# Patient Record
Sex: FEMALE | Race: WHITE | NOT HISPANIC OR LATINO | Employment: OTHER | URBAN - METROPOLITAN AREA
[De-identification: names, ages, dates, MRNs, and addresses within clinical notes are randomized per-mention and may not be internally consistent; named-entity substitution may affect disease eponyms.]

---

## 2023-03-30 DIAGNOSIS — I10 HYPERTENSION, UNSPECIFIED TYPE: Primary | ICD-10-CM

## 2023-04-03 DIAGNOSIS — M54.6 RIGHT-SIDED THORACIC BACK PAIN, UNSPECIFIED CHRONICITY: Primary | ICD-10-CM

## 2023-04-03 RX ORDER — HYDROCODONE BITARTRATE AND ACETAMINOPHEN 5; 325 MG/1; MG/1
TABLET ORAL
COMMUNITY
Start: 2023-03-06 | End: 2023-07-11 | Stop reason: ALTCHOICE

## 2023-04-03 RX ORDER — POTASSIUM CHLORIDE 750 MG/1
10 TABLET, EXTENDED RELEASE ORAL
COMMUNITY
Start: 2021-04-09 | End: 2023-07-11 | Stop reason: SDUPTHER

## 2023-04-03 RX ORDER — AMLODIPINE BESYLATE 10 MG/1
10 TABLET ORAL DAILY
COMMUNITY
Start: 2017-04-06 | End: 2023-07-11 | Stop reason: SDUPTHER

## 2023-04-03 RX ORDER — IBUPROFEN 800 MG/1
800 TABLET ORAL 3 TIMES DAILY
Qty: 270 TABLET | Refills: 1 | Status: SHIPPED | OUTPATIENT
Start: 2023-04-03 | End: 2023-06-16

## 2023-04-03 RX ORDER — HYDROCHLOROTHIAZIDE 25 MG/1
25 TABLET ORAL DAILY
COMMUNITY
Start: 2012-07-13 | End: 2023-07-11 | Stop reason: SDUPTHER

## 2023-04-03 RX ORDER — ATENOLOL 50 MG/1
50 TABLET ORAL 2 TIMES DAILY
Qty: 180 TABLET | Refills: 1 | Status: SHIPPED | OUTPATIENT
Start: 2023-04-03 | End: 2023-07-03

## 2023-04-03 RX ORDER — HYDROCHLOROTHIAZIDE 25 MG/1
25 TABLET ORAL DAILY
Qty: 90 TABLET | Refills: 1 | Status: SHIPPED | OUTPATIENT
Start: 2023-04-03 | End: 2023-07-03

## 2023-04-03 RX ORDER — ATENOLOL 50 MG/1
50 TABLET ORAL 2 TIMES DAILY
COMMUNITY
Start: 2012-09-26 | End: 2023-07-11 | Stop reason: SDUPTHER

## 2023-04-03 RX ORDER — AMLODIPINE BESYLATE 10 MG/1
10 TABLET ORAL DAILY
Qty: 90 TABLET | Refills: 1 | Status: SHIPPED | OUTPATIENT
Start: 2023-04-03 | End: 2023-07-03

## 2023-04-03 RX ORDER — IBUPROFEN 800 MG/1
800 TABLET ORAL 3 TIMES DAILY
COMMUNITY
Start: 2017-01-09 | End: 2023-04-03

## 2023-04-03 NOTE — TELEPHONE ENCOUNTER
Requested Prescriptions     Pending Prescriptions Disp Refills    atenolol (Tenormin) 50 mg tablet [Pharmacy Med Name: Atenolol 50 MG Oral Tablet] 180 tablet 1     Sig: Take 1 tablet (50 mg) by mouth in the morning and 1 tablet (50 mg) before bedtime.    amLODIPine (Norvasc) 10 mg tablet [Pharmacy Med Name: amLODIPine Besylate 10 MG Oral Tablet] 90 tablet 1     Sig: Take 1 tablet (10 mg) by mouth once daily.    hydroCHLOROthiazide (HYDRODiuril) 25 mg tablet [Pharmacy Med Name: hydroCHLOROthiazide 25 MG Oral Tablet] 90 tablet 1     Sig: Take 1 tablet (25 mg) by mouth once daily.

## 2023-04-03 NOTE — TELEPHONE ENCOUNTER
Requested Prescriptions     Pending Prescriptions Disp Refills    ibuprofen 800 mg tablet [Pharmacy Med Name: Ibuprofen 800 MG Oral Tablet] 270 tablet 1     Sig: Take 1 tablet (800 mg) by mouth in the morning and 1 tablet (800 mg) in the evening and 1 tablet (800 mg) before bedtime.

## 2023-06-21 DIAGNOSIS — E87.6 HYPOKALEMIA: Primary | ICD-10-CM

## 2023-06-21 RX ORDER — POTASSIUM CHLORIDE 750 MG/1
50 TABLET, FILM COATED, EXTENDED RELEASE ORAL DAILY
Qty: 450 TABLET | Refills: 0 | Status: SHIPPED | OUTPATIENT
Start: 2023-06-21 | End: 2023-07-11 | Stop reason: SDUPTHER

## 2023-06-21 NOTE — TELEPHONE ENCOUNTER
Pharmacy requests prescription below    Last Office Visit: 01/24/2023     Requested Prescriptions     Pending Prescriptions Disp Refills    potassium chloride CR 10 mEq ER tablet [Pharmacy Med Name: Potassium Chloride Roro ER 10 MEQ Oral Tablet Extended Release] 500 tablet 2     Sig: TAKE 5 TABLETS BY MOUTH DAILY

## 2023-07-11 ENCOUNTER — OFFICE VISIT (OUTPATIENT)
Dept: PRIMARY CARE | Facility: CLINIC | Age: 68
End: 2023-07-11
Payer: MEDICARE

## 2023-07-11 VITALS
TEMPERATURE: 97.7 F | BODY MASS INDEX: 33.84 KG/M2 | HEART RATE: 70 BPM | DIASTOLIC BLOOD PRESSURE: 86 MMHG | OXYGEN SATURATION: 95 % | SYSTOLIC BLOOD PRESSURE: 142 MMHG | RESPIRATION RATE: 18 BRPM | WEIGHT: 185 LBS

## 2023-07-11 DIAGNOSIS — E87.6 HYPOKALEMIA: ICD-10-CM

## 2023-07-11 DIAGNOSIS — M54.6 RIGHT-SIDED THORACIC BACK PAIN, UNSPECIFIED CHRONICITY: ICD-10-CM

## 2023-07-11 DIAGNOSIS — I10 HYPERTENSION, UNSPECIFIED TYPE: Primary | ICD-10-CM

## 2023-07-11 PROCEDURE — 99213 OFFICE O/P EST LOW 20 MIN: CPT | Performed by: FAMILY MEDICINE

## 2023-07-11 RX ORDER — HYDROCHLOROTHIAZIDE 25 MG/1
25 TABLET ORAL DAILY
Qty: 90 TABLET | Refills: 1 | Status: SHIPPED | OUTPATIENT
Start: 2023-07-11 | End: 2023-09-15

## 2023-07-11 RX ORDER — AMLODIPINE BESYLATE 10 MG/1
10 TABLET ORAL DAILY
Qty: 90 TABLET | Refills: 1 | Status: SHIPPED | OUTPATIENT
Start: 2023-07-11 | End: 2023-09-15

## 2023-07-11 RX ORDER — IBUPROFEN 800 MG/1
800 TABLET ORAL 3 TIMES DAILY
Qty: 270 TABLET | Refills: 1 | Status: SHIPPED | OUTPATIENT
Start: 2023-07-11 | End: 2023-09-27 | Stop reason: SDUPTHER

## 2023-07-11 RX ORDER — CHOLECALCIFEROL (VITAMIN D3) 50 MCG
TABLET ORAL
COMMUNITY
End: 2024-04-12 | Stop reason: SDUPTHER

## 2023-07-11 RX ORDER — ASCORBIC ACID 250 MG
TABLET ORAL
COMMUNITY
End: 2024-04-12 | Stop reason: SDUPTHER

## 2023-07-11 RX ORDER — POTASSIUM CHLORIDE 750 MG/1
50 TABLET, FILM COATED, EXTENDED RELEASE ORAL DAILY
Qty: 450 TABLET | Refills: 1 | Status: SHIPPED | OUTPATIENT
Start: 2023-07-11 | End: 2023-09-20

## 2023-07-11 RX ORDER — IBUPROFEN 200 MG
1 TABLET ORAL DAILY
COMMUNITY
Start: 2023-04-24 | End: 2024-05-15 | Stop reason: ENTERED-IN-ERROR

## 2023-07-11 RX ORDER — ATENOLOL 50 MG/1
50 TABLET ORAL 2 TIMES DAILY
Qty: 180 TABLET | Refills: 1 | Status: SHIPPED | OUTPATIENT
Start: 2023-07-11 | End: 2023-09-15

## 2023-07-11 NOTE — PROGRESS NOTES
Subjective   Patient ID: Klaudia Ratliff is a 67 y.o. female who presents for Med Refill.    HPI   Patient comes in today for 6-month checkup and refill of medication.  She is doing well and is currently without complaints.  She is taking all her medicines as directed without side effects.        1/24/2023  Patient comes in today for Medicare wellness exam. She is currently without complaints. She is going to have a great grandchild in June and she is currently smoking and would like to quit. She is currently smoking less than a pack a day. She needs refills on all of her medications. Her colonoscopy and mammograms are up-to-date. She refuses all vaccines.    2/102022  Patient presents today for 6-month checkup and refill of meds. She currently is without complaints. She states that she is taking her medicines as directed and is not having any side effects from them. She has a new mail away prescription service with [facility] and needs all of her medicines sent there. She is also eligible for a welcome to Medicare wellness exam which we will schedule in August.    Patient's blood pressure today is high in the office but she claims that she is getting readings 130/80 or less at home. She has an arm cuff and checks her blood pressure once a week right now.    8/10/2021  Patient comes in today for an outbreak of her lichen planus. She usually sees dermatology for this but they will not even schedule her because she currently does not have health insurance. Apparently she will not have health insurance until her birthday next month 9/3/2020. In the meantime the outbreak is fairly extensive on both arms and both legs. She brings with her the medication that they usually gave her and we will refill it for her today.    3/24/2021  Patient presents today for 6-month checkup and refill of meds. She currently is without complaints. She states that she is taking her medicines as directed and is not having any side effects  "from them. She is very excited about a program called [employer] which costs her about $400 a month for the food she has to buy. She started back on 2021 with her daughter and apparently it is working very well for her as she has lost 19 pounds so far. Despite her weight loss however her blood pressure is still high even on recheck. She claims she has been taking her medicine as directed.    2020  Patient comes in today for evaluation of pain in her lower back into the buttock area. She works in a printing company running 2 different processes. 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 She claims it didn't start until she started running the \"high dye machine\". She claims she doesn't have good health insurance but she does have a HSA. She is waiting to get on Medicare a year from now. Her   in  from a motor vehicle accident.    3/26/2020  Patient calls in today as a telephone visit as she does not have access to a cell phone or Internet video capability. We are currently in the middle of a coronavirus pandemic worldwide and normally she would've been brought into the office for her current complaints. He is transitioning in the care. She was seen at the [facility] express clinic on 3/8/2020 where she was diagnosed with a UTI. She was placed on Keflex 500 mg 2 times a day for 7 days and she finished the medicine 10 days ago and does not feel like her symptoms ever went away. All nonessential business in [place] has been shut down but patient works for a printing company that has been designated to stay open. She is having tingling and pressure with urination but denies frequency or urgency.    3/26/2020  Patient comes in today she is very excited. She is moving from [facility] to a house in [place] next week. She technically has the apartment until November because she is breaking her lease. She is here today for checkup and refill of " her medicine. She doesn't want to do the lab work etc. right now because of its expense.     Review of Systems   All other systems reviewed and are negative.      Objective   BP (!) 163/91   Pulse 70   Temp 36.5 °C (97.7 °F)   Resp 18   Wt 83.9 kg (185 lb)   SpO2 95%   BMI 33.84 kg/m²     Physical Exam  Vitals reviewed.   Constitutional:       Appearance: She is well-developed. She is obese.   HENT:      Head: Normocephalic and atraumatic.      Right Ear: Tympanic membrane, ear canal and external ear normal.      Left Ear: Tympanic membrane, ear canal and external ear normal.      Nose: Nose normal.      Mouth/Throat:      Mouth: Mucous membranes are moist.      Pharynx: Oropharynx is clear.   Eyes:      Extraocular Movements: Extraocular movements intact.      Conjunctiva/sclera: Conjunctivae normal.      Pupils: Pupils are equal, round, and reactive to light.   Cardiovascular:      Rate and Rhythm: Normal rate and regular rhythm.      Heart sounds: Normal heart sounds. No murmur heard.  Pulmonary:      Effort: Pulmonary effort is normal.      Breath sounds: Normal breath sounds. No wheezing.   Abdominal:      General: Abdomen is flat. Bowel sounds are normal.      Palpations: Abdomen is soft.   Musculoskeletal:         General: Normal range of motion.   Skin:     General: Skin is warm and dry.   Neurological:      General: No focal deficit present.      Mental Status: She is alert and oriented to person, place, and time. Mental status is at baseline.   Psychiatric:         Mood and Affect: Mood normal.         Behavior: Behavior normal.         Thought Content: Thought content normal.         Judgment: Judgment normal.         Assessment/Plan   Problem List Items Addressed This Visit    None  Visit Diagnoses       Hypertension, unspecified type    -  Primary    Relevant Medications    amLODIPine (Norvasc) 10 mg tablet    atenolol (Tenormin) 50 mg tablet    hydroCHLOROthiazide (HYDRODiuril) 25 mg tablet     Right-sided thoracic back pain, unspecified chronicity        Relevant Medications    ibuprofen 800 mg tablet    Hypokalemia        Relevant Medications    potassium chloride CR 10 mEq ER tablet        Continue current meds as directed.  Follow up in 6 months for recheck if all remains stable, sooner if problems arise.  Medication list reconciled.  Thank you for visiting today!      Professional services: Some of this note was completed using Dragon voice technology and sometimes the software misinterprets words. This may include unintended errors with respect to translation of words, typographical errors or grammar errors which may not have been identified prior to finalization of the chart note. Please take this into account when reading the note. Thank you.

## 2023-07-11 NOTE — PROGRESS NOTES
Subjective   Patient ID: Klaudia Ratliff is a 67 y.o. female who presents for Med Refill.    HPI     Review of Systems    Objective   BP (!) 163/91   Pulse 70   Temp 36.5 °C (97.7 °F)   Resp 18   Wt 83.9 kg (185 lb)   SpO2 95%   BMI 33.84 kg/m²     Physical Exam    Assessment/Plan

## 2023-08-15 ENCOUNTER — OFFICE VISIT (OUTPATIENT)
Dept: PRIMARY CARE | Facility: CLINIC | Age: 68
End: 2023-08-15
Payer: MEDICARE

## 2023-08-15 ENCOUNTER — LAB (OUTPATIENT)
Dept: LAB | Facility: LAB | Age: 68
End: 2023-08-15
Payer: MEDICARE

## 2023-08-15 VITALS
HEART RATE: 64 BPM | WEIGHT: 185 LBS | TEMPERATURE: 97.7 F | OXYGEN SATURATION: 97 % | SYSTOLIC BLOOD PRESSURE: 156 MMHG | RESPIRATION RATE: 18 BRPM | DIASTOLIC BLOOD PRESSURE: 83 MMHG | BODY MASS INDEX: 33.84 KG/M2

## 2023-08-15 DIAGNOSIS — M25.552 BILATERAL HIP PAIN: Primary | ICD-10-CM

## 2023-08-15 DIAGNOSIS — E87.6 HYPOKALEMIA: ICD-10-CM

## 2023-08-15 DIAGNOSIS — M25.551 BILATERAL HIP PAIN: Primary | ICD-10-CM

## 2023-08-15 LAB
ALANINE AMINOTRANSFERASE (SGPT) (U/L) IN SER/PLAS: 13 U/L (ref 7–45)
ALBUMIN (G/DL) IN SER/PLAS: 3.9 G/DL (ref 3.4–5)
ALKALINE PHOSPHATASE (U/L) IN SER/PLAS: 42 U/L (ref 33–136)
ANION GAP IN SER/PLAS: 15 MMOL/L (ref 10–20)
ASPARTATE AMINOTRANSFERASE (SGOT) (U/L) IN SER/PLAS: 16 U/L (ref 9–39)
BILIRUBIN TOTAL (MG/DL) IN SER/PLAS: 0.5 MG/DL (ref 0–1.2)
CALCIUM (MG/DL) IN SER/PLAS: 9.5 MG/DL (ref 8.6–10.3)
CARBON DIOXIDE, TOTAL (MMOL/L) IN SER/PLAS: 26 MMOL/L (ref 21–32)
CHLORIDE (MMOL/L) IN SER/PLAS: 102 MMOL/L (ref 98–107)
CREATININE (MG/DL) IN SER/PLAS: 0.98 MG/DL (ref 0.5–1.05)
GFR FEMALE: 63 ML/MIN/1.73M2
GLUCOSE (MG/DL) IN SER/PLAS: 78 MG/DL (ref 74–99)
POTASSIUM (MMOL/L) IN SER/PLAS: 4.7 MMOL/L (ref 3.5–5.3)
PROTEIN TOTAL: 6.9 G/DL (ref 6.4–8.2)
SODIUM (MMOL/L) IN SER/PLAS: 138 MMOL/L (ref 136–145)
UREA NITROGEN (MG/DL) IN SER/PLAS: 25 MG/DL (ref 6–23)

## 2023-08-15 PROCEDURE — 99214 OFFICE O/P EST MOD 30 MIN: CPT | Performed by: FAMILY MEDICINE

## 2023-08-15 PROCEDURE — 80053 COMPREHEN METABOLIC PANEL: CPT

## 2023-08-15 PROCEDURE — 36415 COLL VENOUS BLD VENIPUNCTURE: CPT

## 2023-08-15 RX ORDER — PREDNISONE 10 MG/1
TABLET ORAL
Qty: 30 TABLET | Refills: 0 | Status: SHIPPED | OUTPATIENT
Start: 2023-08-15 | End: 2023-11-01 | Stop reason: ALTCHOICE

## 2023-08-15 NOTE — PROGRESS NOTES
Subjective   Patient ID: Klaudia Ratliff is a 67 y.o. female who presents for Gait Problem (Having trouble walking x a couple mos. Not very active and feels like every 100 steps her hips would freeze up. She increase her daily activity but now the aches and pains are worse and burning. ).    HPI   Patient comes in today complaining of gait problems for the last month.  She complains of aches and pains in both hips.  She admits that she is not very active and would like to try increasing her activity but noticed when she tried to do that the aches and pains were worse.    7/11/2023  Patient comes in today for 6-month checkup and refill of medication.  She is doing well and is currently without complaints.  She is taking all her medicines as directed without side effects.      Review of Systems   All other systems reviewed and are negative.      Objective   /83   Pulse 64   Temp 36.5 °C (97.7 °F)   Resp 18   Wt 83.9 kg (185 lb)   SpO2 97%   BMI 33.84 kg/m²     Physical Exam  Vitals reviewed.   Constitutional:       Appearance: She is well-developed.   HENT:      Head: Normocephalic and atraumatic.      Right Ear: Tympanic membrane, ear canal and external ear normal.      Left Ear: Tympanic membrane, ear canal and external ear normal.      Nose: Nose normal.      Mouth/Throat:      Mouth: Mucous membranes are moist.      Pharynx: Oropharynx is clear.   Eyes:      Extraocular Movements: Extraocular movements intact.      Conjunctiva/sclera: Conjunctivae normal.      Pupils: Pupils are equal, round, and reactive to light.   Cardiovascular:      Rate and Rhythm: Normal rate and regular rhythm.      Heart sounds: Normal heart sounds. No murmur heard.  Pulmonary:      Effort: Pulmonary effort is normal.      Breath sounds: Normal breath sounds. No wheezing.   Abdominal:      General: Abdomen is flat. Bowel sounds are normal.      Palpations: Abdomen is soft.   Musculoskeletal:         General: Tenderness  (Bilateral hip pain) present. Normal range of motion.   Skin:     General: Skin is warm and dry.   Neurological:      General: No focal deficit present.      Mental Status: She is alert and oriented to person, place, and time. Mental status is at baseline.   Psychiatric:         Mood and Affect: Mood normal.         Behavior: Behavior normal.         Thought Content: Thought content normal.         Judgment: Judgment normal.       Assessment/Plan   Problem List Items Addressed This Visit    None  Visit Diagnoses       Bilateral hip pain    -  Primary    Relevant Medications    predniSONE (Deltasone) 10 mg tablet    Other Relevant Orders    XR hips bilateral 2 VW w or wo pelvis    Hypokalemia        Relevant Orders    Comprehensive Metabolic Panel        Will contact patient with x-ray results when they are available.  Will contact patient with lab results when available.  Medication list reconciled.  Thank you for visiting today!      Professional services: Some of this note was completed using Dragon voice technology and sometimes the software misinterprets words. This may include unintended errors with respect to translation of words, typographical errors or grammar errors which may not have been identified prior to finalization of the chart note. Please take this into account when reading the note. Thank you.

## 2023-08-18 ENCOUNTER — TELEPHONE (OUTPATIENT)
Dept: PRIMARY CARE | Facility: CLINIC | Age: 68
End: 2023-08-18
Payer: MEDICARE

## 2023-08-18 DIAGNOSIS — G89.29 CHRONIC BILATERAL LOW BACK PAIN, UNSPECIFIED WHETHER SCIATICA PRESENT: Primary | ICD-10-CM

## 2023-08-18 DIAGNOSIS — M54.50 CHRONIC BILATERAL LOW BACK PAIN, UNSPECIFIED WHETHER SCIATICA PRESENT: Primary | ICD-10-CM

## 2023-08-18 NOTE — TELEPHONE ENCOUNTER
I called and spoke with patient.  She claims she is getting no relief with the prednisone.  She states is still very difficult for her to walk.  We will have her get an x-ray of the lumbar spine.  Patient also given reassurances about her CMP lab work which was normal except for slightly elevated BUN of 25.

## 2023-08-18 NOTE — TELEPHONE ENCOUNTER
Patient called back with a copy of questions from the [facility] follow up.  Patient states that she has been taking the prednisone as prescribed by the doctor for the arthritis and she knows this is only the fifth day but she is wondering how long it will be before she feels some kind of relief?  Patient states that she can't even walk to the bathroom without stopping and resting because the pain is so bad.  Patient also looked at labs in her chart and she saw that some levels were high and she knows that is in connection to the kidneys the patient is wondering if that is something that she needs to be concerned with?  Patient can be reached at [phone].      Thank You

## 2023-08-18 NOTE — TELEPHONE ENCOUNTER
Patient is calling she would like to go over the results of her lab work.  Patient can be reached at [phone].    Thank You

## 2023-08-24 DIAGNOSIS — M25.551 BILATERAL HIP PAIN: Primary | ICD-10-CM

## 2023-08-24 DIAGNOSIS — M25.552 BILATERAL HIP PAIN: Primary | ICD-10-CM

## 2023-08-25 ENCOUNTER — TELEPHONE (OUTPATIENT)
Dept: PRIMARY CARE | Facility: CLINIC | Age: 68
End: 2023-08-25
Payer: MEDICARE

## 2023-08-25 DIAGNOSIS — M54.6 RIGHT-SIDED THORACIC BACK PAIN, UNSPECIFIED CHRONICITY: ICD-10-CM

## 2023-08-25 DIAGNOSIS — M25.551 BILATERAL HIP PAIN: Primary | ICD-10-CM

## 2023-08-25 DIAGNOSIS — M25.552 BILATERAL HIP PAIN: Primary | ICD-10-CM

## 2023-08-25 NOTE — TELEPHONE ENCOUNTER
I spoke with patient today and at this point we will have her see physical therapy for balance and ambulation.  We will send referral to [facility] at [facility].  Patient is agreeable.

## 2023-08-25 NOTE — TELEPHONE ENCOUNTER
Patient is calling stating that the doctor she was referred to by Dr. Mendes for her spine stated that they don't do anything spinal.  Patient is asking for another recommendation as to where she can go.  Patient can be reached at [phone].    Thank You

## 2023-09-14 DIAGNOSIS — I10 HYPERTENSION, UNSPECIFIED TYPE: ICD-10-CM

## 2023-09-15 RX ORDER — ATENOLOL 50 MG/1
50 TABLET ORAL 2 TIMES DAILY
Qty: 200 TABLET | Refills: 1 | Status: SHIPPED | OUTPATIENT
Start: 2023-09-15 | End: 2023-09-27 | Stop reason: SDUPTHER

## 2023-09-15 RX ORDER — HYDROCHLOROTHIAZIDE 25 MG/1
25 TABLET ORAL DAILY
Qty: 100 TABLET | Refills: 1 | Status: SHIPPED | OUTPATIENT
Start: 2023-09-15 | End: 2023-09-27 | Stop reason: SDUPTHER

## 2023-09-15 RX ORDER — AMLODIPINE BESYLATE 10 MG/1
10 TABLET ORAL DAILY
Qty: 100 TABLET | Refills: 1 | Status: SHIPPED | OUTPATIENT
Start: 2023-09-15 | End: 2023-09-27 | Stop reason: SDUPTHER

## 2023-09-15 NOTE — TELEPHONE ENCOUNTER
Requested Prescriptions     Pending Prescriptions Disp Refills    amLODIPine (Norvasc) 10 mg tablet [Pharmacy Med Name: amLODIPine Besylate 10 MG Oral Tablet] 100 tablet 2     Sig: TAKE 1 TABLET BY MOUTH ONCE  DAILY    atenolol (Tenormin) 50 mg tablet [Pharmacy Med Name: Atenolol 50 MG Oral Tablet] 200 tablet 2     Sig: TAKE 1 TABLET BY MOUTH TWICE  DAILY    hydroCHLOROthiazide (HYDRODiuril) 25 mg tablet [Pharmacy Med Name: hydroCHLOROthiazide 25 MG Oral Tablet] 100 tablet 2     Sig: TAKE 1 TABLET BY MOUTH ONCE  DAILY

## 2023-09-18 DIAGNOSIS — E87.6 HYPOKALEMIA: ICD-10-CM

## 2023-09-20 RX ORDER — POTASSIUM CHLORIDE 750 MG/1
10 TABLET, FILM COATED, EXTENDED RELEASE ORAL DAILY
Qty: 90 TABLET | Refills: 1 | Status: SHIPPED | OUTPATIENT
Start: 2023-09-20 | End: 2023-09-27 | Stop reason: SDUPTHER

## 2023-09-20 NOTE — TELEPHONE ENCOUNTER
Requested Prescriptions     Pending Prescriptions Disp Refills    potassium chloride CR 10 mEq ER tablet [Pharmacy Med Name: Potassium Chloride Roro ER 10 MEQ Oral Tablet Extended Release] 90 tablet 1     Sig: Take 1 tablet (10 mEq) by mouth once daily.

## 2023-09-27 DIAGNOSIS — E87.6 HYPOKALEMIA: ICD-10-CM

## 2023-09-27 DIAGNOSIS — I10 HYPERTENSION, UNSPECIFIED TYPE: ICD-10-CM

## 2023-09-27 DIAGNOSIS — M54.6 RIGHT-SIDED THORACIC BACK PAIN, UNSPECIFIED CHRONICITY: ICD-10-CM

## 2023-09-27 RX ORDER — POTASSIUM CHLORIDE 750 MG/1
10 TABLET, FILM COATED, EXTENDED RELEASE ORAL DAILY
Qty: 90 TABLET | Refills: 1 | Status: SHIPPED | OUTPATIENT
Start: 2023-09-27 | End: 2023-10-17 | Stop reason: SDUPTHER

## 2023-09-27 RX ORDER — AMLODIPINE BESYLATE 10 MG/1
10 TABLET ORAL DAILY
Qty: 100 TABLET | Refills: 1 | Status: SHIPPED | OUTPATIENT
Start: 2023-09-27 | End: 2023-12-29 | Stop reason: SINTOL

## 2023-09-27 RX ORDER — ATENOLOL 50 MG/1
50 TABLET ORAL 2 TIMES DAILY
Qty: 200 TABLET | Refills: 1 | Status: SHIPPED | OUTPATIENT
Start: 2023-09-27 | End: 2023-12-29 | Stop reason: DRUGHIGH

## 2023-09-27 RX ORDER — HYDROCHLOROTHIAZIDE 25 MG/1
25 TABLET ORAL DAILY
Qty: 100 TABLET | Refills: 1 | Status: SHIPPED | OUTPATIENT
Start: 2023-09-27 | End: 2024-01-12 | Stop reason: ALTCHOICE

## 2023-09-27 RX ORDER — IBUPROFEN 800 MG/1
800 TABLET ORAL 3 TIMES DAILY
Qty: 270 TABLET | Refills: 1 | Status: SHIPPED | OUTPATIENT
Start: 2023-09-27 | End: 2024-02-16 | Stop reason: HOSPADM

## 2023-09-27 NOTE — TELEPHONE ENCOUNTER
Patient is requesting a refill on her     Amlodipine 10 mg  Atenolol 50 mg  Hydrochlorothiazide 25 mg  Potassium 10 mEq  Ibuprofen 800 mg      Sent to [facility]

## 2023-10-12 DIAGNOSIS — E87.6 HYPOKALEMIA: ICD-10-CM

## 2023-10-12 NOTE — TELEPHONE ENCOUNTER
Patient called in today stating that she just received her prescription from [facility] and it is incorrect and she does not have enough pills.  Patient states that the instructions say to take one pill daily and the old instructions were to take five daily.  Patient is calling in and requesting a clarification.  Patient can be reached at [phone].      Thank You

## 2023-10-17 RX ORDER — POTASSIUM CHLORIDE 750 MG/1
50 TABLET, FILM COATED, EXTENDED RELEASE ORAL DAILY
Qty: 450 TABLET | Refills: 1 | Status: SHIPPED | OUTPATIENT
Start: 2023-10-17 | End: 2024-01-02

## 2023-10-17 NOTE — TELEPHONE ENCOUNTER
Pt states that she is supposed to take 5 tablets daily but her Rx was changed to 1. Please review and advise.

## 2023-10-20 NOTE — TELEPHONE ENCOUNTER
Patient is calling to check the status on this request.  Patient can be reached at [phone].    Thank You

## 2023-10-23 NOTE — TELEPHONE ENCOUNTER
Patient is calling to check the status on this medication.  Patient is also requesting a follow up on a physical therapy order that was sent over on Wednesday about what her follow up should be.  Patient is asking if anything else is needed like additional tests or what not?  Patient would like a call back today on these matters if possible.  Patient can be reached at [phone].      Thank You

## 2023-10-24 NOTE — TELEPHONE ENCOUNTER
Patient is calling stating that [facility] told her that the prescription was canceled and they are not sending it out.  Patient can be reached at [phone].      Thank You

## 2023-10-24 NOTE — TELEPHONE ENCOUNTER
I tried to call the patient and explain as stated but patient did not answer and the voicemail box is not set up unable to leave a message.

## 2023-10-25 ENCOUNTER — TELEPHONE (OUTPATIENT)
Dept: PRIMARY CARE | Facility: CLINIC | Age: 68
End: 2023-10-25
Payer: MEDICARE

## 2023-10-25 NOTE — TELEPHONE ENCOUNTER
Please call patient about script,   potassium chloride CR 10 mEq ER tablet     From [facility]. Thank you.

## 2023-11-01 ENCOUNTER — OFFICE VISIT (OUTPATIENT)
Dept: PRIMARY CARE | Facility: CLINIC | Age: 68
End: 2023-11-01
Payer: MEDICARE

## 2023-11-01 VITALS
BODY MASS INDEX: 33.65 KG/M2 | RESPIRATION RATE: 18 BRPM | DIASTOLIC BLOOD PRESSURE: 82 MMHG | WEIGHT: 184 LBS | HEART RATE: 84 BPM | SYSTOLIC BLOOD PRESSURE: 133 MMHG | TEMPERATURE: 98 F

## 2023-11-01 DIAGNOSIS — G89.29 CHRONIC RIGHT-SIDED LOW BACK PAIN WITH LEFT-SIDED SCIATICA: Primary | ICD-10-CM

## 2023-11-01 DIAGNOSIS — M54.42 CHRONIC RIGHT-SIDED LOW BACK PAIN WITH LEFT-SIDED SCIATICA: Primary | ICD-10-CM

## 2023-11-01 PROBLEM — E87.6 HYPOKALEMIA: Status: ACTIVE | Noted: 2023-11-01

## 2023-11-01 PROBLEM — L43.9 LICHEN PLANUS: Status: ACTIVE | Noted: 2023-11-01

## 2023-11-01 PROBLEM — I83.90 ASYMPTOMATIC VARICOSE VEINS: Status: ACTIVE | Noted: 2023-11-01

## 2023-11-01 PROBLEM — M54.6 ACUTE RIGHT-SIDED THORACIC BACK PAIN: Status: RESOLVED | Noted: 2023-11-01 | Resolved: 2023-11-01

## 2023-11-01 PROBLEM — F17.210 CIGARETTE NICOTINE DEPENDENCE WITHOUT COMPLICATION: Status: ACTIVE | Noted: 2023-11-01

## 2023-11-01 PROBLEM — I10 HYPERTENSION: Status: ACTIVE | Noted: 2023-11-01

## 2023-11-01 PROBLEM — M54.50 LOW BACK PAIN: Status: ACTIVE | Noted: 2023-11-01

## 2023-11-01 PROBLEM — R51.9 HEADACHE: Status: ACTIVE | Noted: 2023-11-01

## 2023-11-01 PROBLEM — E78.5 HYPERLIPIDEMIA: Status: ACTIVE | Noted: 2023-11-01

## 2023-11-01 PROBLEM — M25.511 RIGHT SHOULDER PAIN: Status: RESOLVED | Noted: 2023-11-01 | Resolved: 2023-11-01

## 2023-11-01 PROBLEM — R19.5 POSITIVE COLORECTAL CANCER SCREENING USING COLOGUARD TEST: Status: ACTIVE | Noted: 2023-11-01

## 2023-11-01 PROCEDURE — 3079F DIAST BP 80-89 MM HG: CPT | Performed by: NURSE PRACTITIONER

## 2023-11-01 PROCEDURE — 3075F SYST BP GE 130 - 139MM HG: CPT | Performed by: NURSE PRACTITIONER

## 2023-11-01 PROCEDURE — 1160F RVW MEDS BY RX/DR IN RCRD: CPT | Performed by: NURSE PRACTITIONER

## 2023-11-01 PROCEDURE — 1159F MED LIST DOCD IN RCRD: CPT | Performed by: NURSE PRACTITIONER

## 2023-11-01 PROCEDURE — 99214 OFFICE O/P EST MOD 30 MIN: CPT | Performed by: NURSE PRACTITIONER

## 2023-11-01 RX ORDER — PREDNISONE 50 MG/1
50 TABLET ORAL DAILY
Qty: 5 TABLET | Refills: 0 | Status: SHIPPED | OUTPATIENT
Start: 2023-11-01 | End: 2023-11-06

## 2023-11-01 RX ORDER — PREDNISONE 20 MG/1
20 TABLET ORAL DAILY
Qty: 21 TABLET | Refills: 8 | Status: SHIPPED | OUTPATIENT
Start: 2023-11-01 | End: 2023-11-01 | Stop reason: SDUPTHER

## 2023-11-01 RX ORDER — PREDNISONE 20 MG/1
20 TABLET ORAL DAILY
Qty: 21 TABLET | Refills: 8 | Status: SHIPPED | OUTPATIENT
Start: 2023-11-01 | End: 2023-11-01 | Stop reason: ENTERED-IN-ERROR

## 2023-11-01 ASSESSMENT — ENCOUNTER SYMPTOMS
WEAKNESS: 0
NECK PAIN: 0
FATIGUE: 0
WHEEZING: 0
COUGH: 0
BACK PAIN: 1
NUMBNESS: 1
SHORTNESS OF BREATH: 0
MYALGIAS: 1
CHILLS: 0
PALPITATIONS: 0
FEVER: 0

## 2023-11-01 NOTE — PROGRESS NOTES
Subjective   Klaudia Ratliff is a 68 y.o. female who presents for Back Pain (Follow up- discuss PT results./). She completed 13 sessions of PT for LBP and impaired gait. She states the pain severity improved with P.T, but still can only walk  feet before having to sit down because of the pain. She tried a prednisone taper in 8/2023 and did not feel any relief.   HPI Reviewed results of x-rays of lumbar spine and ehsan hips from 8/15/2023. Has mild OA of ehsan hips. OA at L5-S1. Pt. Using ibuprofen 800mg usually once daily.   Review of Systems   Constitutional:  Negative for chills, fatigue and fever.   Respiratory:  Negative for cough, shortness of breath and wheezing.    Cardiovascular:  Negative for chest pain, palpitations and leg swelling.   Musculoskeletal:  Positive for back pain, gait problem and myalgias. Negative for neck pain.   Neurological:  Positive for numbness (left anterior thigh). Negative for weakness.   Objective   Physical Exam  Constitutional:       Appearance: Normal appearance.   Cardiovascular:      Rate and Rhythm: Normal rate and regular rhythm.      Heart sounds: No murmur heard.  Pulmonary:      Effort: Pulmonary effort is normal.      Breath sounds: Normal breath sounds.   Musculoskeletal:      Cervical back: Normal.      Thoracic back: Normal.      Lumbar back: Spasms (low lumbar paraspinal muscles; right buttock) and tenderness (approx L4-S1) present. Normal range of motion. Negative right straight leg raise test and negative left straight leg raise test.      Right hip: Tenderness (point tenderness overlying right hip) present. No deformity or bony tenderness.      Left hip: Normal.   Neurological:      Mental Status: She is alert.     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 Continue PT.

## 2023-11-07 DIAGNOSIS — M54.42 CHRONIC RIGHT-SIDED LOW BACK PAIN WITH LEFT-SIDED SCIATICA: ICD-10-CM

## 2023-11-07 DIAGNOSIS — G89.29 CHRONIC RIGHT-SIDED LOW BACK PAIN WITH LEFT-SIDED SCIATICA: ICD-10-CM

## 2023-11-07 RX ORDER — PREDNISONE 20 MG/1
20 TABLET ORAL DAILY
Qty: 21 TABLET | Refills: 8 | OUTPATIENT
Start: 2023-11-07 | End: 2024-05-05

## 2023-11-07 NOTE — TELEPHONE ENCOUNTER
Requested Prescriptions     Pending Prescriptions Disp Refills    predniSONE (Deltasone) 20 mg tablet 21 tablet 8     Sig: Take 1 tablet (20 mg) by mouth once daily.

## 2023-11-07 NOTE — TELEPHONE ENCOUNTER
Patient is calling to request a refill on the Prednisone that was prescribed by Tanna Izaguirre.  Patient states that it helped her out a lot and she took her last pill yesterday.  Patient wants to make sure that she gets rid of the Bronchitis so that she has no other issues.  Patient can be reached at [phone].      Thank You

## 2023-11-08 NOTE — TELEPHONE ENCOUNTER
Patient is calling to check the status on her refill request.  Patient can be reached at [phone].      Thank You

## 2023-11-08 NOTE — TELEPHONE ENCOUNTER
Pt now stating that the request was not for her Bronchitis (and that she didn't have bronchitis) but it is for her back pain. It was explained that she still won't be getting a refill of steroids. Does Pt still need to be seen?

## 2023-11-27 ENCOUNTER — HOSPITAL ENCOUNTER (OUTPATIENT)
Dept: RADIOLOGY | Facility: CLINIC | Age: 68
Discharge: HOME | End: 2023-11-27
Payer: MEDICARE

## 2023-11-27 DIAGNOSIS — G89.29 CHRONIC RIGHT-SIDED LOW BACK PAIN WITH LEFT-SIDED SCIATICA: ICD-10-CM

## 2023-11-27 DIAGNOSIS — M54.42 CHRONIC RIGHT-SIDED LOW BACK PAIN WITH LEFT-SIDED SCIATICA: ICD-10-CM

## 2023-11-27 PROCEDURE — 72148 MRI LUMBAR SPINE W/O DYE: CPT

## 2023-11-27 PROCEDURE — 72148 MRI LUMBAR SPINE W/O DYE: CPT | Performed by: RADIOLOGY

## 2023-12-01 ENCOUNTER — TELEPHONE (OUTPATIENT)
Dept: PRIMARY CARE | Facility: CLINIC | Age: 68
End: 2023-12-01
Payer: MEDICARE

## 2023-12-01 NOTE — TELEPHONE ENCOUNTER
Patient would like to go over the results of her MRI with the doctor.  Patient states that she did receive the results on [employer], but is unsure of what they mean and would like to get clarification from the physician.  Patient can be reached at [phone].      Thank You

## 2023-12-05 NOTE — TELEPHONE ENCOUNTER
Eric Abdalla,  I spoke with patient this evening and she has an appointment with pain management but not until January 3, 2024.  Please see if you can find an appointment for her sooner than that.  If not she will keep the 1/3/2024 appointment.  Thank you

## 2023-12-05 NOTE — TELEPHONE ENCOUNTER
Patient called this morning stating that she would like to speak with you about the MRI results.  Patient can be reached at [phone].      Thank You

## 2023-12-08 ENCOUNTER — OFFICE VISIT (OUTPATIENT)
Dept: PRIMARY CARE | Facility: CLINIC | Age: 68
End: 2023-12-08
Payer: MEDICARE

## 2023-12-08 VITALS
DIASTOLIC BLOOD PRESSURE: 85 MMHG | RESPIRATION RATE: 20 BRPM | SYSTOLIC BLOOD PRESSURE: 150 MMHG | HEART RATE: 70 BPM | WEIGHT: 184 LBS | BODY MASS INDEX: 33.65 KG/M2 | OXYGEN SATURATION: 96 % | TEMPERATURE: 97.9 F

## 2023-12-08 DIAGNOSIS — L03.031 CELLULITIS OF GREAT TOE, RIGHT: ICD-10-CM

## 2023-12-08 DIAGNOSIS — J01.90 ACUTE SINUSITIS, RECURRENCE NOT SPECIFIED, UNSPECIFIED LOCATION: Primary | ICD-10-CM

## 2023-12-08 DIAGNOSIS — L03.032 CELLULITIS OF GREAT TOE, LEFT: ICD-10-CM

## 2023-12-08 PROCEDURE — 99213 OFFICE O/P EST LOW 20 MIN: CPT | Performed by: FAMILY MEDICINE

## 2023-12-08 PROCEDURE — 3077F SYST BP >= 140 MM HG: CPT | Performed by: FAMILY MEDICINE

## 2023-12-08 PROCEDURE — 1159F MED LIST DOCD IN RCRD: CPT | Performed by: FAMILY MEDICINE

## 2023-12-08 PROCEDURE — 1160F RVW MEDS BY RX/DR IN RCRD: CPT | Performed by: FAMILY MEDICINE

## 2023-12-08 PROCEDURE — 3079F DIAST BP 80-89 MM HG: CPT | Performed by: FAMILY MEDICINE

## 2023-12-08 RX ORDER — DOXYCYCLINE 100 MG/1
100 CAPSULE ORAL 2 TIMES DAILY
Qty: 20 CAPSULE | Refills: 0 | Status: SHIPPED | OUTPATIENT
Start: 2023-12-08 | End: 2023-12-11 | Stop reason: SINTOL

## 2023-12-08 NOTE — PROGRESS NOTES
"Subjective   Patient ID: Klaudia Ratliff is a 68 y.o. female who presents for Sinus Problem (X 4 days. Started as a head cold and wants to make sure it is not in her sinuses. She is all \"plugged up\" and is prone to sinus infection. ) and Nail Problem (Both big toe nails are possibly infected, painful. X 2 weeks).    HPI   Patient comes in today complaining of her sinuses.  She is felt like she has had a \"cold\" for the last 4 days.  She has had a cough and her ears have been crackling.  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  She complains of nasal congestion, postnasal drip and a feeling of her ears being plugged.  Her cough is most pronounced when she is lying down.    Patient also complaining of some swelling in her great toes bilaterally with some dryness and cracking of the skin.    8/15/2023  Patient comes in today complaining of gait problems for the last month.  She complains of aches and pains in both hips.  She admits that she is not very active and would like to try increasing her activity but noticed when she tried to do that the aches and pains were worse.    Review of Systems   All other systems reviewed and are negative.      Objective   /85   Pulse 70   Temp 36.6 °C (97.9 °F)   Resp 20   Wt 83.5 kg (184 lb)   SpO2 96%   BMI 33.65 kg/m²     Physical Exam  Constitutional:       Appearance: She is well-developed. She is obese.   HENT:      Head: Normocephalic and atraumatic.      Right Ear: Tympanic membrane, ear canal and external ear normal.      Left Ear: Tympanic membrane, ear canal and external ear normal.      Nose: Congestion and rhinorrhea (Positive rhinorrhea, positive swollen turbinates, positive postnasal drip) present.      Mouth/Throat:      Mouth: Mucous membranes are moist.      Pharynx: Oropharynx is clear.   Eyes:      Extraocular Movements: Extraocular movements intact.      Conjunctiva/sclera: Conjunctivae normal.      Pupils: Pupils are equal, " round, and reactive to light.   Cardiovascular:      Rate and Rhythm: Normal rate and regular rhythm.      Heart sounds: Normal heart sounds. No murmur heard.  Pulmonary:      Effort: Pulmonary effort is normal.      Breath sounds: Normal breath sounds. No wheezing.   Abdominal:      General: Abdomen is flat. Bowel sounds are normal.      Palpations: Abdomen is soft.   Musculoskeletal:         General: Normal range of motion.   Skin:     General: Skin is warm and dry.      Findings: Erythema (Mild erythema present and mild swelling present of both great toes which both toes are also missing toenails for quite some time.) present.   Neurological:      General: No focal deficit present.      Mental Status: She is alert and oriented to person, place, and time. Mental status is at baseline.   Psychiatric:         Mood and Affect: Mood normal.         Behavior: Behavior normal.         Thought Content: Thought content normal.         Judgment: Judgment normal.         Assessment/Plan   Problem List Items Addressed This Visit    None  Visit Diagnoses         Codes    Acute sinusitis, recurrence not specified, unspecified location    -  Primary J01.90    Relevant Medications    doxycycline (Monodox) 100 mg capsule    Cellulitis of great toe, left     L03.032    Cellulitis of great toe, right     L03.031        Take new medication as directed.  Continue other medications as directed.  RTC in 2 weeks for recheck, sooner should problems arise.  Medication list reconciled.  Thank you for visiting today!      Professional services: Some of this note was completed using Dragon voice technology and sometimes the software misinterprets words. This may include unintended errors with respect to translation of words, typographical errors or grammar errors which may not have been identified prior to finalization of the chart note. Please take this into account when reading the note. Thank you.

## 2023-12-11 ENCOUNTER — TELEPHONE (OUTPATIENT)
Dept: PRIMARY CARE | Facility: CLINIC | Age: 68
End: 2023-12-11
Payer: MEDICARE

## 2023-12-11 NOTE — TELEPHONE ENCOUNTER
Patient called in stating that one she started taking the doxycycline she started having serve diarrhea and vomiting.  Patient states that she called the on call doctor and they told her to stop taking the medication.  Patient is calling to see if there is something that else that she can take?  Patient can be reached at [phone].      Thank You

## 2023-12-11 NOTE — TELEPHONE ENCOUNTER
I spoke with patient this afternoon and we will hold off on any further antibiotic at this time.  She is to soak her foot in warm water with Epsom salts for 10 to 15 minutes at least twice a day.  She will contact me on Thursday with an update.  Sooner should problems arise.

## 2023-12-15 DIAGNOSIS — J20.9 ACUTE BRONCHITIS, UNSPECIFIED ORGANISM: Primary | ICD-10-CM

## 2023-12-15 RX ORDER — CEPHALEXIN 500 MG/1
500 CAPSULE ORAL 3 TIMES DAILY
Qty: 30 CAPSULE | Refills: 0 | Status: SHIPPED | OUTPATIENT
Start: 2023-12-15 | End: 2023-12-25

## 2023-12-15 NOTE — TELEPHONE ENCOUNTER
Please notify patient that a new prescription was sent to [facility] in [place].  Thank you

## 2023-12-15 NOTE — TELEPHONE ENCOUNTER
Patient called in this morning asking if the doctor is willing to put her another antibiotic to replace the one that she was on?  Patient can be reached at [phone].      Thank You

## 2023-12-18 NOTE — TELEPHONE ENCOUNTER
Please let patient know she needs to complete the full 10-day course of antibiotics and then call us back with an update.  She has only been on it for 3 days.  Thank you.

## 2023-12-20 ENCOUNTER — APPOINTMENT (OUTPATIENT)
Dept: PAIN MEDICINE | Facility: CLINIC | Age: 68
End: 2023-12-20
Payer: MEDICARE

## 2023-12-20 ENCOUNTER — TELEPHONE (OUTPATIENT)
Dept: PRIMARY CARE | Facility: CLINIC | Age: 68
End: 2023-12-20
Payer: MEDICARE

## 2023-12-20 NOTE — TELEPHONE ENCOUNTER
Please notify patient that she is going to have numbness in her legs from her back issues.  She should make sure she follows up with pain management as scheduled.    The foot swelling could be due to the amlodipine that she is on for her blood pressure.  If that continues we will need to see her next week and possibly change her medicine.

## 2023-12-20 NOTE — TELEPHONE ENCOUNTER
Patient is calling in stating that her foot/calf is numb when she woke up this morning.  Patient states that she was hoping that it would go away through out the morning and this has not happened yet.  Patient is a little concerned about it and does not know what she should do about this?  Patient also states that it is still swollen.  Patient can be reached at [phone].        Thank You

## 2023-12-26 NOTE — TELEPHONE ENCOUNTER
Patient states that the swelling has not gone down.  Patient states that her toes are real red and are still numb at night.  Patient states that it is okay durning the day when she is walking with the numbness.  Patient is asking if another appointment is needed?  Patient can be reached at [phone].      Thank You

## 2023-12-29 ENCOUNTER — OFFICE VISIT (OUTPATIENT)
Dept: PRIMARY CARE | Facility: CLINIC | Age: 68
End: 2023-12-29
Payer: MEDICARE

## 2023-12-29 VITALS
BODY MASS INDEX: 33.29 KG/M2 | HEART RATE: 79 BPM | TEMPERATURE: 97.6 F | SYSTOLIC BLOOD PRESSURE: 145 MMHG | OXYGEN SATURATION: 94 % | RESPIRATION RATE: 16 BRPM | WEIGHT: 182 LBS | DIASTOLIC BLOOD PRESSURE: 84 MMHG

## 2023-12-29 DIAGNOSIS — I10 HYPERTENSION, UNSPECIFIED TYPE: ICD-10-CM

## 2023-12-29 PROCEDURE — 99213 OFFICE O/P EST LOW 20 MIN: CPT | Performed by: FAMILY MEDICINE

## 2023-12-29 RX ORDER — ATENOLOL 50 MG/1
TABLET ORAL
Qty: 200 TABLET | Refills: 1 | Status: SHIPPED | OUTPATIENT
Start: 2023-12-29 | End: 2024-01-12 | Stop reason: DRUGHIGH

## 2023-12-29 NOTE — PROGRESS NOTES
Subjective   Patient ID: Klaudia Ratliff is a 68 y.o. female who presents for Follow-up (For swollen R foot. Has not improved in the last 2 weeks but has not gotten worse. Seems to go down at night when she goes to bed. ).    HPI   Patient returns today with continued complaint of swelling in both of her feet.  She was last here on 12/8/2023 with similar complaints and also had sinus infection and was placed on antibiotics which would cover skin infection as well and the swelling did not change with the antibiotics.  Patient also had been instructed to soak her feet in warm water with Epsom salts but that did not change the swelling either.  She is on amlodipine 10 mg a day which we will discontinue and see if the swelling improves.      Patient has upcoming appointment with pain management for her low back pain and radicular pain down the lower extremities.    Review of Systems   All other systems reviewed and are negative.      Objective   /84   Pulse 79   Temp 36.4 °C (97.6 °F)   Resp 16   Wt 82.6 kg (182 lb)   SpO2 94%   BMI 33.29 kg/m²     Physical Exam  Constitutional:       Appearance: She is well-developed. She is obese.   HENT:      Head: Normocephalic and atraumatic.      Right Ear: Tympanic membrane, ear canal and external ear normal.      Left Ear: Tympanic membrane, ear canal and external ear normal.      Nose: Nose normal.      Mouth/Throat:      Mouth: Mucous membranes are moist.      Pharynx: Oropharynx is clear.   Eyes:      Extraocular Movements: Extraocular movements intact.      Conjunctiva/sclera: Conjunctivae normal.      Pupils: Pupils are equal, round, and reactive to light.   Cardiovascular:      Rate and Rhythm: Normal rate and regular rhythm.      Heart sounds: Normal heart sounds. No murmur heard.  Pulmonary:      Effort: Pulmonary effort is normal.      Breath sounds: Normal breath sounds. No wheezing.   Abdominal:      General: Abdomen is flat. Bowel sounds are normal.       Palpations: Abdomen is soft.   Musculoskeletal:         General: Normal range of motion.      Right lower leg: Edema (1-2+ pitting edema foot) present.      Left lower leg: Edema (1-2+ pitting edema foot) present.   Skin:     General: Skin is warm and dry.   Neurological:      General: No focal deficit present.      Mental Status: She is alert and oriented to person, place, and time. Mental status is at baseline.   Psychiatric:         Mood and Affect: Mood normal.         Behavior: Behavior normal.         Thought Content: Thought content normal.         Judgment: Judgment normal.       Assessment/Plan   Problem List Items Addressed This Visit             ICD-10-CM    Hypertension I10    Relevant Medications    atenolol (Tenormin) 50 mg tablet   DC amlodipine  Increase atenolol to 100 mg every morning and 50 mg every evening   Continue current meds as directed.  Follow up in 2 weeks for recheck if all remains stable, sooner if problems arise.  Medication list reconciled.  Thank you for visiting today!      Professional services: Some of this note was completed using Dragon voice technology and sometimes the software misinterprets words. This may include unintended errors with respect to translation of words, typographical errors or grammar errors which may not have been identified prior to finalization of the chart note. Please take this into account when reading the note. Thank you.

## 2024-01-12 ENCOUNTER — LAB (OUTPATIENT)
Dept: LAB | Facility: LAB | Age: 69
End: 2024-01-12
Payer: MEDICARE

## 2024-01-12 ENCOUNTER — OFFICE VISIT (OUTPATIENT)
Dept: PRIMARY CARE | Facility: CLINIC | Age: 69
End: 2024-01-12
Payer: MEDICARE

## 2024-01-12 VITALS
DIASTOLIC BLOOD PRESSURE: 83 MMHG | TEMPERATURE: 97.6 F | RESPIRATION RATE: 20 BRPM | WEIGHT: 180 LBS | HEART RATE: 57 BPM | OXYGEN SATURATION: 94 % | SYSTOLIC BLOOD PRESSURE: 171 MMHG | BODY MASS INDEX: 32.92 KG/M2

## 2024-01-12 DIAGNOSIS — E55.9 VITAMIN D DEFICIENCY: ICD-10-CM

## 2024-01-12 DIAGNOSIS — E87.6 HYPOKALEMIA: Primary | ICD-10-CM

## 2024-01-12 DIAGNOSIS — E78.5 HYPERLIPIDEMIA, UNSPECIFIED HYPERLIPIDEMIA TYPE: ICD-10-CM

## 2024-01-12 DIAGNOSIS — R60.9 EDEMA, UNSPECIFIED TYPE: ICD-10-CM

## 2024-01-12 DIAGNOSIS — I10 HYPERTENSION, UNSPECIFIED TYPE: ICD-10-CM

## 2024-01-12 DIAGNOSIS — M10.9 GOUT, UNSPECIFIED CAUSE, UNSPECIFIED CHRONICITY, UNSPECIFIED SITE: ICD-10-CM

## 2024-01-12 DIAGNOSIS — E53.8 VITAMIN B12 DEFICIENCY: ICD-10-CM

## 2024-01-12 LAB
25(OH)D3 SERPL-MCNC: 41 NG/ML (ref 30–100)
ALBUMIN SERPL BCP-MCNC: 4.3 G/DL (ref 3.4–5)
ALP SERPL-CCNC: 51 U/L (ref 33–136)
ALT SERPL W P-5'-P-CCNC: 11 U/L (ref 7–45)
ANION GAP SERPL CALC-SCNC: 16 MMOL/L (ref 10–20)
AST SERPL W P-5'-P-CCNC: 12 U/L (ref 9–39)
BILIRUB SERPL-MCNC: 0.5 MG/DL (ref 0–1.2)
BUN SERPL-MCNC: 17 MG/DL (ref 6–23)
CALCIUM SERPL-MCNC: 9.4 MG/DL (ref 8.6–10.3)
CHLORIDE SERPL-SCNC: 102 MMOL/L (ref 98–107)
CHOLEST SERPL-MCNC: 267 MG/DL (ref 0–199)
CHOLESTEROL/HDL RATIO: 5.6
CO2 SERPL-SCNC: 27 MMOL/L (ref 21–32)
CREAT SERPL-MCNC: 0.89 MG/DL (ref 0.5–1.05)
EGFRCR SERPLBLD CKD-EPI 2021: 71 ML/MIN/1.73M*2
ERYTHROCYTE [DISTWIDTH] IN BLOOD BY AUTOMATED COUNT: 13.2 % (ref 11.5–14.5)
GLUCOSE SERPL-MCNC: 96 MG/DL (ref 74–99)
HCT VFR BLD AUTO: 41.2 % (ref 36–46)
HDLC SERPL-MCNC: 47.5 MG/DL
HGB BLD-MCNC: 13.6 G/DL (ref 12–16)
LDLC SERPL CALC-MCNC: 188 MG/DL
MCH RBC QN AUTO: 33 PG (ref 26–34)
MCHC RBC AUTO-ENTMCNC: 33 G/DL (ref 32–36)
MCV RBC AUTO: 100 FL (ref 80–100)
NON HDL CHOLESTEROL: 220 MG/DL (ref 0–149)
NRBC BLD-RTO: 0 /100 WBCS (ref 0–0)
PLATELET # BLD AUTO: 290 X10*3/UL (ref 150–450)
POTASSIUM SERPL-SCNC: 4 MMOL/L (ref 3.5–5.3)
PROT SERPL-MCNC: 6.9 G/DL (ref 6.4–8.2)
RBC # BLD AUTO: 4.12 X10*6/UL (ref 4–5.2)
SODIUM SERPL-SCNC: 141 MMOL/L (ref 136–145)
TRIGL SERPL-MCNC: 160 MG/DL (ref 0–149)
TSH SERPL-ACNC: 1.86 MIU/L (ref 0.44–3.98)
URATE SERPL-MCNC: 7.9 MG/DL (ref 2.3–6.7)
VIT B12 SERPL-MCNC: 267 PG/ML (ref 211–911)
VLDL: 32 MG/DL (ref 0–40)
WBC # BLD AUTO: 10.2 X10*3/UL (ref 4.4–11.3)

## 2024-01-12 PROCEDURE — 80053 COMPREHEN METABOLIC PANEL: CPT

## 2024-01-12 PROCEDURE — 36415 COLL VENOUS BLD VENIPUNCTURE: CPT

## 2024-01-12 PROCEDURE — 99214 OFFICE O/P EST MOD 30 MIN: CPT | Performed by: FAMILY MEDICINE

## 2024-01-12 PROCEDURE — 80061 LIPID PANEL: CPT

## 2024-01-12 PROCEDURE — 82607 VITAMIN B-12: CPT

## 2024-01-12 PROCEDURE — 85027 COMPLETE CBC AUTOMATED: CPT

## 2024-01-12 PROCEDURE — 82306 VITAMIN D 25 HYDROXY: CPT

## 2024-01-12 PROCEDURE — 84550 ASSAY OF BLOOD/URIC ACID: CPT

## 2024-01-12 PROCEDURE — 84443 ASSAY THYROID STIM HORMONE: CPT

## 2024-01-12 RX ORDER — FUROSEMIDE 40 MG/1
40 TABLET ORAL DAILY
Qty: 30 TABLET | Refills: 11 | Status: SHIPPED | OUTPATIENT
Start: 2024-01-12 | End: 2024-05-31 | Stop reason: HOSPADM

## 2024-01-12 RX ORDER — LOSARTAN POTASSIUM 100 MG/1
100 TABLET ORAL DAILY
Qty: 30 TABLET | Refills: 5 | Status: SHIPPED | OUTPATIENT
Start: 2024-01-12 | End: 2024-05-31 | Stop reason: HOSPADM

## 2024-01-12 RX ORDER — INDOMETHACIN 50 MG/1
CAPSULE ORAL
Qty: 30 CAPSULE | Refills: 0 | Status: SHIPPED | OUTPATIENT
Start: 2024-01-12 | End: 2024-02-16 | Stop reason: HOSPADM

## 2024-01-12 RX ORDER — ATENOLOL 50 MG/1
TABLET ORAL
Qty: 200 TABLET | Refills: 1 | Status: ON HOLD | OUTPATIENT
Start: 2024-01-12 | End: 2024-03-15

## 2024-01-12 NOTE — PROGRESS NOTES
Subjective   Patient ID: Klaudia Ratliff is a 68 y.o. female who presents for Follow-up (Bp check. Has been checking at home, running high 150-170s/70s. ).    HPI   Patient comes in today for follow-up on her blood pressure and swelling of her lower extremities.  The swelling has improved a lot with the discontinuation of the amlodipine.  She however still is having some swelling and some tenderness in the great toe.  She has had gout in the past.  She is currently taking hydrochlorothiazide.    She finally got into see pain management and is scheduled for an MRI of the C-spine, EMG, x-ray of the C-spine and vascular studies of the lower extremities.  Will await those results.    Patient also was in to see her GYN for what she thought was cervical prolapse however he told her it was not cervical prolapse it was her rectum.  She is not symptomatic to any of this.  She just had seen a structure in her vaginal opening that she was not sure what it was.    Review of Systems   All other systems reviewed and are negative.      Objective   /83   Pulse 57   Temp 36.4 °C (97.6 °F)   Resp 20   Wt 81.6 kg (180 lb)   SpO2 94%   BMI 32.92 kg/m²     Physical Exam  Constitutional:       Appearance: She is well-developed. She is obese.   HENT:      Head: Normocephalic and atraumatic.      Right Ear: Tympanic membrane, ear canal and external ear normal.      Left Ear: Tympanic membrane, ear canal and external ear normal.      Nose: Nose normal.      Mouth/Throat:      Mouth: Mucous membranes are moist.      Pharynx: Oropharynx is clear.   Eyes:      Extraocular Movements: Extraocular movements intact.      Conjunctiva/sclera: Conjunctivae normal.      Pupils: Pupils are equal, round, and reactive to light.   Cardiovascular:      Rate and Rhythm: Normal rate and regular rhythm.      Heart sounds: Normal heart sounds. No murmur heard.  Pulmonary:      Effort: Pulmonary effort is normal.      Breath sounds: Normal breath  sounds. No wheezing.   Abdominal:      General: Abdomen is flat. Bowel sounds are normal.      Palpations: Abdomen is soft.   Musculoskeletal:         General: Normal range of motion.   Skin:     General: Skin is warm and dry.   Neurological:      General: No focal deficit present.      Mental Status: She is alert and oriented to person, place, and time. Mental status is at baseline.   Psychiatric:         Mood and Affect: Mood normal.         Behavior: Behavior normal.         Thought Content: Thought content normal.         Judgment: Judgment normal.       Assessment/Plan   Problem List Items Addressed This Visit             ICD-10-CM    Hyperlipidemia E78.5    Relevant Orders    Lipid Panel (Completed)    Hypertension I10    Relevant Medications    losartan (Cozaar) 100 mg tablet    atenolol (Tenormin) 50 mg tablet    Other Relevant Orders    Comprehensive Metabolic Panel (Completed)    Hypokalemia - Primary E87.6     Other Visit Diagnoses         Codes    Vitamin B12 deficiency     E53.8    Relevant Orders    CBC (Completed)    Vitamin B12 (Completed)    Vitamin D deficiency     E55.9    Relevant Orders    Vitamin D 25-Hydroxy,Total (for eval of Vitamin D levels) (Completed)    Gout, unspecified cause, unspecified chronicity, unspecified site     M10.9    Relevant Medications    indomethacin (Indocin) 50 mg capsule    Other Relevant Orders    Uric Acid (Completed)    Edema, unspecified type     R60.9    Relevant Medications    furosemide (Lasix) 40 mg tablet    Other Relevant Orders    TSH with reflex to Free T4 if abnormal (Completed)        DC hydrochlorothiazide  Add Lasix 40 mg as needed leg swelling.  Add losartan 100 mg daily.  Add Indocin 50 mg 3 times daily for great toe pain  Will contact patient with lab results when available.  Take new medication as directed.  Continue other medications as directed.  RTC in 2 weeks for recheck, sooner should problems arise.  Medication list reconciled.  Thank you for  visiting today!      Professional services: Some of this note was completed using Dragon voice technology and sometimes the software misinterprets words. This may include unintended errors with respect to translation of words, typographical errors or grammar errors which may not have been identified prior to finalization of the chart note. Please take this into account when reading the note. Thank you.

## 2024-01-15 ENCOUNTER — TELEPHONE (OUTPATIENT)
Dept: PRIMARY CARE | Facility: CLINIC | Age: 69
End: 2024-01-15
Payer: MEDICARE

## 2024-01-15 NOTE — TELEPHONE ENCOUNTER
Patient stated that she would like to do the B-12 injections in the office.  I scheduled her first one for 1/26/24 when she comes in for her wellness exam.      Thank You

## 2024-01-24 ENCOUNTER — TELEPHONE (OUTPATIENT)
Dept: PRIMARY CARE | Facility: CLINIC | Age: 69
End: 2024-01-24
Payer: MEDICARE

## 2024-01-24 NOTE — TELEPHONE ENCOUNTER
Patient is calling asking if she needs to keep taking the potassium or does she need to stop taking it since she was put on a new blood pressure medication?  Patient can be reached at [phone].      Thank You

## 2024-01-24 NOTE — TELEPHONE ENCOUNTER
Yes, she needs to continue to take the potassium as long as she is taking the Lasix/furosemide for the leg swelling.

## 2024-01-26 ENCOUNTER — APPOINTMENT (OUTPATIENT)
Dept: PRIMARY CARE | Facility: CLINIC | Age: 69
End: 2024-01-26
Payer: MEDICARE

## 2024-02-12 PROBLEM — E66.811 CLASS 1 OBESITY: Status: ACTIVE | Noted: 2024-02-12

## 2024-02-12 PROBLEM — R29.2 REFLEX ABNORMALITY: Status: ACTIVE | Noted: 2024-01-03

## 2024-02-12 PROBLEM — R26.81 GAIT INSTABILITY: Status: ACTIVE | Noted: 2024-01-03

## 2024-02-12 PROBLEM — G54.1 LUMBOSACRAL PLEXUS LESION: Status: ACTIVE | Noted: 2023-10-01

## 2024-02-12 PROBLEM — M51.379 DDD (DEGENERATIVE DISC DISEASE), LUMBOSACRAL: Status: ACTIVE | Noted: 2024-01-03

## 2024-02-12 PROBLEM — M51.37 DDD (DEGENERATIVE DISC DISEASE), LUMBOSACRAL: Status: ACTIVE | Noted: 2024-01-03

## 2024-02-12 PROBLEM — N39.0 URINARY TRACT INFECTION: Status: ACTIVE | Noted: 2024-02-12

## 2024-02-12 PROBLEM — E66.3 OVERWEIGHT WITH BODY MASS INDEX (BMI) 25.0-29.9: Status: ACTIVE | Noted: 2024-02-12

## 2024-02-12 PROBLEM — I70.219: Status: ACTIVE | Noted: 2024-02-12

## 2024-02-12 PROBLEM — J01.90 ACUTE SINUSITIS: Status: ACTIVE | Noted: 2024-02-12

## 2024-02-12 PROBLEM — E66.9 CLASS 1 OBESITY: Status: ACTIVE | Noted: 2024-02-12

## 2024-02-12 PROBLEM — R20.9 DISTURBANCE OF SKIN SENSATION: Status: ACTIVE | Noted: 2024-01-03

## 2024-02-12 PROBLEM — I70.0 AORTIC OCCLUSION (CMS-HCC): Status: ACTIVE | Noted: 2024-02-02

## 2024-02-12 PROBLEM — R25.2 CRAMP OF BOTH LOWER EXTREMITIES: Status: ACTIVE | Noted: 2024-02-12

## 2024-02-13 ENCOUNTER — HOSPITAL ENCOUNTER (OUTPATIENT)
Dept: RADIOLOGY | Facility: EXTERNAL LOCATION | Age: 69
Discharge: HOME | End: 2024-02-13

## 2024-02-14 ENCOUNTER — OFFICE VISIT (OUTPATIENT)
Dept: VASCULAR SURGERY | Facility: HOSPITAL | Age: 69
End: 2024-02-14
Payer: MEDICARE

## 2024-02-14 VITALS
WEIGHT: 171 LBS | OXYGEN SATURATION: 96 % | BODY MASS INDEX: 31.47 KG/M2 | HEIGHT: 62 IN | DIASTOLIC BLOOD PRESSURE: 84 MMHG | HEART RATE: 106 BPM | SYSTOLIC BLOOD PRESSURE: 147 MMHG

## 2024-02-14 DIAGNOSIS — I25.10 CORONARY ARTERY DISEASE WITHOUT ANGINA PECTORIS, UNSPECIFIED VESSEL OR LESION TYPE, UNSPECIFIED WHETHER NATIVE OR TRANSPLANTED HEART: ICD-10-CM

## 2024-02-14 DIAGNOSIS — I65.23 ASYMPTOMATIC BILATERAL CAROTID ARTERY STENOSIS: ICD-10-CM

## 2024-02-14 DIAGNOSIS — Z01.810 PREOPERATIVE CARDIOVASCULAR EXAMINATION: ICD-10-CM

## 2024-02-14 DIAGNOSIS — I70.0 AORTIC OCCLUSION (CMS-HCC): Primary | ICD-10-CM

## 2024-02-14 PROCEDURE — 1159F MED LIST DOCD IN RCRD: CPT | Performed by: SURGERY

## 2024-02-14 PROCEDURE — 99215 OFFICE O/P EST HI 40 MIN: CPT | Mod: 27,25 | Performed by: SURGERY

## 2024-02-14 PROCEDURE — 99406 BEHAV CHNG SMOKING 3-10 MIN: CPT | Performed by: SURGERY

## 2024-02-14 PROCEDURE — 3079F DIAST BP 80-89 MM HG: CPT | Performed by: SURGERY

## 2024-02-14 PROCEDURE — 3077F SYST BP >= 140 MM HG: CPT | Performed by: SURGERY

## 2024-02-14 PROCEDURE — 99205 OFFICE O/P NEW HI 60 MIN: CPT | Performed by: SURGERY

## 2024-02-14 ASSESSMENT — ENCOUNTER SYMPTOMS
OCCASIONAL FEELINGS OF UNSTEADINESS: 1
DEPRESSION: 0
LOSS OF SENSATION IN FEET: 1

## 2024-02-14 ASSESSMENT — PATIENT HEALTH QUESTIONNAIRE - PHQ9
2. FEELING DOWN, DEPRESSED OR HOPELESS: NOT AT ALL
1. LITTLE INTEREST OR PLEASURE IN DOING THINGS: NOT AT ALL
SUM OF ALL RESPONSES TO PHQ9 QUESTIONS 1 AND 2: 0

## 2024-02-15 PROBLEM — I25.10 CORONARY ARTERY DISEASE WITHOUT ANGINA PECTORIS: Status: ACTIVE | Noted: 2024-02-14

## 2024-02-15 NOTE — PROGRESS NOTES
Vascular Surgery Clinic Note    CC: NPV  Referring provider: Dr. Jesse Bar     History Of Present Illness:   Klaudia Ratliff is a 68 y.o. female here for initial evaluation for aortic occlusion. She developed bilateral claudication symptoms last year from her hips down her legs. This has progressed to constant rest pain this past month. She states dangling her legs helps some. A year ago, she was able to walk 2 miles distance. She smokes 1 ppd since the age of 22. CTA aorta demonstrates an aortic occlusion with reconstitution in the left iliac artery and right common femoral artery.     She denies chest pain or shortness of breath. She denies amaurosis fugax or TIA symptoms. She denies any history of thyroid disease or DM. She has lost 20 pounds over the past month because she has been unable to walk to the refrigerator to get snacks.     PMH: HTN, obesity, aortic occlusion, tobacco dependency, HLD   PSH: none   SH: current smoker, lives with roommate    Medical History:  Patient Active Problem List   Diagnosis    Asymptomatic varicose veins    Cigarette nicotine dependence without complication    Hyperlipidemia    Hypertension    Hypokalemia    Lichen planus    Chronic right-sided low back pain with left-sided sciatica    Headache    Positive colorectal cancer screening using Cologuard test    Acute sinusitis    Aortic occlusion (CMS/HCC)    Class 1 obesity    Atherosclerosis of native arteries of extremities with intermittent claudication, unspecified extremity (CMS/HCC)    Cramp of both lower extremities    DDD (degenerative disc disease), lumbosacral    Disturbance of skin sensation    Gait instability    Lumbosacral plexus lesion    Overweight with body mass index (BMI) 25.0-29.9    Reflex abnormality    Urinary tract infection    Coronary artery disease without angina pectoris        SH:    Social Determinants of Health     Tobacco Use: High Risk (2/14/2024)    Patient History     Smoking Tobacco Use:  "Every Day     Smokeless Tobacco Use: Never     Passive Exposure: Current   Alcohol Use: Not on file   Financial Resource Strain: Not on file   Food Insecurity: Not on file   Transportation Needs: Not on file   Physical Activity: Not on file   Stress: Not on file   Social Connections: Not on file   Intimate Partner Violence: Not on file   Depression: Not at risk (2/14/2024)    PHQ-2     PHQ-2 Score: 0   Housing Stability: Not on file   Utilities: Not on file   Digital Equity: Not on file        FH:  No family history on file.     Allergies:   Allergies   Allergen Reactions    Doxycycline Diarrhea    Amlodipine Swelling     Bilateral foot swelling    Clarithromycin Other and Dizziness     Slept for days       ROS:  All systems were reviewed and noted to be negative, other than described above.     Objective:  Last Recorded Vitals  Blood pressure 147/84, pulse 106, height 1.575 m (5' 2\"), weight 77.6 kg (171 lb), SpO2 96 %.    Meds:   Current Outpatient Medications   Medication Instructions    ascorbic acid (Vitamin C) 250 mg tablet oral    atenolol (Tenormin) 50 mg tablet 1 every morning and 1 every evening    cholecalciferol (Vitamin D-3) 50 MCG (2000 UT) tablet oral    furosemide (LASIX) 40 mg, oral, Daily, As needed for foot swelling    ibuprofen 800 mg, oral, 3 times daily    indomethacin (Indocin) 50 mg capsule Take capsule 3 times a day as needed with food for 3 to 4 days until gout attack subsides    losartan (COZAAR) 100 mg, oral, Daily    nicotine (Nicoderm CQ) 14 mg/24 hr patch 1 patch, Daily, Apply (1) patch daily as directed.    potassium chloride CR 10 mEq ER tablet 50 mEq, oral, Daily       Exam:  Constitutional: Well appearing, NAD   PSYCH: Appropriate mood and affect  Eyes: Sclera clear    Neck: Supple, no carotid bruits noted.   CV: No tachycardia   RESP: CTA  GI: Soft, nontender, non-distended.   SKIN: No lesions  NEURO: No focal deficits noted. Motor/sensory intact.   EXTREMITIES: Warm. 1-2+ right " leg edema. Small dry ulcer of left lateral foot   PULSES: nonpalpable femoral or pedal pulses. Monophasic DPs    Assessment & Plan:  1. Aortic occlusion (CMS/HCC)  Vascular US carotid artery duplex bilateral    Case Request Cath Lab: Left Heart Cath    CT angio chest w and wo IV contrast    Complete Pulmonary Function Test Pre/Post Bronchodialator (Spirometry Pre/Post/DLCO/Lung Volumes)    Arterial Blood Gas (ABG)    Blood Gas Arterial    Blood Gas Arterial, COOX      2. Coronary artery disease without angina pectoris, unspecified vessel or lesion type, unspecified whether native or transplanted heart  Vascular US carotid artery duplex bilateral    Case Request Cath Lab: Left Heart Cath    CT angio chest w and wo IV contrast    Complete Pulmonary Function Test Pre/Post Bronchodialator (Spirometry Pre/Post/DLCO/Lung Volumes)    Arterial Blood Gas (ABG)    Blood Gas Arterial    Blood Gas Arterial, COOX      3. Preoperative cardiovascular examination  Vascular US carotid artery duplex bilateral    CT angio chest w and wo IV contrast    Complete Pulmonary Function Test Pre/Post Bronchodialator (Spirometry Pre/Post/DLCO/Lung Volumes)    Arterial Blood Gas (ABG)    Blood Gas Arterial    Blood Gas Arterial, COOX      4. Asymptomatic bilateral carotid artery stenosis  Vascular US carotid artery duplex bilateral    CT angio chest w and wo IV contrast    Complete Pulmonary Function Test Pre/Post Bronchodialator (Spirometry Pre/Post/DLCO/Lung Volumes)    Arterial Blood Gas (ABG)    Blood Gas Arterial    Blood Gas Arterial, COOX        Aortic occlusion with rest pain.   Thrombus starts in the descending thoracic aorta and will require open thoracoabdominal aortic repair.   Smoking cessation  Protective footwear   Obtain PFTs with room air ABG, CTA chest, carotid duplex & diagnostic coronary catheterization as stat  Plan for surgical repair on 2/27/2024    Orders:  Orders Placed This Encounter   Procedures    CT angio chest w  and wo IV contrast    Blood Gas Arterial    Blood Gas Arterial, COOX    Complete Pulmonary Function Test Pre/Post Bronchodialator (Spirometry Pre/Post/DLCO/Lung Volumes)    Arterial Blood Gas (ABG)     Mary Toussaint, APRN-CNP

## 2024-02-16 ENCOUNTER — APPOINTMENT (OUTPATIENT)
Dept: CARDIOLOGY | Facility: HOSPITAL | Age: 69
End: 2024-02-16
Payer: MEDICARE

## 2024-02-16 ENCOUNTER — HOSPITAL ENCOUNTER (OUTPATIENT)
Facility: HOSPITAL | Age: 69
Setting detail: OUTPATIENT SURGERY
Discharge: HOME | End: 2024-02-16
Attending: STUDENT IN AN ORGANIZED HEALTH CARE EDUCATION/TRAINING PROGRAM | Admitting: STUDENT IN AN ORGANIZED HEALTH CARE EDUCATION/TRAINING PROGRAM
Payer: MEDICARE

## 2024-02-16 VITALS
SYSTOLIC BLOOD PRESSURE: 154 MMHG | BODY MASS INDEX: 31.4 KG/M2 | RESPIRATION RATE: 18 BRPM | WEIGHT: 170.64 LBS | DIASTOLIC BLOOD PRESSURE: 80 MMHG | TEMPERATURE: 97 F | HEIGHT: 62 IN | HEART RATE: 72 BPM | OXYGEN SATURATION: 98 %

## 2024-02-16 DIAGNOSIS — I20.0 UNSTABLE ANGINA (MULTI): ICD-10-CM

## 2024-02-16 DIAGNOSIS — I70.0 AORTIC OCCLUSION (CMS-HCC): Primary | ICD-10-CM

## 2024-02-16 DIAGNOSIS — I25.10 CORONARY ARTERY DISEASE WITHOUT ANGINA PECTORIS, UNSPECIFIED VESSEL OR LESION TYPE, UNSPECIFIED WHETHER NATIVE OR TRANSPLANTED HEART: ICD-10-CM

## 2024-02-16 DIAGNOSIS — I25.10 CORONARY ARTERY DISEASE WITHOUT ANGINA PECTORIS: ICD-10-CM

## 2024-02-16 LAB
ANION GAP SERPL CALC-SCNC: 16 MMOL/L (ref 10–20)
BUN SERPL-MCNC: 22 MG/DL (ref 6–23)
CALCIUM SERPL-MCNC: 9.7 MG/DL (ref 8.6–10.3)
CHLORIDE SERPL-SCNC: 105 MMOL/L (ref 98–107)
CO2 SERPL-SCNC: 24 MMOL/L (ref 21–32)
CREAT SERPL-MCNC: 0.79 MG/DL (ref 0.5–1.05)
EGFRCR SERPLBLD CKD-EPI 2021: 82 ML/MIN/1.73M*2
ERYTHROCYTE [DISTWIDTH] IN BLOOD BY AUTOMATED COUNT: 12.6 % (ref 11.5–14.5)
GLUCOSE SERPL-MCNC: 106 MG/DL (ref 74–99)
HCT VFR BLD AUTO: 36.4 % (ref 36–46)
HGB BLD-MCNC: 11.9 G/DL (ref 12–16)
INR PPP: 1.4 (ref 0.9–1.1)
MCH RBC QN AUTO: 33.2 PG (ref 26–34)
MCHC RBC AUTO-ENTMCNC: 32.7 G/DL (ref 32–36)
MCV RBC AUTO: 102 FL (ref 80–100)
NRBC BLD-RTO: 0 /100 WBCS (ref 0–0)
PLATELET # BLD AUTO: 413 X10*3/UL (ref 150–450)
POTASSIUM SERPL-SCNC: 4.5 MMOL/L (ref 3.5–5.3)
PROTHROMBIN TIME: 15.4 SECONDS (ref 9.8–12.8)
RBC # BLD AUTO: 3.58 X10*6/UL (ref 4–5.2)
SODIUM SERPL-SCNC: 140 MMOL/L (ref 136–145)
WBC # BLD AUTO: 10.3 X10*3/UL (ref 4.4–11.3)

## 2024-02-16 PROCEDURE — 85610 PROTHROMBIN TIME: CPT | Performed by: STUDENT IN AN ORGANIZED HEALTH CARE EDUCATION/TRAINING PROGRAM

## 2024-02-16 PROCEDURE — 93005 ELECTROCARDIOGRAM TRACING: CPT | Mod: 59

## 2024-02-16 PROCEDURE — 2780000003 HC OR 278 NO HCPCS: Performed by: STUDENT IN AN ORGANIZED HEALTH CARE EDUCATION/TRAINING PROGRAM

## 2024-02-16 PROCEDURE — 2500000001 HC RX 250 WO HCPCS SELF ADMINISTERED DRUGS (ALT 637 FOR MEDICARE OP): Performed by: STUDENT IN AN ORGANIZED HEALTH CARE EDUCATION/TRAINING PROGRAM

## 2024-02-16 PROCEDURE — 36415 COLL VENOUS BLD VENIPUNCTURE: CPT | Performed by: STUDENT IN AN ORGANIZED HEALTH CARE EDUCATION/TRAINING PROGRAM

## 2024-02-16 PROCEDURE — 93010 ELECTROCARDIOGRAM REPORT: CPT | Performed by: STUDENT IN AN ORGANIZED HEALTH CARE EDUCATION/TRAINING PROGRAM

## 2024-02-16 PROCEDURE — 7100000009 HC PHASE TWO TIME - INITIAL BASE CHARGE: Performed by: STUDENT IN AN ORGANIZED HEALTH CARE EDUCATION/TRAINING PROGRAM

## 2024-02-16 PROCEDURE — 2720000007 HC OR 272 NO HCPCS: Performed by: STUDENT IN AN ORGANIZED HEALTH CARE EDUCATION/TRAINING PROGRAM

## 2024-02-16 PROCEDURE — 7100000010 HC PHASE TWO TIME - EACH INCREMENTAL 1 MINUTE: Performed by: STUDENT IN AN ORGANIZED HEALTH CARE EDUCATION/TRAINING PROGRAM

## 2024-02-16 PROCEDURE — C1760 CLOSURE DEV, VASC: HCPCS | Performed by: STUDENT IN AN ORGANIZED HEALTH CARE EDUCATION/TRAINING PROGRAM

## 2024-02-16 PROCEDURE — 2500000004 HC RX 250 GENERAL PHARMACY W/ HCPCS (ALT 636 FOR OP/ED): Performed by: STUDENT IN AN ORGANIZED HEALTH CARE EDUCATION/TRAINING PROGRAM

## 2024-02-16 PROCEDURE — 2500000005 HC RX 250 GENERAL PHARMACY W/O HCPCS: Performed by: STUDENT IN AN ORGANIZED HEALTH CARE EDUCATION/TRAINING PROGRAM

## 2024-02-16 PROCEDURE — 80048 BASIC METABOLIC PNL TOTAL CA: CPT | Performed by: STUDENT IN AN ORGANIZED HEALTH CARE EDUCATION/TRAINING PROGRAM

## 2024-02-16 PROCEDURE — 85027 COMPLETE CBC AUTOMATED: CPT | Performed by: STUDENT IN AN ORGANIZED HEALTH CARE EDUCATION/TRAINING PROGRAM

## 2024-02-16 PROCEDURE — 93458 L HRT ARTERY/VENTRICLE ANGIO: CPT | Performed by: STUDENT IN AN ORGANIZED HEALTH CARE EDUCATION/TRAINING PROGRAM

## 2024-02-16 PROCEDURE — 2500000004 HC RX 250 GENERAL PHARMACY W/ HCPCS (ALT 636 FOR OP/ED): Performed by: NURSE PRACTITIONER

## 2024-02-16 PROCEDURE — C1894 INTRO/SHEATH, NON-LASER: HCPCS | Performed by: STUDENT IN AN ORGANIZED HEALTH CARE EDUCATION/TRAINING PROGRAM

## 2024-02-16 PROCEDURE — C1887 CATHETER, GUIDING: HCPCS | Performed by: STUDENT IN AN ORGANIZED HEALTH CARE EDUCATION/TRAINING PROGRAM

## 2024-02-16 PROCEDURE — 2550000001 HC RX 255 CONTRASTS: Performed by: STUDENT IN AN ORGANIZED HEALTH CARE EDUCATION/TRAINING PROGRAM

## 2024-02-16 RX ORDER — HEPARIN SODIUM 1000 [USP'U]/ML
INJECTION, SOLUTION INTRAVENOUS; SUBCUTANEOUS AS NEEDED
Status: DISCONTINUED | OUTPATIENT
Start: 2024-02-16 | End: 2024-02-16 | Stop reason: HOSPADM

## 2024-02-16 RX ORDER — FENTANYL CITRATE 50 UG/ML
INJECTION, SOLUTION INTRAMUSCULAR; INTRAVENOUS AS NEEDED
Status: DISCONTINUED | OUTPATIENT
Start: 2024-02-16 | End: 2024-02-16 | Stop reason: HOSPADM

## 2024-02-16 RX ORDER — SODIUM CHLORIDE 9 MG/ML
100 INJECTION, SOLUTION INTRAVENOUS CONTINUOUS
Status: DISCONTINUED | OUTPATIENT
Start: 2024-02-16 | End: 2024-02-16 | Stop reason: HOSPADM

## 2024-02-16 RX ORDER — ATORVASTATIN CALCIUM 80 MG/1
80 TABLET, FILM COATED ORAL NIGHTLY
Status: DISCONTINUED | OUTPATIENT
Start: 2024-02-16 | End: 2024-02-16 | Stop reason: HOSPADM

## 2024-02-16 RX ORDER — VERAPAMIL HYDROCHLORIDE 2.5 MG/ML
INJECTION, SOLUTION INTRAVENOUS AS NEEDED
Status: DISCONTINUED | OUTPATIENT
Start: 2024-02-16 | End: 2024-02-16 | Stop reason: HOSPADM

## 2024-02-16 RX ORDER — MIDAZOLAM HYDROCHLORIDE 1 MG/ML
INJECTION, SOLUTION INTRAMUSCULAR; INTRAVENOUS AS NEEDED
Status: DISCONTINUED | OUTPATIENT
Start: 2024-02-16 | End: 2024-02-16 | Stop reason: HOSPADM

## 2024-02-16 RX ORDER — NAPROXEN SODIUM 220 MG/1
81 TABLET, FILM COATED ORAL ONCE
Status: DISCONTINUED | OUTPATIENT
Start: 2024-02-16 | End: 2024-02-16

## 2024-02-16 RX ORDER — ASPIRIN 325 MG
325 TABLET ORAL ONCE
Status: COMPLETED | OUTPATIENT
Start: 2024-02-16 | End: 2024-02-16

## 2024-02-16 RX ORDER — LIDOCAINE HYDROCHLORIDE 20 MG/ML
INJECTION, SOLUTION INFILTRATION; PERINEURAL AS NEEDED
Status: DISCONTINUED | OUTPATIENT
Start: 2024-02-16 | End: 2024-02-16 | Stop reason: HOSPADM

## 2024-02-16 RX ORDER — NITROGLYCERIN 40 MG/100ML
INJECTION INTRAVENOUS AS NEEDED
Status: DISCONTINUED | OUTPATIENT
Start: 2024-02-16 | End: 2024-02-16 | Stop reason: HOSPADM

## 2024-02-16 RX ORDER — ATORVASTATIN CALCIUM 80 MG/1
80 TABLET, FILM COATED ORAL NIGHTLY
Qty: 90 TABLET | Refills: 3 | Status: SHIPPED | OUTPATIENT
Start: 2024-02-16 | End: 2025-02-15

## 2024-02-16 RX ORDER — NAPROXEN SODIUM 220 MG/1
81 TABLET, FILM COATED ORAL DAILY
Qty: 90 TABLET | Refills: 3 | Status: SHIPPED | OUTPATIENT
Start: 2024-02-16 | End: 2025-02-15

## 2024-02-16 RX ADMIN — ASPIRIN 325 MG ORAL TABLET 325 MG: 325 PILL ORAL at 10:04

## 2024-02-16 ASSESSMENT — COLUMBIA-SUICIDE SEVERITY RATING SCALE - C-SSRS
1. IN THE PAST MONTH, HAVE YOU WISHED YOU WERE DEAD OR WISHED YOU COULD GO TO SLEEP AND NOT WAKE UP?: NO
6. HAVE YOU EVER DONE ANYTHING, STARTED TO DO ANYTHING, OR PREPARED TO DO ANYTHING TO END YOUR LIFE?: NO
2. HAVE YOU ACTUALLY HAD ANY THOUGHTS OF KILLING YOURSELF?: NO

## 2024-02-16 ASSESSMENT — PAIN - FUNCTIONAL ASSESSMENT: PAIN_FUNCTIONAL_ASSESSMENT: 0-10

## 2024-02-16 ASSESSMENT — PAIN DESCRIPTION - DESCRIPTORS: DESCRIPTORS: ACHING;THROBBING

## 2024-02-16 ASSESSMENT — PAIN SCALES - GENERAL: PAINLEVEL_OUTOF10: 8

## 2024-02-16 NOTE — NURSING NOTE
Patient ambulated at baseline levels; impaired ambulation due to preexisting condition. Discharge instructions reviewed with patient and family. No questions identified. IV access removed. Patient transported to [facility] via wheelchair by staff.

## 2024-02-16 NOTE — H&P
History Of Present Illness  Klaudia Ratliff is a 68 y.o. female here for initial evaluation for aortic occlusion. She developed bilateral claudication symptoms last year from her hips down her legs. This has progressed to constant rest pain this past month. She states dangling her legs helps some. A year ago, she was able to walk 2 miles distance. She smokes 1 ppd since the age of 22. CTA aorta demonstrates an aortic occlusion with reconstitution in the left iliac artery and right common femoral artery.     Patient is presenting today to Cath Lab for left heart catheterization as part of a preop evaluation for aortic occlusion.     She denies chest pain or shortness of breath. She denies amaurosis fugax or TIA symptoms. She denies any history of thyroid disease or DM. She has lost 20 pounds over the past month because she has been unable to walk to the refrigerator to get snacks.      PMH: HTN, obesity, aortic occlusion, tobacco dependency, HLD   PSH: none   SH: current smoker, lives with roommate     Past Medical History  Past Medical History:   Diagnosis Date    Acute sinusitis, unspecified 03/16/2016    Acute sinusitis    Essential (primary) hypertension 07/18/2022    Hypertension    Headache, unspecified 07/09/2013    Headache    Right shoulder pain 11/01/2023       Surgical History  No past surgical history on file.     Social History  She reports that she has been smoking cigarettes. She has been exposed to tobacco smoke. She has never used smokeless tobacco. She reports current alcohol use. No history on file for drug use.    Family History  No family history on file.     Allergies  Doxycycline, Amlodipine, and Clarithromycin    General: Awake, alert/oriented x3, well developed, no acute distress  Head: Atraumatic/Normocephalic  Eyes: Normal external exam, EOMI, PERRLA  ENT: Oropharynx normal, moist mucous membranes  Cardiovascular: RRR, S1/S2, no murmurs, rubs, or gallops, radial pulses +2, no edema of  extremities  Pulmonary: CTAB, no respiratory distress. No wheezes, rales, or ronchi  Abdomen: +BS, soft, non-tender, nondistended, no guarding or rebound  MSK: No joint swelling, normal movements of all extremities. Range of motion- normal.  Extremities: no edema, no cyanosis  Neuro: Alert/oriented x3, no focal motor or sensory deficits  Psychiatric: Judgment intact. Appropriate mood and behavior              Assessment/Plan   Principal Problem:    Coronary artery disease without angina pectoris  Active Problems:    Aortic occlusion (CMS/HCC)      Risk and benefits of left heart catheterization and coronary angiography with possible revascularization were discussed with the patient.  She is in agreement to proceed with that.

## 2024-02-16 NOTE — POST-PROCEDURE NOTE
Physician Transition of Care Summary  Invasive Cardiovascular Lab    Procedure Date: 2/16/2024  Attending:    * Aditya Sky - Primary  Resident/Fellow/Other Assistant: Surgeon(s) and Role:    Please refer to the full report that is in the system.    Electronically signed by: Aditya Sky MD PhD, 2/16/2024 11:10 AM

## 2024-02-18 LAB
ATRIAL RATE: 76 BPM
P AXIS: 63 DEGREES
P OFFSET: 204 MS
P ONSET: 154 MS
PR INTERVAL: 136 MS
Q ONSET: 222 MS
QRS COUNT: 12 BEATS
QRS DURATION: 88 MS
QT INTERVAL: 356 MS
QTC CALCULATION(BAZETT): 400 MS
QTC FREDERICIA: 385 MS
R AXIS: 9 DEGREES
T AXIS: 32 DEGREES
T OFFSET: 400 MS
VENTRICULAR RATE: 76 BPM

## 2024-02-19 ENCOUNTER — HOSPITAL ENCOUNTER (OUTPATIENT)
Dept: RESPIRATORY THERAPY | Facility: HOSPITAL | Age: 69
Discharge: HOME | End: 2024-02-19
Payer: MEDICARE

## 2024-02-19 ENCOUNTER — PREP FOR PROCEDURE (OUTPATIENT)
Dept: VASCULAR SURGERY | Facility: HOSPITAL | Age: 69
End: 2024-02-19
Payer: MEDICARE

## 2024-02-19 ENCOUNTER — HOSPITAL ENCOUNTER (OUTPATIENT)
Facility: HOSPITAL | Age: 69
Setting detail: SURGERY ADMIT
End: 2024-02-19
Attending: SURGERY | Admitting: SURGERY
Payer: MEDICARE

## 2024-02-19 ENCOUNTER — HOSPITAL ENCOUNTER (OUTPATIENT)
Dept: CARDIOLOGY | Facility: HOSPITAL | Age: 69
Discharge: HOME | End: 2024-02-19
Payer: MEDICARE

## 2024-02-19 DIAGNOSIS — I25.10 CORONARY ARTERY DISEASE WITHOUT ANGINA PECTORIS, UNSPECIFIED VESSEL OR LESION TYPE, UNSPECIFIED WHETHER NATIVE OR TRANSPLANTED HEART: ICD-10-CM

## 2024-02-19 DIAGNOSIS — I65.23 ASYMPTOMATIC BILATERAL CAROTID ARTERY STENOSIS: ICD-10-CM

## 2024-02-19 DIAGNOSIS — I70.0 AORTIC OCCLUSION (CMS-HCC): ICD-10-CM

## 2024-02-19 DIAGNOSIS — I70.0 AORTIC OCCLUSION (CMS-HCC): Primary | ICD-10-CM

## 2024-02-19 DIAGNOSIS — Z01.810 PREOPERATIVE CARDIOVASCULAR EXAMINATION: ICD-10-CM

## 2024-02-19 LAB
ARTERIAL PATENCY WRIST A: ABNORMAL
BASE EXCESS BLDA CALC-SCNC: 2.5 MMOL/L (ref -2–3)
BODY TEMPERATURE: ABNORMAL
HCO3 BLDA-SCNC: 26.3 MMOL/L (ref 22–26)
OXYHGB MFR BLDA: 93.4 % (ref 94–98)
PCO2 BLDA: 37 MM HG (ref 38–42)
PH BLDA: 7.46 PH (ref 7.38–7.42)
PO2 BLDA: 93 MM HG (ref 85–95)
SAO2 % BLDA: 98 % (ref 94–100)
TEST COMMENT: ABNORMAL

## 2024-02-19 PROCEDURE — 93880 EXTRACRANIAL BILAT STUDY: CPT

## 2024-02-19 PROCEDURE — 36600 WITHDRAWAL OF ARTERIAL BLOOD: CPT

## 2024-02-19 PROCEDURE — 93880 EXTRACRANIAL BILAT STUDY: CPT | Performed by: INTERNAL MEDICINE

## 2024-02-19 PROCEDURE — 82805 BLOOD GASES W/O2 SATURATION: CPT

## 2024-02-20 ENCOUNTER — HOSPITAL ENCOUNTER (INPATIENT)
Facility: HOSPITAL | Age: 69
LOS: 5 days | Discharge: HOME | DRG: 165 | End: 2024-02-25
Attending: EMERGENCY MEDICINE | Admitting: SURGERY
Payer: MEDICARE

## 2024-02-20 ENCOUNTER — HOSPITAL ENCOUNTER (OUTPATIENT)
Dept: RADIOLOGY | Facility: HOSPITAL | Age: 69
Discharge: HOME | End: 2024-02-20
Payer: MEDICARE

## 2024-02-20 ENCOUNTER — TELEPHONE (OUTPATIENT)
Dept: VASCULAR SURGERY | Facility: HOSPITAL | Age: 69
End: 2024-02-20
Payer: MEDICARE

## 2024-02-20 ENCOUNTER — CLINICAL SUPPORT (OUTPATIENT)
Dept: EMERGENCY MEDICINE | Facility: HOSPITAL | Age: 69
DRG: 165 | End: 2024-02-20
Payer: MEDICARE

## 2024-02-20 DIAGNOSIS — I65.23 ASYMPTOMATIC BILATERAL CAROTID ARTERY STENOSIS: ICD-10-CM

## 2024-02-20 DIAGNOSIS — I99.9 VASCULAR DISEASE: ICD-10-CM

## 2024-02-20 DIAGNOSIS — I82.621 ACUTE EMBOLISM AND THROMBOSIS OF DEEP VEINS OF RIGHT UPPER EXTREMITY (MULTI): ICD-10-CM

## 2024-02-20 DIAGNOSIS — I26.92 SADDLE EMBOLUS OF PULMONARY ARTERY, UNSPECIFIED CHRONICITY, UNSPECIFIED WHETHER ACUTE COR PULMONALE PRESENT (MULTI): Primary | ICD-10-CM

## 2024-02-20 DIAGNOSIS — I26.99 PULMONARY EMBOLISM (MULTI): ICD-10-CM

## 2024-02-20 DIAGNOSIS — Z01.810 PREOPERATIVE CARDIOVASCULAR EXAMINATION: ICD-10-CM

## 2024-02-20 DIAGNOSIS — I70.0 AORTIC OCCLUSION (CMS-HCC): ICD-10-CM

## 2024-02-20 DIAGNOSIS — I25.10 CORONARY ARTERY DISEASE WITHOUT ANGINA PECTORIS, UNSPECIFIED VESSEL OR LESION TYPE, UNSPECIFIED WHETHER NATIVE OR TRANSPLANTED HEART: ICD-10-CM

## 2024-02-20 DIAGNOSIS — I26.94 MULTIPLE SUBSEGMENTAL PULMONARY EMBOLI WITHOUT ACUTE COR PULMONALE (MULTI): Primary | ICD-10-CM

## 2024-02-20 LAB
ALBUMIN SERPL BCP-MCNC: 4.1 G/DL (ref 3.4–5)
ALP SERPL-CCNC: 53 U/L (ref 33–136)
ALT SERPL W P-5'-P-CCNC: 24 U/L (ref 7–45)
ANION GAP SERPL CALC-SCNC: 16 MMOL/L (ref 10–20)
AST SERPL W P-5'-P-CCNC: 16 U/L (ref 9–39)
BASOPHILS # BLD AUTO: 0.05 X10*3/UL (ref 0–0.1)
BASOPHILS NFR BLD AUTO: 0.4 %
BILIRUB SERPL-MCNC: 0.5 MG/DL (ref 0–1.2)
BNP SERPL-MCNC: 15 PG/ML (ref 0–99)
BUN SERPL-MCNC: 25 MG/DL (ref 6–23)
CALCIUM SERPL-MCNC: 9.6 MG/DL (ref 8.6–10.6)
CARDIAC TROPONIN I PNL SERPL HS: 9 NG/L (ref 0–34)
CHLORIDE SERPL-SCNC: 98 MMOL/L (ref 98–107)
CO2 SERPL-SCNC: 25 MMOL/L (ref 21–32)
CREAT SERPL-MCNC: 0.88 MG/DL (ref 0.5–1.05)
EGFRCR SERPLBLD CKD-EPI 2021: 72 ML/MIN/1.73M*2
EOSINOPHIL # BLD AUTO: 0.13 X10*3/UL (ref 0–0.7)
EOSINOPHIL NFR BLD AUTO: 1.1 %
ERYTHROCYTE [DISTWIDTH] IN BLOOD BY AUTOMATED COUNT: 12.6 % (ref 11.5–14.5)
GLUCOSE SERPL-MCNC: 103 MG/DL (ref 74–99)
HCT VFR BLD AUTO: 36.8 % (ref 36–46)
HGB BLD-MCNC: 12.8 G/DL (ref 12–16)
IMM GRANULOCYTES # BLD AUTO: 0.05 X10*3/UL (ref 0–0.7)
IMM GRANULOCYTES NFR BLD AUTO: 0.4 % (ref 0–0.9)
INR PPP: 1.2 (ref 0.9–1.1)
LYMPHOCYTES # BLD AUTO: 2.12 X10*3/UL (ref 1.2–4.8)
LYMPHOCYTES NFR BLD AUTO: 17.7 %
MCH RBC QN AUTO: 32.2 PG (ref 26–34)
MCHC RBC AUTO-ENTMCNC: 34.8 G/DL (ref 32–36)
MCV RBC AUTO: 93 FL (ref 80–100)
MONOCYTES # BLD AUTO: 0.75 X10*3/UL (ref 0.1–1)
MONOCYTES NFR BLD AUTO: 6.3 %
NEUTROPHILS # BLD AUTO: 8.87 X10*3/UL (ref 1.2–7.7)
NEUTROPHILS NFR BLD AUTO: 74.1 %
NRBC BLD-RTO: 0 /100 WBCS (ref 0–0)
PLATELET # BLD AUTO: 447 X10*3/UL (ref 150–450)
POTASSIUM SERPL-SCNC: 3.8 MMOL/L (ref 3.5–5.3)
PROT SERPL-MCNC: 7.6 G/DL (ref 6.4–8.2)
PROTHROMBIN TIME: 13.8 SECONDS (ref 9.8–12.8)
RBC # BLD AUTO: 3.98 X10*6/UL (ref 4–5.2)
SODIUM SERPL-SCNC: 135 MMOL/L (ref 136–145)
UFH PPP CHRO-ACNC: 1.1 IU/ML
WBC # BLD AUTO: 12 X10*3/UL (ref 4.4–11.3)

## 2024-02-20 PROCEDURE — 80053 COMPREHEN METABOLIC PANEL: CPT | Performed by: EMERGENCY MEDICINE

## 2024-02-20 PROCEDURE — 83880 ASSAY OF NATRIURETIC PEPTIDE: CPT | Performed by: EMERGENCY MEDICINE

## 2024-02-20 PROCEDURE — 2550000001 HC RX 255 CONTRASTS: Performed by: NURSE PRACTITIONER

## 2024-02-20 PROCEDURE — 71275 CT ANGIOGRAPHY CHEST: CPT | Performed by: RADIOLOGY

## 2024-02-20 PROCEDURE — 2500000004 HC RX 250 GENERAL PHARMACY W/ HCPCS (ALT 636 FOR OP/ED): Performed by: STUDENT IN AN ORGANIZED HEALTH CARE EDUCATION/TRAINING PROGRAM

## 2024-02-20 PROCEDURE — 93005 ELECTROCARDIOGRAM TRACING: CPT

## 2024-02-20 PROCEDURE — 2500000002 HC RX 250 W HCPCS SELF ADMINISTERED DRUGS (ALT 637 FOR MEDICARE OP, ALT 636 FOR OP/ED): Performed by: STUDENT IN AN ORGANIZED HEALTH CARE EDUCATION/TRAINING PROGRAM

## 2024-02-20 PROCEDURE — 99285 EMERGENCY DEPT VISIT HI MDM: CPT | Mod: 25

## 2024-02-20 PROCEDURE — 2500000001 HC RX 250 WO HCPCS SELF ADMINISTERED DRUGS (ALT 637 FOR MEDICARE OP): Performed by: STUDENT IN AN ORGANIZED HEALTH CARE EDUCATION/TRAINING PROGRAM

## 2024-02-20 PROCEDURE — 36415 COLL VENOUS BLD VENIPUNCTURE: CPT | Performed by: STUDENT IN AN ORGANIZED HEALTH CARE EDUCATION/TRAINING PROGRAM

## 2024-02-20 PROCEDURE — 99285 EMERGENCY DEPT VISIT HI MDM: CPT | Performed by: EMERGENCY MEDICINE

## 2024-02-20 PROCEDURE — 71275 CT ANGIOGRAPHY CHEST: CPT

## 2024-02-20 PROCEDURE — 99222 1ST HOSP IP/OBS MODERATE 55: CPT | Performed by: SURGERY

## 2024-02-20 PROCEDURE — 85610 PROTHROMBIN TIME: CPT | Performed by: EMERGENCY MEDICINE

## 2024-02-20 PROCEDURE — 1200000002 HC GENERAL ROOM WITH TELEMETRY DAILY

## 2024-02-20 PROCEDURE — 85025 COMPLETE CBC W/AUTO DIFF WBC: CPT | Performed by: EMERGENCY MEDICINE

## 2024-02-20 PROCEDURE — 84484 ASSAY OF TROPONIN QUANT: CPT | Performed by: EMERGENCY MEDICINE

## 2024-02-20 PROCEDURE — S4991 NICOTINE PATCH NONLEGEND: HCPCS | Performed by: STUDENT IN AN ORGANIZED HEALTH CARE EDUCATION/TRAINING PROGRAM

## 2024-02-20 PROCEDURE — 93010 ELECTROCARDIOGRAM REPORT: CPT | Performed by: EMERGENCY MEDICINE

## 2024-02-20 PROCEDURE — 85520 HEPARIN ASSAY: CPT | Performed by: STUDENT IN AN ORGANIZED HEALTH CARE EDUCATION/TRAINING PROGRAM

## 2024-02-20 PROCEDURE — 36415 COLL VENOUS BLD VENIPUNCTURE: CPT | Performed by: EMERGENCY MEDICINE

## 2024-02-20 RX ORDER — HEPARIN SODIUM 5000 [USP'U]/ML
80 INJECTION, SOLUTION INTRAVENOUS; SUBCUTANEOUS ONCE
Status: COMPLETED | OUTPATIENT
Start: 2024-02-20 | End: 2024-02-20

## 2024-02-20 RX ORDER — POLYETHYLENE GLYCOL 3350 17 G/17G
17 POWDER, FOR SOLUTION ORAL DAILY
Status: CANCELLED | OUTPATIENT
Start: 2024-02-20

## 2024-02-20 RX ORDER — MULTIVIT-MIN/IRON FUM/FOLIC AC 7.5 MG-4
1 TABLET ORAL DAILY
Status: DISCONTINUED | OUTPATIENT
Start: 2024-02-20 | End: 2024-02-25 | Stop reason: HOSPADM

## 2024-02-20 RX ORDER — LOSARTAN POTASSIUM 50 MG/1
100 TABLET ORAL DAILY
Status: DISCONTINUED | OUTPATIENT
Start: 2024-02-20 | End: 2024-02-25 | Stop reason: HOSPADM

## 2024-02-20 RX ORDER — HEPARIN SODIUM 10000 [USP'U]/100ML
0-4500 INJECTION, SOLUTION INTRAVENOUS CONTINUOUS
Status: DISCONTINUED | OUTPATIENT
Start: 2024-02-20 | End: 2024-02-23

## 2024-02-20 RX ORDER — IBUPROFEN 200 MG
1 TABLET ORAL DAILY
Status: DISCONTINUED | OUTPATIENT
Start: 2024-02-20 | End: 2024-02-25 | Stop reason: HOSPADM

## 2024-02-20 RX ORDER — POLYETHYLENE GLYCOL 3350 17 G/17G
17 POWDER, FOR SOLUTION ORAL DAILY
Status: DISCONTINUED | OUTPATIENT
Start: 2024-02-20 | End: 2024-02-25 | Stop reason: HOSPADM

## 2024-02-20 RX ORDER — HEPARIN SODIUM 5000 [USP'U]/ML
3000-6000 INJECTION, SOLUTION INTRAVENOUS; SUBCUTANEOUS EVERY 4 HOURS PRN
Status: DISCONTINUED | OUTPATIENT
Start: 2024-02-20 | End: 2024-02-23

## 2024-02-20 RX ORDER — ATENOLOL 50 MG/1
50 TABLET ORAL 2 TIMES DAILY
Status: DISCONTINUED | OUTPATIENT
Start: 2024-02-20 | End: 2024-02-25 | Stop reason: HOSPADM

## 2024-02-20 RX ORDER — ATORVASTATIN CALCIUM 80 MG/1
80 TABLET, FILM COATED ORAL NIGHTLY
Status: DISCONTINUED | OUTPATIENT
Start: 2024-02-20 | End: 2024-02-25 | Stop reason: HOSPADM

## 2024-02-20 RX ADMIN — HEPARIN SODIUM 6250 UNITS: 5000 INJECTION INTRAVENOUS; SUBCUTANEOUS at 18:50

## 2024-02-20 RX ADMIN — ATORVASTATIN CALCIUM 80 MG: 80 TABLET, FILM COATED ORAL at 21:12

## 2024-02-20 RX ADMIN — ATENOLOL 50 MG: 50 TABLET ORAL at 22:26

## 2024-02-20 RX ADMIN — NICOTINE 1 PATCH: 14 PATCH, EXTENDED RELEASE TRANSDERMAL at 22:26

## 2024-02-20 RX ADMIN — LOSARTAN POTASSIUM 100 MG: 50 TABLET, FILM COATED ORAL at 21:12

## 2024-02-20 RX ADMIN — Medication 1 TABLET: at 21:12

## 2024-02-20 RX ADMIN — HEPARIN SODIUM AND DEXTROSE 1400 UNITS/HR: 10000; 5 INJECTION INTRAVENOUS at 18:49

## 2024-02-20 RX ADMIN — IOHEXOL 90 ML: 350 INJECTION, SOLUTION INTRAVENOUS at 14:00

## 2024-02-20 SDOH — SOCIAL STABILITY: SOCIAL INSECURITY: HAS ANYONE EVER THREATENED TO HURT YOUR FAMILY OR YOUR PETS?: NO

## 2024-02-20 SDOH — SOCIAL STABILITY: SOCIAL INSECURITY: ABUSE: ADULT

## 2024-02-20 SDOH — SOCIAL STABILITY: SOCIAL INSECURITY: HAVE YOU HAD THOUGHTS OF HARMING ANYONE ELSE?: NO

## 2024-02-20 SDOH — SOCIAL STABILITY: SOCIAL INSECURITY: ARE YOU OR HAVE YOU BEEN THREATENED OR ABUSED PHYSICALLY, EMOTIONALLY, OR SEXUALLY BY ANYONE?: NO

## 2024-02-20 SDOH — SOCIAL STABILITY: SOCIAL INSECURITY: WERE YOU ABLE TO COMPLETE ALL THE BEHAVIORAL HEALTH SCREENINGS?: YES

## 2024-02-20 SDOH — SOCIAL STABILITY: SOCIAL INSECURITY: DO YOU FEEL ANYONE HAS EXPLOITED OR TAKEN ADVANTAGE OF YOU FINANCIALLY OR OF YOUR PERSONAL PROPERTY?: NO

## 2024-02-20 SDOH — SOCIAL STABILITY: SOCIAL INSECURITY: DO YOU FEEL UNSAFE GOING BACK TO THE PLACE WHERE YOU ARE LIVING?: NO

## 2024-02-20 SDOH — SOCIAL STABILITY: SOCIAL INSECURITY: DOES ANYONE TRY TO KEEP YOU FROM HAVING/CONTACTING OTHER FRIENDS OR DOING THINGS OUTSIDE YOUR HOME?: NO

## 2024-02-20 SDOH — SOCIAL STABILITY: SOCIAL INSECURITY: ARE THERE ANY APPARENT SIGNS OF INJURIES/BEHAVIORS THAT COULD BE RELATED TO ABUSE/NEGLECT?: NO

## 2024-02-20 ASSESSMENT — COGNITIVE AND FUNCTIONAL STATUS - GENERAL
MOBILITY SCORE: 23
PATIENT BASELINE BEDBOUND: NO
CLIMB 3 TO 5 STEPS WITH RAILING: A LITTLE
DRESSING REGULAR LOWER BODY CLOTHING: A LITTLE
DAILY ACTIVITIY SCORE: 23

## 2024-02-20 ASSESSMENT — ACTIVITIES OF DAILY LIVING (ADL)
ADEQUATE_TO_COMPLETE_ADL: YES
TOILETING: INDEPENDENT
HEARING - RIGHT EAR: FUNCTIONAL
ASSISTIVE_DEVICE: CANE;WALKER
LACK_OF_TRANSPORTATION: NO
FEEDING YOURSELF: INDEPENDENT
DRESSING YOURSELF: INDEPENDENT
WALKS IN HOME: INDEPENDENT
HEARING - LEFT EAR: FUNCTIONAL
BATHING: INDEPENDENT
PATIENT'S MEMORY ADEQUATE TO SAFELY COMPLETE DAILY ACTIVITIES?: YES
JUDGMENT_ADEQUATE_SAFELY_COMPLETE_DAILY_ACTIVITIES: YES
GROOMING: INDEPENDENT

## 2024-02-20 ASSESSMENT — PAIN SCALES - GENERAL
PAINLEVEL_OUTOF10: 4
PAINLEVEL_OUTOF10: 8
PAINLEVEL_OUTOF10: 4

## 2024-02-20 ASSESSMENT — PATIENT HEALTH QUESTIONNAIRE - PHQ9
2. FEELING DOWN, DEPRESSED OR HOPELESS: NOT AT ALL
SUM OF ALL RESPONSES TO PHQ9 QUESTIONS 1 & 2: 0
1. LITTLE INTEREST OR PLEASURE IN DOING THINGS: NOT AT ALL

## 2024-02-20 ASSESSMENT — PAIN DESCRIPTION - ORIENTATION: ORIENTATION: RIGHT

## 2024-02-20 ASSESSMENT — PAIN - FUNCTIONAL ASSESSMENT: PAIN_FUNCTIONAL_ASSESSMENT: 0-10

## 2024-02-20 ASSESSMENT — LIFESTYLE VARIABLES
HAVE PEOPLE ANNOYED YOU BY CRITICIZING YOUR DRINKING: NO
SKIP TO QUESTIONS 9-10: 1
EVER FELT BAD OR GUILTY ABOUT YOUR DRINKING: NO
AUDIT-C TOTAL SCORE: 0
HOW OFTEN DO YOU HAVE A DRINK CONTAINING ALCOHOL: NEVER
HAVE YOU EVER FELT YOU SHOULD CUT DOWN ON YOUR DRINKING: NO
AUDIT-C TOTAL SCORE: 0
EVER HAD A DRINK FIRST THING IN THE MORNING TO STEADY YOUR NERVES TO GET RID OF A HANGOVER: NO
HOW MANY STANDARD DRINKS CONTAINING ALCOHOL DO YOU HAVE ON A TYPICAL DAY: PATIENT DOES NOT DRINK
HOW OFTEN DO YOU HAVE 6 OR MORE DRINKS ON ONE OCCASION: NEVER

## 2024-02-20 ASSESSMENT — COLUMBIA-SUICIDE SEVERITY RATING SCALE - C-SSRS
6. HAVE YOU EVER DONE ANYTHING, STARTED TO DO ANYTHING, OR PREPARED TO DO ANYTHING TO END YOUR LIFE?: NO
2. HAVE YOU ACTUALLY HAD ANY THOUGHTS OF KILLING YOURSELF?: NO
1. IN THE PAST MONTH, HAVE YOU WISHED YOU WERE DEAD OR WISHED YOU COULD GO TO SLEEP AND NOT WAKE UP?: NO

## 2024-02-20 ASSESSMENT — PAIN DESCRIPTION - LOCATION: LOCATION: LEG

## 2024-02-20 NOTE — ED PROVIDER NOTES
HPI:  68-year-old female history of known aortic occlusion causing chronic leg claudication following with vascular presenting after preoperative imaging showed an incidental finding of development of a right lower lobe and right middle lobe segmental PE with focal opacification of the right lower lobe suggestive of developing pulmonary infarct, no CT evidence for right heart strain, redemonstration of complete thrombosis of the visualized portion of the renal/infrarenal abdominal aorta with partial occlusion of the distal thoracic and proximal abdominal aorta.  Patient is asymptomatic at this time, denies chest pain, shortness of breath, dyspnea on exertion.  She does endorse continued chronic right lower extremity pain and swelling which is why she has a scheduled aortic repair with bypass on 2/26 with Dr. Garcia who sent her into the ED for evaluation today.    ------------------------------------------------------------------------------------------------------------------------------------------  Physical Exam:    VS: As documented in the triage note and EMR flowsheet from this visit were reviewed.  General: Well appearing. No acute distress.   Eyes: Pupils round and reactive. No scleral icterus.   HENT: Atraumatic. Normocephalic. Moist mucous membranes.   CV: Regular rate. No pedal edema appreciated. RLE erythematous and swollen (pt states is chronic), extremities warm well-perfused b/l  Resp: Coarse breath sounds bilaterally. Non-labored.    GI: Soft, nontender to palpation. Nondistended.   Skin: Warm, dry, intact. No systemic rashes or lesions appreciated.  Neuro: Alert. No focal neuro deficits observed. Speech fluent. Answers questions appropriately.   Psych: Appropriate. Kempt.    ------------------------------------------------------------------------------------------------------------------------------------------    Medical Decision Making:  ED Course as of 02/20/24 1852   Tue Feb 20, 2024   [id number]  Electrocardiogram, 12-lead PRN ACS symptoms  Normal sinus rhythm rate 70, normal axis.  No acute ST segment elevation or depression.  , QRS 94, QTc 408. [KR]      ED Course User Index  [KR] Christiana Simmons DO         Diagnoses as of 02/20/24 1852   Multiple subsegmental pulmonary emboli without acute cor pulmonale (CMS/HCC)     68-year-old female history of known aortic occlusion following with vascular surgery presenting today with incidental segmental PEs on outpatient CT scan.  Patient afebrile, hemodynamically stable, not tachycardic or hypoxic, breathing comfortably on room air.  Asymptomatic. Patient evaluated by vascular surgery given upcoming planned aortic repair with Dr. Jovan Zelaya this patient was sent in by his office.  Vascular evaluated patient at bedside and recommending admission under their service, starting high intensity heparin drip with bolus and plan to consult to ED LS for consideration of thrombectomy.  Basic labs including preoperative labs obtained and grossly unremarkable.  Heparin drip ordered and patient was admitted to vascular service for further medical optimization and management.    Christiana Simmons DO  EM PGY-2     Christiana Simmons DO  Resident  02/20/24 1852

## 2024-02-20 NOTE — TELEPHONE ENCOUNTER
After speaking with Dr. Jovan Zelaya, patient was instructed to report to [facility] ED for treatment of PE with pulmonary infarction. May require thrombectomy prior to aortic surgery. ED was notified.

## 2024-02-20 NOTE — CONSULTS
[facility]  Vascular Surgery  H&P/Consult Note  Klaudia Ratliff  [id number]  1955      Reason For Consult:  Patient known to vascular surgery service, referred to ED by Dr. Zelaya for CT findings today (saddle PE)     HPI  Klaudia Ratliff is a 68 y.o. female here for initial evaluation for aortic occlusion. She developed bilateral claudication symptoms last year from her hips down her legs. This has progressed to constant rest pain this past month. She states dangling her legs helps some. A year ago, she was able to walk 2 miles distance. She smokes 1 ppd since the age of 22. CTA aorta demonstrates an aortic occlusion with reconstitution in the left iliac artery and right common femoral artery.     Vascular Surgery is consulted given patient is known to Dr. Zelaya. She underwent pre-operative imagining with CTA Chest and was found to have saddle PE.      She denies chest pain or shortness of breath. She denies amaurosis fugax or TIA symptoms. She denies any history of thyroid disease or DM. She has lost 20 pounds over the past month because she has been unable to walk to the refrigerator to get snacks.  She states that she had some right lower chest pain when using her walker over the last few weeks, the pain is not reproducible when using a cane and on palpation.  She states that 1 month ago she noticed that her right lower extremity became painful from the calf down (pain has slowly crept up to the thigh over time) time her lower leg started becoming more swollen.       Review of Systems  No headaches, lightheadedness, dizziness, chest pain, shortness of breath, abdominal pain extremity weakness.    She does endorse with ambulation of her right lower extremity.      PMH:  Past Medical History:   Diagnosis Date    Acute sinusitis, unspecified 03/16/2016    Acute sinusitis    Essential (primary) hypertension 07/18/2022    Hypertension    Headache, unspecified 07/09/2013     "Headache    Right shoulder pain 11/01/2023       Shx:  Past Surgical History:   Procedure Laterality Date    CARDIAC CATHETERIZATION N/A 2/16/2024    Procedure: Left Heart Cath;  Surgeon: Aditya Sky MD PhD;  Location: [facility] Cardiac Cath Lab;  Service: Cardiovascular;  Laterality: N/A;  STAT        FMHx:  family history is not on file.    Social Hx:  Smoker: current , states that she stopped smoking in anticipation of surgery with   Alcohol consumption: none  Illicit drug use: none  Living Situation: lives with a roomate       Objective:     Last Recorded Vitals      12/29/2023    10:33 AM 1/12/2024    11:29 AM 2/14/2024     1:10 PM 2/16/2024     9:49 AM 2/16/2024    10:24 AM 2/20/2024     5:06 PM 2/20/2024     6:08 PM   Vitals   Systolic 145 171 147 160 154 143 153   Diastolic 84 83 84 85 80 82 119   Heart Rate 79 57 106 76 72 81 70   Temp 36.4 °C (97.6 °F) 36.4 °C (97.6 °F)  36.1 °C (97 °F)  37 °C (98.6 °F) 36.9 °C (98.4 °F)   Resp 16 20  16 18 16 17   Height (in)   1.575 m (5' 2\") 1.575 m (5' 2\")   1.575 m (5' 2\")   Weight (lb) 182 180 171 170.64  170 170   BMI 33.29 kg/m2 32.92 kg/m2 31.28 kg/m2 31.21 kg/m2  31.09 kg/m2 31.09 kg/m2   BSA (m2) 1.9 m2 1.89 m2 1.84 m2 1.84 m2  1.84 m2 1.84 m2   Visit Report Report Report Report         No intake/output data recorded.     Physical Exam  General: NAD. Nontoxic  HEENT: Atraumatic. MMM. EOMI.  Card: RRR on monitor  Pulm: Nonlabored breathing on RA  Chest: No bruising or seatbelt sign. Chest is nontender.  Abdomen: Soft, nontender, nondistended. No bruising.  Extremities: MAEx4. RLE warmer than left (left is cold from mid foot to toes). RLE is swollen in comparison to LLE, soft compartments bilaterally  Neuro: No focal deficits.  Pulses:  RUE  Radial -2+ palpable  Ulnar-2+ palpable    LUE  Radial -2+ palpable  Ulnar-2+ palpable    LLE  Fem- unable to palpate  DP-monophasic  PT-monophasic    RLE  Fem-unable to palpate  DP- " monophasic  PT-monophasic    Relevant Results  Lab Results   Component Value Date    WBC 12.0 (H) 02/20/2024    HGB 12.8 02/20/2024    HCT 36.8 02/20/2024    MCV 93 02/20/2024     02/20/2024     Lab Results   Component Value Date    GLUCOSE 103 (H) 02/20/2024    CALCIUM 9.6 02/20/2024     (L) 02/20/2024    K 3.8 02/20/2024    CO2 25 02/20/2024    CL 98 02/20/2024    BUN 25 (H) 02/20/2024    CREATININE 0.88 02/20/2024         CTA chest w/ and wo/ contrast (02/20/2024):  IMPRESSION:  1. Interval development of right lower lobe and right middle lobe  segmental pulmonary emboli with focal opacification of the right  lower lobe suggestive of developing pulmonary infarct. No CT evidence  for right heart strain.  2. The thoracic aorta is normal in course, caliber, and contour.  There is complete thrombosis of the visualized portion of the  renal/infrarenal abdominal aorta and partial occlusion of the distal  thoracic and proximal abdominal aorta.. There is otherwise no  evidence for acute aortic pathology.  3. Additional findings as above.     Assessment/Plan   Ms. Klaudia Ratliff is a 68 y.o. F with known  aortic occlusion and BLE claudication now presenting with incidental R main pulmonary artery saddle PE on CTA Chest imaging on 2/20/24. She is currently HDS.    Plan  -admission to vascular surgery  -High intensity hep gtt with bolus  - consult to EVLS -- evaluate if appropriate candidate for thrombectomy possibly 2/21  - obtain bilateral LE venous duplex for DVT r/o  -ECHO -- to assess for right heart strain given new PE findings  -collect BNP, troponins  -collect full set of labs (CBC, CMP, MAG)  -maintain NPO @ midnight for plan of possible thrombectomy with interventional Cardiology tomorrow (discussed with Dr. Cooley)  -Regular diet until midnight       Discussed with attending surgeon Dr. Zelaya.     Kaycee Sol MD  Vascular Surgery, PGY1  Team Pager: [phone]  Available via [employer]

## 2024-02-20 NOTE — ED TRIAGE NOTES
Pt had CT done prior today, outpatient, and it revealed a PE. Pt denies SOB and chest pain.     Pt's phone contact note states:  After speaking with Dr. Jovan Zelaya, patient was instructed to report to [facility] ED for treatment of PE with pulmonary infarction. May require thrombectomy prior to aortic surgery. ED was notified.

## 2024-02-20 NOTE — RESULT ENCOUNTER NOTE
Incidental PE on CTA chest - no evidence of right heart strain.   Patient denies chest pain or shortness of breath.   Hx of LE DVT ~40 years ago while on oral contraceptives.   Starter pack of Eliquis sent to pharmacy.   Patient verbalized understanding.

## 2024-02-21 ENCOUNTER — APPOINTMENT (OUTPATIENT)
Dept: CARDIOLOGY | Facility: HOSPITAL | Age: 69
DRG: 165 | End: 2024-02-21
Payer: MEDICARE

## 2024-02-21 PROBLEM — I26.99 PULMONARY EMBOLISM (MULTI): Status: ACTIVE | Noted: 2024-02-20

## 2024-02-21 LAB
ALBUMIN SERPL BCP-MCNC: 3.8 G/DL (ref 3.4–5)
ALP SERPL-CCNC: 58 U/L (ref 33–136)
ALT SERPL W P-5'-P-CCNC: 22 U/L (ref 7–45)
ANION GAP SERPL CALC-SCNC: 15 MMOL/L (ref 10–20)
AORTIC VALVE MEAN GRADIENT: 5 MMHG
AORTIC VALVE PEAK VELOCITY: 1.6 M/S
AST SERPL W P-5'-P-CCNC: 12 U/L (ref 9–39)
ATRIAL RATE: 70 BPM
AV PEAK GRADIENT: 10.2 MMHG
AVA (PEAK VEL): 2.47 CM2
AVA (VTI): 2.58 CM2
B2 GLYCOPROT1 IGA SER-ACNC: 9.9 U/ML
B2 GLYCOPROT1 IGG SER-ACNC: <1.4 U/ML
B2 GLYCOPROT1 IGM SER-ACNC: 3.9 U/ML
BILIRUB SERPL-MCNC: 0.4 MG/DL (ref 0–1.2)
BUN SERPL-MCNC: 25 MG/DL (ref 6–23)
CALCIUM SERPL-MCNC: 9.6 MG/DL (ref 8.6–10.6)
CARDIOLIPIN IGA SERPL-ACNC: 2.9 APL U/ML
CARDIOLIPIN IGG SER IA-ACNC: <1.6 GPL U/ML
CARDIOLIPIN IGM SER IA-ACNC: 4.4 MPL U/ML
CHLORIDE SERPL-SCNC: 102 MMOL/L (ref 98–107)
CO2 SERPL-SCNC: 26 MMOL/L (ref 21–32)
CREAT SERPL-MCNC: 0.92 MG/DL (ref 0.5–1.05)
EGFRCR SERPLBLD CKD-EPI 2021: 68 ML/MIN/1.73M*2
ERYTHROCYTE [DISTWIDTH] IN BLOOD BY AUTOMATED COUNT: 12.8 % (ref 11.5–14.5)
GLUCOSE SERPL-MCNC: 101 MG/DL (ref 74–99)
HCT VFR BLD AUTO: 35.3 % (ref 36–46)
HGB BLD-MCNC: 11.5 G/DL (ref 12–16)
LEFT ATRIUM VOLUME AREA LENGTH INDEX BSA: 24.3 ML/M2
LEFT VENTRICLE INTERNAL DIMENSION DIASTOLE: 4.3 CM (ref 3.5–6)
LEFT VENTRICULAR OUTFLOW TRACT DIAMETER: 2 CM
MCH RBC QN AUTO: 32.1 PG (ref 26–34)
MCHC RBC AUTO-ENTMCNC: 32.6 G/DL (ref 32–36)
MCV RBC AUTO: 99 FL (ref 80–100)
MITRAL VALVE E/A RATIO: 0.94
MITRAL VALVE E/E' RATIO: 11.86
NRBC BLD-RTO: 0 /100 WBCS (ref 0–0)
P AXIS: 51 DEGREES
P OFFSET: 207 MS
P ONSET: 151 MS
PLATELET # BLD AUTO: 439 X10*3/UL (ref 150–450)
POTASSIUM SERPL-SCNC: 3.8 MMOL/L (ref 3.5–5.3)
PR INTERVAL: 142 MS
PROT SERPL-MCNC: 6.6 G/DL (ref 6.4–8.2)
Q ONSET: 222 MS
QRS COUNT: 11 BEATS
QRS DURATION: 94 MS
QT INTERVAL: 378 MS
QTC CALCULATION(BAZETT): 408 MS
QTC FREDERICIA: 398 MS
R AXIS: 15 DEGREES
RBC # BLD AUTO: 3.58 X10*6/UL (ref 4–5.2)
RIGHT VENTRICLE FREE WALL PEAK S': 16.3 CM/S
RIGHT VENTRICLE PEAK SYSTOLIC PRESSURE: 27.2 MMHG
SODIUM SERPL-SCNC: 139 MMOL/L (ref 136–145)
T AXIS: 28 DEGREES
T OFFSET: 411 MS
TRICUSPID ANNULAR PLANE SYSTOLIC EXCURSION: 2.4 CM
UFH PPP CHRO-ACNC: 0.6 IU/ML
UFH PPP CHRO-ACNC: 0.6 IU/ML
VENTRICULAR RATE: 70 BPM
WBC # BLD AUTO: 10.9 X10*3/UL (ref 4.4–11.3)

## 2024-02-21 PROCEDURE — 2500000001 HC RX 250 WO HCPCS SELF ADMINISTERED DRUGS (ALT 637 FOR MEDICARE OP): Performed by: STUDENT IN AN ORGANIZED HEALTH CARE EDUCATION/TRAINING PROGRAM

## 2024-02-21 PROCEDURE — 86146 BETA-2 GLYCOPROTEIN ANTIBODY: CPT | Performed by: STUDENT IN AN ORGANIZED HEALTH CARE EDUCATION/TRAINING PROGRAM

## 2024-02-21 PROCEDURE — 1200000002 HC GENERAL ROOM WITH TELEMETRY DAILY

## 2024-02-21 PROCEDURE — 85027 COMPLETE CBC AUTOMATED: CPT | Performed by: STUDENT IN AN ORGANIZED HEALTH CARE EDUCATION/TRAINING PROGRAM

## 2024-02-21 PROCEDURE — 2500000002 HC RX 250 W HCPCS SELF ADMINISTERED DRUGS (ALT 637 FOR MEDICARE OP, ALT 636 FOR OP/ED): Performed by: STUDENT IN AN ORGANIZED HEALTH CARE EDUCATION/TRAINING PROGRAM

## 2024-02-21 PROCEDURE — 2500000004 HC RX 250 GENERAL PHARMACY W/ HCPCS (ALT 636 FOR OP/ED): Performed by: STUDENT IN AN ORGANIZED HEALTH CARE EDUCATION/TRAINING PROGRAM

## 2024-02-21 PROCEDURE — 93306 TTE W/DOPPLER COMPLETE: CPT | Performed by: INTERNAL MEDICINE

## 2024-02-21 PROCEDURE — 93306 TTE W/DOPPLER COMPLETE: CPT

## 2024-02-21 PROCEDURE — 99233 SBSQ HOSP IP/OBS HIGH 50: CPT | Performed by: SURGERY

## 2024-02-21 PROCEDURE — 36415 COLL VENOUS BLD VENIPUNCTURE: CPT | Performed by: STUDENT IN AN ORGANIZED HEALTH CARE EDUCATION/TRAINING PROGRAM

## 2024-02-21 PROCEDURE — 99222 1ST HOSP IP/OBS MODERATE 55: CPT | Performed by: NURSE PRACTITIONER

## 2024-02-21 PROCEDURE — 2500000004 HC RX 250 GENERAL PHARMACY W/ HCPCS (ALT 636 FOR OP/ED): Performed by: INTERNAL MEDICINE

## 2024-02-21 PROCEDURE — 85613 RUSSELL VIPER VENOM DILUTED: CPT | Performed by: STUDENT IN AN ORGANIZED HEALTH CARE EDUCATION/TRAINING PROGRAM

## 2024-02-21 PROCEDURE — 85520 HEPARIN ASSAY: CPT | Performed by: STUDENT IN AN ORGANIZED HEALTH CARE EDUCATION/TRAINING PROGRAM

## 2024-02-21 PROCEDURE — 80053 COMPREHEN METABOLIC PANEL: CPT | Performed by: STUDENT IN AN ORGANIZED HEALTH CARE EDUCATION/TRAINING PROGRAM

## 2024-02-21 PROCEDURE — S4991 NICOTINE PATCH NONLEGEND: HCPCS | Performed by: STUDENT IN AN ORGANIZED HEALTH CARE EDUCATION/TRAINING PROGRAM

## 2024-02-21 PROCEDURE — 86147 CARDIOLIPIN ANTIBODY EA IG: CPT | Performed by: STUDENT IN AN ORGANIZED HEALTH CARE EDUCATION/TRAINING PROGRAM

## 2024-02-21 RX ADMIN — ATORVASTATIN CALCIUM 80 MG: 80 TABLET, FILM COATED ORAL at 21:20

## 2024-02-21 RX ADMIN — Medication 1 TABLET: at 12:34

## 2024-02-21 RX ADMIN — PERFLUTREN 2 ML OF DILUTION: 6.52 INJECTION, SUSPENSION INTRAVENOUS at 11:13

## 2024-02-21 RX ADMIN — HEPARIN SODIUM AND DEXTROSE 1200 UNITS/HR: 10000; 5 INJECTION INTRAVENOUS at 06:33

## 2024-02-21 RX ADMIN — NICOTINE 1 PATCH: 14 PATCH, EXTENDED RELEASE TRANSDERMAL at 12:55

## 2024-02-21 RX ADMIN — LOSARTAN POTASSIUM 100 MG: 50 TABLET, FILM COATED ORAL at 12:34

## 2024-02-21 RX ADMIN — ATENOLOL 50 MG: 50 TABLET ORAL at 12:55

## 2024-02-21 RX ADMIN — ATENOLOL 50 MG: 50 TABLET ORAL at 21:20

## 2024-02-21 ASSESSMENT — COGNITIVE AND FUNCTIONAL STATUS - GENERAL
WALKING IN HOSPITAL ROOM: A LITTLE
MOBILITY SCORE: 22
WALKING IN HOSPITAL ROOM: A LITTLE
MOBILITY SCORE: 20
DAILY ACTIVITIY SCORE: 24
CLIMB 3 TO 5 STEPS WITH RAILING: A LITTLE
DAILY ACTIVITIY SCORE: 24
STANDING UP FROM CHAIR USING ARMS: A LITTLE
MOVING TO AND FROM BED TO CHAIR: A LITTLE
CLIMB 3 TO 5 STEPS WITH RAILING: A LITTLE

## 2024-02-21 ASSESSMENT — PAIN SCALES - GENERAL
PAINLEVEL_OUTOF10: 2
PAINLEVEL_OUTOF10: 2

## 2024-02-21 ASSESSMENT — PAIN - FUNCTIONAL ASSESSMENT: PAIN_FUNCTIONAL_ASSESSMENT: 0-10

## 2024-02-21 NOTE — CONSULTS
Consults  History Of Present Illness:    Klaudia Ratliff is a 68 y.o. female presenting with Segmental PE (right lower lobe).    69 YO F with Pmhx of aortic bi-iliac occlusion and segmental PE>  Vascular surgery request mechanical thrombectomy prior to surgeyr.     Last Recorded Vitals:  Vitals:    02/20/24 1949 02/21/24 0003 02/21/24 0720 02/21/24 0822   BP: 121/84 113/54 120/59 122/62   Pulse: 74 74 58 66   Resp: 18 18 18 18   Temp: 36.5 °C (97.7 °F) 36.4 °C (97.5 °F) 36.2 °C (97.2 °F) 36.3 °C (97.3 °F)   TempSrc: Temporal      SpO2: 97% 94% 95% 96%   Weight:       Height:           Last Labs:  CBC - 2/21/2024:  8:58 AM  10.9 11.5 439    35.3      CMP - 2/20/2024:  5:29 PM  9.6 7.6 16 --- 0.5   _ 4.1 24 53      PTT - No results in last year.  1.2   13.8 _     Troponin I, High Sensitivity   Date/Time Value Ref Range Status   02/20/2024 05:29 PM 9 0 - 34 ng/L Final     BNP   Date/Time Value Ref Range Status   02/20/2024 05:29 PM 15 0 - 99 pg/mL Final     LDL Calculated   Date/Time Value Ref Range Status   01/12/2024 12:18  (H) <=99 mg/dL Final     Comment:                                 Near   Borderline      AGE      Desirable  Optimal    High     High     Very High     0-19 Y     0 - 109     ---    110-129   >/= 130     ----    20-24 Y     0 - 119     ---    120-159   >/= 160     ----      >24 Y     0 -  99   100-129  130-159   160-189     >/=190       VLDL   Date/Time Value Ref Range Status   01/12/2024 12:18 PM 32 0 - 40 mg/dL Final   02/03/2023 09:12 AM 19 0 - 40 mg/dL Final   07/23/2022 09:04 AM 19 0 - 40 mg/dL Final   11/16/2021 09:39 AM 14 0 - 40 mg/dL Final      Last I/O:  I/O last 3 completed shifts:  In: 138.1 (1.8 mL/kg) [I.V.:138.1 (1.8 mL/kg)]  Out: 2 (0 mL/kg) [Urine:2 (0 mL/kg/hr)]  Weight: 77.1 kg     Past Cardiology Tests (Last 3 Years):  EKG:  ECG 12 lead 02/20/2024      ECG 12 lead STAT 02/16/2024    Echo:  No results found for this or any previous visit from the past 1095  "days.    Ejection Fractions:  No results found for: \"EF\"  Cath:  Cardiac catheterization - coronary 02/16/2024    Stress Test:  No results found for this or any previous visit from the past 1095 days.    Cardiac Imaging:  No results found for this or any previous visit from the past 1095 days.      Past Medical History:  She has a past medical history of Acute sinusitis, unspecified (03/16/2016), Essential (primary) hypertension (07/18/2022), Headache, unspecified (07/09/2013), and Right shoulder pain (11/01/2023).    Past Surgical History:  She has a past surgical history that includes Cardiac catheterization (N/A, 2/16/2024).      Social History:  She reports that she has been smoking cigarettes. She has been smoking an average of .5 packs per day. She has been exposed to tobacco smoke. She has never used smokeless tobacco. She reports that she does not currently use alcohol. She reports current drug use. Drug: Marijuana.    Family History:  No family history on file.     Allergies:  Amlodipine, Doxycycline, and Clarithromycin    Inpatient Medications:  Scheduled medications   Medication Dose Route Frequency    atenolol  50 mg oral BID    atorvastatin  80 mg oral Nightly    losartan  100 mg oral Daily    multivitamin with minerals  1 tablet oral Daily    nicotine  1 patch transdermal Daily    perflutren lipid microspheres  0.5-10 mL of dilution intravenous Once in imaging    polyethylene glycol  17 g oral Daily     PRN medications   Medication    heparin     Continuous Medications   Medication Dose Last Rate    heparin  0-4,500 Units/hr 1,200 Units/hr (02/21/24 0922)     Outpatient Medications:  Current Outpatient Medications   Medication Instructions    apixaban (Eliquis) 5 mg (74 tabs) tablet Take 2 tablets (10 mg) by mouth 2 times a day for 7 days, then take 1 tablet (5 mg) by mouth 2 times a day.    ascorbic acid (Vitamin C) 250 mg tablet oral    aspirin 81 mg, oral, Daily    atenolol (Tenormin) 50 mg tablet 1 " "every morning and 1 every evening    atorvastatin (LIPITOR) 80 mg, oral, Nightly    cholecalciferol (Vitamin D-3) 50 MCG (2000 UT) tablet oral    furosemide (LASIX) 40 mg, oral, Daily, As needed for foot swelling    losartan (COZAAR) 100 mg, oral, Daily    nicotine (Nicoderm CQ) 14 mg/24 hr patch 1 patch, Daily, Apply (1) patch daily as directed.    potassium chloride CR 10 mEq ER tablet 50 mEq, oral, Daily       Physical Exam:  /65   Pulse 59   Temp 36.5 °C (97.7 °F)   Resp 18   Ht 1.575 m (5' 2\")   Wt 77.6 kg (170 lb 15.5 oz)   SpO2 99%   BMI 31.27 kg/m²     General Appearance:    Alert, cooperative, no distress, appears stated age   Head:    Normocephalic, without obvious abnormality, atraumatic   Eyes:    PERRL, conjunctiva/corneas clear, EOM's intact, fundi     benign, both eyes   Ears:    Normal TM's and external ear canals, both ears   Nose:   Nares normal, septum midline, mucosa normal, no drainage     or sinus tenderness   Throat:   Lips, mucosa, and tongue normal; teeth and gums normal   Neck:   Supple, symmetrical, trachea midline, no adenopathy;     thyroid:  no enlargement/tenderness/nodules; no carotid    bruit or JVD   Back:     Symmetric, no curvature, ROM normal, no CVA tenderness   Lungs:     Clear to auscultation bilaterally, respirations unlabored   Chest Wall:    No tenderness or deformity    Heart:    Regular rate and rhythm, S1 and S2 normal, no murmur, rub    or gallop   Breast Exam:    No tenderness, masses, or nipple abnormality   Abdomen:     Soft, non-tender, bowel sounds active all four quadrants,     no masses, no organomegaly   Genitalia:    Normal female without lesion, discharge or tenderness   Rectal:    Normal tone, no masses or tenderness; guaiac negative stool           Skin:   Skin color, texture, turgor normal, no rashes or lesions   Lymph nodes:   Cervical, supraclavicular, and axillary nodes normal   Neurologic:   CNII-XII intact, normal strength, sensation and " reflexes     throughout          Assessment/Plan     Peripheral IV 02/20/24 20 G Left;Anterior Wrist (Active)   Site Assessment Clean;Dry;Intact 02/21/24 0922   Dressing Status Clean;Dry 02/21/24 0922   Number of days: 1     Pulmonary angiogram with possible intervention    Code Status:  Full Code    I spent 35 minutes in the professional and overall care of this patient.        Luis Manuel Simmons MD MPH

## 2024-02-21 NOTE — SIGNIFICANT EVENT
CODE STATUS discussion    Patient was seen at bedside for CODE STATUS discussion.  She states that she would like all resuscitative measures including intubation.  All current CODE STATUS is in [place] were explained to patient (full code, DNRCC,, DNR CCA).  She verbalized understanding.  all questions were answered daughter also in agreement, all questions answered.     Patient also obtained with ICU level care.    Patient will remain a full code at this time    Kaycee Sol MD  PGY1 Vascular Surgery  [id number]

## 2024-02-21 NOTE — PROGRESS NOTES
"Klaudia Ratliff is a 68 y.o. female on day 1 of admission presenting with Multiple subsegmental pulmonary emboli without acute cor pulmonale (CMS/HCC).      Subjective   No events overnight. Pain in R foot somewhat improved this morning. Denies chest pain, dyspnea, dizziness, syncope       Objective   Last Recorded Vitals  Heart Rate:  [58-81]   Temp:  [36.2 °C (97.2 °F)-37 °C (98.6 °F)]   Resp:  [16-18]   BP: (113-153)/()   Height:  [157.5 cm (5' 2\")]   Weight:  [77.1 kg (170 lb)]   SpO2:  [94 %-98 %]     Intake/Output last 3 Shifts:  I/O last 3 completed shifts:  In: 138.1 (1.8 mL/kg) [I.V.:138.1 (1.8 mL/kg)]  Out: 2 (0 mL/kg) [Urine:2 (0 mL/kg/hr)]  Weight: 77.1 kg     General: NAD. Nontoxic  HEENT: Atraumatic. MMM. EOMI.  Card: RRR on monitor  Pulm: Nonlabored breathing on RA  Chest: No bruising or seatbelt sign. Chest is nontender.  Abdomen: Soft, nontender, nondistended. No bruising.  Extremities: MAEx4. RLE warmer than left (left is cold from mid foot to toes), erythema improved from prior exams RLE. RLE is swollen in comparison to LLE, soft compartments bilaterally  Neuro: No focal deficits.  Pulses:  RUE  Radial -2+ palpable  Ulnar-2+ palpable     LUE  Radial -2+ palpable  Ulnar-2+ palpable     LLE  Fem- unable to palpate  DP-monophasic  PT-monophasic     RLE  Fem-unable to palpate  DP- monophasic  PT-monophasic    Relevant Results  Lab Results   Component Value Date    WBC 10.9 02/21/2024    HGB 11.5 (L) 02/21/2024    HCT 35.3 (L) 02/21/2024    MCV 99 02/21/2024     02/21/2024     Lab Results   Component Value Date    GLUCOSE 101 (H) 02/21/2024    CALCIUM 9.6 02/21/2024     02/21/2024    K 3.8 02/21/2024    CO2 26 02/21/2024     02/21/2024    BUN 25 (H) 02/21/2024    CREATININE 0.92 02/21/2024       Active Medications  Scheduled medications  atenolol, 50 mg, oral, BID  atorvastatin, 80 mg, oral, Nightly  losartan, 100 mg, oral, Daily  multivitamin with minerals, 1 tablet, oral, " Daily  nicotine, 1 patch, transdermal, Daily  perflutren lipid microspheres, 0.5-10 mL of dilution, intravenous, Once in imaging  polyethylene glycol, 17 g, oral, Daily      Continuous medications  heparin, 0-4,500 Units/hr, Last Rate: 1,200 Units/hr (02/21/24 0922)      PRN medications  PRN medications: heparin     Assessment/Plan   Principal Problem:    Multiple subsegmental pulmonary emboli without acute cor pulmonale (CMS/HCC)    Ms. Klaudia Ratliff is a 68 y.o. F with known  aortic occlusion and BLE claudication and rest pain planned for open thoracoabdominal aortic repair with Dr. Zelaya 2/26/24 admitted for evaluation and management of incidentally-found R main PA saddle PE without symptoms.    RLE pain somewhat improved on heparin drip    Plan:  Continue NPO. Engaged cardiology regarding possible pulmonary thrombectomy  Continue heparin drip, high-intensity  Bilateral DVT scans today  Will send APLA labs today  Pending echo. BNP and trops normal    Discussed with Dr. Garcia Toussaint MD  PGY5 - Integrated Vascular Surgery  [id number]

## 2024-02-21 NOTE — CARE PLAN
The patient's goals for the shift include remain hemodynamically stable throughout shift.    The clinical goals for the shift include patient will remain therapeutic on heparin gtt    Over the shift, the patient did not make progress toward the following goals.        Problem: Pain - Adult  Goal: Verbalizes/displays adequate comfort level or baseline comfort level  Outcome: Progressing     Problem: Safety - Adult  Goal: Free from fall injury  Outcome: Progressing     Problem: Discharge Planning  Goal: Discharge to home or other facility with appropriate resources  Outcome: Progressing     Problem: Chronic Conditions and Co-morbidities  Goal: Patient's chronic conditions and co-morbidity symptoms are monitored and maintained or improved  Outcome: Progressing     Problem: Pain  Goal: Turns in bed with improved pain control throughout the shift  Outcome: Progressing  Goal: Walks with improved pain control throughout the shift  Outcome: Progressing

## 2024-02-21 NOTE — PROGRESS NOTES
Pharmacy Medication History Review    Klaudia Ratliff is a 68 y.o. female admitted for Multiple subsegmental pulmonary emboli without acute cor pulmonale (CMS/HCC). Pharmacy reviewed the patient's [id number] medications and allergies for accuracy.    The list below reflects the updated PTA list. Comments regarding how patient may be taking medications differently can be found in the Admit Orders Activity  Prior to Admission Medications   Prescriptions Last Dose Informant Patient Reported?   apixaban (Eliquis) 5 mg (74 tabs) tablet Unknown at patient plans to start upon discharge Self No   Sig: Take 2 tablets (10 mg) by mouth 2 times a day for 7 days, then take 1 tablet (5 mg) by mouth 2 times a day.   ascorbic acid (Vitamin C) 250 mg tablet Past Week Self Yes   Sig: Take by mouth.   aspirin 81 mg chewable tablet 2024 Self No   Sig: Chew 1 tablet (81 mg) once daily.   atenolol (Tenormin) 50 mg tablet 2024 Self No   Si every morning and 1 every evening   atorvastatin (Lipitor) 80 mg tablet 2024 Self No   Sig: Take 1 tablet (80 mg) by mouth once daily at bedtime.   cholecalciferol (Vitamin D-3) 50 MCG (2000 UT) tablet 2024 Self Yes   Sig: Take by mouth.   furosemide (Lasix) 40 mg tablet 2024 at patient still has supply Self No   Sig: Take 1 tablet (40 mg) by mouth once daily. As needed for foot swelling   losartan (Cozaar) 100 mg tablet 2024 Self No   Sig: Take 1 tablet (100 mg) by mouth once daily.   nicotine (Nicoderm CQ) 14 mg/24 hr patch More than a month at patient request refills Self Yes   Si patch once daily. Apply (1) patch daily as directed.   potassium chloride CR 10 mEq ER tablet 2024 Self No   Sig: Take 5 tablets (50 mEq) by mouth once daily.      Facility-Administered Medications: None        The list below reflects the updated allergy list. Please review each documented allergy for additional clarification and justification.  Allergies  Reviewed by  Cornell Marrero, RN on 2/20/2024        Severity Reactions Comments    Amlodipine High Swelling Bilateral foot swelling    Doxycycline High Diarrhea     Clarithromycin Medium Other, Dizziness Slept for days            Patient accepts M2B at discharge. Pharmacy has been updated to [facility].    Sources used to complete the med history include   Epic dispense history   Allergy list sure scripts   Allergy list from [employer]   Oas ( none recent )      Below are additional concerns with the patient's PTA list.  Patient is an excellent historian - patient knows medication name, indication, dose, and frequency  Patient plans to start apixaban 5 mg starter dose pack  upon discharge      Pratik Steel CPRobyn  Transitions of Care Pharmacy Technician  [facility]  Please reach out via [employer] Secure Chat for questions, or if no response call [phone] or [employer] “MedRec”

## 2024-02-21 NOTE — CONSULTS
Consults  History Of Present Illness:    Klaudia Ratliff is a 68 y.o. female presenting with Segmental PE (right lower lobe). Pt here for initial evaluation for aortic occlusion by vascular surgery. She developed bilateral claudication symptoms about a year from her hips down her legs. This has progressed to constant rest pain and unable to walk. A year ago, she was able to walk 2 miles distance then progressively got worse where she was only able to walk 200 ft then was only able to do 50 ft in the past couple of month. She has been seen by PCP on multiple occasions but first encounter for vascular assessment was about a month ago. She states dangling her legs helps with the pain some. About a month and half ago she started to have RLE swelling and pain and went to PCP and was told it might be from taking Amlodipine but no ultrasound of her legs was done. She smokes 1 ppd since the age of 22. CTA aorta demonstrates an aortic occlusion with reconstitution in the left iliac artery and right common femoral artery. She underwent pre-operative imagining with CTA Chest and was found to have saddle PE. Endovascular consulted for consideration of pulmonary thrombectomy. She denied chest pain, shortness of breath, palpitation or syncope.        Last Recorded Vitals:  Vitals:    02/21/24 0003 02/21/24 0720 02/21/24 0822 02/21/24 1218   BP: 113/54 120/59 122/62 148/74   Pulse: 74 58 66 68   Resp: 18 18 18 18   Temp: 36.4 °C (97.5 °F) 36.2 °C (97.2 °F) 36.3 °C (97.3 °F) 35.3 °C (95.5 °F)   TempSrc:       SpO2: 94% 95% 96% 96%   Weight:       Height:           Last Labs:  CBC - 2/21/2024:  8:58 AM  10.9 11.5 439    35.3      CMP - 2/21/2024:  8:58 AM  9.6 6.6 12 --- 0.4   _ 3.8 22 58      PTT - No results in last year.  1.2   13.8 _     Troponin I, High Sensitivity   Date/Time Value Ref Range Status   02/20/2024 05:29 PM 9 0 - 34 ng/L Final     BNP   Date/Time Value Ref Range Status   02/20/2024 05:29 PM 15 0 - 99 pg/mL  Final     LDL Calculated   Date/Time Value Ref Range Status   01/12/2024 12:18  (H) <=99 mg/dL Final     Comment:                                 Near   Borderline      AGE      Desirable  Optimal    High     High     Very High     0-19 Y     0 - 109     ---    110-129   >/= 130     ----    20-24 Y     0 - 119     ---    120-159   >/= 160     ----      >24 Y     0 -  99   100-129  130-159   160-189     >/=190       VLDL   Date/Time Value Ref Range Status   01/12/2024 12:18 PM 32 0 - 40 mg/dL Final   02/03/2023 09:12 AM 19 0 - 40 mg/dL Final   07/23/2022 09:04 AM 19 0 - 40 mg/dL Final   11/16/2021 09:39 AM 14 0 - 40 mg/dL Final      Last I/O:  I/O last 3 completed shifts:  In: 138.1 (1.8 mL/kg) [I.V.:138.1 (1.8 mL/kg)]  Out: 2 (0 mL/kg) [Urine:2 (0 mL/kg/hr)]  Weight: 77.1 kg     Past Cardiology Tests (Last 3 Years):  EKG:  ECG 12 lead 02/20/2024    ECG 12 lead STAT 02/16/2024    Echo:  Transthoracic Echo (TTE) Complete 02/21/2024  CONCLUSIONS:   1. Left ventricular systolic function is normal with a 60-65% estimated ejection fraction.   2. There is an elevated mean left atrial pressure.   3. There is a prominent echodensity in the apex of the RV, similar in density to surrounding muscle, that is present on multiple views but is not visible with Definity. This may represent a large moderator band or RV trabeculations, vs less likely thrombus or myxoma. Recommend cMRI for further characterization.    Cath:  Cardiac catheterization - coronary 02/16/2024    CTA chest w/ and wo/ contrast (02/20/2024):  IMPRESSION:  1. Interval development of right lower lobe and right middle lobe  segmental pulmonary emboli with focal opacification of the right  lower lobe suggestive of developing pulmonary infarct. No CT evidence  for right heart strain.  2. The thoracic aorta is normal in course, caliber, and contour.  There is complete thrombosis of the visualized portion of the  renal/infrarenal abdominal aorta and partial  occlusion of the distal  thoracic and proximal abdominal aorta.. There is otherwise no  evidence for acute aortic pathology.  3. Additional findings as above.    Past Medical History:  She has a past medical history of Acute sinusitis, unspecified (03/16/2016), Essential (primary) hypertension (07/18/2022), Headache, unspecified (07/09/2013), and Right shoulder pain (11/01/2023).    Past Surgical History:  She has a past surgical history that includes Cardiac catheterization (N/A, 2/16/2024).      Social History:  She reports that she has been smoking cigarettes. She has been smoking an average of .5 packs per day. She has been exposed to tobacco smoke. She has never used smokeless tobacco. She reports that she does not currently use alcohol. She reports current drug use. Drug: Marijuana.    Family History:  No family history on file.     Allergies:  Amlodipine, Doxycycline, and Clarithromycin    Inpatient Medications:  Scheduled medications   Medication Dose Route Frequency    atenolol  50 mg oral BID    atorvastatin  80 mg oral Nightly    losartan  100 mg oral Daily    multivitamin with minerals  1 tablet oral Daily    nicotine  1 patch transdermal Daily    polyethylene glycol  17 g oral Daily     PRN medications   Medication    heparin     Continuous Medications   Medication Dose Last Rate    heparin  0-4,500 Units/hr 1,200 Units/hr (02/21/24 1157)     Outpatient Medications:  Current Outpatient Medications   Medication Instructions    apixaban (Eliquis) 5 mg (74 tabs) tablet Take 2 tablets (10 mg) by mouth 2 times a day for 7 days, then take 1 tablet (5 mg) by mouth 2 times a day.    ascorbic acid (Vitamin C) 250 mg tablet oral    aspirin 81 mg, oral, Daily    atenolol (Tenormin) 50 mg tablet 1 every morning and 1 every evening    atorvastatin (LIPITOR) 80 mg, oral, Nightly    cholecalciferol (Vitamin D-3) 50 MCG (2000 UT) tablet oral    furosemide (LASIX) 40 mg, oral, Daily, As needed for foot swelling     losartan (COZAAR) 100 mg, oral, Daily    nicotine (Nicoderm CQ) 14 mg/24 hr patch 1 patch, Daily, Apply (1) patch daily as directed.    potassium chloride CR 10 mEq ER tablet 50 mEq, oral, Daily       Physical Exam:  Constitutional: Obese female, NAD, awake/alert/oriented x3, pleasant, cooperative     Head/Neck: Neck supple, No JVD, trachea midline, no bruits           Respiratory/Thorax: Patent airways, thorax symmetric, diminished breath sounds in the bases   Cardiovascular: Regular, rate and rhythm, no murmurs, normal S 1 and S 2    Gastrointestinal: Nondistended, soft, non-tender, +BS,       Skin: Warm and dry,               Extremities: SMITH, RLE: + edema, pain with palpation of calf and foot, erythema, warmth, monophasic DP/PT signal. LLE: no edema, discoloration or open sore, foot is warm to touch but cooler than right foot, monophasic DP/PT on Doppler. B/L UE no abnormalities noted, palpable radial and ulnar pulses.   Neuro: non-focal, B/L upper and lower ext equal strength.   Psychological: Appropriate mood and behavior      Assessment/Plan   Ms. Klaudia Ratliff is a 68 y.o. F with known aortic occlusion and BLE claudication now presenting with R main pulmonary artery saddle PE incidental finding on CTA Chest on 2/20/24. She is currently HDS without RV strain, on room air.    Per discussion of Shannan Zelaya and Shay the indication to proceed with pulmonary thrombectomy is to have better visualization during surgery by dropping the left lung and doing single lung ventilation on the right. The clot which appears to be chronic at this point might not be able to be removed endovascularly. However, for the reasons mention it is worth to attempt removal of the clot from the right lung to minimize any ventilation complication during surgery.     Plan    - obtain LE venous duplex for DVT  - continue with high intensity Hep gtt  - Pulmonary angiogram scheduled for 2/22/2024 at 1000 am with Dr Day  - NPO at  [place] and routine labs      Images reviewed by EVLS fellow and attending Dr Day  Case and plan discussed with attending Dr Day      Thank you for the consult.         Peripheral IV 02/20/24 20 G Left;Anterior Wrist (Active)   Site Assessment Clean;Dry;Intact 02/21/24 0922   Dressing Status Clean;Dry 02/21/24 0922   Number of days: 1       Code Status:  Full Code            ADRIÁN Haywood-CNP  Endovascular/Limb Salvage Service   Day: 17607/Haiku/Night [facility] [id number]

## 2024-02-21 NOTE — PROGRESS NOTES
2/21/2024 Care coordination  Ms. Klaudia Ratliff is a 68 y.o. F with known  aortic occlusion and BLE claudication and rest pain planned for open thoracoabdominal aortic repair with Dr. Zelaya 2/26/24 admitted for evaluation and management of incidentally-found R main PA saddle PE without symptoms.   RLE pain somewhat improved on heparin drip   Plan:  Continue NPO. Engaged cardiology regarding possible pulmonary thrombectomy. Continue heparin drip, high-intensity. No needs identified at thi time.

## 2024-02-22 ENCOUNTER — APPOINTMENT (OUTPATIENT)
Dept: PREADMISSION TESTING | Facility: HOSPITAL | Age: 69
End: 2024-02-22
Payer: MEDICARE

## 2024-02-22 ENCOUNTER — APPOINTMENT (OUTPATIENT)
Dept: RESPIRATORY THERAPY | Facility: HOSPITAL | Age: 69
End: 2024-02-22
Payer: MEDICARE

## 2024-02-22 LAB
ALBUMIN SERPL BCP-MCNC: 3.4 G/DL (ref 3.4–5)
ANION GAP SERPL CALC-SCNC: 16 MMOL/L (ref 10–20)
BUN SERPL-MCNC: 22 MG/DL (ref 6–23)
CALCIUM SERPL-MCNC: 9.2 MG/DL (ref 8.6–10.6)
CHLORIDE SERPL-SCNC: 106 MMOL/L (ref 98–107)
CO2 SERPL-SCNC: 24 MMOL/L (ref 21–32)
CREAT SERPL-MCNC: 1.09 MG/DL (ref 0.5–1.05)
DRVVT SCREEN TO CONFIRM RATIO: 1.16 RATIO
DRVVT/DRVVT CFM NRMLZD PPP-RTO: 1.05 RATIO
DRVVT/DRVVT CFM P DOAC NEUT NORM PPP-RTO: 1.11 RATIO
EGFRCR SERPLBLD CKD-EPI 2021: 56 ML/MIN/1.73M*2
ERYTHROCYTE [DISTWIDTH] IN BLOOD BY AUTOMATED COUNT: 12.8 % (ref 11.5–14.5)
GLUCOSE SERPL-MCNC: 102 MG/DL (ref 74–99)
HCT VFR BLD AUTO: 32 % (ref 36–46)
HGB BLD-MCNC: 10.7 G/DL (ref 12–16)
LA 2 SCREEN W REFLEX-IMP: NORMAL
MAGNESIUM SERPL-MCNC: 1.57 MG/DL (ref 1.6–2.4)
MCH RBC QN AUTO: 32.7 PG (ref 26–34)
MCHC RBC AUTO-ENTMCNC: 33.4 G/DL (ref 32–36)
MCV RBC AUTO: 98 FL (ref 80–100)
NORMALIZED SCT PPP-RTO: 0.83 RATIO
NRBC BLD-RTO: 0 /100 WBCS (ref 0–0)
PHOSPHATE SERPL-MCNC: 4.2 MG/DL (ref 2.5–4.9)
PLATELET # BLD AUTO: 389 X10*3/UL (ref 150–450)
POTASSIUM SERPL-SCNC: 3.7 MMOL/L (ref 3.5–5.3)
RBC # BLD AUTO: 3.27 X10*6/UL (ref 4–5.2)
SILICA CLOTTING TIME CONFIRMATION: 1.17 RATIO
SILICA CLOTTING TIME SCREEN: 0.97 RATIO
SODIUM SERPL-SCNC: 142 MMOL/L (ref 136–145)
UFH PPP CHRO-ACNC: 0.6 IU/ML
UFH PPP CHRO-ACNC: 1 IU/ML
WBC # BLD AUTO: 8.6 X10*3/UL (ref 4.4–11.3)

## 2024-02-22 PROCEDURE — 2500000001 HC RX 250 WO HCPCS SELF ADMINISTERED DRUGS (ALT 637 FOR MEDICARE OP): Performed by: STUDENT IN AN ORGANIZED HEALTH CARE EDUCATION/TRAINING PROGRAM

## 2024-02-22 PROCEDURE — 75741 ARTERY X-RAYS LUNG: CPT | Performed by: INTERNAL MEDICINE

## 2024-02-22 PROCEDURE — 99232 SBSQ HOSP IP/OBS MODERATE 35: CPT | Performed by: NURSE PRACTITIONER

## 2024-02-22 PROCEDURE — 88304 TISSUE EXAM BY PATHOLOGIST: CPT | Performed by: PATHOLOGY

## 2024-02-22 PROCEDURE — S4991 NICOTINE PATCH NONLEGEND: HCPCS | Performed by: STUDENT IN AN ORGANIZED HEALTH CARE EDUCATION/TRAINING PROGRAM

## 2024-02-22 PROCEDURE — 2550000001 HC RX 255 CONTRASTS: Performed by: INTERNAL MEDICINE

## 2024-02-22 PROCEDURE — 99152 MOD SED SAME PHYS/QHP 5/>YRS: CPT | Performed by: INTERNAL MEDICINE

## 2024-02-22 PROCEDURE — 2500000004 HC RX 250 GENERAL PHARMACY W/ HCPCS (ALT 636 FOR OP/ED): Performed by: INTERNAL MEDICINE

## 2024-02-22 PROCEDURE — 80069 RENAL FUNCTION PANEL: CPT

## 2024-02-22 PROCEDURE — C1757 CATH, THROMBECTOMY/EMBOLECT: HCPCS | Performed by: INTERNAL MEDICINE

## 2024-02-22 PROCEDURE — C1894 INTRO/SHEATH, NON-LASER: HCPCS | Performed by: INTERNAL MEDICINE

## 2024-02-22 PROCEDURE — 2500000004 HC RX 250 GENERAL PHARMACY W/ HCPCS (ALT 636 FOR OP/ED): Performed by: STUDENT IN AN ORGANIZED HEALTH CARE EDUCATION/TRAINING PROGRAM

## 2024-02-22 PROCEDURE — 85347 COAGULATION TIME ACTIVATED: CPT | Performed by: INTERNAL MEDICINE

## 2024-02-22 PROCEDURE — 2500000002 HC RX 250 W HCPCS SELF ADMINISTERED DRUGS (ALT 637 FOR MEDICARE OP, ALT 636 FOR OP/ED): Performed by: STUDENT IN AN ORGANIZED HEALTH CARE EDUCATION/TRAINING PROGRAM

## 2024-02-22 PROCEDURE — 83735 ASSAY OF MAGNESIUM: CPT

## 2024-02-22 PROCEDURE — 85520 HEPARIN ASSAY: CPT | Performed by: STUDENT IN AN ORGANIZED HEALTH CARE EDUCATION/TRAINING PROGRAM

## 2024-02-22 PROCEDURE — 99153 MOD SED SAME PHYS/QHP EA: CPT | Performed by: INTERNAL MEDICINE

## 2024-02-22 PROCEDURE — 85347 COAGULATION TIME ACTIVATED: CPT

## 2024-02-22 PROCEDURE — 1200000002 HC GENERAL ROOM WITH TELEMETRY DAILY

## 2024-02-22 PROCEDURE — 2500000004 HC RX 250 GENERAL PHARMACY W/ HCPCS (ALT 636 FOR OP/ED): Performed by: NURSE PRACTITIONER

## 2024-02-22 PROCEDURE — 0752T DGTZ GLS MCRSCP SLD LVL III: CPT | Mod: TC,SUR | Performed by: STUDENT IN AN ORGANIZED HEALTH CARE EDUCATION/TRAINING PROGRAM

## 2024-02-22 PROCEDURE — 2720000007 HC OR 272 NO HCPCS: Performed by: INTERNAL MEDICINE

## 2024-02-22 PROCEDURE — 99233 SBSQ HOSP IP/OBS HIGH 50: CPT | Performed by: NURSE PRACTITIONER

## 2024-02-22 PROCEDURE — 36415 COLL VENOUS BLD VENIPUNCTURE: CPT | Performed by: STUDENT IN AN ORGANIZED HEALTH CARE EDUCATION/TRAINING PROGRAM

## 2024-02-22 PROCEDURE — C1769 GUIDE WIRE: HCPCS | Performed by: INTERNAL MEDICINE

## 2024-02-22 PROCEDURE — 85027 COMPLETE CBC AUTOMATED: CPT

## 2024-02-22 PROCEDURE — 02CQ3ZZ EXTIRPATION OF MATTER FROM RIGHT PULMONARY ARTERY, PERCUTANEOUS APPROACH: ICD-10-PCS | Performed by: INTERNAL MEDICINE

## 2024-02-22 PROCEDURE — 2500000005 HC RX 250 GENERAL PHARMACY W/O HCPCS: Performed by: INTERNAL MEDICINE

## 2024-02-22 RX ORDER — MIDAZOLAM HYDROCHLORIDE 1 MG/ML
INJECTION INTRAMUSCULAR; INTRAVENOUS AS NEEDED
Status: DISCONTINUED | OUTPATIENT
Start: 2024-02-22 | End: 2024-02-22 | Stop reason: HOSPADM

## 2024-02-22 RX ORDER — OXYCODONE HYDROCHLORIDE 5 MG/1
5 TABLET ORAL EVERY 6 HOURS PRN
Status: DISCONTINUED | OUTPATIENT
Start: 2024-02-22 | End: 2024-02-25 | Stop reason: HOSPADM

## 2024-02-22 RX ORDER — LIDOCAINE HYDROCHLORIDE 20 MG/ML
INJECTION, SOLUTION INFILTRATION; PERINEURAL AS NEEDED
Status: DISCONTINUED | OUTPATIENT
Start: 2024-02-22 | End: 2024-02-22 | Stop reason: HOSPADM

## 2024-02-22 RX ORDER — HEPARIN SODIUM 1000 [USP'U]/ML
INJECTION, SOLUTION INTRAVENOUS; SUBCUTANEOUS AS NEEDED
Status: DISCONTINUED | OUTPATIENT
Start: 2024-02-22 | End: 2024-02-22 | Stop reason: HOSPADM

## 2024-02-22 RX ORDER — MAGNESIUM SULFATE HEPTAHYDRATE 40 MG/ML
4 INJECTION, SOLUTION INTRAVENOUS ONCE
Status: COMPLETED | OUTPATIENT
Start: 2024-02-22 | End: 2024-02-22

## 2024-02-22 RX ORDER — FENTANYL CITRATE 50 UG/ML
INJECTION, SOLUTION INTRAMUSCULAR; INTRAVENOUS AS NEEDED
Status: DISCONTINUED | OUTPATIENT
Start: 2024-02-22 | End: 2024-02-22 | Stop reason: HOSPADM

## 2024-02-22 RX ORDER — ACETAMINOPHEN 325 MG/1
650 TABLET ORAL EVERY 6 HOURS PRN
Status: DISCONTINUED | OUTPATIENT
Start: 2024-02-22 | End: 2024-02-25 | Stop reason: HOSPADM

## 2024-02-22 RX ADMIN — HEPARIN SODIUM AND DEXTROSE 1000 UNITS/HR: 10000; 5 INJECTION INTRAVENOUS at 23:55

## 2024-02-22 RX ADMIN — NICOTINE 1 PATCH: 14 PATCH, EXTENDED RELEASE TRANSDERMAL at 09:26

## 2024-02-22 RX ADMIN — ATENOLOL 50 MG: 50 TABLET ORAL at 20:58

## 2024-02-22 RX ADMIN — LOSARTAN POTASSIUM 100 MG: 50 TABLET, FILM COATED ORAL at 09:26

## 2024-02-22 RX ADMIN — Medication 1 TABLET: at 09:26

## 2024-02-22 RX ADMIN — HEPARIN SODIUM AND DEXTROSE 1200 UNITS/HR: 10000; 5 INJECTION INTRAVENOUS at 00:55

## 2024-02-22 RX ADMIN — MAGNESIUM SULFATE 4 G: 4 INJECTION INTRAVENOUS at 13:34

## 2024-02-22 RX ADMIN — ATENOLOL 50 MG: 50 TABLET ORAL at 09:26

## 2024-02-22 RX ADMIN — ATORVASTATIN CALCIUM 80 MG: 80 TABLET, FILM COATED ORAL at 20:58

## 2024-02-22 ASSESSMENT — COGNITIVE AND FUNCTIONAL STATUS - GENERAL
MOBILITY SCORE: 20
MOVING TO AND FROM BED TO CHAIR: A LITTLE
MOVING TO AND FROM BED TO CHAIR: A LITTLE
TOILETING: A LITTLE
MOBILITY SCORE: 20
STANDING UP FROM CHAIR USING ARMS: A LITTLE
CLIMB 3 TO 5 STEPS WITH RAILING: A LITTLE
WALKING IN HOSPITAL ROOM: A LITTLE
CLIMB 3 TO 5 STEPS WITH RAILING: A LITTLE
DAILY ACTIVITIY SCORE: 24
STANDING UP FROM CHAIR USING ARMS: A LITTLE
DAILY ACTIVITIY SCORE: 23
WALKING IN HOSPITAL ROOM: A LITTLE

## 2024-02-22 ASSESSMENT — PAIN SCALES - GENERAL: PAINLEVEL_OUTOF10: 0 - NO PAIN

## 2024-02-22 NOTE — CARE PLAN
The patient's goals for the shift include Comfort/sleep    The clinical goals for the shift include pt will remain HDS and sleep at least four hours by end of shift      Problem: Pain - Adult  Goal: Verbalizes/displays adequate comfort level or baseline comfort level  Outcome: Progressing     Problem: Safety - Adult  Goal: Free from fall injury  Outcome: Progressing     Problem: Discharge Planning  Goal: Discharge to home or other facility with appropriate resources  Outcome: Progressing     Problem: Chronic Conditions and Co-morbidities  Goal: Patient's chronic conditions and co-morbidity symptoms are monitored and maintained or improved  Outcome: Progressing     Problem: Pain  Goal: Turns in bed with improved pain control throughout the shift  Outcome: Progressing  Goal: Walks with improved pain control throughout the shift  Outcome: Progressing  Goal: Performs ADL's with improved pain control throughout shift  Outcome: Progressing  Goal: Participates in PT with improved pain control throughout the shift  Outcome: Progressing  Goal: Free from opioid side effects throughout the shift  Outcome: Progressing  Goal: Free from acute confusion related to pain meds throughout the shift  Outcome: Progressing     Problem: Fall/Injury  Goal: Not fall by end of shift  Outcome: Progressing  Goal: Be free from injury by end of the shift  Outcome: Progressing  Goal: Verbalize understanding of personal risk factors for fall in the hospital  Outcome: Progressing  Goal: Verbalize understanding of risk factor reduction measures to prevent injury from fall in the home  Outcome: Progressing  Goal: Use assistive devices by end of the shift  Outcome: Progressing  Goal: Pace activities to prevent fatigue by end of the shift  Outcome: Progressing

## 2024-02-22 NOTE — POST-PROCEDURE NOTE
Physician Transition of Care Summary  Invasive Cardiovascular Lab    Procedure Date: 2/22/2024  Attending:    Brian Day - Primary  Resident/Fellow/Other Assistant: Surgeon(s) and Role:     * Luis Manuel Simmons MD MPH - Fellow    Indications:   Pre-op Diagnosis     * Pulmonary embolism (CMS/HCC) [I26.99]    Post-procedure diagnosis:   Post-op Diagnosis     * Pulmonary embolism (CMS/HCC) [I26.99]    Procedure(s):     * Lower Extremity Angiogram    * Pulmonary Angiogram      Procedure Findings:   PE/tissue in lower lobe branch of right pulmonary artery    Description of the Procedure:   Right femoral vein access  24 Fr sheath, closed with figure of 8 (Flow stasis).  Successful mechanical thrombectomy of right PE/tissue.  Send for pathology(after discussing with pathology department, in saline).    Plan:  4 hours to remove the figure of 8/4 more hour of bedrest after removal.  Restart heparin immediately.    Complications:   None    Stents/Implants:       Anticoagulation/Antiplatelet Plan:   Heparin    Estimated Blood Loss:   20 mL    Anesthesia: Moderate Sedation Anesthesia Staff: No anesthesia staff entered.    Any Specimen(s) Removed:   Order Name Source Comment Collection Info Order Time   SURGICAL PATHOLOGY EXAM PULMONARY ARTERY Fresh sample, not in formalin because not available in cath lab; discussed with Trudy in pathology lab  2/22/2024 11:05 AM     How many specimens will you need? (If more than 16 start a new case)   4          Source of Specimen A:   PULMONARY ARTERY          Procedure:   Mechanical thrombectomy          Specify Site:   Right pulmonary artery          Fixative:   Saline          Source of Specimen B:   PULMONARY ARTERY          Procedure:   Mechanical thrombectomy          Specify Site:   Right pulmonary artery          Fixative:   Saline          Source of Specimen C:   PULMONARY ARTERY          Procedure:   Mechanical thrombectomy          Specify Site:   Right pulmonary artery           Fixative:   Saline          Source of Specimen D:   PULMONARY ARTERY          Procedure:   Mechanical thrombectomy          Specify Site:   Right pulmonary artery          Fixative:   Saline          Clinical History/Special Request:   weight loss, PE, and aortic occlusion c/f malignancy            Disposition:   Home      Electronically signed by: Luis Manuel Simmons MD MPH, 2/22/2024 11:08 AM

## 2024-02-22 NOTE — SIGNIFICANT EVENT
Right CFV access site figure of 8 suture with flowStasis was removed at 0530. Groin soft, no bleeding or oozing or hematoma. 4x4 with Tegaderm applied. Pt instructed to lay flat for another 4 hours until 2130 tonight.  Pt came ambulate after 4 hours bedrest if groin is stable. If there is rebleeding apply pressure dressing and bedrest for the rest of the night.   Vascular surgery team updated.          ADRIÁN Haywood-CNP  Endovascular/Limb Salvage Service   Day: 35001/Haiku/Night [facility] [id number]

## 2024-02-22 NOTE — PROGRESS NOTES
Subjective Data:  Pt was seen and assessed this morning in her room accompanied by her daughter. Pt denied chest pain, SOB or palpitation. She still on RA. Pain in the right leg remains the same without increase edema. She said she has been trying to move more to help with the discomfort. Pt was updated on the plan for cath lab and discussed procedure. Pt is agreeable with the POC.     Overnight Events:    No issues reported.     Objective Data:  Last Recorded Vitals:  Vitals:    02/22/24 0429 02/22/24 0830 02/22/24 1008 02/22/24 1009   BP: 124/59 123/58 152/76    BP Location: Right arm      Patient Position: Lying Lying     Pulse: 60 60 60    Resp: 16 16 20    Temp: 36.3 °C (97.3 °F) 36.4 °C (97.5 °F)     TempSrc: Temporal Temporal     SpO2: 97% 96% 98% 99%   Weight:       Height:           Last Labs:  CBC - 2/22/2024:  8:16 AM  8.6 10.7 389    32.0      CMP - 2/22/2024:  8:16 AM  9.2 6.6 12 --- 0.4   4.2 3.4 22 58      PTT - No results in last year.  1.2   13.8 _     TROPHS   Date/Time Value Ref Range Status   02/20/2024 05:29 PM 9 0 - 34 ng/L Final     BNP   Date/Time Value Ref Range Status   02/20/2024 05:29 PM 15 0 - 99 pg/mL Final     LDLCALC   Date/Time Value Ref Range Status   01/12/2024 12:18  <=99 mg/dL Final     Comment:                                 Near   Borderline      AGE      Desirable  Optimal    High     High     Very High     0-19 Y     0 - 109     ---    110-129   >/= 130     ----    20-24 Y     0 - 119     ---    120-159   >/= 160     ----      >24 Y     0 -  99   100-129  130-159   160-189     >/=190       VLDL   Date/Time Value Ref Range Status   01/12/2024 12:18 PM 32 0 - 40 mg/dL Final   02/03/2023 09:12 AM 19 0 - 40 mg/dL Final   07/23/2022 09:04 AM 19 0 - 40 mg/dL Final   11/16/2021 09:39 AM 14 0 - 40 mg/dL Final      Last I/O:  I/O last 3 completed shifts:  In: 948.9 (12.3 mL/kg) [P.O.:530; I.V.:418.9 (5.4 mL/kg)]  Out: 2 (0 mL/kg) [Urine:2 (0 mL/kg/hr)]  Weight: 77.1 kg     Past  Cardiology Tests (Last 3 Years):  EKG:  ECG 12 lead 02/20/2024    ECG 12 lead STAT 02/16/2024    Echo:  Transthoracic Echo (TTE) Complete 02/21/2024    Ejection Fractions:  Left ventricular systolic function is normal with a 60-65% estimated ejection fraction.   Cath:  Cardiac catheterization - coronary 02/16/2024      Inpatient Medications:  Scheduled medications   Medication Dose Route Frequency    atenolol  50 mg oral BID    atorvastatin  80 mg oral Nightly    losartan  100 mg oral Daily    magnesium sulfate  4 g intravenous Once    multivitamin with minerals  1 tablet oral Daily    nicotine  1 patch transdermal Daily    polyethylene glycol  17 g oral Daily     PRN medications   Medication    fentaNYL PF    heparin    heparin    lidocaine    midazolam     Continuous Medications   Medication Dose Last Rate    heparin  0-4,500 Units/hr 1,200 Units/hr (02/22/24 0557)       Physical Exam:  Constitutional: Obese female, NAD, awake/alert/oriented x3, pleasant, cooperative     Head/Neck: Neck supple, No JVD, trachea midline, no bruits           Respiratory/Thorax: Patent airways, thorax symmetric, diminished breath sounds in the bases   Cardiovascular: Regular, rate and rhythm, no murmurs, normal S 1 and S 2    Gastrointestinal: Nondistended, soft, non-tender, +BS,       Skin: Warm and dry,               Extremities: SMITH, RLE: + edema, pain with palpation of calf and foot, erythema, warmth, monophasic DP/PT signal. LLE: no edema, discoloration or open sore, foot is warm to touch but cooler than right foot, monophasic DP/PT on Doppler. B/L UE no abnormalities noted, palpable radial and ulnar pulses.   Neuro: non-focal, B/L upper and lower ext equal strength.   Psychological: Appropriate mood and behavior      Assessment/Plan   Ms. Klaudia Ratliff is a 68 y.o. F with known aortic occlusion and BLE claudication now presenting with R main pulmonary artery saddle PE incidental finding on CTA Chest on 2/20/24. She is  currently HDS without RV strain, on room air.     Plan     - Pulmonary thrombectomy scheduled for today at 1000 am with Dr Day  -  SAVITA venous duplex for DVT pending  - continue with high intensity Hep gtt   - NPO and routine labs   - EVLS will continue to follow      Peripheral IV 02/20/24 20 G Left;Anterior Wrist (Active)   Site Assessment Clean;Dry;Intact 02/22/24 0900   Dressing Status Clean;Dry 02/22/24 0900   Number of days: 2       Peripheral IV 02/22/24 20 G Right Antecubital (Active)   Site Assessment Clean;Dry;Intact 02/22/24 1010   Number of days: 0       Code Status:  Full Code            ADRIÁN Haywood-CNP  Endovascular/Limb Salvage Service   Day: 79974/Haiku/Night [facility] [id number]

## 2024-02-22 NOTE — PROGRESS NOTES
VASCULAR SURGERY PROGRESS NOTE  Subjective   Right leg feels better this AM with heparin drip infusing.     Objective   Vitals:    02/22/24 1009   BP:    Pulse:    Resp:    Temp:    SpO2: 99%      Exam:  General: NAD. Nontoxic  HEENT: Atraumatic. MMM. EOMI.  Card: RRR on monitor  Pulm: Nonlabored breathing on RA  Abdomen: Soft, nontender, nondistended  Extremities: MAEx4. RLE warmer than left (left is cool from mid foot to toes), erythema improved from prior exams RLE. RLE is swollen in comparison to LLE, soft compartments bilaterally  Neuro: No focal deficits.  Pulses: nonpalpable femoral pulse or pedal pulses.     Relevant Results  CT angio chest w and wo IV contrast 02/20/2024    Narrative  Interpreted By:  Alejandro Mendez and Ravi Shweta  STUDY:  CT ANGIO CHEST W AND WO IV CONTRAST;  2/20/2024 1:59 pm    INDICATION:  Signs/Symptoms:aortic occlusion with thrombus starting in thoracic  aorta.    COMPARISON:  CT abdomen/pelvis 01/30/2024    ACCESSION NUMBER(S):  [id number]    ORDERING CLINICIAN:  RAMBO EASTMAN    TECHNIQUE:  Using multi-detector CT technology,  axial, sequential imaging with  prospective gating was performed of the chest following the  intravenous administration of contrast material.  A low-osmolar  contrast agent was used (80 mL of Omnipaque 350).    CT Dose-Length Product (DLP):   mGy*cm  CT Dose Reduction Employed: Yes ( 100 kV, prospective ECG triggering,  iterative reconstruction)    For optimization of anatomic evaluation, multiplanar reconstruction,  maximum intensity projections, and advanced 3-D off-line  postprocessing were performed on a dedicated stand-alone workstation  by the interpreting physician.    FINDINGS:  Potential study limitations:  None.    THORACIC AORTA:  The thoracic aorta is normal in course, caliber, and contour.  The sinotubular junction is  preserved.  There is no evidence for dissection, intramural hematoma, or  contained rupture. The arch vessel branching  pattern is  conventional.  All of the arch branch vessels appear widely patent in  their proximal portions. Minimal aortic annular calcification is  present. The visualized distal thoracic aorta demonstrates large  intraluminal thrombus with 50% occlusion which extends into the  visualized portion of the abdominal aorta. There is complete  occlusion of the infrarenal abdominal aorta as visualized. Findings  are consistent with imaging from 01/30/2024. There is severe  atherosclerotic calcification at the ostia of the bilateral renal  arteries. The proximal SMA is smoothly narrowed as compared to the  mid SMA which may be secondary to smooth noncalcified plaque. The  celiac trunk, SMA and bilateral renal arteries are otherwise normal  in caliber. There is partial visualization of retrograde  opacification of the ASPEN.    REPRESENTATIVE MEASUREMENTS OF THE AORTA:  Annulus 2.5 x 1.7 cm  Root (Sinus of Valsalva)  3.2 cm (sinus to sinus)  Sinotubular junction  3.0 cm  Mid ascending  3.5 cm  Distal ascending  3.3 cm  Mid transverse arch  2.9 cm  Isthmus  2.4 cm  Mid descending  2.5 cm  Distal descending (hiatus)  2.6 cm    CORONARY ARTERIES:  The examination is not optimized for assessment of the coronary  arteries. Normal coronary artery origins.  Right dominant system.  Mild coronary artery calcification.    PULMONARY ARTERIES:  The central pulmonary arteries appear  normal (MPA-2.2 cm, RPA-2.2  cm, LPA-2.4 cm).  There are multiple subsegmental pulmonary emboli  involving the bifurcation of the right lower lobe and right middle  lobe pulmonary arteries as well as multiple segmental arteries  including the right anterobasilar, lateral basilar, and posterior  basilar segmental arteries as well as a subsegmental branch of the  medial basilar artery. The medial and lateral segmental branches of  the right middle lobe pulmonary artery are also affected. No evidence  of right heart strain.    SYSTEMIC AND PULMONARY  VEINS:  Normal systemic venous and pulmonary venous return.  The SVC and IVC are of normal caliber.  Accessory right top pulmonary vein with otherwise normal pulmonary  venous anatomy.    CARDIAC CHAMBERS:  Normal atrioventricular and ventriculoarterial concordance.  No  cardiomegaly.    LEFT ATRIUM:  Normal size (Area-16.9 cm2)    RIGHT ATRIUM:  Normal size (Area-15.8 cm2)    INTERATRIAL SEPTUM:  Intact.    LEFT VENTRICLE:  Normal size (GIOVANI-3.9 cm)    RIGHT VENTRICLE:  Normal size (GIOVANI-3.6 cm)    INTERVENTRICULAR SEPTUM:  Intact.    AORTIC VALVE:  The aortic valve is  trileaflet in morphology.  No calcifications.    MITRAL VALVE:  No thickening/calcification.    PERICARDIUM:  There is no pericardial effusion or thickening.    CHEST:  Trachea and central airways are patent. No endobronchial lesion.  There is focal airspace opacification of the of the anterolateral  basilar right lower lobe with central lucency in a pattern favoring  developing pulmonary infarct. No pleural effusion or pneumothorax.  There is no significant axillary or mediastinal lymph nodes. No hilar  adenopathy. Few nonenlarged mediastinal lymph nodes are favored to be  reactive. Visualized thyroid is intact.  The esophagus is unremarkable. Note is made of a small hiatal hernia.    UPPER ABDOMEN:  The visualized upper abdomen is notable for: A 1.5 cm nonenhancing  cystic lesion within the superior pole of the left kidney consistent  with a renal cyst, nonspecific dilation of the common bile duct  without intrahepatic biliary ductal dilation or pancreatic ductal  dilation. Relative hypoattenuation of the liver as compared to the  spleen which is nonspecific on early portal venous phase imaging  though likely reflects underlying steatosis. There are incompletely  evaluated sub 5 mm nodules within segment KINJAL and segment IVB/VIII  which may represent cysts, hemangiomas, or hamartomas. A splenule is  present.    BONE AND SOFT TISSUE:  No suspicious  osseous abnormality.    Impression  1. Interval development of right lower lobe and right middle lobe  segmental pulmonary emboli with focal opacification of the right  lower lobe suggestive of developing pulmonary infarct. No CT evidence  for right heart strain.  2. The thoracic aorta is normal in course, caliber, and contour.  There is complete thrombosis of the visualized portion of the  renal/infrarenal abdominal aorta and partial occlusion of the distal  thoracic and proximal abdominal aorta.. There is otherwise no  evidence for acute aortic pathology.  3. Additional findings as above.    I personally reviewed the images/study and I agree with the resident  findings as stated by Ekta Cooper MD. This study was interpreted at  [place], [place].    MACRO:  Ekta Cooper discussed the significance and urgency of this critical  finding by Epic Chat with  RAMBO EASTMAN on 2/20/2024 at 2:47 pm.  (**-RCF-**) Findings:  See findings.    Signed by: Alejandro Mendez 2/20/2024 3:27 PM  Dictation workstation:   [id number]    Transthoracic Echo (TTE) Complete With Contrast 02/21/2024    [facility], [address] [place] [address]  Tel [phone] and Fax [phone]    TRANSTHORACIC ECHOCARDIOGRAM REPORT      Patient Name:      JORGE L CROSS MANUEL   Reading Physician:    [id number] Unruly Celaya MD  Study Date:        2/21/2024            Ordering Provider:    [id number] EMELY HYATT  MRN/PID:           [id number]             Fellow:               [id number] Jesse Bourgeois MD PhD  Accession#:        [id number]         Nurse:  Date of Birth/Age: 1955 / 68 years  Sonographer:          Mariel Calero  SHANDA  Gender:            F                    Additional Staff:  Height:            157.48 cm            Admit Date:           2/20/2024  Weight:            77.11 kg             Admission Status:     Inpatient - STAT  BSA / BMI:         1.78 m2 / 31.09       Encounter#:           [id number]  kg/m2  Department Location:  [facility] Non  Invasive  Blood Pressure: 122 /62 mmHg    Study Type:    TRANSTHORACIC ECHO (TTE) COMPLETE  Diagnosis/ICD: Multiple subsegmental pulmonary emboli without acute cor  pulmonale-I26.94  Indication:    Multiple subsegmental pulmonary emboli without acute cor  pulmonale  CPT Code:      Echo Complete w Full Doppler-[id number]    Patient History:  Pertinent History: HTN, Aortic occlusion, HLD, Multile PE.    Study Detail: The following Echo studies were performed: 2D, M-Mode, Doppler and  color flow. Definity used as a contrast agent for endocardial  border definition. Total contrast used for this procedure was 2 mL  via IV push.      PHYSICIAN INTERPRETATION:  Left Ventricle: The left ventricular systolic function is normal, with an estimated ejection fraction of 60-65%. There are no regional wall motion abnormalities. The left ventricular cavity size is normal. Doppler shows a borderline pattern of left ventricular diastolic filling. There is an elevated mean left atrial pressure.  Left Atrium: The left atrium is normal in size.  Right Ventricle: The right ventricle is normal in size. There is normal right ventricular global systolic function. There is a prominent echodensity in the apex of the RV, similar in density to surrounding muscle, that is present on multiple views but is not visible with Definity. This may represent a large moderator band or RV trabeculations, vs less likely thrombus or myxoma. Recommend cMRI for further characterization.  Right Atrium: The right atrium is normal in size.  Aortic Valve: The aortic valve is probably trileaflet. There is minimal aortic valve cusp calcification. There is trivial aortic valve regurgitation. The peak instantaneous gradient of the aortic valve is 10.2 mmHg. The mean gradient of the aortic valve is 5.0 mmHg.  Mitral Valve: The mitral valve is normal in structure. There is trace mitral  valve regurgitation.  Tricuspid Valve: The tricuspid valve is structurally normal. There is trace to mild tricuspid regurgitation.  Pulmonic Valve: The pulmonic valve is not well visualized. There is trace pulmonic valve regurgitation.  Pericardium: There is a trivial pericardial effusion. There is an anterior clear space.  Aorta: The aortic root is normal. There is upper limits of normal dilatation of the ascending aorta. There is no dilatation of the aortic root.  Pulmonary Artery: The tricuspid regurgitant velocity is 2.46 m/s, and with an estimated right atrial pressure of 3 mmHg, the estimated pulmonary artery pressure is normal with the RVSP at 27.2 mmHg.  Systemic Veins: The inferior vena cava appears to be of normal size. There is IVC inspiratory collapse greater than 50%.  In comparison to the previous echocardiogram(s): There are no prior studies on this patient for comparison purposes.      CONCLUSIONS:  1. Left ventricular systolic function is normal with a 60-65% estimated ejection fraction.  2. There is an elevated mean left atrial pressure.  3. There is a prominent echodensity in the apex of the RV, similar in density to surrounding muscle, that is present on multiple views but is not visible with Definity. This may represent a large moderator band or RV trabeculations, vs less likely thrombus or myxoma. Recommend cMRI for further characterization.    QUANTITATIVE DATA SUMMARY:  2D MEASUREMENTS:  Normal Ranges:  Ao Root d:     3.20 cm   (2.0-3.7cm)  LAs:           3.50 cm   (2.7-4.0cm)  IVSd:          0.70 cm   (0.6-1.1cm)  LVPWd:         0.70 cm   (0.6-1.1cm)  LVIDd:         4.30 cm   (3.9-5.9cm)  LVIDs:         3.20 cm  LV Mass Index: 49.6 g/m2  LV % FS        25.6 %    LA VOLUME:  Normal Ranges:  LA Vol A4C:        44.3 ml    (22+/-6mL/m2)  LA Vol A2C:        41.8 ml  LA Vol BP:         43.4 ml  LA Vol Index A4C:  24.9ml/m2  LA Vol Index A2C:  23.4 ml/m2  LA Vol Index BP:   24.3 ml/m2  LA Area A4C:        16.1 cm2  LA Area A2C:       15.5 cm2  LA Major Axis A4C: 5.0 cm  LA Major Axis A2C: 4.9 cm  LA Volume Index:   24.3 ml/m2    RA VOLUME BY A/L METHOD:  Normal Ranges:  RA Area A4C: 13.0 cm2    AORTA MEASUREMENTS:  Normal Ranges:  Asc Ao, d: 3.30 cm (2.1-3.4cm)    LV DIASTOLIC FUNCTION:  Normal Ranges:  MV Peak E:        0.95 m/s    (0.7-1.2 m/s)  MV Peak A:        1.01 m/s    (0.42-0.7 m/s)  E/A Ratio:        0.94        (1.0-2.2)  MV e'             0.08 m/s    (>8.0)  MV lateral e'     0.08 m/s  MV medial e'      0.08 m/s  MV A Dur:         99.00 msec  E/e' Ratio:       11.86       (<8.0)  a'                0.11 m/s  MV DT:            257 msec    (150-240 msec)  PulmV Sys Munir:    76.60 cm/s  PulmV Vivas Munir:   53.50 cm/s  PulmV S/D Munir:    1.40  PulmV A Revs Munir: 39.60 cm/s  PulmV A Revs Dur: 144.00 msec    MITRAL VALVE:  Normal Ranges:  MV DT: 257 msec (150-240msec)    AORTIC VALVE:  Normal Ranges:  AoV Vmax:                1.60 m/s  (<=1.7m/s)  AoV Peak PG:             10.2 mmHg (<20mmHg)  AoV Mean P.0 mmHg  (1.7-11.5mmHg)  LVOT Max Munir:            1.26 m/s  (<=1.1m/s)  AoV VTI:                 34.40 cm  (18-25cm)  LVOT VTI:                28.20 cm  LVOT Diameter:           2.00 cm   (1.8-2.4cm)  AoV Area, VTI:           2.58 cm2  (2.5-5.5cm2)  AoV Area,Vmax:           2.47 cm2  (2.5-4.5cm2)  AoV Dimensionless Index: 0.82      RIGHT VENTRICLE:  RV Basal 4.30 cm  RV Mid   3.40 cm  RV Major 6.5 cm  TAPSE:   23.5 mm  RV s'    0.16 m/s    TRICUSPID VALVE/RVSP:  Normal Ranges:  Peak TR Velocity: 2.46 m/s  RV Syst Pressure: 27.2 mmHg (< 30mmHg)  IVC Diam:         1.50 cm    PULMONIC VALVE:  Normal Ranges:  PV Accel Time: 97 msec  (>120ms)  PV Max Munir:    0.8 m/s  (0.6-0.9m/s)  PV Max P.9 mmHg    Pulmonary Veins:  PulmV A Revs Dur: 144.00 msec  PulmV A Revs Munir: 39.60 cm/s  PulmV Vivas Munir:   53.50 cm/s  PulmV S/D Munir:    1.40  PulmV Sys Munir:    76.60 cm/s      [id number] Unruly Celaya  MD  Electronically signed on 2/21/2024 at 12:41:16 PM        ** Final **    Medications:  Scheduled Meds:  atenolol, 50 mg, oral, BID  atorvastatin, 80 mg, oral, Nightly  losartan, 100 mg, oral, Daily  magnesium sulfate, 4 g, intravenous, Once  multivitamin with minerals, 1 tablet, oral, Daily  nicotine, 1 patch, transdermal, Daily  polyethylene glycol, 17 g, oral, Daily      Continuous Infusions:  heparin, 0-4,500 Units/hr, Last Rate: 1,200 Units/hr (02/22/24 0557)      PRN Meds:.  PRN medications: fentaNYL PF, heparin, heparin, lidocaine, midazolam    Labs:  Results from last 7 days   Lab Units 02/22/24  0816 02/21/24  0858 02/20/24  1729   WBC AUTO x10*3/uL 8.6 10.9 12.0*   HEMOGLOBIN g/dL 10.7* 11.5* 12.8   PLATELETS AUTO x10*3/uL 389 439 447      Results from last 7 days   Lab Units 02/22/24  0816 02/21/24  0858 02/20/24  1729   SODIUM mmol/L 142 139 135*   POTASSIUM mmol/L 3.7 3.8 3.8   CHLORIDE mmol/L 106 102 98   CO2 mmol/L 24 26 25   BUN mg/dL 22 25* 25*   CREATININE mg/dL 1.09* 0.92 0.88   GLUCOSE mg/dL 102* 101* 103*   MAGNESIUM mg/dL 1.57*  --   --    PHOSPHORUS mg/dL 4.2  --   --       Results from last 7 days   Lab Units 02/20/24  1729 02/16/24  1006   INR  1.2* 1.4*   PROTIME seconds 13.8* 15.4*     Assessment/Plan   Klaudia Ratliff is 68 y.o. female with history of HTN, HLD, obesity, tobacco dependency and aortic occlusion causing BLE claudication and rest pain with planned open thoracoabdominal aortic repair with Dr. Zelaya 2/26/2024 who was admitted with right pulmonary embolism that was incidentally found on CTA of the chest. Placed on a heparin drip. DVT scan pending. Echo without evidence of right heart strain. BNP & troponin normal. Planning possible pulmonary thrombectomy today with Dr. Day.     Plan:  Neuro: ischemic rest pain  - continue tylenol/oxy PRN for pain control  - continue neurovascular checks every 4 hrs    CV: hx of HTN, HLD, aortic occlusion with CLTI of BLE, saddle PE of  right pulmonary artery  - maintain blood pressure control  - continue home atenolol & cozaar   - continue atorvastatin & high-intensity heparin drip   - pulmonary thrombectomy today with Dr. Day   - echo 2/21: no evidence of right heart strain; echodensity in the apex of the RV visualized - obtain cardiac MRI to further characterize   - obtain BLE DVT study     Pulm: tobacco dependency   - continue to encourage IS hourly while awake  - OOB to chair and increase ambulation as tolerated  - continue nicotine patch   - obtain PFTs    FENGI: hypomagnesemia  - NPO  - continue bowel regimen  - replete electrolytes as needed   - RFP as clinically indicated    Endo:   - no issues     Heme:   - no s/sx of bleeding  - APLA labs normal   - CBC as clinically indicated    ID:   - trend temp q4  - CBC as clinically indicated     Dispo:   - continue care on regular nursing floor    ADRIÁN Sommer-CNP

## 2024-02-23 ENCOUNTER — APPOINTMENT (OUTPATIENT)
Dept: VASCULAR MEDICINE | Facility: HOSPITAL | Age: 69
DRG: 165 | End: 2024-02-23
Payer: MEDICARE

## 2024-02-23 ENCOUNTER — APPOINTMENT (OUTPATIENT)
Dept: RESPIRATORY THERAPY | Facility: HOSPITAL | Age: 69
DRG: 165 | End: 2024-02-23
Payer: MEDICARE

## 2024-02-23 ENCOUNTER — APPOINTMENT (OUTPATIENT)
Dept: RADIOLOGY | Facility: HOSPITAL | Age: 69
DRG: 165 | End: 2024-02-23
Payer: MEDICARE

## 2024-02-23 LAB
ACT BLD: 140 SEC (ref 89–169)
ALBUMIN SERPL BCP-MCNC: 3.1 G/DL (ref 3.4–5)
ANION GAP SERPL CALC-SCNC: 13 MMOL/L (ref 10–20)
BUN SERPL-MCNC: 22 MG/DL (ref 6–23)
CALCIUM SERPL-MCNC: 8.7 MG/DL (ref 8.6–10.6)
CHLORIDE SERPL-SCNC: 107 MMOL/L (ref 98–107)
CO2 SERPL-SCNC: 24 MMOL/L (ref 21–32)
CREAT SERPL-MCNC: 0.94 MG/DL (ref 0.5–1.05)
EGFRCR SERPLBLD CKD-EPI 2021: 67 ML/MIN/1.73M*2
ERYTHROCYTE [DISTWIDTH] IN BLOOD BY AUTOMATED COUNT: 12.9 % (ref 11.5–14.5)
GLUCOSE SERPL-MCNC: 99 MG/DL (ref 74–99)
HCT VFR BLD AUTO: 29.3 % (ref 36–46)
HGB BLD-MCNC: 9.7 G/DL (ref 12–16)
MAGNESIUM SERPL-MCNC: 2.17 MG/DL (ref 1.6–2.4)
MCH RBC QN AUTO: 33.1 PG (ref 26–34)
MCHC RBC AUTO-ENTMCNC: 33.1 G/DL (ref 32–36)
MCV RBC AUTO: 100 FL (ref 80–100)
MGC ASCENT PFT - FEV1 - POST: 1.12
MGC ASCENT PFT - FEV1 - PRE: 0.94
MGC ASCENT PFT - FEV1 - PREDICTED: 2.04
MGC ASCENT PFT - FVC - POST: 2.1
MGC ASCENT PFT - FVC - PRE: 1.91
MGC ASCENT PFT - FVC - PREDICTED: 2.58
NRBC BLD-RTO: 0 /100 WBCS (ref 0–0)
PHOSPHATE SERPL-MCNC: 4.1 MG/DL (ref 2.5–4.9)
PLATELET # BLD AUTO: 347 X10*3/UL (ref 150–450)
POTASSIUM SERPL-SCNC: 3.6 MMOL/L (ref 3.5–5.3)
RBC # BLD AUTO: 2.93 X10*6/UL (ref 4–5.2)
SODIUM SERPL-SCNC: 140 MMOL/L (ref 136–145)
UFH PPP CHRO-ACNC: 0.3 IU/ML
UFH PPP CHRO-ACNC: 0.3 IU/ML
WBC # BLD AUTO: 8.9 X10*3/UL (ref 4.4–11.3)

## 2024-02-23 PROCEDURE — 94060 EVALUATION OF WHEEZING: CPT | Performed by: INTERNAL MEDICINE

## 2024-02-23 PROCEDURE — 93970 EXTREMITY STUDY: CPT

## 2024-02-23 PROCEDURE — S4991 NICOTINE PATCH NONLEGEND: HCPCS | Performed by: STUDENT IN AN ORGANIZED HEALTH CARE EDUCATION/TRAINING PROGRAM

## 2024-02-23 PROCEDURE — 94729 DIFFUSING CAPACITY: CPT | Performed by: INTERNAL MEDICINE

## 2024-02-23 PROCEDURE — 2500000002 HC RX 250 W HCPCS SELF ADMINISTERED DRUGS (ALT 637 FOR MEDICARE OP, ALT 636 FOR OP/ED): Performed by: STUDENT IN AN ORGANIZED HEALTH CARE EDUCATION/TRAINING PROGRAM

## 2024-02-23 PROCEDURE — 2550000001 HC RX 255 CONTRASTS: Performed by: NURSE PRACTITIONER

## 2024-02-23 PROCEDURE — 2500000004 HC RX 250 GENERAL PHARMACY W/ HCPCS (ALT 636 FOR OP/ED)

## 2024-02-23 PROCEDURE — 2500000001 HC RX 250 WO HCPCS SELF ADMINISTERED DRUGS (ALT 637 FOR MEDICARE OP): Performed by: STUDENT IN AN ORGANIZED HEALTH CARE EDUCATION/TRAINING PROGRAM

## 2024-02-23 PROCEDURE — 85027 COMPLETE CBC AUTOMATED: CPT

## 2024-02-23 PROCEDURE — 75561 CARDIAC MRI FOR MORPH W/DYE: CPT

## 2024-02-23 PROCEDURE — 36415 COLL VENOUS BLD VENIPUNCTURE: CPT | Performed by: STUDENT IN AN ORGANIZED HEALTH CARE EDUCATION/TRAINING PROGRAM

## 2024-02-23 PROCEDURE — 85520 HEPARIN ASSAY: CPT | Performed by: STUDENT IN AN ORGANIZED HEALTH CARE EDUCATION/TRAINING PROGRAM

## 2024-02-23 PROCEDURE — 94727 GAS DIL/WSHOT DETER LNG VOL: CPT

## 2024-02-23 PROCEDURE — 2500000004 HC RX 250 GENERAL PHARMACY W/ HCPCS (ALT 636 FOR OP/ED): Performed by: STUDENT IN AN ORGANIZED HEALTH CARE EDUCATION/TRAINING PROGRAM

## 2024-02-23 PROCEDURE — 94727 GAS DIL/WSHOT DETER LNG VOL: CPT | Performed by: INTERNAL MEDICINE

## 2024-02-23 PROCEDURE — 75561 CARDIAC MRI FOR MORPH W/DYE: CPT | Performed by: RADIOLOGY

## 2024-02-23 PROCEDURE — 36415 COLL VENOUS BLD VENIPUNCTURE: CPT

## 2024-02-23 PROCEDURE — A9575 INJ GADOTERATE MEGLUMI 0.1ML: HCPCS | Performed by: NURSE PRACTITIONER

## 2024-02-23 PROCEDURE — 83735 ASSAY OF MAGNESIUM: CPT

## 2024-02-23 PROCEDURE — 84100 ASSAY OF PHOSPHORUS: CPT

## 2024-02-23 PROCEDURE — 1200000002 HC GENERAL ROOM WITH TELEMETRY DAILY

## 2024-02-23 PROCEDURE — 93970 EXTREMITY STUDY: CPT | Performed by: RADIOLOGY

## 2024-02-23 RX ORDER — HEPARIN SODIUM 10000 [USP'U]/100ML
0-4000 INJECTION, SOLUTION INTRAVENOUS CONTINUOUS
Status: ACTIVE | OUTPATIENT
Start: 2024-02-23 | End: 2024-02-23

## 2024-02-23 RX ORDER — GADOTERATE MEGLUMINE 376.9 MG/ML
30 INJECTION INTRAVENOUS
Status: COMPLETED | OUTPATIENT
Start: 2024-02-23 | End: 2024-02-23

## 2024-02-23 RX ORDER — POTASSIUM CHLORIDE 14.9 MG/ML
20 INJECTION INTRAVENOUS
Status: COMPLETED | OUTPATIENT
Start: 2024-02-23 | End: 2024-02-23

## 2024-02-23 RX ORDER — HEPARIN SODIUM 5000 [USP'U]/ML
4000 INJECTION, SOLUTION INTRAVENOUS; SUBCUTANEOUS ONCE
Status: DISCONTINUED | OUTPATIENT
Start: 2024-02-23 | End: 2024-02-23

## 2024-02-23 RX ORDER — ENOXAPARIN SODIUM 100 MG/ML
1 INJECTION SUBCUTANEOUS EVERY 12 HOURS
Status: DISCONTINUED | OUTPATIENT
Start: 2024-02-23 | End: 2024-02-23

## 2024-02-23 RX ORDER — HEPARIN SODIUM 10000 [USP'U]/100ML
0-4000 INJECTION, SOLUTION INTRAVENOUS CONTINUOUS
Status: DISCONTINUED | OUTPATIENT
Start: 2024-02-23 | End: 2024-02-23

## 2024-02-23 RX ORDER — ENOXAPARIN SODIUM 100 MG/ML
1 INJECTION SUBCUTANEOUS EVERY 12 HOURS
Status: DISCONTINUED | OUTPATIENT
Start: 2024-02-23 | End: 2024-02-25 | Stop reason: HOSPADM

## 2024-02-23 RX ORDER — POTASSIUM CHLORIDE 14.9 MG/ML
20 INJECTION INTRAVENOUS ONCE
Status: DISCONTINUED | OUTPATIENT
Start: 2024-02-23 | End: 2024-02-23

## 2024-02-23 RX ADMIN — ENOXAPARIN SODIUM 80 MG: 80 INJECTION SUBCUTANEOUS at 20:45

## 2024-02-23 RX ADMIN — NICOTINE 1 PATCH: 14 PATCH, EXTENDED RELEASE TRANSDERMAL at 09:58

## 2024-02-23 RX ADMIN — ATORVASTATIN CALCIUM 80 MG: 80 TABLET, FILM COATED ORAL at 20:45

## 2024-02-23 RX ADMIN — Medication 1 TABLET: at 09:57

## 2024-02-23 RX ADMIN — GADOTERATE MEGLUMINE 30 ML: 376.9 INJECTION INTRAVENOUS at 11:08

## 2024-02-23 RX ADMIN — ATENOLOL 50 MG: 50 TABLET ORAL at 09:57

## 2024-02-23 RX ADMIN — POTASSIUM CHLORIDE 20 MEQ: 14.9 INJECTION, SOLUTION INTRAVENOUS at 17:03

## 2024-02-23 RX ADMIN — LOSARTAN POTASSIUM 100 MG: 50 TABLET, FILM COATED ORAL at 09:57

## 2024-02-23 RX ADMIN — POTASSIUM CHLORIDE 20 MEQ: 14.9 INJECTION, SOLUTION INTRAVENOUS at 20:06

## 2024-02-23 RX ADMIN — ATENOLOL 50 MG: 50 TABLET ORAL at 20:45

## 2024-02-23 ASSESSMENT — COGNITIVE AND FUNCTIONAL STATUS - GENERAL
MOBILITY SCORE: 23
DAILY ACTIVITIY SCORE: 23
CLIMB 3 TO 5 STEPS WITH RAILING: A LITTLE
DRESSING REGULAR LOWER BODY CLOTHING: A LITTLE

## 2024-02-23 ASSESSMENT — PAIN SCALES - GENERAL
PAINLEVEL_OUTOF10: 0 - NO PAIN
PAINLEVEL_OUTOF10: 0 - NO PAIN

## 2024-02-23 NOTE — PROGRESS NOTES
VASCULAR SURGERY PROGRESS NOTE  Subjective   No changes  S/p pulmonary angiogram, attempted thrombectomy. Tissue removed which looked mass-like; sent for path  DVT scan with R CFV, PFV, FV, pop DVT. LLE without DVT.    Objective   Vitals:    02/23/24 0900   BP: 132/82   Pulse: 60   Resp: 16   Temp: 36.9 °C (98.4 °F)   SpO2: 96%      Exam:  General: NAD. Nontoxic  HEENT: Atraumatic. MMM. EOMI.  Card: RRR on monitor  Pulm: Nonlabored breathing on RA  Abdomen: Soft, nontender, nondistended  Extremities: MAEx4. RLE warmer than left (left is cool from mid foot to toes), mild erythem RLE. RLE is swollen in comparison to LLE, soft compartments bilaterally  Neuro: No focal deficits.  Pulses: nonpalpable femoral pulse or pedal pulses.     Relevant Results  CT angio chest w and wo IV contrast 02/20/2024    Narrative  Interpreted By:  Alejandro Mendez and Ravi Shweta  STUDY:  CT ANGIO CHEST W AND WO IV CONTRAST;  2/20/2024 1:59 pm    INDICATION:  Signs/Symptoms:aortic occlusion with thrombus starting in thoracic  aorta.    COMPARISON:  CT abdomen/pelvis 01/30/2024    ACCESSION NUMBER(S):  [id number]    ORDERING CLINICIAN:  RAMBO EASTMAN    TECHNIQUE:  Using multi-detector CT technology,  axial, sequential imaging with  prospective gating was performed of the chest following the  intravenous administration of contrast material.  A low-osmolar  contrast agent was used (80 mL of Omnipaque 350).    CT Dose-Length Product (DLP):   mGy*cm  CT Dose Reduction Employed: Yes ( 100 kV, prospective ECG triggering,  iterative reconstruction)    For optimization of anatomic evaluation, multiplanar reconstruction,  maximum intensity projections, and advanced 3-D off-line  postprocessing were performed on a dedicated stand-alone workstation  by the interpreting physician.    FINDINGS:  Potential study limitations:  None.    THORACIC AORTA:  The thoracic aorta is normal in course, caliber, and contour.  The sinotubular junction is   preserved.  There is no evidence for dissection, intramural hematoma, or  contained rupture. The arch vessel branching pattern is  conventional.  All of the arch branch vessels appear widely patent in  their proximal portions. Minimal aortic annular calcification is  present. The visualized distal thoracic aorta demonstrates large  intraluminal thrombus with 50% occlusion which extends into the  visualized portion of the abdominal aorta. There is complete  occlusion of the infrarenal abdominal aorta as visualized. Findings  are consistent with imaging from 01/30/2024. There is severe  atherosclerotic calcification at the ostia of the bilateral renal  arteries. The proximal SMA is smoothly narrowed as compared to the  mid SMA which may be secondary to smooth noncalcified plaque. The  celiac trunk, SMA and bilateral renal arteries are otherwise normal  in caliber. There is partial visualization of retrograde  opacification of the ASPEN.    REPRESENTATIVE MEASUREMENTS OF THE AORTA:  Annulus 2.5 x 1.7 cm  Root (Sinus of Valsalva)  3.2 cm (sinus to sinus)  Sinotubular junction  3.0 cm  Mid ascending  3.5 cm  Distal ascending  3.3 cm  Mid transverse arch  2.9 cm  Isthmus  2.4 cm  Mid descending  2.5 cm  Distal descending (hiatus)  2.6 cm    CORONARY ARTERIES:  The examination is not optimized for assessment of the coronary  arteries. Normal coronary artery origins.  Right dominant system.  Mild coronary artery calcification.    PULMONARY ARTERIES:  The central pulmonary arteries appear  normal (MPA-2.2 cm, RPA-2.2  cm, LPA-2.4 cm).  There are multiple subsegmental pulmonary emboli  involving the bifurcation of the right lower lobe and right middle  lobe pulmonary arteries as well as multiple segmental arteries  including the right anterobasilar, lateral basilar, and posterior  basilar segmental arteries as well as a subsegmental branch of the  medial basilar artery. The medial and lateral segmental branches of  the right  middle lobe pulmonary artery are also affected. No evidence  of right heart strain.    SYSTEMIC AND PULMONARY VEINS:  Normal systemic venous and pulmonary venous return.  The SVC and IVC are of normal caliber.  Accessory right top pulmonary vein with otherwise normal pulmonary  venous anatomy.    CARDIAC CHAMBERS:  Normal atrioventricular and ventriculoarterial concordance.  No  cardiomegaly.    LEFT ATRIUM:  Normal size (Area-16.9 cm2)    RIGHT ATRIUM:  Normal size (Area-15.8 cm2)    INTERATRIAL SEPTUM:  Intact.    LEFT VENTRICLE:  Normal size (GIOVANI-3.9 cm)    RIGHT VENTRICLE:  Normal size (GIOVANI-3.6 cm)    INTERVENTRICULAR SEPTUM:  Intact.    AORTIC VALVE:  The aortic valve is  trileaflet in morphology.  No calcifications.    MITRAL VALVE:  No thickening/calcification.    PERICARDIUM:  There is no pericardial effusion or thickening.    CHEST:  Trachea and central airways are patent. No endobronchial lesion.  There is focal airspace opacification of the of the anterolateral  basilar right lower lobe with central lucency in a pattern favoring  developing pulmonary infarct. No pleural effusion or pneumothorax.  There is no significant axillary or mediastinal lymph nodes. No hilar  adenopathy. Few nonenlarged mediastinal lymph nodes are favored to be  reactive. Visualized thyroid is intact.  The esophagus is unremarkable. Note is made of a small hiatal hernia.    UPPER ABDOMEN:  The visualized upper abdomen is notable for: A 1.5 cm nonenhancing  cystic lesion within the superior pole of the left kidney consistent  with a renal cyst, nonspecific dilation of the common bile duct  without intrahepatic biliary ductal dilation or pancreatic ductal  dilation. Relative hypoattenuation of the liver as compared to the  spleen which is nonspecific on early portal venous phase imaging  though likely reflects underlying steatosis. There are incompletely  evaluated sub 5 mm nodules within segment KINJAL and segment IVB/VIII  which may  represent cysts, hemangiomas, or hamartomas. A splenule is  present.    BONE AND SOFT TISSUE:  No suspicious osseous abnormality.    Impression  1. Interval development of right lower lobe and right middle lobe  segmental pulmonary emboli with focal opacification of the right  lower lobe suggestive of developing pulmonary infarct. No CT evidence  for right heart strain.  2. The thoracic aorta is normal in course, caliber, and contour.  There is complete thrombosis of the visualized portion of the  renal/infrarenal abdominal aorta and partial occlusion of the distal  thoracic and proximal abdominal aorta.. There is otherwise no  evidence for acute aortic pathology.  3. Additional findings as above.    I personally reviewed the images/study and I agree with the resident  findings as stated by Ekta Cooper MD. This study was interpreted at  [facility], [place], [place].    MACRO:  Ekta Cooper discussed the significance and urgency of this critical  finding by Epic Chat with  RAMBO EASTMAN on 2/20/2024 at 2:47 pm.  (**-RCF-**) Findings:  See findings.    Signed by: Alejandro Mendez 2/20/2024 3:27 PM  Dictation workstation:   [id number]    Transthoracic Echo (TTE) Complete With Contrast 02/21/2024    [facility], [address] [place] [address]  Tel [phone] and Fax [phone]    TRANSTHORACIC ECHOCARDIOGRAM REPORT      Patient Name:      JORGE L MANUEL   Reading Physician:    [id number] Unruly Celaya MD  Study Date:        2/21/2024            Ordering Provider:    [id number] EMELY HYATT  MRN/PID:           [id number]             Fellow:               [id number] Jesse Bourgeois MD PhD  Accession#:        [id number]         Nurse:  Date of Birth/Age: 1955 / 68 years  Sonographer:          Mariel Calero RDCS  Gender:            F                    Additional Staff:  Height:            157.48 cm            Admit Date:           2/20/2024  Weight:             77.11 kg             Admission Status:     Inpatient - STAT  BSA / BMI:         1.78 m2 / 31.09      Encounter#:           [id number]  kg/m2  Department Location:  [facility] Non  Invasive  Blood Pressure: 122 /62 mmHg    Study Type:    TRANSTHORACIC ECHO (TTE) COMPLETE  Diagnosis/ICD: Multiple subsegmental pulmonary emboli without acute cor  pulmonale-I26.94  Indication:    Multiple subsegmental pulmonary emboli without acute cor  pulmonale  CPT Code:      Echo Complete w Full Doppler-[id number]    Patient History:  Pertinent History: HTN, Aortic occlusion, HLD, Multile PE.    Study Detail: The following Echo studies were performed: 2D, M-Mode, Doppler and  color flow. Definity used as a contrast agent for endocardial  border definition. Total contrast used for this procedure was 2 mL  via IV push.      PHYSICIAN INTERPRETATION:  Left Ventricle: The left ventricular systolic function is normal, with an estimated ejection fraction of 60-65%. There are no regional wall motion abnormalities. The left ventricular cavity size is normal. Doppler shows a borderline pattern of left ventricular diastolic filling. There is an elevated mean left atrial pressure.  Left Atrium: The left atrium is normal in size.  Right Ventricle: The right ventricle is normal in size. There is normal right ventricular global systolic function. There is a prominent echodensity in the apex of the RV, similar in density to surrounding muscle, that is present on multiple views but is not visible with Definity. This may represent a large moderator band or RV trabeculations, vs less likely thrombus or myxoma. Recommend cMRI for further characterization.  Right Atrium: The right atrium is normal in size.  Aortic Valve: The aortic valve is probably trileaflet. There is minimal aortic valve cusp calcification. There is trivial aortic valve regurgitation. The peak instantaneous gradient of the aortic valve is 10.2 mmHg. The mean gradient of the  aortic valve is 5.0 mmHg.  Mitral Valve: The mitral valve is normal in structure. There is trace mitral valve regurgitation.  Tricuspid Valve: The tricuspid valve is structurally normal. There is trace to mild tricuspid regurgitation.  Pulmonic Valve: The pulmonic valve is not well visualized. There is trace pulmonic valve regurgitation.  Pericardium: There is a trivial pericardial effusion. There is an anterior clear space.  Aorta: The aortic root is normal. There is upper limits of normal dilatation of the ascending aorta. There is no dilatation of the aortic root.  Pulmonary Artery: The tricuspid regurgitant velocity is 2.46 m/s, and with an estimated right atrial pressure of 3 mmHg, the estimated pulmonary artery pressure is normal with the RVSP at 27.2 mmHg.  Systemic Veins: The inferior vena cava appears to be of normal size. There is IVC inspiratory collapse greater than 50%.  In comparison to the previous echocardiogram(s): There are no prior studies on this patient for comparison purposes.      CONCLUSIONS:  1. Left ventricular systolic function is normal with a 60-65% estimated ejection fraction.  2. There is an elevated mean left atrial pressure.  3. There is a prominent echodensity in the apex of the RV, similar in density to surrounding muscle, that is present on multiple views but is not visible with Definity. This may represent a large moderator band or RV trabeculations, vs less likely thrombus or myxoma. Recommend cMRI for further characterization.    QUANTITATIVE DATA SUMMARY:  2D MEASUREMENTS:  Normal Ranges:  Ao Root d:     3.20 cm   (2.0-3.7cm)  LAs:           3.50 cm   (2.7-4.0cm)  IVSd:          0.70 cm   (0.6-1.1cm)  LVPWd:         0.70 cm   (0.6-1.1cm)  LVIDd:         4.30 cm   (3.9-5.9cm)  LVIDs:         3.20 cm  LV Mass Index: 49.6 g/m2  LV % FS        25.6 %    LA VOLUME:  Normal Ranges:  LA Vol A4C:        44.3 ml    (22+/-6mL/m2)  LA Vol A2C:        41.8 ml  LA Vol BP:         43.4  ml  LA Vol Index A4C:  24.9ml/m2  LA Vol Index A2C:  23.4 ml/m2  LA Vol Index BP:   24.3 ml/m2  LA Area A4C:       16.1 cm2  LA Area A2C:       15.5 cm2  LA Major Axis A4C: 5.0 cm  LA Major Axis A2C: 4.9 cm  LA Volume Index:   24.3 ml/m2    RA VOLUME BY A/L METHOD:  Normal Ranges:  RA Area A4C: 13.0 cm2    AORTA MEASUREMENTS:  Normal Ranges:  Asc Ao, d: 3.30 cm (2.1-3.4cm)    LV DIASTOLIC FUNCTION:  Normal Ranges:  MV Peak E:        0.95 m/s    (0.7-1.2 m/s)  MV Peak A:        1.01 m/s    (0.42-0.7 m/s)  E/A Ratio:        0.94        (1.0-2.2)  MV e'             0.08 m/s    (>8.0)  MV lateral e'     0.08 m/s  MV medial e'      0.08 m/s  MV A Dur:         99.00 msec  E/e' Ratio:       11.86       (<8.0)  a'                0.11 m/s  MV DT:            257 msec    (150-240 msec)  PulmV Sys Munir:    76.60 cm/s  PulmV Vivas Munir:   53.50 cm/s  PulmV S/D Munir:    1.40  PulmV A Revs Munir: 39.60 cm/s  PulmV A Revs Dur: 144.00 msec    MITRAL VALVE:  Normal Ranges:  MV DT: 257 msec (150-240msec)    AORTIC VALVE:  Normal Ranges:  AoV Vmax:                1.60 m/s  (<=1.7m/s)  AoV Peak PG:             10.2 mmHg (<20mmHg)  AoV Mean P.0 mmHg  (1.7-11.5mmHg)  LVOT Max Munir:            1.26 m/s  (<=1.1m/s)  AoV VTI:                 34.40 cm  (18-25cm)  LVOT VTI:                28.20 cm  LVOT Diameter:           2.00 cm   (1.8-2.4cm)  AoV Area, VTI:           2.58 cm2  (2.5-5.5cm2)  AoV Area,Vmax:           2.47 cm2  (2.5-4.5cm2)  AoV Dimensionless Index: 0.82      RIGHT VENTRICLE:  RV Basal 4.30 cm  RV Mid   3.40 cm  RV Major 6.5 cm  TAPSE:   23.5 mm  RV s'    0.16 m/s    TRICUSPID VALVE/RVSP:  Normal Ranges:  Peak TR Velocity: 2.46 m/s  RV Syst Pressure: 27.2 mmHg (< 30mmHg)  IVC Diam:         1.50 cm    PULMONIC VALVE:  Normal Ranges:  PV Accel Time: 97 msec  (>120ms)  PV Max Munir:    0.8 m/s  (0.6-0.9m/s)  PV Max P.9 mmHg    Pulmonary Veins:  PulmV A Revs Dur: 144.00 msec  PulmV A Revs Munir: 39.60 cm/s  PulmV Vivas  Munir:   53.50 cm/s  PulmV S/D Munir:    1.40  PulmV Sys Munir:    76.60 cm/s      [id number] Unruly Celaya MD  Electronically signed on 2/21/2024 at 12:41:16 PM        ** Final **    Medications:  Scheduled Meds:  atenolol, 50 mg, oral, BID  atorvastatin, 80 mg, oral, Nightly  losartan, 100 mg, oral, Daily  multivitamin with minerals, 1 tablet, oral, Daily  nicotine, 1 patch, transdermal, Daily  polyethylene glycol, 17 g, oral, Daily      Continuous Infusions:  heparin, 0-4,500 Units/hr, Last Rate: 1,000 Units/hr (02/23/24 0700)      PRN Meds:.  PRN medications: acetaminophen, heparin, oxyCODONE    Labs:  Results from last 7 days   Lab Units 02/23/24  0716 02/22/24  0816 02/21/24  0858   WBC AUTO x10*3/uL 8.9 8.6 10.9   HEMOGLOBIN g/dL 9.7* 10.7* 11.5*   PLATELETS AUTO x10*3/uL [phone]      Results from last 7 days   Lab Units 02/23/24  0716 02/22/24  0816 02/21/24  0858 02/21/24  0858   SODIUM mmol/L 140 142  --  139   POTASSIUM mmol/L 3.6 3.7  --  3.8   CHLORIDE mmol/L 107 106  --  102   CO2 mmol/L 24 24  --  26   BUN mg/dL 22 22  --  25*   CREATININE mg/dL 0.94 1.09*  --  0.92   GLUCOSE mg/dL 99 102*  --  101*   MAGNESIUM mg/dL 2.17 1.57*   < >  --    PHOSPHORUS mg/dL 4.1 4.2   < >  --     < > = values in this interval not displayed.      Results from last 7 days   Lab Units 02/20/24  1729   INR  1.2*   PROTIME seconds 13.8*     Assessment/Plan   Klaudia Ratliff is 68 y.o. female with history of HTN, HLD, obesity, tobacco dependency and aortic occlusion causing BLE claudication and rest pain with planned open thoracoabdominal aortic repair with Dr. Zelaya 2/26/2024 who was admitted with right pulmonary embolism that was incidentally found on CTA of the chest. Placed on a heparin drip. DVT scan pending. Echo without evidence of right heart strain. BNP & troponin normal.     2/23:  S/p pulmonary angiogram with Dr. Day; removed small bit of mass-like material sent for path.  Getting PFTs and cardiac MRI today  DVT  scan with RLE CFV, PFV, FV, pop DVT    Plan:  Neuro: ischemic rest pain  - continue tylenol/oxy PRN for pain control  - continue neurovascular checks every 4 hrs    CV: hx of HTN, HLD, aortic occlusion with CLTI of BLE, saddle PE of right pulmonary artery  - maintain blood pressure control  - continue home atenolol & cozaar   - continue atorvastatin & high-intensity heparin drip   - Follow-up path results from tissue obtained from 2/22 pulmonary angiogram/thrombectomy  - echo 2/21: no evidence of right heart strain; echodensity in the apex of the RV visualized - Pending cardiac MRI today (done, awaiting read)  - DVT scan with RLE CFV, FV, PFV, pop DVT    Pulm: tobacco dependency   - continue to encourage IS hourly while awake  - OOB to chair and increase ambulation as tolerated  - continue nicotine patch   - Pending PFTs today    FENGI: hypomagnesemia  - Regular diet  - continue bowel regimen  - replete electrolytes as needed   - RFP as clinically indicated    Endo:   - no issues     Heme:   - no s/sx of bleeding  - APLA labs normal   - CBC as clinically indicated    ID:   - trend temp q4  - CBC as clinically indicated     Dispo:   - continue care on regular nursing floor    Discussed with Dr. Garcia Toussaint MD

## 2024-02-24 LAB
ALBUMIN SERPL BCP-MCNC: 3.1 G/DL (ref 3.4–5)
ANION GAP SERPL CALC-SCNC: 12 MMOL/L (ref 10–20)
BUN SERPL-MCNC: 20 MG/DL (ref 6–23)
CALCIUM SERPL-MCNC: 8.9 MG/DL (ref 8.6–10.6)
CHLORIDE SERPL-SCNC: 108 MMOL/L (ref 98–107)
CO2 SERPL-SCNC: 24 MMOL/L (ref 21–32)
CREAT SERPL-MCNC: 0.89 MG/DL (ref 0.5–1.05)
EGFRCR SERPLBLD CKD-EPI 2021: 71 ML/MIN/1.73M*2
ERYTHROCYTE [DISTWIDTH] IN BLOOD BY AUTOMATED COUNT: 13.2 % (ref 11.5–14.5)
GLUCOSE SERPL-MCNC: 90 MG/DL (ref 74–99)
HCT VFR BLD AUTO: 30.7 % (ref 36–46)
HGB BLD-MCNC: 9.8 G/DL (ref 12–16)
MAGNESIUM SERPL-MCNC: 1.76 MG/DL (ref 1.6–2.4)
MCH RBC QN AUTO: 32.6 PG (ref 26–34)
MCHC RBC AUTO-ENTMCNC: 31.9 G/DL (ref 32–36)
MCV RBC AUTO: 102 FL (ref 80–100)
NRBC BLD-RTO: 0 /100 WBCS (ref 0–0)
PHOSPHATE SERPL-MCNC: 4.2 MG/DL (ref 2.5–4.9)
PLATELET # BLD AUTO: 353 X10*3/UL (ref 150–450)
POTASSIUM SERPL-SCNC: 3.9 MMOL/L (ref 3.5–5.3)
RBC # BLD AUTO: 3.01 X10*6/UL (ref 4–5.2)
SODIUM SERPL-SCNC: 140 MMOL/L (ref 136–145)
WBC # BLD AUTO: 8.6 X10*3/UL (ref 4.4–11.3)

## 2024-02-24 PROCEDURE — 2500000002 HC RX 250 W HCPCS SELF ADMINISTERED DRUGS (ALT 637 FOR MEDICARE OP, ALT 636 FOR OP/ED): Performed by: STUDENT IN AN ORGANIZED HEALTH CARE EDUCATION/TRAINING PROGRAM

## 2024-02-24 PROCEDURE — 2500000001 HC RX 250 WO HCPCS SELF ADMINISTERED DRUGS (ALT 637 FOR MEDICARE OP): Performed by: STUDENT IN AN ORGANIZED HEALTH CARE EDUCATION/TRAINING PROGRAM

## 2024-02-24 PROCEDURE — 2500000004 HC RX 250 GENERAL PHARMACY W/ HCPCS (ALT 636 FOR OP/ED)

## 2024-02-24 PROCEDURE — 2500000004 HC RX 250 GENERAL PHARMACY W/ HCPCS (ALT 636 FOR OP/ED): Performed by: STUDENT IN AN ORGANIZED HEALTH CARE EDUCATION/TRAINING PROGRAM

## 2024-02-24 PROCEDURE — 1200000002 HC GENERAL ROOM WITH TELEMETRY DAILY

## 2024-02-24 PROCEDURE — 83735 ASSAY OF MAGNESIUM: CPT

## 2024-02-24 PROCEDURE — S4991 NICOTINE PATCH NONLEGEND: HCPCS | Performed by: STUDENT IN AN ORGANIZED HEALTH CARE EDUCATION/TRAINING PROGRAM

## 2024-02-24 PROCEDURE — 85027 COMPLETE CBC AUTOMATED: CPT

## 2024-02-24 PROCEDURE — 99233 SBSQ HOSP IP/OBS HIGH 50: CPT | Performed by: SURGERY

## 2024-02-24 PROCEDURE — 36415 COLL VENOUS BLD VENIPUNCTURE: CPT

## 2024-02-24 PROCEDURE — 80069 RENAL FUNCTION PANEL: CPT

## 2024-02-24 RX ORDER — MAGNESIUM SULFATE HEPTAHYDRATE 40 MG/ML
2 INJECTION, SOLUTION INTRAVENOUS ONCE
Status: COMPLETED | OUTPATIENT
Start: 2024-02-24 | End: 2024-02-24

## 2024-02-24 RX ADMIN — ENOXAPARIN SODIUM 80 MG: 80 INJECTION SUBCUTANEOUS at 08:59

## 2024-02-24 RX ADMIN — LOSARTAN POTASSIUM 100 MG: 50 TABLET, FILM COATED ORAL at 08:59

## 2024-02-24 RX ADMIN — ENOXAPARIN SODIUM 80 MG: 80 INJECTION SUBCUTANEOUS at 20:41

## 2024-02-24 RX ADMIN — ATENOLOL 50 MG: 50 TABLET ORAL at 20:41

## 2024-02-24 RX ADMIN — MAGNESIUM SULFATE HEPTAHYDRATE 2 G: 40 INJECTION, SOLUTION INTRAVENOUS at 12:03

## 2024-02-24 RX ADMIN — Medication 1 TABLET: at 08:59

## 2024-02-24 RX ADMIN — NICOTINE 1 PATCH: 14 PATCH, EXTENDED RELEASE TRANSDERMAL at 08:59

## 2024-02-24 RX ADMIN — ATORVASTATIN CALCIUM 80 MG: 80 TABLET, FILM COATED ORAL at 20:41

## 2024-02-24 ASSESSMENT — COGNITIVE AND FUNCTIONAL STATUS - GENERAL
MOBILITY SCORE: 24
DAILY ACTIVITIY SCORE: 24

## 2024-02-24 ASSESSMENT — PAIN SCALES - GENERAL
PAINLEVEL_OUTOF10: 0 - NO PAIN
PAINLEVEL_OUTOF10: 0 - NO PAIN

## 2024-02-24 NOTE — HOSPITAL COURSE
Klaudia Ratliff is 68 y.o. female with history of HTN, HLD, obesity, tobacco dependency and aortic occlusion causing BLE claudication and rest pain with planned open thoracoabdominal aortic repair with Dr. Zelaya 2/26/2024 who was admitted with right pulmonary embolism that was incidentally found on CTA of the chest 2/20/24. She was found to have a R main PA saddle PE when undergoing this imaging study for pre-operative testing.    On presentation to [facility] ED , she was placed on a heparin drip. She underwent the following imaging modaities to assess whether patient has co-existing DVTs given new PE finding, assess for Right heart strain: ECHO, BLE venous duplex.    ECHO 2/21 did not show evidence of right heart strain but had indeterminate findings of prominent echodensity in the apex of the RV, similar in density to surrounding muscle, that is present on multiple views but is not visible with Definity. This may represent a large moderator band or RV trabeculations, vs less likely thrombus or myxoma. BNP & troponin normal.     On 2/22 Patient underwent pulmonary angiogram with intervention with Interventional cardiology (right CFV access) . Successful mechanical thrombectomy of Right PE/Tissue (tissue did not appear characteristic of expected thrombus). Pathology sent, pending results.    BLE 2/23 showed RLE CFV, FV, PFV, pop DVT .   On 2/23 patient also underwent cardiac MRI to further delineate cardiac morphology given ECHO findings. Preliminary results show Prominent trabeculations within the right ventricular apex. No  evidence of mass or thrombus. Additionally PFTs obtained on 2/23 showed moderate airflow obstruction  consistent with restrictive lung disease. The diffusing capacity for carbon monoxide, a reflection of alveolar-capillary gas transport, was mildly reduced. On this day, she was transitioned to therapeutic Lovenox.    She was deemed appropriate for discharge on 2/25 to home with no home needs.   Her existing case 2/26 was canceled and appropriate for rescheduling at this time.  On day of discharge, she was tolerating regular diet, voiding independently, passing gas and bowel movements.  She is able to ambulate with assistance, does endorse numbness and tingling in the right lower extremity with ambulation but otherwise states the swelling has decreased during her stay.

## 2024-02-24 NOTE — CARE PLAN
Problem: Pain - Adult  Goal: Verbalizes/displays adequate comfort level or baseline comfort level  Outcome: Progressing     Problem: Safety - Adult  Goal: Free from fall injury  Outcome: Progressing     Problem: Discharge Planning  Goal: Discharge to home or other facility with appropriate resources  Outcome: Progressing     Problem: Chronic Conditions and Co-morbidities  Goal: Patient's chronic conditions and co-morbidity symptoms are monitored and maintained or improved  Outcome: Progressing     Problem: Pain  Goal: Turns in bed with improved pain control throughout the shift  Outcome: Progressing  Goal: Walks with improved pain control throughout the shift  Outcome: Progressing  Goal: Performs ADL's with improved pain control throughout shift  Outcome: Progressing  Goal: Participates in PT with improved pain control throughout the shift  Outcome: Progressing  Goal: Free from opioid side effects throughout the shift  Outcome: Progressing  Goal: Free from acute confusion related to pain meds throughout the shift  Outcome: Progressing     Problem: Fall/Injury  Goal: Not fall by end of shift  Outcome: Progressing  Goal: Be free from injury by end of the shift  Outcome: Progressing  Goal: Verbalize understanding of personal risk factors for fall in the hospital  Outcome: Progressing  Goal: Verbalize understanding of risk factor reduction measures to prevent injury from fall in the home  Outcome: Progressing  Goal: Use assistive devices by end of the shift  Outcome: Progressing  Goal: Pace activities to prevent fatigue by end of the shift  Outcome: Progressing

## 2024-02-24 NOTE — PROGRESS NOTES
VASCULAR SURGERY   PROGRESS NOTE  Subjective   NAEO    Patient sleeping removed this a.m., but easily awoken.  She states that she is anxious regarding the plan for her surgical intervention going, but remains optimistic.    Denies any headaches, lightheadedness, chills, nausea, vomiting, chest pain, shortness of breath, abdominal pain, new extremity weakness.  Continues to endorse numbness/tingling in her right foot, but states this is not a new change.    Objective   Vitals:    02/24/24 0746   BP: 130/60   Pulse: 53   Resp: 17   Temp: 36.5 °C (97.7 °F)   SpO2: 96%      Exam:  General: NAD. Nontoxic  HEENT: Atraumatic. MMM. EOMI.  Card: RRR on monitor  Pulm: Nonlabored breathing on RA  Abdomen: Soft, nontender, nondistended  Extremities: MAEx4. RLE warmer than left (left is cool from mid foot to toes), mild erythem RLE. RLE is swollen in comparison to LLE, soft compartments bilaterally  Neuro: No focal deficits.  Pulses: nonpalpable femoral pulse or pedal pulses.       Medications:  Scheduled Meds:  atenolol, 50 mg, oral, BID  atorvastatin, 80 mg, oral, Nightly  enoxaparin, 1 mg/kg, subcutaneous, q12h  losartan, 100 mg, oral, Daily  multivitamin with minerals, 1 tablet, oral, Daily  nicotine, 1 patch, transdermal, Daily  polyethylene glycol, 17 g, oral, Daily      Continuous Infusions:       PRN Meds:.  PRN medications: acetaminophen, oxyCODONE    Labs:  Results from last 7 days   Lab Units 02/23/24  0716 02/22/24  0816 02/21/24  0858   WBC AUTO x10*3/uL 8.9 8.6 10.9   HEMOGLOBIN g/dL 9.7* 10.7* 11.5*   PLATELETS AUTO x10*3/uL 347 389 439      Results from last 7 days   Lab Units 02/23/24  0716 02/22/24  0816 02/21/24  0858 02/21/24  0858   SODIUM mmol/L 140 142  --  139   POTASSIUM mmol/L 3.6 3.7  --  3.8   CHLORIDE mmol/L 107 106  --  102   CO2 mmol/L 24 24  --  26   BUN mg/dL 22 22  --  25*   CREATININE mg/dL 0.94 1.09*  --  0.92   GLUCOSE mg/dL 99 102*  --  101*   MAGNESIUM mg/dL 2.17 1.57*   < >  --     PHOSPHORUS mg/dL 4.1 4.2   < >  --     < > = values in this interval not displayed.      Results from last 7 days   Lab Units 02/20/24  1729   INR  1.2*   PROTIME seconds 13.8*     MRI Cardiac morphology and function w/wo/ IV contrast (02/24/24)  IMPRESSION:  1. Prominent trabeculations within the right ventricular apex. No  evidence of mass or thrombus.  2. The left ventricle is normal in size, shape, and has normal global  systolic function (LVEF = 73%). There are no segmental wall motion  abnormalities.  Quantitative values are as noted above.  3. There are no findings to suggest prior ischemic damage or an  infiltrative process.  4. Normal aortic, mitral, and tricuspid valve function.  5. Focal area of subpleural signal abnormality within the right lower  lobe better evaluated on dedicated CT of the chest from 02/20/2024    LE venous duplex (02/24/24)  IMPRESSION:  1. Occlusive deep venous thrombus throughout the right lower extremity  2. No evidence of DVT in the left lower extremity.  DVT scan with RLE CFV, PFV, FV, pop DVT    PFTs (02/24/24):  Spirometry demonstrates moderate airflow obstruction. Following the inhalation of a bronchodilator, there is no significant change in the airway mechanics. Lung volumes are consistent with restrictive lung disease. The diffusing capacity for carbon monoxide, a reflection of alveolar-capillary gas transport, is mildly reduced.     Assessment/Plan   Klaudia Ratliff is 68 y.o. female with history of HTN, HLD, obesity, tobacco dependency and aortic occlusion causing BLE claudication and rest pain with planned open thoracoabdominal aortic repair with Dr. Zelaya 2/26/2024 who was admitted with right pulmonary embolism that was incidentally found on CTA of the chest. Placed on a heparin drip. DVT scan pending. Echo without evidence of right heart strain. BNP & troponin normal.       Plan:  Neuro: ischemic rest pain  - continue tylenol/oxy PRN for pain control  - continue  neurovascular checks every 4 hrs    CV: hx of HTN, HLD, aortic occlusion with CLTI of BLE, saddle PE of right pulmonary artery  - maintain blood pressure control  - continue home atenolol & cozaar   - continue atorvastatin & high-intensity heparin drip   - Follow-up path results from tissue obtained from 2/22 pulmonary angiogram/thrombectomy  - echo 2/21: no evidence of right heart strain; echodensity in the apex of the RV visualized   - Pending cardiac MRI final read  - DVT scan with RLE CFV, FV, PFV, pop DVT    Pulm: tobacco dependency   - continue to encourage IS hourly while awake  - OOB to chair and increase ambulation as tolerated  - continue nicotine patch   - PFTs completed 2/24    FENGI: hypomagnesemia  - Regular diet  - continue bowel regimen  - replete electrolytes as needed   - RFP as clinically indicated    Endo:   - no issues     Heme:   - no s/sx of bleeding  - APLA labs normal   - CBC as clinically indicated    ID:   - trend temp q4  - CBC as clinically indicated     Dispo:   - continue care on regular nursing floor    Discussed with Dr. Amanda Sol MD  PGY1 Vascular Surgery  [id number]

## 2024-02-25 ENCOUNTER — PHARMACY VISIT (OUTPATIENT)
Dept: PHARMACY | Facility: CLINIC | Age: 69
End: 2024-02-25
Payer: COMMERCIAL

## 2024-02-25 VITALS
BODY MASS INDEX: 31.46 KG/M2 | HEART RATE: 59 BPM | WEIGHT: 170.97 LBS | TEMPERATURE: 97.7 F | HEIGHT: 62 IN | DIASTOLIC BLOOD PRESSURE: 65 MMHG | SYSTOLIC BLOOD PRESSURE: 134 MMHG | RESPIRATION RATE: 18 BRPM | OXYGEN SATURATION: 99 %

## 2024-02-25 LAB
ALBUMIN SERPL BCP-MCNC: 3.2 G/DL (ref 3.4–5)
ANION GAP SERPL CALC-SCNC: 13 MMOL/L (ref 10–20)
BUN SERPL-MCNC: 19 MG/DL (ref 6–23)
CALCIUM SERPL-MCNC: 8.9 MG/DL (ref 8.6–10.6)
CHLORIDE SERPL-SCNC: 109 MMOL/L (ref 98–107)
CO2 SERPL-SCNC: 23 MMOL/L (ref 21–32)
CREAT SERPL-MCNC: 1.07 MG/DL (ref 0.5–1.05)
EGFRCR SERPLBLD CKD-EPI 2021: 57 ML/MIN/1.73M*2
ERYTHROCYTE [DISTWIDTH] IN BLOOD BY AUTOMATED COUNT: 13.1 % (ref 11.5–14.5)
GLUCOSE SERPL-MCNC: 172 MG/DL (ref 74–99)
HCT VFR BLD AUTO: 29.9 % (ref 36–46)
HGB BLD-MCNC: 9.6 G/DL (ref 12–16)
MAGNESIUM SERPL-MCNC: 1.82 MG/DL (ref 1.6–2.4)
MCH RBC QN AUTO: 32.3 PG (ref 26–34)
MCHC RBC AUTO-ENTMCNC: 32.1 G/DL (ref 32–36)
MCV RBC AUTO: 101 FL (ref 80–100)
NRBC BLD-RTO: 0 /100 WBCS (ref 0–0)
PHOSPHATE SERPL-MCNC: 3.4 MG/DL (ref 2.5–4.9)
PLATELET # BLD AUTO: 342 X10*3/UL (ref 150–450)
POTASSIUM SERPL-SCNC: 3.9 MMOL/L (ref 3.5–5.3)
RBC # BLD AUTO: 2.97 X10*6/UL (ref 4–5.2)
SODIUM SERPL-SCNC: 141 MMOL/L (ref 136–145)
UFH PPP CHRO-ACNC: 0.6 IU/ML
WBC # BLD AUTO: 8.9 X10*3/UL (ref 4.4–11.3)

## 2024-02-25 PROCEDURE — 36415 COLL VENOUS BLD VENIPUNCTURE: CPT

## 2024-02-25 PROCEDURE — RXMED WILLOW AMBULATORY MEDICATION CHARGE

## 2024-02-25 PROCEDURE — 2500000004 HC RX 250 GENERAL PHARMACY W/ HCPCS (ALT 636 FOR OP/ED)

## 2024-02-25 PROCEDURE — 83735 ASSAY OF MAGNESIUM: CPT

## 2024-02-25 PROCEDURE — 2500000001 HC RX 250 WO HCPCS SELF ADMINISTERED DRUGS (ALT 637 FOR MEDICARE OP): Performed by: STUDENT IN AN ORGANIZED HEALTH CARE EDUCATION/TRAINING PROGRAM

## 2024-02-25 PROCEDURE — 2500000002 HC RX 250 W HCPCS SELF ADMINISTERED DRUGS (ALT 637 FOR MEDICARE OP, ALT 636 FOR OP/ED): Performed by: STUDENT IN AN ORGANIZED HEALTH CARE EDUCATION/TRAINING PROGRAM

## 2024-02-25 PROCEDURE — 85520 HEPARIN ASSAY: CPT

## 2024-02-25 PROCEDURE — S4991 NICOTINE PATCH NONLEGEND: HCPCS | Performed by: STUDENT IN AN ORGANIZED HEALTH CARE EDUCATION/TRAINING PROGRAM

## 2024-02-25 PROCEDURE — 2500000002 HC RX 250 W HCPCS SELF ADMINISTERED DRUGS (ALT 637 FOR MEDICARE OP, ALT 636 FOR OP/ED)

## 2024-02-25 PROCEDURE — 2500000004 HC RX 250 GENERAL PHARMACY W/ HCPCS (ALT 636 FOR OP/ED): Performed by: STUDENT IN AN ORGANIZED HEALTH CARE EDUCATION/TRAINING PROGRAM

## 2024-02-25 PROCEDURE — 84100 ASSAY OF PHOSPHORUS: CPT

## 2024-02-25 PROCEDURE — 85027 COMPLETE CBC AUTOMATED: CPT

## 2024-02-25 RX ORDER — MULTIVIT-MIN/IRON FUM/FOLIC AC 7.5 MG-4
1 TABLET ORAL DAILY
Qty: 30 TABLET | Refills: 1 | Status: SHIPPED | OUTPATIENT
Start: 2024-02-25 | End: 2024-04-25

## 2024-02-25 RX ORDER — OXYCODONE HYDROCHLORIDE 5 MG/1
5 TABLET ORAL EVERY 6 HOURS PRN
Qty: 15 TABLET | Refills: 0 | Status: SHIPPED | OUTPATIENT
Start: 2024-02-25 | End: 2024-03-01

## 2024-02-25 RX ORDER — ACETAMINOPHEN 325 MG/1
650 TABLET ORAL EVERY 6 HOURS PRN
Qty: 30 TABLET | Refills: 0 | Status: SHIPPED | OUTPATIENT
Start: 2024-02-25 | End: 2024-03-06

## 2024-02-25 RX ORDER — ENOXAPARIN SODIUM 100 MG/ML
80 INJECTION SUBCUTANEOUS EVERY 12 HOURS
Qty: 60 EACH | Refills: 0 | Status: SHIPPED | OUTPATIENT
Start: 2024-02-25 | End: 2024-03-26

## 2024-02-25 RX ORDER — POLYETHYLENE GLYCOL 3350 17 G/17G
17 POWDER, FOR SOLUTION ORAL DAILY
Qty: 10 PACKET | Refills: 0 | Status: SHIPPED | OUTPATIENT
Start: 2024-02-25 | End: 2024-03-06

## 2024-02-25 RX ORDER — POTASSIUM CHLORIDE 750 MG/1
10 TABLET, FILM COATED, EXTENDED RELEASE ORAL ONCE
Status: COMPLETED | OUTPATIENT
Start: 2024-02-25 | End: 2024-02-25

## 2024-02-25 RX ADMIN — NICOTINE 1 PATCH: 14 PATCH, EXTENDED RELEASE TRANSDERMAL at 09:26

## 2024-02-25 RX ADMIN — LOSARTAN POTASSIUM 100 MG: 50 TABLET, FILM COATED ORAL at 09:26

## 2024-02-25 RX ADMIN — POTASSIUM CHLORIDE 10 MEQ: 750 TABLET, FILM COATED, EXTENDED RELEASE ORAL at 09:26

## 2024-02-25 RX ADMIN — ENOXAPARIN SODIUM 80 MG: 80 INJECTION SUBCUTANEOUS at 09:26

## 2024-02-25 RX ADMIN — SODIUM CHLORIDE 500 ML: 9 INJECTION, SOLUTION INTRAVENOUS at 09:27

## 2024-02-25 RX ADMIN — Medication 1 TABLET: at 09:25

## 2024-02-25 RX ADMIN — ATENOLOL 50 MG: 50 TABLET ORAL at 09:26

## 2024-02-25 ASSESSMENT — COGNITIVE AND FUNCTIONAL STATUS - GENERAL
MOBILITY SCORE: 24
DAILY ACTIVITIY SCORE: 24
DAILY ACTIVITIY SCORE: 24
MOBILITY SCORE: 23
CLIMB 3 TO 5 STEPS WITH RAILING: A LITTLE

## 2024-02-25 ASSESSMENT — PAIN SCALES - GENERAL
PAINLEVEL_OUTOF10: 0 - NO PAIN
PAINLEVEL_OUTOF10: 0 - NO PAIN

## 2024-02-25 NOTE — NURSING NOTE
Patient and daughter received discharge instructions and verbalized understanding. Patient received [id number] and IV and tele were removed. Patient was transported off the unit with all her belongings

## 2024-02-25 NOTE — PROGRESS NOTES
VASCULAR SURGERY   PROGRESS NOTE  Subjective   NAEO    Denies any headaches, lightheadedness, chills, nausea, vomiting, chest pain, shortness of breath, abdominal pain, new extremity weakness.  Continues to endorse numbness/tingling in her right foot, but states this is not a new change, and she states that is has decreased since yesterday.    Objective   Vitals:    02/25/24 0806   BP: 147/77   Pulse: 62   Resp: 18   Temp: 36.5 °C (97.7 °F)   SpO2: 97%      Exam:  General: NAD. Nontoxic  HEENT: Atraumatic. MMM. EOMI.  Card: RRR on monitor  Pulm: Nonlabored breathing on RA  Abdomen: Soft, nontender, nondistended  Extremities: MAEx4. RLE warmer than left (left is cool from mid foot to toes), mild erythem RLE. RLE is edematous (3+ pitting) in comparison to LLE, soft compartments bilaterally  Neuro: No focal deficits.  Pulses: nonpalpable femoral pulse or pedal pulses.       Medications:  Scheduled Meds:  atenolol, 50 mg, oral, BID  atorvastatin, 80 mg, oral, Nightly  enoxaparin, 1 mg/kg, subcutaneous, q12h  losartan, 100 mg, oral, Daily  multivitamin with minerals, 1 tablet, oral, Daily  nicotine, 1 patch, transdermal, Daily  polyethylene glycol, 17 g, oral, Daily      Continuous Infusions:       PRN Meds:.  PRN medications: acetaminophen, oxyCODONE    Labs:  Results from last 7 days   Lab Units 02/25/24  0648 02/24/24  0720 02/23/24  0716   WBC AUTO x10*3/uL 8.9 8.6 8.9   HEMOGLOBIN g/dL 9.6* 9.8* 9.7*   PLATELETS AUTO x10*3/uL 342 353 347      Results from last 7 days   Lab Units 02/25/24  0648 02/24/24  0720 02/23/24  0716   SODIUM mmol/L 141 140 140   POTASSIUM mmol/L 3.9 3.9 3.6   CHLORIDE mmol/L 109* 108* 107   CO2 mmol/L 23 24 24   BUN mg/dL 19 20 22   CREATININE mg/dL 1.07* 0.89 0.94   GLUCOSE mg/dL 172* 90 99   MAGNESIUM mg/dL 1.82 1.76 2.17   PHOSPHORUS mg/dL 3.4 4.2 4.1      Results from last 7 days   Lab Units 02/20/24  1729   INR  1.2*   PROTIME seconds 13.8*     MRI Cardiac morphology and function w/wo/  IV contrast (02/24/24)  IMPRESSION:  1. Prominent trabeculations within the right ventricular apex. No  evidence of mass or thrombus.  2. The left ventricle is normal in size, shape, and has normal global  systolic function (LVEF = 73%). There are no segmental wall motion  abnormalities.  Quantitative values are as noted above.  3. There are no findings to suggest prior ischemic damage or an  infiltrative process.  4. Normal aortic, mitral, and tricuspid valve function.  5. Focal area of subpleural signal abnormality within the right lower  lobe better evaluated on dedicated CT of the chest from 02/20/2024    LE venous duplex (02/24/24)  IMPRESSION:  1. Occlusive deep venous thrombus throughout the right lower extremity  2. No evidence of DVT in the left lower extremity.  DVT scan with RLE CFV, PFV, FV, pop DVT    PFTs (02/24/24):  Spirometry demonstrates moderate airflow obstruction. Following the inhalation of a bronchodilator, there is no significant change in the airway mechanics. Lung volumes are consistent with restrictive lung disease. The diffusing capacity for carbon monoxide, a reflection of alveolar-capillary gas transport, is mildly reduced.     Assessment/Plan   Klaudia Ratliff is 68 y.o. female with history of HTN, HLD, obesity, tobacco dependency and aortic occlusion causing BLE claudication and rest pain with planned open thoracoabdominal aortic repair with Dr. Zelaya 2/26/2024 who was admitted with right pulmonary embolism that was incidentally found on CTA of the chest. Placed on a heparin drip. DVT scan pending. Echo without evidence of right heart strain. BNP & troponin normal.       Plan:  Neuro: ischemic rest pain  - continue tylenol/oxy PRN for pain control  - continue neurovascular checks every 4 hrs    CV: hx of HTN, HLD, aortic occlusion with CLTI of BLE, saddle PE of right pulmonary artery  - maintain blood pressure control  - continue home atenolol & cozaar   - continue atorvastatin &  therapeutic lovenox  - Follow-up path results from tissue obtained from 2/22 pulmonary angiogram/thrombectomy  - echo 2/21: no evidence of right heart strain; echodensity in the apex of the RV visualized   - 2/24 DVT scan with RLE CFV, FV, PFV, pop DVT    Pulm: tobacco dependency   - continue to encourage IS hourly while awake  - OOB to chair and increase ambulation as tolerated  - continue nicotine patch   - PFTs completed 2/24    FENGI: hypomagnesemia  - Regular diet  - continue bowel regimen  - replete electrolytes as needed   - RFP as clinically indicated    Endo:   - no issues     Heme:   - no s/sx of bleeding  - APLA labs normal   - CBC as clinically indicated    ID:   - trend temp q4  - CBC as clinically indicated     Dispo:   - continue care on regular nursing floor  -Likely DC home today 2/25/24 (no home care needs)    Discussed with Dr. Miranda.    Kaycee Sol MD  PGY1 Vascular Surgery  [id number]

## 2024-02-25 NOTE — DISCHARGE SUMMARY
Discharge Diagnosis  Multiple subsegmental pulmonary emboli without acute cor pulmonale (CMS/HCC)    Issues Requiring Follow-Up  Follow-up with Dr. Zelaya in vascular surgery clinic for ongoing surgical planning    Test Results Pending At Discharge  Pending Labs       No current pending labs.            Hospital Course  Klaudia Ratliff is 68 y.o. female with history of HTN, HLD, obesity, tobacco dependency and aortic occlusion causing BLE claudication and rest pain with planned open thoracoabdominal aortic repair with Dr. Zelaya 2/26/2024 who was admitted with right pulmonary embolism that was incidentally found on CTA of the chest 2/20/24. She was found to have a R main PA saddle PE when undergoing this imaging study for pre-operative testing.    On presentation to [facility] ED , she was placed on a heparin drip. She underwent the following imaging modaities to assess whether patient has co-existing DVTs given new PE finding, assess for Right heart strain: ECHO, BLE venous duplex.    ECHO 2/21 did not show evidence of right heart strain but had indeterminate findings of prominent echodensity in the apex of the RV, similar in density to surrounding muscle, that is present on multiple views but is not visible with Definity. This may represent a large moderator band or RV trabeculations, vs less likely thrombus or myxoma. BNP & troponin normal.     On 2/22 Patient underwent pulmonary angiogram with intervention with Interventional cardiology (right CFV access) . Successful mechanical thrombectomy of Right PE/Tissue (tissue did not appear characteristic of expected thrombus). Pathology sent, pending results.    BLE 2/23 showed RLE CFV, FV, PFV, pop DVT .   On 2/23 patient also underwent cardiac MRI to further delineate cardiac morphology given ECHO findings. Preliminary results show Prominent trabeculations within the right ventricular apex. No  evidence of mass or thrombus. Additionally PFTs obtained on 2/23 showed moderate  airflow obstruction  consistent with restrictive lung disease. The diffusing capacity for carbon monoxide, a reflection of alveolar-capillary gas transport, was mildly reduced. On this day, she was transitioned to therapeutic Lovenox.    She was deemed appropriate for discharge on 2/25 to home with no home needs.  Her existing case 2/26 was canceled and appropriate for rescheduling at this time.  On day of discharge, she was tolerating regular diet, voiding independently, passing gas and bowel movements.  She is able to ambulate with assistance, does endorse numbness and tingling in the right lower extremity with ambulation but otherwise states the swelling has decreased during her stay.        Pertinent Physical Exam At Time of Discharge  Vitals:    02/25/24 0806   BP: 147/77   Pulse: 62   Resp: 18   Temp: 36.5 °C (97.7 °F)   SpO2: 97%     Physical Exam:  General: NAD. Nontoxic  HEENT: Atraumatic. MMM. EOMI.  Card: RRR on monitor  Pulm: Nonlabored breathing on RA  Abdomen: Soft, nontender, nondistended  Extremities: MAEx4. RLE warmer than left (left is cool from mid foot to toes), mild erythem RLE. RLE is edematous (3+ pitting) in comparison to LLE, soft compartments bilaterally  Neuro: No focal deficits.  Pulses: nonpalpable femoral pulse or pedal pulses.     Home Medications     Medication List      START taking these medications     acetaminophen 325 mg tablet; Commonly known as: Tylenol; Take 2 tablets   (650 mg) by mouth every 6 hours if needed for mild pain (1 - 3) for up to   10 days.   enoxaparin 80 mg/0.8 mL syringe; Commonly known as: Lovenox; Inject 0.77   mL (77 mg) under the skin every 12 hours.   multivitamin with minerals tablet; Take 1 tablet by mouth once daily.   oxyCODONE 5 mg immediate release tablet; Commonly known as: Roxicodone;   Take 1 tablet (5 mg) by mouth every 6 hours if needed for severe pain (7 -   10) for up to 5 days.   polyethylene glycol 17 gram packet; Commonly known as:  Glycolax,   Miralax; Take 17 g by mouth once daily for 10 days.     CONTINUE taking these medications     ascorbic acid 250 mg tablet; Commonly known as: Vitamin C   aspirin 81 mg chewable tablet; Chew 1 tablet (81 mg) once daily.   atenolol 50 mg tablet; Commonly known as: Tenormin; 1 every morning and   1 every evening   atorvastatin 80 mg tablet; Commonly known as: Lipitor; Take 1 tablet (80   mg) by mouth once daily at bedtime.   cholecalciferol 50 MCG (2000 UT) tablet; Commonly known as: Vitamin D-3   furosemide 40 mg tablet; Commonly known as: Lasix; Take 1 tablet (40 mg)   by mouth once daily. As needed for foot swelling   losartan 100 mg tablet; Commonly known as: Cozaar; Take 1 tablet (100   mg) by mouth once daily.   nicotine 14 mg/24 hr patch; Commonly known as: Nicoderm CQ   potassium chloride CR 10 mEq ER tablet; Commonly known as: Klor-Con;   Take 5 tablets (50 mEq) by mouth once daily.     STOP taking these medications     apixaban 5 mg (74 tabs) tablet; Commonly known as: Eliquis       Outpatient Follow-Up  Future Appointments   Date Time Provider Department Center   4/3/2024  1:15 PM Aditya Sky MD PhD [id number]       Kaycee Sol MD  PGY1 Vascular Surgery  [id number]

## 2024-02-25 NOTE — DISCHARGE INSTRUCTIONS
[facility]  DISCHARGE INSTRUCTIONS    You were admitted to [facility] from 02/20//24 - 02/25/24 for work-up and evaluation after incidental finding of a clot in your lung when undergoing a CT scan in preparation of your surgery.     GENERAL INSTRUCTIONS  1) Diet: You may resume your regular home diet     2) Activity:   - Move around as you are able  - Avoiding driving when your pain is not well controlled. If your pain would hinder you from slamming the breaks or turning your head from side to side, avoid driving  - Refrain from driving if you have used any narcotic medication in the last 48-72 hours. These medications can slow down your reaction time and impair your judgment.  -Avoid strenuous activities including heavy lifting, running, strenuous activity    3) Medications:   - Take tylenol 650 mg every 6 hours as needed for your pain. You can alternate this every 3 hours with Ibuprofen (example: take tylenol at 6 am, noon, 6 pm and take ibuprofen at 9 am, 3 pm, 9 pm).    - If your are still in pain, you may take 5 mg (1 tablet) of oxycodone every 4-6 hours as needed for 7-10 or greater pain.   - Please stop taking Eliquis. You will continue taking ASA,  - You will now be taking a medication called lovenox. This medication helps with preventing new blood clots from forming that may cause new DVTs (clots in deep veins in your body) or in your arteries.    4) Return precautions --   - Fever > 100.5 F (>38 C), chills  -uncontrolled nausea and/or vomiting  -Chest pain  -new or worsening shortness of breathe  -uncontrolled diarrhea   -you stop passing gas   -have new bruising, rashes, blistering on your body  -new numbness/tingling, loss of feeling in any part of your body  -new or worsening swelling  -uncontrolled pain    IF THIS IS AN EMERGENCY, PLEASE CALL 911 OF VISIT YOUR NEAREST EMERGENCY DEPARTMENT.    5) Right leg pain  - Elevate leg as often  as possible  - pain medications should be taken as needed.  - Please contact a provider if the swelling or pain in your leg gets worse.      FOLLOW-UP APPOINTMENTS:  Vascular Surgery Clinic  You will follow-up with this clinic in Dr. Zelaya's clinic after discharge to discuss future surgery planning. If you do not hear from them within 1 week of discharge with appointment information, please call [phone] to schedule your appointment.

## 2024-02-26 ENCOUNTER — PATIENT OUTREACH (OUTPATIENT)
Dept: PRIMARY CARE | Facility: CLINIC | Age: 69
End: 2024-02-26
Payer: MEDICARE

## 2024-02-26 NOTE — PROGRESS NOTES
Discharge Facility: [facility]  Discharge Diagnosis: Multiple subsegmental pulmonary emboli without acute cor pulmonale  Admission Date: 2/20/2024  Discharge Date: 2/25/2024    PCP Appointment Date: none  Specialist Appointment Date: cardiology 4/3/2024  Hospital Encounter and Summary: Linked   See discharge assessment below for further details    Engagement  Call Start Time: 1125 (2/26/2024 11:25 AM)    Medications  Medications reviewed with patient/caregiver?: Yes (2/26/2024 11:25 AM)  Is the patient having any side effects they believe may be caused by any medication additions or changes?: No (2/26/2024 11:25 AM)  Does the patient have all medications ordered at discharge?: Yes (2/26/2024 11:25 AM)  Prescription Comments: new: tylenol, lovenox and multi vitamin. did not accept : oxycodone and miralax (2/26/2024 11:25 AM)  Is the patient taking all medications as directed (includes completed medication regime)?: Yes (2/26/2024 11:25 AM)  Medication Comments: see med list (2/26/2024 11:25 AM)    Appointments  Does the patient have a primary care provider?: Yes (2/26/2024 11:25 AM)  Care Management Interventions: Advised patient to make appointment (2/26/2024 11:25 AM)  Has the patient kept scheduled appointments due by today?: Yes (2/26/2024 11:25 AM)  Care Management Interventions: Advised patient to keep appointment (2/26/2024 11:25 AM)    Self Management  What is the home health agency?: none (2/26/2024 11:25 AM)  Has home health visited the patient within 72 hours of discharge?: Not applicable (2/26/2024 11:25 AM)    Patient Teaching  Does the patient have access to their discharge instructions?: Yes (2/26/2024 11:25 AM)  Care Management Interventions: Reviewed instructions with patient (2/26/2024 11:25 AM)  What is the patient's perception of their health status since discharge?: Improving (2/26/2024 11:25 AM)  Is the patient/caregiver able to teach back the hierarchy of who to call/visit for symptoms/problems?  PCP, Specialist, Home Health nurse, Urgent Care, ED, 911: Yes (2/26/2024 11:25 AM)    Wrap Up  Wrap Up Additional Comments: CTS spoke with patient. she was admitted to [facility] on 2/20/2024 with Multiple subsegmental pulmonary emboli without acute cor pulmonale. Discharged on 2/25/2024 with jairo meds: Lovenox, tylenol and multivitamin. Patient stated that she was feeling better than when she went into the hospital. Denies any shortness of breath or chest pains. She is waiting on Dr Zelaya office to call with regards to rescheduling her surgery. Does not feel an appt with PCP is necessary at this time. Patient has all meds prescribed to her through meds to beds. She stated that she did not accept oxycodone or miralax. She denies any questions or concerns at this time. (2/26/2024 11:25 AM)  Call End Time: 1133 (2/26/2024 11:25 AM)

## 2024-02-27 DIAGNOSIS — E87.6 HYPOKALEMIA: ICD-10-CM

## 2024-02-29 LAB
LABORATORY COMMENT REPORT: NORMAL
PATH REPORT.FINAL DX SPEC: NORMAL
PATH REPORT.GROSS SPEC: NORMAL
PATH REPORT.RELEVANT HX SPEC: NORMAL
PATH REPORT.TOTAL CANCER: NORMAL

## 2024-02-29 RX ORDER — POTASSIUM CHLORIDE 750 MG/1
TABLET, EXTENDED RELEASE ORAL
Qty: 450 TABLET | Refills: 1 | Status: SHIPPED | OUTPATIENT
Start: 2024-02-29 | End: 2024-05-31 | Stop reason: HOSPADM

## 2024-02-29 NOTE — TELEPHONE ENCOUNTER
Pharmacy requests prescription below    Last Office Visit: 1/12/2024     Requested Prescriptions     Pending Prescriptions Disp Refills    potassium chloride CR 10 mEq ER tablet [Pharmacy Med Name: Potassium Chloride Roro ER 10 MEQ Oral Tablet Extended Release] 450 tablet 3     Sig: TAKE 5 TABLETS BY MOUTH ONCE  DAILY

## 2024-03-07 ENCOUNTER — TELEPHONE (OUTPATIENT)
Dept: VASCULAR SURGERY | Facility: HOSPITAL | Age: 69
End: 2024-03-07
Payer: MEDICARE

## 2024-03-07 DIAGNOSIS — I82.402 DEEP VEIN THROMBOSIS (DVT) OF LEFT LOWER EXTREMITY, UNSPECIFIED CHRONICITY, UNSPECIFIED VEIN (MULTI): Primary | ICD-10-CM

## 2024-03-07 DIAGNOSIS — J44.9 CHRONIC OBSTRUCTIVE PULMONARY DISEASE, UNSPECIFIED COPD TYPE (MULTI): Primary | ICD-10-CM

## 2024-03-07 DIAGNOSIS — I82.409 DEEP VEIN THROMBOSIS (DVT) OF LOWER EXTREMITY, UNSPECIFIED CHRONICITY, UNSPECIFIED LATERALITY, UNSPECIFIED VEIN (MULTI): Primary | ICD-10-CM

## 2024-03-07 DIAGNOSIS — T81.72XA COMPLICATION OF VEIN FOLLOWING A PROCEDURE, NOT ELSEWHERE CLASSIFIED, INITIAL ENCOUNTER: ICD-10-CM

## 2024-03-08 DIAGNOSIS — T81.72XA COMPLICATION OF VEIN FOLLOWING A PROCEDURE, NOT ELSEWHERE CLASSIFIED, INITIAL ENCOUNTER: ICD-10-CM

## 2024-03-08 DIAGNOSIS — I82.409 DEEP VEIN THROMBOSIS (DVT) OF LOWER EXTREMITY, UNSPECIFIED CHRONICITY, UNSPECIFIED LATERALITY, UNSPECIFIED VEIN (MULTI): Primary | ICD-10-CM

## 2024-03-11 ENCOUNTER — PATIENT OUTREACH (OUTPATIENT)
Dept: PRIMARY CARE | Facility: CLINIC | Age: 69
End: 2024-03-11
Payer: MEDICARE

## 2024-03-13 ENCOUNTER — TELEPHONE (OUTPATIENT)
Dept: PRIMARY CARE | Facility: CLINIC | Age: 69
End: 2024-03-13
Payer: MEDICARE

## 2024-03-13 DIAGNOSIS — N39.0 URINARY TRACT INFECTION WITHOUT HEMATURIA, SITE UNSPECIFIED: Primary | ICD-10-CM

## 2024-03-13 NOTE — TELEPHONE ENCOUNTER
Patient is asking if Dr. Mendes will put in a request for a urine to be done at [facility] while she is getting her ultrasound done there.  Patient states that Dr. Zelaya believes she has an UTI.  Patient can be reached at [phone].        Thank You

## 2024-03-14 ENCOUNTER — APPOINTMENT (OUTPATIENT)
Dept: RADIOLOGY | Facility: HOSPITAL | Age: 69
DRG: 378 | End: 2024-03-14
Payer: MEDICARE

## 2024-03-14 ENCOUNTER — APPOINTMENT (OUTPATIENT)
Dept: CARDIOLOGY | Facility: HOSPITAL | Age: 69
End: 2024-03-14
Payer: MEDICARE

## 2024-03-14 ENCOUNTER — HOSPITAL ENCOUNTER (INPATIENT)
Facility: HOSPITAL | Age: 69
LOS: 8 days | Discharge: HOME | DRG: 378 | End: 2024-03-22
Attending: SURGERY | Admitting: SURGERY
Payer: MEDICARE

## 2024-03-14 DIAGNOSIS — I26.94 MULTIPLE SUBSEGMENTAL PULMONARY EMBOLI WITHOUT ACUTE COR PULMONALE (MULTI): ICD-10-CM

## 2024-03-14 DIAGNOSIS — I70.0 AORTIC OCCLUSION (CMS-HCC): ICD-10-CM

## 2024-03-14 DIAGNOSIS — K92.2 ACUTE UPPER GI BLEED: ICD-10-CM

## 2024-03-14 DIAGNOSIS — G89.29 CHRONIC RIGHT-SIDED LOW BACK PAIN WITH LEFT-SIDED SCIATICA: ICD-10-CM

## 2024-03-14 DIAGNOSIS — K25.9 GASTRIC ULCER, UNSPECIFIED CHRONICITY, UNSPECIFIED WHETHER GASTRIC ULCER HEMORRHAGE OR PERFORATION PRESENT: ICD-10-CM

## 2024-03-14 DIAGNOSIS — J44.9 CHRONIC OBSTRUCTIVE PULMONARY DISEASE, UNSPECIFIED COPD TYPE (MULTI): ICD-10-CM

## 2024-03-14 DIAGNOSIS — I10 HYPERTENSION, UNSPECIFIED TYPE: ICD-10-CM

## 2024-03-14 DIAGNOSIS — M54.42 CHRONIC RIGHT-SIDED LOW BACK PAIN WITH LEFT-SIDED SCIATICA: ICD-10-CM

## 2024-03-14 DIAGNOSIS — K92.1 MELENA: Primary | ICD-10-CM

## 2024-03-14 LAB
ALBUMIN SERPL BCP-MCNC: 3.5 G/DL (ref 3.4–5)
ANION GAP SERPL CALC-SCNC: 13 MMOL/L (ref 10–20)
APPEARANCE UR: CLEAR
APTT PPP: 35 SECONDS (ref 27–38)
BACTERIA #/AREA URNS AUTO: ABNORMAL /HPF
BILIRUB UR STRIP.AUTO-MCNC: NEGATIVE MG/DL
BUN SERPL-MCNC: 74 MG/DL (ref 6–23)
CALCIUM SERPL-MCNC: 9.2 MG/DL (ref 8.6–10.6)
CHLORIDE SERPL-SCNC: 105 MMOL/L (ref 98–107)
CO2 SERPL-SCNC: 23 MMOL/L (ref 21–32)
COLOR UR: ABNORMAL
CREAT SERPL-MCNC: 1.1 MG/DL (ref 0.5–1.05)
EGFRCR SERPLBLD CKD-EPI 2021: 55 ML/MIN/1.73M*2
ERYTHROCYTE [DISTWIDTH] IN BLOOD BY AUTOMATED COUNT: 14 % (ref 11.5–14.5)
GLUCOSE BLD MANUAL STRIP-MCNC: 100 MG/DL (ref 74–99)
GLUCOSE SERPL-MCNC: 108 MG/DL (ref 74–99)
GLUCOSE UR STRIP.AUTO-MCNC: NORMAL MG/DL
HCT VFR BLD AUTO: 24.4 % (ref 36–46)
HGB BLD-MCNC: 8 G/DL (ref 12–16)
INR PPP: 1.3 (ref 0.9–1.1)
KETONES UR STRIP.AUTO-MCNC: NEGATIVE MG/DL
LACTATE SERPL-SCNC: 0.7 MMOL/L (ref 0.4–2)
LEUKOCYTE ESTERASE UR QL STRIP.AUTO: ABNORMAL
MAGNESIUM SERPL-MCNC: 1.48 MG/DL (ref 1.6–2.4)
MCH RBC QN AUTO: 32.8 PG (ref 26–34)
MCHC RBC AUTO-ENTMCNC: 32.8 G/DL (ref 32–36)
MCV RBC AUTO: 100 FL (ref 80–100)
MUCOUS THREADS #/AREA URNS AUTO: ABNORMAL /LPF
NITRITE UR QL STRIP.AUTO: ABNORMAL
NRBC BLD-RTO: 0 /100 WBCS (ref 0–0)
PH UR STRIP.AUTO: 5.5 [PH]
PHOSPHATE SERPL-MCNC: 4.5 MG/DL (ref 2.5–4.9)
PLATELET # BLD AUTO: 315 X10*3/UL (ref 150–450)
POTASSIUM SERPL-SCNC: 4.4 MMOL/L (ref 3.5–5.3)
PROT UR STRIP.AUTO-MCNC: NEGATIVE MG/DL
PROTHROMBIN TIME: 14.2 SECONDS (ref 9.8–12.8)
RBC # BLD AUTO: 2.44 X10*6/UL (ref 4–5.2)
RBC # UR STRIP.AUTO: NEGATIVE /UL
RBC #/AREA URNS AUTO: ABNORMAL /HPF
SODIUM SERPL-SCNC: 137 MMOL/L (ref 136–145)
SP GR UR STRIP.AUTO: 1.01
SQUAMOUS #/AREA URNS AUTO: ABNORMAL /HPF
UROBILINOGEN UR STRIP.AUTO-MCNC: NORMAL MG/DL
WBC # BLD AUTO: 12.6 X10*3/UL (ref 4.4–11.3)
WBC #/AREA URNS AUTO: ABNORMAL /HPF

## 2024-03-14 PROCEDURE — 2500000004 HC RX 250 GENERAL PHARMACY W/ HCPCS (ALT 636 FOR OP/ED)

## 2024-03-14 PROCEDURE — 83735 ASSAY OF MAGNESIUM: CPT

## 2024-03-14 PROCEDURE — 1200000002 HC GENERAL ROOM WITH TELEMETRY DAILY

## 2024-03-14 PROCEDURE — 83605 ASSAY OF LACTIC ACID: CPT

## 2024-03-14 PROCEDURE — 86901 BLOOD TYPING SEROLOGIC RH(D): CPT

## 2024-03-14 PROCEDURE — 82947 ASSAY GLUCOSE BLOOD QUANT: CPT

## 2024-03-14 PROCEDURE — 85027 COMPLETE CBC AUTOMATED: CPT

## 2024-03-14 PROCEDURE — 87086 URINE CULTURE/COLONY COUNT: CPT

## 2024-03-14 PROCEDURE — C9113 INJ PANTOPRAZOLE SODIUM, VIA: HCPCS

## 2024-03-14 PROCEDURE — 74174 CTA ABD&PLVS W/CONTRAST: CPT

## 2024-03-14 PROCEDURE — 82040 ASSAY OF SERUM ALBUMIN: CPT

## 2024-03-14 PROCEDURE — 81003 URINALYSIS AUTO W/O SCOPE: CPT

## 2024-03-14 PROCEDURE — 86920 COMPATIBILITY TEST SPIN: CPT

## 2024-03-14 PROCEDURE — 36415 COLL VENOUS BLD VENIPUNCTURE: CPT

## 2024-03-14 PROCEDURE — 85610 PROTHROMBIN TIME: CPT

## 2024-03-14 RX ORDER — DEXTROSE 50 % IN WATER (D50W) INTRAVENOUS SYRINGE
25
Status: DISCONTINUED | OUTPATIENT
Start: 2024-03-14 | End: 2024-03-22 | Stop reason: HOSPADM

## 2024-03-14 RX ORDER — PANTOPRAZOLE SODIUM 40 MG/10ML
40 INJECTION, POWDER, LYOPHILIZED, FOR SOLUTION INTRAVENOUS 2 TIMES DAILY
Status: DISCONTINUED | OUTPATIENT
Start: 2024-03-14 | End: 2024-03-22 | Stop reason: HOSPADM

## 2024-03-14 RX ORDER — MAGNESIUM SULFATE HEPTAHYDRATE 40 MG/ML
4 INJECTION, SOLUTION INTRAVENOUS ONCE
Status: COMPLETED | OUTPATIENT
Start: 2024-03-14 | End: 2024-03-15

## 2024-03-14 RX ORDER — SODIUM CHLORIDE, SODIUM LACTATE, POTASSIUM CHLORIDE, CALCIUM CHLORIDE 600; 310; 30; 20 MG/100ML; MG/100ML; MG/100ML; MG/100ML
100 INJECTION, SOLUTION INTRAVENOUS CONTINUOUS
Status: DISCONTINUED | OUTPATIENT
Start: 2024-03-14 | End: 2024-03-16

## 2024-03-14 RX ORDER — ONDANSETRON 4 MG/1
4 TABLET, FILM COATED ORAL EVERY 8 HOURS PRN
Status: DISCONTINUED | OUTPATIENT
Start: 2024-03-14 | End: 2024-03-22 | Stop reason: HOSPADM

## 2024-03-14 RX ORDER — DEXTROSE 50 % IN WATER (D50W) INTRAVENOUS SYRINGE
12.5
Status: DISCONTINUED | OUTPATIENT
Start: 2024-03-14 | End: 2024-03-22 | Stop reason: HOSPADM

## 2024-03-14 RX ORDER — ATENOLOL 50 MG/1
50 TABLET ORAL 2 TIMES DAILY
Status: DISCONTINUED | OUTPATIENT
Start: 2024-03-14 | End: 2024-03-22 | Stop reason: HOSPADM

## 2024-03-14 RX ORDER — INSULIN LISPRO 100 [IU]/ML
0-5 INJECTION, SOLUTION INTRAVENOUS; SUBCUTANEOUS EVERY 4 HOURS
Status: DISCONTINUED | OUTPATIENT
Start: 2024-03-14 | End: 2024-03-15

## 2024-03-14 RX ORDER — IBUPROFEN 200 MG
1 TABLET ORAL DAILY
Status: DISCONTINUED | OUTPATIENT
Start: 2024-03-14 | End: 2024-03-22 | Stop reason: HOSPADM

## 2024-03-14 RX ORDER — MULTIVIT-MIN/IRON FUM/FOLIC AC 7.5 MG-4
1 TABLET ORAL DAILY
Status: DISCONTINUED | OUTPATIENT
Start: 2024-03-14 | End: 2024-03-22 | Stop reason: HOSPADM

## 2024-03-14 RX ORDER — ENOXAPARIN SODIUM 100 MG/ML
80 INJECTION SUBCUTANEOUS EVERY 12 HOURS
Status: DISCONTINUED | OUTPATIENT
Start: 2024-03-14 | End: 2024-03-16

## 2024-03-14 RX ORDER — NAPROXEN SODIUM 220 MG/1
81 TABLET, FILM COATED ORAL DAILY
Status: DISCONTINUED | OUTPATIENT
Start: 2024-03-14 | End: 2024-03-22 | Stop reason: HOSPADM

## 2024-03-14 RX ORDER — ONDANSETRON HYDROCHLORIDE 2 MG/ML
4 INJECTION, SOLUTION INTRAVENOUS EVERY 8 HOURS PRN
Status: DISCONTINUED | OUTPATIENT
Start: 2024-03-14 | End: 2024-03-22 | Stop reason: HOSPADM

## 2024-03-14 RX ORDER — FUROSEMIDE 20 MG/1
40 TABLET ORAL DAILY
Status: DISCONTINUED | OUTPATIENT
Start: 2024-03-14 | End: 2024-03-22 | Stop reason: HOSPADM

## 2024-03-14 RX ORDER — ACETAMINOPHEN 325 MG/1
650 TABLET ORAL EVERY 4 HOURS PRN
Status: DISCONTINUED | OUTPATIENT
Start: 2024-03-14 | End: 2024-03-22 | Stop reason: HOSPADM

## 2024-03-14 RX ADMIN — SODIUM CHLORIDE 1000 ML: 9 INJECTION, SOLUTION INTRAVENOUS at 22:47

## 2024-03-14 RX ADMIN — PANTOPRAZOLE SODIUM 40 MG: 40 INJECTION, POWDER, FOR SOLUTION INTRAVENOUS at 23:01

## 2024-03-14 SDOH — SOCIAL STABILITY: SOCIAL INSECURITY: DO YOU FEEL ANYONE HAS EXPLOITED OR TAKEN ADVANTAGE OF YOU FINANCIALLY OR OF YOUR PERSONAL PROPERTY?: NO

## 2024-03-14 SDOH — SOCIAL STABILITY: SOCIAL INSECURITY: ARE THERE ANY APPARENT SIGNS OF INJURIES/BEHAVIORS THAT COULD BE RELATED TO ABUSE/NEGLECT?: NO

## 2024-03-14 SDOH — SOCIAL STABILITY: SOCIAL INSECURITY: DOES ANYONE TRY TO KEEP YOU FROM HAVING/CONTACTING OTHER FRIENDS OR DOING THINGS OUTSIDE YOUR HOME?: NO

## 2024-03-14 SDOH — SOCIAL STABILITY: SOCIAL INSECURITY: ABUSE: ADULT

## 2024-03-14 SDOH — SOCIAL STABILITY: SOCIAL INSECURITY: HAVE YOU HAD THOUGHTS OF HARMING ANYONE ELSE?: NO

## 2024-03-14 SDOH — SOCIAL STABILITY: SOCIAL INSECURITY: DO YOU FEEL UNSAFE GOING BACK TO THE PLACE WHERE YOU ARE LIVING?: NO

## 2024-03-14 SDOH — SOCIAL STABILITY: SOCIAL INSECURITY: HAS ANYONE EVER THREATENED TO HURT YOUR FAMILY OR YOUR PETS?: NO

## 2024-03-14 SDOH — SOCIAL STABILITY: SOCIAL INSECURITY: WERE YOU ABLE TO COMPLETE ALL THE BEHAVIORAL HEALTH SCREENINGS?: YES

## 2024-03-14 SDOH — SOCIAL STABILITY: SOCIAL INSECURITY: ARE YOU OR HAVE YOU BEEN THREATENED OR ABUSED PHYSICALLY, EMOTIONALLY, OR SEXUALLY BY ANYONE?: NO

## 2024-03-14 ASSESSMENT — PATIENT HEALTH QUESTIONNAIRE - PHQ9
SUM OF ALL RESPONSES TO PHQ9 QUESTIONS 1 & 2: 0
2. FEELING DOWN, DEPRESSED OR HOPELESS: NOT AT ALL
1. LITTLE INTEREST OR PLEASURE IN DOING THINGS: NOT AT ALL

## 2024-03-14 ASSESSMENT — ACTIVITIES OF DAILY LIVING (ADL)
GROOMING: INDEPENDENT
PATIENT'S MEMORY ADEQUATE TO SAFELY COMPLETE DAILY ACTIVITIES?: YES
ADEQUATE_TO_COMPLETE_ADL: YES
HEARING - LEFT EAR: FUNCTIONAL
FEEDING YOURSELF: INDEPENDENT
TOILETING: INDEPENDENT
DRESSING YOURSELF: INDEPENDENT
LACK_OF_TRANSPORTATION: NO
HEARING - RIGHT EAR: FUNCTIONAL
JUDGMENT_ADEQUATE_SAFELY_COMPLETE_DAILY_ACTIVITIES: YES
ASSISTIVE_DEVICE: CANE;EYEGLASSES
BATHING: INDEPENDENT
WALKS IN HOME: INDEPENDENT

## 2024-03-14 ASSESSMENT — COGNITIVE AND FUNCTIONAL STATUS - GENERAL
CLIMB 3 TO 5 STEPS WITH RAILING: A LITTLE
DAILY ACTIVITIY SCORE: 24
PATIENT BASELINE BEDBOUND: NO
MOBILITY SCORE: 23

## 2024-03-14 ASSESSMENT — LIFESTYLE VARIABLES
AUDIT-C TOTAL SCORE: 0
SKIP TO QUESTIONS 9-10: 1
HOW OFTEN DO YOU HAVE 6 OR MORE DRINKS ON ONE OCCASION: NEVER
HOW MANY STANDARD DRINKS CONTAINING ALCOHOL DO YOU HAVE ON A TYPICAL DAY: PATIENT DOES NOT DRINK
HOW OFTEN DO YOU HAVE A DRINK CONTAINING ALCOHOL: NEVER
AUDIT-C TOTAL SCORE: 0

## 2024-03-14 NOTE — Clinical Note
IVC filter placed with R internal jugular access. Dressing CDI. Total 0.5mg versed, 50mcg fentanyl given ivp. VSS t/o procedure.

## 2024-03-14 NOTE — H&P
[facility]  VASCULAR SURGERY - HISTORY AND PHYSICAL / CONSULT    Patient Name: Klaudia Ratliff  MRN: [id number]  Admit Date: 314  : 1955  AGE: 68 y.o.   GENDER: female  ==============================================================================  TODAY'S ASSESSMENT AND PLAN OF CARE:  68-year-old female with past medical history of hypertension, hyperlipidemia, obesity, prior tobacco dependence, cardiac cath 1624, and aortic occlusion causing BLE claudication and rest pain with previous planned open thoracoabdominal aortic repair with Dr. Zelaya which was cancelled due to incidental R main pulmonary artery saddle embolism (s/p mechanical thrombectomy with interventional cards 24 - discharged on  with therapeutic Lovenox).  Patient is now readmitted today for workup of new onset melena, light-headedness, and transient BLE numbness.    Of note, previous L femoral access site from  still has not healed although no drainage.    Will engage GI for likely EGD, IR for IVC filter placement (jugular access because of previously unhealed femoral access site), fu labs, resuscitation. CT A/P pending labs.    Neuro - Tylenol PRN  CV - cont tele, home atenolol  Resp - cont pox, ICS  GI - NPO, PPI, ISS, /hr, 1L bolus on admission, GI consult   - UA, labs pending  Heme - Hold home LVX therapeutic, IR consult  Ppy - SCDs, hold LVX  Dispo - RNF    Seen and d/w Chief on call, Luis Toussaint  D/w Dr. Garcia Ridley MD  Vascular Surgery, PGY1    ==============================================================================  CHIEF COMPLAINT/REASON FOR ADMIT:  68-year-old female with past medical history of hypertension, hyperlipidemia, obesity, prior tobacco dependence, cardiac cath 1624, and aortic occlusion causing BLE claudication and rest pain with previous planned open thoracoabdominal aortic repair with Dr. Zelaya which was cancelled due to incidental R main  pulmonary artery saddle embolism (s/p mechanical thrombectomy with interventional cards 2/22/24 - discharged on 2/25 with therapeutic Lovenox).  Patient is now readmitted today for workup of new onset melena, light-headedness, and transient BLE numbness.    Claudication hx:  Patient endorses that she developed bilateral claudication symptoms last year from her hips down to her legs.  This is progressed to constant rest pain over the past 2 months.  Patient does have positive dangle test.  A year ago the patient reportedly able to walk about 2 miles distance.  Patient reports smoking 1 pack/day since the age of 22 but has now quit.  Previous CTA aorta demonstrated aortic occlusion with reconstitution in the left iliac artery and right common femoral artery.    Today on admission pt reports a 1-day history of weakness/lightheadedness, nausea, and diarrhea with black stool (2 episodes today). Previously endorsed some burning with urination.    PAST MEDICAL HISTORY:   PMH: Hypertension, obesity, aortic lesion, tobacco dependency, hyperlipidemia, right pulmonary embolism requiring mechanical lobectomy  Past Medical History:   Diagnosis Date    Acute sinusitis, unspecified 03/16/2016    Acute sinusitis    Essential (primary) hypertension 07/18/2022    Hypertension    Headache, unspecified 07/09/2013    Headache    Right shoulder pain 11/01/2023     Last menstrual period: NA    PSH:   Past Surgical History:   Procedure Laterality Date    CARDIAC CATHETERIZATION N/A 2/16/2024    Procedure: Left Heart Cath;  Surgeon: Aditya Sky MD PhD;  Location: [facility] [place] [facility];  Service: Cardiovascular;  Laterality: N/A;  STAT    INVASIVE VASCULAR PROCEDURE N/A 2/22/2024    Procedure: Lower Extremity Angiogram;  Surgeon: Bar Day DO;  Location: [place] [address] Cardiac Cath Lab;  Service: Cardiovascular;  Laterality: N/A;    INVASIVE VASCULAR PROCEDURE N/A 2/22/2024    Procedure: Pulmonary Angiogram;  Sur[facility] CROSS  [facility];  Location: [place] [address] Cardiac Cath Lab;  Service: Cardiovascular;  Laterality: N/A;     FH: NC  No family history on file.  SOCIAL HISTORY:    Smoking: Endorses  Social History     Tobacco Use   Smoking Status Every Day    Packs/day: .5    Types: Cigarettes    Passive exposure: Current   Smokeless Tobacco Never       Alcohol:    Social History     Substance and Sexual Activity   Alcohol Use Not Currently       MEDICATIONS:   Prior to Admission medications    Medication Sig Start Date End Date Taking? Authorizing Provider   ascorbic acid (Vitamin C) 250 mg tablet Take by mouth.    Historical Provider, MD   aspirin 81 mg chewable tablet Chew 1 tablet (81 mg) once daily. 2/16/24 2/15/25  Aditya Sky MD PhD   atenolol (Tenormin) 50 mg tablet 1 every morning and 1 every evening 1/12/24   Jesse Mendes DO   atorvastatin (Lipitor) 80 mg tablet Take 1 tablet (80 mg) by mouth once daily at bedtime. 2/16/24 2/15/25  Aditya Sky MD PhD   cholecalciferol (Vitamin D-3) 50 MCG (2000 UT) tablet Take by mouth.    Historical Provider, MD   enoxaparin (Lovenox) 80 mg/0.8 mL syringe Inject 0.8 mL (80 mg) under the skin every 12 hours. 2/25/24 3/26/24  Kaycee Sol MD   furosemide (Lasix) 40 mg tablet Take 1 tablet (40 mg) by mouth once daily. As needed for foot swelling 1/12/24 1/11/25  Jesse Mendes DO   losartan (Cozaar) 100 mg tablet Take 1 tablet (100 mg) by mouth once daily. 1/12/24 7/10/24  Jesse Mendes DO   multivitamin with minerals tablet Take 1 tablet by mouth once daily. 2/25/24 4/25/24  Kaycee Sol MD   nicotine (Nicoderm CQ) 14 mg/24 hr patch 1 patch once daily. Apply (1) patch daily as directed. 4/24/23   Historical Provider, MD   potassium chloride CR 10 mEq ER tablet TAKE 5 TABLETS BY MOUTH ONCE  DAILY 2/29/24   Jesse Mendes DO     ALLERGIES:   Allergies   Allergen Reactions    Amlodipine Swelling     Bilateral foot swelling    Doxycycline Diarrhea    Clarithromycin  "Other and Dizziness     Slept for days       REVIEW OF SYSTEMS:  12-point ROS performed and negative unless otherwise stated in HPI    PHYSICAL EXAM:  Physical Exam  Constitutional:       Appearance: Normal appearance. She is not ill-appearing, toxic-appearing or diaphoretic.   HENT:      Head: Normocephalic.      Right Ear: External ear normal.      Left Ear: External ear normal.      Nose: Nose normal.      Mouth/Throat:      Mouth: Mucous membranes are moist.   Eyes:      Pupils: Pupils are equal, round, and reactive to light.   Cardiovascular:      Rate and Rhythm: Normal rate.      Comments: No signals BLE, no palpable femoral pulses bl  Pulmonary:      Effort: Pulmonary effort is normal.   Abdominal:      General: There is no distension.      Palpations: Abdomen is soft.      Tenderness: There is no abdominal tenderness.   Musculoskeletal:         General: Normal range of motion.      Cervical back: Normal range of motion.   Skin:     General: Skin is warm and dry.      Capillary Refill: Capillary refill takes less than 2 seconds.      Comments: L femoral access site remains open although no drainage, purulence, erythema, fluctuance, or crepitus noted surrounding   Neurological:      General: No focal deficit present.      Mental Status: She is alert and oriented to person, place, and time. Mental status is at baseline.   Psychiatric:         Mood and Affect: Mood normal.         Behavior: Behavior normal.         Thought Content: Thought content normal.         Judgment: Judgment normal.         IMAGING SUMMARY:  (summary of findings, not a copy of dictation)  NA    LABS:                No lab exists for component: \"LABALBU\"              I have reviewed all laboratory and imaging results ordered/pertinent for this encounter.    "

## 2024-03-15 ENCOUNTER — APPOINTMENT (OUTPATIENT)
Dept: GASTROENTEROLOGY | Facility: HOSPITAL | Age: 69
DRG: 378 | End: 2024-03-15
Payer: MEDICARE

## 2024-03-15 ENCOUNTER — APPOINTMENT (OUTPATIENT)
Dept: RADIOLOGY | Facility: HOSPITAL | Age: 69
DRG: 378 | End: 2024-03-15
Payer: MEDICARE

## 2024-03-15 DIAGNOSIS — I10 HYPERTENSION, UNSPECIFIED TYPE: ICD-10-CM

## 2024-03-15 LAB
ABO GROUP (TYPE) IN BLOOD: NORMAL
ABO GROUP (TYPE) IN BLOOD: NORMAL
ALBUMIN SERPL BCP-MCNC: 3.5 G/DL (ref 3.4–5)
ANION GAP SERPL CALC-SCNC: 15 MMOL/L (ref 10–20)
ANTIBODY SCREEN: NORMAL
APTT PPP: 32 SECONDS (ref 27–38)
BLOOD EXPIRATION DATE: NORMAL
BUN SERPL-MCNC: 53 MG/DL (ref 6–23)
CALCIUM SERPL-MCNC: 9.5 MG/DL (ref 8.6–10.6)
CHLORIDE SERPL-SCNC: 106 MMOL/L (ref 98–107)
CO2 SERPL-SCNC: 24 MMOL/L (ref 21–32)
CREAT SERPL-MCNC: 1.07 MG/DL (ref 0.5–1.05)
DISPENSE STATUS: NORMAL
EGFRCR SERPLBLD CKD-EPI 2021: 57 ML/MIN/1.73M*2
ERYTHROCYTE [DISTWIDTH] IN BLOOD BY AUTOMATED COUNT: 15.1 % (ref 11.5–14.5)
GLUCOSE BLD MANUAL STRIP-MCNC: 103 MG/DL (ref 74–99)
GLUCOSE BLD MANUAL STRIP-MCNC: 104 MG/DL (ref 74–99)
GLUCOSE BLD MANUAL STRIP-MCNC: 83 MG/DL (ref 74–99)
GLUCOSE BLD MANUAL STRIP-MCNC: 99 MG/DL (ref 74–99)
GLUCOSE SERPL-MCNC: 96 MG/DL (ref 74–99)
HCT VFR BLD AUTO: 28.9 % (ref 36–46)
HGB BLD-MCNC: 9.5 G/DL (ref 12–16)
HOLD SPECIMEN: NORMAL
INR PPP: 1.1 (ref 0.9–1.1)
MAGNESIUM SERPL-MCNC: 2.87 MG/DL (ref 1.6–2.4)
MCH RBC QN AUTO: 32.3 PG (ref 26–34)
MCHC RBC AUTO-ENTMCNC: 32.9 G/DL (ref 32–36)
MCV RBC AUTO: 98 FL (ref 80–100)
NRBC BLD-RTO: 0 /100 WBCS (ref 0–0)
PHOSPHATE SERPL-MCNC: 5 MG/DL (ref 2.5–4.9)
PLATELET # BLD AUTO: 305 X10*3/UL (ref 150–450)
POTASSIUM SERPL-SCNC: 4.5 MMOL/L (ref 3.5–5.3)
PRODUCT BLOOD TYPE: 7300
PRODUCT CODE: NORMAL
PROTHROMBIN TIME: 12.7 SECONDS (ref 9.8–12.8)
RBC # BLD AUTO: 2.94 X10*6/UL (ref 4–5.2)
RH FACTOR (ANTIGEN D): NORMAL
RH FACTOR (ANTIGEN D): NORMAL
SODIUM SERPL-SCNC: 140 MMOL/L (ref 136–145)
UNIT ABO: NORMAL
UNIT NUMBER: NORMAL
UNIT RH: NORMAL
UNIT VOLUME: 350
WBC # BLD AUTO: 9.1 X10*3/UL (ref 4.4–11.3)
XM INTEP: NORMAL

## 2024-03-15 PROCEDURE — 3700000012 HC SEDATION LEVEL 5+ TIME - INITIAL 15 MINUTES 5/> YEARS: Performed by: INTERNAL MEDICINE

## 2024-03-15 PROCEDURE — 2500000004 HC RX 250 GENERAL PHARMACY W/ HCPCS (ALT 636 FOR OP/ED): Performed by: INTERNAL MEDICINE

## 2024-03-15 PROCEDURE — 88305 TISSUE EXAM BY PATHOLOGIST: CPT | Performed by: PATHOLOGY

## 2024-03-15 PROCEDURE — C1769 GUIDE WIRE: HCPCS

## 2024-03-15 PROCEDURE — 76937 US GUIDE VASCULAR ACCESS: CPT | Performed by: RADIOLOGY

## 2024-03-15 PROCEDURE — 7100000010 HC PHASE TWO TIME - EACH INCREMENTAL 1 MINUTE: Performed by: INTERNAL MEDICINE

## 2024-03-15 PROCEDURE — 36430 TRANSFUSION BLD/BLD COMPNT: CPT

## 2024-03-15 PROCEDURE — 75825 VEIN X-RAY TRUNK: CPT | Performed by: RADIOLOGY

## 2024-03-15 PROCEDURE — 74174 CTA ABD&PLVS W/CONTRAST: CPT | Performed by: RADIOLOGY

## 2024-03-15 PROCEDURE — 2720000007 HC OR 272 NO HCPCS

## 2024-03-15 PROCEDURE — 06H03DZ INSERTION OF INTRALUMINAL DEVICE INTO INFERIOR VENA CAVA, PERCUTANEOUS APPROACH: ICD-10-PCS | Performed by: RADIOLOGY

## 2024-03-15 PROCEDURE — 85610 PROTHROMBIN TIME: CPT

## 2024-03-15 PROCEDURE — 2500000001 HC RX 250 WO HCPCS SELF ADMINISTERED DRUGS (ALT 637 FOR MEDICARE OP)

## 2024-03-15 PROCEDURE — 99152 MOD SED SAME PHYS/QHP 5/>YRS: CPT

## 2024-03-15 PROCEDURE — 2780000003 HC OR 278 NO HCPCS

## 2024-03-15 PROCEDURE — 43239 EGD BIOPSY SINGLE/MULTIPLE: CPT | Performed by: INTERNAL MEDICINE

## 2024-03-15 PROCEDURE — 77001 FLUOROGUIDE FOR VEIN DEVICE: CPT | Performed by: RADIOLOGY

## 2024-03-15 PROCEDURE — P9016 RBC LEUKOCYTES REDUCED: HCPCS

## 2024-03-15 PROCEDURE — 82947 ASSAY GLUCOSE BLOOD QUANT: CPT

## 2024-03-15 PROCEDURE — C9113 INJ PANTOPRAZOLE SODIUM, VIA: HCPCS

## 2024-03-15 PROCEDURE — 83735 ASSAY OF MAGNESIUM: CPT

## 2024-03-15 PROCEDURE — 99152 MOD SED SAME PHYS/QHP 5/>YRS: CPT | Performed by: INTERNAL MEDICINE

## 2024-03-15 PROCEDURE — 80069 RENAL FUNCTION PANEL: CPT

## 2024-03-15 PROCEDURE — 99233 SBSQ HOSP IP/OBS HIGH 50: CPT | Performed by: SURGERY

## 2024-03-15 PROCEDURE — 2500000004 HC RX 250 GENERAL PHARMACY W/ HCPCS (ALT 636 FOR OP/ED): Performed by: RADIOLOGY

## 2024-03-15 PROCEDURE — 37191 INS ENDOVAS VENA CAVA FILTR: CPT | Performed by: RADIOLOGY

## 2024-03-15 PROCEDURE — 2550000001 HC RX 255 CONTRASTS: Performed by: SURGERY

## 2024-03-15 PROCEDURE — 2500000004 HC RX 250 GENERAL PHARMACY W/ HCPCS (ALT 636 FOR OP/ED)

## 2024-03-15 PROCEDURE — 0DB68ZX EXCISION OF STOMACH, VIA NATURAL OR ARTIFICIAL OPENING ENDOSCOPIC, DIAGNOSTIC: ICD-10-PCS | Performed by: PATHOLOGY

## 2024-03-15 PROCEDURE — 2500000005 HC RX 250 GENERAL PHARMACY W/O HCPCS

## 2024-03-15 PROCEDURE — C1880 VENA CAVA FILTER: HCPCS

## 2024-03-15 PROCEDURE — 0753T DGTZ GLS MCRSCP SLD LEVEL IV: CPT | Mod: TC,SUR | Performed by: INTERNAL MEDICINE

## 2024-03-15 PROCEDURE — 36415 COLL VENOUS BLD VENIPUNCTURE: CPT | Performed by: SURGERY

## 2024-03-15 PROCEDURE — 1200000002 HC GENERAL ROOM WITH TELEMETRY DAILY

## 2024-03-15 PROCEDURE — 36010 PLACE CATHETER IN VEIN: CPT | Performed by: RADIOLOGY

## 2024-03-15 PROCEDURE — 36415 COLL VENOUS BLD VENIPUNCTURE: CPT

## 2024-03-15 PROCEDURE — 99152 MOD SED SAME PHYS/QHP 5/>YRS: CPT | Performed by: RADIOLOGY

## 2024-03-15 PROCEDURE — 99153 MOD SED SAME PHYS/QHP EA: CPT | Performed by: RADIOLOGY

## 2024-03-15 PROCEDURE — 30233N1 TRANSFUSION OF NONAUTOLOGOUS RED BLOOD CELLS INTO PERIPHERAL VEIN, PERCUTANEOUS APPROACH: ICD-10-PCS | Performed by: SURGERY

## 2024-03-15 PROCEDURE — 85027 COMPLETE CBC AUTOMATED: CPT

## 2024-03-15 PROCEDURE — 7100000009 HC PHASE TWO TIME - INITIAL BASE CHARGE: Performed by: INTERNAL MEDICINE

## 2024-03-15 PROCEDURE — 99153 MOD SED SAME PHYS/QHP EA: CPT

## 2024-03-15 RX ORDER — MIDAZOLAM HYDROCHLORIDE 1 MG/ML
INJECTION INTRAMUSCULAR; INTRAVENOUS
Status: COMPLETED | OUTPATIENT
Start: 2024-03-15 | End: 2024-03-15

## 2024-03-15 RX ORDER — MIDAZOLAM HYDROCHLORIDE 1 MG/ML
INJECTION, SOLUTION INTRAMUSCULAR; INTRAVENOUS AS NEEDED
Status: COMPLETED | OUTPATIENT
Start: 2024-03-15 | End: 2024-03-15

## 2024-03-15 RX ORDER — LIDOCAINE 560 MG/1
1 PATCH PERCUTANEOUS; TOPICAL; TRANSDERMAL DAILY
Status: DISCONTINUED | OUTPATIENT
Start: 2024-03-15 | End: 2024-03-22 | Stop reason: HOSPADM

## 2024-03-15 RX ORDER — SULFAMETHOXAZOLE AND TRIMETHOPRIM 800; 160 MG/1; MG/1
320 TABLET ORAL EVERY 12 HOURS SCHEDULED
Status: DISCONTINUED | OUTPATIENT
Start: 2024-03-15 | End: 2024-03-15

## 2024-03-15 RX ORDER — GABAPENTIN 100 MG/1
200 CAPSULE ORAL 2 TIMES DAILY
Status: DISCONTINUED | OUTPATIENT
Start: 2024-03-15 | End: 2024-03-22 | Stop reason: HOSPADM

## 2024-03-15 RX ORDER — FENTANYL CITRATE 50 UG/ML
INJECTION, SOLUTION INTRAMUSCULAR; INTRAVENOUS
Status: COMPLETED | OUTPATIENT
Start: 2024-03-15 | End: 2024-03-15

## 2024-03-15 RX ORDER — ATENOLOL 50 MG/1
TABLET ORAL
Qty: 200 TABLET | Refills: 0 | Status: SHIPPED | OUTPATIENT
Start: 2024-03-15 | End: 2024-03-22 | Stop reason: HOSPADM

## 2024-03-15 RX ORDER — FENTANYL CITRATE 50 UG/ML
INJECTION, SOLUTION INTRAMUSCULAR; INTRAVENOUS AS NEEDED
Status: COMPLETED | OUTPATIENT
Start: 2024-03-15 | End: 2024-03-15

## 2024-03-15 RX ADMIN — PIPERACILLIN SODIUM AND TAZOBACTAM SODIUM 3.38 G: 3; .375 INJECTION, SOLUTION INTRAVENOUS at 10:14

## 2024-03-15 RX ADMIN — IOHEXOL 100 ML: 350 INJECTION, SOLUTION INTRAVENOUS at 08:42

## 2024-03-15 RX ADMIN — FENTANYL CITRATE 50 MCG: 50 INJECTION, SOLUTION INTRAMUSCULAR; INTRAVENOUS at 16:16

## 2024-03-15 RX ADMIN — LIDOCAINE 1 PATCH: 4 PATCH TOPICAL at 20:20

## 2024-03-15 RX ADMIN — IOHEXOL 82 ML: 350 INJECTION, SOLUTION INTRAVENOUS at 00:08

## 2024-03-15 RX ADMIN — PANTOPRAZOLE SODIUM 40 MG: 40 INJECTION, POWDER, FOR SOLUTION INTRAVENOUS at 10:14

## 2024-03-15 RX ADMIN — GABAPENTIN 200 MG: 100 CAPSULE ORAL at 10:14

## 2024-03-15 RX ADMIN — GABAPENTIN 200 MG: 100 CAPSULE ORAL at 20:20

## 2024-03-15 RX ADMIN — PANTOPRAZOLE SODIUM 40 MG: 40 INJECTION, POWDER, FOR SOLUTION INTRAVENOUS at 20:20

## 2024-03-15 RX ADMIN — MIDAZOLAM HYDROCHLORIDE 0.5 MG: 1 INJECTION, SOLUTION INTRAMUSCULAR; INTRAVENOUS at 08:31

## 2024-03-15 RX ADMIN — ATENOLOL 50 MG: 50 TABLET ORAL at 10:15

## 2024-03-15 RX ADMIN — FENTANYL CITRATE 50 MCG: 50 INJECTION, SOLUTION INTRAMUSCULAR; INTRAVENOUS at 08:31

## 2024-03-15 RX ADMIN — MAGNESIUM SULFATE 4 G: 4 INJECTION INTRAVENOUS at 05:10

## 2024-03-15 RX ADMIN — PIPERACILLIN SODIUM AND TAZOBACTAM SODIUM 3.38 G: 3; .375 INJECTION, SOLUTION INTRAVENOUS at 04:22

## 2024-03-15 RX ADMIN — ACETAMINOPHEN 650 MG: 325 TABLET ORAL at 20:20

## 2024-03-15 RX ADMIN — ACETAMINOPHEN 650 MG: 325 TABLET ORAL at 02:32

## 2024-03-15 RX ADMIN — IOHEXOL 100 ML: 350 INJECTION, SOLUTION INTRAVENOUS at 08:41

## 2024-03-15 RX ADMIN — MIDAZOLAM 2 MG: 1 INJECTION INTRAMUSCULAR; INTRAVENOUS at 16:20

## 2024-03-15 RX ADMIN — PIPERACILLIN SODIUM AND TAZOBACTAM SODIUM 3.38 G: 3; .375 INJECTION, SOLUTION INTRAVENOUS at 22:55

## 2024-03-15 RX ADMIN — FENTANYL CITRATE 50 MCG: 50 INJECTION, SOLUTION INTRAMUSCULAR; INTRAVENOUS at 16:19

## 2024-03-15 RX ADMIN — PIPERACILLIN SODIUM AND TAZOBACTAM SODIUM 3.38 G: 3; .375 INJECTION, SOLUTION INTRAVENOUS at 17:48

## 2024-03-15 RX ADMIN — FUROSEMIDE 40 MG: 20 TABLET ORAL at 10:14

## 2024-03-15 RX ADMIN — MIDAZOLAM 2 MG: 1 INJECTION INTRAMUSCULAR; INTRAVENOUS at 16:17

## 2024-03-15 RX ADMIN — SODIUM CHLORIDE, POTASSIUM CHLORIDE, SODIUM LACTATE AND CALCIUM CHLORIDE 100 ML/HR: 600; 310; 30; 20 INJECTION, SOLUTION INTRAVENOUS at 01:15

## 2024-03-15 ASSESSMENT — COGNITIVE AND FUNCTIONAL STATUS - GENERAL
DAILY ACTIVITIY SCORE: 24
MOBILITY SCORE: 23
CLIMB 3 TO 5 STEPS WITH RAILING: A LITTLE
CLIMB 3 TO 5 STEPS WITH RAILING: A LITTLE
MOBILITY SCORE: 23
DAILY ACTIVITIY SCORE: 24

## 2024-03-15 ASSESSMENT — PAIN - FUNCTIONAL ASSESSMENT
PAIN_FUNCTIONAL_ASSESSMENT: 0-10
PAIN_FUNCTIONAL_ASSESSMENT: 0-10
PAIN_FUNCTIONAL_ASSESSMENT: UNABLE TO SELF-REPORT
PAIN_FUNCTIONAL_ASSESSMENT: 0-10
PAIN_FUNCTIONAL_ASSESSMENT: UNABLE TO SELF-REPORT
PAIN_FUNCTIONAL_ASSESSMENT: 0-10

## 2024-03-15 ASSESSMENT — PAIN SCALES - GENERAL
PAINLEVEL_OUTOF10: 0 - NO PAIN
PAINLEVEL_OUTOF10: 1
PAINLEVEL_OUTOF10: 5 - MODERATE PAIN
PAINLEVEL_OUTOF10: 3
PAINLEVEL_OUTOF10: 3
PAINLEVEL_OUTOF10: 0 - NO PAIN
PAINLEVEL_OUTOF10: 9
PAINLEVEL_OUTOF10: 0 - NO PAIN

## 2024-03-15 NOTE — SIGNIFICANT EVENT
Pt difficult stick, IV access team engaged. Successful IV access  - Consider Midline in AM for longterm solution    Hg 8.0 from 9.6 2 weeks ago.  - Receiving 1u PRBCs  - AM labs ordered    Cr 1.1  - Will continue IV hydration  - CT A/P ordered    UA positive for LE/Nitrites & leukocytosis on CBC  - leilani Ridley MD  D/w senior resident, Luis Toussaint MD

## 2024-03-15 NOTE — CONSULTS
[facility]   [facility] [place]  INITIAL CONSULT NOTE     Consult requested by: Service: Vascular surgery    Reason for Consult: Melena    Admission Chief Complaint: Melena      SUBJECTIVE     HPI: Klaudia Ratliff is a 68 y.o. female w/PMH of HTN, HLD, obesity, tobacco dependency and aortic occlusion causing BLE claudication and rest pain and recent diagnosis of PE s/p mechanical thrombectomy (2/2024) who is admitted with melena. GI is consulted for the same    Patient was planned to undergo thoracoabdominal aortic repair on 2/26/2024. Her pre-op CTA chest revealed right main pulmonary artery saddle PE. She was admitted to the hospital and underwent mechanical thrombectomy of PE. She was started on anticoagulation (Lovenox) following discharge.     She complains of two BM with soft and black stool yesterday. She has never experienced anything similar in the past. She denies abdominal pain, nausea, vomiting, fever, chills, odynophagia, dysphagia, hematochezia or new skin lesions. She was taking ibuprofen nightly until about 1 months ago. She drinks 1 shot of vodka nightly. Used to smoke 1pack/day but recently quit.     Today, she is afebrile and HDS with 60-70s, -120s/50-70s. CTA a/p with complete occlusion of infrarenal abdominal aorta with reconstitution of the flow in the left external iliac artery and right common femoral artery and no evidence of acute GI bleeding. Pertinent labs include Cr 1.1 (b/l 0.8-0.9), BUN 74, INR 1.3, Hgb 8.0 (from 9.6 @ last discharge 2/25), WBC 12.6 and Plt 315K. IR was consulted for placement of IVC filter.     ROS: Complete review of systems obtained, negative unless otherwise indicated above.     Allergies   Allergen Reactions    Amlodipine Swelling     Bilateral foot swelling    Doxycycline Diarrhea    Clarithromycin Other and Dizziness     Slept for days     Past Medical History:   Diagnosis Date    Acute sinusitis,  unspecified 03/16/2016    Acute sinusitis    Essential (primary) hypertension 07/18/2022    Hypertension    Headache, unspecified 07/09/2013    Headache    Right shoulder pain 11/01/2023     Past Surgical History:   Procedure Laterality Date    CARDIAC CATHETERIZATION N/A 2/16/2024    Procedure: Left Heart Cath;  Surgeon: Aditya Sky MD PhD;  Location: [facility] Cardiac Cath Lab;  Service: Cardiovascular;  Laterality: N/A;  STAT    INVASIVE VASCULAR PROCEDURE N/A 2/22/2024    Procedure: Lower Extremity Angiogram;  Surgeon: Bar Day DO;  Location: [place] [address] Cardiac Cath Lab;  Service: Cardiovascular;  Laterality: N/A;    INVASIVE VASCULAR PROCEDURE N/A 2/22/2024    Procedure: Pulmonary Angiogram;  Surgeon: Bar Day DO;  Location: [place] [address] Cardiac Cath Lab;  Service: Cardiovascular;  Laterality: N/A;     No family history on file.  Social History     Social History Narrative    Not on file     [unfilled]    Medications:  Scheduled medications  [Held by provider] aspirin, 81 mg, oral, Daily  atenolol, 50 mg, oral, BID  [Held by provider] enoxaparin, 80 mg, subcutaneous, q12h  furosemide, 40 mg, oral, Daily  gabapentin, 200 mg, oral, BID  insulin lispro, 0-5 Units, subcutaneous, q4h  magnesium sulfate, 4 g, intravenous, Once  multivitamin with minerals, 1 tablet, oral, Daily  nicotine, 1 patch, transdermal, Daily  pantoprazole, 40 mg, intravenous, BID  piperacillin-tazobactam, 3.375 g, intravenous, q6h      Continuous medications  lactated Ringer's, 100 mL/hr, Last Rate: 100 mL/hr (03/15/24 0115)      PRN medications  PRN medications: acetaminophen, dextrose, dextrose, glucagon, ondansetron **OR** ondansetron       EXAM     Vital signs:  Vitals:    03/15/24 0420   BP: 126/72   Pulse: 64   Resp:    Temp: 36.5 °C (97.7 °F)   SpO2: 97%         Physical Exam  Eyes:      General: No scleral icterus.     Pupils: Pupils are equal, round, and reactive to light.   Cardiovascular:      Rate and  Rhythm: Normal rate and regular rhythm.      Pulses: Normal pulses.   Pulmonary:      Effort: Pulmonary effort is normal.      Breath sounds: Normal breath sounds.   Abdominal:      General: Abdomen is flat. There is no distension.      Palpations: Abdomen is soft.      Tenderness: There is no abdominal tenderness.   Skin:     General: Skin is warm.      Capillary Refill: Capillary refill takes less than 2 seconds.      Findings: No lesion.   Neurological:      General: No focal deficit present.      Mental Status: She is alert and oriented to person, place, and time.   Psychiatric:         Mood and Affect: Mood normal.         Thought Content: Thought content normal.         Judgment: Judgment normal.             DATA                                                                            Labs     Lab Results   Component Value Date    WBC 12.6 (H) 03/14/2024    WBC 8.9 02/25/2024    WBC 8.6 02/24/2024    HGB 8.0 (L) 03/14/2024    HGB 9.6 (L) 02/25/2024    HGB 9.8 (L) 02/24/2024     03/14/2024     (H) 02/25/2024     (H) 02/24/2024     03/14/2024     02/25/2024     02/24/2024       Lab Results   Component Value Date    GLUCOSE 108 (H) 03/14/2024    CALCIUM 9.2 03/14/2024     03/14/2024    K 4.4 03/14/2024    CO2 23 03/14/2024     03/14/2024    BUN 74 (H) 03/14/2024    CREATININE 1.10 (H) 03/14/2024       Lab Results   Component Value Date    ALT 22 02/21/2024    ALT 24 02/20/2024    ALT 11 01/12/2024    AST 12 02/21/2024    AST 16 02/20/2024    AST 12 01/12/2024    ALKPHOS 58 02/21/2024    ALKPHOS 53 02/20/2024    ALKPHOS 51 01/12/2024    BILITOT 0.4 02/21/2024    BILITOT 0.5 02/20/2024    BILITOT 0.5 01/12/2024                                                                                  Imaging           === 03/05/24 ===    ULTRASOUND - ONBASE SCAN  === 03/14/24 ===    CT ABDOMEN PELVIS ANGIOGRAM W AND/OR WO IV CONTRAST    - Impression -  1. Complete  occlusion of the infrarenal abdominal aorta with  reconstitution of the flow in the left external iliac artery and  right common femoral artery.  2. No evidence of acute GI bleeding.  3. Diverticulosis without evidence acute diverticulitis.  4. No evidence of acute process in the abdomen or pelvis.      I personally reviewed the images/study and I agree with the findings  as stated by resident physician Dr. Lane Gonzalez . This study  was interpreted at [facility],  [place], [place].    MACRO:  None    Signed by: Everette Bianchi 3/15/2024 5:50 AM  Dictation workstation:   [id number]                                                                           GI Procedures       ASSESSMENT / PLAN                  Klaudia Ratliff is a 68 y.o. female w/PMH of HTN, HLD, obesity, tobacco dependency and aortic occlusion causing BLE claudication and rest pain and recent diagnosis of PE s/p mechanical thrombectomy (2/2024) who is admitted with melena. GI is consulted for the same    #Melena  #Acute blood loss anemia   Ms Ratliff preesnts with melena. She is HDS with benign exam. Hgb has trended down since last discharge and BUN/Cr have trended up. Likely etiologies of UGI include PUD, gastritis, esophagitis, AVM. She is at higher risk with Lovenox treatment, alcohol/NSAID consumption and smoking.     S/p EGD (3/15):  The esophagus appeared normal.  Single ulcer in the antrum with clean base (Ranjeet III)  Moderate abnormal mucosa in the antrum, consistent with gastritis; performed cold forceps biopsy to rule out H. pylori  The duodenal bulb, 1st part of the duodenum and 2nd part of the duodenum appeared normal.       Recommendations:   - Continue to trend Hgb, please re-engage GI if there is continued concern for Upper or lower GI bleeding   - Please follow pathology results to rule out H pylori   - Can resume diet as tolerated   - Please start PPI daily   - Recommend avoiding  NSAIDs    AFUA per primary team.   ------------------------------------------------------------------------  Evelio Parnell MD   Gastroenterology Fellow    After 5PM and on Weekends, please page on-call fellow.    To be discussed with service attending Dr. Lara  Final recommendations pending attending attestation.

## 2024-03-15 NOTE — PRE-PROCEDURE NOTE
Interventional Radiology Preprocedure Note    Indication for procedure: The encounter diagnosis was Melena.    Relevant review of systems: NA    Relevant Labs:   Lab Results   Component Value Date    CREATININE 1.10 (H) 03/14/2024    EGFR 55 (L) 03/14/2024    INR 1.3 (H) 03/14/2024    PROTIME 14.2 (H) 03/14/2024       Planned Sedation/Anesthesia: Moderate    Airway assessment: normal    Directed physical examination:    Aox3  Normal rate of respirations  Neck is without erythema or swelling    Mallampati: II (hard and soft palate, upper portion of tonsils anduvula visible)    ASA Score: ASA 3 - Patient with moderate systemic disease with functional limitations    Benefits, risks and alternatives of procedure and planned sedation have been discussed with the patient and/or their representative. All questions answered and they agree to proceed.     Spenser Diamond DO  Department of Surgery / IR Integrated PGY1

## 2024-03-15 NOTE — POST-PROCEDURE NOTE
Interventional Radiology Brief Postprocedure Note    Attending:   Manish Ortiz MD    Assistant:   Spenser Diamond DO    Diagnosis:   1. Melena             Description of procedure:   The patient was brought to the procedure area. A team huddle was performed with everyone in agreement.  The patient was positioned, and was subsequently prepped and draped in the usual sterile fashion.  The skin was infiltrated with lidocaine for local analgesia. An access needle was advanced into the right internal jugular vein under ultrasound guidance. Subsequently, a guidewire was advanced into the right common iliac vein  The sheath was advanced over the guidewire, and contrast was administered to map the infrarenal vena cava.  Subsequently a Option ELITE retrievable IVC filter was placed with apex at the L1-L2 intervertebral disc lumber level.  Confirmatory angiography was performed for infrarenal placement.  The medical hardware and needle were removed and hemostasis was achieved.  Patient tolerated the procedure well, and no immediate complications were observed. See PACS for full details.      Anesthesia:  MAC    Complications: None    Estimated Blood Loss: minimal      No specimens collected      See detailed result report with images in PACS.    The patient tolerated the procedure well without incident or complication and is in stable condition.     Spenser Diamond DO  Department of Surgery / IR Integrated PGY1

## 2024-03-15 NOTE — SIGNIFICANT EVENT
Pulmonary was called to see the patient for pre-OP optimization.    68-year-old female with PMHx of HTN, obesity, prior tobacco dependence, cardiac cath 2/16/24, and aortic occlusion causing BLE claudication and rest pain with previous planned open thoracoabdominal aortic repair with Dr. Zelaya which was cancelled due to incidental R main pulmonary artery saddle embolism (s/p mechanical thrombectomy with interventional cards 2/22/24 - discharged on 2/25 with therapeutic Lovenox). Patient is now readmitted for workup of new onset melena, light-headedness, and transient BLE numbness.     GI consulted for EGD, IR for IVC filter placement (jugular access because of previously unhealed femoral access site). Pulmonary consulted for pre-OP optimization for possible aortic bypass surgery.    -  If patient is going to surgery urgently then no point of pre-op optimization, otherwise patient to be seen tomorrow.

## 2024-03-15 NOTE — NURSING NOTE
Difficult IntraVenous Access:  VAST consulted for placement of PIV. Due to need to administer ordered IV contrast a 20 gauge catheter was requested by bedside RN on behalf of primary team. Upon examining bilateral arms below level of antecubital fossa via ultrasound, no veins of sufficient diameter for required gauge were found. The left upper arm cephalic vein was cannulated using a 20 gauge 2.25 inch accucath. Adequate blood return with no signs of complication on device securement.     Primary team please be aware future intravenous access may be complicated by insufficient vasculature.

## 2024-03-15 NOTE — PROGRESS NOTES
3/15/2024 Care coordination   Pt with hx  aortic occlusion causing BLE claudication and rest pain with previous planned open thoracoabdominal aortic repair with Dr. Zelaya which was cancelled due to incidental R main pulmonary artery saddle embolism (s/p mechanical thrombectomy with interventional cards 2/22/24 - discharged on 2/25 with therapeutic Lovenox).  Patient is now readmitted today for workup of new onset melena, light-headedness, and transient BLE numbness.   I met with pt to introduce myself. Pt states she lives with a roommate.  Has a walker and a cane at home. Roommate can provide support and  groceries etc.  Will cont to follow.

## 2024-03-15 NOTE — PROGRESS NOTES
"VASCULAR SURGERY PROGRESS NOTE  [facility]  Subjective   NAEO. Patient states has mild discomfort in legs that has persisted. But unchanged from prior.     Objective   Heart Rate:  [61-74]   Temp:  [36.4 °C (97.5 °F)-36.7 °C (98.1 °F)]   Resp:  [16-21]   BP: (100-135)/(50-74)   Height:  [157.5 cm (5' 2\")]   Weight:  [76.7 kg (169 lb)]   SpO2:  [97 %-100 %]     Physical Exam:  Constitutional:       Appearance: Normal appearance. She is not ill-appearing, toxic-appearing or diaphoretic.   HENT:      Head: Normocephalic.      Right Ear: External ear normal.      Left Ear: External ear normal.      Nose: Nose normal.      Mouth/Throat:      Mouth: Mucous membranes are moist.   Eyes:      Pupils: Pupils are equal, round, and reactive to light.   Cardiovascular:      Rate and Rhythm: Normal rate.      Comments: No signals BLE, no palpable femoral pulses bl  Pulmonary:      Effort: Pulmonary effort is normal.   Abdominal:      General: There is no distension.      Palpations: Abdomen is soft.      Tenderness: There is no abdominal tenderness.   Musculoskeletal:         General: Normal range of motion.      Cervical back: Normal range of motion.   Skin:     General: Skin is warm and dry.      Capillary Refill: Capillary refill takes less than 2 seconds.      Comments: L femoral access site remains open although no drainage, purulence, erythema, fluctuance, or crepitus noted surrounding   Neurological:      General: No focal deficit present.      Mental Status: She is alert and oriented to person, place, and time. Mental status is at baseline.   Psychiatric:         Mood and Affect: Mood normal.         Behavior: Behavior normal.         Thought Content: Thought content normal.         Judgment: Judgment normal.     Labs:  Results from last 7 days   Lab Units 03/14/24  2214   WBC AUTO x10*3/uL 12.6*   HEMOGLOBIN g/dL 8.0*   HEMATOCRIT % 24.4*   PLATELETS AUTO x10*3/uL 315     Results from last 7 " days   Lab Units 03/14/24  2059   SODIUM mmol/L 137   POTASSIUM mmol/L 4.4   CHLORIDE mmol/L 105   CO2 mmol/L 23   BUN mg/dL 74*   CREATININE mg/dL 1.10*   GLUCOSE mg/dL 108*   CALCIUM mg/dL 9.2     Results from last 7 days   Lab Units 03/14/24  2214   APTT seconds 35   INR  1.3*           Assessment/Plan   Klaudia Ratliff is a 68-year-old female with past medical history of hypertension, hyperlipidemia, obesity, prior tobacco dependence, cardiac cath 2/16/24, and aortic occlusion causing BLE claudication and rest pain with previous planned open thoracoabdominal aortic repair with Dr. Zelaya which was cancelled due to incidental R main pulmonary artery saddle embolism (s/p mechanical thrombectomy with interventional cards 2/22/24 - discharged on 2/25 with therapeutic Lovenox).  Patient is now readmitted for workup of new onset melena, light-headedness, and transient BLE numbness.     Of note, previous L femoral access site from 2/22 still has not healed although no drainage. GI consulted for EGD, IR for IVC filter placement (jugular access because of previously unhealed femoral access site). CT reviewed showing complete occlusion of infrarenal abdominal aorta with reconstitution of flow in left EIA and R CFA. Was given 1 unit pRBC given overall downtrending hemoglobin 3/14.      Neuro - Tylenol PRN  CV - cont tele, home atenolol  Resp - cont pox, ICS  GI - NPO, PPI, ISS, /hr; appreciate GI recs    - UA, monitor electrolytes as clinically indicated   Heme - Hold home LVX therapeutic pending GI recs/EGD findings, IR consult  Ppy - SCDs, hold LVX  Dispo - RNF    Seen with fellow Dr. Delgado. D/w Dr. Garcia Felton MD  Pgy-1 Vascular Surgery  [phone]

## 2024-03-16 LAB
ALBUMIN SERPL BCP-MCNC: 3.3 G/DL (ref 3.4–5)
ANION GAP SERPL CALC-SCNC: 16 MMOL/L (ref 10–20)
APTT PPP: 31 SECONDS (ref 27–38)
BUN SERPL-MCNC: 49 MG/DL (ref 6–23)
CALCIUM SERPL-MCNC: 8.7 MG/DL (ref 8.6–10.6)
CHLORIDE SERPL-SCNC: 103 MMOL/L (ref 98–107)
CO2 SERPL-SCNC: 25 MMOL/L (ref 21–32)
CREAT SERPL-MCNC: 1.44 MG/DL (ref 0.5–1.05)
EGFRCR SERPLBLD CKD-EPI 2021: 40 ML/MIN/1.73M*2
ERYTHROCYTE [DISTWIDTH] IN BLOOD BY AUTOMATED COUNT: 14.7 % (ref 11.5–14.5)
ERYTHROCYTE [DISTWIDTH] IN BLOOD BY AUTOMATED COUNT: 15.1 % (ref 11.5–14.5)
GLUCOSE BLD MANUAL STRIP-MCNC: 105 MG/DL (ref 74–99)
GLUCOSE SERPL-MCNC: 113 MG/DL (ref 74–99)
HCT VFR BLD AUTO: 26.8 % (ref 36–46)
HCT VFR BLD AUTO: 28.1 % (ref 36–46)
HGB BLD-MCNC: 8.8 G/DL (ref 12–16)
HGB BLD-MCNC: 9.3 G/DL (ref 12–16)
INR PPP: 1.2 (ref 0.9–1.1)
MAGNESIUM SERPL-MCNC: 2.13 MG/DL (ref 1.6–2.4)
MCH RBC QN AUTO: 31.9 PG (ref 26–34)
MCH RBC QN AUTO: 32.5 PG (ref 26–34)
MCHC RBC AUTO-ENTMCNC: 32.8 G/DL (ref 32–36)
MCHC RBC AUTO-ENTMCNC: 33.1 G/DL (ref 32–36)
MCV RBC AUTO: 97 FL (ref 80–100)
MCV RBC AUTO: 98 FL (ref 80–100)
NRBC BLD-RTO: 0 /100 WBCS (ref 0–0)
NRBC BLD-RTO: 0 /100 WBCS (ref 0–0)
PHOSPHATE SERPL-MCNC: 5.4 MG/DL (ref 2.5–4.9)
PLATELET # BLD AUTO: 289 X10*3/UL (ref 150–450)
PLATELET # BLD AUTO: 291 X10*3/UL (ref 150–450)
POTASSIUM SERPL-SCNC: 3.5 MMOL/L (ref 3.5–5.3)
PROTHROMBIN TIME: 13.1 SECONDS (ref 9.8–12.8)
RBC # BLD AUTO: 2.76 X10*6/UL (ref 4–5.2)
RBC # BLD AUTO: 2.86 X10*6/UL (ref 4–5.2)
SODIUM SERPL-SCNC: 140 MMOL/L (ref 136–145)
WBC # BLD AUTO: 7.2 X10*3/UL (ref 4.4–11.3)
WBC # BLD AUTO: 9.1 X10*3/UL (ref 4.4–11.3)

## 2024-03-16 PROCEDURE — 2500000004 HC RX 250 GENERAL PHARMACY W/ HCPCS (ALT 636 FOR OP/ED): Performed by: STUDENT IN AN ORGANIZED HEALTH CARE EDUCATION/TRAINING PROGRAM

## 2024-03-16 PROCEDURE — 85027 COMPLETE CBC AUTOMATED: CPT

## 2024-03-16 PROCEDURE — 85610 PROTHROMBIN TIME: CPT

## 2024-03-16 PROCEDURE — 1200000002 HC GENERAL ROOM WITH TELEMETRY DAILY

## 2024-03-16 PROCEDURE — 36415 COLL VENOUS BLD VENIPUNCTURE: CPT

## 2024-03-16 PROCEDURE — 2500000001 HC RX 250 WO HCPCS SELF ADMINISTERED DRUGS (ALT 637 FOR MEDICARE OP)

## 2024-03-16 PROCEDURE — 99222 1ST HOSP IP/OBS MODERATE 55: CPT | Performed by: STUDENT IN AN ORGANIZED HEALTH CARE EDUCATION/TRAINING PROGRAM

## 2024-03-16 PROCEDURE — 2500000004 HC RX 250 GENERAL PHARMACY W/ HCPCS (ALT 636 FOR OP/ED)

## 2024-03-16 PROCEDURE — 83735 ASSAY OF MAGNESIUM: CPT

## 2024-03-16 PROCEDURE — 80069 RENAL FUNCTION PANEL: CPT

## 2024-03-16 PROCEDURE — 82947 ASSAY GLUCOSE BLOOD QUANT: CPT

## 2024-03-16 PROCEDURE — C9113 INJ PANTOPRAZOLE SODIUM, VIA: HCPCS

## 2024-03-16 PROCEDURE — 2500000005 HC RX 250 GENERAL PHARMACY W/O HCPCS

## 2024-03-16 RX ORDER — HEPARIN SODIUM 5000 [USP'U]/ML
5000 INJECTION, SOLUTION INTRAVENOUS; SUBCUTANEOUS EVERY 8 HOURS
Status: DISCONTINUED | OUTPATIENT
Start: 2024-03-16 | End: 2024-03-21

## 2024-03-16 RX ADMIN — PIPERACILLIN SODIUM AND TAZOBACTAM SODIUM 3.38 G: 3; .375 INJECTION, SOLUTION INTRAVENOUS at 11:18

## 2024-03-16 RX ADMIN — PANTOPRAZOLE SODIUM 40 MG: 40 INJECTION, POWDER, FOR SOLUTION INTRAVENOUS at 21:19

## 2024-03-16 RX ADMIN — GABAPENTIN 200 MG: 100 CAPSULE ORAL at 21:20

## 2024-03-16 RX ADMIN — ACETAMINOPHEN 650 MG: 325 TABLET ORAL at 17:28

## 2024-03-16 RX ADMIN — PIPERACILLIN SODIUM AND TAZOBACTAM SODIUM 3.38 G: 3; .375 INJECTION, SOLUTION INTRAVENOUS at 17:28

## 2024-03-16 RX ADMIN — SODIUM CHLORIDE, POTASSIUM CHLORIDE, SODIUM LACTATE AND CALCIUM CHLORIDE 500 ML: 600; 310; 30; 20 INJECTION, SOLUTION INTRAVENOUS at 21:20

## 2024-03-16 RX ADMIN — PANTOPRAZOLE SODIUM 40 MG: 40 INJECTION, POWDER, FOR SOLUTION INTRAVENOUS at 09:47

## 2024-03-16 RX ADMIN — GABAPENTIN 200 MG: 100 CAPSULE ORAL at 09:47

## 2024-03-16 RX ADMIN — FUROSEMIDE 40 MG: 20 TABLET ORAL at 09:46

## 2024-03-16 RX ADMIN — PIPERACILLIN SODIUM AND TAZOBACTAM SODIUM 3.38 G: 3; .375 INJECTION, SOLUTION INTRAVENOUS at 04:52

## 2024-03-16 RX ADMIN — ATENOLOL 50 MG: 50 TABLET ORAL at 09:46

## 2024-03-16 RX ADMIN — HEPARIN SODIUM 5000 UNITS: 5000 INJECTION INTRAVENOUS; SUBCUTANEOUS at 13:15

## 2024-03-16 RX ADMIN — PIPERACILLIN SODIUM AND TAZOBACTAM SODIUM 3.38 G: 3; .375 INJECTION, SOLUTION INTRAVENOUS at 22:01

## 2024-03-16 RX ADMIN — Medication 1 TABLET: at 09:47

## 2024-03-16 RX ADMIN — LIDOCAINE 1 PATCH: 4 PATCH TOPICAL at 21:31

## 2024-03-16 ASSESSMENT — COGNITIVE AND FUNCTIONAL STATUS - GENERAL
MOBILITY SCORE: 24
DAILY ACTIVITIY SCORE: 23
DAILY ACTIVITIY SCORE: 24
TOILETING: A LITTLE
MOBILITY SCORE: 24

## 2024-03-16 ASSESSMENT — PAIN SCALES - GENERAL
PAINLEVEL_OUTOF10: 5 - MODERATE PAIN
PAINLEVEL_OUTOF10: 0 - NO PAIN

## 2024-03-16 ASSESSMENT — PAIN DESCRIPTION - LOCATION: LOCATION: BACK

## 2024-03-16 ASSESSMENT — PAIN DESCRIPTION - ORIENTATION: ORIENTATION: RIGHT

## 2024-03-16 ASSESSMENT — PAIN - FUNCTIONAL ASSESSMENT: PAIN_FUNCTIONAL_ASSESSMENT: 0-10

## 2024-03-16 NOTE — CONSULTS
Pulmonary Medicine Inpatient Consultation    Reason for Consultation: optimization    Requesting Physician: Dr. Zelaya   Pulmonary Attending Physician: Dr. Moreno    CURRENT HOSPITALIZATION:  Admit Date: 3/14/2024      Assessment/Plan: This is a 68 year old female with aortic occlusions causing BLE symptoms and right lower extremity, DVT/PE post mechanical thrombectomy, now with IVC filter given GI bleeding on lovenox. There is plan for surgery with vascular some time this month and pulmonary is being consulted for optimization prior to surgery.      Moderate Obstruction: Likely related to 50 pack year smoking history although does not have significant emphysema on imaging and denies symptoms  Restrictive Lung Disease: will need re-evaluation once farther out from PE  Right subsegmental pulmonary artery embolism: with likely right lower lobe infarct: She is now post thrombectomy and IVC filter. Did not tolerated lovenox  Surgical risk assessment: intermediate risk given underlying COPD      Recommendations:  - Please start Spiriva daily  - While inpatient around the time of her surgery and right after please schedule duonebs TID and hold Spiriva   - Bronchopulmonary hygiene, including therapy consults, incentive spirometer, and up to chair / out of bed as often as possible  - Please place pulmonary outpatient follow up for COPD management and imaging follow up    We will sign off, please reach out with any additional questions or changes in status      History of Presenting Illness: Klaudia Ratliff is a 68 y.o. female with past medical history of HTN, HLD, obesity, prior tobacco dependence, and aortic occlusion causing BLE claudication with recent diagnosis of PE s/p thrombectomy last month.    CTA aorta demonstrates an aortic occlusion with reconstitution in the left iliac artery and right common femoral artery. She was admitted for planned thoracoabdominal aortic repair on 2/26/24 but pre op CTA showed right  pulmonary artery PE. She was started on lovenox 2/25. Patient is now readmitted today for workup of new onset melena, light-headedness, and transient BLE numbness.    During this hospitalization, admission Hgb 8.0 (from 9.6 @ last discharge 2/25) and IR was placed an IVC filter yesterday.  He has received 1 unit of blood this admission and hgb today is 9.5. Also found to have a UTI.    Currently satting 97% on no oxygen. Does have history of smoking of about 1 pack per day for nearly 50 years. Denies any shortness of breath symptoms prior to the last year, felt like she could do everything she wanted to. Did have a cough occasionally. Over the past year she started to have lower extremity edema and during this time denies shortness of breath but did note fatigue with doing things. She quit smoking during her last hospitalization.            Past Surgical History:   Procedure Laterality Date    CARDIAC CATHETERIZATION N/A 2/16/2024    Procedure: Left Heart Cath;  Surgeon: Aditya Sky MD PhD;  Location: [facility] Cardiac [facility] Lab;  Service: Cardiovascular;  Laterality: N/A;  STAT    INVASIVE VASCULAR PROCEDURE N/A 2/22/2024    Procedure: Lower Extremity Angiogram;  Surgeon: Bar Day DO;  Location: [place] [address] Cardiac Cath Lab;  Service: Cardiovascular;  Laterality: N/A;    INVASIVE VASCULAR PROCEDURE N/A 2/22/2024    Procedure: Pulmonary Angiogram;  Surgeon: Bar Day DO;  Location: [place] [address] Cardiac Cath Lab;  Service: Cardiovascular;  Laterality: N/A;       Social History:  Tobacco Exposure: 50 pack year history      No family history on file.      Allergies and Medications:  Allergies:  Allergies   Allergen Reactions    Amlodipine Swelling     Bilateral foot swelling    Doxycycline Diarrhea    Clarithromycin Other and Dizziness     Slept for days               Physical Exam  Temp:  [36 °C (96.8 °F)-36.6 °C (97.9 °F)] 36.6 °C (97.9 °F)  Heart Rate:  [58-72] 68  Resp:  [14-23] 18  BP:  ()/(44-70) 119/59  General appearance: NAD  Mouth: MMM  Eyes/Pupils: PERRL, EOMI  Chest: Clear to auscultation bilaterally  CV:  Normal rate, regular rhythm  Abdomen: Soft, nontender, nondistended, normoactive bowel sounds  Extremities:  No edema  Skin:  No rash  Neurological: AAO x3    Lower extremity dopplers:  IMPRESSION:  1. Occlusive deep venous thrombus throughout the right lower extremity  2. No evidence of DVT in the left lower extremity.    Echo 02/24  CONCLUSIONS:   1. Left ventricular systolic function is normal with a 60-65% estimated ejection fraction.   2. There is an elevated mean left atrial pressure.   3. There is a prominent echodensity in the apex of the RV, similar in density to surrounding muscle, that is present on multiple views but is not visible with Definity. This may represent a large moderator band or RV trabeculations, vs less likely thrombus or myxoma. Recommend cMRI for further characterization.    PFTs 2/23/24:  FVC: 1.91 (73%)  FEV1: 0.94 (46%)  FEV1/FVC 62  TLC: 3.24 (68%)  DLCO: 12.84 (70%)    Imaging:  I have personally reviewed and interpreted the radiographic data in [facility].    Thank you for the consultation. Recommendations are preliminary until signed by the attending physician.    Aniket Kong MD  Pulmonary Critical Care Fellow, Pager

## 2024-03-16 NOTE — PROGRESS NOTES
VASCULAR SURGERY PROGRESS NOTE  [facility]  Subjective   No overnight events.  Feeling okay.  Pending pulm eval.  No other concerns.    Objective   Heart Rate:  [58-68]   Temp:  [36 °C (96.8 °F)-36.6 °C (97.9 °F)]   Resp:  [14-23]   BP: ()/(44-70)   SpO2:  [93 %-100 %]     Physical Exam:  Constitutional:       Appearance: Normal appearance. She is not ill-appearing, toxic-appearing or diaphoretic.   HENT:      Head: Normocephalic.      Right Ear: External ear normal.      Left Ear: External ear normal.      Nose: Nose normal.      Mouth/Throat:      Mouth: Mucous membranes are moist.   Eyes:      Pupils: Pupils are equal, round, and reactive to light.   Cardiovascular:      Rate and Rhythm: Normal rate.      Comments: No signals BLE, no palpable femoral pulses bl  Pulmonary:      Effort: Pulmonary effort is normal.   Abdominal:      General: There is no distension.      Palpations: Abdomen is soft.      Tenderness: There is no abdominal tenderness.   Musculoskeletal:         General: Normal range of motion.      Cervical back: Normal range of motion.   Skin:     General: Skin is warm and dry.      Capillary Refill: Capillary refill takes less than 2 seconds.      Comments: L femoral access site remains open although no drainage, purulence, erythema, fluctuance, or crepitus noted surrounding   Neurological:      General: No focal deficit present.      Mental Status: She is alert and oriented to person, place, and time. Mental status is at baseline.   Psychiatric:         Mood and Affect: Mood normal.         Behavior: Behavior normal.         Thought Content: Thought content normal.         Judgment: Judgment normal.     Labs:  Results from last 7 days   Lab Units 03/16/24  0951 03/15/24  1318 03/14/24  2214   WBC AUTO x10*3/uL 7.2 9.1 12.6*   HEMOGLOBIN g/dL 9.3* 9.5* 8.0*   HEMATOCRIT % 28.1* 28.9* 24.4*   PLATELETS AUTO x10*3/uL 291 305 315       Results from last 7 days   Lab  Units 03/16/24  0951 03/15/24  1318 03/14/24  2059   SODIUM mmol/L 140 140 137   POTASSIUM mmol/L 3.5 4.5 4.4   CHLORIDE mmol/L 103 106 105   CO2 mmol/L 25 24 23   BUN mg/dL 49* 53* 74*   CREATININE mg/dL 1.44* 1.07* 1.10*   GLUCOSE mg/dL 113* 96 108*   CALCIUM mg/dL 8.7 9.5 9.2       Results from last 7 days   Lab Units 03/16/24  0951 03/15/24  1318 03/14/24  2214   APTT seconds 31 32 35   INR  1.2* 1.1 1.3*             Assessment/Plan   Klaudia Ratliff is a 68-year-old female with past medical history of hypertension, hyperlipidemia, obesity, prior tobacco dependence, cardiac cath 2/16/24, and aortic occlusion causing BLE claudication and rest pain with previous planned open thoracoabdominal aortic repair with Dr. Zelaya which was cancelled due to incidental R main pulmonary artery saddle embolism (s/p mechanical thrombectomy with interventional cards 2/22/24 - discharged on 2/25 with therapeutic Lovenox).  Patient is now readmitted for workup of new onset melena, light-headedness, and transient BLE numbness.     Of note, previous L femoral access site from 2/22 still has not healed although no drainage. GI consulted for EGD, IR for IVC filter placement (jugular access because of previously unhealed femoral access site). CT reviewed showing complete occlusion of infrarenal abdominal aorta with reconstitution of flow in left EIA and R CFA. Was given 1 unit pRBC given overall downtrending hemoglobin 3/14.      Neuro - Tylenol PRN  CV - cont tele, home atenolol  Resp - cont pox, ICS  GI - NPO, PPI, ISS, /hr; appreciate GI recs    - UA, monitor electrolytes as clinically indicated   Heme - Hold home LVX therapeutic pending GI recs/EGD findings, IR consult  Ppy - SCDs, START SQH (3/16)  Dispo - RNF    Discussed with Dr. Miranda.    Alvarado Yang MD  Vascular Surgery, PGY3  Team Pager: [phone]

## 2024-03-17 LAB
ALBUMIN SERPL BCP-MCNC: 3.6 G/DL (ref 3.4–5)
ANION GAP SERPL CALC-SCNC: 20 MMOL/L
APTT PPP: 30 SECONDS (ref 27–38)
BACTERIA UR CULT: ABNORMAL
BUN SERPL-MCNC: 37 MG/DL (ref 6–23)
CALCIUM SERPL-MCNC: 8.8 MG/DL (ref 8.6–10.6)
CHLORIDE SERPL-SCNC: 101 MMOL/L (ref 98–107)
CO2 SERPL-SCNC: 21 MMOL/L (ref 21–32)
CREAT SERPL-MCNC: 1.44 MG/DL (ref 0.5–1.05)
EGFRCR SERPLBLD CKD-EPI 2021: 40 ML/MIN/1.73M*2
ERYTHROCYTE [DISTWIDTH] IN BLOOD BY AUTOMATED COUNT: 14.6 % (ref 11.5–14.5)
GLUCOSE SERPL-MCNC: 128 MG/DL (ref 74–99)
HCT VFR BLD AUTO: 29 % (ref 36–46)
HGB BLD-MCNC: 9.3 G/DL (ref 12–16)
INR PPP: 1.2 (ref 0.9–1.1)
MAGNESIUM SERPL-MCNC: 1.62 MG/DL (ref 1.6–2.4)
MCH RBC QN AUTO: 32.9 PG (ref 26–34)
MCHC RBC AUTO-ENTMCNC: 32.1 G/DL (ref 32–36)
MCV RBC AUTO: 103 FL (ref 80–100)
NRBC BLD-RTO: 0 /100 WBCS (ref 0–0)
PHOSPHATE SERPL-MCNC: 3.9 MG/DL (ref 2.5–4.9)
PLATELET # BLD AUTO: 299 X10*3/UL (ref 150–450)
POTASSIUM SERPL-SCNC: 2.9 MMOL/L (ref 3.5–5.3)
PROTHROMBIN TIME: 13.1 SECONDS (ref 9.8–12.8)
RBC # BLD AUTO: 2.83 X10*6/UL (ref 4–5.2)
SODIUM SERPL-SCNC: 139 MMOL/L (ref 136–145)
WBC # BLD AUTO: 8.6 X10*3/UL (ref 4.4–11.3)

## 2024-03-17 PROCEDURE — 1200000002 HC GENERAL ROOM WITH TELEMETRY DAILY

## 2024-03-17 PROCEDURE — 85610 PROTHROMBIN TIME: CPT

## 2024-03-17 PROCEDURE — 2500000001 HC RX 250 WO HCPCS SELF ADMINISTERED DRUGS (ALT 637 FOR MEDICARE OP)

## 2024-03-17 PROCEDURE — 2500000002 HC RX 250 W HCPCS SELF ADMINISTERED DRUGS (ALT 637 FOR MEDICARE OP, ALT 636 FOR OP/ED)

## 2024-03-17 PROCEDURE — C9113 INJ PANTOPRAZOLE SODIUM, VIA: HCPCS

## 2024-03-17 PROCEDURE — 2500000004 HC RX 250 GENERAL PHARMACY W/ HCPCS (ALT 636 FOR OP/ED): Performed by: STUDENT IN AN ORGANIZED HEALTH CARE EDUCATION/TRAINING PROGRAM

## 2024-03-17 PROCEDURE — 2500000004 HC RX 250 GENERAL PHARMACY W/ HCPCS (ALT 636 FOR OP/ED)

## 2024-03-17 PROCEDURE — 2500000001 HC RX 250 WO HCPCS SELF ADMINISTERED DRUGS (ALT 637 FOR MEDICARE OP): Performed by: STUDENT IN AN ORGANIZED HEALTH CARE EDUCATION/TRAINING PROGRAM

## 2024-03-17 PROCEDURE — 80069 RENAL FUNCTION PANEL: CPT

## 2024-03-17 PROCEDURE — 83735 ASSAY OF MAGNESIUM: CPT

## 2024-03-17 PROCEDURE — 36415 COLL VENOUS BLD VENIPUNCTURE: CPT

## 2024-03-17 PROCEDURE — 85027 COMPLETE CBC AUTOMATED: CPT

## 2024-03-17 PROCEDURE — 2500000005 HC RX 250 GENERAL PHARMACY W/O HCPCS

## 2024-03-17 RX ORDER — MAGNESIUM SULFATE HEPTAHYDRATE 40 MG/ML
4 INJECTION, SOLUTION INTRAVENOUS ONCE
Status: COMPLETED | OUTPATIENT
Start: 2024-03-17 | End: 2024-03-17

## 2024-03-17 RX ORDER — POTASSIUM CHLORIDE 1.5 G/1.58G
40 POWDER, FOR SOLUTION ORAL ONCE
Status: COMPLETED | OUTPATIENT
Start: 2024-03-17 | End: 2024-03-17

## 2024-03-17 RX ORDER — POTASSIUM CHLORIDE 14.9 MG/ML
20 INJECTION INTRAVENOUS
Status: DISCONTINUED | OUTPATIENT
Start: 2024-03-17 | End: 2024-03-17

## 2024-03-17 RX ORDER — ALBUTEROL SULFATE 0.83 MG/ML
2.5 SOLUTION RESPIRATORY (INHALATION) EVERY 6 HOURS PRN
Status: DISCONTINUED | OUTPATIENT
Start: 2024-03-17 | End: 2024-03-22 | Stop reason: HOSPADM

## 2024-03-17 RX ORDER — POTASSIUM CHLORIDE 750 MG/1
20 TABLET, FILM COATED, EXTENDED RELEASE ORAL ONCE
Status: COMPLETED | OUTPATIENT
Start: 2024-03-17 | End: 2024-03-17

## 2024-03-17 RX ADMIN — PANTOPRAZOLE SODIUM 40 MG: 40 INJECTION, POWDER, FOR SOLUTION INTRAVENOUS at 09:10

## 2024-03-17 RX ADMIN — PIPERACILLIN SODIUM AND TAZOBACTAM SODIUM 3.38 G: 3; .375 INJECTION, SOLUTION INTRAVENOUS at 09:10

## 2024-03-17 RX ADMIN — PIPERACILLIN SODIUM AND TAZOBACTAM SODIUM 3.38 G: 3; .375 INJECTION, SOLUTION INTRAVENOUS at 17:03

## 2024-03-17 RX ADMIN — PIPERACILLIN SODIUM AND TAZOBACTAM SODIUM 3.38 G: 3; .375 INJECTION, SOLUTION INTRAVENOUS at 22:36

## 2024-03-17 RX ADMIN — GABAPENTIN 200 MG: 100 CAPSULE ORAL at 09:10

## 2024-03-17 RX ADMIN — MAGNESIUM SULFATE IN WATER 4 G: 40 INJECTION, SOLUTION INTRAVENOUS at 15:27

## 2024-03-17 RX ADMIN — POTASSIUM CHLORIDE 20 MEQ: 14.9 INJECTION, SOLUTION INTRAVENOUS at 15:26

## 2024-03-17 RX ADMIN — ACETAMINOPHEN 650 MG: 325 TABLET ORAL at 22:48

## 2024-03-17 RX ADMIN — GABAPENTIN 200 MG: 100 CAPSULE ORAL at 20:16

## 2024-03-17 RX ADMIN — Medication 1 TABLET: at 09:10

## 2024-03-17 RX ADMIN — FUROSEMIDE 40 MG: 20 TABLET ORAL at 09:10

## 2024-03-17 RX ADMIN — LIDOCAINE 1 PATCH: 4 PATCH TOPICAL at 09:08

## 2024-03-17 RX ADMIN — POTASSIUM CHLORIDE 40 MEQ: 1.5 POWDER, FOR SOLUTION ORAL at 15:25

## 2024-03-17 RX ADMIN — ATENOLOL 50 MG: 50 TABLET ORAL at 20:15

## 2024-03-17 RX ADMIN — PIPERACILLIN SODIUM AND TAZOBACTAM SODIUM 3.38 G: 3; .375 INJECTION, SOLUTION INTRAVENOUS at 04:45

## 2024-03-17 RX ADMIN — POTASSIUM CHLORIDE 20 MEQ: 750 TABLET, FILM COATED, EXTENDED RELEASE ORAL at 20:30

## 2024-03-17 RX ADMIN — PANTOPRAZOLE SODIUM 40 MG: 40 INJECTION, POWDER, FOR SOLUTION INTRAVENOUS at 20:16

## 2024-03-17 RX ADMIN — ATENOLOL 50 MG: 50 TABLET ORAL at 09:10

## 2024-03-17 ASSESSMENT — COGNITIVE AND FUNCTIONAL STATUS - GENERAL
TOILETING: A LITTLE
DAILY ACTIVITIY SCORE: 23
DAILY ACTIVITIY SCORE: 23
MOBILITY SCORE: 24
MOBILITY SCORE: 24
TOILETING: A LITTLE

## 2024-03-17 ASSESSMENT — PAIN SCALES - GENERAL
PAINLEVEL_OUTOF10: 8
PAINLEVEL_OUTOF10: 0 - NO PAIN

## 2024-03-17 ASSESSMENT — PAIN DESCRIPTION - LOCATION: LOCATION: BACK

## 2024-03-17 ASSESSMENT — PAIN - FUNCTIONAL ASSESSMENT: PAIN_FUNCTIONAL_ASSESSMENT: 0-10

## 2024-03-17 NOTE — CARE PLAN
The patient's goals for the shift include      The clinical goals for the shift include Patient will remain hemodynamically stable this shift.      Problem: Skin  Goal: Decreased wound size/increased tissue granulation at next dressing change  Outcome: Progressing

## 2024-03-17 NOTE — NURSING NOTE
Notified Vascular resident of patient's b/p and loose, black stool. No new orders regarding stool, recheck b/p in 1 hour and hold atenolol. New order for LR bolus 500 ml.

## 2024-03-17 NOTE — PROGRESS NOTES
VASCULAR SURGERY PROGRESS NOTE  [facility]  Subjective   Overnight, melena x2 with mild hypotension. Fluid responsive to 500cc bolus. GI reenaged - no intervention at this time, likely old bleeding. This AM, denies increased RLE pain, chest pain, dyspnea, dizziness.    Objective   Heart Rate:  [62-92]   Temp:  [35.9 °C (96.6 °F)-36.6 °C (97.9 °F)]   Resp:  [18-19]   BP: ()/(52-69)   SpO2:  [91 %-98 %]     Physical Exam:  Constitutional:       Appearance: Normal appearance. She is not ill-appearing, toxic-appearing or diaphoretic.   HENT:      Head: Normocephalic.      Right Ear: External ear normal.      Left Ear: External ear normal.      Nose: Nose normal.      Mouth/Throat:      Mouth: Mucous membranes are moist.   Eyes:      Pupils: Pupils are equal, round, and reactive to light.   Cardiovascular:      Rate and Rhythm: Normal rate.      Comments: No signals BLE, no palpable femoral pulses bl  Pulmonary:      Effort: Pulmonary effort is normal.   Abdominal:      General: There is no distension.      Palpations: Abdomen is soft.      Tenderness: There is no abdominal tenderness.   Musculoskeletal:         General: Normal range of motion.      Cervical back: Normal range of motion.   Skin:     General: Skin is warm and dry.      Capillary Refill: Capillary refill takes less than 2 seconds.      Comments: L femoral access site remains open although no drainage, purulence, erythema, fluctuance, or crepitus noted surrounding   Neurological:      General: No focal deficit present.      Mental Status: She is alert and oriented to person, place, and time. Mental status is at baseline.   Psychiatric:         Mood and Affect: Mood normal.         Behavior: Behavior normal.         Thought Content: Thought content normal.         Judgment: Judgment normal.     Labs:  Results from last 7 days   Lab Units 03/16/24  2148 03/16/24  0951 03/15/24  1318   WBC AUTO x10*3/uL 9.1 7.2 9.1    HEMOGLOBIN g/dL 8.8* 9.3* 9.5*   HEMATOCRIT % 26.8* 28.1* 28.9*   PLATELETS AUTO x10*3/uL 289 291 305       Results from last 7 days   Lab Units 03/16/24  0951 03/15/24  1318 03/14/24  2059   SODIUM mmol/L 140 140 137   POTASSIUM mmol/L 3.5 4.5 4.4   CHLORIDE mmol/L 103 106 105   CO2 mmol/L 25 24 23   BUN mg/dL 49* 53* 74*   CREATININE mg/dL 1.44* 1.07* 1.10*   GLUCOSE mg/dL 113* 96 108*   CALCIUM mg/dL 8.7 9.5 9.2       Results from last 7 days   Lab Units 03/16/24  0951 03/15/24  1318 03/14/24  2214   APTT seconds 31 32 35   INR  1.2* 1.1 1.3*             Assessment/Plan   Klaudia Ratliff is a 68-year-old female with past medical history of hypertension, hyperlipidemia, obesity, prior tobacco dependence, cardiac cath 2/16/24, and aortic occlusion causing BLE claudication and rest pain with previous planned open thoracoabdominal aortic repair with Dr. Zelaya which was cancelled due to incidental R main pulmonary artery saddle embolism (s/p mechanical thrombectomy with interventional cards 2/22/24 - discharged on 2/25 with therapeutic Lovenox).  Patient is now readmitted for workup of new onset melena, light-headedness, and transient BLE numbness.     Of note, previous L femoral access site from 2/22 still has not healed although no drainage. GI consulted for EGD, IR for IVC filter placement (jugular access because of previously unhealed femoral access site). CT reviewed showing complete occlusion of infrarenal abdominal aorta with reconstitution of flow in left EIA and R CFA. Was given 1 unit pRBC given overall downtrending hemoglobin 3/14.      Neuro - Tylenol PRN  CV - cont tele, home atenolol  Resp - cont pox, LAMA and prn albuterol per pulm  GI - regular diet, PPI. GI: cont PPI, no further intervention for melena currently    - UA, monitor electrolytes as clinically indicated   Heme - Hold home LVX therapeutic. IVC filter placed 3/15. Trend CBC  Ppy - SCDs, SQH held in setting of melena.  Dispo -  RNF    Discussed with Dr. Miranda.    Nathaniel Blanchard MD  General Surgery PGY-3  Vascular Surgery [id number]

## 2024-03-17 NOTE — SIGNIFICANT EVENT
Paged by nurse regarding hypotension (bp 90s/50s) and HR 90's. On exam, patient mentating appropriately, endorsing no new symptoms of pain or lightheadedness. Reported 1 episode of black stool which she has had earlier this admission.    STAT CBC ordered, SQH held, evening atenolol held, 500 LR bolus given, GI re-engaged.     Discussed with chief resident, Spenser Yang.    Marcelina Covington  General Surgery PGY-1    Update:    CBC resulted, Hgb 8.8 from 9.3 this morning. Repeat 's/60's, HR 60s. AM CBC ordered, will continue to monitor.    Marcelina Covington MD  General Surgery PGY-1

## 2024-03-18 LAB
ALBUMIN SERPL BCP-MCNC: 3.7 G/DL (ref 3.4–5)
ANION GAP SERPL CALC-SCNC: 17 MMOL/L (ref 10–20)
APTT PPP: 31 SECONDS (ref 27–38)
BUN SERPL-MCNC: 24 MG/DL (ref 6–23)
CALCIUM SERPL-MCNC: 8.8 MG/DL (ref 8.6–10.6)
CHLORIDE SERPL-SCNC: 102 MMOL/L (ref 98–107)
CO2 SERPL-SCNC: 24 MMOL/L (ref 21–32)
CREAT SERPL-MCNC: 1.17 MG/DL (ref 0.5–1.05)
EGFRCR SERPLBLD CKD-EPI 2021: 51 ML/MIN/1.73M*2
ERYTHROCYTE [DISTWIDTH] IN BLOOD BY AUTOMATED COUNT: 14.5 % (ref 11.5–14.5)
GLUCOSE SERPL-MCNC: 115 MG/DL (ref 74–99)
HCT VFR BLD AUTO: 27.9 % (ref 36–46)
HGB BLD-MCNC: 9.3 G/DL (ref 12–16)
INR PPP: 1.1 (ref 0.9–1.1)
MAGNESIUM SERPL-MCNC: 2.23 MG/DL (ref 1.6–2.4)
MCH RBC QN AUTO: 32.6 PG (ref 26–34)
MCHC RBC AUTO-ENTMCNC: 33.3 G/DL (ref 32–36)
MCV RBC AUTO: 98 FL (ref 80–100)
NRBC BLD-RTO: 0 /100 WBCS (ref 0–0)
PHOSPHATE SERPL-MCNC: 3.1 MG/DL (ref 2.5–4.9)
PLATELET # BLD AUTO: 307 X10*3/UL (ref 150–450)
POTASSIUM SERPL-SCNC: 3.1 MMOL/L (ref 3.5–5.3)
PROTHROMBIN TIME: 12.7 SECONDS (ref 9.8–12.8)
RBC # BLD AUTO: 2.85 X10*6/UL (ref 4–5.2)
SODIUM SERPL-SCNC: 140 MMOL/L (ref 136–145)
WBC # BLD AUTO: 9 X10*3/UL (ref 4.4–11.3)

## 2024-03-18 PROCEDURE — 85610 PROTHROMBIN TIME: CPT

## 2024-03-18 PROCEDURE — C9113 INJ PANTOPRAZOLE SODIUM, VIA: HCPCS

## 2024-03-18 PROCEDURE — 2500000001 HC RX 250 WO HCPCS SELF ADMINISTERED DRUGS (ALT 637 FOR MEDICARE OP): Performed by: NURSE PRACTITIONER

## 2024-03-18 PROCEDURE — 94640 AIRWAY INHALATION TREATMENT: CPT

## 2024-03-18 PROCEDURE — S4991 NICOTINE PATCH NONLEGEND: HCPCS

## 2024-03-18 PROCEDURE — 36415 COLL VENOUS BLD VENIPUNCTURE: CPT

## 2024-03-18 PROCEDURE — 2500000005 HC RX 250 GENERAL PHARMACY W/O HCPCS

## 2024-03-18 PROCEDURE — 80069 RENAL FUNCTION PANEL: CPT

## 2024-03-18 PROCEDURE — 2500000002 HC RX 250 W HCPCS SELF ADMINISTERED DRUGS (ALT 637 FOR MEDICARE OP, ALT 636 FOR OP/ED)

## 2024-03-18 PROCEDURE — 2500000004 HC RX 250 GENERAL PHARMACY W/ HCPCS (ALT 636 FOR OP/ED)

## 2024-03-18 PROCEDURE — 1200000002 HC GENERAL ROOM WITH TELEMETRY DAILY

## 2024-03-18 PROCEDURE — 85027 COMPLETE CBC AUTOMATED: CPT

## 2024-03-18 PROCEDURE — 2500000002 HC RX 250 W HCPCS SELF ADMINISTERED DRUGS (ALT 637 FOR MEDICARE OP, ALT 636 FOR OP/ED): Performed by: STUDENT IN AN ORGANIZED HEALTH CARE EDUCATION/TRAINING PROGRAM

## 2024-03-18 PROCEDURE — 99233 SBSQ HOSP IP/OBS HIGH 50: CPT | Performed by: NURSE PRACTITIONER

## 2024-03-18 PROCEDURE — 2500000001 HC RX 250 WO HCPCS SELF ADMINISTERED DRUGS (ALT 637 FOR MEDICARE OP)

## 2024-03-18 PROCEDURE — 2500000002 HC RX 250 W HCPCS SELF ADMINISTERED DRUGS (ALT 637 FOR MEDICARE OP, ALT 636 FOR OP/ED): Performed by: NURSE PRACTITIONER

## 2024-03-18 PROCEDURE — 83735 ASSAY OF MAGNESIUM: CPT

## 2024-03-18 RX ORDER — FLUTICASONE PROPIONATE 50 MCG
2 SPRAY, SUSPENSION (ML) NASAL DAILY
Status: DISCONTINUED | OUTPATIENT
Start: 2024-03-18 | End: 2024-03-22 | Stop reason: HOSPADM

## 2024-03-18 RX ORDER — ATORVASTATIN CALCIUM 80 MG/1
80 TABLET, FILM COATED ORAL NIGHTLY
Status: DISCONTINUED | OUTPATIENT
Start: 2024-03-18 | End: 2024-03-22 | Stop reason: HOSPADM

## 2024-03-18 RX ORDER — POTASSIUM CHLORIDE 750 MG/1
40 TABLET, FILM COATED, EXTENDED RELEASE ORAL ONCE
Status: COMPLETED | OUTPATIENT
Start: 2024-03-18 | End: 2024-03-18

## 2024-03-18 RX ADMIN — PIPERACILLIN SODIUM AND TAZOBACTAM SODIUM 3.38 G: 3; .375 INJECTION, SOLUTION INTRAVENOUS at 04:42

## 2024-03-18 RX ADMIN — PIPERACILLIN SODIUM AND TAZOBACTAM SODIUM 3.38 G: 3; .375 INJECTION, SOLUTION INTRAVENOUS at 16:26

## 2024-03-18 RX ADMIN — POTASSIUM CHLORIDE 40 MEQ: 750 TABLET, FILM COATED, EXTENDED RELEASE ORAL at 12:51

## 2024-03-18 RX ADMIN — PANTOPRAZOLE SODIUM 40 MG: 40 INJECTION, POWDER, FOR SOLUTION INTRAVENOUS at 08:35

## 2024-03-18 RX ADMIN — Medication 1 TABLET: at 08:34

## 2024-03-18 RX ADMIN — GABAPENTIN 200 MG: 100 CAPSULE ORAL at 08:35

## 2024-03-18 RX ADMIN — PIPERACILLIN SODIUM AND TAZOBACTAM SODIUM 3.38 G: 3; .375 INJECTION, SOLUTION INTRAVENOUS at 22:22

## 2024-03-18 RX ADMIN — FUROSEMIDE 40 MG: 20 TABLET ORAL at 08:34

## 2024-03-18 RX ADMIN — TIOTROPIUM BROMIDE INHALATION SPRAY 2 PUFF: 3.12 SPRAY, METERED RESPIRATORY (INHALATION) at 09:11

## 2024-03-18 RX ADMIN — FLUTICASONE PROPIONATE 2 SPRAY: 50 SPRAY, METERED NASAL at 16:30

## 2024-03-18 RX ADMIN — ATORVASTATIN CALCIUM 80 MG: 80 TABLET, FILM COATED ORAL at 20:41

## 2024-03-18 RX ADMIN — NICOTINE 1 PATCH: 14 PATCH, EXTENDED RELEASE TRANSDERMAL at 08:35

## 2024-03-18 RX ADMIN — PIPERACILLIN SODIUM AND TAZOBACTAM SODIUM 3.38 G: 3; .375 INJECTION, SOLUTION INTRAVENOUS at 10:22

## 2024-03-18 RX ADMIN — PANTOPRAZOLE SODIUM 40 MG: 40 INJECTION, POWDER, FOR SOLUTION INTRAVENOUS at 20:50

## 2024-03-18 RX ADMIN — LIDOCAINE 1 PATCH: 4 PATCH TOPICAL at 08:35

## 2024-03-18 RX ADMIN — ATENOLOL 50 MG: 50 TABLET ORAL at 20:41

## 2024-03-18 RX ADMIN — GABAPENTIN 200 MG: 100 CAPSULE ORAL at 20:41

## 2024-03-18 RX ADMIN — ATENOLOL 50 MG: 50 TABLET ORAL at 08:35

## 2024-03-18 ASSESSMENT — COGNITIVE AND FUNCTIONAL STATUS - GENERAL
DAILY ACTIVITIY SCORE: 23
WALKING IN HOSPITAL ROOM: A LITTLE
WALKING IN HOSPITAL ROOM: A LITTLE
MOBILITY SCORE: 24
CLIMB 3 TO 5 STEPS WITH RAILING: A LITTLE
DRESSING REGULAR UPPER BODY CLOTHING: A LITTLE
DAILY ACTIVITIY SCORE: 24
MOBILITY SCORE: 22
CLIMB 3 TO 5 STEPS WITH RAILING: A LITTLE
DAILY ACTIVITIY SCORE: 23
TOILETING: A LITTLE
MOBILITY SCORE: 22

## 2024-03-18 ASSESSMENT — PAIN SCALES - GENERAL
PAINLEVEL_OUTOF10: 0 - NO PAIN
PAINLEVEL_OUTOF10: 0 - NO PAIN

## 2024-03-18 ASSESSMENT — PAIN SCALES - PAIN ASSESSMENT IN ADVANCED DEMENTIA (PAINAD)
BODYLANGUAGE: RELAXED
TOTALSCORE: 0
CONSOLABILITY: NO NEED TO CONSOLE
FACIALEXPRESSION: SMILING OR INEXPRESSIVE
BREATHING: NORMAL

## 2024-03-18 ASSESSMENT — PAIN SCALES - WONG BAKER: WONGBAKER_NUMERICALRESPONSE: NO HURT

## 2024-03-18 NOTE — CARE PLAN
The patient's goals for the shift include      The clinical goals for the shift include Patient will remain free from falls this shift.      Problem: Skin  Goal: Decreased wound size/increased tissue granulation at next dressing change  Outcome: Progressing

## 2024-03-18 NOTE — NURSING NOTE
Called resident to inquire about adding fluids and slowing rate of patient's IV potassium d/t patient stating that she could not tolerate it. MD to get back to me with further instruction. Also, informed MD of diastolic  of 92.

## 2024-03-18 NOTE — PROGRESS NOTES
VASCULAR SURGERY PROGRESS NOTE  Subjective   No fevers/chills. Still with some right foot nocturnal rest pain - no wounds.     Objective   Vitals:    03/18/24 1108   BP: 127/72   Pulse: 68   Resp: 18   Temp: 36.3 °C (97.3 °F)   SpO2: 96%      Exam:  Constitutional: No acute distress, resting comfortably  Neuro:  AOx3, grossly intact  ENMT: moist mucous membranes  CV: no tachycardia  Pulm: non-labored on room air  GI: soft, non-tender, non-distended  Skin: warm and dry  Musculoskeletal: moving all extremities  Extremities: right foot slightly darker than left given edema. Left femoral access site open without drainage.   Pulses: nonpalpable femoral or pedal pulses     Relevant Results  Medications:  Scheduled Meds:  [Held by provider] aspirin, 81 mg, oral, Daily  atenolol, 50 mg, oral, BID  atorvastatin, 80 mg, oral, Nightly  furosemide, 40 mg, oral, Daily  gabapentin, 200 mg, oral, BID  [Held by provider] heparin (porcine), 5,000 Units, subcutaneous, q8h  lidocaine, 1 patch, transdermal, Daily  multivitamin with minerals, 1 tablet, oral, Daily  nicotine, 1 patch, transdermal, Daily  pantoprazole, 40 mg, intravenous, BID  piperacillin-tazobactam, 3.375 g, intravenous, q6h  tiotropium, 2 puff, inhalation, Daily      Continuous Infusions:   PRN Meds:.  PRN medications: acetaminophen, albuterol, dextrose, dextrose, glucagon, ondansetron **OR** ondansetron    Labs:  Results from last 7 days   Lab Units 03/18/24  0746 03/17/24  0953 03/16/24  2148   WBC AUTO x10*3/uL 9.0 8.6 9.1   HEMOGLOBIN g/dL 9.3* 9.3* 8.8*   PLATELETS AUTO x10*3/uL 307 299 289      Results from last 7 days   Lab Units 03/18/24  0746 03/17/24  0953 03/16/24  0951   SODIUM mmol/L 140 139 140   POTASSIUM mmol/L 3.1* 2.9* 3.5   CHLORIDE mmol/L 102 101 103   CO2 mmol/L 24 21 25   BUN mg/dL 24* 37* 49*   CREATININE mg/dL 1.17* 1.44* 1.44*   GLUCOSE mg/dL 115* 128* 113*   MAGNESIUM mg/dL 2.23 1.62 2.13   PHOSPHORUS mg/dL 3.1 3.9 5.4*      Results from last  7 days   Lab Units 03/18/24  0746 03/17/24  0953 03/16/24  0951   INR  1.1 1.2* 1.2*   PROTIME seconds 12.7 13.1* 13.1*   APTT seconds 31 30 31     Assessment/Plan   Klaudia Ratliff is 68 y.o. female with history of hypertension, hyperlipidemia, obesity, prior tobacco dependence, COPD, RLE DVT, right PE (s/p mechanical thrombectomy 2/22/24 discharged on Lovenox) and aortic occlusion who admitted with new onset of melena. Of note, previous L femoral access site from 2/22 still has not healed.     3/14: 1u PRBCs   3/15: IVC filter placed in IR given inability to anticoagulate patient; EGD performed by GI showing single ulcer  3/16: pulmonary consulted     Plan:  Neuro: ischemic rest pain of lower extremity   - continue tylenol PRN for pain control  - continue neurovascular checks every 4 hrs    CV: hx of HTN, HLD, RLE DVT, right PE, aortic occlusion   - maintain blood pressure control  - continue home atenolol, lasix  - holding home losartan  - continue atorvastatin  - holding aspirin, SQH & AC given recent GI bleed    Pulm: hx of COPD & tobacco abuse  - continue spiriva, PRN albuterol   - continue to encourage IS hourly while awake  - OOB to chair and increase ambulation as tolerated  - continue nicotine patch   - appreciate pulmonary recs  - RT ordered     FENGI: obesity, hypokalemia   - continue regular diet   - continue PPI  - strict I&O  - follow pathology report to r/o H. Pylori, avoid NSAIDs  - RFP as clinically indicated    Endo:   - no issues     Heme: RLE DVT, right PE (s/p mechanical thrombectomy 2/22)   - monitor for s/sx of bleeding  - holding AC - has filter in place   - CBC as clinically indicated    ID: left groin nonhealing access site, UTI   - trend temp q4  - continue zosyn   - urine culture 3/14: + E. Coli  - start packing groin with dry nugauze BID     Dispo:   - continue care on regular nursing floor  - open thoracoabdominal aortic repair on hold until groin is healed - likely 1 month      Mary Toussaint, APRN-CNP

## 2024-03-19 LAB
ALBUMIN SERPL BCP-MCNC: 3.6 G/DL (ref 3.4–5)
ANION GAP SERPL CALC-SCNC: 15 MMOL/L (ref 10–20)
APTT PPP: 32 SECONDS (ref 27–38)
BUN SERPL-MCNC: 20 MG/DL (ref 6–23)
CALCIUM SERPL-MCNC: 8.7 MG/DL (ref 8.6–10.6)
CHLORIDE SERPL-SCNC: 100 MMOL/L (ref 98–107)
CO2 SERPL-SCNC: 27 MMOL/L (ref 21–32)
CREAT SERPL-MCNC: 1.05 MG/DL (ref 0.5–1.05)
EGFRCR SERPLBLD CKD-EPI 2021: 58 ML/MIN/1.73M*2
ERYTHROCYTE [DISTWIDTH] IN BLOOD BY AUTOMATED COUNT: 14.6 % (ref 11.5–14.5)
GLUCOSE BLD MANUAL STRIP-MCNC: 113 MG/DL (ref 74–99)
GLUCOSE SERPL-MCNC: 131 MG/DL (ref 74–99)
HCT VFR BLD AUTO: 27.4 % (ref 36–46)
HGB BLD-MCNC: 9.1 G/DL (ref 12–16)
INR PPP: 1.2 (ref 0.9–1.1)
MAGNESIUM SERPL-MCNC: 1.63 MG/DL (ref 1.6–2.4)
MCH RBC QN AUTO: 32.7 PG (ref 26–34)
MCHC RBC AUTO-ENTMCNC: 33.2 G/DL (ref 32–36)
MCV RBC AUTO: 99 FL (ref 80–100)
NRBC BLD-RTO: 0 /100 WBCS (ref 0–0)
PHOSPHATE SERPL-MCNC: 2.5 MG/DL (ref 2.5–4.9)
PLATELET # BLD AUTO: 293 X10*3/UL (ref 150–450)
POTASSIUM SERPL-SCNC: 3.1 MMOL/L (ref 3.5–5.3)
PROTHROMBIN TIME: 13.9 SECONDS (ref 9.8–12.8)
RBC # BLD AUTO: 2.78 X10*6/UL (ref 4–5.2)
SODIUM SERPL-SCNC: 139 MMOL/L (ref 136–145)
WBC # BLD AUTO: 9.3 X10*3/UL (ref 4.4–11.3)

## 2024-03-19 PROCEDURE — 2500000005 HC RX 250 GENERAL PHARMACY W/O HCPCS

## 2024-03-19 PROCEDURE — C9113 INJ PANTOPRAZOLE SODIUM, VIA: HCPCS

## 2024-03-19 PROCEDURE — 82947 ASSAY GLUCOSE BLOOD QUANT: CPT

## 2024-03-19 PROCEDURE — 85027 COMPLETE CBC AUTOMATED: CPT

## 2024-03-19 PROCEDURE — 2500000002 HC RX 250 W HCPCS SELF ADMINISTERED DRUGS (ALT 637 FOR MEDICARE OP, ALT 636 FOR OP/ED)

## 2024-03-19 PROCEDURE — 1200000002 HC GENERAL ROOM WITH TELEMETRY DAILY

## 2024-03-19 PROCEDURE — 2500000002 HC RX 250 W HCPCS SELF ADMINISTERED DRUGS (ALT 637 FOR MEDICARE OP, ALT 636 FOR OP/ED): Performed by: STUDENT IN AN ORGANIZED HEALTH CARE EDUCATION/TRAINING PROGRAM

## 2024-03-19 PROCEDURE — 2500000001 HC RX 250 WO HCPCS SELF ADMINISTERED DRUGS (ALT 637 FOR MEDICARE OP)

## 2024-03-19 PROCEDURE — 99233 SBSQ HOSP IP/OBS HIGH 50: CPT | Performed by: SURGERY

## 2024-03-19 PROCEDURE — 2500000004 HC RX 250 GENERAL PHARMACY W/ HCPCS (ALT 636 FOR OP/ED)

## 2024-03-19 PROCEDURE — 80069 RENAL FUNCTION PANEL: CPT

## 2024-03-19 PROCEDURE — 2500000001 HC RX 250 WO HCPCS SELF ADMINISTERED DRUGS (ALT 637 FOR MEDICARE OP): Performed by: NURSE PRACTITIONER

## 2024-03-19 PROCEDURE — 85610 PROTHROMBIN TIME: CPT

## 2024-03-19 PROCEDURE — 36415 COLL VENOUS BLD VENIPUNCTURE: CPT

## 2024-03-19 PROCEDURE — 94640 AIRWAY INHALATION TREATMENT: CPT

## 2024-03-19 PROCEDURE — 83735 ASSAY OF MAGNESIUM: CPT

## 2024-03-19 RX ORDER — POTASSIUM CHLORIDE 750 MG/1
40 TABLET, FILM COATED, EXTENDED RELEASE ORAL 2 TIMES DAILY
Status: COMPLETED | OUTPATIENT
Start: 2024-03-19 | End: 2024-03-19

## 2024-03-19 RX ORDER — MAGNESIUM SULFATE HEPTAHYDRATE 40 MG/ML
4 INJECTION, SOLUTION INTRAVENOUS ONCE
Status: COMPLETED | OUTPATIENT
Start: 2024-03-19 | End: 2024-03-19

## 2024-03-19 RX ADMIN — GABAPENTIN 200 MG: 100 CAPSULE ORAL at 08:44

## 2024-03-19 RX ADMIN — PANTOPRAZOLE SODIUM 40 MG: 40 INJECTION, POWDER, FOR SOLUTION INTRAVENOUS at 08:44

## 2024-03-19 RX ADMIN — FUROSEMIDE 40 MG: 20 TABLET ORAL at 08:44

## 2024-03-19 RX ADMIN — PIPERACILLIN SODIUM AND TAZOBACTAM SODIUM 3.38 G: 3; .375 INJECTION, SOLUTION INTRAVENOUS at 04:35

## 2024-03-19 RX ADMIN — LIDOCAINE 1 PATCH: 4 PATCH TOPICAL at 08:44

## 2024-03-19 RX ADMIN — MAGNESIUM SULFATE IN WATER 4 G: 40 INJECTION, SOLUTION INTRAVENOUS at 14:35

## 2024-03-19 RX ADMIN — Medication 1 TABLET: at 08:44

## 2024-03-19 RX ADMIN — ATENOLOL 50 MG: 50 TABLET ORAL at 20:35

## 2024-03-19 RX ADMIN — GABAPENTIN 200 MG: 100 CAPSULE ORAL at 20:35

## 2024-03-19 RX ADMIN — PANTOPRAZOLE SODIUM 40 MG: 40 INJECTION, POWDER, FOR SOLUTION INTRAVENOUS at 20:38

## 2024-03-19 RX ADMIN — ACETAMINOPHEN 650 MG: 325 TABLET ORAL at 08:40

## 2024-03-19 RX ADMIN — ATORVASTATIN CALCIUM 80 MG: 80 TABLET, FILM COATED ORAL at 20:35

## 2024-03-19 RX ADMIN — PIPERACILLIN SODIUM AND TAZOBACTAM SODIUM 3.38 G: 3; .375 INJECTION, SOLUTION INTRAVENOUS at 22:19

## 2024-03-19 RX ADMIN — TIOTROPIUM BROMIDE INHALATION SPRAY 2 PUFF: 3.12 SPRAY, METERED RESPIRATORY (INHALATION) at 09:12

## 2024-03-19 RX ADMIN — POTASSIUM CHLORIDE 40 MEQ: 750 TABLET, FILM COATED, EXTENDED RELEASE ORAL at 12:55

## 2024-03-19 RX ADMIN — ACETAMINOPHEN 650 MG: 325 TABLET ORAL at 20:35

## 2024-03-19 RX ADMIN — PIPERACILLIN SODIUM AND TAZOBACTAM SODIUM 3.38 G: 3; .375 INJECTION, SOLUTION INTRAVENOUS at 16:00

## 2024-03-19 RX ADMIN — PIPERACILLIN SODIUM AND TAZOBACTAM SODIUM 3.38 G: 3; .375 INJECTION, SOLUTION INTRAVENOUS at 11:23

## 2024-03-19 RX ADMIN — ATENOLOL 50 MG: 50 TABLET ORAL at 08:44

## 2024-03-19 RX ADMIN — FLUTICASONE PROPIONATE 2 SPRAY: 50 SPRAY, METERED NASAL at 08:45

## 2024-03-19 RX ADMIN — POTASSIUM CHLORIDE 40 MEQ: 750 TABLET, FILM COATED, EXTENDED RELEASE ORAL at 20:35

## 2024-03-19 RX ADMIN — ACETAMINOPHEN 650 MG: 325 TABLET ORAL at 12:58

## 2024-03-19 ASSESSMENT — PAIN SCALES - GENERAL
PAINLEVEL_OUTOF10: 7
PAINLEVEL_OUTOF10: 4
PAINLEVEL_OUTOF10: 7
PAINLEVEL_OUTOF10: 0 - NO PAIN

## 2024-03-19 ASSESSMENT — COGNITIVE AND FUNCTIONAL STATUS - GENERAL
HELP NEEDED FOR BATHING: A LITTLE
DRESSING REGULAR UPPER BODY CLOTHING: A LITTLE
PERSONAL GROOMING: A LITTLE
TOILETING: A LITTLE
MOBILITY SCORE: 22
PERSONAL GROOMING: A LITTLE
TOILETING: A LITTLE
DRESSING REGULAR LOWER BODY CLOTHING: A LITTLE
DAILY ACTIVITIY SCORE: 19
WALKING IN HOSPITAL ROOM: A LITTLE
MOBILITY SCORE: 22
HELP NEEDED FOR BATHING: A LITTLE
DAILY ACTIVITIY SCORE: 19
CLIMB 3 TO 5 STEPS WITH RAILING: A LITTLE
WALKING IN HOSPITAL ROOM: A LITTLE
CLIMB 3 TO 5 STEPS WITH RAILING: A LITTLE
DRESSING REGULAR UPPER BODY CLOTHING: A LITTLE
DRESSING REGULAR LOWER BODY CLOTHING: A LITTLE

## 2024-03-19 ASSESSMENT — PAIN SCALES - WONG BAKER: WONGBAKER_NUMERICALRESPONSE: HURTS LITTLE MORE

## 2024-03-19 ASSESSMENT — PAIN - FUNCTIONAL ASSESSMENT
PAIN_FUNCTIONAL_ASSESSMENT: 0-10

## 2024-03-19 ASSESSMENT — PAIN DESCRIPTION - DESCRIPTORS: DESCRIPTORS: ACHING

## 2024-03-19 ASSESSMENT — PAIN DESCRIPTION - LOCATION: LOCATION: BACK

## 2024-03-19 NOTE — PROGRESS NOTES
VASCULAR SURGERY PROGRESS NOTE  Subjective   Denies fevers, chills, or pain.     Objective   Vitals:    03/19/24 1031   BP: 115/51   Pulse: 61   Resp: 16   Temp: 36.1 °C (97 °F)   SpO2: 95%      Exam:  Constitutional: No acute distress, resting comfortably  Neuro:  AOx3, grossly intact  ENMT: moist mucous membranes  CV: no tachycardia  Pulm: non-labored on room air  GI: soft, non-tender, non-distended  Skin: warm and dry  Musculoskeletal: moving all extremities  Extremities: right foot slightly darker than left given edema. Left femoral access site open without drainage. Packing removed and base of wound is pink and healthy.   Pulses: nonpalpable femoral or pedal pulses     Relevant Results  Medications:  Scheduled Meds:  aspirin, 81 mg, oral, Daily  atenolol, 50 mg, oral, BID  atorvastatin, 80 mg, oral, Nightly  fluticasone, 2 spray, Each Nostril, Daily  furosemide, 40 mg, oral, Daily  gabapentin, 200 mg, oral, BID  [Held by provider] heparin (porcine), 5,000 Units, subcutaneous, q8h  lidocaine, 1 patch, transdermal, Daily  magnesium sulfate, 4 g, intravenous, Once  multivitamin with minerals, 1 tablet, oral, Daily  nicotine, 1 patch, transdermal, Daily  pantoprazole, 40 mg, intravenous, BID  piperacillin-tazobactam, 3.375 g, intravenous, q6h  potassium chloride CR, 40 mEq, oral, BID  tiotropium, 2 puff, inhalation, Daily      Continuous Infusions:   PRN Meds:.  PRN medications: acetaminophen, albuterol, dextrose, dextrose, glucagon, ondansetron **OR** ondansetron    Labs:  Results from last 7 days   Lab Units 03/19/24  0944 03/18/24  0746 03/17/24  0953   WBC AUTO x10*3/uL 9.3 9.0 8.6   HEMOGLOBIN g/dL 9.1* 9.3* 9.3*   PLATELETS AUTO x10*3/uL 293 307 299        Results from last 7 days   Lab Units 03/19/24  0944 03/18/24  0746 03/17/24  0953   SODIUM mmol/L 139 140 139   POTASSIUM mmol/L 3.1* 3.1* 2.9*   CHLORIDE mmol/L 100 102 101   CO2 mmol/L 27 24 21   BUN mg/dL 20 24* 37*   CREATININE mg/dL 1.05 1.17* 1.44*    GLUCOSE mg/dL 131* 115* 128*   MAGNESIUM mg/dL 1.63 2.23 1.62   PHOSPHORUS mg/dL 2.5 3.1 3.9        Results from last 7 days   Lab Units 03/19/24  0944 03/18/24  0746 03/17/24  0953   INR  1.2* 1.1 1.2*   PROTIME seconds 13.9* 12.7 13.1*   APTT seconds 32 31 30       Assessment/Plan   Klaudia Ratliff is 68 y.o. female with history of hypertension, hyperlipidemia, obesity, prior tobacco dependence, COPD, RLE DVT, right PE (s/p mechanical thrombectomy 2/22/24 discharged on Lovenox) and aortic occlusion who admitted with new onset of melena. Of note, previous L femoral access site from 2/22 still has not healed.     3/14: 1u PRBCs   3/15: IVC filter placed in IR given inability to anticoagulate patient; EGD performed by GI showing single ulcer  3/16: pulmonary consulted   3/18: Left groin venous access site wound packed with Nugauze    Plan:  Neuro: ischemic rest pain of lower extremity   - continue tylenol PRN for pain control  - continue neurovascular checks every 4 hrs    CV: hx of HTN, HLD, RLE DVT, right PE, aortic occlusion   - maintain blood pressure control  - continue home atenolol, lasix  - holding home losartan  - continue atorvastatin  - holding SQH & AC given recent GI bleed  - Will restart ASA today.   - Reach out to GI 3/20 to discuss when okay to resume AC    Pulm: hx of COPD & tobacco abuse  - continue spiriva, PRN albuterol   - continue to encourage IS hourly while awake  - OOB to chair and increase ambulation as tolerated  - continue nicotine patch   - appreciate pulmonary recs  - RT ordered     FENGI: obesity, hypokalemia   - continue regular diet   - continue PPI  - strict I&O  - follow pathology report to r/o H. Pylori, avoid NSAIDs  - RFP as clinically indicated    Endo:   - no issues     Heme: RLE DVT, right PE (s/p mechanical thrombectomy 2/22)   - monitor for s/sx of bleeding  - holding AC - has filter in place   - CBC as clinically indicated    ID: left groin nonhealing access site, UTI    - trend temp q4  - continue zosyn   - urine culture 3/14: + E. Coli  - Continue wound care to left groin    Dispo:   - continue care on regular nursing floor  - open thoracoabdominal aortic repair on hold until groin is healed - likely 1 month     Discussed with Dr. Garcia Jacobson MD

## 2024-03-20 ENCOUNTER — DOCUMENTATION (OUTPATIENT)
Dept: HOME HEALTH SERVICES | Facility: HOME HEALTH | Age: 69
End: 2024-03-20
Payer: MEDICARE

## 2024-03-20 ENCOUNTER — HOME HEALTH ADMISSION (OUTPATIENT)
Dept: HOME HEALTH SERVICES | Facility: HOME HEALTH | Age: 69
End: 2024-03-20
Payer: MEDICARE

## 2024-03-20 LAB
ALBUMIN SERPL BCP-MCNC: 3.2 G/DL (ref 3.4–5)
ANION GAP SERPL CALC-SCNC: 11 MMOL/L (ref 10–20)
APTT PPP: 29 SECONDS (ref 27–38)
BUN SERPL-MCNC: 18 MG/DL (ref 6–23)
CALCIUM SERPL-MCNC: 8.7 MG/DL (ref 8.6–10.6)
CHLORIDE SERPL-SCNC: 104 MMOL/L (ref 98–107)
CO2 SERPL-SCNC: 31 MMOL/L (ref 21–32)
CREAT SERPL-MCNC: 1 MG/DL (ref 0.5–1.05)
EGFRCR SERPLBLD CKD-EPI 2021: 62 ML/MIN/1.73M*2
ERYTHROCYTE [DISTWIDTH] IN BLOOD BY AUTOMATED COUNT: 14.9 % (ref 11.5–14.5)
GLUCOSE SERPL-MCNC: 99 MG/DL (ref 74–99)
HCT VFR BLD AUTO: 25 % (ref 36–46)
HGB BLD-MCNC: 8.3 G/DL (ref 12–16)
INR PPP: 1.2 (ref 0.9–1.1)
MAGNESIUM SERPL-MCNC: 2.27 MG/DL (ref 1.6–2.4)
MCH RBC QN AUTO: 32.7 PG (ref 26–34)
MCHC RBC AUTO-ENTMCNC: 33.2 G/DL (ref 32–36)
MCV RBC AUTO: 98 FL (ref 80–100)
NRBC BLD-RTO: 0 /100 WBCS (ref 0–0)
PHOSPHATE SERPL-MCNC: 2.8 MG/DL (ref 2.5–4.9)
PLATELET # BLD AUTO: 271 X10*3/UL (ref 150–450)
POTASSIUM SERPL-SCNC: 3 MMOL/L (ref 3.5–5.3)
PROTHROMBIN TIME: 13 SECONDS (ref 9.8–12.8)
RBC # BLD AUTO: 2.54 X10*6/UL (ref 4–5.2)
SODIUM SERPL-SCNC: 143 MMOL/L (ref 136–145)
WBC # BLD AUTO: 7.5 X10*3/UL (ref 4.4–11.3)

## 2024-03-20 PROCEDURE — 83735 ASSAY OF MAGNESIUM: CPT

## 2024-03-20 PROCEDURE — 36415 COLL VENOUS BLD VENIPUNCTURE: CPT

## 2024-03-20 PROCEDURE — 2500000001 HC RX 250 WO HCPCS SELF ADMINISTERED DRUGS (ALT 637 FOR MEDICARE OP): Performed by: NURSE PRACTITIONER

## 2024-03-20 PROCEDURE — 85027 COMPLETE CBC AUTOMATED: CPT

## 2024-03-20 PROCEDURE — 99233 SBSQ HOSP IP/OBS HIGH 50: CPT | Performed by: SURGERY

## 2024-03-20 PROCEDURE — 85610 PROTHROMBIN TIME: CPT

## 2024-03-20 PROCEDURE — 94640 AIRWAY INHALATION TREATMENT: CPT

## 2024-03-20 PROCEDURE — 2500000002 HC RX 250 W HCPCS SELF ADMINISTERED DRUGS (ALT 637 FOR MEDICARE OP, ALT 636 FOR OP/ED)

## 2024-03-20 PROCEDURE — C9113 INJ PANTOPRAZOLE SODIUM, VIA: HCPCS

## 2024-03-20 PROCEDURE — 80069 RENAL FUNCTION PANEL: CPT

## 2024-03-20 PROCEDURE — 2500000001 HC RX 250 WO HCPCS SELF ADMINISTERED DRUGS (ALT 637 FOR MEDICARE OP)

## 2024-03-20 PROCEDURE — 1200000002 HC GENERAL ROOM WITH TELEMETRY DAILY

## 2024-03-20 PROCEDURE — 2500000004 HC RX 250 GENERAL PHARMACY W/ HCPCS (ALT 636 FOR OP/ED)

## 2024-03-20 RX ORDER — POTASSIUM CHLORIDE 750 MG/1
40 TABLET, FILM COATED, EXTENDED RELEASE ORAL 2 TIMES DAILY
Status: COMPLETED | OUTPATIENT
Start: 2024-03-20 | End: 2024-03-20

## 2024-03-20 RX ADMIN — PIPERACILLIN SODIUM AND TAZOBACTAM SODIUM 3.38 G: 3; .375 INJECTION, SOLUTION INTRAVENOUS at 09:46

## 2024-03-20 RX ADMIN — GABAPENTIN 200 MG: 100 CAPSULE ORAL at 20:26

## 2024-03-20 RX ADMIN — ATORVASTATIN CALCIUM 80 MG: 80 TABLET, FILM COATED ORAL at 20:25

## 2024-03-20 RX ADMIN — TIOTROPIUM BROMIDE INHALATION SPRAY 2 PUFF: 3.12 SPRAY, METERED RESPIRATORY (INHALATION) at 11:28

## 2024-03-20 RX ADMIN — PANTOPRAZOLE SODIUM 40 MG: 40 INJECTION, POWDER, FOR SOLUTION INTRAVENOUS at 20:26

## 2024-03-20 RX ADMIN — ATENOLOL 50 MG: 50 TABLET ORAL at 20:27

## 2024-03-20 RX ADMIN — PIPERACILLIN SODIUM AND TAZOBACTAM SODIUM 3.38 G: 3; .375 INJECTION, SOLUTION INTRAVENOUS at 16:10

## 2024-03-20 RX ADMIN — ASPIRIN 81 MG 81 MG: 81 TABLET ORAL at 09:46

## 2024-03-20 RX ADMIN — POTASSIUM CHLORIDE 40 MEQ: 750 TABLET, FILM COATED, EXTENDED RELEASE ORAL at 20:25

## 2024-03-20 RX ADMIN — FUROSEMIDE 40 MG: 20 TABLET ORAL at 09:46

## 2024-03-20 RX ADMIN — PIPERACILLIN SODIUM AND TAZOBACTAM SODIUM 3.38 G: 3; .375 INJECTION, SOLUTION INTRAVENOUS at 04:08

## 2024-03-20 RX ADMIN — ACETAMINOPHEN 650 MG: 325 TABLET ORAL at 20:24

## 2024-03-20 RX ADMIN — GABAPENTIN 200 MG: 100 CAPSULE ORAL at 09:46

## 2024-03-20 RX ADMIN — ATENOLOL 50 MG: 50 TABLET ORAL at 09:46

## 2024-03-20 RX ADMIN — PIPERACILLIN SODIUM AND TAZOBACTAM SODIUM 3.38 G: 3; .375 INJECTION, SOLUTION INTRAVENOUS at 22:38

## 2024-03-20 RX ADMIN — Medication 1 TABLET: at 09:46

## 2024-03-20 RX ADMIN — POTASSIUM CHLORIDE 40 MEQ: 750 TABLET, FILM COATED, EXTENDED RELEASE ORAL at 13:10

## 2024-03-20 RX ADMIN — ACETAMINOPHEN 650 MG: 325 TABLET ORAL at 09:13

## 2024-03-20 RX ADMIN — FLUTICASONE PROPIONATE 2 SPRAY: 50 SPRAY, METERED NASAL at 10:51

## 2024-03-20 RX ADMIN — ACETAMINOPHEN 650 MG: 325 TABLET ORAL at 04:15

## 2024-03-20 RX ADMIN — PANTOPRAZOLE SODIUM 40 MG: 40 INJECTION, POWDER, FOR SOLUTION INTRAVENOUS at 09:46

## 2024-03-20 ASSESSMENT — PAIN DESCRIPTION - DESCRIPTORS
DESCRIPTORS: ACHING;BURNING
DESCRIPTORS: ACHING

## 2024-03-20 ASSESSMENT — COGNITIVE AND FUNCTIONAL STATUS - GENERAL
CLIMB 3 TO 5 STEPS WITH RAILING: A LITTLE
WALKING IN HOSPITAL ROOM: A LITTLE
DRESSING REGULAR LOWER BODY CLOTHING: A LITTLE
DRESSING REGULAR UPPER BODY CLOTHING: A LITTLE
DAILY ACTIVITIY SCORE: 20
TOILETING: A LITTLE
MOBILITY SCORE: 22
HELP NEEDED FOR BATHING: A LITTLE

## 2024-03-20 ASSESSMENT — PAIN - FUNCTIONAL ASSESSMENT
PAIN_FUNCTIONAL_ASSESSMENT: 0-10

## 2024-03-20 ASSESSMENT — PAIN SCALES - GENERAL
PAINLEVEL_OUTOF10: 0 - NO PAIN
PAINLEVEL_OUTOF10: 5 - MODERATE PAIN
PAINLEVEL_OUTOF10: 6
PAINLEVEL_OUTOF10: 3
PAINLEVEL_OUTOF10: 8

## 2024-03-20 NOTE — PROGRESS NOTES
3/20/2024 Care coordination  Discussed wound care  yesterday with pt.  Stated her friend Guerda can assist.  Agreeable to [facility].  Provider placed [facility] referral yesterday.  Updated [facility] today, potential discharge.

## 2024-03-20 NOTE — CARE PLAN
The patient's goals for the shift include  to obtain a more comfortable bed and sleep at least four hours    The clinical goals for the shift include patient will remain free from falls throughout my shift.    Pt obtained a more comfortable bed   Problem: Pain - Adult  Goal: Verbalizes/displays adequate comfort level or baseline comfort level  Outcome: Progressing     Problem: Safety - Adult  Goal: Free from fall injury  Outcome: Progressing

## 2024-03-20 NOTE — HH CARE COORDINATION
Home Care received a Referral for Nursing. We have processed the referral for a Start of Care on 03/22/2024.     If you have any questions or concerns, please feel free to contact us at [phone]. Follow the prompts, enter your five digit zip code, and you will be directed to your care team on [facility].

## 2024-03-21 LAB
ALBUMIN SERPL BCP-MCNC: 3.4 G/DL (ref 3.4–5)
ANION GAP SERPL CALC-SCNC: 13 MMOL/L (ref 10–20)
APTT PPP: 31 SECONDS (ref 27–38)
BUN SERPL-MCNC: 19 MG/DL (ref 6–23)
CALCIUM SERPL-MCNC: 8.7 MG/DL (ref 8.6–10.6)
CHLORIDE SERPL-SCNC: 103 MMOL/L (ref 98–107)
CO2 SERPL-SCNC: 29 MMOL/L (ref 21–32)
CREAT SERPL-MCNC: 0.98 MG/DL (ref 0.5–1.05)
EGFRCR SERPLBLD CKD-EPI 2021: 63 ML/MIN/1.73M*2
ERYTHROCYTE [DISTWIDTH] IN BLOOD BY AUTOMATED COUNT: 15.1 % (ref 11.5–14.5)
ERYTHROCYTE [DISTWIDTH] IN BLOOD BY AUTOMATED COUNT: 15.3 % (ref 11.5–14.5)
GLUCOSE SERPL-MCNC: 104 MG/DL (ref 74–99)
HCT VFR BLD AUTO: 27 % (ref 36–46)
HCT VFR BLD AUTO: 28.4 % (ref 36–46)
HGB BLD-MCNC: 8.6 G/DL (ref 12–16)
HGB BLD-MCNC: 9.3 G/DL (ref 12–16)
INR PPP: 1.2 (ref 0.9–1.1)
LABORATORY COMMENT REPORT: NORMAL
MAGNESIUM SERPL-MCNC: 1.69 MG/DL (ref 1.6–2.4)
MCH RBC QN AUTO: 32.2 PG (ref 26–34)
MCH RBC QN AUTO: 33.3 PG (ref 26–34)
MCHC RBC AUTO-ENTMCNC: 31.9 G/DL (ref 32–36)
MCHC RBC AUTO-ENTMCNC: 32.7 G/DL (ref 32–36)
MCV RBC AUTO: 101 FL (ref 80–100)
MCV RBC AUTO: 102 FL (ref 80–100)
NRBC BLD-RTO: 0 /100 WBCS (ref 0–0)
NRBC BLD-RTO: 0 /100 WBCS (ref 0–0)
PATH REPORT.FINAL DX SPEC: NORMAL
PATH REPORT.GROSS SPEC: NORMAL
PATH REPORT.TOTAL CANCER: NORMAL
PHOSPHATE SERPL-MCNC: 3 MG/DL (ref 2.5–4.9)
PLATELET # BLD AUTO: 311 X10*3/UL (ref 150–450)
PLATELET # BLD AUTO: 348 X10*3/UL (ref 150–450)
POTASSIUM SERPL-SCNC: 3.4 MMOL/L (ref 3.5–5.3)
PROTHROMBIN TIME: 13.1 SECONDS (ref 9.8–12.8)
RBC # BLD AUTO: 2.67 X10*6/UL (ref 4–5.2)
RBC # BLD AUTO: 2.79 X10*6/UL (ref 4–5.2)
SODIUM SERPL-SCNC: 142 MMOL/L (ref 136–145)
WBC # BLD AUTO: 10.4 X10*3/UL (ref 4.4–11.3)
WBC # BLD AUTO: 8.3 X10*3/UL (ref 4.4–11.3)

## 2024-03-21 PROCEDURE — 2500000004 HC RX 250 GENERAL PHARMACY W/ HCPCS (ALT 636 FOR OP/ED)

## 2024-03-21 PROCEDURE — 36415 COLL VENOUS BLD VENIPUNCTURE: CPT

## 2024-03-21 PROCEDURE — 80069 RENAL FUNCTION PANEL: CPT

## 2024-03-21 PROCEDURE — 94640 AIRWAY INHALATION TREATMENT: CPT

## 2024-03-21 PROCEDURE — 2500000002 HC RX 250 W HCPCS SELF ADMINISTERED DRUGS (ALT 637 FOR MEDICARE OP, ALT 636 FOR OP/ED): Performed by: NURSE PRACTITIONER

## 2024-03-21 PROCEDURE — 85027 COMPLETE CBC AUTOMATED: CPT

## 2024-03-21 PROCEDURE — 2500000001 HC RX 250 WO HCPCS SELF ADMINISTERED DRUGS (ALT 637 FOR MEDICARE OP)

## 2024-03-21 PROCEDURE — 2500000001 HC RX 250 WO HCPCS SELF ADMINISTERED DRUGS (ALT 637 FOR MEDICARE OP): Performed by: NURSE PRACTITIONER

## 2024-03-21 PROCEDURE — 83735 ASSAY OF MAGNESIUM: CPT

## 2024-03-21 PROCEDURE — 1100000001 HC PRIVATE ROOM DAILY

## 2024-03-21 PROCEDURE — 85610 PROTHROMBIN TIME: CPT

## 2024-03-21 PROCEDURE — RXMED WILLOW AMBULATORY MEDICATION CHARGE

## 2024-03-21 PROCEDURE — C9113 INJ PANTOPRAZOLE SODIUM, VIA: HCPCS

## 2024-03-21 PROCEDURE — 99233 SBSQ HOSP IP/OBS HIGH 50: CPT | Performed by: NURSE PRACTITIONER

## 2024-03-21 PROCEDURE — 2500000004 HC RX 250 GENERAL PHARMACY W/ HCPCS (ALT 636 FOR OP/ED): Performed by: NURSE PRACTITIONER

## 2024-03-21 RX ORDER — MAGNESIUM SULFATE HEPTAHYDRATE 40 MG/ML
4 INJECTION, SOLUTION INTRAVENOUS ONCE
Status: COMPLETED | OUTPATIENT
Start: 2024-03-21 | End: 2024-03-21

## 2024-03-21 RX ORDER — ATENOLOL 50 MG/1
50 TABLET ORAL 2 TIMES DAILY
Qty: 60 TABLET | Refills: 0 | Status: SHIPPED | OUTPATIENT
Start: 2024-03-21 | End: 2024-05-31 | Stop reason: HOSPADM

## 2024-03-21 RX ORDER — POTASSIUM CHLORIDE 750 MG/1
40 TABLET, FILM COATED, EXTENDED RELEASE ORAL ONCE
Status: COMPLETED | OUTPATIENT
Start: 2024-03-21 | End: 2024-03-21

## 2024-03-21 RX ORDER — PANTOPRAZOLE SODIUM 40 MG/1
40 TABLET, DELAYED RELEASE ORAL EVERY 12 HOURS
Qty: 28 TABLET | Refills: 0 | Status: SHIPPED | OUTPATIENT
Start: 2024-03-21 | End: 2024-04-12 | Stop reason: ALTCHOICE

## 2024-03-21 RX ORDER — ENOXAPARIN SODIUM 100 MG/ML
1 INJECTION SUBCUTANEOUS EVERY 12 HOURS
Status: DISCONTINUED | OUTPATIENT
Start: 2024-03-21 | End: 2024-03-22 | Stop reason: HOSPADM

## 2024-03-21 RX ADMIN — ATORVASTATIN CALCIUM 80 MG: 80 TABLET, FILM COATED ORAL at 20:03

## 2024-03-21 RX ADMIN — ATENOLOL 50 MG: 50 TABLET ORAL at 20:03

## 2024-03-21 RX ADMIN — FUROSEMIDE 40 MG: 20 TABLET ORAL at 09:29

## 2024-03-21 RX ADMIN — ATENOLOL 50 MG: 50 TABLET ORAL at 09:29

## 2024-03-21 RX ADMIN — ENOXAPARIN SODIUM 80 MG: 80 INJECTION SUBCUTANEOUS at 09:29

## 2024-03-21 RX ADMIN — GABAPENTIN 200 MG: 100 CAPSULE ORAL at 09:29

## 2024-03-21 RX ADMIN — PIPERACILLIN SODIUM AND TAZOBACTAM SODIUM 3.38 G: 3; .375 INJECTION, SOLUTION INTRAVENOUS at 04:38

## 2024-03-21 RX ADMIN — MAGNESIUM SULFATE IN WATER 4 G: 40 INJECTION, SOLUTION INTRAVENOUS at 12:21

## 2024-03-21 RX ADMIN — GABAPENTIN 200 MG: 100 CAPSULE ORAL at 20:03

## 2024-03-21 RX ADMIN — FLUTICASONE PROPIONATE 2 SPRAY: 50 SPRAY, METERED NASAL at 09:34

## 2024-03-21 RX ADMIN — PANTOPRAZOLE SODIUM 40 MG: 40 INJECTION, POWDER, FOR SOLUTION INTRAVENOUS at 20:03

## 2024-03-21 RX ADMIN — Medication 1 TABLET: at 09:30

## 2024-03-21 RX ADMIN — POTASSIUM CHLORIDE 40 MEQ: 750 TABLET, FILM COATED, EXTENDED RELEASE ORAL at 12:21

## 2024-03-21 RX ADMIN — ACETAMINOPHEN 650 MG: 325 TABLET ORAL at 04:33

## 2024-03-21 RX ADMIN — ENOXAPARIN SODIUM 80 MG: 80 INJECTION SUBCUTANEOUS at 22:30

## 2024-03-21 RX ADMIN — PANTOPRAZOLE SODIUM 40 MG: 40 INJECTION, POWDER, FOR SOLUTION INTRAVENOUS at 09:29

## 2024-03-21 RX ADMIN — ACETAMINOPHEN 650 MG: 325 TABLET ORAL at 19:33

## 2024-03-21 RX ADMIN — ASPIRIN 81 MG 81 MG: 81 TABLET ORAL at 09:29

## 2024-03-21 RX ADMIN — ACETAMINOPHEN 650 MG: 325 TABLET ORAL at 09:28

## 2024-03-21 RX ADMIN — TIOTROPIUM BROMIDE INHALATION SPRAY 2 PUFF: 3.12 SPRAY, METERED RESPIRATORY (INHALATION) at 08:41

## 2024-03-21 ASSESSMENT — PAIN SCALES - GENERAL
PAINLEVEL_OUTOF10: 8
PAINLEVEL_OUTOF10: 5 - MODERATE PAIN
PAINLEVEL_OUTOF10: 0 - NO PAIN
PAINLEVEL_OUTOF10: 5 - MODERATE PAIN

## 2024-03-21 ASSESSMENT — PAIN DESCRIPTION - DESCRIPTORS: DESCRIPTORS: ACHING

## 2024-03-21 ASSESSMENT — COGNITIVE AND FUNCTIONAL STATUS - GENERAL
MOBILITY SCORE: 23
CLIMB 3 TO 5 STEPS WITH RAILING: A LITTLE

## 2024-03-21 ASSESSMENT — PAIN - FUNCTIONAL ASSESSMENT
PAIN_FUNCTIONAL_ASSESSMENT: 0-10

## 2024-03-21 NOTE — CARE PLAN
Problem: Skin  Goal: Decreased wound size/increased tissue granulation at next dressing change  Outcome: Progressing     Problem: Pain - Adult  Goal: Verbalizes/displays adequate comfort level or baseline comfort level  Outcome: Progressing     Problem: Safety - Adult  Goal: Free from fall injury  Outcome: Progressing     Problem: Discharge Planning  Goal: Discharge to home or other facility with appropriate resources  Outcome: Progressing     Problem: Chronic Conditions and Co-morbidities  Goal: Patient's chronic conditions and co-morbidity symptoms are monitored and maintained or improved  Outcome: Progressing   The patient's goals for the shift include      The clinical goals for the shift include Pt will rmain HDS this shift    Over the shift, the patient did not make progress toward the following goals. Barriers to progression include none. Recommendations to address these barriers include none.

## 2024-03-21 NOTE — CARE PLAN
The patient's goals for the shift include  to sleep without interruption for four hours    The clinical goals for the shift include Pt will remain HDS throughout shift    Problem: Pain - Adult  Goal: Verbalizes/displays adequate comfort level or baseline comfort level  Outcome: Progressing     Problem: Safety - Adult  Goal: Free from fall injury  Outcome: Progressing

## 2024-03-21 NOTE — PROGRESS NOTES
VASCULAR SURGERY PROGRESS NOTE  Subjective   Feels well overall.     Objective   Vitals:    03/20/24 1554   BP: 148/76   Pulse: 64   Resp: 18   Temp: 37 °C (98.6 °F)   SpO2: 96%      Exam:  Constitutional: No acute distress, resting comfortably  Neuro:  AOx3, grossly intact  ENMT: moist mucous membranes  CV: no tachycardia  Pulm: non-labored on room air  GI: soft, non-tender, non-distended  Skin: warm and dry  Musculoskeletal: moving all extremities  Extremities: right foot slightly darker than left given edema. Left femoral access site open without drainage. Packing removed and base of wound is pink and healthy.   Pulses: nonpalpable femoral or pedal pulses     Relevant Results  Medications:  Scheduled Meds:  aspirin, 81 mg, oral, Daily  atenolol, 50 mg, oral, BID  atorvastatin, 80 mg, oral, Nightly  fluticasone, 2 spray, Each Nostril, Daily  furosemide, 40 mg, oral, Daily  gabapentin, 200 mg, oral, BID  [Held by provider] heparin (porcine), 5,000 Units, subcutaneous, q8h  lidocaine, 1 patch, transdermal, Daily  multivitamin with minerals, 1 tablet, oral, Daily  nicotine, 1 patch, transdermal, Daily  pantoprazole, 40 mg, intravenous, BID  piperacillin-tazobactam, 3.375 g, intravenous, q6h  potassium chloride CR, 40 mEq, oral, BID  tiotropium, 2 puff, inhalation, Daily      Continuous Infusions:   PRN Meds:.  PRN medications: acetaminophen, albuterol, dextrose, dextrose, glucagon, ondansetron **OR** ondansetron    Labs:  Results from last 7 days   Lab Units 03/20/24  0650 03/19/24  0944 03/18/24  0746   WBC AUTO x10*3/uL 7.5 9.3 9.0   HEMOGLOBIN g/dL 8.3* 9.1* 9.3*   PLATELETS AUTO x10*3/uL 271 293 307      Results from last 7 days   Lab Units 03/20/24  0650 03/19/24  0944 03/18/24  0746   SODIUM mmol/L 143 139 140   POTASSIUM mmol/L 3.0* 3.1* 3.1*   CHLORIDE mmol/L 104 100 102   CO2 mmol/L 31 27 24   BUN mg/dL 18 20 24*   CREATININE mg/dL 1.00 1.05 1.17*   GLUCOSE mg/dL 99 131* 115*   MAGNESIUM mg/dL 2.27 1.63  2.23   PHOSPHORUS mg/dL 2.8 2.5 3.1      Results from last 7 days   Lab Units 03/20/24  0650 03/19/24  0944 03/18/24  0746   INR  1.2* 1.2* 1.1   PROTIME seconds 13.0* 13.9* 12.7   APTT seconds 29 32 31     Assessment/Plan   Klaudia Ratliff is 68 y.o. female with history of hypertension, hyperlipidemia, obesity, prior tobacco dependence, COPD, RLE DVT, right PE (s/p mechanical thrombectomy 2/22/24 discharged on Lovenox) and aortic occlusion who admitted with new onset of melena. Of note, previous L femoral access site from 2/22 still has not healed.     3/14: 1u PRBCs   3/15: IVC filter placed in IR given inability to anticoagulate patient; EGD performed by GI showing single ulcer  3/16: pulmonary consulted   3/18: Left groin venous access site wound packed with Nugauze    Plan:  Neuro: ischemic rest pain of lower extremity   - continue tylenol PRN for pain control  - continue neurovascular checks every 4 hrs    CV: hx of HTN, HLD, RLE DVT, right PE, aortic occlusion   - maintain blood pressure control  - continue home atenolol, lasix  - holding home losartan  - continue atorvastatin  - holding SQH & AC given recent GI bleed  - Will restart ASA today.   - Reach out to GI 3/20 to discuss when okay to resume AC    Pulm: hx of COPD & tobacco abuse  - continue spiriva, PRN albuterol   - continue to encourage IS hourly while awake  - OOB to chair and increase ambulation as tolerated  - continue nicotine patch   - appreciate pulmonary recs  - RT ordered     FENGI: obesity, hypokalemia   - continue regular diet   - continue PPI  - strict I&O  - follow pathology report to r/o H. Pylori, avoid NSAIDs  - RFP as clinically indicated    Endo:   - no issues     Heme: RLE DVT, right PE (s/p mechanical thrombectomy 2/22)   - monitor for s/sx of bleeding  - holding AC - has filter in place. Need to establish homegoing plan.   - CBC as clinically indicated    ID: left groin nonhealing access site, UTI   - trend temp q4  -  continue zosyn   - urine culture 3/14: + E. Coli  - Continue wound care to left groin    Dispo:   - continue care on regular nursing floor  - open thoracoabdominal aortic repair on hold until groin is healed - likely 1 month     Discussed with Dr. Garcia Senior MD

## 2024-03-21 NOTE — PROGRESS NOTES
VASCULAR SURGERY PROGRESS NOTE  Subjective   No fevers/chills. No overt bleeding.     Objective   Vitals:    03/21/24 1119   BP: 133/65   Pulse: 57   Resp:    Temp: 36.6 °C (97.9 °F)   SpO2: 95%      Exam:  Constitutional: No acute distress, resting comfortably  Neuro:  AOx3, grossly intact  ENMT: moist mucous membranes  CV: no tachycardia  Pulm: non-labored on room air  GI: soft, non-tender, non-distended  Skin: warm and dry  Musculoskeletal: moving all extremities  Extremities: right foot slightly darker than left given edema. Left femoral access site open without drainage. Packing removed and base of wound is pink and healthy.   Pulses: nonpalpable femoral or pedal pulses     Relevant Results  Medications:  Scheduled Meds:aspirin, 81 mg, oral, Daily  atenolol, 50 mg, oral, BID  atorvastatin, 80 mg, oral, Nightly  enoxaparin, 1 mg/kg, subcutaneous, q12h  fluticasone, 2 spray, Each Nostril, Daily  furosemide, 40 mg, oral, Daily  gabapentin, 200 mg, oral, BID  lidocaine, 1 patch, transdermal, Daily  magnesium sulfate, 4 g, intravenous, Once  multivitamin with minerals, 1 tablet, oral, Daily  nicotine, 1 patch, transdermal, Daily  pantoprazole, 40 mg, intravenous, BID  potassium chloride CR, 40 mEq, oral, Once  tiotropium, 2 puff, inhalation, Daily      Continuous Infusions:   PRN Meds:.PRN medications: acetaminophen, albuterol, dextrose, dextrose, glucagon, ondansetron **OR** ondansetron    Labs:  Results from last 7 days   Lab Units 03/21/24  0706 03/20/24  0650 03/19/24  0944   WBC AUTO x10*3/uL 8.3 7.5 9.3   HEMOGLOBIN g/dL 8.6* 8.3* 9.1*   PLATELETS AUTO x10*3/uL 311 271 293      Results from last 7 days   Lab Units 03/21/24  0706 03/20/24  0650 03/19/24  0944   SODIUM mmol/L 142 143 139   POTASSIUM mmol/L 3.4* 3.0* 3.1*   CHLORIDE mmol/L 103 104 100   CO2 mmol/L 29 31 27   BUN mg/dL 19 18 20   CREATININE mg/dL 0.98 1.00 1.05   GLUCOSE mg/dL 104* 99 131*   MAGNESIUM mg/dL 1.69 2.27 1.63   PHOSPHORUS mg/dL 3.0  2.8 2.5      Results from last 7 days   Lab Units 03/21/24  0706 03/20/24  0650 03/19/24  0944   INR  1.2* 1.2* 1.2*   PROTIME seconds 13.1* 13.0* 13.9*   APTT seconds 31 29 32     Assessment/Plan   Klaudia Ratliff is 68 y.o. female with history of hypertension, hyperlipidemia, obesity, prior tobacco dependence, COPD, RLE DVT, right PE (s/p mechanical thrombectomy 2/22/24 discharged on Lovenox) and aortic occlusion who admitted with new onset of melena. Of note, previous L femoral access site from 2/22 still has not healed.      3/14: 1u PRBCs   3/15: IVC filter placed in IR given inability to anticoagulate patient; EGD performed by GI showing single ulcer  3/16: pulmonary consulted   3/18: Left groin venous access site wound packed with Nugauze  3/21: lovenox resumed      Plan:  Neuro: ischemic rest pain of lower extremity   - continue tylenol PRN for pain control  - continue neurovascular checks every 4 hrs     CV: hx of HTN, HLD, RLE DVT, right PE, aortic occlusion   - maintain blood pressure control  - continue home atenolol, lasix  - holding home losartan  - continue atorvastatin/asa     Pulm: hx of COPD & tobacco abuse  - continue spiriva, PRN albuterol   - continue to encourage IS hourly while awake  - OOB to chair and increase ambulation as tolerated  - continue nicotine patch   - appreciate pulmonary recs  - RT ordered      FENGI: obesity, hypokalemia   - continue regular diet   - continue PPI  - strict I&O  - follow pathology report to r/o H. Pylori, avoid NSAIDs  - RFP as clinically indicated     Endo:   - no issues      Heme: RLE DVT, right PE (s/p mechanical thrombectomy 2/22)   - resume AC today, monitor for s/sx bleeding   - CBC as clinically indicated     ID: left groin nonhealing access site, UTI   - trend temp q4  - discontinue zosyn   - urine culture 3/14: + E. Coli  - Continue wound care to left groin     Dispo:   - continue care on regular nursing floor  - open thoracoabdominal aortic repair  planned for 4/4  - discharge later today vs tomorrow     Mary Toussaint, APRN-CNP

## 2024-03-22 ENCOUNTER — PHARMACY VISIT (OUTPATIENT)
Dept: PHARMACY | Facility: CLINIC | Age: 69
End: 2024-03-22
Payer: COMMERCIAL

## 2024-03-22 VITALS
BODY MASS INDEX: 30.69 KG/M2 | HEART RATE: 51 BPM | RESPIRATION RATE: 18 BRPM | WEIGHT: 166.8 LBS | TEMPERATURE: 97.5 F | SYSTOLIC BLOOD PRESSURE: 128 MMHG | OXYGEN SATURATION: 97 % | DIASTOLIC BLOOD PRESSURE: 57 MMHG | HEIGHT: 62 IN

## 2024-03-22 LAB
ERYTHROCYTE [DISTWIDTH] IN BLOOD BY AUTOMATED COUNT: 15.7 % (ref 11.5–14.5)
HCT VFR BLD AUTO: 28.5 % (ref 36–46)
HGB BLD-MCNC: 9.2 G/DL (ref 12–16)
MCH RBC QN AUTO: 33.5 PG (ref 26–34)
MCHC RBC AUTO-ENTMCNC: 32.3 G/DL (ref 32–36)
MCV RBC AUTO: 104 FL (ref 80–100)
NRBC BLD-RTO: 0 /100 WBCS (ref 0–0)
PLATELET # BLD AUTO: 345 X10*3/UL (ref 150–450)
RBC # BLD AUTO: 2.75 X10*6/UL (ref 4–5.2)
WBC # BLD AUTO: 9 X10*3/UL (ref 4.4–11.3)

## 2024-03-22 PROCEDURE — 2500000004 HC RX 250 GENERAL PHARMACY W/ HCPCS (ALT 636 FOR OP/ED): Performed by: NURSE PRACTITIONER

## 2024-03-22 PROCEDURE — 85027 COMPLETE CBC AUTOMATED: CPT

## 2024-03-22 PROCEDURE — C9113 INJ PANTOPRAZOLE SODIUM, VIA: HCPCS

## 2024-03-22 PROCEDURE — 94640 AIRWAY INHALATION TREATMENT: CPT

## 2024-03-22 PROCEDURE — 2500000004 HC RX 250 GENERAL PHARMACY W/ HCPCS (ALT 636 FOR OP/ED)

## 2024-03-22 PROCEDURE — 2500000001 HC RX 250 WO HCPCS SELF ADMINISTERED DRUGS (ALT 637 FOR MEDICARE OP)

## 2024-03-22 PROCEDURE — 36415 COLL VENOUS BLD VENIPUNCTURE: CPT

## 2024-03-22 PROCEDURE — RXMED WILLOW AMBULATORY MEDICATION CHARGE

## 2024-03-22 RX ORDER — GABAPENTIN 100 MG/1
200 CAPSULE ORAL 2 TIMES DAILY
Qty: 120 CAPSULE | Refills: 0 | Status: SHIPPED | OUTPATIENT
Start: 2024-03-22 | End: 2024-04-17 | Stop reason: SDUPTHER

## 2024-03-22 RX ADMIN — Medication 1 TABLET: at 08:24

## 2024-03-22 RX ADMIN — ASPIRIN 81 MG 81 MG: 81 TABLET ORAL at 08:24

## 2024-03-22 RX ADMIN — ATENOLOL 50 MG: 50 TABLET ORAL at 08:24

## 2024-03-22 RX ADMIN — TIOTROPIUM BROMIDE INHALATION SPRAY 2 PUFF: 3.12 SPRAY, METERED RESPIRATORY (INHALATION) at 09:00

## 2024-03-22 RX ADMIN — PANTOPRAZOLE SODIUM 40 MG: 40 INJECTION, POWDER, FOR SOLUTION INTRAVENOUS at 08:25

## 2024-03-22 RX ADMIN — FUROSEMIDE 40 MG: 20 TABLET ORAL at 08:24

## 2024-03-22 RX ADMIN — GABAPENTIN 200 MG: 100 CAPSULE ORAL at 08:24

## 2024-03-22 RX ADMIN — FLUTICASONE PROPIONATE 2 SPRAY: 50 SPRAY, METERED NASAL at 08:26

## 2024-03-22 RX ADMIN — ACETAMINOPHEN 650 MG: 325 TABLET ORAL at 08:29

## 2024-03-22 RX ADMIN — ENOXAPARIN SODIUM 80 MG: 80 INJECTION SUBCUTANEOUS at 08:24

## 2024-03-22 ASSESSMENT — PAIN - FUNCTIONAL ASSESSMENT
PAIN_FUNCTIONAL_ASSESSMENT: 0-10
PAIN_FUNCTIONAL_ASSESSMENT: 0-10

## 2024-03-22 ASSESSMENT — PAIN SCALES - GENERAL
PAINLEVEL_OUTOF10: 3
PAINLEVEL_OUTOF10: 0 - NO PAIN

## 2024-03-22 ASSESSMENT — COGNITIVE AND FUNCTIONAL STATUS - GENERAL
MOBILITY SCORE: 23
DAILY ACTIVITIY SCORE: 23
CLIMB 3 TO 5 STEPS WITH RAILING: A LITTLE
EATING MEALS: A LITTLE

## 2024-03-22 ASSESSMENT — PAIN DESCRIPTION - LOCATION: LOCATION: BACK

## 2024-03-22 ASSESSMENT — PAIN DESCRIPTION - ORIENTATION: ORIENTATION: LOWER

## 2024-03-22 NOTE — DISCHARGE SUMMARY
Discharge Diagnosis  Melena    Issues Requiring Follow-Up  Vascular Surgery Follow UP  GI Follow Up with review of ulcer pathology     Test Results Pending At Discharge  Pending Labs       No current pending labs.          Hospital Course   Klaudia Ratliff is 68 y.o. female with history of hypertension, hyperlipidemia, obesity, prior tobacco dependence, COPD, RLE DVT, right PE (s/p mechanical thrombectomy 2/22/24 discharged on Lovenox) and aortic occlusion who was admitted with new onset of melena. Of note, previous L femoral access site from 2/22 still had not healed.      3/14: 1u PRBCs   3/15: IVC filter placed in IR given inability to anticoagulate patient; EGD performed by GI showing single ulcer. Pathology at time of discharge still pending   3/16: pulmonary consulted for recommendations regarding preoperative optimization   3/18: Left groin venous access site wound packed with Nugauze  3/21: lovenox resumed  3/22: hemoglobin stable    Discharged with follow up next Friday with Dr. Zelaya. Started on tiotropium per pulmonology and pantoprazole per GI.      Pertinent Physical Exam At Time of Discharge  Physical Exam  Constitutional: No acute distress, resting comfortably  Neuro:  AOx3, grossly intact  ENMT: moist mucous membranes  CV: no tachycardia  Pulm: non-labored on room air  GI: soft, non-tender, non-distended  Skin: warm and dry  Musculoskeletal: moving all extremities  Extremities: right foot slightly darker than left given edema. Left femoral access site open without drainage. Packing removed and base of wound is pink and healthy.   Pulses: nonpalpable femoral or pedal pulses      Home Medications     Medication List      START taking these medications     atenolol 50 mg tablet; Commonly known as: Tenormin; Take 1 tablet (50   mg) by mouth 2 times a day.   pantoprazole 40 mg EC tablet; Commonly known as: ProtoNix; Take 1 tablet   (40 mg) by mouth every 12 hours for 14 days. Do not crush, chew, or  split.   tiotropium 2.5 mcg/actuation inhaler; Commonly known as: Spiriva   Respimat; Inhale 2 puffs once daily.     CONTINUE taking these medications     ascorbic acid 250 mg tablet; Commonly known as: Vitamin C   aspirin 81 mg chewable tablet; Chew 1 tablet (81 mg) once daily.   atorvastatin 80 mg tablet; Commonly known as: Lipitor; Take 1 tablet (80   mg) by mouth once daily at bedtime.   Certavite-Antioxidant tablet; Generic drug: multivitamin with minerals;   Take 1 tablet by mouth once daily.   cholecalciferol 50 MCG (2000 UT) tablet; Commonly known as: Vitamin D-3   furosemide 40 mg tablet; Commonly known as: Lasix; Take 1 tablet (40 mg)   by mouth once daily. As needed for foot swelling   losartan 100 mg tablet; Commonly known as: Cozaar; Take 1 tablet (100   mg) by mouth once daily.   nicotine 14 mg/24 hr patch; Commonly known as: Nicoderm CQ   potassium chloride CR 10 mEq ER tablet; Commonly known as: Klor-Con;   TAKE 5 TABLETS BY MOUTH ONCE  DAILY     ASK your doctor about these medications     enoxaparin 80 mg/0.8 mL syringe; Commonly known as: Lovenox; Inject 0.8   mL (80 mg) under the skin every 12 hours.       Outpatient Follow-Up  Future Appointments   Date Time Provider Department Center   3/23/2024 To Be Determined Carolyne Jurado RN [facility]   3/29/2024  1:15 PM Jovan Zelaya MD [facility]   4/3/2024  1:15 PM Aditya Sky MD PhD [id number]       Sonia Senior MD

## 2024-03-22 NOTE — CARE PLAN
The patient's goals for the shift include  Pt would like to sleep interrupted for four hours    The clinical goals for the shift include remain hemodynamically stable    Problem: Skin  Goal: Decreased wound size/increased tissue granulation at next dressing change  Outcome: Progressing     Problem: Pain - Adult  Goal: Verbalizes/displays adequate comfort level or baseline comfort level  Outcome: Progressing     Problem: Safety - Adult  Goal: Free from fall injury  Outcome: Progressing     Problem: Discharge Planning  Goal: Discharge to home or other facility with appropriate resources  Outcome: Progressing     Problem: Chronic Conditions and Co-morbidities  Goal: Patient's chronic conditions and co-morbidity symptoms are monitored and maintained or improved  Outcome: Progressing

## 2024-03-23 ENCOUNTER — HOME CARE VISIT (OUTPATIENT)
Dept: HOME HEALTH SERVICES | Facility: HOME HEALTH | Age: 69
End: 2024-03-23
Payer: MEDICARE

## 2024-03-23 VITALS
TEMPERATURE: 96.9 F | RESPIRATION RATE: 20 BRPM | DIASTOLIC BLOOD PRESSURE: 70 MMHG | HEART RATE: 68 BPM | SYSTOLIC BLOOD PRESSURE: 120 MMHG | OXYGEN SATURATION: 99 %

## 2024-03-23 PROCEDURE — 0023 HH SOC

## 2024-03-23 PROCEDURE — G0299 HHS/HOSPICE OF RN EA 15 MIN: HCPCS

## 2024-03-23 ASSESSMENT — ENCOUNTER SYMPTOMS
LOWEST PAIN SEVERITY IN PAST 24 HOURS: 0/10
PAIN LOCATION - PAIN FREQUENCY: INTERMITTENT
DEPRESSION: 0
PAIN: 1
CHANGE IN APPETITE: UNCHANGED
PAIN SEVERITY GOAL: 0/10
PERSON REPORTING PAIN: PATIENT
LAST BOWEL MOVEMENT: 66922
LOSS OF SENSATION IN FEET: 0
PAIN LOCATION - PAIN SEVERITY: 2/10
PAIN LOCATION - PAIN QUALITY: SORE
APPETITE LEVEL: FAIR
HIGHEST PAIN SEVERITY IN PAST 24 HOURS: 2/10
OCCASIONAL FEELINGS OF UNSTEADINESS: 0
PAIN LOCATION: GROIN
SUBJECTIVE PAIN PROGRESSION: UNCHANGED

## 2024-03-23 ASSESSMENT — ACTIVITIES OF DAILY LIVING (ADL)
OASIS_M1830: 05
ENTERING_EXITING_HOME: MODERATE ASSIST

## 2024-03-23 ASSESSMENT — PAIN SCALES - PAIN ASSESSMENT IN ADVANCED DEMENTIA (PAINAD)
BODYLANGUAGE: 0
NEGVOCALIZATION: 0 - NONE.
CONSOLABILITY: 0
BODYLANGUAGE: 0 - RELAXED.
FACIALEXPRESSION: 0
TOTALSCORE: 0
FACIALEXPRESSION: 0 - SMILING OR INEXPRESSIVE.
CONSOLABILITY: 0 - NO NEED TO CONSOLE.
BREATHING: 0
NEGVOCALIZATION: 0

## 2024-03-24 NOTE — HOME HEALTH
SKILLED NURSING VISIT FOR ADMISSION TO [facility] ASSESSMENT TEACHING OF MEDICATIONS DISEASE PROCESS AND WOUND CARE. SN INSTRUCTED FRIEND KAMILAH IN WOUND CARE. PT TOLERATED PROCEDURES WELL. PT TO HAVE ADDITIONAL VASCULAR SURGERY IN [facility]IL.

## 2024-03-25 ENCOUNTER — PATIENT OUTREACH (OUTPATIENT)
Dept: CARE COORDINATION | Facility: CLINIC | Age: 69
End: 2024-03-25
Payer: MEDICARE

## 2024-03-25 ENCOUNTER — HOME CARE VISIT (OUTPATIENT)
Dept: HOME HEALTH SERVICES | Facility: HOME HEALTH | Age: 69
End: 2024-03-25
Payer: MEDICARE

## 2024-03-25 VITALS
TEMPERATURE: 97 F | SYSTOLIC BLOOD PRESSURE: 118 MMHG | DIASTOLIC BLOOD PRESSURE: 68 MMHG | RESPIRATION RATE: 20 BRPM | HEART RATE: 68 BPM | OXYGEN SATURATION: 99 %

## 2024-03-25 PROCEDURE — G0299 HHS/HOSPICE OF RN EA 15 MIN: HCPCS

## 2024-03-25 ASSESSMENT — ENCOUNTER SYMPTOMS
CHANGE IN APPETITE: UNCHANGED
APPETITE LEVEL: FAIR
HIGHEST PAIN SEVERITY IN PAST 24 HOURS: 0/10
DENIES PAIN: 1
SUBJECTIVE PAIN PROGRESSION: UNCHANGED
LOSS OF SENSATION IN FEET: 0
PAIN SEVERITY GOAL: 0/10
OCCASIONAL FEELINGS OF UNSTEADINESS: 0
PERSON REPORTING PAIN: PATIENT
LAST BOWEL MOVEMENT: 66924
DEPRESSION: 0
LOWEST PAIN SEVERITY IN PAST 24 HOURS: 0/10

## 2024-03-25 ASSESSMENT — PAIN SCALES - PAIN ASSESSMENT IN ADVANCED DEMENTIA (PAINAD)
FACIALEXPRESSION: 0 - SMILING OR INEXPRESSIVE.
NEGVOCALIZATION: 0
BREATHING: 0
BODYLANGUAGE: 0
FACIALEXPRESSION: 0
NEGVOCALIZATION: 0 - NONE.
BODYLANGUAGE: 0 - RELAXED.

## 2024-03-25 NOTE — PROGRESS NOTES
Discharge Facility: [facility]  Discharge Diagnosis: Melena  Admission Date: 3/14/2024 (readmit)  Discharge Date: 3/22/2024    PCP Appointment Date:  -Pt declines scheduling at this time.    Specialist Appointment Date:   -3/29/2024 1315 Dr. Zelaya Vascular    [facility] Encounter and Summary: Linked    See discharge assessment below for further details    Engagement  Call Start Time: 0930 (3/25/2024  9:31 AM)    Medications  Medications reviewed with patient/caregiver?: Yes (new prescriptions reviewed; atenolol, gabapentin, pantoprazole, spiriva respimat) (3/25/2024  9:31 AM)  Is the patient having any side effects they believe may be caused by any medication additions or changes?: No (3/25/2024  9:31 AM)  Does the patient have all medications ordered at discharge?: Yes (3/25/2024  9:31 AM)  Care Management Interventions: No intervention needed (3/25/2024  9:31 AM)  Is the patient taking all medications as directed (includes completed medication regime)?: Yes (3/25/2024  9:31 AM)    Appointments  Does the patient have a primary care provider?: Yes (3/25/2024  9:31 AM)  Has the patient kept scheduled appointments due by today?: Yes (3/25/2024  9:31 AM)    Self Management  What is the home health agency?: [facility] [phone] (3/25/2024  9:31 AM)  Has home health visited the patient within 72 hours of discharge?: Yes (3/25/2024  9:31 AM)    Patient Teaching  Does the patient have access to their discharge instructions?: Yes (3/25/2024  9:31 AM)  Care Management Interventions: Reviewed instructions with patient (3/25/2024  9:31 AM)  What is the patient's perception of their health status since discharge?: Improving (3/25/2024  9:31 AM)  Is the patient/caregiver able to teach back the hierarchy of who to call/visit for symptoms/problems? PCP, Specialist, Home Health nurse, Urgent Care, ED, 911: Yes (3/25/2024  9:31 AM)    Wrap Up  Wrap Up Additional Comments: Pt was admitted to [facility] 3/14-3/22/2024 for  melena. Pt states she is doing fine and she is just waiting for surgery. Pt discharged with new prescriptions for atenolol, gabapentin, protonix, and spiriva inhaler- pt denies questions or issues with medication. She reports understanding of discharge instructions. Pt discharged with [facility] and states they have already came out to visit her. Pt reports she is following up with surgeon on Friday 3/29 and believes surgery will be scheduled for 4/4. Pt declines PCP follow up at this time d/t being told she would be in rehab for 2 weeks following surgery. Pt denies any questions, needs, or concerns at this time. She is encouraged to call if questions or needs arise. (3/25/2024  9:31 AM)  Call End Time: 0934 (3/25/2024  9:31 AM)

## 2024-03-25 NOTE — HOME HEALTH
SKILLED NURSING VISIT FOR ASSESSMENT TEACHING OF MEDICATIONS DISEASE PROCESS AND WOUND CARE. PT TOLERATED PROCEDURES WELL.

## 2024-03-26 ENCOUNTER — PREP FOR PROCEDURE (OUTPATIENT)
Dept: VASCULAR SURGERY | Facility: HOSPITAL | Age: 69
End: 2024-03-26
Payer: MEDICARE

## 2024-03-26 ENCOUNTER — HOSPITAL ENCOUNTER (OUTPATIENT)
Facility: HOSPITAL | Age: 69
Setting detail: SURGERY ADMIT
End: 2024-03-26
Attending: SURGERY | Admitting: SURGERY
Payer: MEDICARE

## 2024-03-26 DIAGNOSIS — I74.09 AORTOILIAC OCCLUSIVE DISEASE (MULTI): Primary | ICD-10-CM

## 2024-03-27 ENCOUNTER — APPOINTMENT (OUTPATIENT)
Dept: CARDIOLOGY | Facility: HOSPITAL | Age: 69
End: 2024-03-27
Payer: MEDICARE

## 2024-03-27 PROBLEM — J44.9 CHRONIC OBSTRUCTIVE PULMONARY DISEASE (MULTI): Status: ACTIVE | Noted: 2024-03-14

## 2024-03-27 PROBLEM — I65.23 BILATERAL CAROTID ARTERY STENOSIS: Status: ACTIVE | Noted: 2024-02-14

## 2024-03-27 PROBLEM — I87.8 VENOUS INTERMITTENT CLAUDICATION: Status: ACTIVE | Noted: 2024-02-12

## 2024-03-27 PROBLEM — I83.93 VARICOSE VEINS OF BOTH LOWER EXTREMITIES: Status: ACTIVE | Noted: 2023-11-01

## 2024-03-27 PROBLEM — I82.90 VENOUS THROMBOEMBOLISM (VTE): Status: ACTIVE | Noted: 2024-02-20

## 2024-03-27 PROBLEM — K25.9 GASTRIC ULCER: Status: ACTIVE | Noted: 2024-03-14

## 2024-03-28 ENCOUNTER — HOME CARE VISIT (OUTPATIENT)
Dept: HOME HEALTH SERVICES | Facility: HOME HEALTH | Age: 69
End: 2024-03-28
Payer: MEDICARE

## 2024-03-28 VITALS
SYSTOLIC BLOOD PRESSURE: 112 MMHG | HEART RATE: 72 BPM | RESPIRATION RATE: 20 BRPM | DIASTOLIC BLOOD PRESSURE: 68 MMHG | TEMPERATURE: 96.8 F | OXYGEN SATURATION: 99 %

## 2024-03-28 PROCEDURE — G0299 HHS/HOSPICE OF RN EA 15 MIN: HCPCS

## 2024-03-28 ASSESSMENT — PAIN SCALES - PAIN ASSESSMENT IN ADVANCED DEMENTIA (PAINAD)
NEGVOCALIZATION: 0
CONSOLABILITY: 0
BREATHING: 0
FACIALEXPRESSION: 0 - SMILING OR INEXPRESSIVE.
FACIALEXPRESSION: 0
NEGVOCALIZATION: 0 - NONE.
TOTALSCORE: 0
CONSOLABILITY: 0 - NO NEED TO CONSOLE.
BODYLANGUAGE: 0
BODYLANGUAGE: 0 - RELAXED.

## 2024-03-28 ASSESSMENT — ENCOUNTER SYMPTOMS
LOSS OF SENSATION IN FEET: 0
PAIN LOCATION - PAIN SEVERITY: 5/10
PAIN: 1
PAIN LOCATION - PAIN QUALITY: ACHES
CHANGE IN APPETITE: UNCHANGED
PERSON REPORTING PAIN: PATIENT
LOWEST PAIN SEVERITY IN PAST 24 HOURS: 0/10
APPETITE LEVEL: FAIR
PAIN SEVERITY GOAL: 0/10
PAIN LOCATION - PAIN FREQUENCY: INTERMITTENT
OCCASIONAL FEELINGS OF UNSTEADINESS: 0
SUBJECTIVE PAIN PROGRESSION: UNCHANGED
DEPRESSION: 0
PAIN LOCATION: BACK
HIGHEST PAIN SEVERITY IN PAST 24 HOURS: 5/10

## 2024-03-28 NOTE — HOME HEALTH
SKILLED NURSING VISIT FOR ASSESSMENT AND WOUND CARE. PT TOLERATED PROCEDURES WELL. RIGHT FOOT PINK AND LESS SWOLLEN WITH PALPABLE PEDAL PULSE. PT HAS APPT WITH SURGEON TOMORROW  HAS PREADMISSION TESTING ON MONDAY IN ANTICIPATION OF AORTA SURGERY NEXT THURSDAY.

## 2024-03-29 ENCOUNTER — OFFICE VISIT (OUTPATIENT)
Dept: VASCULAR SURGERY | Facility: HOSPITAL | Age: 69
End: 2024-03-29
Payer: MEDICARE

## 2024-03-29 VITALS
WEIGHT: 162 LBS | HEART RATE: 60 BPM | OXYGEN SATURATION: 100 % | DIASTOLIC BLOOD PRESSURE: 73 MMHG | SYSTOLIC BLOOD PRESSURE: 115 MMHG | BODY MASS INDEX: 29.81 KG/M2 | HEIGHT: 62 IN

## 2024-03-29 DIAGNOSIS — I70.0 AORTIC OCCLUSION (CMS-HCC): Primary | ICD-10-CM

## 2024-03-29 PROCEDURE — 3078F DIAST BP <80 MM HG: CPT | Performed by: NURSE PRACTITIONER

## 2024-03-29 PROCEDURE — 1111F DSCHRG MED/CURRENT MED MERGE: CPT | Performed by: NURSE PRACTITIONER

## 2024-03-29 PROCEDURE — 3074F SYST BP LT 130 MM HG: CPT | Performed by: NURSE PRACTITIONER

## 2024-03-29 PROCEDURE — 99214 OFFICE O/P EST MOD 30 MIN: CPT | Performed by: NURSE PRACTITIONER

## 2024-03-29 PROCEDURE — 1159F MED LIST DOCD IN RCRD: CPT | Performed by: NURSE PRACTITIONER

## 2024-03-29 NOTE — PATIENT INSTRUCTIONS
Stop lovenox injections.   Start Eliquis 5 mg twice daily.   Continue would care.   RTC in 2 weeks.

## 2024-03-30 NOTE — PROGRESS NOTES
Vascular Surgery Clinic Note    CC: FUV     History Of Present Illness:   Klaudia Ratliff is a 68 y.o. female here for routine follow up. She has an aortic occlusion causing right foot discomfort and claudication. She was diagnosed with a PE and RLE DVT in February 2024 and underwent mechanical thrombectomy via left femoral vein access. 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 The left groin access site has not healed and she is currently performing wound care daily. She denies fevers or chills.     She was admitted with melena and anemia. GI performed scope that showed a single gastric ulcer. Pathology results showed no H. Pylori.     Carotid duplex is with less than 50% ICA stenosis bilaterally.   FEV1 is 1.12L. She was evaluated by pulmonology and started on Spiriva.   Coronary catheterization showed nonobstructive CAD.     Medical History:  Patient Active Problem List   Diagnosis    Varicose veins of both lower extremities    Cigarette nicotine dependence without complication    Hyperlipidemia    Hypertension    Hypokalemia    Lichen planus    Chronic right-sided low back pain with left-sided sciatica    Headache    Positive colorectal cancer screening using Cologuard test    Acute sinusitis    Aortic occlusion (CMS/HCC)    Class 1 obesity    Venous intermittent claudication    Cramp of both lower extremities    DDD (degenerative disc disease), lumbosacral    Disturbance of skin sensation    Gait instability    Lumbosacral plexus lesion    Overweight with body mass index (BMI) 25.0-29.9    Reflex abnormality    Urinary tract infection    Arteriosclerosis of coronary artery    Multiple subsegmental pulmonary emboli without acute cor pulmonale (CMS/HCC)    Pulmonary embolism (CMS/HCC)    Melena    Aortoiliac occlusive disease (CMS/HCC)    Chronic obstructive pulmonary disease (CMS/HCC)    Venous thromboembolism (VTE)    Gastric ulcer    Bilateral carotid artery stenosis        SH:    Social Determinants of  "Health     Tobacco Use: High Risk (3/28/2024)    Patient History     Smoking Tobacco Use: Every Day     Smokeless Tobacco Use: Never     Passive Exposure: Current   Alcohol Use: Not At Risk (3/14/2024)    AUDIT-C     Frequency of Alcohol Consumption: Never     Average Number of Drinks: Patient does not drink     Frequency of Binge Drinking: Never   Financial Resource Strain: Low Risk  (3/14/2024)    Overall Financial Resource Strain (CARDIA)     Difficulty of Paying Living Expenses: Not hard at all   Food Insecurity: Not on file   Transportation Needs: No Transportation Needs (3/23/2024)    OASIS : Transportation     Lack of Transportation (Medical): No     Lack of Transportation (Non-Medical): No     Patient Unable or Declines to Respond: No   Physical Activity: Not on file   Stress: Not on file   Social Connections: Feeling Socially Integrated (3/23/2024)    OASIS : Social Isolation     Frequency of experiencing loneliness or isolation: Never   Intimate Partner Violence: Not on file   Depression: Not at risk (3/14/2024)    PHQ-2     PHQ-2 Score: 0   Housing Stability: Low Risk  (3/14/2024)    Housing Stability Vital Sign     Unable to Pay for Housing in the Last Year: No     Number of Places Lived in the Last Year: 1     Unstable Housing in the Last Year: No   Utilities: Not on file   Digital Equity: Not on file        FH:  No family history on file.     Allergies:   Allergies   Allergen Reactions    Amlodipine Swelling     Bilateral foot swelling    Clarithromycin Other and Dizziness     Slept for days    Doxycycline Diarrhea       ROS:  All systems were reviewed and noted to be negative, other than described above.     Objective:  Last Recorded Vitals  Blood pressure 115/73, pulse 60, height 1.575 m (5' 2\"), weight 73.5 kg (162 lb), SpO2 100 %.    Meds:   Current Outpatient Medications   Medication Instructions    ascorbic acid (Vitamin C) 250 mg tablet oral    ascorbic acid (VITAMIN C) 250 mg, oral, " Daily    aspirin 81 mg, oral, Daily    atenolol (TENORMIN) 50 mg, oral, 2 times daily    atorvastatin (LIPITOR) 80 mg, oral, Nightly    cholecalciferol (Vitamin D-3) 50 MCG (2000 UT) tablet oral    cholecalciferol (VITAMIN D-3) 50 mcg, oral, Daily    furosemide (LASIX) 40 mg, oral, Daily, As needed for foot swelling    gabapentin (NEURONTIN) 200 mg, oral, 2 times daily    losartan (COZAAR) 100 mg, oral, Daily    multivitamin with minerals tablet 1 tablet, oral, Daily    nicotine (Nicoderm CQ) 14 mg/24 hr patch 1 patch, Daily, Apply (1) patch daily as directed.    pantoprazole (PROTONIX) 40 mg, oral, Every 12 hours, Do not crush, chew, or split.    potassium chloride CR 10 mEq ER tablet TAKE 5 TABLETS BY MOUTH ONCE  DAILY    tiotropium (Spiriva Respimat) 2.5 mcg/actuation inhaler 2 puffs, inhalation, Daily RT       Exam:  Constitutional: Well appearing, NAD   PSYCH: Appropriate mood and affect  Eyes: Sclera clear   Neck: Supple   CV: No tachycardia  RESP: Unlabored breathing  GI: Soft, nontender, non-distended.   NEURO: No focal deficits noted. Motor/sensory intact.   EXTREMITIES: Feet slightly cool. No leg edema. No toe wounds. Left groin venous access site with superficial wound present without drainage or malodor.   PULSES: nonpalpable pedal pulses     Assessment & Plan:  1. Aortic occlusion (CMS/HCC)          Aortic occlusion.   Dx with RLE DVT/PE 2/2024 s/p mechanical thrombectomy.   Readmitted with melena and non-healing left venous access site s/p IVC filter placement by IR.   Currently performing daily wound care to left groin with nugauze packing.   Continue wound care  May stop lovenox and start eliquis 5 mg BID   Weight loss  RTC in 2 weeks for wound check   Wound needs to be healed prior to aortic surgery to avoid potential graft infection     Follow-up:  2 weeks     ADRIÁN Sommer-CNP

## 2024-04-01 ENCOUNTER — APPOINTMENT (OUTPATIENT)
Dept: PREADMISSION TESTING | Facility: HOSPITAL | Age: 69
End: 2024-04-01
Payer: MEDICARE

## 2024-04-02 ENCOUNTER — HOME CARE VISIT (OUTPATIENT)
Dept: HOME HEALTH SERVICES | Facility: HOME HEALTH | Age: 69
End: 2024-04-02
Payer: MEDICARE

## 2024-04-02 VITALS
SYSTOLIC BLOOD PRESSURE: 124 MMHG | DIASTOLIC BLOOD PRESSURE: 70 MMHG | TEMPERATURE: 97.5 F | RESPIRATION RATE: 18 BRPM | HEART RATE: 55 BPM | OXYGEN SATURATION: 99 %

## 2024-04-02 PROCEDURE — G0300 HHS/HOSPICE OF LPN EA 15 MIN: HCPCS

## 2024-04-02 ASSESSMENT — ENCOUNTER SYMPTOMS
APPETITE LEVEL: GOOD
DENIES PAIN: 1
PERSON REPORTING PAIN: PATIENT
CHANGE IN APPETITE: UNCHANGED

## 2024-04-03 ENCOUNTER — APPOINTMENT (OUTPATIENT)
Dept: CARDIOLOGY | Facility: CLINIC | Age: 69
End: 2024-04-03
Payer: MEDICARE

## 2024-04-04 PROCEDURE — G0180 MD CERTIFICATION HHA PATIENT: HCPCS | Performed by: SURGERY

## 2024-04-08 ENCOUNTER — PATIENT OUTREACH (OUTPATIENT)
Dept: PRIMARY CARE | Facility: CLINIC | Age: 69
End: 2024-04-08
Payer: MEDICARE

## 2024-04-08 ENCOUNTER — HOME CARE VISIT (OUTPATIENT)
Dept: HOME HEALTH SERVICES | Facility: HOME HEALTH | Age: 69
End: 2024-04-08
Payer: MEDICARE

## 2024-04-08 VITALS
RESPIRATION RATE: 20 BRPM | SYSTOLIC BLOOD PRESSURE: 110 MMHG | HEART RATE: 70 BPM | OXYGEN SATURATION: 97 % | TEMPERATURE: 97.5 F | DIASTOLIC BLOOD PRESSURE: 66 MMHG

## 2024-04-08 PROCEDURE — G0299 HHS/HOSPICE OF RN EA 15 MIN: HCPCS

## 2024-04-08 ASSESSMENT — PAIN SCALES - PAIN ASSESSMENT IN ADVANCED DEMENTIA (PAINAD)
FACIALEXPRESSION: 0
BREATHING: 0
FACIALEXPRESSION: 0 - SMILING OR INEXPRESSIVE.
NEGVOCALIZATION: 0
BODYLANGUAGE: 0
NEGVOCALIZATION: 0 - NONE.
BODYLANGUAGE: 0 - RELAXED.
CONSOLABILITY: 0 - NO NEED TO CONSOLE.
CONSOLABILITY: 0
TOTALSCORE: 0

## 2024-04-08 ASSESSMENT — ENCOUNTER SYMPTOMS
LOSS OF SENSATION IN FEET: 0
SUBJECTIVE PAIN PROGRESSION: UNCHANGED
CHANGE IN APPETITE: UNCHANGED
HIGHEST PAIN SEVERITY IN PAST 24 HOURS: 0/10
PERSON REPORTING PAIN: PATIENT
DEPRESSION: 0
LOWEST PAIN SEVERITY IN PAST 24 HOURS: 0/10
DENIES PAIN: 1
APPETITE LEVEL: GOOD
OCCASIONAL FEELINGS OF UNSTEADINESS: 0
PAIN SEVERITY GOAL: 0/10

## 2024-04-08 NOTE — PROGRESS NOTES
Call regarding 14days after hospitalization.  At time of outreach call the patient feels as if their condition has improved since discharge. Having surgery 4/18/2024.

## 2024-04-08 NOTE — HOME HEALTH
SKILLED NURSING VISIT FOR ASSESSMENT AND WOUND CARE. PT TOLERATED PROCEDURES WELL. PT REPORTS SURGERY DELAYED DUE TO WOUND NOT YET HEALED.

## 2024-04-12 ENCOUNTER — OFFICE VISIT (OUTPATIENT)
Dept: VASCULAR SURGERY | Facility: HOSPITAL | Age: 69
End: 2024-04-12
Payer: MEDICARE

## 2024-04-12 ENCOUNTER — HOME CARE VISIT (OUTPATIENT)
Dept: HOME HEALTH SERVICES | Facility: HOME HEALTH | Age: 69
End: 2024-04-12
Payer: MEDICARE

## 2024-04-12 VITALS
DIASTOLIC BLOOD PRESSURE: 78 MMHG | HEIGHT: 62 IN | WEIGHT: 163.4 LBS | BODY MASS INDEX: 30.07 KG/M2 | OXYGEN SATURATION: 98 % | HEART RATE: 60 BPM | SYSTOLIC BLOOD PRESSURE: 115 MMHG

## 2024-04-12 DIAGNOSIS — I70.0 AORTIC OCCLUSION (CMS-HCC): Primary | ICD-10-CM

## 2024-04-12 PROCEDURE — 3074F SYST BP LT 130 MM HG: CPT | Performed by: NURSE PRACTITIONER

## 2024-04-12 PROCEDURE — G0299 HHS/HOSPICE OF RN EA 15 MIN: HCPCS

## 2024-04-12 PROCEDURE — 3078F DIAST BP <80 MM HG: CPT | Performed by: NURSE PRACTITIONER

## 2024-04-12 PROCEDURE — 99213 OFFICE O/P EST LOW 20 MIN: CPT | Performed by: NURSE PRACTITIONER

## 2024-04-12 PROCEDURE — 1111F DSCHRG MED/CURRENT MED MERGE: CPT | Performed by: NURSE PRACTITIONER

## 2024-04-12 PROCEDURE — 1159F MED LIST DOCD IN RCRD: CPT | Performed by: NURSE PRACTITIONER

## 2024-04-12 ASSESSMENT — ENCOUNTER SYMPTOMS
LOSS OF SENSATION IN FEET: 0
DEPRESSION: 0
OCCASIONAL FEELINGS OF UNSTEADINESS: 0

## 2024-04-13 VITALS
RESPIRATION RATE: 20 BRPM | SYSTOLIC BLOOD PRESSURE: 118 MMHG | OXYGEN SATURATION: 99 % | DIASTOLIC BLOOD PRESSURE: 68 MMHG | TEMPERATURE: 97 F | HEART RATE: 70 BPM

## 2024-04-13 ASSESSMENT — ENCOUNTER SYMPTOMS
DENIES PAIN: 1
DEPRESSION: 0
APPETITE LEVEL: GOOD
PAIN SEVERITY GOAL: 0/10
OCCASIONAL FEELINGS OF UNSTEADINESS: 0
HIGHEST PAIN SEVERITY IN PAST 24 HOURS: 0/10
LAST BOWEL MOVEMENT: 66942
CHANGE IN APPETITE: UNCHANGED
SUBJECTIVE PAIN PROGRESSION: UNCHANGED
LOSS OF SENSATION IN FEET: 0
LOWEST PAIN SEVERITY IN PAST 24 HOURS: 0/10
PERSON REPORTING PAIN: PATIENT

## 2024-04-13 ASSESSMENT — PAIN SCALES - PAIN ASSESSMENT IN ADVANCED DEMENTIA (PAINAD)
NEGVOCALIZATION: 0 - NONE.
CONSOLABILITY: 0
BREATHING: 0
FACIALEXPRESSION: 0 - SMILING OR INEXPRESSIVE.
CONSOLABILITY: 0 - NO NEED TO CONSOLE.
BODYLANGUAGE: 0
TOTALSCORE: 0
FACIALEXPRESSION: 0
BODYLANGUAGE: 0 - RELAXED.
NEGVOCALIZATION: 0

## 2024-04-13 NOTE — PROGRESS NOTES
Vascular Surgery Clinic Note    CC: FUV wound check, aortic occlusion     History Of Present Illness:   Klaudia Ratliff is a 68 y.o. female here for routine follow up. She has an aortic occlusion causing right foot numbness and short distance claudication. She was diagnosed with a PE and RLE DVT in February 2024 and underwent mechanical thrombectomy via left femoral vein access. 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 The left groin access site has not healed and she is currently performing wound care daily. She denies fevers or chills.      She was admitted with melena and anemia. GI performed scope that showed a single gastric ulcer. Pathology results showed no H. Pylori.      Carotid duplex is with less than 50% ICA stenosis bilaterally.   FEV1 is 1.12L. She was evaluated by pulmonology and started on Spiriva.   Coronary catheterization showed nonobstructive CAD.     Medical History:  Patient Active Problem List   Diagnosis    Varicose veins of both lower extremities    Cigarette nicotine dependence without complication    Hyperlipidemia    Hypertension    Hypokalemia    Lichen planus    Chronic right-sided low back pain with left-sided sciatica    Headache    Positive colorectal cancer screening using Cologuard test    Acute sinusitis    Aortic occlusion (CMS-HCC)    Class 1 obesity    Venous intermittent claudication    Cramp of both lower extremities    DDD (degenerative disc disease), lumbosacral    Disturbance of skin sensation    Gait instability    Lumbosacral plexus lesion    Overweight with body mass index (BMI) 25.0-29.9    Reflex abnormality    Urinary tract infection    Arteriosclerosis of coronary artery    Multiple subsegmental pulmonary emboli without acute cor pulmonale (Multi)    Pulmonary embolism (Multi)    Melena    Aortoiliac occlusive disease (Multi)    Chronic obstructive pulmonary disease (Multi)    Venous thromboembolism (VTE)    Gastric ulcer    Bilateral carotid artery stenosis         SH:    Social Determinants of Health     Tobacco Use: Medium Risk (4/12/2024)    Patient History     Smoking Tobacco Use: Former     Smokeless Tobacco Use: Never     Passive Exposure: Current   Alcohol Use: Not At Risk (3/14/2024)    AUDIT-C     Frequency of Alcohol Consumption: Never     Average Number of Drinks: Patient does not drink     Frequency of Binge Drinking: Never   Financial Resource Strain: Low Risk  (3/14/2024)    Overall Financial Resource Strain (CARDIA)     Difficulty of Paying Living Expenses: Not hard at all   Food Insecurity: Not on file   Transportation Needs: No Transportation Needs (3/23/2024)    OASIS : Transportation     Lack of Transportation (Medical): No     Lack of Transportation (Non-Medical): No     Patient Unable or Declines to Respond: No   Physical Activity: Not on file   Stress: Not on file   Social Connections: Feeling Socially Integrated (3/23/2024)    OASIS : Social Isolation     Frequency of experiencing loneliness or isolation: Never   Intimate Partner Violence: Not on file   Depression: Not at risk (3/14/2024)    PHQ-2     PHQ-2 Score: 0   Housing Stability: Low Risk  (3/14/2024)    Housing Stability Vital Sign     Unable to Pay for Housing in the Last Year: No     Number of Places Lived in the Last Year: 1     Unstable Housing in the Last Year: No   Utilities: Not on file   Digital Equity: Not on file   Health Literacy: Adequate Health Literacy (3/23/2024)    OASIS : Health Literacy     Frequency of needing help to read materials from doctor or pharmacy: Never        FH:  No family history on file.     Allergies:   Allergies   Allergen Reactions    Amlodipine Swelling     Bilateral foot swelling    Clarithromycin Other and Dizziness     Slept for days    Doxycycline Diarrhea       ROS:  All systems were reviewed and noted to be negative, other than described above.     Objective:  Last Recorded Vitals  Blood pressure 115/78, pulse 60, height 1.575 m (5'  "2\"), weight 74.1 kg (163 lb 6.4 oz), SpO2 98%.    Meds:   Current Outpatient Medications   Medication Instructions    apixaban (ELIQUIS) 5 mg, oral, 2 times daily    ascorbic acid (VITAMIN C) 250 mg, oral, Daily    aspirin 81 mg, oral, Daily    atenolol (TENORMIN) 50 mg, oral, 2 times daily    atorvastatin (LIPITOR) 80 mg, oral, Nightly    cholecalciferol (VITAMIN D-3) 50 mcg, oral, Daily    furosemide (LASIX) 40 mg, oral, Daily, As needed for foot swelling    gabapentin (NEURONTIN) 200 mg, oral, 2 times daily    losartan (COZAAR) 100 mg, oral, Daily    multivitamin with minerals tablet 1 tablet, oral, Daily    nicotine (Nicoderm CQ) 14 mg/24 hr patch 1 patch, Daily, Apply (1) patch daily as directed.    potassium chloride CR 10 mEq ER tablet TAKE 5 TABLETS BY MOUTH ONCE  DAILY    tiotropium (Spiriva Respimat) 2.5 mcg/actuation inhaler 2 puffs, inhalation, Daily RT       Exam:  Constitutional: Well appearing, NAD   PSYCH: Appropriate mood and affect  Eyes: Sclera clear   Neck: Supple   CV: No tachycardia  RESP: Unlabored breathing  GI: Soft, nontender, non-distended.   NEURO: No focal deficits noted  EXTREMITIES: Feet slightly cool. Mild right leg edema. No toe wounds. Left groin venous access site with superficial wound present without drainage or malodor.   PULSES: nonpalpable pedal pulses     Assessment & Plan:  1. Aortic occlusion (CMS-[facility])          Aortic occlusion   RLE DVT/PE 2/2024 s/p mechanical thrombectomy s/p IVC filter placement.   Non-healing left venous access site - slow to heal.   Continue wound care  Continue eliquis 5 mg BID   RTC in 2 weeks for wound check   Wound needs to be healed prior to aortic surgery to avoid potential graft infection     Mary Toussaint, APRN-CNP    "

## 2024-04-15 ENCOUNTER — PREP FOR PROCEDURE (OUTPATIENT)
Dept: VASCULAR SURGERY | Facility: HOSPITAL | Age: 69
End: 2024-04-15
Payer: MEDICARE

## 2024-04-15 ENCOUNTER — HOME CARE VISIT (OUTPATIENT)
Dept: HOME HEALTH SERVICES | Facility: HOME HEALTH | Age: 69
End: 2024-04-15
Payer: MEDICARE

## 2024-04-15 VITALS
DIASTOLIC BLOOD PRESSURE: 70 MMHG | HEART RATE: 70 BPM | SYSTOLIC BLOOD PRESSURE: 122 MMHG | TEMPERATURE: 97 F | RESPIRATION RATE: 20 BRPM | OXYGEN SATURATION: 99 %

## 2024-04-15 DIAGNOSIS — I74.4 EMBOLISM AND THROMBOSIS OF ARTERIES OF EXTREMITIES, UNSPECIFIED (MULTI): ICD-10-CM

## 2024-04-15 DIAGNOSIS — I70.0 AORTIC OCCLUSION (CMS-HCC): Primary | ICD-10-CM

## 2024-04-15 DIAGNOSIS — I99.8 OTHER DISORDER OF CIRCULATORY SYSTEM: ICD-10-CM

## 2024-04-15 PROCEDURE — G0299 HHS/HOSPICE OF RN EA 15 MIN: HCPCS

## 2024-04-15 ASSESSMENT — ENCOUNTER SYMPTOMS
LOSS OF SENSATION IN FEET: 0
LOWEST PAIN SEVERITY IN PAST 24 HOURS: 0/10
PAIN SEVERITY GOAL: 0/10
SUBJECTIVE PAIN PROGRESSION: UNCHANGED
APPETITE LEVEL: GOOD
DENIES PAIN: 1
PERSON REPORTING PAIN: PATIENT
LAST BOWEL MOVEMENT: 66945
CHANGE IN APPETITE: UNCHANGED
OCCASIONAL FEELINGS OF UNSTEADINESS: 0
DEPRESSION: 0
HIGHEST PAIN SEVERITY IN PAST 24 HOURS: 0/10

## 2024-04-15 ASSESSMENT — PAIN SCALES - PAIN ASSESSMENT IN ADVANCED DEMENTIA (PAINAD)
BODYLANGUAGE: 0 - RELAXED.
FACIALEXPRESSION: 0
CONSOLABILITY: 0 - NO NEED TO CONSOLE.
NEGVOCALIZATION: 0
FACIALEXPRESSION: 0 - SMILING OR INEXPRESSIVE.
TOTALSCORE: 0
BODYLANGUAGE: 0
NEGVOCALIZATION: 0 - NONE.
CONSOLABILITY: 0
BREATHING: 0

## 2024-04-15 NOTE — HOME HEALTH
SKILLED NURSING VISIT FOR ASSESSMENT AND WOUND CARE. PT TOLERATED PROCEDURES WELL.PT TO TAKE PICTURE OF WOUND ON 4/23 AND SEND TO SURGEON FOR HER 4/24 VIRTUAL APPT. PT HOPES WOUND WILL BE HEALED SO SHE CAN BE SCHEDULED FOR HER VASCULAR SURGERY.

## 2024-04-16 ENCOUNTER — LAB (OUTPATIENT)
Dept: LAB | Facility: LAB | Age: 69
End: 2024-04-16
Payer: MEDICARE

## 2024-04-16 DIAGNOSIS — I70.0 AORTIC OCCLUSION (CMS-HCC): Primary | ICD-10-CM

## 2024-04-16 DIAGNOSIS — I70.0 AORTIC OCCLUSION (CMS-HCC): ICD-10-CM

## 2024-04-16 DIAGNOSIS — I99.8 OTHER DISORDER OF CIRCULATORY SYSTEM: ICD-10-CM

## 2024-04-16 DIAGNOSIS — I74.4 EMBOLISM AND THROMBOSIS OF ARTERIES OF EXTREMITIES, UNSPECIFIED (MULTI): ICD-10-CM

## 2024-04-16 DIAGNOSIS — N30.00 ACUTE CYSTITIS WITHOUT HEMATURIA: Primary | ICD-10-CM

## 2024-04-16 LAB
ANION GAP SERPL CALC-SCNC: 16 MMOL/L (ref 10–20)
APPEARANCE UR: ABNORMAL
APTT PPP: 38 SECONDS (ref 27–38)
BACTERIA #/AREA URNS AUTO: ABNORMAL /HPF
BASOPHILS # BLD AUTO: 0.02 X10*3/UL (ref 0–0.1)
BASOPHILS NFR BLD AUTO: 0.2 %
BILIRUB UR STRIP.AUTO-MCNC: NEGATIVE MG/DL
BUN SERPL-MCNC: 38 MG/DL (ref 6–23)
CALCIUM SERPL-MCNC: 9 MG/DL (ref 8.6–10.3)
CHLORIDE SERPL-SCNC: 102 MMOL/L (ref 98–107)
CO2 SERPL-SCNC: 23 MMOL/L (ref 21–32)
COLOR UR: ABNORMAL
CREAT SERPL-MCNC: 1.07 MG/DL (ref 0.5–1.05)
EGFRCR SERPLBLD CKD-EPI 2021: 57 ML/MIN/1.73M*2
EOSINOPHIL # BLD AUTO: 0.29 X10*3/UL (ref 0–0.7)
EOSINOPHIL NFR BLD AUTO: 3.1 %
ERYTHROCYTE [DISTWIDTH] IN BLOOD BY AUTOMATED COUNT: 14.3 % (ref 11.5–14.5)
GLUCOSE SERPL-MCNC: 121 MG/DL (ref 74–99)
GLUCOSE UR STRIP.AUTO-MCNC: NEGATIVE MG/DL
HCT VFR BLD AUTO: 33.7 % (ref 36–46)
HGB BLD-MCNC: 11.1 G/DL (ref 12–16)
HYALINE CASTS #/AREA URNS AUTO: ABNORMAL /LPF
IMM GRANULOCYTES # BLD AUTO: 0.04 X10*3/UL (ref 0–0.7)
IMM GRANULOCYTES NFR BLD AUTO: 0.4 % (ref 0–0.9)
INR PPP: 1.7 (ref 0.9–1.1)
KETONES UR STRIP.AUTO-MCNC: NEGATIVE MG/DL
LEUKOCYTE ESTERASE UR QL STRIP.AUTO: ABNORMAL
LYMPHOCYTES # BLD AUTO: 2.02 X10*3/UL (ref 1.2–4.8)
LYMPHOCYTES NFR BLD AUTO: 21.6 %
MCH RBC QN AUTO: 33.1 PG (ref 26–34)
MCHC RBC AUTO-ENTMCNC: 32.9 G/DL (ref 32–36)
MCV RBC AUTO: 101 FL (ref 80–100)
MONOCYTES # BLD AUTO: 0.65 X10*3/UL (ref 0.1–1)
MONOCYTES NFR BLD AUTO: 6.9 %
NEUTROPHILS # BLD AUTO: 6.34 X10*3/UL (ref 1.2–7.7)
NEUTROPHILS NFR BLD AUTO: 67.8 %
NITRITE UR QL STRIP.AUTO: NEGATIVE
NRBC BLD-RTO: 0 /100 WBCS (ref 0–0)
PH UR STRIP.AUTO: 6 [PH]
PLATELET # BLD AUTO: 284 X10*3/UL (ref 150–450)
POTASSIUM SERPL-SCNC: 4.3 MMOL/L (ref 3.5–5.3)
PROT UR STRIP.AUTO-MCNC: NEGATIVE MG/DL
PROTHROMBIN TIME: 19.4 SECONDS (ref 9.8–12.8)
RBC # BLD AUTO: 3.35 X10*6/UL (ref 4–5.2)
RBC # UR STRIP.AUTO: NEGATIVE /UL
RBC #/AREA URNS AUTO: ABNORMAL /HPF
SODIUM SERPL-SCNC: 137 MMOL/L (ref 136–145)
SP GR UR STRIP.AUTO: 1.01
UROBILINOGEN UR STRIP.AUTO-MCNC: <2 MG/DL
WBC # BLD AUTO: 9.4 X10*3/UL (ref 4.4–11.3)
WBC #/AREA URNS AUTO: >50 /HPF
WBC CLUMPS #/AREA URNS AUTO: ABNORMAL /HPF

## 2024-04-16 PROCEDURE — 87186 SC STD MICRODIL/AGAR DIL: CPT

## 2024-04-16 PROCEDURE — 86850 RBC ANTIBODY SCREEN: CPT

## 2024-04-16 PROCEDURE — 87086 URINE CULTURE/COLONY COUNT: CPT

## 2024-04-16 PROCEDURE — 85610 PROTHROMBIN TIME: CPT

## 2024-04-16 PROCEDURE — 80048 BASIC METABOLIC PNL TOTAL CA: CPT

## 2024-04-16 PROCEDURE — 36415 COLL VENOUS BLD VENIPUNCTURE: CPT

## 2024-04-16 PROCEDURE — 86900 BLOOD TYPING SEROLOGIC ABO: CPT

## 2024-04-16 PROCEDURE — 86901 BLOOD TYPING SEROLOGIC RH(D): CPT

## 2024-04-16 PROCEDURE — 85025 COMPLETE CBC W/AUTO DIFF WBC: CPT

## 2024-04-16 PROCEDURE — 81001 URINALYSIS AUTO W/SCOPE: CPT

## 2024-04-16 PROCEDURE — 85730 THROMBOPLASTIN TIME PARTIAL: CPT

## 2024-04-16 RX ORDER — NITROFURANTOIN 25; 75 MG/1; MG/1
100 CAPSULE ORAL 2 TIMES DAILY
Qty: 10 CAPSULE | Refills: 0 | Status: SHIPPED | OUTPATIENT
Start: 2024-04-16 | End: 2024-04-17 | Stop reason: SDUPTHER

## 2024-04-17 ENCOUNTER — HOME CARE VISIT (OUTPATIENT)
Dept: HOME HEALTH SERVICES | Facility: HOME HEALTH | Age: 69
End: 2024-04-17
Payer: MEDICARE

## 2024-04-17 ENCOUNTER — APPOINTMENT (OUTPATIENT)
Dept: VASCULAR SURGERY | Facility: HOSPITAL | Age: 69
End: 2024-04-17
Payer: MEDICARE

## 2024-04-17 ENCOUNTER — TELEPHONE (OUTPATIENT)
Dept: VASCULAR SURGERY | Facility: HOSPITAL | Age: 69
End: 2024-04-17
Payer: MEDICARE

## 2024-04-17 ENCOUNTER — LAB REQUISITION (OUTPATIENT)
Dept: LAB | Facility: HOSPITAL | Age: 69
End: 2024-04-17
Payer: MEDICARE

## 2024-04-17 VITALS
DIASTOLIC BLOOD PRESSURE: 70 MMHG | TEMPERATURE: 97.1 F | RESPIRATION RATE: 20 BRPM | HEART RATE: 78 BPM | OXYGEN SATURATION: 99 % | SYSTOLIC BLOOD PRESSURE: 126 MMHG

## 2024-04-17 DIAGNOSIS — M54.42 CHRONIC RIGHT-SIDED LOW BACK PAIN WITH LEFT-SIDED SCIATICA: ICD-10-CM

## 2024-04-17 DIAGNOSIS — I70.0 ATHEROSCLEROSIS OF AORTA (CMS-HCC): ICD-10-CM

## 2024-04-17 DIAGNOSIS — G89.29 CHRONIC RIGHT-SIDED LOW BACK PAIN WITH LEFT-SIDED SCIATICA: ICD-10-CM

## 2024-04-17 DIAGNOSIS — N30.00 ACUTE CYSTITIS WITHOUT HEMATURIA: ICD-10-CM

## 2024-04-17 LAB
ABO GROUP (TYPE) IN BLOOD: NORMAL
ANTIBODY SCREEN: NORMAL
HOLD SPECIMEN: NORMAL
RH FACTOR (ANTIGEN D): NORMAL

## 2024-04-17 PROCEDURE — G0299 HHS/HOSPICE OF RN EA 15 MIN: HCPCS

## 2024-04-17 RX ORDER — GABAPENTIN 100 MG/1
200 CAPSULE ORAL 2 TIMES DAILY
Qty: 120 CAPSULE | Refills: 0 | Status: SHIPPED | OUTPATIENT
Start: 2024-04-17 | End: 2024-05-17

## 2024-04-17 RX ORDER — NITROFURANTOIN 25; 75 MG/1; MG/1
100 CAPSULE ORAL 2 TIMES DAILY
Qty: 10 CAPSULE | Refills: 0 | Status: SHIPPED | OUTPATIENT
Start: 2024-04-17 | End: 2024-04-22

## 2024-04-17 ASSESSMENT — PAIN SCALES - PAIN ASSESSMENT IN ADVANCED DEMENTIA (PAINAD)
NEGVOCALIZATION: 0 - NONE.
CONSOLABILITY: 0
TOTALSCORE: 0
BREATHING: 0
BODYLANGUAGE: 0
CONSOLABILITY: 0 - NO NEED TO CONSOLE.
BODYLANGUAGE: 0 - RELAXED.
NEGVOCALIZATION: 0
FACIALEXPRESSION: 0
FACIALEXPRESSION: 0 - SMILING OR INEXPRESSIVE.

## 2024-04-17 ASSESSMENT — ENCOUNTER SYMPTOMS
APPETITE LEVEL: FAIR
SUBJECTIVE PAIN PROGRESSION: UNCHANGED
LOWEST PAIN SEVERITY IN PAST 24 HOURS: 0/10
PAIN SEVERITY GOAL: 0/10
PERSON REPORTING PAIN: PATIENT
HIGHEST PAIN SEVERITY IN PAST 24 HOURS: 0/10
LOSS OF SENSATION IN FEET: 0
LAST BOWEL MOVEMENT: 66947
DEPRESSION: 0
DENIES PAIN: 1
CHANGE IN APPETITE: UNCHANGED
OCCASIONAL FEELINGS OF UNSTEADINESS: 0

## 2024-04-17 NOTE — TELEPHONE ENCOUNTER
Patient's surgery for 4/18/24 delayed d/t +UTI noted on yesterday's labs.  Per Mary Toussaint CNP, antibiotic Rx submitted to patient's pharmacy.  I spoke with patient this morning and relayed this info.  Asked that she resume her Eliquis medication as prescribed, hold Lovenox.  Patient verbalized understanding of all instructions.  Will await new surgery date.

## 2024-04-17 NOTE — HOME HEALTH
SKILLED NURSING VISIT FOR ASSESSMENT AND WOUND CARE. PT TOLERATED PROCEDURES WELL. PT REPORTS SHE HAD LABWORK PERFORMED YESTERDAY IN ANTICIPATION OF SURGERY ON FRIDAY 4/19 BUT NOW DELAYED UNTIL MAY 15  DUE TO UTI.  PT HAS SCRIPT FOR ANTIBIOTIC AT PHARMACY. PT DISCOURAGED DUE TO HER DECREASING ENDURANCE AND RIGHT LEG EDEMA. PT AGREEABLE TO PHYSICAL THERAPY EVALUATION.

## 2024-04-18 ENCOUNTER — HOME CARE VISIT (OUTPATIENT)
Dept: HOME HEALTH SERVICES | Facility: HOME HEALTH | Age: 69
End: 2024-04-18
Payer: MEDICARE

## 2024-04-19 ENCOUNTER — HOME CARE VISIT (OUTPATIENT)
Dept: HOME HEALTH SERVICES | Facility: HOME HEALTH | Age: 69
End: 2024-04-19
Payer: MEDICARE

## 2024-04-19 LAB — BACTERIA UR CULT: ABNORMAL

## 2024-04-19 PROCEDURE — G0299 HHS/HOSPICE OF RN EA 15 MIN: HCPCS

## 2024-04-20 VITALS
RESPIRATION RATE: 20 BRPM | HEART RATE: 78 BPM | SYSTOLIC BLOOD PRESSURE: 116 MMHG | DIASTOLIC BLOOD PRESSURE: 68 MMHG | TEMPERATURE: 97.3 F | OXYGEN SATURATION: 99 %

## 2024-04-20 ASSESSMENT — ENCOUNTER SYMPTOMS
CHANGE IN APPETITE: UNCHANGED
PERSON REPORTING PAIN: PATIENT
DEPRESSION: 0
PAIN SEVERITY GOAL: 0/10
LOSS OF SENSATION IN FEET: 0
SUBJECTIVE PAIN PROGRESSION: UNCHANGED
HIGHEST PAIN SEVERITY IN PAST 24 HOURS: 0/10
OCCASIONAL FEELINGS OF UNSTEADINESS: 0
APPETITE LEVEL: FAIR
LAST BOWEL MOVEMENT: 66949
DENIES PAIN: 1
LOWEST PAIN SEVERITY IN PAST 24 HOURS: 0/10

## 2024-04-20 ASSESSMENT — PAIN SCALES - PAIN ASSESSMENT IN ADVANCED DEMENTIA (PAINAD)
FACIALEXPRESSION: 0
BODYLANGUAGE: 0 - RELAXED.
TOTALSCORE: 0
NEGVOCALIZATION: 0
FACIALEXPRESSION: 0 - SMILING OR INEXPRESSIVE.
CONSOLABILITY: 0 - NO NEED TO CONSOLE.
NEGVOCALIZATION: 0 - NONE.
BREATHING: 0
BODYLANGUAGE: 0
CONSOLABILITY: 0

## 2024-04-22 ENCOUNTER — HOME CARE VISIT (OUTPATIENT)
Dept: HOME HEALTH SERVICES | Facility: HOME HEALTH | Age: 69
End: 2024-04-22
Payer: MEDICARE

## 2024-04-22 DIAGNOSIS — N30.00 ACUTE CYSTITIS WITHOUT HEMATURIA: Primary | ICD-10-CM

## 2024-04-22 PROCEDURE — G0299 HHS/HOSPICE OF RN EA 15 MIN: HCPCS

## 2024-04-22 PROCEDURE — 0023 HH SOC

## 2024-04-23 ENCOUNTER — HOME CARE VISIT (OUTPATIENT)
Dept: HOME HEALTH SERVICES | Facility: HOME HEALTH | Age: 69
End: 2024-04-23
Payer: MEDICARE

## 2024-04-23 VITALS
SYSTOLIC BLOOD PRESSURE: 122 MMHG | HEART RATE: 72 BPM | RESPIRATION RATE: 20 BRPM | TEMPERATURE: 97 F | OXYGEN SATURATION: 99 % | DIASTOLIC BLOOD PRESSURE: 72 MMHG

## 2024-04-23 VITALS — HEART RATE: 71 BPM | SYSTOLIC BLOOD PRESSURE: 128 MMHG | TEMPERATURE: 97.7 F | DIASTOLIC BLOOD PRESSURE: 70 MMHG

## 2024-04-23 PROCEDURE — G0151 HHCP-SERV OF PT,EA 15 MIN: HCPCS

## 2024-04-23 SDOH — HEALTH STABILITY: PHYSICAL HEALTH
EXERCISE COMMENTS: INSTR IN AND PERFORMED SEATED: HIP ROTATION, LAQ AND ANKLE DF/PF INSTR IN HOURLY ACTIVITY ( WALK STAND OR HEP)

## 2024-04-23 ASSESSMENT — ENCOUNTER SYMPTOMS
HIGHEST PAIN SEVERITY IN PAST 24 HOURS: 0/10
PAIN LOCATION: RIGHT FOOT
LOSS OF SENSATION IN FEET: 0
DEPRESSION: 0
PAIN: 1
LAST BOWEL MOVEMENT: 66952
PERSON REPORTING PAIN: PATIENT
OCCASIONAL FEELINGS OF UNSTEADINESS: 1
OCCASIONAL FEELINGS OF UNSTEADINESS: 0
SUBJECTIVE PAIN PROGRESSION: UNCHANGED
APPETITE LEVEL: FAIR
DENIES PAIN: 1
PAIN LOCATION - PAIN SEVERITY: 1/10
PAIN SEVERITY GOAL: 0/10
LOWEST PAIN SEVERITY IN PAST 24 HOURS: 0/10
HIGHEST PAIN SEVERITY IN PAST 24 HOURS: 7/10
CHANGE IN APPETITE: UNCHANGED

## 2024-04-23 ASSESSMENT — PAIN SCALES - PAIN ASSESSMENT IN ADVANCED DEMENTIA (PAINAD)
BODYLANGUAGE: 0 - RELAXED.
NEGVOCALIZATION: 0 - NONE.
BODYLANGUAGE: 0
CONSOLABILITY: 0 - NO NEED TO CONSOLE.
NEGVOCALIZATION: 0
TOTALSCORE: 0
CONSOLABILITY: 0
BREATHING: 0
FACIALEXPRESSION: 0
FACIALEXPRESSION: 0 - SMILING OR INEXPRESSIVE.

## 2024-04-23 ASSESSMENT — ACTIVITIES OF DAILY LIVING (ADL)
AMBULATION_DISTANCE/DURATION_TOLERATED: 25 FT X 2
AMBULATION ASSISTANCE ON FLAT SURFACES: 1

## 2024-04-23 NOTE — HOME HEALTH
SKILLED NURSING VISIT FOR ASSESSMENT AND WOUND CARE. PT TOLERATED PROCEDURES WELL.RIGHT FOOT LESS EDEMATOUS AND REDDENED. PALPABLE PEDAL PULSE. PT ELEVATING BLE THROUGHOUT THE DAY.

## 2024-04-24 ENCOUNTER — OFFICE VISIT (OUTPATIENT)
Dept: VASCULAR SURGERY | Facility: CLINIC | Age: 69
End: 2024-04-24
Payer: MEDICARE

## 2024-04-24 ENCOUNTER — HOME CARE VISIT (OUTPATIENT)
Dept: HOME HEALTH SERVICES | Facility: HOME HEALTH | Age: 69
End: 2024-04-24
Payer: MEDICARE

## 2024-04-24 DIAGNOSIS — I70.0 AORTIC OCCLUSION (CMS-HCC): Primary | ICD-10-CM

## 2024-04-24 PROCEDURE — 99214 OFFICE O/P EST MOD 30 MIN: CPT | Performed by: SURGERY

## 2024-04-24 PROCEDURE — 1159F MED LIST DOCD IN RCRD: CPT | Performed by: SURGERY

## 2024-04-24 PROCEDURE — G0157 HHC PT ASSISTANT EA 15: HCPCS | Mod: CQ

## 2024-04-24 ASSESSMENT — ENCOUNTER SYMPTOMS
HIGHEST PAIN SEVERITY IN PAST 24 HOURS: 6/10
PERSON REPORTING PAIN: PATIENT
LOWEST PAIN SEVERITY IN PAST 24 HOURS: 0/10
PAIN SEVERITY GOAL: 0/10
PAIN LOCATION: RIGHT FOOT
SUBJECTIVE PAIN PROGRESSION: UNCHANGED
PAIN: 1

## 2024-04-25 ENCOUNTER — HOME CARE VISIT (OUTPATIENT)
Dept: HOME HEALTH SERVICES | Facility: HOME HEALTH | Age: 69
End: 2024-04-25
Payer: MEDICARE

## 2024-04-25 VITALS
TEMPERATURE: 97 F | RESPIRATION RATE: 20 BRPM | OXYGEN SATURATION: 99 % | DIASTOLIC BLOOD PRESSURE: 70 MMHG | HEART RATE: 70 BPM | SYSTOLIC BLOOD PRESSURE: 122 MMHG

## 2024-04-25 PROCEDURE — G0299 HHS/HOSPICE OF RN EA 15 MIN: HCPCS

## 2024-04-25 ASSESSMENT — ENCOUNTER SYMPTOMS
CHANGE IN APPETITE: UNCHANGED
OCCASIONAL FEELINGS OF UNSTEADINESS: 0
DEPRESSION: 0
DENIES PAIN: 1
SUBJECTIVE PAIN PROGRESSION: UNCHANGED
PAIN SEVERITY GOAL: 0/10
LAST BOWEL MOVEMENT: 66955
LOWEST PAIN SEVERITY IN PAST 24 HOURS: 0/10
APPETITE LEVEL: GOOD
LOSS OF SENSATION IN FEET: 0
HIGHEST PAIN SEVERITY IN PAST 24 HOURS: 0/10

## 2024-04-25 ASSESSMENT — PAIN SCALES - PAIN ASSESSMENT IN ADVANCED DEMENTIA (PAINAD)
CONSOLABILITY: 0 - NO NEED TO CONSOLE.
NEGVOCALIZATION: 0 - NONE.
CONSOLABILITY: 0
BODYLANGUAGE: 0
FACIALEXPRESSION: 0 - SMILING OR INEXPRESSIVE.
BREATHING: 0
FACIALEXPRESSION: 0
BODYLANGUAGE: 0 - RELAXED.
TOTALSCORE: 0
NEGVOCALIZATION: 0

## 2024-04-25 NOTE — HOME HEALTH
SKILLED NURSING VISIT FOR ASSESSMENT AND WOUND CARE. PT TOLERATED PROCEDURES WELL. PT REPORTS SHE SENT PICTURE OF WOUND TO SURGEONS OFFICE AS REQUESTED. SURGERY IS SCHEDULED FOR MAY 16, 2024.

## 2024-04-26 ENCOUNTER — LAB (OUTPATIENT)
Dept: LAB | Facility: LAB | Age: 69
End: 2024-04-26
Payer: MEDICARE

## 2024-04-26 DIAGNOSIS — N30.00 ACUTE CYSTITIS WITHOUT HEMATURIA: ICD-10-CM

## 2024-04-26 LAB
APPEARANCE UR: CLEAR
BACTERIA #/AREA URNS AUTO: ABNORMAL /HPF
BILIRUB UR STRIP.AUTO-MCNC: NEGATIVE MG/DL
COLOR UR: ABNORMAL
GLUCOSE UR STRIP.AUTO-MCNC: NEGATIVE MG/DL
HYALINE CASTS #/AREA URNS AUTO: ABNORMAL /LPF
KETONES UR STRIP.AUTO-MCNC: NEGATIVE MG/DL
LEUKOCYTE ESTERASE UR QL STRIP.AUTO: ABNORMAL
MUCOUS THREADS #/AREA URNS AUTO: ABNORMAL /LPF
NITRITE UR QL STRIP.AUTO: NEGATIVE
PH UR STRIP.AUTO: 5 [PH]
PROT UR STRIP.AUTO-MCNC: NEGATIVE MG/DL
RBC # UR STRIP.AUTO: NEGATIVE /UL
RBC #/AREA URNS AUTO: ABNORMAL /HPF
SP GR UR STRIP.AUTO: 1.01
SQUAMOUS #/AREA URNS AUTO: ABNORMAL /HPF
UROBILINOGEN UR STRIP.AUTO-MCNC: <2 MG/DL
WBC #/AREA URNS AUTO: ABNORMAL /HPF

## 2024-04-26 PROCEDURE — 81001 URINALYSIS AUTO W/SCOPE: CPT

## 2024-04-26 PROCEDURE — 87086 URINE CULTURE/COLONY COUNT: CPT

## 2024-04-26 PROCEDURE — 87186 SC STD MICRODIL/AGAR DIL: CPT

## 2024-04-27 DIAGNOSIS — N39.0 URINARY TRACT INFECTION WITHOUT HEMATURIA, SITE UNSPECIFIED: Primary | ICD-10-CM

## 2024-04-27 LAB — HOLD SPECIMEN: NORMAL

## 2024-04-27 RX ORDER — FLUCONAZOLE 100 MG/1
150 TABLET ORAL ONCE
Qty: 2 TABLET | Refills: 0 | Status: SHIPPED | OUTPATIENT
Start: 2024-04-27 | End: 2024-04-27

## 2024-04-27 RX ORDER — AMOXICILLIN AND CLAVULANATE POTASSIUM 875; 125 MG/1; MG/1
1 TABLET, FILM COATED ORAL 2 TIMES DAILY
Qty: 6 TABLET | Refills: 0 | Status: SHIPPED | OUTPATIENT
Start: 2024-04-27 | End: 2024-04-30

## 2024-04-29 ENCOUNTER — TELEPHONE (OUTPATIENT)
Dept: VASCULAR SURGERY | Facility: HOSPITAL | Age: 69
End: 2024-04-29
Payer: MEDICARE

## 2024-04-29 ENCOUNTER — HOME CARE VISIT (OUTPATIENT)
Dept: HOME HEALTH SERVICES | Facility: HOME HEALTH | Age: 69
End: 2024-04-29
Payer: MEDICARE

## 2024-04-29 VITALS
RESPIRATION RATE: 20 BRPM | DIASTOLIC BLOOD PRESSURE: 70 MMHG | HEART RATE: 72 BPM | OXYGEN SATURATION: 99 % | TEMPERATURE: 98.6 F | SYSTOLIC BLOOD PRESSURE: 122 MMHG

## 2024-04-29 DIAGNOSIS — N39.0 URINARY TRACT INFECTION WITHOUT HEMATURIA, SITE UNSPECIFIED: Primary | ICD-10-CM

## 2024-04-29 LAB — BACTERIA UR CULT: ABNORMAL

## 2024-04-29 PROCEDURE — G0299 HHS/HOSPICE OF RN EA 15 MIN: HCPCS

## 2024-04-29 ASSESSMENT — ENCOUNTER SYMPTOMS
LOSS OF SENSATION IN FEET: 0
PERSON REPORTING PAIN: PATIENT
SUBJECTIVE PAIN PROGRESSION: UNCHANGED
PAIN SEVERITY GOAL: 0/10
OCCASIONAL FEELINGS OF UNSTEADINESS: 0
DEPRESSION: 0
LOWEST PAIN SEVERITY IN PAST 24 HOURS: 0/10
APPETITE LEVEL: GOOD
DENIES PAIN: 1
LAST BOWEL MOVEMENT: 66959
CHANGE IN APPETITE: UNCHANGED
HIGHEST PAIN SEVERITY IN PAST 24 HOURS: 0/10

## 2024-04-29 ASSESSMENT — PAIN SCALES - PAIN ASSESSMENT IN ADVANCED DEMENTIA (PAINAD)
BODYLANGUAGE: 0 - RELAXED.
NEGVOCALIZATION: 0 - NONE.
TOTALSCORE: 0
CONSOLABILITY: 0
BREATHING: 0
BODYLANGUAGE: 0
CONSOLABILITY: 0 - NO NEED TO CONSOLE.
FACIALEXPRESSION: 0
NEGVOCALIZATION: 0
FACIALEXPRESSION: 0 - SMILING OR INEXPRESSIVE.

## 2024-05-01 ENCOUNTER — HOME CARE VISIT (OUTPATIENT)
Dept: HOME HEALTH SERVICES | Facility: HOME HEALTH | Age: 69
End: 2024-05-01
Payer: MEDICARE

## 2024-05-01 DIAGNOSIS — I82.409 DEEP VEIN THROMBOSIS (DVT) OF LOWER EXTREMITY, UNSPECIFIED CHRONICITY, UNSPECIFIED LATERALITY, UNSPECIFIED VEIN (MULTI): Primary | ICD-10-CM

## 2024-05-01 DIAGNOSIS — I26.99 PULMONARY EMBOLISM, UNSPECIFIED CHRONICITY, UNSPECIFIED PULMONARY EMBOLISM TYPE, UNSPECIFIED WHETHER ACUTE COR PULMONALE PRESENT (MULTI): ICD-10-CM

## 2024-05-01 PROCEDURE — G0157 HHC PT ASSISTANT EA 15: HCPCS | Mod: CQ

## 2024-05-01 ASSESSMENT — ENCOUNTER SYMPTOMS
PAIN SEVERITY GOAL: 0/10
LOWEST PAIN SEVERITY IN PAST 24 HOURS: 0/10
PERSON REPORTING PAIN: PATIENT
PAIN: 1
PAIN LOCATION: RIGHT FOOT
HIGHEST PAIN SEVERITY IN PAST 24 HOURS: 8/10

## 2024-05-03 ENCOUNTER — LAB (OUTPATIENT)
Dept: LAB | Facility: LAB | Age: 69
End: 2024-05-03
Payer: MEDICARE

## 2024-05-03 ENCOUNTER — HOME CARE VISIT (OUTPATIENT)
Dept: HOME HEALTH SERVICES | Facility: HOME HEALTH | Age: 69
End: 2024-05-03
Payer: MEDICARE

## 2024-05-03 VITALS
SYSTOLIC BLOOD PRESSURE: 122 MMHG | HEART RATE: 70 BPM | OXYGEN SATURATION: 99 % | DIASTOLIC BLOOD PRESSURE: 72 MMHG | RESPIRATION RATE: 20 BRPM | TEMPERATURE: 96.6 F

## 2024-05-03 DIAGNOSIS — N39.0 URINARY TRACT INFECTION WITHOUT HEMATURIA, SITE UNSPECIFIED: Primary | ICD-10-CM

## 2024-05-03 DIAGNOSIS — N39.0 URINARY TRACT INFECTION WITHOUT HEMATURIA, SITE UNSPECIFIED: ICD-10-CM

## 2024-05-03 DIAGNOSIS — N30.00 ACUTE CYSTITIS WITHOUT HEMATURIA: Primary | ICD-10-CM

## 2024-05-03 LAB
APPEARANCE UR: ABNORMAL
BACTERIA #/AREA URNS AUTO: ABNORMAL /HPF
BILIRUB UR STRIP.AUTO-MCNC: NEGATIVE MG/DL
COLOR UR: YELLOW
GLUCOSE UR STRIP.AUTO-MCNC: NEGATIVE MG/DL
KETONES UR STRIP.AUTO-MCNC: NEGATIVE MG/DL
LEUKOCYTE ESTERASE UR QL STRIP.AUTO: ABNORMAL
NITRITE UR QL STRIP.AUTO: POSITIVE
PH UR STRIP.AUTO: 6 [PH]
PROT UR STRIP.AUTO-MCNC: ABNORMAL MG/DL
RBC # UR STRIP.AUTO: NEGATIVE /UL
RBC #/AREA URNS AUTO: ABNORMAL /HPF
SP GR UR STRIP.AUTO: 1.01
SQUAMOUS #/AREA URNS AUTO: ABNORMAL /HPF
UROBILINOGEN UR STRIP.AUTO-MCNC: <2 MG/DL
WBC #/AREA URNS AUTO: >50 /HPF
WBC CLUMPS #/AREA URNS AUTO: ABNORMAL /HPF

## 2024-05-03 PROCEDURE — 81001 URINALYSIS AUTO W/SCOPE: CPT

## 2024-05-03 PROCEDURE — 87086 URINE CULTURE/COLONY COUNT: CPT

## 2024-05-03 PROCEDURE — 87186 SC STD MICRODIL/AGAR DIL: CPT

## 2024-05-03 PROCEDURE — G0299 HHS/HOSPICE OF RN EA 15 MIN: HCPCS

## 2024-05-03 RX ORDER — SULFAMETHOXAZOLE AND TRIMETHOPRIM 400; 80 MG/1; MG/1
1 TABLET ORAL 2 TIMES DAILY
Qty: 20 TABLET | Refills: 0 | Status: SHIPPED | OUTPATIENT
Start: 2024-05-03 | End: 2024-05-31 | Stop reason: HOSPADM

## 2024-05-03 ASSESSMENT — ENCOUNTER SYMPTOMS: LAST BOWEL MOVEMENT: 66963

## 2024-05-03 NOTE — RESULT ENCOUNTER NOTE
UA still positive.   Spoke with patient - is wiping back to front, having diarrhea.   Will send to urology, sent for 10 days of bactrim, asked she use a nga-bottle, take a probiotic, and wipe front to back.

## 2024-05-03 NOTE — HOME HEALTH
SKILLED NURSING VISIT FOR ASSESSMENT. WOUND HEALED. RLE REMAINS SLIGHTLY EDEMATOUS AND DUNG WITH PALPABLE PEDAL PULSE. PT DECLINES VISITS NEXT WEEK DUE TO SEVERAL APPTS IN PREPARATION FOR SURGERY MAY 16,2024.

## 2024-05-04 ASSESSMENT — PAIN SCALES - PAIN ASSESSMENT IN ADVANCED DEMENTIA (PAINAD)
BREATHING: 0
CONSOLABILITY: 0 - NO NEED TO CONSOLE.
TOTALSCORE: 0
NEGVOCALIZATION: 0
BODYLANGUAGE: 0 - RELAXED.
FACIALEXPRESSION: 0
CONSOLABILITY: 0
BODYLANGUAGE: 0
FACIALEXPRESSION: 0 - SMILING OR INEXPRESSIVE.
NEGVOCALIZATION: 0 - NONE.

## 2024-05-04 ASSESSMENT — ENCOUNTER SYMPTOMS
OCCASIONAL FEELINGS OF UNSTEADINESS: 0
LOWEST PAIN SEVERITY IN PAST 24 HOURS: 0/10
DENIES PAIN: 1
DEPRESSION: 0
PERSON REPORTING PAIN: PATIENT
CHANGE IN APPETITE: UNCHANGED
APPETITE LEVEL: FAIR
SUBJECTIVE PAIN PROGRESSION: UNCHANGED
PAIN SEVERITY GOAL: 0/10
HIGHEST PAIN SEVERITY IN PAST 24 HOURS: 0/10
LOSS OF SENSATION IN FEET: 0

## 2024-05-05 LAB — BACTERIA UR CULT: ABNORMAL

## 2024-05-06 ENCOUNTER — OFFICE VISIT (OUTPATIENT)
Dept: UROLOGY | Facility: CLINIC | Age: 69
End: 2024-05-06
Payer: MEDICARE

## 2024-05-06 VITALS
HEIGHT: 62 IN | WEIGHT: 163 LBS | HEART RATE: 61 BPM | SYSTOLIC BLOOD PRESSURE: 116 MMHG | DIASTOLIC BLOOD PRESSURE: 78 MMHG | BODY MASS INDEX: 30 KG/M2

## 2024-05-06 DIAGNOSIS — N39.0 RECURRENT UTI: ICD-10-CM

## 2024-05-06 DIAGNOSIS — N39.0 URINARY TRACT INFECTION WITHOUT HEMATURIA, SITE UNSPECIFIED: ICD-10-CM

## 2024-05-06 LAB
POC APPEARANCE, URINE: CLEAR
POC BILIRUBIN, URINE: NEGATIVE
POC BLOOD, URINE: NEGATIVE
POC COLOR, URINE: YELLOW
POC GLUCOSE, URINE: NEGATIVE MG/DL
POC KETONES, URINE: NEGATIVE MG/DL
POC LEUKOCYTES, URINE: ABNORMAL
POC NITRITE,URINE: NEGATIVE
POC PH, URINE: 6 PH
POC PROTEIN, URINE: NEGATIVE MG/DL
POC SPECIFIC GRAVITY, URINE: 1.01
POC UROBILINOGEN, URINE: 0.2 EU/DL

## 2024-05-06 PROCEDURE — 3078F DIAST BP <80 MM HG: CPT | Performed by: NURSE PRACTITIONER

## 2024-05-06 PROCEDURE — 3074F SYST BP LT 130 MM HG: CPT | Performed by: NURSE PRACTITIONER

## 2024-05-06 PROCEDURE — 99214 OFFICE O/P EST MOD 30 MIN: CPT | Performed by: NURSE PRACTITIONER

## 2024-05-06 PROCEDURE — 1159F MED LIST DOCD IN RCRD: CPT | Performed by: NURSE PRACTITIONER

## 2024-05-06 PROCEDURE — 81003 URINALYSIS AUTO W/O SCOPE: CPT | Performed by: NURSE PRACTITIONER

## 2024-05-06 NOTE — PROGRESS NOTES
UROLOGIC INITIAL EVALUATION     PROBLEM LIST:  1. Urinary tract infection without hematuria, site unspecified        2. Recurrent UTI  POCT UA Automated manually resulted    Post-Void Residual           HISTORY OF PRESENT ILLNESS:   Klaudia Ratliff is a 68 y.o. with HTN, HLD, COPD  Vasculopathy including saddle PE s/p thrombectomy, RLE DVT, and aortic occlusion with plan for open repair  Kindly referred by vascular surgery for evaluation and management of acute cystitis  Seen unaccompanied  Reports UTI may have been her fault  Was experiencing severe low back pain after hospitalization  Unable to reach from behind to wipe after BM so was wiping through her legs  Last UTIs were ~14 years ago, associated with sexual activity  Started abx 3 days ago; no current dysuria, hematuria, or other symptoms  No significant nocturia at baseline, 0-1/night  No incontinence  No urinary catheterization during recent admissions  Primary concern is risk of mortality with upcoming surgery    PAST MEDICAL HISTORY:  Past Medical History:   Diagnosis Date    Abnormal ECG 02/20/2024    Normal sinus rhythm Nonspecific ST abnormality Abnormal ECG    Acute sinusitis, unspecified 03/16/2016    Acute sinusitis    Aortic occlusion (CMS-[facility])     Claudication of both lower extremities (CMS-[facility])     COPD (chronic obstructive pulmonary disease) (Multi)     Coronary artery disease     Coronary artery disease without angina pectoris    DVT (deep venous thrombosis) (Multi)     RLE DVT, right PE (s/p mechanical thrombectomy 2/22/24    Essential (primary) hypertension 07/18/2022    Hypertension    Headache, unspecified 07/09/2013    Headache    Hyperlipidemia     Joint pain     Melena     Right shoulder pain 11/01/2023    Ulcer, stomach peptic     Wound of left groin     left groin nonhealing access site       PAST SURGICAL HISTORY:  Past Surgical History:   Procedure Laterality Date    CARDIAC CATHETERIZATION N/A 02/16/2024    Procedure: Left  Heart Cath;  Surgeon: Aditya Sky MD PhD;  Location[facility] Cardiac Cath Lab;  Service: Cardiovascular;  Laterality: N/A;  STAT    COLONOSCOPY      CT ABDOMEN PELVIS W AND WO IV CONTRAST  03/15/2024    IMPRESSION: 1. 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 2. No evidence of acute GI bleeding. 3. Diverticulosis without evidence acute diverticulitis. 4. No evidence of acute process in the abdomen or pelvis.    ECHOCARDIOGRAM 2 D M MODE PANEL  2024    Left ventricular systolic function is normal with a 60-65% estimated ejection fraction.  2. There is an elevated mean left atrial pressure.    ESOPHAGOGASTRODUODENOSCOPY      INVASIVE VASCULAR PROCEDURE N/A 2024    Procedure: Lower Extremity Angiogram;  Surgeon: Bar Day DO;  Location: [place] [address] Cardiac Cath Lab;  Service: Cardiovascular;  Laterality: N/A;    INVASIVE VASCULAR PROCEDURE N/A 2024    Procedure: Pulmonary Angiogram;  Surgeon: Bar Day DO;  Location: [place] [address] Cardiac Cath Lab;  Service: Cardiovascular;  Laterality: N/A;    IR INTERVENTION FILTER PLACEMENT  03/15/2024    MR CARDIAC MORPHOLOGY AND FUNCTION W AND WO IV CONTRAST  2024    THROMBECTOMY  2002    mechanical thrombectomy 24        ALLERGIES:   Allergies   Allergen Reactions    Amlodipine Swelling     Bilateral foot swelling    Clarithromycin Other and Dizziness     Slept for days    Doxycycline Diarrhea        MEDICATIONS:   Current Outpatient Medications on File Prior to Visit   Medication Sig Dispense Refill    [] amoxicillin-pot clavulanate (Augmentin) 875-125 mg tablet Take 1 tablet by mouth 2 times a day for 3 days. 6 tablet 0    apixaban (Eliquis) 5 mg tablet Take 1 tablet (5 mg) by mouth 2 times a day. 60 tablet 0    ascorbic acid (Vitamin C) 250 mg tablet Take 1 tablet (250 mg) by mouth once daily.      aspirin  81 mg chewable tablet Chew 1 tablet (81 mg) once daily. 90 tablet 3    atenolol (Tenormin) 50 mg tablet Take 1 tablet (50 mg) by mouth 2 times a day. 60 tablet 0    atorvastatin (Lipitor) 80 mg tablet Take 1 tablet (80 mg) by mouth once daily at bedtime. 90 tablet 3    cholecalciferol (Vitamin D-3) 50 MCG (2000 UT) tablet Take 1 tablet (50 mcg) by mouth once daily.      furosemide (Lasix) 40 mg tablet Take 1 tablet (40 mg) by mouth once daily. As needed for foot swelling 30 tablet 11    gabapentin (Neurontin) 100 mg capsule Take 2 capsules (200 mg) by mouth 2 times a day. 120 capsule 0    losartan (Cozaar) 100 mg tablet Take 1 tablet (100 mg) by mouth once daily. 30 tablet 5    nicotine (Nicoderm CQ) 14 mg/24 hr patch 1 patch once daily. Apply (1) patch daily as directed.      potassium chloride CR 10 mEq ER tablet TAKE 5 TABLETS BY MOUTH ONCE  DAILY 450 tablet 1    sulfamethoxazole-trimethoprim (Bactrim) 400-80 mg tablet Take 1 tablet by mouth 2 times a day for 10 days. 20 tablet 0    tiotropium (Spiriva Respimat) 2.5 mcg/actuation inhaler Inhale 2 puffs once daily. 8 g 11    [DISCONTINUED] apixaban (Eliquis) 5 mg tablet Take 1 tablet (5 mg) by mouth 2 times a day.       No current facility-administered medications on file prior to visit.        SOCIAL HISTORY:  Patient  reports that she quit smoking about 2 months ago. Her smoking use included cigarettes. She has been exposed to tobacco smoke. She has never used smokeless tobacco. She reports that she does not currently use alcohol. She reports current drug use. Drug: Marijuana.   Social History     Socioeconomic History    Marital status:      Spouse name: Not on file    Number of children: Not on file    Years of education: Not on file    Highest education level: Not on file   Occupational History    Not on file   Tobacco Use    Smoking status: Former     Current packs/day: 0.00     Types: Cigarettes     Quit date: 2024     Years since quittin.2      Passive exposure: Current    Smokeless tobacco: Never   Vaping Use    Vaping status: Never Used   Substance and Sexual Activity    Alcohol use: Not Currently     Comment: Occasional    Drug use: Yes     Types: Marijuana     Comment: used gummies once a couple weeks ago    Sexual activity: Not on file   Other Topics Concern    Not on file   Social History Narrative    Not on file     Social Determinants of Health     Financial Resource Strain: Low Risk  (3/14/2024)    Overall Financial Resource Strain (CARDIA)     Difficulty of Paying Living Expenses: Not hard at all   Food Insecurity: Not on file   Transportation Needs: No Transportation Needs (3/23/2024)    OASIS : Transportation     Lack of Transportation (Medical): No     Lack of Transportation (Non-Medical): No     Patient Unable or Declines to Respond: No   Physical Activity: Not on file   Stress: Not on file   Social Connections: Feeling Socially Integrated (3/23/2024)    OASIS : Social Isolation     Frequency of experiencing loneliness or isolation: Never   Intimate Partner Violence: Not on file   Housing Stability: Low Risk  (3/14/2024)    Housing Stability Vital Sign     Unable to Pay for Housing in the Last Year: No     Number of Places Lived in the Last Year: 1     Unstable Housing in the Last Year: No       FAMILY HISTORY:  No family history on file.    REVIEW OF SYSTEMS:  All systems reviewed, pertinent negatives as noted in HPI.     PHYSICAL EXAM:  There were no vitals taken for this visit.  Constitutional: Well-developed and well-nourished. No distress.    Head: Normocephalic and atraumatic.    Neck: Normal range of motion.     Pulmonary/Chest: Effort normal. No respiratory distress.   Abdominal: Non-distended.  : See below.  Integumentary: No rash or lesions visualized.  Musculoskeletal: Normal range of motion.    Neurological: Alert and oriented.  Psychiatric: Normal mood and affect. Thought content normal.      LABORATORY REVIEW:    Lab Results   Component Value Date    BUN 38 (H) 04/16/2024    CREATININE 1.07 (H) 04/16/2024    EGFR 57 (L) 04/16/2024     04/16/2024    K 4.3 04/16/2024     04/16/2024    CO2 23 04/16/2024    CALCIUM 9.0 04/16/2024      Lab Results   Component Value Date    WBC 9.4 04/16/2024    RBC 3.35 (L) 04/16/2024    HGB 11.1 (L) 04/16/2024    HCT 33.7 (L) 04/16/2024     (H) 04/16/2024    MCH 33.1 04/16/2024    MCHC 32.9 04/16/2024    RDW 14.3 04/16/2024     04/16/2024      Urine dipstick shows positive for WBC's.        Assessment:      1. Urinary tract infection without hematuria, site unspecified        2. Recurrent UTI  POCT UA Automated manually resulted    Post-Void Residual          Klaudia AMERICA Ratliff is a 68 y.o. with vasculopathy recently hospitalized x 2; plan for open thoracoabdominal aortic repair  Urine culture x 4 positive for E Coli since 3/14/24  Dry vaginal tissues and mild rectocele noted on pelvic exam    Most likely contributing factor poor toileting hygiene due to back pain  Also reviewed other possible factors such as atrophic vaginitis and depletion of protective hieu and hospitalization  Discussed preventive measures, especially in light of upcoming surgery; agreeable to plan as below     Plan:   Complete course of antibiotics  Start D-mannose supplementation, education and written materials provided  Reviewed role of topical estrogen replacement in supporting protective microbiome; prefers to hold off on this for now  RTC in 3 months for reassessment or sooner if needed  Encouraged to contact us in the interim with any questions, concerns

## 2024-05-06 NOTE — PATIENT INSTRUCTIONS
What is D-mannose?  D-mannose is a type of sugar that's related to the better-known glucose. These sugars are both simple sugars --that is, they consist of just one molecule of sugar. As well, both occur naturally in your body and are also found in some plants in the form of starch.    Several fruits and vegetables contain D-mannose, including:  cranberries (and cranberry juice)  apples  oranges  peaches  broccoli  green beans    This sugar is also found in certain nutritional supplements, available as capsules or powders. Some contain D-mannose by itself, while others include additional ingredients, such as:  cranberry  dandelion extract  hibiscus  christie hips  probiotics    Many people take D-mannose for treating and preventing urinary tract infections (UTIs). D-mannose is thought to block certain bacteria from growing in the urinary tract.     What the science says  E. coli bacteria cause 90 percent of UTIs. Once these bacteria enter the urinary tract, they latch on to cells, grow, and cause infection. Researchers think that D-mannose works for a UTI by preventing these bacteria from latching on.    After you consume foods or supplements containing D-mannose, your body eventually eliminates it through the kidneys and into the urinary tract. While in the urinary tract, it can attach to the E. coli bacteria that may be there. As a result, the bacteria can no longer attach to cells and cause infection.    How to use D-mannose  A lot of different D-mannose products are available. When deciding on which to use, you should consider three things:  whether you're trying to prevent an infection or treat an active infection  the dose you'll need to take  the type of product you want to take    D-mannose is typically used for preventing a UTI in people who have frequent UTIs or for treating an active UTI. It's important to know which of these you are using it for because the dosage will differ.    The best dose to use isn't  entirely clear, however. For now, the doses that have been used in research are recommended:  For preventing frequent UTIs: 2 grams once daily, or 1 gram twice daily.  For treating an active UTI: 1.5 grams twice daily for 3 days, and then once daily for 10 days; or 1 gram three times daily for 14 days.    D-mannose comes in capsules and powders. The form you choose mainly depends on your preference. You might prefer a powder if you don't like to take bulky capsules or want to avoid the fillers included in some manufacturers' capsules.    Keep in mind that many products come in 500-milligram capsules. This means that you may need to take two to four capsules to get the desired dose.  To use D-mannose powder, dissolve it in a glass of water, and then drink the mixture. The powder dissolves easily, and the water will have a sweet taste.    Side effects of taking D-mannose  Most people who take D-mannose don't experience side effects, but some might have loose stools or diarrhea.  If you have diabetes, talk with your doctor before taking D-mannose. It makes sense to be cautious since D-mannose is a form of sugar. Your doctor might want to monitor your blood sugar levels more closely if you take D-mannose.  If you have an active UTI, don't delay in talking with a healthcare provider. Although D-mannose might help treat infections for some people, the evidence isn't very strong at this point. Delaying treatment with an antibiotic that has been proven to be effective can result in a more serious infection of the kidneys and blood.    Stick with the proven methods  D-mannose appears to be a promising nutritional supplement that may be an option for treating and preventing UTIs, especially in people who have frequent UTIs. Most people who take it don't experience any side effects.    Talk with your doctor if you have an active UTI. Although D-mannose might help treat a UTI for some people, it's important to follow proven  methods of treatment to prevent the development of a more serious infection.

## 2024-05-07 ENCOUNTER — APPOINTMENT (OUTPATIENT)
Dept: HOME HEALTH SERVICES | Facility: HOME HEALTH | Age: 69
End: 2024-05-07
Payer: MEDICARE

## 2024-05-08 ENCOUNTER — PATIENT OUTREACH (OUTPATIENT)
Dept: PRIMARY CARE | Facility: CLINIC | Age: 69
End: 2024-05-08
Payer: MEDICARE

## 2024-05-08 DIAGNOSIS — I70.0 AORTIC OCCLUSION (CMS-HCC): Primary | ICD-10-CM

## 2024-05-08 DIAGNOSIS — N39.0 URINARY TRACT INFECTION WITHOUT HEMATURIA, SITE UNSPECIFIED: Primary | ICD-10-CM

## 2024-05-08 DIAGNOSIS — I77.9 DISORDER OF ARTERIES AND ARTERIOLES, UNSPECIFIED (CMS-HCC): ICD-10-CM

## 2024-05-08 NOTE — PROGRESS NOTES
Successful outreach to patient regarding hospitalization as patient continues TCM program.   At time of outreach call the patient feels as if their condition has improved since initial visit with PCP or specialist. Having surgery on 5/16/2024.  Questions or concerns addressed at this time with patient.   Provided contact information to patient if any further non-emergent needs arise.

## 2024-05-09 ENCOUNTER — HOME CARE VISIT (OUTPATIENT)
Dept: HOME HEALTH SERVICES | Facility: HOME HEALTH | Age: 69
End: 2024-05-09
Payer: MEDICARE

## 2024-05-10 DIAGNOSIS — E87.6 HYPOKALEMIA: ICD-10-CM

## 2024-05-13 ENCOUNTER — HOME CARE VISIT (OUTPATIENT)
Dept: HOME HEALTH SERVICES | Facility: HOME HEALTH | Age: 69
End: 2024-05-13
Payer: MEDICARE

## 2024-05-13 VITALS
RESPIRATION RATE: 20 BRPM | SYSTOLIC BLOOD PRESSURE: 118 MMHG | TEMPERATURE: 96.8 F | HEART RATE: 72 BPM | OXYGEN SATURATION: 99 % | DIASTOLIC BLOOD PRESSURE: 62 MMHG

## 2024-05-13 PROCEDURE — G0299 HHS/HOSPICE OF RN EA 15 MIN: HCPCS

## 2024-05-13 ASSESSMENT — PAIN SCALES - PAIN ASSESSMENT IN ADVANCED DEMENTIA (PAINAD)
CONSOLABILITY: 0
BODYLANGUAGE: 0
NEGVOCALIZATION: 0
BODYLANGUAGE: 0 - RELAXED.
BREATHING: 0
NEGVOCALIZATION: 0 - NONE.
FACIALEXPRESSION: 0 - SMILING OR INEXPRESSIVE.
CONSOLABILITY: 0 - NO NEED TO CONSOLE.
FACIALEXPRESSION: 0
TOTALSCORE: 0

## 2024-05-13 ASSESSMENT — ENCOUNTER SYMPTOMS
DENIES PAIN: 1
SUBJECTIVE PAIN PROGRESSION: UNCHANGED
PERSON REPORTING PAIN: PATIENT
PAIN SEVERITY GOAL: 0/10
LOWEST PAIN SEVERITY IN PAST 24 HOURS: 0/10
HIGHEST PAIN SEVERITY IN PAST 24 HOURS: 0/10

## 2024-05-13 ASSESSMENT — ACTIVITIES OF DAILY LIVING (ADL)
OASIS_M1830: 00
HOME_HEALTH_OASIS: 00

## 2024-05-13 NOTE — HOME HEALTH
SKILLED NURSING VISIT FOR ASSESSMENT AND AGENCY DISCHARGE. PTS WOUND HEALED. MEDICATIONS RECONCILLED. PT SCHEDULED FOR SURGERY ON THURSDAY. AGENCY DISCHARGE PERFORMED.

## 2024-05-14 RX ORDER — POTASSIUM CHLORIDE 750 MG/1
TABLET, EXTENDED RELEASE ORAL
Qty: 500 TABLET | Refills: 2 | OUTPATIENT
Start: 2024-05-14

## 2024-05-15 ENCOUNTER — ANESTHESIA EVENT (OUTPATIENT)
Dept: OPERATING ROOM | Facility: HOSPITAL | Age: 69
DRG: 673 | End: 2024-05-15
Payer: MEDICARE

## 2024-05-15 ENCOUNTER — LAB (OUTPATIENT)
Dept: LAB | Facility: LAB | Age: 69
End: 2024-05-15
Payer: MEDICARE

## 2024-05-15 ENCOUNTER — LAB REQUISITION (OUTPATIENT)
Dept: LAB | Facility: HOSPITAL | Age: 69
DRG: 673 | End: 2024-05-15
Payer: MEDICARE

## 2024-05-15 DIAGNOSIS — I77.9 DISORDER OF ARTERIES AND ARTERIOLES, UNSPECIFIED (CMS-HCC): ICD-10-CM

## 2024-05-15 DIAGNOSIS — N39.0 URINARY TRACT INFECTION WITHOUT HEMATURIA, SITE UNSPECIFIED: ICD-10-CM

## 2024-05-15 DIAGNOSIS — I70.0 ATHEROSCLEROSIS OF AORTA (CMS-HCC): ICD-10-CM

## 2024-05-15 DIAGNOSIS — I70.0 AORTIC OCCLUSION (CMS-HCC): ICD-10-CM

## 2024-05-15 LAB
ABO GROUP (TYPE) IN BLOOD: NORMAL
ANION GAP SERPL CALC-SCNC: 12 MMOL/L (ref 10–20)
ANTIBODY SCREEN: NORMAL
APPEARANCE UR: CLEAR
BASOPHILS # BLD AUTO: 0.03 X10*3/UL (ref 0–0.1)
BASOPHILS NFR BLD AUTO: 0.4 %
BILIRUB UR STRIP.AUTO-MCNC: NEGATIVE MG/DL
BUN SERPL-MCNC: 37 MG/DL (ref 6–23)
CALCIUM SERPL-MCNC: 9.4 MG/DL (ref 8.6–10.3)
CHLORIDE SERPL-SCNC: 108 MMOL/L (ref 98–107)
CO2 SERPL-SCNC: 24 MMOL/L (ref 21–32)
COLOR UR: YELLOW
CREAT SERPL-MCNC: 1.49 MG/DL (ref 0.5–1.05)
EGFRCR SERPLBLD CKD-EPI 2021: 38 ML/MIN/1.73M*2
EOSINOPHIL # BLD AUTO: 0.25 X10*3/UL (ref 0–0.7)
EOSINOPHIL NFR BLD AUTO: 3.4 %
ERYTHROCYTE [DISTWIDTH] IN BLOOD BY AUTOMATED COUNT: 14.2 % (ref 11.5–14.5)
GLUCOSE SERPL-MCNC: 105 MG/DL (ref 74–99)
GLUCOSE UR STRIP.AUTO-MCNC: NEGATIVE MG/DL
HCT VFR BLD AUTO: 32.8 % (ref 36–46)
HGB BLD-MCNC: 10.5 G/DL (ref 12–16)
HOLD SPECIMEN: NORMAL
IMM GRANULOCYTES # BLD AUTO: 0.02 X10*3/UL (ref 0–0.7)
IMM GRANULOCYTES NFR BLD AUTO: 0.3 % (ref 0–0.9)
INR PPP: 1.3 (ref 0.9–1.1)
KETONES UR STRIP.AUTO-MCNC: NEGATIVE MG/DL
LEUKOCYTE ESTERASE UR QL STRIP.AUTO: NEGATIVE
LYMPHOCYTES # BLD AUTO: 2.04 X10*3/UL (ref 1.2–4.8)
LYMPHOCYTES NFR BLD AUTO: 28 %
MCH RBC QN AUTO: 33.4 PG (ref 26–34)
MCHC RBC AUTO-ENTMCNC: 32 G/DL (ref 32–36)
MCV RBC AUTO: 105 FL (ref 80–100)
MONOCYTES # BLD AUTO: 0.56 X10*3/UL (ref 0.1–1)
MONOCYTES NFR BLD AUTO: 7.7 %
NEUTROPHILS # BLD AUTO: 4.39 X10*3/UL (ref 1.2–7.7)
NEUTROPHILS NFR BLD AUTO: 60.2 %
NITRITE UR QL STRIP.AUTO: NEGATIVE
NRBC BLD-RTO: 0 /100 WBCS (ref 0–0)
PH UR STRIP.AUTO: 6 [PH]
PLATELET # BLD AUTO: 283 X10*3/UL (ref 150–450)
POTASSIUM SERPL-SCNC: 5.4 MMOL/L (ref 3.5–5.3)
PROT UR STRIP.AUTO-MCNC: NEGATIVE MG/DL
PROTHROMBIN TIME: 14.7 SECONDS (ref 9.8–12.8)
RBC # BLD AUTO: 3.14 X10*6/UL (ref 4–5.2)
RBC # UR STRIP.AUTO: NEGATIVE /UL
RH FACTOR (ANTIGEN D): NORMAL
SODIUM SERPL-SCNC: 139 MMOL/L (ref 136–145)
SP GR UR STRIP.AUTO: 1.01
UROBILINOGEN UR STRIP.AUTO-MCNC: <2 MG/DL
WBC # BLD AUTO: 7.3 X10*3/UL (ref 4.4–11.3)

## 2024-05-15 PROCEDURE — 86850 RBC ANTIBODY SCREEN: CPT

## 2024-05-15 PROCEDURE — 80048 BASIC METABOLIC PNL TOTAL CA: CPT

## 2024-05-15 PROCEDURE — 86901 BLOOD TYPING SEROLOGIC RH(D): CPT

## 2024-05-15 PROCEDURE — 85025 COMPLETE CBC W/AUTO DIFF WBC: CPT

## 2024-05-15 PROCEDURE — 86923 COMPATIBILITY TEST ELECTRIC: CPT

## 2024-05-15 PROCEDURE — 85610 PROTHROMBIN TIME: CPT

## 2024-05-15 PROCEDURE — 81003 URINALYSIS AUTO W/O SCOPE: CPT

## 2024-05-15 PROCEDURE — 86900 BLOOD TYPING SEROLOGIC ABO: CPT

## 2024-05-15 PROCEDURE — 36415 COLL VENOUS BLD VENIPUNCTURE: CPT

## 2024-05-15 RX ORDER — ENOXAPARIN SODIUM 100 MG/ML
80 INJECTION SUBCUTANEOUS EVERY 12 HOURS
COMMUNITY
End: 2024-05-31 | Stop reason: HOSPADM

## 2024-05-15 NOTE — PROGRESS NOTES
Pharmacy Medication History Review    Klaudia Ratliff is a 68 y.o. female who is planned to be admitted for Aortic occlusion (CMS-[facility]). Pharmacy called the patient prior to their scheduled procedure and reviewed the patient's [id number] medications for accuracy.    Medications ADDED:  Enoxaparin 80mg/0.8ml  Medications CHANGED:  none  Medications REMOVED:   Bactrim 400/80mg - therapy complete  Nicotine 14mg/24hr patch    Please review medication list and comments regarding how patient may be taking medications differently in the Admit Orders Activity.    Preferred pharmacy and allergies to be confirmed with patient by nursing the day of procedure.     Sources used to complete the med history include spoke with patient, surescripts, oarrs, care everywhere    Below are additional concerns with the patient's PTA list.  Patient stopped eliquis (last dose 05/12/24) and switched to enoxaparin (started 05/13/24) in preparation of procedure    Ellie Glynn    [facility]  Please reach out via Secure Chat for questions

## 2024-05-16 ENCOUNTER — HOSPITAL ENCOUNTER (INPATIENT)
Facility: HOSPITAL | Age: 69
LOS: 15 days | Discharge: NURSING, REHAB OR LONG-TERM CARE | DRG: 673 | End: 2024-05-31
Attending: SURGERY | Admitting: SURGERY
Payer: MEDICARE

## 2024-05-16 ENCOUNTER — HOSPITAL ENCOUNTER (OUTPATIENT)
Dept: OPERATING ROOM | Facility: HOSPITAL | Age: 69
Discharge: HOME | End: 2024-05-16

## 2024-05-16 ENCOUNTER — APPOINTMENT (OUTPATIENT)
Dept: RADIOLOGY | Facility: HOSPITAL | Age: 69
DRG: 673 | End: 2024-05-16
Payer: MEDICARE

## 2024-05-16 ENCOUNTER — ANESTHESIA (OUTPATIENT)
Dept: OPERATING ROOM | Facility: HOSPITAL | Age: 69
DRG: 673 | End: 2024-05-16
Payer: MEDICARE

## 2024-05-16 DIAGNOSIS — R94.31 ABNORMAL ELECTROCARDIOGRAM (ECG) (EKG): ICD-10-CM

## 2024-05-16 DIAGNOSIS — I48.91 ATRIAL FIBRILLATION, UNSPECIFIED TYPE (MULTI): ICD-10-CM

## 2024-05-16 DIAGNOSIS — I82.90 VENOUS THROMBOEMBOLISM (VTE): ICD-10-CM

## 2024-05-16 DIAGNOSIS — R60.0 EDEMA OF BOTH LEGS: ICD-10-CM

## 2024-05-16 DIAGNOSIS — I48.91 ATRIAL FIB/FLUTTER, TRANSIENT (MULTI): ICD-10-CM

## 2024-05-16 DIAGNOSIS — G89.18 ACUTE POST-OPERATIVE PAIN: ICD-10-CM

## 2024-05-16 DIAGNOSIS — I70.0 AORTIC OCCLUSION (CMS-HCC): ICD-10-CM

## 2024-05-16 DIAGNOSIS — I74.09 AORTOILIAC OCCLUSIVE DISEASE (MULTI): Primary | ICD-10-CM

## 2024-05-16 DIAGNOSIS — K59.00 CONSTIPATION, UNSPECIFIED CONSTIPATION TYPE: ICD-10-CM

## 2024-05-16 DIAGNOSIS — I48.19 ATRIAL FIBRILLATION, PERSISTENT (MULTI): ICD-10-CM

## 2024-05-16 DIAGNOSIS — I48.92 ATRIAL FIB/FLUTTER, TRANSIENT (MULTI): ICD-10-CM

## 2024-05-16 LAB
ACT BLD: 115 SEC (ref 83–199)
ACT BLD: 121 SEC (ref 83–199)
ACT BLD: 199 SEC (ref 83–199)
ACT BLD: 227 SEC (ref 83–199)
ACT BLD: 229 SEC (ref 83–199)
ACT BLD: 261 SEC (ref 83–199)
ACT BLD: 261 SEC (ref 83–199)
ACT BLD: 284 SEC (ref 83–199)
ALBUMIN SERPL BCP-MCNC: 3.1 G/DL (ref 3.4–5)
AMYLASE SERPL-CCNC: <10 U/L (ref 29–103)
ANION GAP BLDA CALCULATED.4IONS-SCNC: 12 MMO/L (ref 10–25)
ANION GAP BLDA CALCULATED.4IONS-SCNC: 13 MMO/L (ref 10–25)
ANION GAP BLDA CALCULATED.4IONS-SCNC: 13 MMO/L (ref 10–25)
ANION GAP BLDA CALCULATED.4IONS-SCNC: 14 MMO/L (ref 10–25)
ANION GAP BLDA CALCULATED.4IONS-SCNC: 16 MMO/L (ref 10–25)
ANION GAP BLDA CALCULATED.4IONS-SCNC: 17 MMO/L (ref 10–25)
ANION GAP BLDA CALCULATED.4IONS-SCNC: 9 MMO/L (ref 10–25)
ANION GAP SERPL CALC-SCNC: 14 MMOL/L (ref 10–20)
APTT PPP: 29 SECONDS (ref 27–38)
BASE EXCESS BLDA CALC-SCNC: -0.5 MMOL/L (ref -2–3)
BASE EXCESS BLDA CALC-SCNC: -10.3 MMOL/L (ref -2–3)
BASE EXCESS BLDA CALC-SCNC: -5.9 MMOL/L (ref -2–3)
BASE EXCESS BLDA CALC-SCNC: -6.6 MMOL/L (ref -2–3)
BASE EXCESS BLDA CALC-SCNC: -8.4 MMOL/L (ref -2–3)
BASE EXCESS BLDA CALC-SCNC: -8.6 MMOL/L (ref -2–3)
BASE EXCESS BLDA CALC-SCNC: -9.6 MMOL/L (ref -2–3)
BLOOD EXPIRATION DATE: NORMAL
BODY TEMPERATURE: 37 DEGREES CELSIUS
BUN SERPL-MCNC: 32 MG/DL (ref 6–23)
CA-I BLD-SCNC: 1.11 MMOL/L (ref 1.1–1.33)
CA-I BLDA-SCNC: 1.09 MMOL/L (ref 1.1–1.33)
CA-I BLDA-SCNC: 1.18 MMOL/L (ref 1.1–1.33)
CA-I BLDA-SCNC: 1.19 MMOL/L (ref 1.1–1.33)
CA-I BLDA-SCNC: 1.22 MMOL/L (ref 1.1–1.33)
CA-I BLDA-SCNC: 1.28 MMOL/L (ref 1.1–1.33)
CA-I BLDA-SCNC: 1.39 MMOL/L (ref 1.1–1.33)
CA-I BLDA-SCNC: 1.4 MMOL/L (ref 1.1–1.33)
CALCIUM SERPL-MCNC: 7.4 MG/DL (ref 8.6–10.6)
CHLORIDE BLDA-SCNC: 107 MMOL/L (ref 98–107)
CHLORIDE BLDA-SCNC: 107 MMOL/L (ref 98–107)
CHLORIDE BLDA-SCNC: 108 MMOL/L (ref 98–107)
CHLORIDE BLDA-SCNC: 109 MMOL/L (ref 98–107)
CHLORIDE SERPL-SCNC: 111 MMOL/L (ref 98–107)
CO2 SERPL-SCNC: 20 MMOL/L (ref 21–32)
CREAT SERPL-MCNC: 1.4 MG/DL (ref 0.5–1.05)
DISPENSE STATUS: NORMAL
EGFRCR SERPLBLD CKD-EPI 2021: 41 ML/MIN/1.73M*2
ERYTHROCYTE [DISTWIDTH] IN BLOOD BY AUTOMATED COUNT: 15.5 % (ref 11.5–14.5)
GLUCOSE BLDA-MCNC: 126 MG/DL (ref 74–99)
GLUCOSE BLDA-MCNC: 129 MG/DL (ref 74–99)
GLUCOSE BLDA-MCNC: 191 MG/DL (ref 74–99)
GLUCOSE BLDA-MCNC: 220 MG/DL (ref 74–99)
GLUCOSE BLDA-MCNC: 226 MG/DL (ref 74–99)
GLUCOSE BLDA-MCNC: 282 MG/DL (ref 74–99)
GLUCOSE BLDA-MCNC: 289 MG/DL (ref 74–99)
GLUCOSE SERPL-MCNC: 142 MG/DL (ref 74–99)
HCO3 BLDA-SCNC: 15.5 MMOL/L (ref 22–26)
HCO3 BLDA-SCNC: 16.8 MMOL/L (ref 22–26)
HCO3 BLDA-SCNC: 17.9 MMOL/L (ref 22–26)
HCO3 BLDA-SCNC: 18.4 MMOL/L (ref 22–26)
HCO3 BLDA-SCNC: 19.7 MMOL/L (ref 22–26)
HCO3 BLDA-SCNC: 20.2 MMOL/L (ref 22–26)
HCO3 BLDA-SCNC: 24.2 MMOL/L (ref 22–26)
HCT VFR BLD AUTO: 31 % (ref 36–46)
HCT VFR BLD EST: 23 % (ref 36–46)
HCT VFR BLD EST: 25 % (ref 36–46)
HCT VFR BLD EST: 28 % (ref 36–46)
HCT VFR BLD EST: 29 % (ref 36–46)
HCT VFR BLD EST: 29 % (ref 36–46)
HCT VFR BLD EST: 32 % (ref 36–46)
HCT VFR BLD EST: 32 % (ref 36–46)
HGB BLD-MCNC: 10.8 G/DL (ref 12–16)
HGB BLDA-MCNC: 10.5 G/DL (ref 12–16)
HGB BLDA-MCNC: 10.8 G/DL (ref 12–16)
HGB BLDA-MCNC: 7.5 G/DL (ref 12–16)
HGB BLDA-MCNC: 8.2 G/DL (ref 12–16)
HGB BLDA-MCNC: 9.2 G/DL (ref 12–16)
HGB BLDA-MCNC: 9.7 G/DL (ref 12–16)
HGB BLDA-MCNC: 9.8 G/DL (ref 12–16)
INHALED O2 CONCENTRATION: 40 %
INHALED O2 CONCENTRATION: 50 %
INHALED O2 CONCENTRATION: 50 %
INHALED O2 CONCENTRATION: 60 %
INR PPP: 1.4 (ref 0.9–1.1)
LACTATE BLDA-SCNC: 1.1 MMOL/L (ref 0.4–2)
LACTATE BLDA-SCNC: 1.8 MMOL/L (ref 0.4–2)
LACTATE BLDA-SCNC: 2.3 MMOL/L (ref 0.4–2)
LACTATE BLDA-SCNC: 2.4 MMOL/L (ref 0.4–2)
LACTATE BLDA-SCNC: 2.7 MMOL/L (ref 0.4–2)
LACTATE BLDA-SCNC: 2.8 MMOL/L (ref 0.4–2)
LACTATE BLDA-SCNC: 3.5 MMOL/L (ref 0.4–2)
LIPASE SERPL-CCNC: <3 U/L (ref 9–82)
MAGNESIUM SERPL-MCNC: 1.25 MG/DL (ref 1.6–2.4)
MCH RBC QN AUTO: 31.9 PG (ref 26–34)
MCHC RBC AUTO-ENTMCNC: 34.8 G/DL (ref 32–36)
MCV RBC AUTO: 91 FL (ref 80–100)
NRBC BLD-RTO: 0 /100 WBCS (ref 0–0)
OXYHGB MFR BLDA: 97.5 % (ref 94–98)
OXYHGB MFR BLDA: 97.8 % (ref 94–98)
OXYHGB MFR BLDA: 98 % (ref 94–98)
OXYHGB MFR BLDA: 98 % (ref 94–98)
PCO2 BLDA: 30 MM HG (ref 38–42)
PCO2 BLDA: 39 MM HG (ref 38–42)
PCO2 BLDA: 39 MM HG (ref 38–42)
PCO2 BLDA: 41 MM HG (ref 38–42)
PCO2 BLDA: 42 MM HG (ref 38–42)
PCO2 BLDA: 42 MM HG (ref 38–42)
PCO2 BLDA: 44 MM HG (ref 38–42)
PH BLDA: 7.21 PH (ref 7.38–7.42)
PH BLDA: 7.23 PH (ref 7.38–7.42)
PH BLDA: 7.27 PH (ref 7.38–7.42)
PH BLDA: 7.28 PH (ref 7.38–7.42)
PH BLDA: 7.3 PH (ref 7.38–7.42)
PH BLDA: 7.32 PH (ref 7.38–7.42)
PH BLDA: 7.4 PH (ref 7.38–7.42)
PHOSPHATE SERPL-MCNC: 3.3 MG/DL (ref 2.5–4.9)
PLATELET # BLD AUTO: 186 X10*3/UL (ref 150–450)
PO2 BLDA: 163 MM HG (ref 85–95)
PO2 BLDA: 165 MM HG (ref 85–95)
PO2 BLDA: 186 MM HG (ref 85–95)
PO2 BLDA: 229 MM HG (ref 85–95)
PO2 BLDA: 232 MM HG (ref 85–95)
PO2 BLDA: 251 MM HG (ref 85–95)
PO2 BLDA: 433 MM HG (ref 85–95)
POTASSIUM BLDA-SCNC: 4 MMOL/L (ref 3.5–5.3)
POTASSIUM BLDA-SCNC: 4 MMOL/L (ref 3.5–5.3)
POTASSIUM BLDA-SCNC: 4.1 MMOL/L (ref 3.5–5.3)
POTASSIUM BLDA-SCNC: 4.3 MMOL/L (ref 3.5–5.3)
POTASSIUM BLDA-SCNC: 4.5 MMOL/L (ref 3.5–5.3)
POTASSIUM BLDA-SCNC: 4.7 MMOL/L (ref 3.5–5.3)
POTASSIUM BLDA-SCNC: 4.9 MMOL/L (ref 3.5–5.3)
POTASSIUM SERPL-SCNC: 4 MMOL/L (ref 3.5–5.3)
PRODUCT BLOOD TYPE: 7300
PRODUCT BLOOD TYPE: 8400
PRODUCT BLOOD TYPE: 8400
PRODUCT CODE: NORMAL
PROTHROMBIN TIME: 15.8 SECONDS (ref 9.8–12.8)
RBC # BLD AUTO: 3.39 X10*6/UL (ref 4–5.2)
SAO2 % BLDA: 100 % (ref 94–100)
SAO2 % BLDA: 99 % (ref 94–100)
SODIUM BLDA-SCNC: 132 MMOL/L (ref 136–145)
SODIUM BLDA-SCNC: 135 MMOL/L (ref 136–145)
SODIUM BLDA-SCNC: 135 MMOL/L (ref 136–145)
SODIUM BLDA-SCNC: 136 MMOL/L (ref 136–145)
SODIUM BLDA-SCNC: 137 MMOL/L (ref 136–145)
SODIUM BLDA-SCNC: 138 MMOL/L (ref 136–145)
SODIUM BLDA-SCNC: 138 MMOL/L (ref 136–145)
SODIUM SERPL-SCNC: 141 MMOL/L (ref 136–145)
UNIT ABO: NORMAL
UNIT NUMBER: NORMAL
UNIT RH: NORMAL
UNIT VOLUME: 201
UNIT VOLUME: 220
UNIT VOLUME: 295
UNIT VOLUME: 299
UNIT VOLUME: 331
WBC # BLD AUTO: 16.1 X10*3/UL (ref 4.4–11.3)

## 2024-05-16 PROCEDURE — 04UL0KZ SUPPLEMENT LEFT FEMORAL ARTERY WITH NONAUTOLOGOUS TISSUE SUBSTITUTE, OPEN APPROACH: ICD-10-PCS | Performed by: SURGERY

## 2024-05-16 PROCEDURE — 83690 ASSAY OF LIPASE: CPT | Performed by: STUDENT IN AN ORGANIZED HEALTH CARE EDUCATION/TRAINING PROGRAM

## 2024-05-16 PROCEDURE — 04100JK BYPASS ABDOMINAL AORTA TO BILATERAL FEMORAL ARTERIES WITH SYNTHETIC SUBSTITUTE, OPEN APPROACH: ICD-10-PCS | Performed by: SURGERY

## 2024-05-16 PROCEDURE — 37799 UNLISTED PX VASCULAR SURGERY: CPT | Performed by: STUDENT IN AN ORGANIZED HEALTH CARE EDUCATION/TRAINING PROGRAM

## 2024-05-16 PROCEDURE — 88304 TISSUE EXAM BY PATHOLOGIST: CPT | Mod: TC,SUR | Performed by: NURSE PRACTITIONER

## 2024-05-16 PROCEDURE — 84132 ASSAY OF SERUM POTASSIUM: CPT | Mod: 91,MUE | Performed by: STUDENT IN AN ORGANIZED HEALTH CARE EDUCATION/TRAINING PROGRAM

## 2024-05-16 PROCEDURE — 3700000001 HC GENERAL ANESTHESIA TIME - INITIAL BASE CHARGE: Performed by: SURGERY

## 2024-05-16 PROCEDURE — 35646 BPG AORTOBIFEMORAL: CPT | Performed by: SURGERY

## 2024-05-16 PROCEDURE — 2500000005 HC RX 250 GENERAL PHARMACY W/O HCPCS: Performed by: STUDENT IN AN ORGANIZED HEALTH CARE EDUCATION/TRAINING PROGRAM

## 2024-05-16 PROCEDURE — 3600000005 HC OR TIME - INITIAL BASE CHARGE - PROCEDURE LEVEL FIVE: Performed by: SURGERY

## 2024-05-16 PROCEDURE — A4217 STERILE WATER/SALINE, 500 ML: HCPCS | Performed by: SURGERY

## 2024-05-16 PROCEDURE — 96373 THER/PROPH/DIAG INJ IA: CPT | Performed by: SURGERY

## 2024-05-16 PROCEDURE — 93503 INSERT/PLACE HEART CATHETER: CPT | Performed by: STUDENT IN AN ORGANIZED HEALTH CARE EDUCATION/TRAINING PROGRAM

## 2024-05-16 PROCEDURE — P9045 ALBUMIN (HUMAN), 5%, 250 ML: HCPCS | Mod: JZ,JG | Performed by: STUDENT IN AN ORGANIZED HEALTH CARE EDUCATION/TRAINING PROGRAM

## 2024-05-16 PROCEDURE — 36620 INSERTION CATHETER ARTERY: CPT | Performed by: ANESTHESIOLOGY

## 2024-05-16 PROCEDURE — 2500000004 HC RX 250 GENERAL PHARMACY W/ HCPCS (ALT 636 FOR OP/ED): Performed by: STUDENT IN AN ORGANIZED HEALTH CARE EDUCATION/TRAINING PROGRAM

## 2024-05-16 PROCEDURE — 2500000002 HC RX 250 W HCPCS SELF ADMINISTERED DRUGS (ALT 637 FOR MEDICARE OP, ALT 636 FOR OP/ED): Performed by: STUDENT IN AN ORGANIZED HEALTH CARE EDUCATION/TRAINING PROGRAM

## 2024-05-16 PROCEDURE — 2500000004 HC RX 250 GENERAL PHARMACY W/ HCPCS (ALT 636 FOR OP/ED)

## 2024-05-16 PROCEDURE — 84132 ASSAY OF SERUM POTASSIUM: CPT | Mod: 91 | Performed by: STUDENT IN AN ORGANIZED HEALTH CARE EDUCATION/TRAINING PROGRAM

## 2024-05-16 PROCEDURE — 99291 CRITICAL CARE FIRST HOUR: CPT | Performed by: STUDENT IN AN ORGANIZED HEALTH CARE EDUCATION/TRAINING PROGRAM

## 2024-05-16 PROCEDURE — 2780000003 HC OR 278 NO HCPCS: Performed by: SURGERY

## 2024-05-16 PROCEDURE — 2720000007 HC OR 272 NO HCPCS: Performed by: SURGERY

## 2024-05-16 PROCEDURE — 85027 COMPLETE CBC AUTOMATED: CPT | Performed by: STUDENT IN AN ORGANIZED HEALTH CARE EDUCATION/TRAINING PROGRAM

## 2024-05-16 PROCEDURE — 83735 ASSAY OF MAGNESIUM: CPT | Performed by: STUDENT IN AN ORGANIZED HEALTH CARE EDUCATION/TRAINING PROGRAM

## 2024-05-16 PROCEDURE — 04RA0JZ REPLACEMENT OF LEFT RENAL ARTERY WITH SYNTHETIC SUBSTITUTE, OPEN APPROACH: ICD-10-PCS | Performed by: SURGERY

## 2024-05-16 PROCEDURE — 04CA0ZZ EXTIRPATION OF MATTER FROM LEFT RENAL ARTERY, OPEN APPROACH: ICD-10-PCS | Performed by: SURGERY

## 2024-05-16 PROCEDURE — 36556 INSERT NON-TUNNEL CV CATH: CPT | Performed by: STUDENT IN AN ORGANIZED HEALTH CARE EDUCATION/TRAINING PROGRAM

## 2024-05-16 PROCEDURE — 04CK0ZZ EXTIRPATION OF MATTER FROM RIGHT FEMORAL ARTERY, OPEN APPROACH: ICD-10-PCS | Performed by: SURGERY

## 2024-05-16 PROCEDURE — A4312 CATH W/O BAG 2-WAY SILICONE: HCPCS | Performed by: SURGERY

## 2024-05-16 PROCEDURE — 85610 PROTHROMBIN TIME: CPT | Performed by: STUDENT IN AN ORGANIZED HEALTH CARE EDUCATION/TRAINING PROGRAM

## 2024-05-16 PROCEDURE — C1762 CONN TISS, HUMAN(INC FASCIA): HCPCS | Performed by: SURGERY

## 2024-05-16 PROCEDURE — 85347 COAGULATION TIME ACTIVATED: CPT | Mod: 91

## 2024-05-16 PROCEDURE — 3600000010 HC OR TIME - EACH INCREMENTAL 1 MINUTE - PROCEDURE LEVEL FIVE: Performed by: SURGERY

## 2024-05-16 PROCEDURE — 2500000005 HC RX 250 GENERAL PHARMACY W/O HCPCS: Performed by: SURGERY

## 2024-05-16 PROCEDURE — P9016 RBC LEUKOCYTES REDUCED: HCPCS

## 2024-05-16 PROCEDURE — C9248 INJ, CLEVIDIPINE BUTYRATE: HCPCS | Performed by: STUDENT IN AN ORGANIZED HEALTH CARE EDUCATION/TRAINING PROGRAM

## 2024-05-16 PROCEDURE — A35646 PR BYPASS GRAFT OTHR,AORTOBIFEMORAL: Performed by: ANESTHESIOLOGY

## 2024-05-16 PROCEDURE — 84132 ASSAY OF SERUM POTASSIUM: CPT | Mod: 91

## 2024-05-16 PROCEDURE — 94002 VENT MGMT INPAT INIT DAY: CPT

## 2024-05-16 PROCEDURE — 36430 TRANSFUSION BLD/BLD COMPNT: CPT | Mod: GC | Performed by: STUDENT IN AN ORGANIZED HEALTH CARE EDUCATION/TRAINING PROGRAM

## 2024-05-16 PROCEDURE — 76937 US GUIDE VASCULAR ACCESS: CPT | Performed by: ANESTHESIOLOGY

## 2024-05-16 PROCEDURE — 88304 TISSUE EXAM BY PATHOLOGIST: CPT | Performed by: STUDENT IN AN ORGANIZED HEALTH CARE EDUCATION/TRAINING PROGRAM

## 2024-05-16 PROCEDURE — 82150 ASSAY OF AMYLASE: CPT | Performed by: STUDENT IN AN ORGANIZED HEALTH CARE EDUCATION/TRAINING PROGRAM

## 2024-05-16 PROCEDURE — 2020000001 HC ICU ROOM DAILY

## 2024-05-16 PROCEDURE — P9045 ALBUMIN (HUMAN), 5%, 250 ML: HCPCS | Mod: JZ | Performed by: STUDENT IN AN ORGANIZED HEALTH CARE EDUCATION/TRAINING PROGRAM

## 2024-05-16 PROCEDURE — 2500000004 HC RX 250 GENERAL PHARMACY W/ HCPCS (ALT 636 FOR OP/ED): Performed by: SURGERY

## 2024-05-16 PROCEDURE — 86923 COMPATIBILITY TEST ELECTRIC: CPT

## 2024-05-16 PROCEDURE — 35631 BPG AOR-CELIAC-MSN-RENAL: CPT | Performed by: SURGERY

## 2024-05-16 PROCEDURE — 74018 RADEX ABDOMEN 1 VIEW: CPT

## 2024-05-16 PROCEDURE — C1768 GRAFT, VASCULAR: HCPCS | Performed by: SURGERY

## 2024-05-16 PROCEDURE — 82330 ASSAY OF CALCIUM: CPT | Mod: 91 | Performed by: STUDENT IN AN ORGANIZED HEALTH CARE EDUCATION/TRAINING PROGRAM

## 2024-05-16 PROCEDURE — 88304 TISSUE EXAM BY PATHOLOGIST: CPT | Mod: TC,91,SUR | Performed by: SURGERY

## 2024-05-16 PROCEDURE — 3700000002 HC GENERAL ANESTHESIA TIME - EACH INCREMENTAL 1 MINUTE: Performed by: SURGERY

## 2024-05-16 PROCEDURE — 71045 X-RAY EXAM CHEST 1 VIEW: CPT

## 2024-05-16 DEVICE — GELSOFT PLUS GELATIN IMPREGNATED KNITTED VASCULAR PROSTHESIS BIFURCATE
Type: IMPLANTABLE DEVICE | Site: AORTA | Status: FUNCTIONAL
Brand: GELSOFT PLUS™

## 2024-05-16 DEVICE — GELWEAVE GELATIN IMPREGNATED WOVEN VASCULAR PROSTHESIS STRAIGHT
Type: IMPLANTABLE DEVICE | Site: KIDNEY | Status: FUNCTIONAL
Brand: GELWEAVE™

## 2024-05-16 DEVICE — XENOSURE BIOLOGIC PATCH, 0.8CM X 8CM, EIFU
Type: IMPLANTABLE DEVICE | Site: ARTERIAL | Status: FUNCTIONAL
Brand: XENOSURE BIOLOGIC PATCH

## 2024-05-16 RX ORDER — SODIUM CHLORIDE, SODIUM LACTATE, POTASSIUM CHLORIDE, CALCIUM CHLORIDE 600; 310; 30; 20 MG/100ML; MG/100ML; MG/100ML; MG/100ML
5 INJECTION, SOLUTION INTRAVENOUS CONTINUOUS
Status: DISCONTINUED | OUTPATIENT
Start: 2024-05-16 | End: 2024-05-22

## 2024-05-16 RX ORDER — NOREPINEPHRINE BITARTRATE 0.03 MG/ML
INJECTION, SOLUTION INTRAVENOUS CONTINUOUS PRN
Status: DISCONTINUED | OUTPATIENT
Start: 2024-05-16 | End: 2024-05-16

## 2024-05-16 RX ORDER — CEFOXITIN 2 G/1
INJECTION, POWDER, FOR SOLUTION INTRAVENOUS AS NEEDED
Status: DISCONTINUED | OUTPATIENT
Start: 2024-05-16 | End: 2024-05-16

## 2024-05-16 RX ORDER — NOREPINEPHRINE BITARTRATE/D5W 8 MG/250ML
0-3 PLASTIC BAG, INJECTION (ML) INTRAVENOUS CONTINUOUS
Status: DISCONTINUED | OUTPATIENT
Start: 2024-05-16 | End: 2024-05-17

## 2024-05-16 RX ORDER — SODIUM CHLORIDE 0.9 G/100ML
IRRIGANT IRRIGATION AS NEEDED
Status: DISCONTINUED | OUTPATIENT
Start: 2024-05-16 | End: 2024-05-16 | Stop reason: HOSPADM

## 2024-05-16 RX ORDER — ROPIVACAINE HYDROCHLORIDE 5 MG/ML
INJECTION, SOLUTION EPIDURAL; INFILTRATION; PERINEURAL AS NEEDED
Status: DISCONTINUED | OUTPATIENT
Start: 2024-05-16 | End: 2024-05-16

## 2024-05-16 RX ORDER — CALCIUM GLUCONATE 20 MG/ML
1 INJECTION, SOLUTION INTRAVENOUS EVERY 6 HOURS PRN
Status: DISCONTINUED | OUTPATIENT
Start: 2024-05-16 | End: 2024-05-21

## 2024-05-16 RX ORDER — HYDROMORPHONE HYDROCHLORIDE 1 MG/ML
0.4 INJECTION, SOLUTION INTRAMUSCULAR; INTRAVENOUS; SUBCUTANEOUS
Status: DISCONTINUED | OUTPATIENT
Start: 2024-05-16 | End: 2024-05-17

## 2024-05-16 RX ORDER — PROPOFOL 10 MG/ML
INJECTION, EMULSION INTRAVENOUS AS NEEDED
Status: DISCONTINUED | OUTPATIENT
Start: 2024-05-16 | End: 2024-05-16

## 2024-05-16 RX ORDER — GLYCOPYRROLATE 0.2 MG/ML
INJECTION INTRAMUSCULAR; INTRAVENOUS AS NEEDED
Status: DISCONTINUED | OUTPATIENT
Start: 2024-05-16 | End: 2024-05-16

## 2024-05-16 RX ORDER — PROTAMINE SULFATE 10 MG/ML
INJECTION, SOLUTION INTRAVENOUS AS NEEDED
Status: DISCONTINUED | OUTPATIENT
Start: 2024-05-16 | End: 2024-05-16

## 2024-05-16 RX ORDER — SODIUM CHLORIDE, SODIUM LACTATE, POTASSIUM CHLORIDE, CALCIUM CHLORIDE 600; 310; 30; 20 MG/100ML; MG/100ML; MG/100ML; MG/100ML
0-1000 INJECTION, SOLUTION INTRAVENOUS CONTINUOUS
Status: DISCONTINUED | OUTPATIENT
Start: 2024-05-16 | End: 2024-05-17

## 2024-05-16 RX ORDER — SODIUM CHLORIDE, SODIUM LACTATE, POTASSIUM CHLORIDE, CALCIUM CHLORIDE 600; 310; 30; 20 MG/100ML; MG/100ML; MG/100ML; MG/100ML
INJECTION, SOLUTION INTRAVENOUS CONTINUOUS PRN
Status: DISCONTINUED | OUTPATIENT
Start: 2024-05-16 | End: 2024-05-16

## 2024-05-16 RX ORDER — RIFAMPIN 600 MG/10ML
INJECTION, POWDER, LYOPHILIZED, FOR SOLUTION INTRAVENOUS AS NEEDED
Status: DISCONTINUED | OUTPATIENT
Start: 2024-05-16 | End: 2024-05-16 | Stop reason: HOSPADM

## 2024-05-16 RX ORDER — METHADONE IN SOD CHLOR,ISO-OSM 10 MG/ML
SYRINGE (ML) INTRAVENOUS AS NEEDED
Status: DISCONTINUED | OUTPATIENT
Start: 2024-05-16 | End: 2024-05-16

## 2024-05-16 RX ORDER — CALCIUM GLUCONATE 20 MG/ML
2 INJECTION, SOLUTION INTRAVENOUS EVERY 6 HOURS PRN
Status: DISCONTINUED | OUTPATIENT
Start: 2024-05-16 | End: 2024-05-21

## 2024-05-16 RX ORDER — ATROPINE SULFATE 0.4 MG/ML
INJECTION, SOLUTION ENDOTRACHEAL; INTRAMEDULLARY; INTRAMUSCULAR; INTRAVENOUS; SUBCUTANEOUS AS NEEDED
Status: DISCONTINUED | OUTPATIENT
Start: 2024-05-16 | End: 2024-05-16

## 2024-05-16 RX ORDER — NEOSTIGMINE METHYLSULFATE 1 MG/ML
INJECTION, SOLUTION INTRAVENOUS AS NEEDED
Status: DISCONTINUED | OUTPATIENT
Start: 2024-05-16 | End: 2024-05-16

## 2024-05-16 RX ORDER — EPINEPHRINE HCL IN DEXTROSE 5% 4MG/250ML
0-2 PLASTIC BAG, INJECTION (ML) INTRAVENOUS CONTINUOUS
Status: DISCONTINUED | OUTPATIENT
Start: 2024-05-16 | End: 2024-05-17

## 2024-05-16 RX ORDER — HYDROMORPHONE HYDROCHLORIDE 1 MG/ML
0.2 INJECTION, SOLUTION INTRAMUSCULAR; INTRAVENOUS; SUBCUTANEOUS
Status: DISCONTINUED | OUTPATIENT
Start: 2024-05-16 | End: 2024-05-16

## 2024-05-16 RX ORDER — DEXTROSE 50 % IN WATER (D50W) INTRAVENOUS SYRINGE
25
Status: DISCONTINUED | OUTPATIENT
Start: 2024-05-16 | End: 2024-05-31 | Stop reason: HOSPADM

## 2024-05-16 RX ORDER — ROPIVACAINE IN 0.9% SOD CHL/PF 0.2 %
14 PLASTIC BAG, INJECTION (ML) EPIDURAL CONTINUOUS
Status: DISCONTINUED | OUTPATIENT
Start: 2024-05-16 | End: 2024-05-18

## 2024-05-16 RX ORDER — PANTOPRAZOLE SODIUM 40 MG/10ML
40 INJECTION, POWDER, LYOPHILIZED, FOR SOLUTION INTRAVENOUS
Status: DISCONTINUED | OUTPATIENT
Start: 2024-05-17 | End: 2024-05-20

## 2024-05-16 RX ORDER — ROCURONIUM BROMIDE 10 MG/ML
INJECTION, SOLUTION INTRAVENOUS AS NEEDED
Status: DISCONTINUED | OUTPATIENT
Start: 2024-05-16 | End: 2024-05-16

## 2024-05-16 RX ORDER — HEPARIN SODIUM 1000 [USP'U]/ML
INJECTION, SOLUTION INTRAVENOUS; SUBCUTANEOUS AS NEEDED
Status: DISCONTINUED | OUTPATIENT
Start: 2024-05-16 | End: 2024-05-16

## 2024-05-16 RX ORDER — MAGNESIUM SULFATE HEPTAHYDRATE 40 MG/ML
2 INJECTION, SOLUTION INTRAVENOUS EVERY 6 HOURS PRN
Status: DISCONTINUED | OUTPATIENT
Start: 2024-05-16 | End: 2024-05-21

## 2024-05-16 RX ORDER — MIDAZOLAM HYDROCHLORIDE 1 MG/ML
INJECTION INTRAMUSCULAR; INTRAVENOUS CONTINUOUS PRN
Status: DISCONTINUED | OUTPATIENT
Start: 2024-05-16 | End: 2024-05-16

## 2024-05-16 RX ORDER — PHENYLEPHRINE 10 MG/250 ML(40 MCG/ML)IN 0.9 % SOD.CHLORIDE INTRAVENOUS
CONTINUOUS PRN
Status: DISCONTINUED | OUTPATIENT
Start: 2024-05-16 | End: 2024-05-16

## 2024-05-16 RX ORDER — POLYMYXIN B 500000 [USP'U]/1
INJECTION, POWDER, LYOPHILIZED, FOR SOLUTION INTRAMUSCULAR; INTRATHECAL; INTRAVENOUS; OPHTHALMIC AS NEEDED
Status: DISCONTINUED | OUTPATIENT
Start: 2024-05-16 | End: 2024-05-16 | Stop reason: HOSPADM

## 2024-05-16 RX ORDER — PANTOPRAZOLE SODIUM 40 MG/1
40 TABLET, DELAYED RELEASE ORAL
Status: DISCONTINUED | OUTPATIENT
Start: 2024-05-17 | End: 2024-05-17

## 2024-05-16 RX ORDER — PROPOFOL 10 MG/ML
5-50 INJECTION, EMULSION INTRAVENOUS CONTINUOUS
Status: DISCONTINUED | OUTPATIENT
Start: 2024-05-16 | End: 2024-05-17

## 2024-05-16 RX ORDER — PHENYLEPHRINE HYDROCHLORIDE 10 MG/ML
INJECTION INTRAVENOUS AS NEEDED
Status: DISCONTINUED | OUTPATIENT
Start: 2024-05-16 | End: 2024-05-16

## 2024-05-16 RX ORDER — ALBUMIN HUMAN 50 G/1000ML
SOLUTION INTRAVENOUS
Status: DISCONTINUED
Start: 2024-05-16 | End: 2024-05-17 | Stop reason: HOSPADM

## 2024-05-16 RX ORDER — ALBUMIN HUMAN 50 G/1000ML
25 SOLUTION INTRAVENOUS ONCE
Status: COMPLETED | OUTPATIENT
Start: 2024-05-16 | End: 2024-05-16

## 2024-05-16 RX ORDER — ALBUMIN HUMAN 50 G/1000ML
SOLUTION INTRAVENOUS AS NEEDED
Status: DISCONTINUED | OUTPATIENT
Start: 2024-05-16 | End: 2024-05-16

## 2024-05-16 RX ORDER — POTASSIUM CHLORIDE 14.9 MG/ML
20 INJECTION INTRAVENOUS EVERY 6 HOURS PRN
Status: DISCONTINUED | OUTPATIENT
Start: 2024-05-16 | End: 2024-05-21

## 2024-05-16 RX ORDER — MAGNESIUM SULFATE HEPTAHYDRATE 40 MG/ML
4 INJECTION, SOLUTION INTRAVENOUS EVERY 6 HOURS PRN
Status: DISCONTINUED | OUTPATIENT
Start: 2024-05-16 | End: 2024-05-21

## 2024-05-16 RX ORDER — CALCIUM CHLORIDE INJECTION 100 MG/ML
INJECTION, SOLUTION INTRAVENOUS AS NEEDED
Status: DISCONTINUED | OUTPATIENT
Start: 2024-05-16 | End: 2024-05-16

## 2024-05-16 RX ORDER — SODIUM CHLORIDE, SODIUM LACTATE, POTASSIUM CHLORIDE, CALCIUM CHLORIDE 600; 310; 30; 20 MG/100ML; MG/100ML; MG/100ML; MG/100ML
30 INJECTION, SOLUTION INTRAVENOUS CONTINUOUS
Status: DISCONTINUED | OUTPATIENT
Start: 2024-05-16 | End: 2024-05-18

## 2024-05-16 RX ORDER — PROPOFOL 10 MG/ML
INJECTION, EMULSION INTRAVENOUS
Status: COMPLETED
Start: 2024-05-16 | End: 2024-05-16

## 2024-05-16 RX ORDER — LIDOCAINE HYDROCHLORIDE 10 MG/ML
INJECTION INFILTRATION; PERINEURAL AS NEEDED
Status: DISCONTINUED | OUTPATIENT
Start: 2024-05-16 | End: 2024-05-16

## 2024-05-16 RX ORDER — POTASSIUM CHLORIDE 29.8 MG/ML
40 INJECTION INTRAVENOUS EVERY 6 HOURS PRN
Status: DISCONTINUED | OUTPATIENT
Start: 2024-05-16 | End: 2024-05-21

## 2024-05-16 RX ORDER — FENTANYL CITRATE 50 UG/ML
INJECTION, SOLUTION INTRAMUSCULAR; INTRAVENOUS AS NEEDED
Status: DISCONTINUED | OUTPATIENT
Start: 2024-05-16 | End: 2024-05-16

## 2024-05-16 RX ADMIN — CEFOXITIN SODIUM 2 G: 2 POWDER, FOR SOLUTION INTRAVENOUS at 21:29

## 2024-05-16 RX ADMIN — Medication 10 MG: at 09:00

## 2024-05-16 RX ADMIN — PROPOFOL 100 MG: 10 INJECTION, EMULSION INTRAVENOUS at 07:45

## 2024-05-16 RX ADMIN — ROCURONIUM BROMIDE 20 MG: 10 INJECTION INTRAVENOUS at 11:25

## 2024-05-16 RX ADMIN — NOREPINEPHRINE BITARTRATE 16 MCG: 1 INJECTION, SOLUTION, CONCENTRATE INTRAVENOUS at 14:49

## 2024-05-16 RX ADMIN — Medication 40 PERCENT: at 23:04

## 2024-05-16 RX ADMIN — CEFOXITIN SODIUM 2 G: 2 POWDER, FOR SOLUTION INTRAVENOUS at 13:04

## 2024-05-16 RX ADMIN — ROPIVACAINE HYDROCHLORIDE 30 ML: 5 INJECTION, SOLUTION EPIDURAL; INFILTRATION; PERINEURAL at 06:30

## 2024-05-16 RX ADMIN — SODIUM CHLORIDE 2 UNITS/HR: 9 INJECTION, SOLUTION INTRAVENOUS at 13:10

## 2024-05-16 RX ADMIN — PROPOFOL 50 MG: 10 INJECTION, EMULSION INTRAVENOUS at 08:59

## 2024-05-16 RX ADMIN — HEPARIN SODIUM 3000 UNITS: 1000 INJECTION, SOLUTION INTRAVENOUS; SUBCUTANEOUS at 12:04

## 2024-05-16 RX ADMIN — SODIUM CHLORIDE, POTASSIUM CHLORIDE, SODIUM LACTATE AND CALCIUM CHLORIDE 30 ML/HR: 600; 310; 30; 20 INJECTION, SOLUTION INTRAVENOUS at 18:04

## 2024-05-16 RX ADMIN — ALBUMIN (HUMAN) 250 ML: 12.5 SOLUTION INTRAVENOUS at 10:25

## 2024-05-16 RX ADMIN — PHENYLEPHRINE HYDROCHLORIDE 80 MCG: 10 INJECTION INTRAVENOUS at 07:45

## 2024-05-16 RX ADMIN — CALCIUM CHLORIDE 0.5 G: 100 INJECTION INTRAVENOUS; INTRAVENTRICULAR at 12:17

## 2024-05-16 RX ADMIN — FENTANYL CITRATE 200 MCG: 50 INJECTION, SOLUTION INTRAMUSCULAR; INTRAVENOUS at 07:45

## 2024-05-16 RX ADMIN — ROCURONIUM BROMIDE 100 MG: 10 INJECTION INTRAVENOUS at 07:45

## 2024-05-16 RX ADMIN — PROPOFOL 50 MG: 10 INJECTION, EMULSION INTRAVENOUS at 10:12

## 2024-05-16 RX ADMIN — PROTAMINE SULFATE 30 MG: 10 INJECTION, SOLUTION INTRAVENOUS at 14:30

## 2024-05-16 RX ADMIN — ALBUMIN (HUMAN) 500 ML: 12.5 SOLUTION INTRAVENOUS at 13:20

## 2024-05-16 RX ADMIN — GLYCOPYRROLATE 1 MG: 0.2 INJECTION, SOLUTION INTRAMUSCULAR; INTRAVENOUS at 15:33

## 2024-05-16 RX ADMIN — Medication 0.02 MCG/KG/MIN: at 11:28

## 2024-05-16 RX ADMIN — MAGNESIUM SULFATE HEPTAHYDRATE 4 G: 40 INJECTION, SOLUTION INTRAVENOUS at 18:39

## 2024-05-16 RX ADMIN — FENTANYL CITRATE 150 MCG: 50 INJECTION, SOLUTION INTRAMUSCULAR; INTRAVENOUS at 10:31

## 2024-05-16 RX ADMIN — INSULIN HUMAN 2 UNITS: 100 INJECTION, SOLUTION PARENTERAL at 14:02

## 2024-05-16 RX ADMIN — Medication 50 PERCENT: at 18:00

## 2024-05-16 RX ADMIN — NOREPINEPHRINE BITARTRATE 16 MCG: 1 INJECTION, SOLUTION, CONCENTRATE INTRAVENOUS at 14:12

## 2024-05-16 RX ADMIN — PROPOFOL 30 MCG/KG/MIN: 10 INJECTION, EMULSION INTRAVENOUS at 16:15

## 2024-05-16 RX ADMIN — Medication 0.5 MCG/KG/MIN: at 08:12

## 2024-05-16 RX ADMIN — Medication 40 PERCENT: at 20:00

## 2024-05-16 RX ADMIN — SODIUM CHLORIDE, POTASSIUM CHLORIDE, SODIUM LACTATE AND CALCIUM CHLORIDE 150 ML/HR: 600; 310; 30; 20 INJECTION, SOLUTION INTRAVENOUS at 20:16

## 2024-05-16 RX ADMIN — ATROPINE SULFATE 0.4 MG: 0.4 INJECTION, SOLUTION INTRAVENOUS at 15:42

## 2024-05-16 RX ADMIN — SODIUM CHLORIDE, POTASSIUM CHLORIDE, SODIUM LACTATE AND CALCIUM CHLORIDE 500 ML: 600; 310; 30; 20 INJECTION, SOLUTION INTRAVENOUS at 20:39

## 2024-05-16 RX ADMIN — ROCURONIUM BROMIDE 30 MG: 10 INJECTION INTRAVENOUS at 13:32

## 2024-05-16 RX ADMIN — ROCURONIUM BROMIDE 30 MG: 10 INJECTION INTRAVENOUS at 10:26

## 2024-05-16 RX ADMIN — CLEVIPIDINE 0.25 MG: 0.5 EMULSION INTRAVENOUS at 11:48

## 2024-05-16 RX ADMIN — PROPOFOL 30 MCG/KG/MIN: 10 INJECTION, EMULSION INTRAVENOUS at 17:49

## 2024-05-16 RX ADMIN — ALBUMIN HUMAN 25 G: 0.05 INJECTION, SOLUTION INTRAVENOUS at 19:41

## 2024-05-16 RX ADMIN — INSULIN HUMAN 3 UNITS: 100 INJECTION, SOLUTION PARENTERAL at 13:09

## 2024-05-16 RX ADMIN — SODIUM CHLORIDE, POTASSIUM CHLORIDE, SODIUM LACTATE AND CALCIUM CHLORIDE: 600; 310; 30; 20 INJECTION, SOLUTION INTRAVENOUS at 08:59

## 2024-05-16 RX ADMIN — NEOSTIGMINE METHYLSULFATE 4 MG: 1 INJECTION INTRAVENOUS at 16:35

## 2024-05-16 RX ADMIN — PHENYLEPHRINE HYDROCHLORIDE 200 MCG: 10 INJECTION INTRAVENOUS at 08:12

## 2024-05-16 RX ADMIN — HEPARIN SODIUM 7000 UNITS: 1000 INJECTION, SOLUTION INTRAVENOUS; SUBCUTANEOUS at 11:42

## 2024-05-16 RX ADMIN — SODIUM CHLORIDE, POTASSIUM CHLORIDE, SODIUM LACTATE AND CALCIUM CHLORIDE 150 ML/HR: 600; 310; 30; 20 INJECTION, SOLUTION INTRAVENOUS at 18:04

## 2024-05-16 RX ADMIN — SODIUM CHLORIDE, POTASSIUM CHLORIDE, SODIUM LACTATE AND CALCIUM CHLORIDE 500 ML: 600; 310; 30; 20 INJECTION, SOLUTION INTRAVENOUS at 17:51

## 2024-05-16 RX ADMIN — CEFOXITIN SODIUM 2 G: 2 POWDER, FOR SOLUTION INTRAVENOUS at 09:00

## 2024-05-16 RX ADMIN — ALBUMIN (HUMAN) 250 ML: 12.5 SOLUTION INTRAVENOUS at 14:49

## 2024-05-16 RX ADMIN — LIDOCAINE HYDROCHLORIDE 80 MG: 10 INJECTION, SOLUTION INFILTRATION; PERINEURAL at 07:45

## 2024-05-16 RX ADMIN — GLYCOPYRROLATE 0.8 MG: 0.2 INJECTION, SOLUTION INTRAMUSCULAR; INTRAVENOUS at 16:35

## 2024-05-16 SDOH — HEALTH STABILITY: MENTAL HEALTH: CURRENT SMOKER: 0

## 2024-05-16 ASSESSMENT — COLUMBIA-SUICIDE SEVERITY RATING SCALE - C-SSRS
2. HAVE YOU ACTUALLY HAD ANY THOUGHTS OF KILLING YOURSELF?: NO
1. IN THE PAST MONTH, HAVE YOU WISHED YOU WERE DEAD OR WISHED YOU COULD GO TO SLEEP AND NOT WAKE UP?: NO
6. HAVE YOU EVER DONE ANYTHING, STARTED TO DO ANYTHING, OR PREPARED TO DO ANYTHING TO END YOUR LIFE?: NO

## 2024-05-16 ASSESSMENT — PAIN - FUNCTIONAL ASSESSMENT
PAIN_FUNCTIONAL_ASSESSMENT: 0-10
PAIN_FUNCTIONAL_ASSESSMENT: CPOT (CRITICAL CARE PAIN OBSERVATION TOOL)
PAIN_FUNCTIONAL_ASSESSMENT: CPOT (CRITICAL CARE PAIN OBSERVATION TOOL)

## 2024-05-16 NOTE — ANESTHESIA PROCEDURE NOTES
Arterial Line:    Date/Time: 5/16/2024 8:05 AM    Staffing  Performed: attending   Authorized by: Aaron Colindres MD    Performed by: Aaron Colindres MD    An arterial line was placed. Procedure performed using ultrasound guidance.in the OR for the following indication(s): continuous blood pressure monitoring and blood sampling needed.    A 20 gauge (size), 1 and 3/4 inch (length), Angiocath (type) catheter was placed into the Right brachial artery, secured by Tegaderm,   Seldinger technique used.  Events:  patient tolerated procedure well with no complications.

## 2024-05-16 NOTE — ANESTHESIA PREPROCEDURE EVALUATION
Patient: Klaudia Ratliff    Procedure Information       Date/Time: 05/16/24 0715    Procedure: Creation Bypass Graft Aortofemoral Artery    Location: [facility] OR 16 / [facility] OR    Surgeons: Jovan Zelaya MD     HPI:  Klaudia Ratliff is 68 y.o. female with history of hypertension, hyperlipidemia, obesity, prior tobacco dependence, COPD, RLE DVT, right PE (s/p mechanical thrombectomy 2/22/24 discharged on eliquis bridged to Lovenox, sp IVC filter) and aortic occlusion here for aortofem bypass with Dr. Zelaya via thoracoabdominal approach.    ALLERGIES:  Allergies   Allergen Reactions    Amlodipine Swelling     Bilateral foot swelling    Clarithromycin Other and Dizziness     Slept for days    Doxycycline Diarrhea        MEDICAL HISTORY:  Past Medical History:   Diagnosis Date    Abnormal ECG 02/20/2024    Normal sinus rhythm Nonspecific ST abnormality Abnormal ECG    Acute sinusitis, unspecified 03/16/2016    Acute sinusitis    Aortic occlusion (CMS-[facility])     Claudication of both lower extremities (CMS-[facility])     COPD (chronic obstructive pulmonary disease) (Multi)     Coronary artery disease     Coronary artery disease without angina pectoris    DVT (deep venous thrombosis) (Multi)     RLE DVT, right PE (s/p mechanical thrombectomy 2/22/24    Essential (primary) hypertension 07/18/2022    Hypertension    Headache, unspecified 07/09/2013    Headache    Hyperlipidemia     Joint pain     Melena     Right shoulder pain 11/01/2023    Ulcer, stomach peptic     Wound of left groin     left groin nonhealing access site        Relevant Problems   Cardiac   (+) Aortoiliac occlusive disease (Multi)   (+) Arteriosclerosis of coronary artery   (+) Hyperlipidemia   (+) Hypertension      Pulmonary   (+) Chronic obstructive pulmonary disease (Multi)   (+) Multiple subsegmental pulmonary emboli without acute cor pulmonale (Multi)   (+) Pulmonary embolism (Multi)      Neuro   (+) Bilateral carotid artery stenosis   (+)  Lumbosacral plexus lesion      GI   (+) Gastric ulcer      /Renal   (+) Urinary tract infection      Hematology   (+) Venous thromboembolism (VTE)      Musculoskeletal   (+) Chronic right-sided low back pain with left-sided sciatica   (+) DDD (degenerative disc disease), lumbosacral      HEENT   (+) Acute sinusitis      ID   (+) Urinary tract infection      Skin   (+) Lichen planus        SURGICAL HISTORY:  Past Surgical History:   Procedure Laterality Date    CARDIAC CATHETERIZATION N/A 02/16/2024    Procedure: Left Heart Cath;  Surgeon: Aditya Sky MD PhD;  Location: [facility];  Service: Cardiovascular;  Laterality: N/A;  STAT    COLONOSCOPY      CT ABDOMEN PELVIS W AND WO IV CONTRAST  03/15/2024    IMPRESSION: 1. 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 2. No evidence of acute GI bleeding. 3. Diverticulosis without evidence acute diverticulitis. 4. No evidence of acute process in the abdomen or pelvis.    ECHOCARDIOGRAM 2 D M MODE PANEL  02/21/2024    Left ventricular systolic function is normal with a 60-65% estimated ejection fraction.  2. There is an elevated mean left atrial pressure.    ESOPHAGOGASTRODUODENOSCOPY      INVASIVE VASCULAR PROCEDURE N/A 02/22/2024    Procedure: Lower Extremity Angiogram;  Surgeon: Bar Day DO;  Location: [place] [address] [facility];  Service: Cardiovascular;  Laterality: N/A;    INVASIVE VASCULAR PROCEDURE N/A 02/22/2024    Procedure: Pulmonary Angiogram;  Surgeon: Bar Day DO;  Location: [place] [address] [facility];  Service: Cardiovascular;  Laterality: N/A;    IR INTERVENTION FILTER PLACEMENT  03/15/2024    MR CARDIAC MORPHOLOGY AND FUNCTION W AND WO IV CONTRAST  02/23/2024    THROMBECTOMY  02/2002    mechanical thrombectomy 2/22/24        MEDS:  Current Outpatient Medications   Medication Instructions    apixaban  "(ELIQUIS) 5 mg, oral, 2 times daily    ascorbic acid (VITAMIN C) 250 mg, oral, Daily    aspirin 81 mg, oral, Daily    atenolol (TENORMIN) 50 mg, oral, 2 times daily    atorvastatin (LIPITOR) 80 mg, oral, Nightly    cholecalciferol (VITAMIN D-3) 50 mcg, oral, Daily    enoxaparin (LOVENOX) 80 mg, subcutaneous, Every 12 hours, Started 05/13/24    furosemide (LASIX) 40 mg, oral, Daily, As needed for foot swelling    gabapentin (NEURONTIN) 200 mg, oral, 2 times daily    losartan (COZAAR) 100 mg, oral, Daily    potassium chloride CR 10 mEq ER tablet TAKE 5 TABLETS BY MOUTH ONCE  DAILY    tiotropium (Spiriva Respimat) 2.5 mcg/actuation inhaler 2 puffs, inhalation, Daily RT        VITALS:      5/16/2024     6:24 AM 5/13/2024    10:01 AM 5/6/2024     2:12 PM   Vitals   Systolic 164 118 116   Diastolic 76 62 78   Heart Rate 67 72 61   Temp 36.6 °C (97.9 °F) 36 °C (96.8 °F)    Resp 16 20    Height (in) 1.575 m (5' 2\")  1.575 m (5' 2\")   Weight (lb) 162.2  163   BMI 29.67 kg/m2  29.81 kg/m2   BSA (m2) 1.79 m2  1.8 m2   Visit Report   Report       LABS:   BMP   Lab Results   Component Value Date    GLUCOSE 105 (H) 05/15/2024    CALCIUM 9.4 05/15/2024     05/15/2024    K 5.4 (H) 05/15/2024    CO2 24 05/15/2024     (H) 05/15/2024    BUN 37 (H) 05/15/2024    CREATININE 1.49 (H) 05/15/2024   , LFT   Lab Results   Component Value Date    ALT 22 02/21/2024    AST 12 02/21/2024    ALKPHOS 58 02/21/2024    BILITOT 0.4 02/21/2024   , CBC  Lab Results   Component Value Date    WBC 7.3 05/15/2024    HGB 10.5 (L) 05/15/2024    HCT 32.8 (L) 05/15/2024     (H) 05/15/2024     05/15/2024          , Coags   Lab Results   Component Value Date/Time    PROTIME 14.7 (H) 05/15/2024 0703    INR 1.3 (H) 05/15/2024 0703    APTT 38 04/16/2024 1604      , A1C No results found for: \"HGBA1C\"    IMAGES:  EKG          Encounter Date: 02/20/24   ECG 12 lead   Result Value    Ventricular Rate 70    Atrial Rate 70    TX Interval 142    " QRS Duration 94    QT Interval 378    QTC Calculation(Bazett) 408    P Axis 51    R Axis 15    T Axis 28    QRS Count 11    Q Onset 222    P Onset 151    P Offset 207    T Offset 411    QTC Fredericia 398    Narrative    Normal sinus rhythm  Nonspecific ST abnormality  Abnormal ECG  When compared with ECG of 16-FEB-2024 09:52,  No significant change was found  See ED provider note for full interpretation and clinical correlation  Confirmed by Aaron Valdez ([id number]) on 2/21/2024 1:49:50 AM      , ECHO         Transthoracic Echo (TTE) Complete With Contrast 02/21/2024    [facility], [address] [place] [address]  Tel [phone] and Fax [phone]    TRANSTHORACIC ECHOCARDIOGRAM REPORT      Patient Name:      JORGE L MANUEL   Reading Physician:    [id number] Unruly Celaya MD  Study Date:        2/21/2024            Ordering Provider:    [id number] HAN    CONCLUSIONS:  1. Left ventricular systolic function is normal with a 60-65% estimated ejection fraction.  2. There is an elevated mean left atrial pressure.  3. There is a prominent echodensity in the apex of the RV, similar in density to surrounding muscle, that is present on multiple views but is not visible with Definity. This may represent a large moderator band or RV trabeculations, vs less likely thrombus or myxoma. Recommend cMRI for further characterization.    Cardiac MRI 02/22/2024  IMPRESSION:  1. Prominent trabeculations within the right ventricular apex. No  evidence of mass or thrombus.  2. The left ventricle is normal in size, shape, and has normal global  systolic function (LVEF = 73%). There are no segmental wall motion  abnormalities.  Quantitative values are as noted above.  3. There are no findings to suggest prior ischemic damage or an  infiltrative process.  4. Normal aortic, mitral, and tricuspid valve function.  5. Focal area of subpleural signal abnormality within the right lower  lobe better evaluated  on dedicated CT of the chest from 02/20/2024    CT angio chest w and wo IV contrast 02/20/2024    Narrative  Interpreted By:  Alejandro Mendez  and Kenneth Dash  STUDY:  CT ANGIO CHEST W AND WO IV CONTRAST;  2/20/2024 1:59 pm    Impression  1. Interval development of right lower lobe and right middle lobe  segmental pulmonary emboli with focal opacification of the right  lower lobe suggestive of developing pulmonary infarct. No CT evidence  for right heart strain.  2. The thoracic aorta is normal in course, caliber, and contour.  There is complete thrombosis of the visualized portion of the  renal/infrarenal abdominal aorta and partial occlusion of the distal  thoracic and proximal abdominal aorta.. There is otherwise no  evidence for acute aortic pathology.  3. Additional findings as above.    I personally reviewed the images/study and I agree with the resident  findings as stated by Ekta Cooper MD. This study was interpreted at  [place], [place].    MACRO:  Ekta Cooper discussed the significance and urgency of this critical  finding by Epic Chat with  RAMBO EASTMAN on 2/20/2024 at 2:47 pm.  (**-RCF-**) Findings:  See findings.    Signed by: Alejandro Mendez 2/20/2024 3:27 PM  Dictation workstation:   [id number]   , CT Abdomin       CT angio abdomen pelvis w and or wo IV IV contrast 03/15/2024    Impression  1. Complete occlusion of the infrarenal abdominal aorta with  reconstitution of the flow in the left external iliac artery and  right common femoral artery.  2. No evidence of acute GI bleeding.  3. Diverticulosis without evidence acute diverticulitis.  4. No evidence of acute process in the abdomen or pelvis.      I personally reviewed the images/study and I agree with the findings  as stated by resident physician Dr. Lane Gonzalez . This study  was interpreted at [place], [place].    MACRO:  None    Signed by:  Everette Bianchi 3/15/2024 5:50 AM  Dictation workstation:   [id number]        SOCIAL:  Social History     Tobacco Use   Smoking Status Former    Current packs/day: 0.00    Types: Cigarettes    Quit date: 2024    Years since quittin.2    Passive exposure: Current   Smokeless Tobacco Never      Social History     Substance and Sexual Activity   Alcohol Use Not Currently    Comment: Occasional      Social History     Substance and Sexual Activity   Drug Use Yes    Types: Marijuana    Comment: used gummies once a couple weeks ago        NPO STATUS:  NPO/Void Status  Date of Last Liquid: 24  Time of Last Liquid: 0400  Date of Last Solid: 05/15/24        Clinical Areas Reviewed:    Allergies  Meds             Physical Exam    Airway  Mallampati: II  TM distance: >3 FB  Neck ROM: full     Cardiovascular   Rhythm: regular     Dental   Comments: Poor dentition   Pulmonary   Breath sounds clear to auscultation     Abdominal   (+) obese  Abdomen: soft             Anesthesia Plan    History of general anesthesia?: yes  History of complications of general anesthesia?: no    ASA 3     general   (Risks, benefits, and alternatives to intraoperative TANMAY, arterial monitoring, CVC placement, and GETA. The patient indicated understanding and agreed to proceed.  )  The patient is not a current smoker.    intravenous induction   Postoperative administration of opioids is intended.  Trial extubation is planned.  Anesthetic plan and risks discussed with patient.  Use of blood products discussed with patient who consented to blood products.    Plan discussed with attending and fellow.

## 2024-05-16 NOTE — OP NOTE
OPERATIVE NOTE                  Preoperative Diagnosis  Perivisceral aortic occlusive disease  Infrarenal aortic occlusion   Severe left renal artery stenosis.    Postoperative Diagnosis                 Same    Procedures:  Transaortic perivisceral aortic endarterectomy  Left renal artery bypass grafting   Aortobifemoral bypass grafting  Right common femoral artery thromboendarterectomy  Left renal artery endarterectomy    Indications for Surgery:  This is a 68-year-old female who presents with severe bilateral lower extremity ischemia with rest pain and numbness secondary to aortic occlusion.  She was also noted to have occlusive disease of the perivisceral aorta with partial thrombosis of the aorta with about 50% luminal stenosis.  She also has significant renal artery stenosis worse on the left side.  She has hypertension on 3 antihypertensive medications. Patient now submits for transaortic perivisceral aortic endarterectomy, left renal artery bypass grafting and aortobifemoral bypass grafting.      Surgeon(s):    Jovan Zelaya MD    Assistant(s):  MD Boom Sung MD S. Patel, MD    Anesthesia: General    Anesthesiologist(s):    Anesthesiologist: Angel Jensen MD; Aaron Colindres MD  Anesthesia Resident: Kalpesh Perez MD     Complications:    * No complications entered in OR log *     Findings:  Partial thrombosis of the perivisceral aorta; infrarenal aortic occlusion; moderate left common femoral artery stenosis; severe right common femoral artery stenosis; severe left renal artery stenosis    Postoperative Condition: stable.      Sponge, Needle, Instrument Count: were correct.      Specimen(s):   ID Type Source Tests Collected by Time   1 : Aortic Contents Tissue AORTA SURGICAL PATHOLOGY EXAM Jovan Zelaya MD 5/16/2024 1413   2 : Right Femoral Plaque Tissue PLAQUE SURGICAL PATHOLOGY EXAM Jovan Zelaya MD 5/16/2024 1413        Drains and/or Catheters:   Chest Tube 1 Left 28 Fr (Active)    Function -20 cm H2O 05/16/24 1800   Chest Tube Air Leak No 05/16/24 1800   Patency Intervention Stripped;Tip/tilt 05/16/24 1800   Drainage Description Sanguineous 05/16/24 1800   Dressing Status Clean;Dry;Occlusive 05/16/24 1800   Site Assessment Clean;Dry;Intact 05/16/24 1800   Surrounding Skin Dry;Intact 05/16/24 1800   Output (mL) 0 mL 05/16/24 1845       NG/OG/Feeding Tube NG - Plumas sump Right nostril (Active)   Tube Status Clamped 05/16/24 1800   Placement Verification X-ray 05/16/24 1800   Site Assessment Clean;Dry;Intact 05/16/24 1800   Drainage Appearance None 05/16/24 1800   NG/OG Interventions Air injected into blue air vent port 05/16/24 1800       Urethral Catheter Non-latex;Temperature probe 16 Fr. (Active)   Site Assessment Clean;Skin intact 05/16/24 1800   Collection Container Standard drainage bag 05/16/24 1800   Securement Method Securing device (Describe) 05/16/24 1800   Reason for Continuing Urinary Catheterization accurate hourly measurement of urine volume in a critically ill patient that cannot be assessed by other volumes and urine collection strategies 05/16/24 1800   Output (mL) 5 mL 05/16/24 1845       Operative Procedure Details:  After a plane of anesthesia was obtained with double-lumen endotracheal intubation the patient was prepared and draped in the usual sterile fashion.  The patient was positioned in the traditional thoracoabdominal position.      A scimitar incision was made in each groin and dissection carried through the subcutaneous tissue.  The common, profunda femoris and superficial femoral arteries were isolated and dissected free on both sides.  There was a significant amount of disease that was palpable on the right Common femoral artery.  Moderate degree of plaque was noted on the left side.    A 10th interspace interspace thoracoretroperitoneal incision was made.  The diaphragm was partially incised by approximately 10 cm to facilitate the exposure.  The  retroperitoneal space was entered and the entire visceral content was reflected medially.  The aorta was identified.  Dissection was continued proximally into the distal descending thoracic aorta.  The left jarred of the diaphragm was divided.  The distal descending thoracic aorta about 7 cm proximal to the celiac axis origin was isolated and dissected free and prepared for clamping.  The left renal, superior mesenteric and celiac axis were isolated and dissected free and encircled with a vessel loop.  The patient was given half a gram per kilogram body weight of mannitol.    A retroperitoneal tunnel was prepared between the right common femoral artery and the aortic bifurcation.  Similarly a tunnel was prepared between the left common femoral artery and the aortic bifurcation.    Following systemic heparinization control was obtained of the distal descending thoracic aorta.  A longitudinal aortotomy was made in the infrarenal aorta and extended to distal descending thoracic aorta about 6 cm above the celiac axis origin.  Thromboendarterectomy was effected of the perivisceral aorta.  Nice endpoints were noted at the orifice ease of the renal and visceral vessels.  The arteriotomy was then closed primarily using a running 4-0 Prolene suture.  The suture line was hemostatic.    The infrarenal aorta was then transected about 1 cm distal to the left renal artery.  A 14 x 7 mm rifampin-soaked woven polyester graft was fashioned to fit and sewn end to end with a running 3-0 Prolene suture.  The suture line was hemostatic.    Next, the left renal artery was divided at its origin.  The renal artery was involved with significant disease with a plaque extending about 2 to 2-1/2 cm from its orifice.  Endarterectomy was effected over the renal artery and nice endpoint was established.  A rifampin-soaked 6 mm woven polyester graft was fashioned to fit and sewn into an to the renal artery with a running 6-0 Prolene suture.  The  anastomosis was hemostatic.  The kidney was then instilled with 240 cc of cold perfusate.  A side-biting clamp was then applied on the left lateral aspect of the aortic graft.  A graftotomy was made and a hole was made using a hand-held cautery.  The graft was then fashioned to fit and sewn end-to-side with a running 5-0 Prolene suture.  After appropriate backbleeding and forward bleeding were allowed flow was restored to the left renal artery.  The anastomosis was hemostatic.  Excellent Doppler signals were heard over all the renal and visceral vessels.  The patient maintained adequate urinary output throughout the course of the operation.    Next, the left limb of the graft was brought into approximation with the left common femoral artery.  Control was obtained of the left femoral artery and a longitudinal arteriotomy made in the distal common femoral artery.  The amount of disease was such that endarterectomy was not warranted.  The graft was then fashioned to fit and sewn end-to-side with a running 6-0 Prolene suture.  After appropriate backbleeding and forward bleeding were allowed flow was restored to the left lower extremity.  The anastomosis was hemostatic.    Next the right limb of the graft was brought into approximation with the right common femoral artery.  Control was obtained of the right common femoral artery and a longitudinal arteriotomy made.  Endarterectomy of the common femoral artery was then effected.  Arteriotomy was then patch angioplastied using a bovine pericardial patch and a running 7-0 Prolene suture.  A longitudinal incision was made over the patch and the right limb of the graft was brought into approximation with the patch and fashioned to fit and sewn end-to-side with a running 6-0 Prolene suture.  After appropriate backbleeding and forward bleeding were allowed flow was restored to the right lower extremity.  The anastomosis was hemostatic.    Heparin effects were reversed with  protamine.  After hemostasis was assured the diaphragm was approximated using 2-0 Ethibond in horizontal mattress fashion.  The ribs were approximated using #2 pericostal sutures in figure-of-eight fashion.  A #20 a chest tube was placed into the pleural space and brought out through a separate stab wound.  The muscular layer was approximated using #1 PDS suture in running fashion and the skin with staples.    The patient tolerated procedure well without complications and was transferred to recovery room in stable condition.    I was present for the entire duration of the procedure.      Jovan Zelaya MD  Phone: [phone]

## 2024-05-16 NOTE — ANESTHESIA PROCEDURE NOTES
Airway  Date/Time: 5/16/2024 7:50 AM  Urgency: elective      Staffing  Performed: resident and fellow   Authorized by: Aaron Colindres MD    Performed by: Kalpesh Perez MD  Patient location during procedure: OR    Indications and Patient Condition  Indications for airway management: anesthesia  Spontaneous ventilation: present  Sedation level: deep  Preoxygenated: yes  Patient position: sniffing  Mask difficulty assessment: 2 - vent by mask + OA or adjuvant +/- NMBA  Planned trial extubation    Final Airway Details  Final airway type: endotracheal airway      Successful airway: ETT - double lumen left  Cuffed: yes   Successful intubation technique: video laryngoscopy  Facilitating devices/methods: intubating stylet  Endotracheal tube insertion site: oral  Blade: Tracey  Blade size: #3  ETT DL size (fr): 37  Cormack-Lehane Classification: grade IIa - partial view of glottis  Placement verified by: chest auscultation, bronchoscopy, capnometry and single lung ventilation   Measured from: lips  ETT to lips (cm): 31  Number of attempts at approach: 1

## 2024-05-16 NOTE — ANESTHESIA PROCEDURE NOTES
Central Venous Line:    Date/Time: 5/16/2024 8:30 AM    A central venous line was placed in the OR for the following indication(s): central venous access and CVP monitoring.  Staffing  Performed: resident   Authorized by: Aaron Colindres MD    Performed by: Kalpesh Perez MD    Sterility preparation included the following: provider hand hygiene performed prior to central venous catheter insertion, all 5 sterile barriers used (gloves, gown, cap, mask, large sterile drape) during central venous catheter insertion, antiseptic used during central venous catheter insertion and skin prep agent completely dried prior to procedure.  Medical reason for not performing maximal sterile barrier technique: no  The patient was placed in Trendelenburg position.    Left internal jugular vein was prepped.    The site was prepped with Chlorhexidine.  Size: 9 Fr (8 Fr)   Length: 11.5  Catheter type: introducer   Number of Lumens: double lumen    This catheter was not an oximetric catheter.    During the procedure, the following specific steps were taken: target vein identified, needle advanced into vein and blood aspirated and guidewire advanced into vein.  Seldinger technique used.  Procedure performed using ultrasound guidance.  Sterile gel and probe cover used in ultrasound-guided central venous catheter insertion.    Intravenous verification was obtained by ultrasound, venous blood return and manometry.      Post insertion care included: all ports aspirated, all ports flushed easily, guidewire removed intact, Biopatch applied, line sutured in place and dressing applied.    During the procedure the patient experienced: patient tolerated procedure well with no complications.           images stored in chartleft internal jugular vein.  The PAC placement was confirmed by pressure tracing changes.  The patient experienced the following events during the procedure: patient tolerated procedure well with no complications.

## 2024-05-16 NOTE — BRIEF OP NOTE
Date: 2024  OR Location: [facility] OR    Name: Klaudia Ratliff, : 1955, Age: 68 y.o., MRN: [id number], Sex: female    Diagnosis  Pre-op Diagnosis     * Aortic occlusion (CMS-HCC) [I70.0] Post-op Diagnosis     * Aortic occlusion (CMS-HCC) [I70.0]     Procedures  Perivisceral aortic endarterectomy, aortobifemoral bypass graft, left renal bypass, right femoral endarterectomy    Surgeons      * Jovan Zelaya - Primary    Resident/Fellow/Other Assistant:  Surgeons and Role:     * Jessica Huynh MD - Resident - Assisting     * Giuliano Delgado MD - Fellow     * Boom Jacobson MD - Fellow    Procedure Summary  Anesthesia: General  ASA: III  Anesthesia Staff: Anesthesiologist: Angel Jensen MD; Aaron Colindres MD  Anesthesia Resident: Kalpesh Perez MD  Estimated Blood Loss: 1000 mL  Intra-op Medications:   Administrations occurring from 0715 to 1245 on 24:   Medication Name Total Dose   gelatin absorbable (Gelfoam) 100 sponge 2 each   thrombin (recombinant) (Recothrom) topical solution 20,000 Units   sodium chloride 0.9 % irrigation solution 3,000 mL   heparin (porcine) 5,000 Units in sodium chloride 0.9% 500 mL irrigation 5,000 Units              Anesthesia Record               Intraprocedure I/O Totals          Intake    PRBC 700.00 mL    Norepinephrine Drip 0.00 mL    The total shown is the total volume documented since Anesthesia Start was filed.    Clevidipine Drip 0.00 mL    The total shown is the total volume documented since Anesthesia Start was filed.    Insulin Drip 0.00 mL    The total shown is the total volume documented since Anesthesia Start was filed.    Phenylephrine Drip 0.00 mL    The total shown is the total volume documented since Anesthesia Start was filed.    Cell Saver 230 mL    Total Intake 930 mL       Output    Urine 1300 mL    Total Output 1300 mL       Net    Net Volume -370 mL          Specimen:   ID Type Source Tests Collected by Time   1 : Aortic Contents Tissue AORTA  SURGICAL PATHOLOGY EXAM Jovan Zelaya MD 5/16/2024 1413   2 : Right Femoral Plaque Tissue PLAQUE SURGICAL PATHOLOGY EXAM Jovan Zelaya MD 5/16/2024 1413        Staff:   Circulator: Jeancarlos Johnson RN  Relief Circulator: Sonia Lorenzo RN; Ellie Jade RN  Relief Scrub: Jeancarlos Johnson RN  Scrub Person: Jose Myers RN; Carisa Justice RN    Findings: Occluded infrarenal aorta, atherosclerotic plaque in right CFA    Complications:  None; patient tolerated the procedure well.     Disposition: ICU - intubated and hemodynamically stable.  Condition: stable  Specimens Collected:   ID Type Source Tests Collected by Time   1 : Aortic Contents Tissue AORTA SURGICAL PATHOLOGY EXAM Jovan Zelaya MD 5/16/2024 1413   2 : Right Femoral Plaque Tissue PLAQUE SURGICAL PATHOLOGY EXAM Jovan Zelaya MD 5/16/2024 1413     Attending Attestation:     Jovan Zelaya  Phone Number: [phone]

## 2024-05-16 NOTE — ANESTHESIA PROCEDURE NOTES
Peripheral Block    Patient location during procedure: pre-op  Start time: 5/16/2024 6:38 AM  End time: 5/16/2024 6:45 AM  Reason for block: at surgeon's request and post-op pain management  Staffing  Performed: resident   Authorized by: Spencer Vital MD    Performed by: Spencer Vital MD  Preanesthetic Checklist  Completed: patient identified, IV checked, site marked, risks and benefits discussed, surgical consent, monitors and equipment checked, pre-op evaluation and timeout performed   Timeout performed at: 5/16/2024 6:37 AM  Peripheral Block  Patient position: laying flat  Prep: ChloraPrep  Patient monitoring: heart rate and continuous pulse ox  Block type: QL  Laterality: B/L  Injection technique: catheter  Guidance: ultrasound guided  Local infiltration: ropivacaine  Needle  Needle type: Tuohy   Needle gauge: 26 G  Needle length: 8 cm  Needle localization: ultrasound guidance     image stored in chart  Assessment  Injection assessment: negative aspiration for heme, no paresthesia on injection, incremental injection and local visualized surrounding nerve on ultrasound  Additional Notes  QL catheters: informed consent obtained. risks and benefits discussed. ASA monitors placed, timeout performed. Pt positioned, prepped with chlorhexidine, draped with sterile towels. Ultrasound guidance used with visualization of the needle throughout duration of the procedure. Aspiration was negative. A total of 30 cc 0.5% ropivacaine, 50mcg epinephrine, and 4mg decadron injected between both sides. catheters threaded into space and secured. Patient tolerated procedure well.       Timeout by Kathryn JO

## 2024-05-16 NOTE — ANESTHESIA POSTPROCEDURE EVALUATION
Patient: Klaudia Ratliff    Procedure Summary       Date: 05/16/24 Room / Location: [facility] OR 16 / [facility]    Anesthesia Start: 0740 Anesthesia Stop: 1730    Procedure: Perivisceral aortic endartectomy, aorta bifemoral bypass graft, and left renal artery bypass graft Diagnosis:       Aortic occlusion (CMS-HCC)      (Aortic occlusion (CMS-HCC) [I70.0])    Surgeons: Jovan Zelaya MD Responsible Provider: Aaron Colindres MD    Anesthesia Type: general ASA Status: 3            Anesthesia Type: general    Vitals Value Taken Time   /74 05/16/24 1817   Temp See CTICU record 05/16/24 1817   Pulse 61 05/16/24 1816   Resp 16 05/16/24 1816   SpO2 100 % 05/16/24 1816   Vitals shown include unfiled device data.    Anesthesia Post Evaluation    Patient location during evaluation: ICU  Patient participation: complete - patient cannot participate  Level of consciousness: sedated  Pain management: adequate  Airway patency: patent  Cardiovascular status: stable (on levo .04)  Respiratory status: acceptable and ETT (AL)  Hydration status: acceptable  Postoperative Nausea and Vomiting: none        No notable events documented.

## 2024-05-16 NOTE — ANESTHESIA PROCEDURE NOTES
Peripheral IV  Date/Time: 5/16/2024 8:32 AM      Placement  Laterality: right  Location: forearm  Technique: anatomical landmarks  Attempts: 1

## 2024-05-16 NOTE — CONSULTS
Klaudia Ratliff is a 68 y.o. year old female patient who presents for AAA with Dr. Zelaya on 5/16/24. Acute Pain consulted for block for postoperative pain control.     Anticipated Postop Pain Issues -   Palliative: typically relieved with IV analgesics and regional local anesthetics  Provocative: typically with movement  Quality: typically burning and aching  Radiation: typically none  Severity: typically severe 8-10/10  Timing: typically constant    Past Medical History:   Diagnosis Date    Abnormal ECG 02/20/2024    Normal sinus rhythm Nonspecific ST abnormality Abnormal ECG    Acute sinusitis, unspecified 03/16/2016    Acute sinusitis    Aortic occlusion (CMS-HCC)     Claudication of both lower extremities (CMS-[facility])     COPD (chronic obstructive pulmonary disease) (Multi)     Coronary artery disease     Coronary artery disease without angina pectoris    DVT (deep venous thrombosis) (Multi)     RLE DVT, right PE (s/p mechanical thrombectomy 2/22/24    Essential (primary) hypertension 07/18/2022    Hypertension    Headache, unspecified 07/09/2013    Headache    Hyperlipidemia     Joint pain     Melena     Right shoulder pain 11/01/2023    Ulcer, stomach peptic     Wound of left groin     left groin nonhealing access site        Past Surgical History:   Procedure Laterality Date    CARDIAC CATHETERIZATION N/A 02/16/2024    Procedure: Left Heart Cath;  Surgeon: Aditya Sky MD PhD;  Location: [facility] Cardiac Cath Lab;  Service: Cardiovascular;  Laterality: N/A;  STAT    COLONOSCOPY      CT ABDOMEN PELVIS W AND WO IV CONTRAST  03/15/2024    IMPRESSION: 1. 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 2. No evidence of acute GI bleeding. 3. Diverticulosis without evidence acute diverticulitis. 4. No evidence of acute process in the abdomen or pelvis.    ECHOCARDIOGRAM 2 D M MODE PANEL  02/21/2024    Left  ventricular systolic function is normal with a 60-65% estimated ejection fraction.  2. There is an elevated mean left atrial pressure.    ESOPHAGOGASTRODUODENOSCOPY      INVASIVE VASCULAR PROCEDURE N/A 2024    Procedure: Lower Extremity Angiogram;  Surgeon: Bar Day DO;  Location: [place] [address] Cardiac Cath Lab;  Service: Cardiovascular;  Laterality: N/A;    INVASIVE VASCULAR PROCEDURE N/A 2024    Procedure: Pulmonary Angiogram;  Surgeon: Bar Day DO;  Location: [place] [address] Cardiac Cath Lab;  Service: Cardiovascular;  Laterality: N/A;    IR INTERVENTION FILTER PLACEMENT  03/15/2024    MR CARDIAC MORPHOLOGY AND FUNCTION W AND WO IV CONTRAST  2024    THROMBECTOMY  2002    mechanical thrombectomy 24        No family history on file.     Social History     Socioeconomic History    Marital status:      Spouse name: Not on file    Number of children: Not on file    Years of education: Not on file    Highest education level: Not on file   Occupational History    Not on file   Tobacco Use    Smoking status: Former     Current packs/day: 0.00     Types: Cigarettes     Quit date: 2024     Years since quittin.2     Passive exposure: Current    Smokeless tobacco: Never   Vaping Use    Vaping status: Never Used   Substance and Sexual Activity    Alcohol use: Not Currently     Comment: Occasional    Drug use: Yes     Types: Marijuana     Comment: used gummies once a couple weeks ago    Sexual activity: Not on file   Other Topics Concern    Not on file   Social History Narrative    Not on file     Social Determinants of Health     Financial Resource Strain: Low Risk  (3/14/2024)    Overall Financial Resource Strain (CARDIA)     Difficulty of Paying Living Expenses: Not hard at all   Food Insecurity: Not on file   Transportation Needs: No Transportation Needs (2024)    OASIS : Transportation     Lack of Transportation (Medical): No     Lack of Transportation  (Non-Medical): No     Patient Unable or Declines to Respond: No   Physical Activity: Not on file   Stress: Not on file   Social Connections: Feeling Socially Integrated (5/13/2024)    OASIS : Social Isolation     Frequency of experiencing loneliness or isolation: Never   Intimate Partner Violence: Not on file   Housing Stability: Low Risk  (3/14/2024)    Housing Stability Vital Sign     Unable to Pay for Housing in the Last Year: No     Number of Places Lived in the Last Year: 1     Unstable Housing in the Last Year: No        Allergies   Allergen Reactions    Amlodipine Swelling     Bilateral foot swelling    Clarithromycin Other and Dizziness     Slept for days    Doxycycline Diarrhea         Review of Systems  Gen: No fatigue, anorexia, insomnia, fever.   Eyes: No vision loss, double vision, drainage, eye pain.   ENT: No pharyngitis, dry mouth, no hearing changes or ear discharge  Cardiac: No chest pain, palpitations, syncope, near syncope.   Pulmonary: No shortness of breath, cough, hemoptysis.   Heme/lymph: No swollen glands, fever, bleeding.   GI: No abdominal pain, change in bowel habits, melena, hematemesis, hematochezia, nausea, vomiting, diarrhea.   : No discharge, dysuria, frequency, urgency, hematuria.  Endo: No polyuria or weight loss.   Musculoskeletal: Negative for any pain or loss of ROM/weakness  Skin: No rashes or lesions  Neuro: Normal speech, no numbness or weakness. No gait difficulties  Review of systems is otherwise negative unless stated above or in history of present illness.    Physical Exam:  Constitutional:  no distress, alert and cooperative  Eyes: clear sclera  Head/Neck: No apparent injury, trachea midline  Respiratory/Thorax: Patent airways, thorax symmetric, breathing comfortably  Cardiovascular: no pitting edema  Gastrointestinal: Nondistended  Musculoskeletal: ROM intact  Extremities: no clubbing  Neurological: alert, small x4  Psychological: Appropriate affect    Results for  orders placed or performed during the hospital encounter of 05/16/24 (from the past 24 hour(s))   Prepare RBC: 4 Units   Result Value Ref Range    PRODUCT CODE P9740Q55     Unit Number [id number]     Unit ABO B     Unit RH POS     XM INTEP COMP     Dispense Status XM     Blood Expiration Date June 01, 2024 23:59 EDT     PRODUCT BLOOD TYPE 7300     UNIT VOLUME 350     PRODUCT CODE S2304O90     Unit Number [id number]     Unit ABO B     Unit RH POS     XM INTEP COMP     Dispense Status XM     Blood Expiration Date June 08, 2024 23:59 EDT     PRODUCT BLOOD TYPE 7300     UNIT VOLUME 350     PRODUCT CODE R7611O30     Unit Number [id number]     Unit ABO B     Unit RH POS     XM INTEP COMP     Dispense Status XM     Blood Expiration Date June 07, 2024 23:59 EDT     PRODUCT BLOOD TYPE 7300     UNIT VOLUME 350     PRODUCT CODE S4190N82     Unit Number [id number]     Unit ABO B     Unit RH POS     XM INTEP COMP     Dispense Status XM     Blood Expiration Date June 01, 2024 23:59 EDT     PRODUCT BLOOD TYPE 7300     UNIT VOLUME 350    Prepare Plasma: 4 Units   Result Value Ref Range    PRODUCT CODE A4938D32     Unit Number [id number]     Unit ABO B     Unit RH POS     Dispense Status RE     Blood Expiration Date May 21, 2024 06:33 EDT     PRODUCT BLOOD TYPE 7300     UNIT VOLUME 201     PRODUCT CODE Y3001P28     Unit Number [id number]     Unit ABO B     Unit RH POS     Dispense Status RE     Blood Expiration Date May 17, 2024 20:00 EDT     PRODUCT BLOOD TYPE 7300     UNIT VOLUME 331     PRODUCT CODE Y2345Z14     Unit Number [id number]     Unit ABO AB     Unit RH POS     Dispense Status RE     Blood Expiration Date May 19, 2024 07:44 EDT     PRODUCT BLOOD TYPE 8400     UNIT VOLUME 295     PRODUCT CODE R6925I33     Unit Number [id number]     Unit ABO AB     Unit RH POS     Dispense Status RE     Blood Expiration Date May 19, 2024 08:54 EDT     PRODUCT BLOOD TYPE 8400     UNIT VOLUME 299     Prepare Platelets: 1 Units   Result Value Ref Range    PRODUCT CODE W4642K36     Unit Number [id number]     Unit ABO B     Unit RH POS     Dispense Status XM     Blood Expiration Date May 16, 2024 23:59 EDT     PRODUCT BLOOD TYPE 7300     UNIT VOLUME 220       Klaudia Ratliff is a 68 y.o. year old female patient who presents for AAA with Dr. Zelaya on 5/16/24. Acute Pain consulted for block for postoperative pain control.     Plan:    - B/L QL nerve blocks with catheters performed preoperatively on 5/16/24  - Ambit ball with Ropivacaine 0.2%/NaCl 0.9% 500mL, Rate 7 cc/hr bilaterally  - Ambit medication will not interfere with pain medication prescribed by the primary team.   - Please be aware of local anesthetic toxic dose and absorption variability before considering lidocaine patches  - Acute pain service will follow while catheters in place  - Rest of pain management per primary team    Acute Pain Resident  pg [phone] ph [phone]

## 2024-05-16 NOTE — H&P
Vascular Surgery H&P Note     CC: FUV wound check, aortic occlusion      History Of Present Illness:   Klaudia Ratliff is a 68 y.o. female here for routine follow up. She has an aortic occlusion causing right foot numbness and short distance claudication. She was diagnosed with a PE and RLE DVT in February 2024 and underwent mechanical thrombectomy via left femoral vein access. 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 The left groin access site has not healed and she is currently performing wound care daily. She denies fevers or chills.      She was admitted with melena and anemia. GI performed scope that showed a single gastric ulcer. Pathology results showed no H. Pylori.      Carotid duplex is with less than 50% ICA stenosis bilaterally.   FEV1 is 1.12L. She was evaluated by pulmonology and started on Spiriva.   Coronary catheterization showed nonobstructive CAD.      Medical History:      Patient Active Problem List   Diagnosis    Varicose veins of both lower extremities    Cigarette nicotine dependence without complication    Hyperlipidemia    Hypertension    Hypokalemia    Lichen planus    Chronic right-sided low back pain with left-sided sciatica    Headache    Positive colorectal cancer screening using Cologuard test    Acute sinusitis    Aortic occlusion (CMS-HCC)    Class 1 obesity    Venous intermittent claudication    Cramp of both lower extremities    DDD (degenerative disc disease), lumbosacral    Disturbance of skin sensation    Gait instability    Lumbosacral plexus lesion    Overweight with body mass index (BMI) 25.0-29.9    Reflex abnormality    Urinary tract infection    Arteriosclerosis of coronary artery    Multiple subsegmental pulmonary emboli without acute cor pulmonale (Multi)    Pulmonary embolism (Multi)    Melena    Aortoiliac occlusive disease (Multi)    Chronic obstructive pulmonary disease (Multi)    Venous thromboembolism (VTE)    Gastric ulcer    Bilateral carotid artery  stenosis         SH:     Social Determinants of Health           Tobacco Use: Medium Risk (4/12/2024)     Patient History      Smoking Tobacco Use: Former      Smokeless Tobacco Use: Never      Passive Exposure: Current   Alcohol Use: Not At Risk (3/14/2024)     AUDIT-C      Frequency of Alcohol Consumption: Never      Average Number of Drinks: Patient does not drink      Frequency of Binge Drinking: Never   Financial Resource Strain: Low Risk  (3/14/2024)     Overall Financial Resource Strain (CARDIA)      Difficulty of Paying Living Expenses: Not hard at all   Food Insecurity: Not on file   Transportation Needs: No Transportation Needs (3/23/2024)     OASIS : Transportation      Lack of Transportation (Medical): No      Lack of Transportation (Non-Medical): No      Patient Unable or Declines to Respond: No   Physical Activity: Not on file   Stress: Not on file   Social Connections: Feeling Socially Integrated (3/23/2024)     OASIS : Social Isolation      Frequency of experiencing loneliness or isolation: Never   Intimate Partner Violence: Not on file   Depression: Not at risk (3/14/2024)     PHQ-2      PHQ-2 Score: 0   Housing Stability: Low Risk  (3/14/2024)     Housing Stability Vital Sign      Unable to Pay for Housing in the Last Year: No      Number of Places Lived in the Last Year: 1      Unstable Housing in the Last Year: No   Utilities: Not on file   Digital Equity: Not on file   Health Literacy: Adequate Health Literacy (3/23/2024)     OASIS : Health Literacy      Frequency of needing help to read materials from doctor or pharmacy: Never         FH:  Family History   No family history on file.         Allergies:         Allergies   Allergen Reactions    Amlodipine Swelling       Bilateral foot swelling    Clarithromycin Other and Dizziness       Slept for days    Doxycycline Diarrhea         ROS:  All systems were reviewed and noted to be negative, other than described above.     "  Objective:  Last Recorded Vitals  Blood pressure 115/78, pulse 60, height 1.575 m (5' 2\"), weight 74.1 kg (163 lb 6.4 oz), SpO2 98%.     Meds:        Current Outpatient Medications   Medication Instructions    apixaban (ELIQUIS) 5 mg, oral, 2 times daily    ascorbic acid (VITAMIN C) 250 mg, oral, Daily    aspirin 81 mg, oral, Daily    atenolol (TENORMIN) 50 mg, oral, 2 times daily    atorvastatin (LIPITOR) 80 mg, oral, Nightly    cholecalciferol (VITAMIN D-3) 50 mcg, oral, Daily    furosemide (LASIX) 40 mg, oral, Daily, As needed for foot swelling    gabapentin (NEURONTIN) 200 mg, oral, 2 times daily    losartan (COZAAR) 100 mg, oral, Daily    multivitamin with minerals tablet 1 tablet, oral, Daily    nicotine (Nicoderm CQ) 14 mg/24 hr patch 1 patch, Daily, Apply (1) patch daily as directed.    potassium chloride CR 10 mEq ER tablet TAKE 5 TABLETS BY MOUTH ONCE  DAILY    tiotropium (Spiriva Respimat) 2.5 mcg/actuation inhaler 2 puffs, inhalation, Daily RT         Exam:  Constitutional: Well appearing, NAD   PSYCH: Appropriate mood and affect  Eyes: Sclera clear   Neck: Supple   CV: No tachycardia  RESP: Unlabored breathing  GI: Soft, nontender, non-distended.   NEURO: No focal deficits noted  EXTREMITIES: Feet slightly cool. Mild right leg edema. No toe wounds. Left groin venous access site with superficial wound present without drainage or malodor.   PULSES: nonpalpable pedal pulses      Assessment & Plan:  68F with aortoiliac disease    - OR today for perivisceral aortic endarterectomy and aortobifemoral bypass  - consented    Jessica Huynh MD  Vascular Surgery PGY-4  Team pager: [phone]             "

## 2024-05-16 NOTE — H&P
History Of Present Illness  Klaudia Ratliff is a 68 y.o. female with past medical history of HTN, HLD, obesity, COPD, gout, RLE DVT, right PE s/p mechanical thrombectomy and IVC filter placement (2/22/2024) discharged on eliquis and bridged to Lovenox (5/13). She was seen by vascular surgery in Feb 2024 after she developed bilateral claudication symptoms that progressed to constant rest pain and was found to have aortic occlusion with reconstitution in the left iliac artery and right common femoral artery. She presented to the SICU s/p perivisceral aortic endarterectomy, aortobifemoral bypass graft, left renal bypass, and right femoral endarterectomy with Dr. Zelaya on 5/16/24.     OR course: 27 min supraceliac clamp, additional left renal ischemic time of 47 minutes during bypass   EBL: 1L  UOP: 550  Crystalloid: 4L  Colloid: 1L  Products: 2 RBC  Cell Saver: 460ml  Intubation: G2 view, [place], [place]  Access: see below     Past Medical History  Past Medical History:   Diagnosis Date    Abnormal ECG 02/20/2024    Normal sinus rhythm Nonspecific ST abnormality Abnormal ECG    Acute sinusitis, unspecified 03/16/2016    Acute sinusitis    Aortic occlusion (CMS-HCC)     Claudication of both lower extremities (CMS-HCC)     COPD (chronic obstructive pulmonary disease) (Multi)     Coronary artery disease     Coronary artery disease without angina pectoris    DVT (deep venous thrombosis) (Multi)     RLE DVT, right PE (s/p mechanical thrombectomy 2/22/24    Essential (primary) hypertension 07/18/2022    Hypertension    Headache, unspecified 07/09/2013    Headache    Hyperlipidemia     Joint pain     Melena     Right shoulder pain 11/01/2023    Ulcer, stomach peptic     Wound of left groin     left groin nonhealing access site       Surgical History  Past Surgical History:   Procedure Laterality Date    CARDIAC CATHETERIZATION N/A 02/16/2024    Procedure: Left Heart Cath;  Surgeon: Aditya Sky MD PhD;  Location: [facility]  Cardiac Cath Lab;  Service: Cardiovascular;  Laterality: N/A;  STAT    COLONOSCOPY      CT ABDOMEN PELVIS W AND WO IV CONTRAST  03/15/2024    IMPRESSION: 1. 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 2. No evidence of acute GI bleeding. 3. Diverticulosis without evidence acute diverticulitis. 4. No evidence of acute process in the abdomen or pelvis.    ECHOCARDIOGRAM 2 D M MODE PANEL  02/21/2024    Left ventricular systolic function is normal with a 60-65% estimated ejection fraction.  2. There is an elevated mean left atrial pressure.    ESOPHAGOGASTRODUODENOSCOPY      INVASIVE VASCULAR PROCEDURE N/A 02/22/2024    Procedure: Lower Extremity Angiogram;  Surgeon: Bar Day DO;  Location: [place] [address] Cardiac Cath Lab;  Service: Cardiovascular;  Laterality: N/A;    INVASIVE VASCULAR PROCEDURE N/A 02/22/2024    Procedure: Pulmonary Angiogram;  Surgeon: Bar Day DO;  Location: [place] [address] Cardiac Cath Lab;  Service: Cardiovascular;  Laterality: N/A;    IR INTERVENTION FILTER PLACEMENT  03/15/2024    MR CARDIAC MORPHOLOGY AND FUNCTION W AND WO IV CONTRAST  02/23/2024    THROMBECTOMY  02/2002    mechanical thrombectomy 2/22/24        Social History  She reports that she quit smoking about 2 months ago. Her smoking use included cigarettes. She has been exposed to tobacco smoke. She has never used smokeless tobacco. She reports that she does not currently use alcohol. She reports current drug use. Drug: Marijuana.    Family History  No family history on file.     Allergies  Amlodipine, Clarithromycin, and Doxycycline    Review of Systems   Unable to perform ROS: Intubated        Physical Exam  Vitals reviewed.   Constitutional:       Appearance: Normal appearance. She is normal weight.      Comments: Intubated and sedated   HENT:      Head: Normocephalic and atraumatic.      Mouth/Throat:       "Mouth: Mucous membranes are moist.      Pharynx: Oropharynx is clear.   Eyes:      Conjunctiva/sclera: Conjunctivae normal.      Pupils: Pupils are equal, round, and reactive to light.   Cardiovascular:      Rate and Rhythm: Normal rate and regular rhythm.      Pulses: Normal pulses.      Heart sounds: Normal heart sounds.      Comments: Right foot multiphasic DP and PT, Left foot palpable DP and PT  Pulmonary:      Breath sounds: Normal breath sounds.      Comments: Intubated and on ventilator  Abdominal:      Palpations: Abdomen is soft.      Comments: Incisions covered by surgical dressing, clean, dry, intact, no strikethrough   Musculoskeletal:         General: No deformity. Normal range of motion.      Cervical back: Normal range of motion and neck supple.      Right lower leg: No edema.      Left lower leg: No edema.   Skin:     General: Skin is dry.      Capillary Refill: Capillary refill takes less than 2 seconds.      Comments: Cool skin   Neurological:      General: No focal deficit present.      Comments: Unable to fully assess due to intubation and sedation   Psychiatric:      Comments: Unable to fully assess due to intubation and sedation         Last Recorded Vitals  Blood pressure 164/76, pulse 67, temperature 36.6 °C (97.9 °F), temperature source Temporal, resp. rate 16, height 1.575 m (5' 2\"), weight 73.6 kg (162 lb 3.2 oz), SpO2 97%.    Relevant Results    Scheduled medications  cefOXitin, 2 g, intravenous, q8h  insulin regular, 0-15 Units, intravenous, Once  lactated Ringer's, 500 mL, intravenous, Once  oxygen, , inhalation, Continuous - Inhalation  [START ON 5/17/2024] pantoprazole, 40 mg, oral, Daily before breakfast   Or  [START ON 5/17/2024] pantoprazole, 40 mg, intravenous, Daily before breakfast      Continuous medications  EPINEPHrine, 0-2 mcg/kg/min  insulin regular infusion for cardiac surgery, 0-15 Units/hr  lactated Ringer's, 150 mL/hr  norepinephrine, 0-3 mcg/kg/min  propofol, 5-50 " mcg/kg/min, Last Rate: 30 mcg/kg/min (05/16/24 1615)  ropivacaine (PF) in NS cmpd, 14 mL/hr      PRN medications  PRN medications: calcium gluconate, calcium gluconate, dextrose **OR** glucagon, HYDROmorphone, insulin regular, magnesium sulfate, magnesium sulfate, oxygen, potassium chloride, potassium chloride     Results for orders placed or performed during the hospital encounter of 05/16/24 (from the past 24 hour(s))   Prepare RBC: 4 Units   Result Value Ref Range    PRODUCT CODE K5082E45     Unit Number [id number]     Unit ABO B     Unit RH POS     XM INTEP COMP     Dispense Status XM     Blood Expiration Date June 01, 2024 23:59 EDT     PRODUCT BLOOD TYPE 7300     UNIT VOLUME 350     PRODUCT CODE Z4966Z61     Unit Number [id number]     Unit ABO B     Unit RH POS     XM INTEP COMP     Dispense Status XM     Blood Expiration Date June 08, 2024 23:59 EDT     PRODUCT BLOOD TYPE 7300     UNIT VOLUME 350     PRODUCT CODE E6315C69     Unit Number [id number]     Unit ABO B     Unit RH POS     XM INTEP COMP     Dispense Status TR     Blood Expiration Date June 07, 2024 23:59 EDT     PRODUCT BLOOD TYPE 7300     UNIT VOLUME 350     PRODUCT CODE K2691S15     Unit Number [id number]     Unit ABO B     Unit RH POS     XM INTEP COMP     Dispense Status TR     Blood Expiration Date June 01, 2024 23:59 EDT     PRODUCT BLOOD TYPE 7300     UNIT VOLUME 350    Prepare Plasma: 4 Units   Result Value Ref Range    PRODUCT CODE Y8161Z00     Unit Number [id number]     Unit ABO B     Unit RH POS     Dispense Status RE     Blood Expiration Date May 21, 2024 06:33 EDT     PRODUCT BLOOD TYPE 7300     UNIT VOLUME 201     PRODUCT CODE Q1059Q22     Unit Number [id number]     Unit ABO B     Unit RH POS     Dispense Status RE     Blood Expiration Date May 17, 2024 20:00 EDT     PRODUCT BLOOD TYPE 7300     UNIT VOLUME 331     PRODUCT CODE N5434D93     Unit Number [id number]     Unit ABO AB     Unit RH POS      Dispense Status RE     Blood Expiration Date May 19, 2024 07:44 EDT     PRODUCT BLOOD TYPE 8400     UNIT VOLUME 295     PRODUCT CODE Z1070F53     Unit Number [id number]     Unit ABO AB     Unit RH POS     Dispense Status RE     Blood Expiration Date May 19, 2024 08:54 EDT     PRODUCT BLOOD TYPE 8400     UNIT VOLUME 299    Prepare Platelets: 1 Units   Result Value Ref Range    PRODUCT CODE F2773D68     Unit Number [id number]     Unit ABO B     Unit RH POS     Dispense Status RE     Blood Expiration Date May 16, 2024 23:59 EDT     PRODUCT BLOOD TYPE 7300     UNIT VOLUME 220    Prepare Plasma   Result Value Ref Range    PRODUCT CODE J3332AX0     Unit Number [id number]     Unit ABO B     Unit RH POS     Dispense Status XM     Blood Expiration Date May 21, 2024 07:27 EDT     PRODUCT BLOOD TYPE 7300     UNIT VOLUME 204     PRODUCT CODE D8071FN7     Unit Number [id number]     Unit ABO B     Unit RH POS     Dispense Status XM     Blood Expiration Date May 21, 2024 07:27 EDT     PRODUCT BLOOD TYPE 7300     UNIT VOLUME 199     PRODUCT CODE R2395XK0     Unit Number [id number]     Unit ABO B     Unit RH POS     Dispense Status XM     Blood Expiration Date May 21, 2024 07:27 EDT     PRODUCT BLOOD TYPE 7300     UNIT VOLUME 204     PRODUCT CODE P4958Q46     Unit Number [id number]     Unit ABO B     Unit RH POS     Dispense Status XM     Blood Expiration Date May 21, 2024 07:27 EDT     PRODUCT BLOOD TYPE 7300     UNIT VOLUME 195    ACTIVATED CLOTTING TIME LOW   Result Value Ref Range    POCT Activated Clotting Time Low Range 115 83 - 199 sec   Blood Gas Arterial Full Panel Unsolicited   Result Value Ref Range    POCT pH, Arterial 7.40 7.38 - 7.42 pH    POCT pCO2, Arterial 39 38 - 42 mm Hg    POCT pO2, Arterial 433 (H) 85 - 95 mm Hg    POCT SO2, Arterial 100 94 - 100 %    POCT Oxy Hemoglobin, Arterial 97.8 94.0 - 98.0 %    POCT Hematocrit Calculated, Arterial 29.0 (L) 36.0 - 46.0 %    POCT Sodium,  Arterial 138 136 - 145 mmol/L    POCT Potassium, Arterial 4.3 3.5 - 5.3 mmol/L    POCT Chloride, Arterial 109 (H) 98 - 107 mmol/L    POCT Ionized Calcium, Arterial 1.28 1.10 - 1.33 mmol/L    POCT Glucose, Arterial 129 (H) 74 - 99 mg/dL    POCT Lactate, Arterial 1.1 0.4 - 2.0 mmol/L    POCT Base Excess, Arterial -0.5 -2.0 - 3.0 mmol/L    POCT HCO3 Calculated, Arterial 24.2 22.0 - 26.0 mmol/L    POCT Hemoglobin, Arterial 9.8 (L) 12.0 - 16.0 g/dL    POCT Anion Gap, Arterial 9 (L) 10 - 25 mmo/L    Patient Temperature 37.0 degrees Celsius   ACTIVATED CLOTTING TIME LOW   Result Value Ref Range    POCT Activated Clotting Time Low Range 284 (H) 83 - 199 sec   BLOOD GAS ARTERIAL FULL PANEL   Result Value Ref Range    POCT pH, Arterial 7.21 (LL) 7.38 - 7.42 pH    POCT pCO2, Arterial 42 38 - 42 mm Hg    POCT pO2, Arterial 232 (H) 85 - 95 mm Hg    POCT SO2, Arterial 99 94 - 100 %    POCT Oxy Hemoglobin, Arterial 97.8 94.0 - 98.0 %    POCT Hematocrit Calculated, Arterial 23.0 (L) 36.0 - 46.0 %    POCT Sodium, Arterial 132 (L) 136 - 145 mmol/L    POCT Potassium, Arterial 4.9 3.5 - 5.3 mmol/L    POCT Chloride, Arterial 108 (H) 98 - 107 mmol/L    POCT Ionized Calcium, Arterial 1.40 (H) 1.10 - 1.33 mmol/L    POCT Glucose, Arterial 289 (H) 74 - 99 mg/dL    POCT Lactate, Arterial 3.5 (H) 0.4 - 2.0 mmol/L    POCT Base Excess, Arterial -10.3 (L) -2.0 - 3.0 mmol/L    POCT HCO3 Calculated, Arterial 16.8 (L) 22.0 - 26.0 mmol/L    POCT Hemoglobin, Arterial 7.5 (L) 12.0 - 16.0 g/dL    POCT Anion Gap, Arterial 12 10 - 25 mmo/L    Patient Temperature 37.0 degrees Celsius    FiO2 50 %   ACTIVATED CLOTTING TIME LOW   Result Value Ref Range    POCT Activated Clotting Time Low Range 261 (H) 83 - 199 sec   ACTIVATED CLOTTING TIME LOW   Result Value Ref Range    POCT Activated Clotting Time Low Range 199 83 - 199 sec   BLOOD GAS ARTERIAL FULL PANEL   Result Value Ref Range    POCT pH, Arterial 7.27 (L) 7.38 - 7.42 pH    POCT pCO2, Arterial 39 38 -  42 mm Hg    POCT pO2, Arterial 251 (H) 85 - 95 mm Hg    POCT SO2, Arterial 99 94 - 100 %    POCT Oxy Hemoglobin, Arterial 97.8 94.0 - 98.0 %    POCT Hematocrit Calculated, Arterial 32.0 (L) 36.0 - 46.0 %    POCT Sodium, Arterial 138 136 - 145 mmol/L    POCT Potassium, Arterial 4.0 3.5 - 5.3 mmol/L    POCT Chloride, Arterial 108 (H) 98 - 107 mmol/L    POCT Ionized Calcium, Arterial 1.18 1.10 - 1.33 mmol/L    POCT Glucose, Arterial 126 (H) 74 - 99 mg/dL    POCT Lactate, Arterial 2.4 (H) 0.4 - 2.0 mmol/L    POCT Base Excess, Arterial -8.4 (L) -2.0 - 3.0 mmol/L    POCT HCO3 Calculated, Arterial 17.9 (L) 22.0 - 26.0 mmol/L    POCT Hemoglobin, Arterial 10.8 (L) 12.0 - 16.0 g/dL    POCT Anion Gap, Arterial 16 10 - 25 mmo/L    Patient Temperature 37.0 degrees Celsius    FiO2 50 %         Assessment/Plan   Principal Problem:    Aortic occlusion (CMS-[facility])      Klaudia Ratliff is a 68 y.o. female with past medical history of HTN, HLD, obesity, COPD, gout, RLE DVT, right PE s/p mechanical thrombectomy and IVC filter placement (2/22/2024) discharged on eliquis and bridged to Lovenox (5/13), and aortic occlusion. Patient presents to SICU now s/p aortofem bypass via thoracoabdominal approach. Arrived to SICU intubated, sedated on prop @30, requiring norepi @ 0.04 and epi @ 0.02 to maintain goal MAP 80. Intubated with double lumen tube, neuromuscular blockade reversed by anesthesia team.     Plan:  NEURO: PMH of sciatica. Arrived to SICU intubated, sedated with propofol @30. Reversed by anesthesia. Received  10mg IV methadone at start of case. Pt arrived hypothermic.   - Warm patient to normothermic  - continue propofol infusion. Wean in AM for SAT/SBT trial  - hydromorphone as needed pain  - q1h neuro/neurovasc/pain assessments for now  - PT/OT consulted  - B/L QL block with catheter in place, management by acute pain. Avoid lidocaine patches while in place  - Home meds; Gabapentin 100 mg BID    CV: History of HTN, HLD,  obesity, RLE DVT, Right PE (s/p mechanical thrombectomy 2/24), and aortic occlusion. Baseline BP (pre-op) 161/76. Baseline cardiac function normal EF (60-65%), elevated LA pressure (2/2/24). Carotid Duplex < 50% ICA stenosis b/l. Now s/p aortofem bypass. Arrived to SICU on norepi 0.04 and epi 0.02.    - goal MAP >80 per vascular surgery  - continuous ekg/abp monitoring  - wean pressors as able  - volume as indicated  - trend lactate  - home meds: Eliquis 5 mg BID (held since 5/12, bridged to lovenox 5/13), ASA 81 mg, Atenolol 50 mg, Lovenox 80 mg q12h, Atorvastatin 80 mg, losartan 100 mg, Vitamin C 250 mg    PULM: History of COPD, pulmonary embolism (2/2/24). No diaphragm pacing wires placed intra-op. Arrived to SICU Intubated with AL in place. Neuromuscular blockade reversed by anesthesia.   - Wean vent as tolerated.   - goal SpO2 >94%  - Q1h IS after extubated  - additional bronchial hygiene as indicated  - post op and daily cxr  - vent bundle  - chest tube to -20 suction  - Home meds: Tiotropium 2.5 mcg 2 puffs daily.    GI: History of gastric ulcer (3/2024). Strict NPO.  - NG-> LIWS  - PPI for GI prophylaxis.  - f/u post op amylase, lipase, hepatic function panel     : Baseline cr 0.9-1.1. Supraceliac clamp x27 minutes. Additional left renal ischemic time of 47 minutes during bypass.   - Check renal function panel post op and q6h x2. Further frequency to be determined  - ½:1 uop replacement with LR for 12h  - Maintenance IVF @150ml/hr per vascular surgery  - Volume resuscitation as needed.    - Goal U/O >1-2ml/kg/hr.    - Replete electrolytes to goal K>4, Mg>2, Phos>2.5, ionized Ca>1.10 if creatinine remains WNL or <1.5 with adequate uop.  - Home meds: Lasix 40 mg PRN, Potassium chloride 10 mEq ER     HEME: Acute surgical blood loss anemia. OR EBL 1L, received 2u pRBC and 460ml cell saver.  - Check CBC, coags, fibrinogen post op, q6hx2. Then q12h  - scds for dvt prophylaxis.  - no subcutaneous heparin per  vascular surgery at this time    ENDO: No history of diabetes or hypothyroidism. A1c not on file, TSH 1.86. Arrived to ICU on insulin gtt @3.  - Wean insulin gtt  - Q4h BG  - After insulin gtt off, begin SSI Lispro per ICU protocol    ID: No PMH. Post op WBC pending. Cefoxitin 2g @ 9am and 1pm intra-op.   - q12h WBC  - Continue cefoxitin as long as chest tube in place  - monitor for s/s infection    Lines:   - L internal jugular MAC w/ mini MAC  - R brachial A-line  - PIV right forearm  - R wrist MEENU line  - QL bilateral blocks    Dispo: continue ICU care. Reassess dispo in am. Discussed with SICU attending Dr. Carlton.    SICU team phone [phone]  Earnest Jacobs MD

## 2024-05-16 NOTE — SIGNIFICANT EVENT
Vascular Surgery Post-Op Plan of Care:    68 y.o. 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     Pre-op exam: Right foot monophasic DP and PT, Left foot monophasic DP and PT  Post-op exam: Right foot multiphasic DP and PT, Left foot palpable DP and PT    Supraceliac clamp x27 minutes  Additional left renal ischemic time of 47 minutes during bypass    EBL: 1000 ml     Plan:  - q1 Neurovascular checks  - Pain control per ICU  - Wean to extubate as tolerated  - Chest tube to -20 mmHg suction  - Spinal perfusion goal: MAP >80 mmHg  - NPO, NGT to LIWS  - Resuscitative IV fluids- 150cc/hr  - 1/2 to 1 replacement with LR for 12 hrs (mannitol given intra op); to end at midnight  - Send CBC, coags, RFP on arrival to ICU  - Maintain K>4, Mg>2, Hgb>8, Plts>100, INR<1.4  - Labs q12h for 24 hrs  - monitor UOP, continue fox  - Cefoxitin until chest tube comes out  - SCDs  - hold blood thinners yanique Delgado MD  Vascular Surgery Fellow  Service Pager: [phone]

## 2024-05-17 ENCOUNTER — APPOINTMENT (OUTPATIENT)
Dept: RADIOLOGY | Facility: HOSPITAL | Age: 69
DRG: 673 | End: 2024-05-17
Payer: MEDICARE

## 2024-05-17 LAB
ALBUMIN SERPL BCP-MCNC: 3.7 G/DL (ref 3.4–5)
ALBUMIN SERPL BCP-MCNC: 3.8 G/DL (ref 3.4–5)
ALP SERPL-CCNC: 27 U/L (ref 33–136)
ALT SERPL W P-5'-P-CCNC: 16 U/L (ref 7–45)
ANION GAP BLDA CALCULATED.4IONS-SCNC: 12 MMO/L (ref 10–25)
ANION GAP BLDA CALCULATED.4IONS-SCNC: 16 MMO/L (ref 10–25)
ANION GAP BLDA CALCULATED.4IONS-SCNC: 20 MMO/L (ref 10–25)
ANION GAP SERPL CALC-SCNC: 12 MMOL/L (ref 10–20)
ANION GAP SERPL CALC-SCNC: 18 MMOL/L (ref 10–20)
APTT PPP: 28 SECONDS (ref 27–38)
AST SERPL W P-5'-P-CCNC: 17 U/L (ref 9–39)
BASE EXCESS BLDA CALC-SCNC: -5.5 MMOL/L (ref -2–3)
BASE EXCESS BLDA CALC-SCNC: -7.7 MMOL/L (ref -2–3)
BASE EXCESS BLDA CALC-SCNC: -9.7 MMOL/L (ref -2–3)
BILIRUB DIRECT SERPL-MCNC: 1.9 MG/DL (ref 0–0.3)
BILIRUB SERPL-MCNC: 2.7 MG/DL (ref 0–1.2)
BLOOD EXPIRATION DATE: NORMAL
BODY TEMPERATURE: 37 DEGREES CELSIUS
BUN SERPL-MCNC: 31 MG/DL (ref 6–23)
BUN SERPL-MCNC: 34 MG/DL (ref 6–23)
CA-I BLD-SCNC: 1.1 MMOL/L (ref 1.1–1.33)
CA-I BLD-SCNC: 1.11 MMOL/L (ref 1.1–1.33)
CA-I BLDA-SCNC: 1.1 MMOL/L (ref 1.1–1.33)
CA-I BLDA-SCNC: 1.13 MMOL/L (ref 1.1–1.33)
CA-I BLDA-SCNC: 1.13 MMOL/L (ref 1.1–1.33)
CALCIUM SERPL-MCNC: 7.7 MG/DL (ref 8.6–10.6)
CALCIUM SERPL-MCNC: 7.9 MG/DL (ref 8.6–10.6)
CHLORIDE BLDA-SCNC: 106 MMOL/L (ref 98–107)
CHLORIDE BLDA-SCNC: 107 MMOL/L (ref 98–107)
CHLORIDE BLDA-SCNC: 108 MMOL/L (ref 98–107)
CHLORIDE SERPL-SCNC: 105 MMOL/L (ref 98–107)
CHLORIDE SERPL-SCNC: 108 MMOL/L (ref 98–107)
CO2 SERPL-SCNC: 18 MMOL/L (ref 21–32)
CO2 SERPL-SCNC: 23 MMOL/L (ref 21–32)
CREAT SERPL-MCNC: 1.52 MG/DL (ref 0.5–1.05)
CREAT SERPL-MCNC: 1.53 MG/DL (ref 0.5–1.05)
DISPENSE STATUS: NORMAL
EGFRCR SERPLBLD CKD-EPI 2021: 37 ML/MIN/1.73M*2
EGFRCR SERPLBLD CKD-EPI 2021: 37 ML/MIN/1.73M*2
ERYTHROCYTE [DISTWIDTH] IN BLOOD BY AUTOMATED COUNT: 15.8 % (ref 11.5–14.5)
ERYTHROCYTE [DISTWIDTH] IN BLOOD BY AUTOMATED COUNT: 15.9 % (ref 11.5–14.5)
ERYTHROCYTE [DISTWIDTH] IN BLOOD BY AUTOMATED COUNT: 16.7 % (ref 11.5–14.5)
FIBRINOGEN PPP-MCNC: 254 MG/DL (ref 200–400)
GLUCOSE BLD MANUAL STRIP-MCNC: 109 MG/DL (ref 74–99)
GLUCOSE BLD MANUAL STRIP-MCNC: 150 MG/DL (ref 74–99)
GLUCOSE BLD MANUAL STRIP-MCNC: 89 MG/DL (ref 74–99)
GLUCOSE BLDA-MCNC: 156 MG/DL (ref 74–99)
GLUCOSE BLDA-MCNC: 188 MG/DL (ref 74–99)
GLUCOSE BLDA-MCNC: 226 MG/DL (ref 74–99)
GLUCOSE SERPL-MCNC: 100 MG/DL (ref 74–99)
GLUCOSE SERPL-MCNC: 237 MG/DL (ref 74–99)
HCO3 BLDA-SCNC: 16.6 MMOL/L (ref 22–26)
HCO3 BLDA-SCNC: 17.4 MMOL/L (ref 22–26)
HCO3 BLDA-SCNC: 20 MMOL/L (ref 22–26)
HCT VFR BLD AUTO: 24.9 % (ref 36–46)
HCT VFR BLD AUTO: 25.5 % (ref 36–46)
HCT VFR BLD AUTO: 33 % (ref 36–46)
HCT VFR BLD EST: 26 % (ref 36–46)
HCT VFR BLD EST: 27 % (ref 36–46)
HCT VFR BLD EST: 29 % (ref 36–46)
HGB BLD-MCNC: 11.4 G/DL (ref 12–16)
HGB BLD-MCNC: 8.9 G/DL (ref 12–16)
HGB BLD-MCNC: 9.2 G/DL (ref 12–16)
HGB BLDA-MCNC: 8.6 G/DL (ref 12–16)
HGB BLDA-MCNC: 9.1 G/DL (ref 12–16)
HGB BLDA-MCNC: 9.7 G/DL (ref 12–16)
INHALED O2 CONCENTRATION: 36 %
INHALED O2 CONCENTRATION: 36 %
INHALED O2 CONCENTRATION: 40 %
INR PPP: 1.4 (ref 0.9–1.1)
LACTATE BLDA-SCNC: 1.7 MMOL/L (ref 0.4–2)
LACTATE BLDA-SCNC: 2.8 MMOL/L (ref 0.4–2)
LACTATE BLDA-SCNC: 3 MMOL/L (ref 0.4–2)
MAGNESIUM SERPL-MCNC: 2.12 MG/DL (ref 1.6–2.4)
MAGNESIUM SERPL-MCNC: 2.44 MG/DL (ref 1.6–2.4)
MCH RBC QN AUTO: 31.4 PG (ref 26–34)
MCH RBC QN AUTO: 31.6 PG (ref 26–34)
MCH RBC QN AUTO: 31.6 PG (ref 26–34)
MCHC RBC AUTO-ENTMCNC: 34.5 G/DL (ref 32–36)
MCHC RBC AUTO-ENTMCNC: 35.7 G/DL (ref 32–36)
MCHC RBC AUTO-ENTMCNC: 36.1 G/DL (ref 32–36)
MCV RBC AUTO: 87 FL (ref 80–100)
MCV RBC AUTO: 88 FL (ref 80–100)
MCV RBC AUTO: 91 FL (ref 80–100)
NRBC BLD-RTO: 0 /100 WBCS (ref 0–0)
OXYHGB MFR BLDA: 96.3 % (ref 94–98)
OXYHGB MFR BLDA: 96.5 % (ref 94–98)
OXYHGB MFR BLDA: 98 % (ref 94–98)
PCO2 BLDA: 33 MM HG (ref 38–42)
PCO2 BLDA: 37 MM HG (ref 38–42)
PCO2 BLDA: 38 MM HG (ref 38–42)
PH BLDA: 7.26 PH (ref 7.38–7.42)
PH BLDA: 7.33 PH (ref 7.38–7.42)
PH BLDA: 7.33 PH (ref 7.38–7.42)
PHOSPHATE SERPL-MCNC: 4.2 MG/DL (ref 2.5–4.9)
PLATELET # BLD AUTO: 121 X10*3/UL (ref 150–450)
PLATELET # BLD AUTO: 131 X10*3/UL (ref 150–450)
PLATELET # BLD AUTO: 181 X10*3/UL (ref 150–450)
PO2 BLDA: 140 MM HG (ref 85–95)
PO2 BLDA: 148 MM HG (ref 85–95)
PO2 BLDA: 164 MM HG (ref 85–95)
POTASSIUM BLDA-SCNC: 3.9 MMOL/L (ref 3.5–5.3)
POTASSIUM BLDA-SCNC: 4.1 MMOL/L (ref 3.5–5.3)
POTASSIUM BLDA-SCNC: 4.3 MMOL/L (ref 3.5–5.3)
POTASSIUM SERPL-SCNC: 4.2 MMOL/L (ref 3.5–5.3)
POTASSIUM SERPL-SCNC: 4.2 MMOL/L (ref 3.5–5.3)
PRODUCT BLOOD TYPE: 7300
PRODUCT CODE: NORMAL
PROT SERPL-MCNC: 4.7 G/DL (ref 6.4–8.2)
PROTHROMBIN TIME: 16.3 SECONDS (ref 9.8–12.8)
RBC # BLD AUTO: 2.82 X10*6/UL (ref 4–5.2)
RBC # BLD AUTO: 2.93 X10*6/UL (ref 4–5.2)
RBC # BLD AUTO: 3.61 X10*6/UL (ref 4–5.2)
SAO2 % BLDA: 100 % (ref 94–100)
SAO2 % BLDA: 98 % (ref 94–100)
SAO2 % BLDA: 99 % (ref 94–100)
SODIUM BLDA-SCNC: 136 MMOL/L (ref 136–145)
SODIUM BLDA-SCNC: 136 MMOL/L (ref 136–145)
SODIUM BLDA-SCNC: 138 MMOL/L (ref 136–145)
SODIUM SERPL-SCNC: 137 MMOL/L (ref 136–145)
SODIUM SERPL-SCNC: 139 MMOL/L (ref 136–145)
UNIT ABO: NORMAL
UNIT NUMBER: NORMAL
UNIT RH: NORMAL
UNIT VOLUME: 195
UNIT VOLUME: 199
UNIT VOLUME: 204
UNIT VOLUME: 204
UNIT VOLUME: 350
WBC # BLD AUTO: 10.9 X10*3/UL (ref 4.4–11.3)
WBC # BLD AUTO: 11.9 X10*3/UL (ref 4.4–11.3)
WBC # BLD AUTO: 9.7 X10*3/UL (ref 4.4–11.3)
XM INTEP: NORMAL

## 2024-05-17 PROCEDURE — 97530 THERAPEUTIC ACTIVITIES: CPT | Mod: GP

## 2024-05-17 PROCEDURE — 2020000001 HC ICU ROOM DAILY

## 2024-05-17 PROCEDURE — 94003 VENT MGMT INPAT SUBQ DAY: CPT

## 2024-05-17 PROCEDURE — 2500000004 HC RX 250 GENERAL PHARMACY W/ HCPCS (ALT 636 FOR OP/ED): Performed by: STUDENT IN AN ORGANIZED HEALTH CARE EDUCATION/TRAINING PROGRAM

## 2024-05-17 PROCEDURE — 71045 X-RAY EXAM CHEST 1 VIEW: CPT | Performed by: STUDENT IN AN ORGANIZED HEALTH CARE EDUCATION/TRAINING PROGRAM

## 2024-05-17 PROCEDURE — 2500000004 HC RX 250 GENERAL PHARMACY W/ HCPCS (ALT 636 FOR OP/ED): Mod: JZ

## 2024-05-17 PROCEDURE — 71045 X-RAY EXAM CHEST 1 VIEW: CPT

## 2024-05-17 PROCEDURE — P9045 ALBUMIN (HUMAN), 5%, 250 ML: HCPCS | Mod: JZ

## 2024-05-17 PROCEDURE — 99231 SBSQ HOSP IP/OBS SF/LOW 25: CPT | Performed by: STUDENT IN AN ORGANIZED HEALTH CARE EDUCATION/TRAINING PROGRAM

## 2024-05-17 PROCEDURE — 84132 ASSAY OF SERUM POTASSIUM: CPT | Mod: 91 | Performed by: STUDENT IN AN ORGANIZED HEALTH CARE EDUCATION/TRAINING PROGRAM

## 2024-05-17 PROCEDURE — 97161 PT EVAL LOW COMPLEX 20 MIN: CPT | Mod: GP

## 2024-05-17 PROCEDURE — 2500000002 HC RX 250 W HCPCS SELF ADMINISTERED DRUGS (ALT 637 FOR MEDICARE OP, ALT 636 FOR OP/ED): Performed by: STUDENT IN AN ORGANIZED HEALTH CARE EDUCATION/TRAINING PROGRAM

## 2024-05-17 PROCEDURE — 82947 ASSAY GLUCOSE BLOOD QUANT: CPT | Mod: 91

## 2024-05-17 PROCEDURE — 2500000005 HC RX 250 GENERAL PHARMACY W/O HCPCS: Performed by: STUDENT IN AN ORGANIZED HEALTH CARE EDUCATION/TRAINING PROGRAM

## 2024-05-17 PROCEDURE — C9113 INJ PANTOPRAZOLE SODIUM, VIA: HCPCS | Performed by: STUDENT IN AN ORGANIZED HEALTH CARE EDUCATION/TRAINING PROGRAM

## 2024-05-17 PROCEDURE — 99291 CRITICAL CARE FIRST HOUR: CPT | Performed by: STUDENT IN AN ORGANIZED HEALTH CARE EDUCATION/TRAINING PROGRAM

## 2024-05-17 PROCEDURE — 82330 ASSAY OF CALCIUM: CPT | Performed by: STUDENT IN AN ORGANIZED HEALTH CARE EDUCATION/TRAINING PROGRAM

## 2024-05-17 PROCEDURE — 82248 BILIRUBIN DIRECT: CPT | Performed by: STUDENT IN AN ORGANIZED HEALTH CARE EDUCATION/TRAINING PROGRAM

## 2024-05-17 PROCEDURE — 37799 UNLISTED PX VASCULAR SURGERY: CPT | Performed by: STUDENT IN AN ORGANIZED HEALTH CARE EDUCATION/TRAINING PROGRAM

## 2024-05-17 PROCEDURE — P9047 ALBUMIN (HUMAN), 25%, 50ML: HCPCS | Mod: JZ | Performed by: STUDENT IN AN ORGANIZED HEALTH CARE EDUCATION/TRAINING PROGRAM

## 2024-05-17 PROCEDURE — 85610 PROTHROMBIN TIME: CPT

## 2024-05-17 PROCEDURE — 30233N1 TRANSFUSION OF NONAUTOLOGOUS RED BLOOD CELLS INTO PERIPHERAL VEIN, PERCUTANEOUS APPROACH: ICD-10-PCS | Performed by: SURGERY

## 2024-05-17 PROCEDURE — P9045 ALBUMIN (HUMAN), 5%, 250 ML: HCPCS | Mod: JZ | Performed by: STUDENT IN AN ORGANIZED HEALTH CARE EDUCATION/TRAINING PROGRAM

## 2024-05-17 PROCEDURE — 85384 FIBRINOGEN ACTIVITY: CPT

## 2024-05-17 PROCEDURE — 85027 COMPLETE CBC AUTOMATED: CPT | Mod: 91 | Performed by: STUDENT IN AN ORGANIZED HEALTH CARE EDUCATION/TRAINING PROGRAM

## 2024-05-17 PROCEDURE — P9016 RBC LEUKOCYTES REDUCED: HCPCS

## 2024-05-17 PROCEDURE — 83735 ASSAY OF MAGNESIUM: CPT | Performed by: STUDENT IN AN ORGANIZED HEALTH CARE EDUCATION/TRAINING PROGRAM

## 2024-05-17 PROCEDURE — 36430 TRANSFUSION BLD/BLD COMPNT: CPT

## 2024-05-17 RX ORDER — ALBUMIN HUMAN 50 G/1000ML
12.5 SOLUTION INTRAVENOUS ONCE
Status: COMPLETED | OUTPATIENT
Start: 2024-05-17 | End: 2024-05-17

## 2024-05-17 RX ORDER — ALBUMIN HUMAN 250 G/1000ML
25 SOLUTION INTRAVENOUS ONCE
Status: COMPLETED | OUTPATIENT
Start: 2024-05-17 | End: 2024-05-17

## 2024-05-17 RX ORDER — HYDROMORPHONE HYDROCHLORIDE 1 MG/ML
0.2 INJECTION, SOLUTION INTRAMUSCULAR; INTRAVENOUS; SUBCUTANEOUS EVERY 2 HOUR PRN
Status: DISCONTINUED | OUTPATIENT
Start: 2024-05-17 | End: 2024-05-20

## 2024-05-17 RX ORDER — HYDROMORPHONE HYDROCHLORIDE 1 MG/ML
0.4 INJECTION, SOLUTION INTRAMUSCULAR; INTRAVENOUS; SUBCUTANEOUS EVERY 4 HOURS PRN
Status: DISCONTINUED | OUTPATIENT
Start: 2024-05-17 | End: 2024-05-20

## 2024-05-17 RX ORDER — ALBUMIN HUMAN 50 G/1000ML
SOLUTION INTRAVENOUS
Status: COMPLETED
Start: 2024-05-17 | End: 2024-05-17

## 2024-05-17 RX ORDER — ALBUMIN HUMAN 50 G/1000ML
25 SOLUTION INTRAVENOUS ONCE
Status: COMPLETED | OUTPATIENT
Start: 2024-05-17 | End: 2024-05-17

## 2024-05-17 RX ORDER — INSULIN LISPRO 100 [IU]/ML
0-10 INJECTION, SOLUTION INTRAVENOUS; SUBCUTANEOUS EVERY 4 HOURS
Status: DISCONTINUED | OUTPATIENT
Start: 2024-05-17 | End: 2024-05-20

## 2024-05-17 RX ORDER — INSULIN LISPRO 100 [IU]/ML
0-10 INJECTION, SOLUTION INTRAVENOUS; SUBCUTANEOUS
Status: DISCONTINUED | OUTPATIENT
Start: 2024-05-17 | End: 2024-05-17

## 2024-05-17 RX ORDER — NOREPINEPHRINE BITARTRATE/D5W 8 MG/250ML
.01-1 PLASTIC BAG, INJECTION (ML) INTRAVENOUS CONTINUOUS
Status: DISCONTINUED | OUTPATIENT
Start: 2024-05-17 | End: 2024-05-18

## 2024-05-17 RX ORDER — ONDANSETRON HYDROCHLORIDE 2 MG/ML
4 INJECTION, SOLUTION INTRAVENOUS ONCE
Status: COMPLETED | OUTPATIENT
Start: 2024-05-17 | End: 2024-05-17

## 2024-05-17 RX ORDER — HEPARIN SODIUM 5000 [USP'U]/ML
5000 INJECTION, SOLUTION INTRAVENOUS; SUBCUTANEOUS EVERY 8 HOURS
Status: DISCONTINUED | OUTPATIENT
Start: 2024-05-17 | End: 2024-05-21

## 2024-05-17 RX ADMIN — HEPARIN SODIUM 5000 UNITS: 5000 INJECTION INTRAVENOUS; SUBCUTANEOUS at 12:45

## 2024-05-17 RX ADMIN — ALBUMIN HUMAN 12.5 G: 0.05 INJECTION, SOLUTION INTRAVENOUS at 01:21

## 2024-05-17 RX ADMIN — INSULIN LISPRO 2 UNITS: 100 INJECTION, SOLUTION INTRAVENOUS; SUBCUTANEOUS at 06:54

## 2024-05-17 RX ADMIN — ALBUMIN HUMAN 12.5 G: 0.05 INJECTION, SOLUTION INTRAVENOUS at 12:44

## 2024-05-17 RX ADMIN — SODIUM CHLORIDE, POTASSIUM CHLORIDE, SODIUM LACTATE AND CALCIUM CHLORIDE 500 ML: 600; 310; 30; 20 INJECTION, SOLUTION INTRAVENOUS at 08:03

## 2024-05-17 RX ADMIN — ALBUMIN HUMAN 12.5 G: 50 SOLUTION INTRAVENOUS at 01:21

## 2024-05-17 RX ADMIN — HYDROCORTISONE SODIUM SUCCINATE 100 MG: 100 INJECTION, POWDER, FOR SOLUTION INTRAMUSCULAR; INTRAVENOUS at 13:47

## 2024-05-17 RX ADMIN — ONDANSETRON 4 MG: 2 INJECTION INTRAMUSCULAR; INTRAVENOUS at 05:07

## 2024-05-17 RX ADMIN — PANTOPRAZOLE SODIUM 40 MG: 40 INJECTION, POWDER, FOR SOLUTION INTRAVENOUS at 06:08

## 2024-05-17 RX ADMIN — NOREPINEPHRINE BITARTRATE 0.02 MCG/KG/MIN: 8 INJECTION, SOLUTION INTRAVENOUS at 21:01

## 2024-05-17 RX ADMIN — HYDROMORPHONE HYDROCHLORIDE 0.2 MG: 1 INJECTION, SOLUTION INTRAMUSCULAR; INTRAVENOUS; SUBCUTANEOUS at 13:44

## 2024-05-17 RX ADMIN — HYDROMORPHONE HYDROCHLORIDE 0.2 MG: 1 INJECTION, SOLUTION INTRAMUSCULAR; INTRAVENOUS; SUBCUTANEOUS at 06:18

## 2024-05-17 RX ADMIN — ALBUMIN HUMAN 25 G: 0.05 INJECTION, SOLUTION INTRAVENOUS at 01:24

## 2024-05-17 RX ADMIN — CEFOXITIN SODIUM 2 G: 2 POWDER, FOR SOLUTION INTRAVENOUS at 12:57

## 2024-05-17 RX ADMIN — CEFOXITIN SODIUM 2 G: 2 POWDER, FOR SOLUTION INTRAVENOUS at 20:24

## 2024-05-17 RX ADMIN — CEFOXITIN SODIUM 2 G: 2 POWDER, FOR SOLUTION INTRAVENOUS at 06:08

## 2024-05-17 RX ADMIN — HYDROMORPHONE HYDROCHLORIDE 0.2 MG: 1 INJECTION, SOLUTION INTRAMUSCULAR; INTRAVENOUS; SUBCUTANEOUS at 22:37

## 2024-05-17 RX ADMIN — HEPARIN SODIUM 5000 UNITS: 5000 INJECTION INTRAVENOUS; SUBCUTANEOUS at 20:24

## 2024-05-17 RX ADMIN — ALBUMIN HUMAN 12.5 G: 0.05 INJECTION, SOLUTION INTRAVENOUS at 18:24

## 2024-05-17 RX ADMIN — SODIUM CHLORIDE, POTASSIUM CHLORIDE, SODIUM LACTATE AND CALCIUM CHLORIDE 30 ML/HR: 600; 310; 30; 20 INJECTION, SOLUTION INTRAVENOUS at 21:29

## 2024-05-17 RX ADMIN — HYDROMORPHONE HYDROCHLORIDE 0.2 MG: 1 INJECTION, SOLUTION INTRAMUSCULAR; INTRAVENOUS; SUBCUTANEOUS at 20:23

## 2024-05-17 RX ADMIN — HYDROMORPHONE HYDROCHLORIDE 0.2 MG: 1 INJECTION, SOLUTION INTRAMUSCULAR; INTRAVENOUS; SUBCUTANEOUS at 08:15

## 2024-05-17 RX ADMIN — HYDROMORPHONE HYDROCHLORIDE 0.2 MG: 1 INJECTION, SOLUTION INTRAMUSCULAR; INTRAVENOUS; SUBCUTANEOUS at 16:01

## 2024-05-17 RX ADMIN — ALBUMIN HUMAN 25 G: 0.25 SOLUTION INTRAVENOUS at 11:23

## 2024-05-17 SDOH — ECONOMIC STABILITY: INCOME INSECURITY: HOW HARD IS IT FOR YOU TO PAY FOR THE VERY BASICS LIKE FOOD, HOUSING, MEDICAL CARE, AND HEATING?: NOT HARD AT ALL

## 2024-05-17 SDOH — SOCIAL STABILITY: SOCIAL INSECURITY: WITHIN THE LAST YEAR, HAVE YOU BEEN HUMILIATED OR EMOTIONALLY ABUSED IN OTHER WAYS BY YOUR PARTNER OR EX-PARTNER?: NO

## 2024-05-17 SDOH — HEALTH STABILITY: MENTAL HEALTH
STRESS IS WHEN SOMEONE FEELS TENSE, NERVOUS, ANXIOUS, OR CAN'T SLEEP AT NIGHT BECAUSE THEIR MIND IS TROUBLED. HOW STRESSED ARE YOU?: NOT AT ALL

## 2024-05-17 SDOH — HEALTH STABILITY: MENTAL HEALTH: HOW OFTEN DO YOU HAVE A DRINK CONTAINING ALCOHOL?: NEVER

## 2024-05-17 SDOH — SOCIAL STABILITY: SOCIAL NETWORK
IN A TYPICAL WEEK, HOW MANY TIMES DO YOU TALK ON THE PHONE WITH FAMILY, FRIENDS, OR NEIGHBORS?: MORE THAN THREE TIMES A WEEK

## 2024-05-17 SDOH — SOCIAL STABILITY: SOCIAL INSECURITY: WITHIN THE LAST YEAR, HAVE YOU BEEN AFRAID OF YOUR PARTNER OR EX-PARTNER?: NO

## 2024-05-17 SDOH — SOCIAL STABILITY: SOCIAL NETWORK: ARE YOU MARRIED, WIDOWED, DIVORCED, SEPARATED, NEVER MARRIED, OR LIVING WITH A PARTNER?: WIDOWED

## 2024-05-17 SDOH — ECONOMIC STABILITY: FOOD INSECURITY: WITHIN THE PAST 12 MONTHS, YOU WORRIED THAT YOUR FOOD WOULD RUN OUT BEFORE YOU GOT MONEY TO BUY MORE.: NEVER TRUE

## 2024-05-17 SDOH — ECONOMIC STABILITY: INCOME INSECURITY: IN THE PAST 12 MONTHS, HAS THE ELECTRIC, GAS, OIL, OR WATER COMPANY THREATENED TO SHUT OFF SERVICE IN YOUR HOME?: NO

## 2024-05-17 SDOH — ECONOMIC STABILITY: FOOD INSECURITY: WITHIN THE PAST 12 MONTHS, THE FOOD YOU BOUGHT JUST DIDN'T LAST AND YOU DIDN'T HAVE MONEY TO GET MORE.: NEVER TRUE

## 2024-05-17 SDOH — ECONOMIC STABILITY: HOUSING INSECURITY
IN THE LAST 12 MONTHS, WAS THERE A TIME WHEN YOU DID NOT HAVE A STEADY PLACE TO SLEEP OR SLEPT IN A SHELTER (INCLUDING NOW)?: NO

## 2024-05-17 SDOH — ECONOMIC STABILITY: TRANSPORTATION INSECURITY
IN THE PAST 12 MONTHS, HAS THE LACK OF TRANSPORTATION KEPT YOU FROM MEDICAL APPOINTMENTS OR FROM GETTING MEDICATIONS?: NO

## 2024-05-17 SDOH — ECONOMIC STABILITY: TRANSPORTATION INSECURITY
IN THE PAST 12 MONTHS, HAS LACK OF TRANSPORTATION KEPT YOU FROM MEETINGS, WORK, OR FROM GETTING THINGS NEEDED FOR DAILY LIVING?: NO

## 2024-05-17 SDOH — SOCIAL STABILITY: SOCIAL NETWORK: HOW OFTEN DO YOU ATTEND CHURCH OR RELIGIOUS SERVICES?: NEVER

## 2024-05-17 SDOH — SOCIAL STABILITY: SOCIAL NETWORK
DO YOU BELONG TO ANY CLUBS OR ORGANIZATIONS SUCH AS CHURCH GROUPS UNIONS, FRATERNAL OR ATHLETIC GROUPS, OR SCHOOL GROUPS?: NO

## 2024-05-17 SDOH — ECONOMIC STABILITY: INCOME INSECURITY: IN THE LAST 12 MONTHS, WAS THERE A TIME WHEN YOU WERE NOT ABLE TO PAY THE MORTGAGE OR RENT ON TIME?: NO

## 2024-05-17 SDOH — SOCIAL STABILITY: SOCIAL INSECURITY
WITHIN THE LAST YEAR, HAVE YOU BEEN KICKED, HIT, SLAPPED, OR OTHERWISE PHYSICALLY HURT BY YOUR PARTNER OR EX-PARTNER?: NO

## 2024-05-17 SDOH — HEALTH STABILITY: MENTAL HEALTH: HOW OFTEN DO YOU HAVE 6 OR MORE DRINKS ON ONE OCCASION?: NEVER

## 2024-05-17 SDOH — SOCIAL STABILITY: SOCIAL INSECURITY
WITHIN THE LAST YEAR, HAVE TO BEEN RAPED OR FORCED TO HAVE ANY KIND OF SEXUAL ACTIVITY BY YOUR PARTNER OR EX-PARTNER?: NO

## 2024-05-17 SDOH — ECONOMIC STABILITY: HOUSING INSECURITY: IN THE LAST 12 MONTHS, HOW MANY PLACES HAVE YOU LIVED?: 1

## 2024-05-17 SDOH — SOCIAL STABILITY: SOCIAL NETWORK: HOW OFTEN DO YOU ATTENT MEETINGS OF THE CLUB OR ORGANIZATION YOU BELONG TO?: NEVER

## 2024-05-17 SDOH — SOCIAL STABILITY: SOCIAL NETWORK: HOW OFTEN DO YOU GET TOGETHER WITH FRIENDS OR RELATIVES?: MORE THAN THREE TIMES A WEEK

## 2024-05-17 SDOH — HEALTH STABILITY: PHYSICAL HEALTH: ON AVERAGE, HOW MANY MINUTES DO YOU ENGAGE IN EXERCISE AT THIS LEVEL?: 30 MIN

## 2024-05-17 SDOH — HEALTH STABILITY: MENTAL HEALTH
HOW OFTEN DO YOU NEED TO HAVE SOMEONE HELP YOU WHEN YOU READ INSTRUCTIONS, PAMPHLETS, OR OTHER WRITTEN MATERIAL FROM YOUR DOCTOR OR PHARMACY?: NEVER

## 2024-05-17 SDOH — HEALTH STABILITY: PHYSICAL HEALTH: ON AVERAGE, HOW MANY DAYS PER WEEK DO YOU ENGAGE IN MODERATE TO STRENUOUS EXERCISE (LIKE A BRISK WALK)?: 6 DAYS

## 2024-05-17 SDOH — HEALTH STABILITY: MENTAL HEALTH: HOW MANY STANDARD DRINKS CONTAINING ALCOHOL DO YOU HAVE ON A TYPICAL DAY?: PATIENT DOES NOT DRINK

## 2024-05-17 ASSESSMENT — PAIN SCALES - GENERAL
PAINLEVEL_OUTOF10: 3
PAINLEVEL_OUTOF10: 2
PAINLEVEL_OUTOF10: 2
PAINLEVEL_OUTOF10: 0 - NO PAIN
PAINLEVEL_OUTOF10: 5 - MODERATE PAIN
PAINLEVEL_OUTOF10: 5 - MODERATE PAIN
PAINLEVEL_OUTOF10: 6
PAINLEVEL_OUTOF10: 2
PAINLEVEL_OUTOF10: 0 - NO PAIN
PAINLEVEL_OUTOF10: 2
PAINLEVEL_OUTOF10: 3
PAINLEVEL_OUTOF10: 5 - MODERATE PAIN
PAINLEVEL_OUTOF10: 5 - MODERATE PAIN
PAINLEVEL_OUTOF10: 2
PAINLEVEL_OUTOF10: 4
PAINLEVEL_OUTOF10: 5 - MODERATE PAIN
PAINLEVEL_OUTOF10: 3
PAINLEVEL_OUTOF10: 2
PAINLEVEL_OUTOF10: 6

## 2024-05-17 ASSESSMENT — PAIN - FUNCTIONAL ASSESSMENT
PAIN_FUNCTIONAL_ASSESSMENT: 0-10
PAIN_FUNCTIONAL_ASSESSMENT: CPOT (CRITICAL CARE PAIN OBSERVATION TOOL)
PAIN_FUNCTIONAL_ASSESSMENT: 0-10

## 2024-05-17 ASSESSMENT — COGNITIVE AND FUNCTIONAL STATUS - GENERAL
STANDING UP FROM CHAIR USING ARMS: A LOT
MOVING TO AND FROM BED TO CHAIR: A LITTLE
TURNING FROM BACK TO SIDE WHILE IN FLAT BAD: A LOT
MOBILITY SCORE: 14
CLIMB 3 TO 5 STEPS WITH RAILING: A LOT
WALKING IN HOSPITAL ROOM: A LITTLE
MOVING FROM LYING ON BACK TO SITTING ON SIDE OF FLAT BED WITH BEDRAILS: A LOT

## 2024-05-17 ASSESSMENT — PAIN DESCRIPTION - ORIENTATION: ORIENTATION: LEFT;LOWER

## 2024-05-17 ASSESSMENT — LIFESTYLE VARIABLES
SKIP TO QUESTIONS 9-10: 1
AUDIT-C TOTAL SCORE: 0

## 2024-05-17 ASSESSMENT — ACTIVITIES OF DAILY LIVING (ADL)
ADL_ASSISTANCE: INDEPENDENT
ADLS_ADDRESSED: NO

## 2024-05-17 ASSESSMENT — PAIN DESCRIPTION - LOCATION: LOCATION: ABDOMEN

## 2024-05-17 NOTE — SIGNIFICANT EVENT
05/16/24 2304   Daily Screen   Total RSBI 36   Weaning Parameters   Weaning Vital Capacity 580 mL   Negative Inspiratory Force (NIF) -25   Spontaneous Minute Volume (MV) 6.5   Weaning Tidal Volume 531 mL   Respiratory Depth/Rhythm Regular   Respiratory Effort Unlabored   Weaning Tolerance Excellent

## 2024-05-17 NOTE — SIGNIFICANT EVENT
Post-Operative Check    S: Patient is s/p Perivisceral aortic endarterectomy, aortobifemoral bypass graft, left renal bypass, and right femoral endarterectomy. She is recovering in the ICU, sedation has not been weaned.    O:  Temp:  [35.4 °C (95.7 °F)-36.6 °C (97.9 °F)] 35.6 °C (96.1 °F)  Heart Rate:  [57-81] 81  Resp:  [16-19] 18  BP: (164)/(76) 164/76  Arterial Line BP 1: ()/(50-74) 124/61  FiO2 (%):  [40 %-50 %] 40 %   General: Sedated  Pulm: Intubated  MSK: BL groin incisions covered in dressing without strikethrough, no hematoma. BL palpable DP/PT.   Neuro: Unable to obtain exam due to sedation    A/P: 68 y.o. 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     - q1 Neurovascular checks: please wean sedation for neuro exam as soon as possible   - Pain control per ICU  - Wean to extubate as tolerated  - Chest tube to -20 mmHg suction  - Spinal perfusion goal: MAP >80 mmHg  - NPO, NGT to LIWS  - Resuscitative IV fluids- 150cc/hr  - 1/2 to 1 replacement with LR for 12 hrs (mannitol given intra op); to end at midnight  - Maintain K>4, Mg>2, Hgb>8, Plts>100, INR<1.4  - Labs q12h for 24 hrs  - monitor UOP, continue fox  - Cefoxitin x24 hrs  - SCDs  - hold blood thinners koffi Olea MD  General Surgery  Vascular [id number]    3:30AM update:  Patient extubated. Moving all 4 and following commands. Pain is controlled. BL palpable DP.     Nivia Olea MD  General Surgery

## 2024-05-17 NOTE — PROGRESS NOTES
Klaudia Ratliff is a 68 y.o. female on day 1 of admission presenting with Aortic occlusion (CMS-HCC).    Subjective   NAEON. Was extubated without issue. Received 1u pRBC overnight and was weaned off epinephrine. Seen this AM sitting in chair, well-appearing, relatively comfortable, minimal pain at rest. Complains of thirst and throat pain, likely related to intubation with double lumen tube. Had some questions about mild, nonspecific symptoms, all answered to patient's satisfaction.      Objective     Physical Exam  Vitals reviewed.   Constitutional:       Appearance: Normal appearance. She is normal weight.   HENT:      Head: Normocephalic and atraumatic.      Mouth/Throat:      Mouth: Mucous membranes are moist.      Pharynx: Oropharynx is clear.   Eyes:      Extraocular Movements: Extraocular movements intact.      Conjunctiva/sclera: Conjunctivae normal.      Pupils: Pupils are equal, round, and reactive to light.   Cardiovascular:      Rate and Rhythm: Normal rate and regular rhythm.      Pulses: Normal pulses.      Heart sounds: Normal heart sounds.      Comments: Still requiring vasopressor support   Pulmonary:      Effort: Pulmonary effort is normal.      Breath sounds: Normal breath sounds.   Abdominal:      General: There is no distension.      Palpations: Abdomen is soft.      Tenderness: There is abdominal tenderness.   Musculoskeletal:         General: Normal range of motion.      Cervical back: Normal range of motion and neck supple.      Right lower leg: Edema present.      Left lower leg: No edema.      Comments: Minimal pitting edema on R foot below ankle. Similar to pre-op findings   Skin:     General: Skin is warm and dry.      Capillary Refill: Capillary refill takes less than 2 seconds.   Neurological:      General: No focal deficit present.      Mental Status: She is alert and oriented to person, place, and time.   Psychiatric:         Mood and Affect: Mood normal.         Behavior:  "Behavior normal.       Last Recorded Vitals  Blood pressure 132/59, pulse 81, temperature 37.1 °C (98.8 °F), temperature source Core, resp. rate 19, height 1.575 m (5' 2\"), weight 73.6 kg (162 lb 3.2 oz), SpO2 99%.  Intake/Output last 3 Shifts:  I/O last 3 completed shifts:  In: 71295.1 (141.1 mL/kg) [I.V.:6422.1 (87.3 mL/kg); Blood:2760; IV Piggyback:1200]  Out: 1965 (26.7 mL/kg) [Urine:1830 (0.7 mL/kg/hr); Chest Tube:135]  Weight: 73.6 kg     Relevant Results  Scheduled medications  cefOXitin, 2 g, intravenous, q8h  insulin lispro, 0-10 Units, subcutaneous, q4h  lactated Ringer's, 500 mL, intravenous, Once  pantoprazole, 40 mg, oral, Daily before breakfast   Or  pantoprazole, 40 mg, intravenous, Daily before breakfast      Continuous medications  lactated Ringer's, 150 mL/hr, Last Rate: 150 mL/hr (05/17/24 0700)  lactated Ringer's, 30 mL/hr, Last Rate: 30 mL/hr (05/17/24 0700)  norepinephrine, 0-3 mcg/kg/min, Last Rate: 0.1 mcg/kg/min (05/17/24 0700)  ropivacaine (PF) in NS cmpd, 14 mL/hr      PRN medications  PRN medications: calcium gluconate, calcium gluconate, dextrose **OR** glucagon, HYDROmorphone, HYDROmorphone, magnesium sulfate, magnesium sulfate, oxygen, potassium chloride, potassium chloride     Results for orders placed or performed during the hospital encounter of 05/16/24 (from the past 24 hour(s))   ACTIVATED CLOTTING TIME LOW   Result Value Ref Range    POCT Activated Clotting Time Low Range 115 83 - 199 sec   Blood Gas Arterial Full Panel Unsolicited   Result Value Ref Range    POCT pH, Arterial 7.40 7.38 - 7.42 pH    POCT pCO2, Arterial 39 38 - 42 mm Hg    POCT pO2, Arterial 433 (H) 85 - 95 mm Hg    POCT SO2, Arterial 100 94 - 100 %    POCT Oxy Hemoglobin, Arterial 97.8 94.0 - 98.0 %    POCT Hematocrit Calculated, Arterial 29.0 (L) 36.0 - 46.0 %    POCT Sodium, Arterial 138 136 - 145 mmol/L    POCT Potassium, Arterial 4.3 3.5 - 5.3 mmol/L    POCT Chloride, Arterial 109 (H) 98 - 107 mmol/L    POCT " Ionized Calcium, Arterial 1.28 1.10 - 1.33 mmol/L    POCT Glucose, Arterial 129 (H) 74 - 99 mg/dL    POCT Lactate, Arterial 1.1 0.4 - 2.0 mmol/L    POCT Base Excess, Arterial -0.5 -2.0 - 3.0 mmol/L    POCT HCO3 Calculated, Arterial 24.2 22.0 - 26.0 mmol/L    POCT Hemoglobin, Arterial 9.8 (L) 12.0 - 16.0 g/dL    POCT Anion Gap, Arterial 9 (L) 10 - 25 mmo/L    Patient Temperature 37.0 degrees Celsius   ACTIVATED CLOTTING TIME LOW   Result Value Ref Range    POCT Activated Clotting Time Low Range 284 (H) 83 - 199 sec   BLOOD GAS ARTERIAL FULL PANEL   Result Value Ref Range    POCT pH, Arterial 7.21 (LL) 7.38 - 7.42 pH    POCT pCO2, Arterial 42 38 - 42 mm Hg    POCT pO2, Arterial 232 (H) 85 - 95 mm Hg    POCT SO2, Arterial 99 94 - 100 %    POCT Oxy Hemoglobin, Arterial 97.8 94.0 - 98.0 %    POCT Hematocrit Calculated, Arterial 23.0 (L) 36.0 - 46.0 %    POCT Sodium, Arterial 132 (L) 136 - 145 mmol/L    POCT Potassium, Arterial 4.9 3.5 - 5.3 mmol/L    POCT Chloride, Arterial 108 (H) 98 - 107 mmol/L    POCT Ionized Calcium, Arterial 1.40 (H) 1.10 - 1.33 mmol/L    POCT Glucose, Arterial 289 (H) 74 - 99 mg/dL    POCT Lactate, Arterial 3.5 (H) 0.4 - 2.0 mmol/L    POCT Base Excess, Arterial -10.3 (L) -2.0 - 3.0 mmol/L    POCT HCO3 Calculated, Arterial 16.8 (L) 22.0 - 26.0 mmol/L    POCT Hemoglobin, Arterial 7.5 (L) 12.0 - 16.0 g/dL    POCT Anion Gap, Arterial 12 10 - 25 mmo/L    Patient Temperature 37.0 degrees Celsius    FiO2 50 %   ACTIVATED CLOTTING TIME LOW   Result Value Ref Range    POCT Activated Clotting Time Low Range 261 (H) 83 - 199 sec   ACTIVATED CLOTTING TIME LOW   Result Value Ref Range    POCT Activated Clotting Time Low Range 199 83 - 199 sec   Calcium, ionized   Result Value Ref Range    POCT Calcium, Ionized 1.11 1.1 - 1.33 mmol/L   CBC   Result Value Ref Range    WBC 16.1 (H) 4.4 - 11.3 x10*3/uL    nRBC 0.0 0.0 - 0.0 /100 WBCs    RBC 3.39 (L) 4.00 - 5.20 x10*6/uL    Hemoglobin 10.8 (L) 12.0 - 16.0 g/dL     Hematocrit 31.0 (L) 36.0 - 46.0 %    MCV 91 80 - 100 fL    MCH 31.9 26.0 - 34.0 pg    MCHC 34.8 32.0 - 36.0 g/dL    RDW 15.5 (H) 11.5 - 14.5 %    Platelets 186 150 - 450 x10*3/uL   Coagulation Screen   Result Value Ref Range    Protime 15.8 (H) 9.8 - 12.8 seconds    INR 1.4 (H) 0.9 - 1.1    aPTT 29 27 - 38 seconds   Magnesium   Result Value Ref Range    Magnesium 1.25 (L) 1.60 - 2.40 mg/dL   Renal function panel   Result Value Ref Range    Glucose 142 (H) 74 - 99 mg/dL    Sodium 141 136 - 145 mmol/L    Potassium 4.0 3.5 - 5.3 mmol/L    Chloride 111 (H) 98 - 107 mmol/L    Bicarbonate 20 (L) 21 - 32 mmol/L    Anion Gap 14 10 - 20 mmol/L    Urea Nitrogen 32 (H) 6 - 23 mg/dL    Creatinine 1.40 (H) 0.50 - 1.05 mg/dL    eGFR 41 (L) >60 mL/min/1.73m*2    Calcium 7.4 (L) 8.6 - 10.6 mg/dL    Phosphorus 3.3 2.5 - 4.9 mg/dL    Albumin 3.1 (L) 3.4 - 5.0 g/dL   Amylase   Result Value Ref Range    Amylase <10 (L) 29 - 103 U/L   Lipase   Result Value Ref Range    Lipase <3 (L) 9 - 82 U/L   BLOOD GAS ARTERIAL FULL PANEL   Result Value Ref Range    POCT pH, Arterial 7.27 (L) 7.38 - 7.42 pH    POCT pCO2, Arterial 39 38 - 42 mm Hg    POCT pO2, Arterial 251 (H) 85 - 95 mm Hg    POCT SO2, Arterial 99 94 - 100 %    POCT Oxy Hemoglobin, Arterial 97.8 94.0 - 98.0 %    POCT Hematocrit Calculated, Arterial 32.0 (L) 36.0 - 46.0 %    POCT Sodium, Arterial 138 136 - 145 mmol/L    POCT Potassium, Arterial 4.0 3.5 - 5.3 mmol/L    POCT Chloride, Arterial 108 (H) 98 - 107 mmol/L    POCT Ionized Calcium, Arterial 1.18 1.10 - 1.33 mmol/L    POCT Glucose, Arterial 126 (H) 74 - 99 mg/dL    POCT Lactate, Arterial 2.4 (H) 0.4 - 2.0 mmol/L    POCT Base Excess, Arterial -8.4 (L) -2.0 - 3.0 mmol/L    POCT HCO3 Calculated, Arterial 17.9 (L) 22.0 - 26.0 mmol/L    POCT Hemoglobin, Arterial 10.8 (L) 12.0 - 16.0 g/dL    POCT Anion Gap, Arterial 16 10 - 25 mmo/L    Patient Temperature 37.0 degrees Celsius    FiO2 50 %   BLOOD GAS ARTERIAL FULL PANEL   Result  Value Ref Range    POCT pH, Arterial 7.32 (L) 7.38 - 7.42 pH    POCT pCO2, Arterial 30 (L) 38 - 42 mm Hg    POCT pO2, Arterial 186 (H) 85 - 95 mm Hg    POCT SO2, Arterial 100 94 - 100 %    POCT Oxy Hemoglobin, Arterial 97.8 94.0 - 98.0 %    POCT Hematocrit Calculated, Arterial 28.0 (L) 36.0 - 46.0 %    POCT Sodium, Arterial 135 (L) 136 - 145 mmol/L    POCT Potassium, Arterial 4.0 3.5 - 5.3 mmol/L    POCT Chloride, Arterial 107 98 - 107 mmol/L    POCT Ionized Calcium, Arterial 1.09 (L) 1.10 - 1.33 mmol/L    POCT Glucose, Arterial 220 (H) 74 - 99 mg/dL    POCT Lactate, Arterial 2.8 (H) 0.4 - 2.0 mmol/L    POCT Base Excess, Arterial -9.6 (L) -2.0 - 3.0 mmol/L    POCT HCO3 Calculated, Arterial 15.5 (L) 22.0 - 26.0 mmol/L    POCT Hemoglobin, Arterial 9.2 (L) 12.0 - 16.0 g/dL    POCT Anion Gap, Arterial 17 10 - 25 mmo/L    Patient Temperature 37.0 degrees Celsius    FiO2 40 %   BLOOD GAS ARTERIAL FULL PANEL   Result Value Ref Range    POCT pH, Arterial 7.33 (L) 7.38 - 7.42 pH    POCT pCO2, Arterial 33 (L) 38 - 42 mm Hg    POCT pO2, Arterial 164 (H) 85 - 95 mm Hg    POCT SO2, Arterial 100 94 - 100 %    POCT Oxy Hemoglobin, Arterial 98.0 94.0 - 98.0 %    POCT Hematocrit Calculated, Arterial 27.0 (L) 36.0 - 46.0 %    POCT Sodium, Arterial 136 136 - 145 mmol/L    POCT Potassium, Arterial 3.9 3.5 - 5.3 mmol/L    POCT Chloride, Arterial 107 98 - 107 mmol/L    POCT Ionized Calcium, Arterial 1.10 1.10 - 1.33 mmol/L    POCT Glucose, Arterial 226 (H) 74 - 99 mg/dL    POCT Lactate, Arterial 2.8 (H) 0.4 - 2.0 mmol/L    POCT Base Excess, Arterial -7.7 (L) -2.0 - 3.0 mmol/L    POCT HCO3 Calculated, Arterial 17.4 (L) 22.0 - 26.0 mmol/L    POCT Hemoglobin, Arterial 9.1 (L) 12.0 - 16.0 g/dL    POCT Anion Gap, Arterial 16 10 - 25 mmo/L    Patient Temperature 37.0 degrees Celsius    FiO2 40 %   Calcium, ionized   Result Value Ref Range    POCT Calcium, Ionized 1.10 1.1 - 1.33 mmol/L   CBC   Result Value Ref Range    WBC 11.9 (H) 4.4 - 11.3  x10*3/uL    nRBC 0.0 0.0 - 0.0 /100 WBCs    RBC 2.82 (L) 4.00 - 5.20 x10*6/uL    Hemoglobin 8.9 (L) 12.0 - 16.0 g/dL    Hematocrit 24.9 (L) 36.0 - 46.0 %    MCV 88 80 - 100 fL    MCH 31.6 26.0 - 34.0 pg    MCHC 35.7 32.0 - 36.0 g/dL    RDW 15.8 (H) 11.5 - 14.5 %    Platelets 181 150 - 450 x10*3/uL   Hepatic function panel   Result Value Ref Range    Albumin 3.7 3.4 - 5.0 g/dL    Bilirubin, Total 2.7 (H) 0.0 - 1.2 mg/dL    Bilirubin, Direct 1.9 (H) 0.0 - 0.3 mg/dL    Alkaline Phosphatase 27 (L) 33 - 136 U/L    ALT 16 7 - 45 U/L    AST 17 9 - 39 U/L    Total Protein 4.7 (L) 6.4 - 8.2 g/dL   Magnesium   Result Value Ref Range    Magnesium 2.44 (H) 1.60 - 2.40 mg/dL   Basic Metabolic Panel   Result Value Ref Range    Glucose 237 (H) 74 - 99 mg/dL    Sodium 137 136 - 145 mmol/L    Potassium 4.2 3.5 - 5.3 mmol/L    Chloride 105 98 - 107 mmol/L    Bicarbonate 18 (L) 21 - 32 mmol/L    Anion Gap 18 10 - 20 mmol/L    Urea Nitrogen 34 (H) 6 - 23 mg/dL    Creatinine 1.53 (H) 0.50 - 1.05 mg/dL    eGFR 37 (L) >60 mL/min/1.73m*2    Calcium 7.9 (L) 8.6 - 10.6 mg/dL   BLOOD GAS ARTERIAL FULL PANEL   Result Value Ref Range    POCT pH, Arterial 7.26 (L) 7.38 - 7.42 pH    POCT pCO2, Arterial 37 (L) 38 - 42 mm Hg    POCT pO2, Arterial 148 (H) 85 - 95 mm Hg    POCT SO2, Arterial 98 94 - 100 %    POCT Oxy Hemoglobin, Arterial 96.5 94.0 - 98.0 %    POCT Hematocrit Calculated, Arterial 26.0 (L) 36.0 - 46.0 %    POCT Sodium, Arterial 138 136 - 145 mmol/L    POCT Potassium, Arterial 4.1 3.5 - 5.3 mmol/L    POCT Chloride, Arterial 106 98 - 107 mmol/L    POCT Ionized Calcium, Arterial 1.13 1.10 - 1.33 mmol/L    POCT Glucose, Arterial 188 (H) 74 - 99 mg/dL    POCT Lactate, Arterial 3.0 (H) 0.4 - 2.0 mmol/L    POCT Base Excess, Arterial -9.7 (L) -2.0 - 3.0 mmol/L    POCT HCO3 Calculated, Arterial 16.6 (L) 22.0 - 26.0 mmol/L    POCT Hemoglobin, Arterial 8.6 (L) 12.0 - 16.0 g/dL    POCT Anion Gap, Arterial 20 10 - 25 mmo/L    Patient Temperature  37.0 degrees Celsius    FiO2 36 %   CBC   Result Value Ref Range    WBC 9.7 4.4 - 11.3 x10*3/uL    nRBC 0.0 0.0 - 0.0 /100 WBCs    RBC 2.93 (L) 4.00 - 5.20 x10*6/uL    Hemoglobin 9.2 (L) 12.0 - 16.0 g/dL    Hematocrit 25.5 (L) 36.0 - 46.0 %    MCV 87 80 - 100 fL    MCH 31.4 26.0 - 34.0 pg    MCHC 36.1 (H) 32.0 - 36.0 g/dL    RDW 15.9 (H) 11.5 - 14.5 %    Platelets 121 (L) 150 - 450 x10*3/uL   BLOOD GAS ARTERIAL FULL PANEL   Result Value Ref Range    POCT pH, Arterial 7.33 (L) 7.38 - 7.42 pH    POCT pCO2, Arterial 38 38 - 42 mm Hg    POCT pO2, Arterial 140 (H) 85 - 95 mm Hg    POCT SO2, Arterial 99 94 - 100 %    POCT Oxy Hemoglobin, Arterial 96.3 94.0 - 98.0 %    POCT Hematocrit Calculated, Arterial 29.0 (L) 36.0 - 46.0 %    POCT Sodium, Arterial 136 136 - 145 mmol/L    POCT Potassium, Arterial 4.3 3.5 - 5.3 mmol/L    POCT Chloride, Arterial 108 (H) 98 - 107 mmol/L    POCT Ionized Calcium, Arterial 1.13 1.10 - 1.33 mmol/L    POCT Glucose, Arterial 156 (H) 74 - 99 mg/dL    POCT Lactate, Arterial 1.7 0.4 - 2.0 mmol/L    POCT Base Excess, Arterial -5.5 (L) -2.0 - 3.0 mmol/L    POCT HCO3 Calculated, Arterial 20.0 (L) 22.0 - 26.0 mmol/L    POCT Hemoglobin, Arterial 9.7 (L) 12.0 - 16.0 g/dL    POCT Anion Gap, Arterial 12 10 - 25 mmo/L    Patient Temperature 37.0 degrees Celsius    FiO2 36 %   POCT GLUCOSE   Result Value Ref Range    POCT Glucose 150 (H) 74 - 99 mg/dL      XR chest 1 view    Result Date: 5/16/2024  STUDY: XR CHEST 1 VIEW; XR ABDOMEN 1 VIEW;  5/16/2024 6:21 pm   INDICATION: Signs/Symptoms:post op.   COMPARISON: CT abdomen/pelvis 03/14/2024.   ACCESSION NUMBER(S): [id number]; [id number]   ORDERING CLINICIAN: MANE CALDWELL   FINDINGS: AP radiograph of the chest was provided.   MEDICAL DEVICES: ETT distal tip 3.1 cm from the alda. Enteric tube overlies the esophagus side port superior to the gastroesophageal junction and distal tip overlying the stomach.. Left thoracostomy tube with distal tip overlying the  left upper lung. Left IJ central venous catheter with distal tip overlying the left brachiocephalic vein. IVC filter overlies the L3-4 right transverse processes. Partially visualized rectal temperature probe.   CARDIOMEDIASTINAL SILHOUETTE: Cardiomediastinal silhouette is normal in size and configuration.   LUNGS: No pneumothorax, pleural effusion or focal consolidation.   ABDOMEN: Nonobstructive bowel gas pattern. No evidence of extraluminal/portal venous gas.   BONES: No acute osseous changes.       1.  No evidence of acute cardiopulmonary process. 2. Nonobstructive bowel gas pattern. 3. Enteric tube side port is superior to the expected location of the gastroesophageal junction. Advancement recommended.   I personally reviewed the images/study and I agree with the findings as stated by Dr. Vicente Zuniga. This study was interpreted at [place], [place].   MACRO: None     Dictation workstation:   [id number]    XR abdomen 1 view    Result Date: 5/16/2024  STUDY: XR CHEST 1 VIEW; XR ABDOMEN 1 VIEW;  5/16/2024 6:21 pm   INDICATION: Signs/Symptoms:post op.   COMPARISON: CT abdomen/pelvis 03/14/2024.   ACCESSION NUMBER(S): [id number]; [id number]   ORDERING CLINICIAN: MANE CALDWELL   FINDINGS: AP radiograph of the chest was provided.   MEDICAL DEVICES: ETT distal tip 3.1 cm from the alda. Enteric tube overlies the esophagus side port superior to the gastroesophageal junction and distal tip overlying the stomach.. Left thoracostomy tube with distal tip overlying the left upper lung. Left IJ central venous catheter with distal tip overlying the left brachiocephalic vein. IVC filter overlies the L3-4 right transverse processes. Partially visualized rectal temperature probe.   CARDIOMEDIASTINAL SILHOUETTE: Cardiomediastinal silhouette is normal in size and configuration.   LUNGS: No pneumothorax, pleural effusion or focal consolidation.   ABDOMEN: Nonobstructive bowel gas  pattern. No evidence of extraluminal/portal venous gas.   BONES: No acute osseous changes.       1.  No evidence of acute cardiopulmonary process. 2. Nonobstructive bowel gas pattern. 3. Enteric tube side port is superior to the expected location of the gastroesophageal junction. Advancement recommended.   I personally reviewed the images/study and I agree with the findings as stated by Dr. Vicente Zuniga. This study was interpreted at [facility], [place], [place].   MACRO: None     Dictation workstation:   [id number]      This patient has a central line   Reason for the central line remaining today? Parenteral medication    This patient has a urinary catheter   Reason for the urinary catheter remaining today? critically ill patient who need accurate urinary output measurements       Assessment/Plan   Principal Problem:    Aortic occlusion (CMS-HCC)  Active Problems:    Aortoiliac occlusive disease (Multi)    Klaudia Ratliff is a 68 y.o. female with past medical history of HTN, HLD, obesity, COPD, gout, RLE DVT, right PE s/p mechanical thrombectomy and IVC filter placement (2/22/2024) discharged on eliquis and bridged to Lovenox (5/13), and aortic occlusion. Patient presents to SICU now s/p aortofem bypass via thoracoabdominal approach. Arrived to SICU intubated, sedated on prop @30, requiring norepi @ 0.04 and epi @ 0.02 to maintain goal MAP 80. Intubated with double lumen tube, neuromuscular blockade reversed by anesthesia team. Extubated overnight on POD 0 and received 1u pRBC.      Plan:  NEURO: PMH of sciatica. A+Ox3, no focal deficits, reassuring neurovasc checks overnight. Pain well controlled. No neuro issues at this time.  - hydromorphone as needed pain  - continue q1h neuro/neurovasc/pain assessments for now  - PT/OT consulted  - B/L QL block with catheter in place, management by acute pain. Avoid lidocaine patches while in place.  - Home meds; Gabapentin 100 mg BID      CV: History of HTN, HLD, obesity, RLE DVT, Right PE (s/p mechanical thrombectomy 2/24), and aortic occlusion. Baseline BP (pre-op) 161/76. Baseline cardiac function normal EF (60-65%), elevated LA pressure (2/2/24). Carotid Duplex < 50% ICA stenosis b/l. Now s/p aortofem bypass. Hypovolemic shock requiring vasoactive support to maintain goal MAP. Lactate cleared.  - goal MAP >80 per vascular surgery  - continuous ekg/abp monitoring  - wean pressors as able  - volume as indicated (prefer colloid)  - home meds: Eliquis 5 mg BID (held since 5/12, bridged to lovenox 5/13), ASA 81 mg, Atenolol 50 mg, atorvastatin 80 mg, losartan 100 mg, Vitamin C 250 mg     PULM: History of COPD, pulmonary embolism (2/2/24). No diaphragm pacing wires placed intra-op. Arrived to SICU Intubated with AL in place. Extubated overnight on POD 0. No respiratory issues at this time.   - wean 02 for goal SpO2 >94%  - Q1h IS   - additional bronchial hygiene as indicated  - Daily CXR  - chest tube to -20 suction  - Home meds: Tiotropium 2.5 mcg 2 puffs daily.     GI: History of gastric ulcer (3/2024). Strict NPO. No issues at this time.   - continue NG-> LIWS  - liver labs grossly normal, no signs of gut ischemia  - PPI for GI prophylaxis.     : Baseline cr 0.9-1.1. Supraceliac clamp x27 minutes. Additional left renal ischemic time of 47 minutes during bypass. Now with mild ELROY but adequate UOP.  - q12h RFP  - Maintenance IVF @150ml/hr while NPO per vascular surgery  - Volume resuscitation as needed.    - Goal U/O >1-2ml/kg/hr.    - Replete electrolytes to goal K>4, Mg>2, Phos>2.5, ionized Ca>1.10 if creatinine remains WNL or <1.5 with adequate uop.  - Home meds: Lasix 40 mg PRN, Potassium chloride 10 mEq ER      HEME: Acute surgical blood loss anemia. OR EBL 1L, received 2u pRBC and 460ml cell saver. Received another 1u pRBC overnight on POD 0.   - q12h CBC, coag  - scds for dvt prophylaxis.  - no subcutaneous heparin per vascular surgery at  this time  - will transfuse another 1u pRBC for goal Hgb >10  - Home meds: Lovenox 80 mg q12h     ENDO: No history of diabetes or hypothyroidism. A1c not on file, TSH 1.86. Arrived to ICU on insulin gtt, turned off overnight on POD 0. Mild hyperglycemia controlled with SSI.   - Q4h BG  - SSI Lispro per ICU protocol     ID: No PMH. Received Cefoxitin 2g @ 9am and 1pm intra-op. Now afebrile and normal WBC post-op. No signs of infection at this time.   - q12h WBC  - Continue cefoxitin as long as chest tube in place  - monitor for s/s infection     Lines:   - L internal jugular MAC w/ mini MAC  - R brachial A-line  - PIV right forearm  - R wrist MEENU line --> remove today  - QL bilateral blocks     F - Daughter Ashley [phone], updated at bedside    Dispo: Continue ICU care. Discussed with SICU attending Dr. Carlton.  SICU team phone [phone]  Earnest Jacobs MD

## 2024-05-17 NOTE — PROGRESS NOTES
Pharmacy Admission Order Reconciliation Review    Klaudia Ratliff is a 68 y.o. female admitted for Aortic occlusion (CMS-[facility]). Pharmacy reviewed the patient's unreconciled admission medications.    Prior to admission medications that were reviewed and acted on by the pharmacist include:  Vitamin C  Vitamin D3  These medications have been reconciled.     Any other unreconcilied medications have been addressed and will be ordered or held by the patient's medical team. Medications addressed by the pharmacist may be added or changed by the patient's medical team at any time.    Carmelina Medina, PharmD  Transitions of Care Pharmacist  [facility]  Please reach out via Secure Chat for questions

## 2024-05-17 NOTE — PROGRESS NOTES
1/1 CTICU    VASCULAR SURGERY   5/16 Perivisceral aortic endarterectomy, aortobifemoral--bypass graft, left renal bypass, and right femoral endarterectomy (Jovan Garcia)     DC PLAN  SNF: _______     > auth needed    PAYOR   [facility]    AWAIT  ( ) SNF choice from patient or her g-dtr (Aliexi)     PT/OT   5/17 PT (14) Mod    COMPLETED  (X) Daily ongoing review of patient via chart and/or (M-F) IDT rounds  (X) 05/17 DC/SDOH Assessments with pt. / gdt (Kai) and daughter from [place].      CONTACTS  Grand-dtr (Kai) and Roommate (Lon) here in [place]  Daughter from [place] and son from [place]    NOTE  Met with patient / g'dgr (Kai) and daughter. Patient's dtr/son out of state but Kai lives near pt. & with roommate is support for patient.  Patient at home and uses walker/cane.  Recently stopped driving. Kai and Lon support transport & shopping.  Per patient, still active with [facility] from last admit.  However all 3 want to start planning for SNF.  Patient thinks the one she'd prefer is in [place].  Lists for [place] and [place] given to g-dtr and highlighted (2) [place] facilities.  Expectation is d-dtr will follow up with author with 3 SNF choices.     Hyacinth Eubanks (LSW, MSW)

## 2024-05-17 NOTE — PROGRESS NOTES
Spiritual Care Visit    Clinical Encounter Type  Visited With: Patient and family together  Routine Visit: Introduction  Continue Visiting: Yes    Tenriism Encounters  Tenriism Needs: Prayer         Sacramental Encounters  Other Sacrament: P&B    Patient received a prayer and blessing by Fr. Ovi Carias,  Sabianism .  Family members were preset.

## 2024-05-17 NOTE — PROGRESS NOTES
Vascular Surgery Progress Note    Subjective  No acute events following surgery  Extubated without issue  States her pain is tolerable    Current Meds  Scheduled medications  cefOXitin, 2 g, intravenous, q8h  insulin lispro, 0-10 Units, subcutaneous, q4h  pantoprazole, 40 mg, oral, Daily before breakfast   Or  pantoprazole, 40 mg, intravenous, Daily before breakfast      Continuous medications  lactated Ringer's, 150 mL/hr, Last Rate: 150 mL/hr (05/17/24 1000)  lactated Ringer's, 30 mL/hr, Last Rate: 30 mL/hr (05/17/24 1000)  norepinephrine, 0-3 mcg/kg/min, Last Rate: 0.04 mcg/kg/min (05/17/24 1000)  ropivacaine (PF) in NS cmpd, 14 mL/hr      PRN medications  PRN medications: calcium gluconate, calcium gluconate, dextrose **OR** glucagon, HYDROmorphone, HYDROmorphone, magnesium sulfate, magnesium sulfate, oxygen, potassium chloride, potassium chloride    Objective    Vitals:   Temp:  [35.4 °C (95.7 °F)-37.3 °C (99.1 °F)] 36.5 °C (97.7 °F)  Heart Rate:  [57-92] 86  Resp:  [12-22] 18  BP: (119-132)/(57-61) 119/61  Arterial Line BP 1: ()/(50-77) 117/61  FiO2 (%):  [40 %-50 %] 40 %      I/O  I/O last 3 completed shifts:  In: 39928.1 (141.1 mL/kg) [I.V.:6422.1 (87.3 mL/kg); Blood:2760; IV Piggyback:1200]  Out: 1965 (26.7 mL/kg) [Urine:1830 (0.7 mL/kg/hr); Chest Tube:135]  Weight: 73.6 kg       Physical Exam  GENERAL APPEARANCE:  AxOx4, generally well-appearing no acute distress.  HEART:  Normal rate and regular rhythm  LUNGS:  Equal chest rise and fall, non-labored breathing  ABDOMEN:  Soft, nontender, nondistended  EXTREMITIES:  BL groin incisions covered in dressing without strikethrough, no hematoma. BL palpable DP/PT.   NEUROLOGICAL:  Grossly nonfocal. Alert and oriented, moving all 4 extremities.   Skin:  Warm and dry without any rash.      Labs:  Lab Results   Component Value Date    WBC 9.7 05/17/2024    HGB 9.2 (L) 05/17/2024    HCT 25.5 (L) 05/17/2024    MCV 87 05/17/2024     (L) 05/17/2024     Lab  Results   Component Value Date    GLUCOSE 237 (H) 05/17/2024    CALCIUM 7.9 (L) 05/17/2024     05/17/2024    K 4.2 05/17/2024    CO2 18 (L) 05/17/2024     05/17/2024    BUN 34 (H) 05/17/2024    CREATININE 1.53 (H) 05/17/2024     Lab Results   Component Value Date    ALT 16 05/17/2024    AST 17 05/17/2024    ALKPHOS 27 (L) 05/17/2024    BILITOT 2.7 (H) 05/17/2024     Results from last 7 days   Lab Units 05/16/24  1745   APTT seconds 29   INR  1.4*     Results from last 7 days   Lab Units 05/17/24  0621   POCT PH, ARTERIAL pH 7.33*   POCT PCO2, ARTERIAL mm Hg 38   POCT PO2, ARTERIAL mm Hg 140*   POCT HCO3 CALCULATED, ARTERIAL mmol/L 20.0*   POCT BASE EXCESS, ARTERIAL mmol/L -5.5*         Imaging  XR chest 1 view    Result Date: 5/17/2024  Interpreted By:  Joanne Holley and Ohs Zachary STUDY: XR CHEST 1 VIEW; XR ABDOMEN 1 VIEW;  5/16/2024 6:21 pm   INDICATION: Signs/Symptoms:post op.   COMPARISON: CT abdomen/pelvis 03/14/2024.   ACCESSION NUMBER(S): [id number]; [id number]   ORDERING CLINICIAN: MANE CALDWELL   FINDINGS: AP radiograph of the chest was provided.   MEDICAL DEVICES: ETT distal tip 3.1 cm from the alda. Enteric tube overlies the esophagus side port superior to the gastroesophageal junction and distal tip overlying the stomach.. Left thoracostomy tube with distal tip overlying the left upper lung. Left IJ central venous catheter with distal tip overlying the left brachiocephalic vein. IVC filter overlies the L3-4 right transverse processes. Partially visualized rectal temperature probe.   CARDIOMEDIASTINAL SILHOUETTE: Cardiomediastinal silhouette is normal in size and configuration.   LUNGS: No pneumothorax, pleural effusion or focal consolidation.   ABDOMEN: Nonobstructive bowel gas pattern. No evidence of extraluminal/portal venous gas.   BONES: No acute osseous changes.       1.  No evidence of acute cardiopulmonary process. 2. Nonobstructive bowel gas pattern. 3. Enteric tube side port is  superior to the expected location of the gastroesophageal junction. Advancement recommended.   I personally reviewed the images/study and I agree with the findings as stated by Dr. Vicente Zuniga. This study was interpreted at [place], [place].   MACRO: None   Signed by: Joanne Holley 5/17/2024 8:29 AM Dictation workstation:   [id number]    XR abdomen 1 view    Result Date: 5/17/2024  Interpreted By:  Joanne Holley and Ohs Zachary STUDY: XR CHEST 1 VIEW; XR ABDOMEN 1 VIEW;  5/16/2024 6:21 pm   INDICATION: Signs/Symptoms:post op.   COMPARISON: CT abdomen/pelvis 03/14/2024.   ACCESSION NUMBER(S): [id number]; [id number]   ORDERING CLINICIAN: MANE CALDWELL   FINDINGS: AP radiograph of the chest was provided.   MEDICAL DEVICES: ETT distal tip 3.1 cm from the alda. Enteric tube overlies the esophagus side port superior to the gastroesophageal junction and distal tip overlying the stomach.. Left thoracostomy tube with distal tip overlying the left upper lung. Left IJ central venous catheter with distal tip overlying the left brachiocephalic vein. IVC filter overlies the L3-4 right transverse processes. Partially visualized rectal temperature probe.   CARDIOMEDIASTINAL SILHOUETTE: Cardiomediastinal silhouette is normal in size and configuration.   LUNGS: No pneumothorax, pleural effusion or focal consolidation.   ABDOMEN: Nonobstructive bowel gas pattern. No evidence of extraluminal/portal venous gas.   BONES: No acute osseous changes.       1.  No evidence of acute cardiopulmonary process. 2. Nonobstructive bowel gas pattern. 3. Enteric tube side port is superior to the expected location of the gastroesophageal junction. Advancement recommended.   I personally reviewed the images/study and I agree with the findings as stated by Dr. Vicente Zuniga. This study was interpreted at [place], [place].   MACRO: None   Signed by: Joanne  Kishan 5/17/2024 8:29 AM Dictation workstation:   [id number]     Assessment:  68 y.o. 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     Plan:   - Ongoing fluid and product resuscitation  - 1u PRBC this AM  - Continue q1 neurovascular checks  - Pain control per ICU   - Chest tube to -20 mmHg suction  - Wean O2 as tolerated  - Spinal perfusion goal: MAP >80 mmHg  - NPO, NGT to LIWS   - Maintain K>4, Mg>2, Hgb>8, Plts>100, INR<1.4  - Labs q12h for 24 hrs  - monitor UOP, continue fox  - Cefoxitin x24 hrs  - SCDs  - Okay for DVT prophylaxis    Seen and discussed with vascular fellow, Dr. Delgado  Discussed with attending physician, Dr. Willis Diamond,   Department of Surgery / IR Integrated PGY1  Vascular [id number]

## 2024-05-17 NOTE — PROGRESS NOTES
"1/1 CTICU    READMITS  03/14-3/27 [facility] --> DC home with [facility].     VASCULAR SURGERY   05/16 Perivisceral aortic endarterectomy, aortobifemoral--bypass graft, left renal bypass, and right femoral endarterectomy (Jovan Zelaya)    PAYOR   [employer]     PT/OT   ( ) Awaiting    COMPLETED  (X) Daily ongoing review of patient via chart and/or (M-F) IDT rounds  (X) 05/17 - New to CTICU/author - EPIC and last admit reviewed.     REVIEW  Last admit on 3/15/24 - Care Transitions documented, \"Pt states she lives with a roommate (Lon). Has a walker and a cane at home. Roommate can provide support and  groceries etc.\"     Daughter (Ashley Ratliff) [place]  Son (Jesse Ratliff) [place] (LSW, MSW)     "

## 2024-05-17 NOTE — CARE PLAN
Problem: Safety - Medical Restraint  Goal: Remains free of injury from restraints (Restraint for Interference with Medical Device)  Outcome: Progressing  Goal: Free from restraint(s) (Restraint for Interference with Medical Device)  Outcome: Progressing     Problem: Meds/Post-op Pain  Goal: Pain controlled to tolerate pain level  Outcome: Progressing  Goal: Tolerates prescribed medication  Outcome: Progressing     Problem: DVT/VTE Prevention/Activity  Goal: No decrease in circulation/sensation  Outcome: Progressing  Goal: Prevent skin breakdown  Outcome: Progressing  Goal: Return to preop oxygenation status  Outcome: Progressing  Goal: Tolerates optimal activity  Outcome: Progressing  Goal: Increase self care and/or family involvement in 24 hrs.  Outcome: Progressing     Problem: Wound care/infection prevention  Goal: No signs of infection in 24 hrs.  Outcome: Progressing  Goal: No unexpected bleeding from incision this shift  Outcome: Progressing     Problem: Diet/fluid balance  Goal: Adequate urinary output  Outcome: Progressing  Goal: Free from nausea/vomiting  Outcome: Progressing  Goal: Return in bowel function  Outcome: Progressing  Goal: Tolerates prescribed diet  Outcome: Progressing     Problem: Other goals  Goal: No change in neurological status  Outcome: Progressing  Goal: Stabilize vital signs (return to 10% of baseline)  Outcome: Progressing

## 2024-05-17 NOTE — PROGRESS NOTES
Acute Pain Service    Postop Pain HPI -   Palliative: relieved with IV analgesics and regional local anesthetics  Provocative: movement  Quality:  burning and aching  Radiation:  none  Severity:  1/10  Timing: constant    24-HOUR OPIOID CONSUMPTION:  No opioids    Scheduled medications  cefOXitin, 2 g, intravenous, q8h  insulin lispro, 0-10 Units, subcutaneous, q4h  pantoprazole, 40 mg, intravenous, Daily before breakfast      Continuous medications  lactated Ringer's, 150 mL/hr, Last Rate: 150 mL/hr (05/17/24 1000)  lactated Ringer's, 30 mL/hr, Last Rate: 30 mL/hr (05/17/24 1000)  norepinephrine, 0-3 mcg/kg/min, Last Rate: 0.04 mcg/kg/min (05/17/24 1000)  ropivacaine (PF) in NS cmpd, 14 mL/hr      PRN medications  PRN medications: calcium gluconate, calcium gluconate, dextrose **OR** glucagon, HYDROmorphone, HYDROmorphone, magnesium sulfate, magnesium sulfate, oxygen, potassium chloride, potassium chloride     Physical Exam:  Constitutional:  no distress, alert and cooperative  Eyes: clear sclera  Head/Neck: No apparent injury, trachea midline  Respiratory/Thorax: Patent airways, thorax symmetric, breathing comfortably  Cardiovascular: no pitting edema  Gastrointestinal: Nondistended  Musculoskeletal: ROM intact  Extremities: no clubbing  Neurological: alert, small x4  Psychological: Appropriate affect    Results for orders placed or performed during the hospital encounter of 05/16/24 (from the past 24 hour(s))   ACTIVATED CLOTTING TIME LOW   Result Value Ref Range    POCT Activated Clotting Time Low Range 284 (H) 83 - 199 sec   BLOOD GAS ARTERIAL FULL PANEL   Result Value Ref Range    POCT pH, Arterial 7.21 (LL) 7.38 - 7.42 pH    POCT pCO2, Arterial 42 38 - 42 mm Hg    POCT pO2, Arterial 232 (H) 85 - 95 mm Hg    POCT SO2, Arterial 99 94 - 100 %    POCT Oxy Hemoglobin, Arterial 97.8 94.0 - 98.0 %    POCT Hematocrit Calculated, Arterial 23.0 (L) 36.0 - 46.0 %    POCT Sodium, Arterial 132 (L) 136 - 145 mmol/L    POCT  Potassium, Arterial 4.9 3.5 - 5.3 mmol/L    POCT Chloride, Arterial 108 (H) 98 - 107 mmol/L    POCT Ionized Calcium, Arterial 1.40 (H) 1.10 - 1.33 mmol/L    POCT Glucose, Arterial 289 (H) 74 - 99 mg/dL    POCT Lactate, Arterial 3.5 (H) 0.4 - 2.0 mmol/L    POCT Base Excess, Arterial -10.3 (L) -2.0 - 3.0 mmol/L    POCT HCO3 Calculated, Arterial 16.8 (L) 22.0 - 26.0 mmol/L    POCT Hemoglobin, Arterial 7.5 (L) 12.0 - 16.0 g/dL    POCT Anion Gap, Arterial 12 10 - 25 mmo/L    Patient Temperature 37.0 degrees Celsius    FiO2 50 %   ACTIVATED CLOTTING TIME LOW   Result Value Ref Range    POCT Activated Clotting Time Low Range 261 (H) 83 - 199 sec   ACTIVATED CLOTTING TIME LOW   Result Value Ref Range    POCT Activated Clotting Time Low Range 199 83 - 199 sec   Calcium, ionized   Result Value Ref Range    POCT Calcium, Ionized 1.11 1.1 - 1.33 mmol/L   CBC   Result Value Ref Range    WBC 16.1 (H) 4.4 - 11.3 x10*3/uL    nRBC 0.0 0.0 - 0.0 /100 WBCs    RBC 3.39 (L) 4.00 - 5.20 x10*6/uL    Hemoglobin 10.8 (L) 12.0 - 16.0 g/dL    Hematocrit 31.0 (L) 36.0 - 46.0 %    MCV 91 80 - 100 fL    MCH 31.9 26.0 - 34.0 pg    MCHC 34.8 32.0 - 36.0 g/dL    RDW 15.5 (H) 11.5 - 14.5 %    Platelets 186 150 - 450 x10*3/uL   Coagulation Screen   Result Value Ref Range    Protime 15.8 (H) 9.8 - 12.8 seconds    INR 1.4 (H) 0.9 - 1.1    aPTT 29 27 - 38 seconds   Magnesium   Result Value Ref Range    Magnesium 1.25 (L) 1.60 - 2.40 mg/dL   Renal function panel   Result Value Ref Range    Glucose 142 (H) 74 - 99 mg/dL    Sodium 141 136 - 145 mmol/L    Potassium 4.0 3.5 - 5.3 mmol/L    Chloride 111 (H) 98 - 107 mmol/L    Bicarbonate 20 (L) 21 - 32 mmol/L    Anion Gap 14 10 - 20 mmol/L    Urea Nitrogen 32 (H) 6 - 23 mg/dL    Creatinine 1.40 (H) 0.50 - 1.05 mg/dL    eGFR 41 (L) >60 mL/min/1.73m*2    Calcium 7.4 (L) 8.6 - 10.6 mg/dL    Phosphorus 3.3 2.5 - 4.9 mg/dL    Albumin 3.1 (L) 3.4 - 5.0 g/dL   Amylase   Result Value Ref Range    Amylase <10 (L) 29 -  103 U/L   Lipase   Result Value Ref Range    Lipase <3 (L) 9 - 82 U/L   BLOOD GAS ARTERIAL FULL PANEL   Result Value Ref Range    POCT pH, Arterial 7.27 (L) 7.38 - 7.42 pH    POCT pCO2, Arterial 39 38 - 42 mm Hg    POCT pO2, Arterial 251 (H) 85 - 95 mm Hg    POCT SO2, Arterial 99 94 - 100 %    POCT Oxy Hemoglobin, Arterial 97.8 94.0 - 98.0 %    POCT Hematocrit Calculated, Arterial 32.0 (L) 36.0 - 46.0 %    POCT Sodium, Arterial 138 136 - 145 mmol/L    POCT Potassium, Arterial 4.0 3.5 - 5.3 mmol/L    POCT Chloride, Arterial 108 (H) 98 - 107 mmol/L    POCT Ionized Calcium, Arterial 1.18 1.10 - 1.33 mmol/L    POCT Glucose, Arterial 126 (H) 74 - 99 mg/dL    POCT Lactate, Arterial 2.4 (H) 0.4 - 2.0 mmol/L    POCT Base Excess, Arterial -8.4 (L) -2.0 - 3.0 mmol/L    POCT HCO3 Calculated, Arterial 17.9 (L) 22.0 - 26.0 mmol/L    POCT Hemoglobin, Arterial 10.8 (L) 12.0 - 16.0 g/dL    POCT Anion Gap, Arterial 16 10 - 25 mmo/L    Patient Temperature 37.0 degrees Celsius    FiO2 50 %   BLOOD GAS ARTERIAL FULL PANEL   Result Value Ref Range    POCT pH, Arterial 7.32 (L) 7.38 - 7.42 pH    POCT pCO2, Arterial 30 (L) 38 - 42 mm Hg    POCT pO2, Arterial 186 (H) 85 - 95 mm Hg    POCT SO2, Arterial 100 94 - 100 %    POCT Oxy Hemoglobin, Arterial 97.8 94.0 - 98.0 %    POCT Hematocrit Calculated, Arterial 28.0 (L) 36.0 - 46.0 %    POCT Sodium, Arterial 135 (L) 136 - 145 mmol/L    POCT Potassium, Arterial 4.0 3.5 - 5.3 mmol/L    POCT Chloride, Arterial 107 98 - 107 mmol/L    POCT Ionized Calcium, Arterial 1.09 (L) 1.10 - 1.33 mmol/L    POCT Glucose, Arterial 220 (H) 74 - 99 mg/dL    POCT Lactate, Arterial 2.8 (H) 0.4 - 2.0 mmol/L    POCT Base Excess, Arterial -9.6 (L) -2.0 - 3.0 mmol/L    POCT HCO3 Calculated, Arterial 15.5 (L) 22.0 - 26.0 mmol/L    POCT Hemoglobin, Arterial 9.2 (L) 12.0 - 16.0 g/dL    POCT Anion Gap, Arterial 17 10 - 25 mmo/L    Patient Temperature 37.0 degrees Celsius    FiO2 40 %   BLOOD GAS ARTERIAL FULL PANEL    Result Value Ref Range    POCT pH, Arterial 7.33 (L) 7.38 - 7.42 pH    POCT pCO2, Arterial 33 (L) 38 - 42 mm Hg    POCT pO2, Arterial 164 (H) 85 - 95 mm Hg    POCT SO2, Arterial 100 94 - 100 %    POCT Oxy Hemoglobin, Arterial 98.0 94.0 - 98.0 %    POCT Hematocrit Calculated, Arterial 27.0 (L) 36.0 - 46.0 %    POCT Sodium, Arterial 136 136 - 145 mmol/L    POCT Potassium, Arterial 3.9 3.5 - 5.3 mmol/L    POCT Chloride, Arterial 107 98 - 107 mmol/L    POCT Ionized Calcium, Arterial 1.10 1.10 - 1.33 mmol/L    POCT Glucose, Arterial 226 (H) 74 - 99 mg/dL    POCT Lactate, Arterial 2.8 (H) 0.4 - 2.0 mmol/L    POCT Base Excess, Arterial -7.7 (L) -2.0 - 3.0 mmol/L    POCT HCO3 Calculated, Arterial 17.4 (L) 22.0 - 26.0 mmol/L    POCT Hemoglobin, Arterial 9.1 (L) 12.0 - 16.0 g/dL    POCT Anion Gap, Arterial 16 10 - 25 mmo/L    Patient Temperature 37.0 degrees Celsius    FiO2 40 %   Calcium, ionized   Result Value Ref Range    POCT Calcium, Ionized 1.10 1.1 - 1.33 mmol/L   CBC   Result Value Ref Range    WBC 11.9 (H) 4.4 - 11.3 x10*3/uL    nRBC 0.0 0.0 - 0.0 /100 WBCs    RBC 2.82 (L) 4.00 - 5.20 x10*6/uL    Hemoglobin 8.9 (L) 12.0 - 16.0 g/dL    Hematocrit 24.9 (L) 36.0 - 46.0 %    MCV 88 80 - 100 fL    MCH 31.6 26.0 - 34.0 pg    MCHC 35.7 32.0 - 36.0 g/dL    RDW 15.8 (H) 11.5 - 14.5 %    Platelets 181 150 - 450 x10*3/uL   Hepatic function panel   Result Value Ref Range    Albumin 3.7 3.4 - 5.0 g/dL    Bilirubin, Total 2.7 (H) 0.0 - 1.2 mg/dL    Bilirubin, Direct 1.9 (H) 0.0 - 0.3 mg/dL    Alkaline Phosphatase 27 (L) 33 - 136 U/L    ALT 16 7 - 45 U/L    AST 17 9 - 39 U/L    Total Protein 4.7 (L) 6.4 - 8.2 g/dL   Magnesium   Result Value Ref Range    Magnesium 2.44 (H) 1.60 - 2.40 mg/dL   Basic Metabolic Panel   Result Value Ref Range    Glucose 237 (H) 74 - 99 mg/dL    Sodium 137 136 - 145 mmol/L    Potassium 4.2 3.5 - 5.3 mmol/L    Chloride 105 98 - 107 mmol/L    Bicarbonate 18 (L) 21 - 32 mmol/L    Anion Gap 18 10 - 20  mmol/L    Urea Nitrogen 34 (H) 6 - 23 mg/dL    Creatinine 1.53 (H) 0.50 - 1.05 mg/dL    eGFR 37 (L) >60 mL/min/1.73m*2    Calcium 7.9 (L) 8.6 - 10.6 mg/dL   BLOOD GAS ARTERIAL FULL PANEL   Result Value Ref Range    POCT pH, Arterial 7.26 (L) 7.38 - 7.42 pH    POCT pCO2, Arterial 37 (L) 38 - 42 mm Hg    POCT pO2, Arterial 148 (H) 85 - 95 mm Hg    POCT SO2, Arterial 98 94 - 100 %    POCT Oxy Hemoglobin, Arterial 96.5 94.0 - 98.0 %    POCT Hematocrit Calculated, Arterial 26.0 (L) 36.0 - 46.0 %    POCT Sodium, Arterial 138 136 - 145 mmol/L    POCT Potassium, Arterial 4.1 3.5 - 5.3 mmol/L    POCT Chloride, Arterial 106 98 - 107 mmol/L    POCT Ionized Calcium, Arterial 1.13 1.10 - 1.33 mmol/L    POCT Glucose, Arterial 188 (H) 74 - 99 mg/dL    POCT Lactate, Arterial 3.0 (H) 0.4 - 2.0 mmol/L    POCT Base Excess, Arterial -9.7 (L) -2.0 - 3.0 mmol/L    POCT HCO3 Calculated, Arterial 16.6 (L) 22.0 - 26.0 mmol/L    POCT Hemoglobin, Arterial 8.6 (L) 12.0 - 16.0 g/dL    POCT Anion Gap, Arterial 20 10 - 25 mmo/L    Patient Temperature 37.0 degrees Celsius    FiO2 36 %   CBC   Result Value Ref Range    WBC 9.7 4.4 - 11.3 x10*3/uL    nRBC 0.0 0.0 - 0.0 /100 WBCs    RBC 2.93 (L) 4.00 - 5.20 x10*6/uL    Hemoglobin 9.2 (L) 12.0 - 16.0 g/dL    Hematocrit 25.5 (L) 36.0 - 46.0 %    MCV 87 80 - 100 fL    MCH 31.4 26.0 - 34.0 pg    MCHC 36.1 (H) 32.0 - 36.0 g/dL    RDW 15.9 (H) 11.5 - 14.5 %    Platelets 121 (L) 150 - 450 x10*3/uL   BLOOD GAS ARTERIAL FULL PANEL   Result Value Ref Range    POCT pH, Arterial 7.33 (L) 7.38 - 7.42 pH    POCT pCO2, Arterial 38 38 - 42 mm Hg    POCT pO2, Arterial 140 (H) 85 - 95 mm Hg    POCT SO2, Arterial 99 94 - 100 %    POCT Oxy Hemoglobin, Arterial 96.3 94.0 - 98.0 %    POCT Hematocrit Calculated, Arterial 29.0 (L) 36.0 - 46.0 %    POCT Sodium, Arterial 136 136 - 145 mmol/L    POCT Potassium, Arterial 4.3 3.5 - 5.3 mmol/L    POCT Chloride, Arterial 108 (H) 98 - 107 mmol/L    POCT Ionized Calcium, Arterial  1.13 1.10 - 1.33 mmol/L    POCT Glucose, Arterial 156 (H) 74 - 99 mg/dL    POCT Lactate, Arterial 1.7 0.4 - 2.0 mmol/L    POCT Base Excess, Arterial -5.5 (L) -2.0 - 3.0 mmol/L    POCT HCO3 Calculated, Arterial 20.0 (L) 22.0 - 26.0 mmol/L    POCT Hemoglobin, Arterial 9.7 (L) 12.0 - 16.0 g/dL    POCT Anion Gap, Arterial 12 10 - 25 mmo/L    Patient Temperature 37.0 degrees Celsius    FiO2 36 %   POCT GLUCOSE   Result Value Ref Range    POCT Glucose 150 (H) 74 - 99 mg/dL        Klaudia Ratliff is a 68 y.o. year old female patient who presents for AAA with Dr. Zelaya on 5/16/24. Acute Pain consulted for block for postoperative pain control.      Plan:     - B/L QL nerve blocks with catheters performed preoperatively on 5/16/24 - L catheter removed intra-op.   - Ambit ball with Ropivacaine 0.2%/NaCl 0.9% 500mL, Rate 10 cc/hr on right.   - Bolused 5cc 0.5% Ropivacaine on right  - Ambit medication will not interfere with pain medication prescribed by the primary team.   - Please be aware of local anesthetic toxic dose and absorption variability before considering lidocaine patches  - Acute pain service will follow while catheters in place  - Rest of pain management per primary team     Acute Pain Resident  pg [phone] ph [phone]

## 2024-05-17 NOTE — PROGRESS NOTES
Physical Therapy    Physical Therapy Evaluation & Treatment    Patient Name: Klaudia Ratliff  MRN: [id number]  Today's Date: 5/17/2024   Time Calculation  Start Time: 0852  Stop Time: 0936  Time Calculation (min): 44 min    Assessment/Plan   PT Assessment  PT Assessment Results: Decreased strength, Decreased endurance, Impaired balance, Decreased mobility  Rehab Prognosis: Good  Medical Staff Made Aware: Yes  End of Session Communication: Bedside nurse  Assessment Comment: Pt demonstrated ability to complete bed mobility, sit<>stand transfer and ambulation with FWW.  Max-CGA provided throughout session for functional mobility/transfers.  Vitals stable with change in position.  End of Session Patient Position: Bed, 3 rail up, Alarm off, not on at start of session   IP OR SWING BED PT PLAN  Inpatient or Swing Bed: Inpatient  PT Plan  Treatment/Interventions: Bed mobility, Transfer training, Gait training, Balance training, Neuromuscular re-education, Strengthening, Endurance training, Therapeutic exercise, Therapeutic activity  PT Plan: Skilled PT  PT Frequency: 3 times per week  PT Discharge Recommendations: Moderate intensity level of continued care  PT Recommended Transfer Status: Assist x1, Assistive device  PT - OK to Discharge: Yes      Subjective     General Visit Information:  General  Reason for Referral: Perivisceral aortic endarterectomy, aortobifemoral bypass graft, left renal bypass, and right femoral endarterectomy.  Past Medical History Relevant to Rehab: HTN, HLD, obesity, COPD, gout, RLE DVT, right PE s/p mechanical thrombectomy and IVC filter placement (2/22/2024)  Missed Visit: No  Family/Caregiver Present: Yes  Caregiver Feedback: Daughter and granddaughter present at start of session  Prior to Session Communication: Bedside nurse  Patient Position Received: Up in chair, Alarm off, not on at start of session  Preferred Learning Style: auditory, verbal  General Comment: Pt awake, alert and willing  to participate in PT session  Home Living:  Home Living  Type of Home: House  Lives With:  (Roomate)  Home Adaptive Equipment: Walker rolling or standard  Home Layout:  (1 TATO, bed/bath -1st floor, tub/shower with GB and shower chair)  Prior Level of Function:  Prior Function Per Pt/Caregiver Report  Level of [place]: Independent with ADLs and functional transfers, Independent with homemaking with ambulation  ADL Assistance: Independent  Homemaking Assistance: Independent  Ambulatory Assistance:  (Utilized a FWW for ambulation within the home d/t R foot pain/weakness.  Pt states she does not ambulate much outside the house d/t RLE deficits.)  Vocational:  (Babysits)  Leisure: Painting  Prior Function Comments: (-) drive for the past year, x1 falls d/t LEs giving out (no injuries)  Precautions:  Precautions  Hearing/Visual Limitations: WFL  Medical Precautions: Fall precautions, Oxygen therapy device and L/min, Chest tube  Precautions Comment: MAP >80, SpO2 >94%  Vital Signs:  Vital Signs  Heart Rate: 82 (Post: 84)  Heart Rate Source: Monitor  Resp: 15 (Post: 16)  SpO2: 100 % (Post: 100)  BP: 118/63 (Post: 110/60)  BP Location: Right arm  BP Method: Arterial line  Patient Position:  (Sitting start, supine end of session)    Objective   Pain:  Pain Assessment  Pain Assessment: 0-10  Pain Score: 2  Pain Location:  (Throat)  Cognition:  Cognition  Overall Cognitive Status: Within Functional Limits  Orientation Level: Oriented X4    General Assessments:  General Observation  General Observation: Tele, mateo, CVP, .08 levo, chest tube x1, fox, NG, central line, pain catheters, 2L     Activity Tolerance  Endurance: Tolerates 10 - 20 min exercise with multiple rests  Early Mobility/Exercise Safety Screen: Proceed with mobilization - No exclusion criteria met  Activity Tolerance Comments: Limited mobility d/t RLE pain    Sensation  Light Touch: No apparent deficits    Strength  Strength Comments: Not formally assessed  however at least 3/5 shown through functional mobility  Postural Control  Postural Control: Within Functional Limits    Static Sitting Balance  Static Sitting-Balance Support: Bilateral upper extremity supported, Feet supported  Static Sitting-Level of Assistance: Contact guard    Static Standing Balance  Static Standing-Balance Support: Bilateral upper extremity supported  Static Standing-Level of Assistance:  (Initially minAx1, progressed to CGA)  Static Standing-Comment/Number of Minutes: FWW  Functional Assessments:  ADL  ADL's Addressed: No    Bed Mobility  Bed Mobility: Yes  Bed Mobility 1  Bed Mobility 1: Sitting to supine  Level of Assistance 1: Maximum assistance (x2)  Bed Mobility Comments 1: HOB elevated, assistance d/t elevated pain  Bed Mobility 2  Bed Mobility  2:  (Boost towards HOB)  Level of Assistance 2: Dependent (x2)  Bed Mobility Comments 2: HOB flat, draw sheet utilized    Transfers  Transfer: Yes  Transfer 1  Transfer From 1: Sit to  Transfer to 1: Stand  Technique 1: Sit to stand  Transfer Device 1: Walker  Transfer Level of Assistance 1: Moderate assistance (x1)  Trials/Comments 1: Cues for hand placement, transfer completed from chair  Transfers 2  Transfer From 2: Stand to  Transfer to 2: Sit  Technique 2: Stand to sit  Transfer Device 2: Walker  Transfer Level of Assistance 2: Contact guard  Trials/Comments 2: Cues for hand placement  Transfers 3  Transfer From 3: Sit to  Transfer to 3: Stand  Technique 3: Sit to stand  Transfer Device 3: Walker  Transfer Level of Assistance 3: Contact guard  Trials/Comments 3: Elevated height of bed to complete transfer  Transfers 4  Transfer From 4: Stand to  Transfer to 4: Sit  Technique 4: Stand to sit  Transfer Device 4: Walker  Transfer Level of Assistance 4: Contact guard  Trials/Comments 4: Cues for hand placement    Ambulation/Gait Training  Ambulation/Gait Training Performed: Yes (Refer to treatment note for additional  information)    Stairs  Stairs: No  Extremity/Trunk Assessments:  RUE   RUE : Within Functional Limits  LUE   LUE: Within Functional Limits  RLE   RLE : Within Functional Limits  LLE   LLE : Within Functional Limits  Treatments:  Therapeutic Activity  Therapeutic Activity Performed: Yes  Therapeutic Activity 1: x90 sec static standing prior to ambulation to obtain appropriate stability for safe ambulation.  Initially pt requiring modAx1 d/t posterior instability however with adjustment, pt able to complete static standing with CGA.  Therapeutic Activity 2: Pt sat EOB ~3 min for rest break before additional ambulation.  LE support with no UE support demonstrating good seated posture.  Vitals stable.    Ambulation/Gait Training 1  Surface 1: Level tile  Device 1: Rolling walker  Assistance 1:  (CGA-minAx1)  Quality of Gait 1: Narrow base of support  Comments/Distance (ft) 1: ~5ft forward with x4 backwards steps (Step-to gait pattern, minimal foot clearance. Increased difficulty advancing LLE d/t RLE pain/weakness. Increased time to complete.  Assistance with walker management.)  Ambulation/Gait Training 2  Surface 2: Level tile  Device 2: Rolling walker  Assistance 2: Contact guard  Quality of Gait 2: Narrow base of support  Comments/Distance (ft) 2: ~5 lateral side steps at EOB (Assistance with walker management)  Outcome Measures:  [facility] Basic Mobility  Turning from your back to your side while in a flat bed without using bedrails: A lot  Moving from lying on your back to sitting on the side of a flat bed without using bedrails: A lot  Moving to and from bed to chair (including a wheelchair): A little  Standing up from a chair using your arms (e.g. wheelchair or bedside chair): A lot  To walk in hospital room: A little  Climbing 3-5 steps with railing: A lot  Basic Mobility - Total Score: 14    FSS-ICU  Ambulation: Walks <50 feet with any assistance x1 or walks any distance with assistance x2 people  Rolling:  Moderate assistance (performs 50 - 74% of task)  Sitting: Supervision or set-up only  Transfer Sit-to-Stand: Moderate assistance (performs 50 - 74% of task)  Transfer Supine-to-Sit: Maximal assistance (performs 25% - 49% of task)  Total Score: 14    ICU Mobility Screen  Early Mobility/Exercise Safety Screen: Proceed with mobilization - No exclusion criteria met  ICU Mobility Scale: Walking with assistance of 1 person    Encounter Problems       Encounter Problems (Active)       Balance       Pt will demonstrate ability to complete sitting/standing static/dynamic balance activities with unilateral UE support and no LOB for increase in safety up D/C.  (Progressing)       Start:  05/17/24    Expected End:  05/31/24               Mobility       Pt will demonstrated ability to ambulate >/=50ft with proper form, LRAD and no balance deficits for safe DC.   (Progressing)       Start:  05/17/24    Expected End:  05/31/24            Pt will demonstrate ability to complete ther ex activities to improve functional strength for increase in independence upon DC.  (Progressing)       Start:  05/17/24    Expected End:  05/31/24               PT Transfers       Pt will demonstrated ability to complete bed mobility and sit<>stand transfers without assistance and use of assistive device to safely DC. (Progressing)       Start:  05/17/24    Expected End:  05/31/24                   Education Documentation  Precautions, taught by Vivi Lugo PT at 5/17/2024 10:54 AM.  Learner: Patient  Readiness: Acceptance  Method: Explanation, Demonstration  Response: Demonstrated Understanding, Verbalizes Understanding    Body Mechanics, taught by Vivi Lugo PT at 5/17/2024 10:54 AM.  Learner: Patient  Readiness: Acceptance  Method: Explanation, Demonstration  Response: Demonstrated Understanding, Verbalizes Understanding    Mobility Training, taught by Vivi Lugo PT at 5/17/2024 10:54 AM.  Learner: Patient  Readiness:  Acceptance  Method: Explanation, Demonstration  Response: Demonstrated Understanding, Verbalizes Understanding    Education Comments  No comments found.

## 2024-05-18 ENCOUNTER — APPOINTMENT (OUTPATIENT)
Dept: RADIOLOGY | Facility: HOSPITAL | Age: 69
DRG: 673 | End: 2024-05-18
Payer: MEDICARE

## 2024-05-18 LAB
ALBUMIN SERPL BCP-MCNC: 3.5 G/DL (ref 3.4–5)
ALBUMIN SERPL BCP-MCNC: 3.6 G/DL (ref 3.4–5)
ALBUMIN SERPL BCP-MCNC: 3.8 G/DL (ref 3.4–5)
ALP SERPL-CCNC: 25 U/L (ref 33–136)
ALT SERPL W P-5'-P-CCNC: 15 U/L (ref 7–45)
ANION GAP BLDA CALCULATED.4IONS-SCNC: 12 MMO/L (ref 10–25)
ANION GAP SERPL CALC-SCNC: 12 MMOL/L (ref 10–20)
ANION GAP SERPL CALC-SCNC: 14 MMOL/L (ref 10–20)
ANION GAP SERPL CALC-SCNC: 15 MMOL/L (ref 10–20)
APTT PPP: 32 SECONDS (ref 27–38)
AST SERPL W P-5'-P-CCNC: 19 U/L (ref 9–39)
BASE EXCESS BLDA CALC-SCNC: -4.7 MMOL/L (ref -2–3)
BILIRUB DIRECT SERPL-MCNC: 2.4 MG/DL (ref 0–0.3)
BILIRUB SERPL-MCNC: 3.6 MG/DL (ref 0–1.2)
BLOOD EXPIRATION DATE: NORMAL
BODY TEMPERATURE: 37 DEGREES CELSIUS
BUN SERPL-MCNC: 29 MG/DL (ref 6–23)
BUN SERPL-MCNC: 29 MG/DL (ref 6–23)
BUN SERPL-MCNC: 30 MG/DL (ref 6–23)
CA-I BLD-SCNC: 1.14 MMOL/L (ref 1.1–1.33)
CA-I BLD-SCNC: 1.16 MMOL/L (ref 1.1–1.33)
CA-I BLDA-SCNC: 1.16 MMOL/L (ref 1.1–1.33)
CALCIUM SERPL-MCNC: 7.9 MG/DL (ref 8.6–10.6)
CALCIUM SERPL-MCNC: 8.1 MG/DL (ref 8.6–10.6)
CALCIUM SERPL-MCNC: 8.1 MG/DL (ref 8.6–10.6)
CHLORIDE BLDA-SCNC: 108 MMOL/L (ref 98–107)
CHLORIDE SERPL-SCNC: 107 MMOL/L (ref 98–107)
CHLORIDE SERPL-SCNC: 107 MMOL/L (ref 98–107)
CHLORIDE SERPL-SCNC: 109 MMOL/L (ref 98–107)
CO2 SERPL-SCNC: 22 MMOL/L (ref 21–32)
CREAT SERPL-MCNC: 1.24 MG/DL (ref 0.5–1.05)
CREAT SERPL-MCNC: 1.31 MG/DL (ref 0.5–1.05)
CREAT SERPL-MCNC: 1.37 MG/DL (ref 0.5–1.05)
DISPENSE STATUS: NORMAL
EGFRCR SERPLBLD CKD-EPI 2021: 42 ML/MIN/1.73M*2
EGFRCR SERPLBLD CKD-EPI 2021: 45 ML/MIN/1.73M*2
EGFRCR SERPLBLD CKD-EPI 2021: 48 ML/MIN/1.73M*2
ERYTHROCYTE [DISTWIDTH] IN BLOOD BY AUTOMATED COUNT: 16.3 % (ref 11.5–14.5)
ERYTHROCYTE [DISTWIDTH] IN BLOOD BY AUTOMATED COUNT: 16.5 % (ref 11.5–14.5)
ERYTHROCYTE [DISTWIDTH] IN BLOOD BY AUTOMATED COUNT: 17.3 % (ref 11.5–14.5)
GLUCOSE BLD MANUAL STRIP-MCNC: 102 MG/DL (ref 74–99)
GLUCOSE BLD MANUAL STRIP-MCNC: 113 MG/DL (ref 74–99)
GLUCOSE BLDA-MCNC: 107 MG/DL (ref 74–99)
GLUCOSE SERPL-MCNC: 110 MG/DL (ref 74–99)
GLUCOSE SERPL-MCNC: 112 MG/DL (ref 74–99)
GLUCOSE SERPL-MCNC: 112 MG/DL (ref 74–99)
HCO3 BLDA-SCNC: 21.1 MMOL/L (ref 22–26)
HCT VFR BLD AUTO: 27.9 % (ref 36–46)
HCT VFR BLD AUTO: 28.7 % (ref 36–46)
HCT VFR BLD AUTO: 31.3 % (ref 36–46)
HCT VFR BLD EST: 29 % (ref 36–46)
HGB BLD-MCNC: 10.4 G/DL (ref 12–16)
HGB BLD-MCNC: 9.5 G/DL (ref 12–16)
HGB BLD-MCNC: 9.9 G/DL (ref 12–16)
HGB BLDA-MCNC: 9.6 G/DL (ref 12–16)
INHALED O2 CONCENTRATION: 21 %
INR PPP: 1.6 (ref 0.9–1.1)
INTERPRETATION FOR ANTI-PLATELET FACTOR 4 ANTIBODY: NEGATIVE
LACTATE BLDA-SCNC: 0.7 MMOL/L (ref 0.4–2)
MAGNESIUM SERPL-MCNC: 1.99 MG/DL (ref 1.6–2.4)
MAGNESIUM SERPL-MCNC: 2.38 MG/DL (ref 1.6–2.4)
MCH RBC QN AUTO: 30.9 PG (ref 26–34)
MCH RBC QN AUTO: 31.3 PG (ref 26–34)
MCH RBC QN AUTO: 31.3 PG (ref 26–34)
MCHC RBC AUTO-ENTMCNC: 33.2 G/DL (ref 32–36)
MCHC RBC AUTO-ENTMCNC: 34.1 G/DL (ref 32–36)
MCHC RBC AUTO-ENTMCNC: 34.5 G/DL (ref 32–36)
MCV RBC AUTO: 91 FL (ref 80–100)
MCV RBC AUTO: 92 FL (ref 80–100)
MCV RBC AUTO: 93 FL (ref 80–100)
NRBC BLD-RTO: 0 /100 WBCS (ref 0–0)
OXYHGB MFR BLDA: 95 % (ref 94–98)
PCO2 BLDA: 41 MM HG (ref 38–42)
PH BLDA: 7.32 PH (ref 7.38–7.42)
PHOSPHATE SERPL-MCNC: 3 MG/DL (ref 2.5–4.9)
PHOSPHATE SERPL-MCNC: 3 MG/DL (ref 2.5–4.9)
PHOSPHATE SERPL-MCNC: 4 MG/DL (ref 2.5–4.9)
PLATELET # BLD AUTO: 109 X10*3/UL (ref 150–450)
PLATELET # BLD AUTO: 92 X10*3/UL (ref 150–450)
PLATELET # BLD AUTO: 98 X10*3/UL (ref 150–450)
PO2 BLDA: 79 MM HG (ref 85–95)
POTASSIUM BLDA-SCNC: 3.5 MMOL/L (ref 3.5–5.3)
POTASSIUM SERPL-SCNC: 3.7 MMOL/L (ref 3.5–5.3)
POTASSIUM SERPL-SCNC: 3.8 MMOL/L (ref 3.5–5.3)
POTASSIUM SERPL-SCNC: 3.9 MMOL/L (ref 3.5–5.3)
PRODUCT BLOOD TYPE: 7300
PRODUCT CODE: NORMAL
PROT SERPL-MCNC: 4.7 G/DL (ref 6.4–8.2)
PROTHROMBIN TIME: 18.1 SECONDS (ref 9.8–12.8)
RBC # BLD AUTO: 3.04 X10*6/UL (ref 4–5.2)
RBC # BLD AUTO: 3.16 X10*6/UL (ref 4–5.2)
RBC # BLD AUTO: 3.37 X10*6/UL (ref 4–5.2)
SAO2 % BLDA: 98 % (ref 94–100)
SERUM AND PLATELET FACTOR 4: 0.08 OD UNITS
SODIUM BLDA-SCNC: 138 MMOL/L (ref 136–145)
SODIUM SERPL-SCNC: 137 MMOL/L (ref 136–145)
SODIUM SERPL-SCNC: 140 MMOL/L (ref 136–145)
SODIUM SERPL-SCNC: 141 MMOL/L (ref 136–145)
UNIT ABO: NORMAL
UNIT NUMBER: NORMAL
UNIT RH: NORMAL
UNIT VOLUME: 199
UNIT VOLUME: 204
UNIT VOLUME: 350
WBC # BLD AUTO: 10.9 X10*3/UL (ref 4.4–11.3)
WBC # BLD AUTO: 11.2 X10*3/UL (ref 4.4–11.3)
WBC # BLD AUTO: 12.3 X10*3/UL (ref 4.4–11.3)
XM INTEP: NORMAL

## 2024-05-18 PROCEDURE — 2500000004 HC RX 250 GENERAL PHARMACY W/ HCPCS (ALT 636 FOR OP/ED): Performed by: STUDENT IN AN ORGANIZED HEALTH CARE EDUCATION/TRAINING PROGRAM

## 2024-05-18 PROCEDURE — 36430 TRANSFUSION BLD/BLD COMPNT: CPT

## 2024-05-18 PROCEDURE — 37799 UNLISTED PX VASCULAR SURGERY: CPT | Performed by: STUDENT IN AN ORGANIZED HEALTH CARE EDUCATION/TRAINING PROGRAM

## 2024-05-18 PROCEDURE — 2020000001 HC ICU ROOM DAILY

## 2024-05-18 PROCEDURE — 82330 ASSAY OF CALCIUM: CPT | Performed by: STUDENT IN AN ORGANIZED HEALTH CARE EDUCATION/TRAINING PROGRAM

## 2024-05-18 PROCEDURE — 2500000004 HC RX 250 GENERAL PHARMACY W/ HCPCS (ALT 636 FOR OP/ED)

## 2024-05-18 PROCEDURE — 80069 RENAL FUNCTION PANEL: CPT | Mod: CCI,MUE | Performed by: STUDENT IN AN ORGANIZED HEALTH CARE EDUCATION/TRAINING PROGRAM

## 2024-05-18 PROCEDURE — 71045 X-RAY EXAM CHEST 1 VIEW: CPT | Performed by: STUDENT IN AN ORGANIZED HEALTH CARE EDUCATION/TRAINING PROGRAM

## 2024-05-18 PROCEDURE — 84132 ASSAY OF SERUM POTASSIUM: CPT | Mod: 91 | Performed by: STUDENT IN AN ORGANIZED HEALTH CARE EDUCATION/TRAINING PROGRAM

## 2024-05-18 PROCEDURE — 83735 ASSAY OF MAGNESIUM: CPT | Performed by: STUDENT IN AN ORGANIZED HEALTH CARE EDUCATION/TRAINING PROGRAM

## 2024-05-18 PROCEDURE — 2500000004 HC RX 250 GENERAL PHARMACY W/ HCPCS (ALT 636 FOR OP/ED): Performed by: PHYSICIAN ASSISTANT

## 2024-05-18 PROCEDURE — 85027 COMPLETE CBC AUTOMATED: CPT | Mod: 91 | Performed by: STUDENT IN AN ORGANIZED HEALTH CARE EDUCATION/TRAINING PROGRAM

## 2024-05-18 PROCEDURE — 86022 PLATELET ANTIBODIES: CPT | Performed by: STUDENT IN AN ORGANIZED HEALTH CARE EDUCATION/TRAINING PROGRAM

## 2024-05-18 PROCEDURE — 83735 ASSAY OF MAGNESIUM: CPT | Mod: 91 | Performed by: STUDENT IN AN ORGANIZED HEALTH CARE EDUCATION/TRAINING PROGRAM

## 2024-05-18 PROCEDURE — 85027 COMPLETE CBC AUTOMATED: CPT | Performed by: STUDENT IN AN ORGANIZED HEALTH CARE EDUCATION/TRAINING PROGRAM

## 2024-05-18 PROCEDURE — 99231 SBSQ HOSP IP/OBS SF/LOW 25: CPT

## 2024-05-18 PROCEDURE — 99291 CRITICAL CARE FIRST HOUR: CPT | Performed by: STUDENT IN AN ORGANIZED HEALTH CARE EDUCATION/TRAINING PROGRAM

## 2024-05-18 PROCEDURE — 84295 ASSAY OF SERUM SODIUM: CPT | Mod: 91 | Performed by: STUDENT IN AN ORGANIZED HEALTH CARE EDUCATION/TRAINING PROGRAM

## 2024-05-18 PROCEDURE — P9016 RBC LEUKOCYTES REDUCED: HCPCS

## 2024-05-18 PROCEDURE — 2500000001 HC RX 250 WO HCPCS SELF ADMINISTERED DRUGS (ALT 637 FOR MEDICARE OP): Performed by: STUDENT IN AN ORGANIZED HEALTH CARE EDUCATION/TRAINING PROGRAM

## 2024-05-18 PROCEDURE — 71045 X-RAY EXAM CHEST 1 VIEW: CPT

## 2024-05-18 PROCEDURE — 82947 ASSAY GLUCOSE BLOOD QUANT: CPT

## 2024-05-18 PROCEDURE — 74018 RADEX ABDOMEN 1 VIEW: CPT | Performed by: STUDENT IN AN ORGANIZED HEALTH CARE EDUCATION/TRAINING PROGRAM

## 2024-05-18 PROCEDURE — 85610 PROTHROMBIN TIME: CPT | Performed by: STUDENT IN AN ORGANIZED HEALTH CARE EDUCATION/TRAINING PROGRAM

## 2024-05-18 PROCEDURE — C9113 INJ PANTOPRAZOLE SODIUM, VIA: HCPCS | Performed by: STUDENT IN AN ORGANIZED HEALTH CARE EDUCATION/TRAINING PROGRAM

## 2024-05-18 PROCEDURE — 82248 BILIRUBIN DIRECT: CPT | Performed by: STUDENT IN AN ORGANIZED HEALTH CARE EDUCATION/TRAINING PROGRAM

## 2024-05-18 PROCEDURE — 74018 RADEX ABDOMEN 1 VIEW: CPT

## 2024-05-18 PROCEDURE — 82330 ASSAY OF CALCIUM: CPT | Mod: 91 | Performed by: STUDENT IN AN ORGANIZED HEALTH CARE EDUCATION/TRAINING PROGRAM

## 2024-05-18 RX ORDER — FUROSEMIDE 10 MG/ML
40 INJECTION INTRAMUSCULAR; INTRAVENOUS ONCE
Status: COMPLETED | OUTPATIENT
Start: 2024-05-18 | End: 2024-05-18

## 2024-05-18 RX ORDER — BISACODYL 10 MG/1
10 SUPPOSITORY RECTAL DAILY PRN
Status: DISCONTINUED | OUTPATIENT
Start: 2024-05-18 | End: 2024-05-31 | Stop reason: HOSPADM

## 2024-05-18 RX ORDER — ASPIRIN 300 MG/1
150 SUPPOSITORY RECTAL DAILY
Status: DISCONTINUED | OUTPATIENT
Start: 2024-05-18 | End: 2024-05-20

## 2024-05-18 RX ADMIN — HYDROMORPHONE HYDROCHLORIDE 0.2 MG: 1 INJECTION, SOLUTION INTRAMUSCULAR; INTRAVENOUS; SUBCUTANEOUS at 05:26

## 2024-05-18 RX ADMIN — CEFOXITIN SODIUM 2 G: 2 POWDER, FOR SOLUTION INTRAVENOUS at 20:13

## 2024-05-18 RX ADMIN — POTASSIUM CHLORIDE 20 MEQ: 200 INJECTION, SOLUTION INTRAVENOUS at 18:42

## 2024-05-18 RX ADMIN — POTASSIUM CHLORIDE 40 MEQ: 29.8 INJECTION, SOLUTION INTRAVENOUS at 04:55

## 2024-05-18 RX ADMIN — HYDROMORPHONE HYDROCHLORIDE 0.2 MG: 1 INJECTION, SOLUTION INTRAMUSCULAR; INTRAVENOUS; SUBCUTANEOUS at 17:35

## 2024-05-18 RX ADMIN — HYDROMORPHONE HYDROCHLORIDE 0.2 MG: 1 INJECTION, SOLUTION INTRAMUSCULAR; INTRAVENOUS; SUBCUTANEOUS at 02:15

## 2024-05-18 RX ADMIN — CEFOXITIN SODIUM 2 G: 2 POWDER, FOR SOLUTION INTRAVENOUS at 12:35

## 2024-05-18 RX ADMIN — HYDROMORPHONE HYDROCHLORIDE 0.2 MG: 1 INJECTION, SOLUTION INTRAMUSCULAR; INTRAVENOUS; SUBCUTANEOUS at 12:45

## 2024-05-18 RX ADMIN — POTASSIUM CHLORIDE 20 MEQ: 200 INJECTION, SOLUTION INTRAVENOUS at 13:58

## 2024-05-18 RX ADMIN — HEPARIN SODIUM 5000 UNITS: 5000 INJECTION INTRAVENOUS; SUBCUTANEOUS at 20:13

## 2024-05-18 RX ADMIN — HYDROMORPHONE HYDROCHLORIDE 0.2 MG: 1 INJECTION, SOLUTION INTRAMUSCULAR; INTRAVENOUS; SUBCUTANEOUS at 08:46

## 2024-05-18 RX ADMIN — PANTOPRAZOLE SODIUM 40 MG: 40 INJECTION, POWDER, FOR SOLUTION INTRAVENOUS at 06:17

## 2024-05-18 RX ADMIN — CEFOXITIN SODIUM 2 G: 2 POWDER, FOR SOLUTION INTRAVENOUS at 04:55

## 2024-05-18 RX ADMIN — ASPIRIN 150 MG: 300 SUPPOSITORY RECTAL at 10:19

## 2024-05-18 RX ADMIN — HYDROCORTISONE SODIUM SUCCINATE 50 MG: 100 INJECTION, POWDER, FOR SOLUTION INTRAMUSCULAR; INTRAVENOUS at 13:56

## 2024-05-18 RX ADMIN — HYDROMORPHONE HYDROCHLORIDE 0.2 MG: 1 INJECTION, SOLUTION INTRAMUSCULAR; INTRAVENOUS; SUBCUTANEOUS at 15:01

## 2024-05-18 RX ADMIN — HYDROMORPHONE HYDROCHLORIDE 0.2 MG: 1 INJECTION, SOLUTION INTRAMUSCULAR; INTRAVENOUS; SUBCUTANEOUS at 20:12

## 2024-05-18 RX ADMIN — HEPARIN SODIUM 5000 UNITS: 5000 INJECTION INTRAVENOUS; SUBCUTANEOUS at 04:55

## 2024-05-18 RX ADMIN — FUROSEMIDE 40 MG: 10 INJECTION, SOLUTION INTRAMUSCULAR; INTRAVENOUS at 10:48

## 2024-05-18 RX ADMIN — MAGNESIUM SULFATE HEPTAHYDRATE 2 G: 40 INJECTION, SOLUTION INTRAVENOUS at 04:55

## 2024-05-18 RX ADMIN — HYDROCORTISONE SODIUM SUCCINATE 50 MG: 100 INJECTION, POWDER, FOR SOLUTION INTRAMUSCULAR; INTRAVENOUS at 20:13

## 2024-05-18 ASSESSMENT — PAIN SCALES - GENERAL
PAINLEVEL_OUTOF10: 5 - MODERATE PAIN
PAINLEVEL_OUTOF10: 0 - NO PAIN
PAINLEVEL_OUTOF10: 3
PAINLEVEL_OUTOF10: 5 - MODERATE PAIN
PAINLEVEL_OUTOF10: 6
PAINLEVEL_OUTOF10: 0 - NO PAIN
PAINLEVEL_OUTOF10: 6
PAINLEVEL_OUTOF10: 3
PAINLEVEL_OUTOF10: 0 - NO PAIN
PAINLEVEL_OUTOF10: 3
PAINLEVEL_OUTOF10: 4
PAINLEVEL_OUTOF10: 6
PAINLEVEL_OUTOF10: 2
PAINLEVEL_OUTOF10: 4
PAINLEVEL_OUTOF10: 6
PAINLEVEL_OUTOF10: 3
PAINLEVEL_OUTOF10: 0 - NO PAIN
PAINLEVEL_OUTOF10: 2
PAINLEVEL_OUTOF10: 0 - NO PAIN
PAINLEVEL_OUTOF10: 5 - MODERATE PAIN
PAINLEVEL_OUTOF10: 0 - NO PAIN

## 2024-05-18 ASSESSMENT — PAIN DESCRIPTION - LOCATION
LOCATION: FOOT
LOCATION: INCISION
LOCATION: ABDOMEN
LOCATION: ABDOMEN

## 2024-05-18 ASSESSMENT — PAIN DESCRIPTION - ORIENTATION
ORIENTATION: LEFT
ORIENTATION: LEFT

## 2024-05-18 NOTE — PROGRESS NOTES
VASCULAR SURGERY PROGRESS NOTE  Subjective   No acute overnight events.  Remains on Levophed.  Patient sitting in chair this morning.  Denied any pain.  No return of bowel function.  Continues report mouth is dry and wants to drink water    Objective   Vitals:  Heart Rate:  [78-97]   Temp:  [36.5 °C (97.7 °F)-37.2 °C (99 °F)]   Resp:  [14-30]   BP: (118-119)/(61-63)   SpO2:  [86 %-100 %]     Exam:  Constitutional: No acute distress, resting comfortably  Neuro:  AOx3, grossly intact  ENMT: NGT in place  CV: no tachycardia on bedside monitor  Pulm: non-labored on nasal cannula, left chest tube to suction with serosanguineous output and no airleak  GI: soft, appropriately tender, thoraco-RP incision covered with dressing  Skin: warm and dry  Musculoskeletal: moving all extremities  Extremities: Palpable bilateral DPs, sensory and motor intact in bilateral lower extremities, bilateral groin incisions covered with dressing    Labs:  Results from last 7 days   Lab Units 05/18/24  0118 05/17/24  1037 05/17/24  0401   WBC AUTO x10*3/uL 11.2 10.9 9.7   HEMOGLOBIN g/dL 9.5* 11.4* 9.2*   PLATELETS AUTO x10*3/uL 109* 131* 121*      Results from last 7 days   Lab Units 05/18/24  0118 05/17/24  1037 05/17/24  0005   SODIUM mmol/L 141 139 137   POTASSIUM mmol/L 3.7 4.2 4.2   CHLORIDE mmol/L 109* 108* 105   CO2 mmol/L 22 23 18*   BUN mg/dL 30* 31* 34*   CREATININE mg/dL 1.24* 1.52* 1.53*   GLUCOSE mg/dL 112* 100* 237*   MAGNESIUM mg/dL 1.99 2.12 2.44*   PHOSPHORUS mg/dL 4.0 4.2  --       Results from last 7 days   Lab Units 05/18/24  0118 05/17/24  1037 05/16/24  1745   INR  1.6* 1.4* 1.4*   PROTIME seconds 18.1* 16.3* 15.8*   APTT seconds 32 28 29     Assessment/Plan   Klaudia Ratliff is 68 y.o. 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  Recovering well  Adequate urine  output  No return of bowel function  Elevated bilirubin, no transaminitis  Low downtrending H&H, elevated INR    Plan:  - q4 NV assessment  - pain control  - bronchopulmonary hygiene  - chest tube to water seal  - start rectal aspirin  - wean levophed to MAP >70 mmHg  - daily CxR while chest tube in place  - NGT to LIWS, repeat KUB to confirm NGT location  - trend LFTs  - HLIV  - IV lasix 40mg   - Maintain K >4, Mg >2  - transfuse pRBC x1, FFP x1 to maintain Hgb >10, PLT >100, INR <1.4  - send PF4 to r/o HIT  - hold [facility], pending PF4  - PT/OT  - SCDs    D/w attending, Dr. Garcia Huynh MD  Vascular Surgery PGY-4  Team pager: [phone]

## 2024-05-18 NOTE — PROGRESS NOTES
Acute Pain Service    Postop Pain HPI -   Palliative: relieved with IV analgesics and regional local anesthetics  Provocative: movement  Quality:  burning and aching  Radiation:  none  Severity:  2/10  Timing: constant    24-HOUR OPIOID CONSUMPTION:  Dilaudid 0.2mg x9     Scheduled medications  aspirin, 150 mg, rectal, Daily  cefOXitin, 2 g, intravenous, q8h  [Held by provider] heparin, 5,000 Units, subcutaneous, q8h  hydrocortisone sodium succinate, 50 mg, intravenous, q6h  insulin lispro, 0-10 Units, subcutaneous, q4h  pantoprazole, 40 mg, intravenous, Daily before breakfast      Continuous medications  lactated Ringer's, 5 mL/hr, Last Rate: 5 mL/hr (05/18/24 0818)  lactated Ringer's, 30 mL/hr, Last Rate: 30 mL/hr (05/18/24 0700)  norepinephrine, 0.01-1 mcg/kg/min, Last Rate: Stopped (05/18/24 1005)  ropivacaine (PF) in NS cmpd, 14 mL/hr      PRN medications  PRN medications: calcium gluconate, calcium gluconate, dextrose **OR** glucagon, HYDROmorphone, HYDROmorphone, magnesium sulfate, magnesium sulfate, oxygen, potassium chloride, potassium chloride     Physical Exam:  Constitutional:  no distress, alert and cooperative  Eyes: clear sclera  Head/Neck: No apparent injury, trachea midline  Respiratory/Thorax: Patent airways, thorax symmetric, breathing comfortably  Cardiovascular: no pitting edema  Gastrointestinal: Nondistended  Musculoskeletal: ROM intact  Extremities: no clubbing  Neurological: alert, small x4  Psychological: Appropriate affect    Results for orders placed or performed during the hospital encounter of 05/16/24 (from the past 24 hour(s))   POCT GLUCOSE   Result Value Ref Range    POCT Glucose 89 74 - 99 mg/dL   Magnesium   Result Value Ref Range    Magnesium 1.99 1.60 - 2.40 mg/dL   Hepatic function panel   Result Value Ref Range    Albumin 3.8 3.4 - 5.0 g/dL    Bilirubin, Total 3.6 (H) 0.0 - 1.2 mg/dL    Bilirubin, Direct 2.4 (H) 0.0 - 0.3 mg/dL    Alkaline Phosphatase 25 (L) 33 - 136 U/L    ALT  15 7 - 45 U/L    AST 19 9 - 39 U/L    Total Protein 4.7 (L) 6.4 - 8.2 g/dL   Coagulation Screen   Result Value Ref Range    Protime 18.1 (H) 9.8 - 12.8 seconds    INR 1.6 (H) 0.9 - 1.1    aPTT 32 27 - 38 seconds   CBC   Result Value Ref Range    WBC 11.2 4.4 - 11.3 x10*3/uL    nRBC 0.0 0.0 - 0.0 /100 WBCs    RBC 3.04 (L) 4.00 - 5.20 x10*6/uL    Hemoglobin 9.5 (L) 12.0 - 16.0 g/dL    Hematocrit 27.9 (L) 36.0 - 46.0 %    MCV 92 80 - 100 fL    MCH 31.3 26.0 - 34.0 pg    MCHC 34.1 32.0 - 36.0 g/dL    RDW 17.3 (H) 11.5 - 14.5 %    Platelets 109 (L) 150 - 450 x10*3/uL   Calcium, ionized   Result Value Ref Range    POCT Calcium, Ionized 1.14 1.1 - 1.33 mmol/L   Basic Metabolic Panel   Result Value Ref Range    Glucose 112 (H) 74 - 99 mg/dL    Sodium 141 136 - 145 mmol/L    Potassium 3.7 3.5 - 5.3 mmol/L    Chloride 109 (H) 98 - 107 mmol/L    Bicarbonate 22 21 - 32 mmol/L    Anion Gap 14 10 - 20 mmol/L    Urea Nitrogen 30 (H) 6 - 23 mg/dL    Creatinine 1.24 (H) 0.50 - 1.05 mg/dL    eGFR 48 (L) >60 mL/min/1.73m*2    Calcium 7.9 (L) 8.6 - 10.6 mg/dL   Phosphorus   Result Value Ref Range    Phosphorus 4.0 2.5 - 4.9 mg/dL   BLOOD GAS ARTERIAL FULL PANEL   Result Value Ref Range    POCT pH, Arterial 7.32 (L) 7.38 - 7.42 pH    POCT pCO2, Arterial 41 38 - 42 mm Hg    POCT pO2, Arterial 79 (L) 85 - 95 mm Hg    POCT SO2, Arterial 98 94 - 100 %    POCT Oxy Hemoglobin, Arterial 95.0 94.0 - 98.0 %    POCT Hematocrit Calculated, Arterial 29.0 (L) 36.0 - 46.0 %    POCT Sodium, Arterial 138 136 - 145 mmol/L    POCT Potassium, Arterial 3.5 3.5 - 5.3 mmol/L    POCT Chloride, Arterial 108 (H) 98 - 107 mmol/L    POCT Ionized Calcium, Arterial 1.16 1.10 - 1.33 mmol/L    POCT Glucose, Arterial 107 (H) 74 - 99 mg/dL    POCT Lactate, Arterial 0.7 0.4 - 2.0 mmol/L    POCT Base Excess, Arterial -4.7 (L) -2.0 - 3.0 mmol/L    POCT HCO3 Calculated, Arterial 21.1 (L) 22.0 - 26.0 mmol/L    POCT Hemoglobin, Arterial 9.6 (L) 12.0 - 16.0 g/dL    POCT Anion  Gap, Arterial 12 10 - 25 mmo/L    Patient Temperature 37.0 degrees Celsius    FiO2 21 %   Prepare RBC: 1 Units   Result Value Ref Range    PRODUCT CODE D2347V12     Unit Number [id number]     Unit ABO B     Unit RH POS     XM INTEP COMP     Dispense Status IS     Blood Expiration Date May 31, 2024 23:59 EDT     PRODUCT BLOOD TYPE 7300     UNIT VOLUME 350    Prepare Plasma: 2 Units   Result Value Ref Range    PRODUCT CODE B4299IG8     Unit Number [id number]     Unit ABO B     Unit RH POS     Dispense Status IS     Blood Expiration Date May 21, 2024 07:27 EDT     PRODUCT BLOOD TYPE 7300     UNIT VOLUME 199     PRODUCT CODE Y3650MJ5     Unit Number [id number]     Unit ABO B     Unit RH POS     Dispense Status IS     Blood Expiration Date May 21, 2024 07:27 EDT     PRODUCT BLOOD TYPE 7300     UNIT VOLUME 204    POCT GLUCOSE   Result Value Ref Range    POCT Glucose 113 (H) 74 - 99 mg/dL        Klaudia Ratliff is a 68 y.o. year old female patient who presents for AAA with Dr. Zelaya on 5/16/24. Acute Pain consulted for block for postoperative pain control.      Plan:     - B/L QL nerve blocks with catheters performed preoperatively on 5/16/24 - L catheter removed intra-op.   - R sided catheter removed 5/18/24  - Acute pain service will sign off  - Rest of pain management per primary team     Acute Pain Resident  pg [phone] ph [phone]

## 2024-05-18 NOTE — PROGRESS NOTES
Klaudia Ratliff is a 68 y.o. female on day 2 of admission presenting with Aortic occlusion (CMS-HCC).    Subjective   NAEON. Seen this morning sitting in chair, NAD, no SOB or respiratory distress on 2L n.c. States she feels generally well but experiences severe pain in the groin (L>R) when standing. Still complaining of thirst. Throat pain improving. Pt endorses LLE weakness in relation to RLE but neuro exam is reassuring.      Objective     Physical Exam  Vitals reviewed.   Constitutional:       Appearance: Normal appearance. She is normal weight.   HENT:      Head: Normocephalic and atraumatic.      Mouth/Throat:      Mouth: Mucous membranes are moist.      Pharynx: Oropharynx is clear.   Eyes:      Extraocular Movements: Extraocular movements intact.      Conjunctiva/sclera: Conjunctivae normal.      Pupils: Pupils are equal, round, and reactive to light.   Cardiovascular:      Rate and Rhythm: Normal rate and regular rhythm.      Pulses: Normal pulses.      Heart sounds: Normal heart sounds.   Pulmonary:      Effort: Pulmonary effort is normal.      Breath sounds: Normal breath sounds.   Abdominal:      General: There is no distension.      Palpations: Abdomen is soft.      Tenderness: There is abdominal tenderness.   Musculoskeletal:         General: Normal range of motion.      Cervical back: Normal range of motion and neck supple.      Right lower leg: Edema present.      Left lower leg: No edema.      Comments: Minimal pitting edema on R foot below ankle. Similar to pre-op findings   Skin:     General: Skin is warm and dry.      Capillary Refill: Capillary refill takes less than 2 seconds.   Neurological:      General: No focal deficit present.      Mental Status: She is alert and oriented to person, place, and time.   Psychiatric:         Mood and Affect: Mood normal.         Behavior: Behavior normal.       Last Recorded Vitals  Blood pressure 119/61, pulse 79, temperature 37 °C (98.6 °F), temperature  "source Temporal, resp. rate 26, height 1.575 m (5' 2\"), weight 73.6 kg (162 lb 3.2 oz), SpO2 100%.  Intake/Output last 3 Shifts:  I/O last 3 completed shifts:  In: 01764.5 (152.6 mL/kg) [I.V.:6855 (93.2 mL/kg); Blood:2100; IV Piggyback:2274.5]  Out: 1918 (26.1 mL/kg) [Urine:1448 (0.5 mL/kg/hr); Emesis/NG output:100; Chest Tube:370]  Weight: 73.6 kg     Relevant Results  Scheduled medications  cefOXitin, 2 g, intravenous, q8h  heparin, 5,000 Units, subcutaneous, q8h  hydrocortisone sodium succinate, 50 mg, intravenous, q6h  insulin lispro, 0-10 Units, subcutaneous, q4h  pantoprazole, 40 mg, intravenous, Daily before breakfast      Continuous medications  lactated Ringer's, 150 mL/hr, Last Rate: 150 mL/hr (05/18/24 0700)  lactated Ringer's, 30 mL/hr, Last Rate: 30 mL/hr (05/18/24 0700)  norepinephrine, 0.01-1 mcg/kg/min, Last Rate: 0.07 mcg/kg/min (05/18/24 0700)  ropivacaine (PF) in NS cmpd, 14 mL/hr      PRN medications  PRN medications: calcium gluconate, calcium gluconate, dextrose **OR** glucagon, HYDROmorphone, HYDROmorphone, magnesium sulfate, magnesium sulfate, oxygen, potassium chloride, potassium chloride     Results for orders placed or performed during the hospital encounter of 05/16/24 (from the past 24 hour(s))   Coagulation Screen   Result Value Ref Range    Protime 16.3 (H) 9.8 - 12.8 seconds    INR 1.4 (H) 0.9 - 1.1    aPTT 28 27 - 38 seconds   Fibrinogen   Result Value Ref Range    Fibrinogen 254 200 - 400 mg/dL   Calcium, ionized   Result Value Ref Range    POCT Calcium, Ionized 1.11 1.1 - 1.33 mmol/L   CBC   Result Value Ref Range    WBC 10.9 4.4 - 11.3 x10*3/uL    nRBC 0.0 0.0 - 0.0 /100 WBCs    RBC 3.61 (L) 4.00 - 5.20 x10*6/uL    Hemoglobin 11.4 (L) 12.0 - 16.0 g/dL    Hematocrit 33.0 (L) 36.0 - 46.0 %    MCV 91 80 - 100 fL    MCH 31.6 26.0 - 34.0 pg    MCHC 34.5 32.0 - 36.0 g/dL    RDW 16.7 (H) 11.5 - 14.5 %    Platelets 131 (L) 150 - 450 x10*3/uL   Renal function panel   Result Value Ref Range "    Glucose 100 (H) 74 - 99 mg/dL    Sodium 139 136 - 145 mmol/L    Potassium 4.2 3.5 - 5.3 mmol/L    Chloride 108 (H) 98 - 107 mmol/L    Bicarbonate 23 21 - 32 mmol/L    Anion Gap 12 10 - 20 mmol/L    Urea Nitrogen 31 (H) 6 - 23 mg/dL    Creatinine 1.52 (H) 0.50 - 1.05 mg/dL    eGFR 37 (L) >60 mL/min/1.73m*2    Calcium 7.7 (L) 8.6 - 10.6 mg/dL    Phosphorus 4.2 2.5 - 4.9 mg/dL    Albumin 3.8 3.4 - 5.0 g/dL   Magnesium   Result Value Ref Range    Magnesium 2.12 1.60 - 2.40 mg/dL   POCT GLUCOSE   Result Value Ref Range    POCT Glucose 109 (H) 74 - 99 mg/dL   POCT GLUCOSE   Result Value Ref Range    POCT Glucose 89 74 - 99 mg/dL   Magnesium   Result Value Ref Range    Magnesium 1.99 1.60 - 2.40 mg/dL   Hepatic function panel   Result Value Ref Range    Albumin 3.8 3.4 - 5.0 g/dL    Bilirubin, Total 3.6 (H) 0.0 - 1.2 mg/dL    Bilirubin, Direct 2.4 (H) 0.0 - 0.3 mg/dL    Alkaline Phosphatase 25 (L) 33 - 136 U/L    ALT 15 7 - 45 U/L    AST 19 9 - 39 U/L    Total Protein 4.7 (L) 6.4 - 8.2 g/dL   Coagulation Screen   Result Value Ref Range    Protime 18.1 (H) 9.8 - 12.8 seconds    INR 1.6 (H) 0.9 - 1.1    aPTT 32 27 - 38 seconds   CBC   Result Value Ref Range    WBC 11.2 4.4 - 11.3 x10*3/uL    nRBC 0.0 0.0 - 0.0 /100 WBCs    RBC 3.04 (L) 4.00 - 5.20 x10*6/uL    Hemoglobin 9.5 (L) 12.0 - 16.0 g/dL    Hematocrit 27.9 (L) 36.0 - 46.0 %    MCV 92 80 - 100 fL    MCH 31.3 26.0 - 34.0 pg    MCHC 34.1 32.0 - 36.0 g/dL    RDW 17.3 (H) 11.5 - 14.5 %    Platelets 109 (L) 150 - 450 x10*3/uL   Calcium, ionized   Result Value Ref Range    POCT Calcium, Ionized 1.14 1.1 - 1.33 mmol/L   Basic Metabolic Panel   Result Value Ref Range    Glucose 112 (H) 74 - 99 mg/dL    Sodium 141 136 - 145 mmol/L    Potassium 3.7 3.5 - 5.3 mmol/L    Chloride 109 (H) 98 - 107 mmol/L    Bicarbonate 22 21 - 32 mmol/L    Anion Gap 14 10 - 20 mmol/L    Urea Nitrogen 30 (H) 6 - 23 mg/dL    Creatinine 1.24 (H) 0.50 - 1.05 mg/dL    eGFR 48 (L) >60 mL/min/1.73m*2     Calcium 7.9 (L) 8.6 - 10.6 mg/dL   Phosphorus   Result Value Ref Range    Phosphorus 4.0 2.5 - 4.9 mg/dL   BLOOD GAS ARTERIAL FULL PANEL   Result Value Ref Range    POCT pH, Arterial 7.32 (L) 7.38 - 7.42 pH    POCT pCO2, Arterial 41 38 - 42 mm Hg    POCT pO2, Arterial 79 (L) 85 - 95 mm Hg    POCT SO2, Arterial 98 94 - 100 %    POCT Oxy Hemoglobin, Arterial 95.0 94.0 - 98.0 %    POCT Hematocrit Calculated, Arterial 29.0 (L) 36.0 - 46.0 %    POCT Sodium, Arterial 138 136 - 145 mmol/L    POCT Potassium, Arterial 3.5 3.5 - 5.3 mmol/L    POCT Chloride, Arterial 108 (H) 98 - 107 mmol/L    POCT Ionized Calcium, Arterial 1.16 1.10 - 1.33 mmol/L    POCT Glucose, Arterial 107 (H) 74 - 99 mg/dL    POCT Lactate, Arterial 0.7 0.4 - 2.0 mmol/L    POCT Base Excess, Arterial -4.7 (L) -2.0 - 3.0 mmol/L    POCT HCO3 Calculated, Arterial 21.1 (L) 22.0 - 26.0 mmol/L    POCT Hemoglobin, Arterial 9.6 (L) 12.0 - 16.0 g/dL    POCT Anion Gap, Arterial 12 10 - 25 mmo/L    Patient Temperature 37.0 degrees Celsius    FiO2 21 %      XR chest 1 view    Result Date: 5/17/2024  Interpreted By:  Joanne Holley and Ohs Zachary STUDY: XR CHEST 1 VIEW; XR ABDOMEN 1 VIEW;  5/16/2024 6:21 pm   INDICATION: Signs/Symptoms:post op.   COMPARISON: CT abdomen/pelvis 03/14/2024.   ACCESSION NUMBER(S): [id number]; [id number]   ORDERING CLINICIAN: MANE CALDWELL   FINDINGS: AP radiograph of the chest was provided.   MEDICAL DEVICES: ETT distal tip 3.1 cm from the alda. Enteric tube overlies the esophagus side port superior to the gastroesophageal junction and distal tip overlying the stomach.. Left thoracostomy tube with distal tip overlying the left upper lung. Left IJ central venous catheter with distal tip overlying the left brachiocephalic vein. IVC filter overlies the L3-4 right transverse processes. Partially visualized rectal temperature probe.   CARDIOMEDIASTINAL SILHOUETTE: Cardiomediastinal silhouette is normal in size and configuration.   LUNGS: No  pneumothorax, pleural effusion or focal consolidation.   ABDOMEN: Nonobstructive bowel gas pattern. No evidence of extraluminal/portal venous gas.   BONES: No acute osseous changes.       1.  No evidence of acute cardiopulmonary process. 2. Nonobstructive bowel gas pattern. 3. Enteric tube side port is superior to the expected location of the gastroesophageal junction. Advancement recommended.   I personally reviewed the images/study and I agree with the findings as stated by Dr. Vicente Zuniga. This study was interpreted at [place], [place].   MACRO: None   Signed by: Joanne Holley 5/17/2024 8:29 AM Dictation workstation:   [id number]    XR abdomen 1 view    Result Date: 5/17/2024  Interpreted By:  Joanne Holley and Ohs Zachary STUDY: XR CHEST 1 VIEW; XR ABDOMEN 1 VIEW;  5/16/2024 6:21 pm   INDICATION: Signs/Symptoms:post op.   COMPARISON: CT abdomen/pelvis 03/14/2024.   ACCESSION NUMBER(S): [id number]; [id number]   ORDERING CLINICIAN: MANE CALDWELL   FINDINGS: AP radiograph of the chest was provided.   MEDICAL DEVICES: ETT distal tip 3.1 cm from the alda. Enteric tube overlies the esophagus side port superior to the gastroesophageal junction and distal tip overlying the stomach.. Left thoracostomy tube with distal tip overlying the left upper lung. Left IJ central venous catheter with distal tip overlying the left brachiocephalic vein. IVC filter overlies the L3-4 right transverse processes. Partially visualized rectal temperature probe.   CARDIOMEDIASTINAL SILHOUETTE: Cardiomediastinal silhouette is normal in size and configuration.   LUNGS: No pneumothorax, pleural effusion or focal consolidation.   ABDOMEN: Nonobstructive bowel gas pattern. No evidence of extraluminal/portal venous gas.   BONES: No acute osseous changes.       1.  No evidence of acute cardiopulmonary process. 2. Nonobstructive bowel gas pattern. 3. Enteric tube side port is superior to the  expected location of the gastroesophageal junction. Advancement recommended.   I personally reviewed the images/study and I agree with the findings as stated by Dr. Vicente Zuniga. This study was interpreted at [facility], [place], [place].   MACRO: None   Signed by: Joanne Holley 5/17/2024 8:29 AM Dictation workstation:   [id number]      This patient has a central line. Likely remove today.    This patient has a urinary catheter   Reason for the urinary catheter remaining today? critically ill patient who need accurate urinary output measurements       Assessment/Plan   Principal Problem:    Aortic occlusion (CMS-[facility])  Active Problems:    Aortoiliac occlusive disease (Multi)    Klaudia Ratliff is a 68 y.o. female with past medical history of HTN, HLD, obesity, COPD, gout, RLE DVT, right PE s/p mechanical thrombectomy and IVC filter placement (2/22/2024) discharged on eliquis and bridged to Lovenox (5/13), and aortic occlusion. Patient presents to SICU now s/p aortofem bypass via thoracoabdominal approach. Arrived to SICU intubated, sedated on prop @30, requiring norepi @ 0.04 and epi @ 0.02 to maintain goal MAP 80. Intubated with double lumen tube, neuromuscular blockade reversed by anesthesia team. Extubated overnight on POD 0 and received 1u pRBC. Post-op course stable with good recovery trajectory.      Plan:  NEURO: PMH of sciatica. A+Ox3, no focal deficits, reassuring neurovasc checks overnight. Pain well controlled. No neuro issues at this time.  - hydromorphone as needed pain  - ok for q4h neuro/neurovasc/pain assessments  - PT/OT consulted  - B/L QL block catheters discontinued today  - Home meds; Gabapentin 100 mg BID     CV: History of HTN, HLD, obesity, RLE DVT, Right PE (s/p mechanical thrombectomy 2/24), and aortic occlusion. Baseline BP (pre-op) 161/76. Baseline cardiac function normal EF (60-65%), elevated LA pressure (2/2/24). Carotid Duplex < 50% ICA stenosis  b/l. Now s/p aortofem bypass. Lactate cleared. Hemodynamically stable and now off pressors.   - goal MAP liberalized to >70 per vascular surgery  - continuous ekg/abp monitoring  - start rectal aspirin per vascular surgery  - home meds: Eliquis 5 mg BID (held since 5/12, bridged to lovenox 5/13), ASA 81 mg, Atenolol 50 mg, atorvastatin 80 mg, losartan 100 mg, Vitamin C 250 mg     PULM: History of COPD, pulmonary embolism (2/2/24). No diaphragm pacing wires placed intra-op. Arrived to SICU Intubated with AL in place. Extubated overnight on POD 0. No respiratory issues at this time. Minimal O2 requirement. Likely to be weaned to room air today.   - wean 02 for goal SpO2 >94%  - Q1h IS   - additional bronchial hygiene as indicated  - Daily CXR  - place chest tube to water seal  - Home meds: Tiotropium 2.5 mcg 2 puffs daily.     GI: History of gastric ulcer (3/2024). Strict NPO. No issues at this time.   - continue NG-> LIWS, will repeat KUB to confirm positioning  - liver labs grossly normal, no signs of gut ischemia  - Bili uptrending, continue to trend and monitor  - PPI for GI prophylaxis.     : Baseline cr 0.9-1.1. Supraceliac clamp x27 minutes. Additional left renal ischemic time of 47 minutes during bypass. Initial post-op mild ELROY but adequate UOP. Serum Cr now downtrending. ELROY resolving. Hypervolemia.   - q12h RFP  - Volume resuscitation as needed.    - 40 lasix now, redose as needed for goal neg 1L  - Goal U/O >1-2ml/kg/hr.    - Replete electrolytes to goal K>4, Mg>2, Phos>2.5, ionized Ca>1.10 if creatinine remains WNL or <1.5 with adequate uop.  - Home meds: Lasix 40 mg PRN, Potassium chloride 10 mEq ER      HEME: Acute surgical blood loss anemia. OR EBL 1L, received 2u pRBC and 460ml cell saver. Received another 1u pRBC overnight on POD 0.   - q12h CBC, coag  - scds for dvt prophylaxis.  - holding SQH and sending PF4 due to drop in platelets  - start rectal ASA per vascular surgery  - will transfuse  another 1u pRBC for goal Hgb >10 per Dr. Zelaya request  - Giving 2FFP for INR>1.5 per Dr. Zelaya request  - Home meds: Lovenox 80 mg q12h     ENDO: No history of diabetes or hypothyroidism. A1c not on file, TSH 1.86. Arrived to ICU on insulin gtt, turned off overnight on POD 0. Mild hyperglycemia controlled with SSI.   - Q4h BG  - SSI Lispro per ICU protocol     ID: No PMH. Received Cefoxitin 2g @ 9am and 1pm intra-op. Now afebrile and normal WBC post-op. No signs of infection at this time.   - q12h WBC  - Continue cefoxitin as long as chest tube in place  - monitor for s/s infection     Lines:   - L internal jugular MAC w/ mini MAC --> plan to remove today  - R brachial A-line --> plan to remove today  - PIV right forearm  - Anne Marie     LILI - Daughter Ashley [phone], to be updated today    Dispo: Continue ICU care. Likely floor tomorrow. Discussed with SICU attending Dr. Carlton.  SICU team phone [phone]  Earnest Jacobs MD

## 2024-05-19 ENCOUNTER — APPOINTMENT (OUTPATIENT)
Dept: RADIOLOGY | Facility: HOSPITAL | Age: 69
DRG: 673 | End: 2024-05-19
Payer: MEDICARE

## 2024-05-19 LAB
ALBUMIN SERPL BCP-MCNC: 3.3 G/DL (ref 3.4–5)
ALBUMIN SERPL BCP-MCNC: 3.5 G/DL (ref 3.4–5)
ALBUMIN SERPL BCP-MCNC: 3.7 G/DL (ref 3.4–5)
ALP SERPL-CCNC: 44 U/L (ref 33–136)
ALP SERPL-CCNC: 73 U/L (ref 33–136)
ALT SERPL W P-5'-P-CCNC: 24 U/L (ref 7–45)
ALT SERPL W P-5'-P-CCNC: 38 U/L (ref 7–45)
ANION GAP BLDA CALCULATED.4IONS-SCNC: 10 MMO/L (ref 10–25)
ANION GAP SERPL CALC-SCNC: 14 MMOL/L (ref 10–20)
ANION GAP SERPL CALC-SCNC: 15 MMOL/L (ref 10–20)
ANION GAP SERPL CALC-SCNC: 16 MMOL/L (ref 10–20)
APTT PPP: 27 SECONDS (ref 27–38)
AST SERPL W P-5'-P-CCNC: 24 U/L (ref 9–39)
AST SERPL W P-5'-P-CCNC: 33 U/L (ref 9–39)
BASE EXCESS BLDA CALC-SCNC: -2.8 MMOL/L (ref -2–3)
BILIRUB DIRECT SERPL-MCNC: 3.6 MG/DL (ref 0–0.3)
BILIRUB DIRECT SERPL-MCNC: 4.1 MG/DL (ref 0–0.3)
BILIRUB SERPL-MCNC: 5.7 MG/DL (ref 0–1.2)
BILIRUB SERPL-MCNC: 6.1 MG/DL (ref 0–1.2)
BODY TEMPERATURE: 37 DEGREES CELSIUS
BUN SERPL-MCNC: 32 MG/DL (ref 6–23)
BUN SERPL-MCNC: 33 MG/DL (ref 6–23)
BUN SERPL-MCNC: 37 MG/DL (ref 6–23)
CA-I BLD-SCNC: 1.18 MMOL/L (ref 1.1–1.33)
CA-I BLD-SCNC: 1.21 MMOL/L (ref 1.1–1.33)
CA-I BLDA-SCNC: 1.18 MMOL/L (ref 1.1–1.33)
CALCIUM SERPL-MCNC: 8.2 MG/DL (ref 8.6–10.6)
CALCIUM SERPL-MCNC: 8.4 MG/DL (ref 8.6–10.6)
CALCIUM SERPL-MCNC: 8.8 MG/DL (ref 8.6–10.6)
CHLORIDE BLDA-SCNC: 108 MMOL/L (ref 98–107)
CHLORIDE SERPL-SCNC: 105 MMOL/L (ref 98–107)
CHLORIDE SERPL-SCNC: 107 MMOL/L (ref 98–107)
CHLORIDE SERPL-SCNC: 108 MMOL/L (ref 98–107)
CO2 SERPL-SCNC: 23 MMOL/L (ref 21–32)
CO2 SERPL-SCNC: 23 MMOL/L (ref 21–32)
CO2 SERPL-SCNC: 24 MMOL/L (ref 21–32)
CREAT SERPL-MCNC: 1.39 MG/DL (ref 0.5–1.05)
CREAT SERPL-MCNC: 1.39 MG/DL (ref 0.5–1.05)
CREAT SERPL-MCNC: 1.41 MG/DL (ref 0.5–1.05)
EGFRCR SERPLBLD CKD-EPI 2021: 41 ML/MIN/1.73M*2
EGFRCR SERPLBLD CKD-EPI 2021: 42 ML/MIN/1.73M*2
EGFRCR SERPLBLD CKD-EPI 2021: 42 ML/MIN/1.73M*2
ERYTHROCYTE [DISTWIDTH] IN BLOOD BY AUTOMATED COUNT: 15.8 % (ref 11.5–14.5)
ERYTHROCYTE [DISTWIDTH] IN BLOOD BY AUTOMATED COUNT: 16.1 % (ref 11.5–14.5)
GLUCOSE BLD MANUAL STRIP-MCNC: 109 MG/DL (ref 74–99)
GLUCOSE BLD MANUAL STRIP-MCNC: 110 MG/DL (ref 74–99)
GLUCOSE BLD MANUAL STRIP-MCNC: 98 MG/DL (ref 74–99)
GLUCOSE BLDA-MCNC: 107 MG/DL (ref 74–99)
GLUCOSE SERPL-MCNC: 111 MG/DL (ref 74–99)
GLUCOSE SERPL-MCNC: 116 MG/DL (ref 74–99)
GLUCOSE SERPL-MCNC: 124 MG/DL (ref 74–99)
HCO3 BLDA-SCNC: 22.6 MMOL/L (ref 22–26)
HCT VFR BLD AUTO: 28.9 % (ref 36–46)
HCT VFR BLD AUTO: 30.4 % (ref 36–46)
HCT VFR BLD EST: 31 % (ref 36–46)
HGB BLD-MCNC: 10.1 G/DL (ref 12–16)
HGB BLD-MCNC: 10.6 G/DL (ref 12–16)
HGB BLDA-MCNC: 10.4 G/DL (ref 12–16)
INHALED O2 CONCENTRATION: 21 %
INR PPP: 1.3 (ref 0.9–1.1)
LACTATE BLDA-SCNC: 0.6 MMOL/L (ref 0.4–2)
LDH SERPL L TO P-CCNC: 159 U/L (ref 84–246)
MAGNESIUM SERPL-MCNC: 2.09 MG/DL (ref 1.6–2.4)
MAGNESIUM SERPL-MCNC: 2.22 MG/DL (ref 1.6–2.4)
MCH RBC QN AUTO: 31.3 PG (ref 26–34)
MCH RBC QN AUTO: 32.3 PG (ref 26–34)
MCHC RBC AUTO-ENTMCNC: 34.9 G/DL (ref 32–36)
MCHC RBC AUTO-ENTMCNC: 34.9 G/DL (ref 32–36)
MCV RBC AUTO: 90 FL (ref 80–100)
MCV RBC AUTO: 93 FL (ref 80–100)
NRBC BLD-RTO: 0 /100 WBCS (ref 0–0)
NRBC BLD-RTO: 0 /100 WBCS (ref 0–0)
OXYHGB MFR BLDA: 88.8 % (ref 94–98)
PCO2 BLDA: 41 MM HG (ref 38–42)
PH BLDA: 7.35 PH (ref 7.38–7.42)
PHOSPHATE SERPL-MCNC: 3.3 MG/DL (ref 2.5–4.9)
PHOSPHATE SERPL-MCNC: 3.4 MG/DL (ref 2.5–4.9)
PHOSPHATE SERPL-MCNC: 3.4 MG/DL (ref 2.5–4.9)
PLATELET # BLD AUTO: 101 X10*3/UL (ref 150–450)
PLATELET # BLD AUTO: 114 X10*3/UL (ref 150–450)
PO2 BLDA: 57 MM HG (ref 85–95)
POTASSIUM BLDA-SCNC: 3.8 MMOL/L (ref 3.5–5.3)
POTASSIUM SERPL-SCNC: 3.3 MMOL/L (ref 3.5–5.3)
POTASSIUM SERPL-SCNC: 3.7 MMOL/L (ref 3.5–5.3)
POTASSIUM SERPL-SCNC: 3.8 MMOL/L (ref 3.5–5.3)
PROT SERPL-MCNC: 4.8 G/DL (ref 6.4–8.2)
PROT SERPL-MCNC: 5.1 G/DL (ref 6.4–8.2)
PROTHROMBIN TIME: 14.4 SECONDS (ref 9.8–12.8)
RBC # BLD AUTO: 3.23 X10*6/UL (ref 4–5.2)
RBC # BLD AUTO: 3.28 X10*6/UL (ref 4–5.2)
SAO2 % BLDA: 92 % (ref 94–100)
SODIUM BLDA-SCNC: 137 MMOL/L (ref 136–145)
SODIUM SERPL-SCNC: 141 MMOL/L (ref 136–145)
SODIUM SERPL-SCNC: 141 MMOL/L (ref 136–145)
SODIUM SERPL-SCNC: 142 MMOL/L (ref 136–145)
WBC # BLD AUTO: 11.9 X10*3/UL (ref 4.4–11.3)
WBC # BLD AUTO: 12.5 X10*3/UL (ref 4.4–11.3)

## 2024-05-19 PROCEDURE — 71045 X-RAY EXAM CHEST 1 VIEW: CPT | Performed by: STUDENT IN AN ORGANIZED HEALTH CARE EDUCATION/TRAINING PROGRAM

## 2024-05-19 PROCEDURE — 37799 UNLISTED PX VASCULAR SURGERY: CPT | Performed by: STUDENT IN AN ORGANIZED HEALTH CARE EDUCATION/TRAINING PROGRAM

## 2024-05-19 PROCEDURE — 2500000004 HC RX 250 GENERAL PHARMACY W/ HCPCS (ALT 636 FOR OP/ED): Performed by: STUDENT IN AN ORGANIZED HEALTH CARE EDUCATION/TRAINING PROGRAM

## 2024-05-19 PROCEDURE — 2020000001 HC ICU ROOM DAILY

## 2024-05-19 PROCEDURE — 85730 THROMBOPLASTIN TIME PARTIAL: CPT | Performed by: PHYSICIAN ASSISTANT

## 2024-05-19 PROCEDURE — 76705 ECHO EXAM OF ABDOMEN: CPT

## 2024-05-19 PROCEDURE — 2500000001 HC RX 250 WO HCPCS SELF ADMINISTERED DRUGS (ALT 637 FOR MEDICARE OP): Performed by: STUDENT IN AN ORGANIZED HEALTH CARE EDUCATION/TRAINING PROGRAM

## 2024-05-19 PROCEDURE — 84132 ASSAY OF SERUM POTASSIUM: CPT | Mod: 91 | Performed by: STUDENT IN AN ORGANIZED HEALTH CARE EDUCATION/TRAINING PROGRAM

## 2024-05-19 PROCEDURE — 83010 ASSAY OF HAPTOGLOBIN QUANT: CPT | Performed by: STUDENT IN AN ORGANIZED HEALTH CARE EDUCATION/TRAINING PROGRAM

## 2024-05-19 PROCEDURE — 84100 ASSAY OF PHOSPHORUS: CPT | Mod: 91 | Performed by: STUDENT IN AN ORGANIZED HEALTH CARE EDUCATION/TRAINING PROGRAM

## 2024-05-19 PROCEDURE — 2500000004 HC RX 250 GENERAL PHARMACY W/ HCPCS (ALT 636 FOR OP/ED): Performed by: PHYSICIAN ASSISTANT

## 2024-05-19 PROCEDURE — 85027 COMPLETE CBC AUTOMATED: CPT | Performed by: STUDENT IN AN ORGANIZED HEALTH CARE EDUCATION/TRAINING PROGRAM

## 2024-05-19 PROCEDURE — P9047 ALBUMIN (HUMAN), 25%, 50ML: HCPCS | Mod: JZ

## 2024-05-19 PROCEDURE — C9113 INJ PANTOPRAZOLE SODIUM, VIA: HCPCS | Performed by: STUDENT IN AN ORGANIZED HEALTH CARE EDUCATION/TRAINING PROGRAM

## 2024-05-19 PROCEDURE — 82947 ASSAY GLUCOSE BLOOD QUANT: CPT | Mod: 91

## 2024-05-19 PROCEDURE — 82247 BILIRUBIN TOTAL: CPT | Mod: 91 | Performed by: STUDENT IN AN ORGANIZED HEALTH CARE EDUCATION/TRAINING PROGRAM

## 2024-05-19 PROCEDURE — 80069 RENAL FUNCTION PANEL: CPT | Mod: CCI | Performed by: STUDENT IN AN ORGANIZED HEALTH CARE EDUCATION/TRAINING PROGRAM

## 2024-05-19 PROCEDURE — 76705 ECHO EXAM OF ABDOMEN: CPT | Performed by: RADIOLOGY

## 2024-05-19 PROCEDURE — 2500000004 HC RX 250 GENERAL PHARMACY W/ HCPCS (ALT 636 FOR OP/ED): Mod: JZ

## 2024-05-19 PROCEDURE — 37799 UNLISTED PX VASCULAR SURGERY: CPT | Mod: WESLAB | Performed by: STUDENT IN AN ORGANIZED HEALTH CARE EDUCATION/TRAINING PROGRAM

## 2024-05-19 PROCEDURE — 99291 CRITICAL CARE FIRST HOUR: CPT | Performed by: ANESTHESIOLOGY

## 2024-05-19 PROCEDURE — 83735 ASSAY OF MAGNESIUM: CPT | Mod: 91 | Performed by: STUDENT IN AN ORGANIZED HEALTH CARE EDUCATION/TRAINING PROGRAM

## 2024-05-19 PROCEDURE — 85610 PROTHROMBIN TIME: CPT | Performed by: PHYSICIAN ASSISTANT

## 2024-05-19 PROCEDURE — 84075 ASSAY ALKALINE PHOSPHATASE: CPT | Mod: 91 | Performed by: STUDENT IN AN ORGANIZED HEALTH CARE EDUCATION/TRAINING PROGRAM

## 2024-05-19 PROCEDURE — 83615 LACTATE (LD) (LDH) ENZYME: CPT | Performed by: STUDENT IN AN ORGANIZED HEALTH CARE EDUCATION/TRAINING PROGRAM

## 2024-05-19 PROCEDURE — 71045 X-RAY EXAM CHEST 1 VIEW: CPT

## 2024-05-19 PROCEDURE — 80069 RENAL FUNCTION PANEL: CPT | Mod: CCI,91 | Performed by: STUDENT IN AN ORGANIZED HEALTH CARE EDUCATION/TRAINING PROGRAM

## 2024-05-19 PROCEDURE — 82330 ASSAY OF CALCIUM: CPT | Mod: 91 | Performed by: STUDENT IN AN ORGANIZED HEALTH CARE EDUCATION/TRAINING PROGRAM

## 2024-05-19 RX ORDER — FUROSEMIDE 10 MG/ML
40 INJECTION INTRAMUSCULAR; INTRAVENOUS ONCE
Status: COMPLETED | OUTPATIENT
Start: 2024-05-19 | End: 2024-05-19

## 2024-05-19 RX ORDER — HYDRALAZINE HYDROCHLORIDE 20 MG/ML
5 INJECTION INTRAMUSCULAR; INTRAVENOUS EVERY 4 HOURS PRN
Status: DISCONTINUED | OUTPATIENT
Start: 2024-05-19 | End: 2024-05-24

## 2024-05-19 RX ORDER — POTASSIUM CHLORIDE 29.8 MG/ML
40 INJECTION INTRAVENOUS ONCE
Qty: 100 ML | Refills: 0 | Status: COMPLETED | OUTPATIENT
Start: 2024-05-19 | End: 2024-05-20

## 2024-05-19 RX ORDER — ALBUMIN HUMAN 250 G/1000ML
25 SOLUTION INTRAVENOUS EVERY 6 HOURS
Status: COMPLETED | OUTPATIENT
Start: 2024-05-19 | End: 2024-05-20

## 2024-05-19 RX ORDER — LABETALOL HYDROCHLORIDE 5 MG/ML
10 INJECTION, SOLUTION INTRAVENOUS EVERY 4 HOURS PRN
Status: DISCONTINUED | OUTPATIENT
Start: 2024-05-19 | End: 2024-05-19

## 2024-05-19 RX ORDER — POTASSIUM CHLORIDE 29.8 MG/ML
40 INJECTION INTRAVENOUS ONCE
Status: COMPLETED | OUTPATIENT
Start: 2024-05-19 | End: 2024-05-19

## 2024-05-19 RX ORDER — LABETALOL HYDROCHLORIDE 5 MG/ML
20 INJECTION, SOLUTION INTRAVENOUS EVERY 4 HOURS PRN
Status: DISCONTINUED | OUTPATIENT
Start: 2024-05-19 | End: 2024-05-24

## 2024-05-19 RX ORDER — FLUTICASONE PROPIONATE 50 MCG
2 SPRAY, SUSPENSION (ML) NASAL DAILY
Status: DISCONTINUED | OUTPATIENT
Start: 2024-05-20 | End: 2024-05-20

## 2024-05-19 RX ADMIN — FUROSEMIDE 40 MG: 10 INJECTION, SOLUTION INTRAMUSCULAR; INTRAVENOUS at 22:07

## 2024-05-19 RX ADMIN — ALBUMIN HUMAN 25 G: 0.25 SOLUTION INTRAVENOUS at 11:06

## 2024-05-19 RX ADMIN — ASPIRIN 150 MG: 300 SUPPOSITORY RECTAL at 09:30

## 2024-05-19 RX ADMIN — LABETALOL HYDROCHLORIDE 10 MG: 5 INJECTION, SOLUTION INTRAVENOUS at 20:58

## 2024-05-19 RX ADMIN — POTASSIUM CHLORIDE 20 MEQ: 200 INJECTION, SOLUTION INTRAVENOUS at 03:25

## 2024-05-19 RX ADMIN — ALBUMIN HUMAN 25 G: 0.25 SOLUTION INTRAVENOUS at 16:53

## 2024-05-19 RX ADMIN — HYDROMORPHONE HYDROCHLORIDE 0.2 MG: 1 INJECTION, SOLUTION INTRAMUSCULAR; INTRAVENOUS; SUBCUTANEOUS at 13:48

## 2024-05-19 RX ADMIN — HYDROMORPHONE HYDROCHLORIDE 0.2 MG: 1 INJECTION, SOLUTION INTRAMUSCULAR; INTRAVENOUS; SUBCUTANEOUS at 23:38

## 2024-05-19 RX ADMIN — HYDROMORPHONE HYDROCHLORIDE 0.2 MG: 1 INJECTION, SOLUTION INTRAMUSCULAR; INTRAVENOUS; SUBCUTANEOUS at 21:06

## 2024-05-19 RX ADMIN — FUROSEMIDE 40 MG: 10 INJECTION, SOLUTION INTRAMUSCULAR; INTRAVENOUS at 11:22

## 2024-05-19 RX ADMIN — POTASSIUM CHLORIDE 40 MEQ: 29.8 INJECTION, SOLUTION INTRAVENOUS at 22:06

## 2024-05-19 RX ADMIN — HYDROMORPHONE HYDROCHLORIDE 0.2 MG: 1 INJECTION, SOLUTION INTRAMUSCULAR; INTRAVENOUS; SUBCUTANEOUS at 04:35

## 2024-05-19 RX ADMIN — HYDROMORPHONE HYDROCHLORIDE 0.2 MG: 1 INJECTION, SOLUTION INTRAMUSCULAR; INTRAVENOUS; SUBCUTANEOUS at 06:36

## 2024-05-19 RX ADMIN — HYDROMORPHONE HYDROCHLORIDE 0.4 MG: 1 INJECTION, SOLUTION INTRAMUSCULAR; INTRAVENOUS; SUBCUTANEOUS at 09:29

## 2024-05-19 RX ADMIN — CEFOXITIN SODIUM 2 G: 2 POWDER, FOR SOLUTION INTRAVENOUS at 04:08

## 2024-05-19 RX ADMIN — FUROSEMIDE 40 MG: 10 INJECTION, SOLUTION INTRAMUSCULAR; INTRAVENOUS at 03:25

## 2024-05-19 RX ADMIN — HEPARIN SODIUM 5000 UNITS: 5000 INJECTION INTRAVENOUS; SUBCUTANEOUS at 04:08

## 2024-05-19 RX ADMIN — PANTOPRAZOLE SODIUM 40 MG: 40 INJECTION, POWDER, FOR SOLUTION INTRAVENOUS at 06:07

## 2024-05-19 RX ADMIN — POTASSIUM CHLORIDE 40 MEQ: 29.8 INJECTION, SOLUTION INTRAVENOUS at 09:29

## 2024-05-19 RX ADMIN — HYDROMORPHONE HYDROCHLORIDE 0.2 MG: 1 INJECTION, SOLUTION INTRAMUSCULAR; INTRAVENOUS; SUBCUTANEOUS at 02:26

## 2024-05-19 RX ADMIN — HYDROCORTISONE SODIUM SUCCINATE 50 MG: 100 INJECTION, POWDER, FOR SOLUTION INTRAMUSCULAR; INTRAVENOUS at 11:22

## 2024-05-19 RX ADMIN — HEPARIN SODIUM 5000 UNITS: 5000 INJECTION INTRAVENOUS; SUBCUTANEOUS at 20:51

## 2024-05-19 RX ADMIN — ALBUMIN HUMAN 25 G: 0.25 SOLUTION INTRAVENOUS at 22:07

## 2024-05-19 RX ADMIN — POTASSIUM CHLORIDE 40 MEQ: 29.8 INJECTION, SOLUTION INTRAVENOUS at 17:59

## 2024-05-19 RX ADMIN — HYDROCORTISONE SODIUM SUCCINATE 50 MG: 100 INJECTION, POWDER, FOR SOLUTION INTRAMUSCULAR; INTRAVENOUS at 01:11

## 2024-05-19 RX ADMIN — CEFOXITIN SODIUM 2 G: 2 POWDER, FOR SOLUTION INTRAVENOUS at 20:52

## 2024-05-19 RX ADMIN — HYDROMORPHONE HYDROCHLORIDE 0.2 MG: 1 INJECTION, SOLUTION INTRAMUSCULAR; INTRAVENOUS; SUBCUTANEOUS at 16:50

## 2024-05-19 RX ADMIN — HEPARIN SODIUM 5000 UNITS: 5000 INJECTION INTRAVENOUS; SUBCUTANEOUS at 13:48

## 2024-05-19 RX ADMIN — CEFOXITIN SODIUM 2 G: 2 POWDER, FOR SOLUTION INTRAVENOUS at 13:47

## 2024-05-19 ASSESSMENT — PAIN DESCRIPTION - LOCATION
LOCATION: ABDOMEN

## 2024-05-19 ASSESSMENT — PAIN SCALES - GENERAL
PAINLEVEL_OUTOF10: 0 - NO PAIN
PAINLEVEL_OUTOF10: 6
PAINLEVEL_OUTOF10: 0 - NO PAIN
PAINLEVEL_OUTOF10: 4
PAINLEVEL_OUTOF10: 0 - NO PAIN
PAINLEVEL_OUTOF10: 0 - NO PAIN
PAINLEVEL_OUTOF10: 6
PAINLEVEL_OUTOF10: 6
PAINLEVEL_OUTOF10: 5 - MODERATE PAIN
PAINLEVEL_OUTOF10: 0 - NO PAIN
PAINLEVEL_OUTOF10: 7
PAINLEVEL_OUTOF10: 0 - NO PAIN
PAINLEVEL_OUTOF10: 0 - NO PAIN
PAINLEVEL_OUTOF10: 4

## 2024-05-19 ASSESSMENT — PAIN - FUNCTIONAL ASSESSMENT

## 2024-05-19 ASSESSMENT — PAIN DESCRIPTION - ORIENTATION
ORIENTATION: LEFT

## 2024-05-19 NOTE — CARE PLAN
Problem: Meds/Post-op Pain  Goal: Pain controlled to tolerate pain level  5/19/2024 0050 by Aldo Sen RN  Outcome: Progressing  5/19/2024 0049 by Aldo Sen RN  Outcome: Progressing  Goal: Tolerates prescribed medication  5/19/2024 0050 by Aldo Sen RN  Outcome: Progressing  5/19/2024 0049 by Aldo Sen RN  Outcome: Progressing     Problem: DVT/VTE Prevention/Activity  Goal: No decrease in circulation/sensation  5/19/2024 0050 by Aldo Sen RN  Outcome: Progressing  5/19/2024 0049 by Aldo Sen RN  Outcome: Progressing  Goal: Prevent skin breakdown  5/19/2024 0050 by Aldo Sen RN  Outcome: Progressing  5/19/2024 0049 by Aldo Sen RN  Outcome: Progressing  Goal: Return to preop oxygenation status  5/19/2024 0050 by Aldo Sen RN  Outcome: Progressing  5/19/2024 0049 by Aldo Sen RN  Outcome: Progressing  Goal: Tolerates optimal activity  5/19/2024 0050 by Aldo Sen RN  Outcome: Progressing  5/19/2024 0049 by Aldo Sen RN  Outcome: Progressing  Goal: Increase self care and/or family involvement in 24 hrs.  5/19/2024 0050 by Aldo Sen RN  Outcome: Progressing  5/19/2024 0049 by Aldo Sen RN  Outcome: Progressing     Problem: Wound care/infection prevention  Goal: No signs of infection in 24 hrs.  5/19/2024 0050 by Aldo Sen RN  Outcome: Progressing  5/19/2024 0049 by Aldo Sen RN  Outcome: Progressing  Goal: No unexpected bleeding from incision this shift  5/19/2024 0050 by Aldo Sen RN  Outcome: Progressing  5/19/2024 0049 by Aldo Sen RN  Outcome: Progressing     Problem: Diet/fluid balance  Goal: Adequate urinary output  Outcome: Progressing  Goal: Free from nausea/vomiting  Outcome: Progressing  Goal: Tolerates prescribed diet  Outcome: Progressing     Problem: Other  goals  Goal: No change in neurological status  5/19/2024 0050 by Aldo Sen RN  Outcome: Progressing  5/19/2024 0049 by Aldo Sen RN  Outcome: Progressing  Goal: Stabilize vital signs (return to 10% of baseline)  5/19/2024 0050 by Aldo Sen RN  Outcome: Progressing  5/19/2024 0049 by Aldo Sen RN  Outcome: Progressing     Problem: Skin  Goal: Participates in plan/prevention/treatment measures  Outcome: Progressing  Goal: Prevent/manage excess moisture  Outcome: Progressing  Goal: Prevent/minimize sheer/friction injuries  Outcome: Progressing  Goal: Promote skin healing  Outcome: Progressing     Problem: Pain  Goal: My pain/discomfort is manageable  Outcome: Progressing     Problem: Safety  Goal: Patient will be injury free during hospitalization  Outcome: Progressing  Goal: I will remain free of falls  Outcome: Progressing     Problem: Daily Care  Goal: Daily care needs are met  Outcome: Progressing     Problem: Psychosocial Needs  Goal: Demonstrates ability to cope with hospitalization/illness  Outcome: Progressing  Goal: Collaborate with me, my family, and caregiver to identify my specific goals  Outcome: Progressing     Problem: Diet/fluid balance  Goal: Return in bowel function  Outcome: Not Progressing     Problem: Skin  Goal: Promote/optimize nutrition  Outcome: Not Progressing

## 2024-05-19 NOTE — PROGRESS NOTES
History Of Present Illness  Klaudia Ratliff is a 68 y.o. female who presents with aortic occlusion with perivisceral aortic thrombus formation. She complains of persistent right lower extremity pain and numbness. No ulcers or wounds.     Past Medical History  She has a past medical history of Abnormal ECG (02/20/2024), Acute sinusitis, unspecified (03/16/2016), Aortic occlusion (CMS-[facility]), Claudication of both lower extremities (CMS-[facility]), COPD (chronic obstructive pulmonary disease) (Multi), Coronary artery disease, DVT (deep venous thrombosis) (Multi), Essential (primary) hypertension (07/18/2022), Headache, unspecified (07/09/2013), Hyperlipidemia, Joint pain, Melena, Right shoulder pain (11/01/2023), Ulcer, stomach peptic, and Wound of left groin.    Surgical History    Social History  She reports that she quit smoking about 2 months ago. Her smoking use included cigarettes. She has been exposed to tobacco smoke. She has never used smokeless tobacco. She reports that she does not currently use alcohol. She reports current drug use. Drug: Marijuana.    Family History  No family history on file.     Allergies  Amlodipine, Clarithromycin, and Doxycycline    Review of Systems  No change since last evaluation    Physical Exam  Gen: alert and awake. Oriented to time, place and person. In no acute distress.  HEENT: normocephalic, sclera non icteric. EOMI. Supple without lymphadenopathy  Cor: RRR,   Lung: clear to auscultation  Abd: soft, non tender and non distended.  No evidence of hepatosplenomegaly  Ext: No femoral pulses palpable.  Psych: Normal    Last Recorded Vitals  There were no vitals taken for this visit.       Assessment/Plan   Aortic occlusion with several bilateral lower extremity ischemia    Plan for perivisceral aortic endarterectomy and aortobifemoral bypass grafting    Jovan Zelaya MD  Professor & Chief, Division of Vascular Surgery & Endovascular Therapy  , Vascular Residency &  Fellowship Training Programs  [employer] [facility] in Vascular [place]  [facility] / [facility] [place]

## 2024-05-19 NOTE — PROGRESS NOTES
"                                                     Surgical Intensive Care Unit Progress Note      Subjective   Received a dose of lasix 40 mg overnight with a good response   Bilirubin rising to 6.1 of unclear etiology. US liver: Hepatic steatosis without focal lesions. Otherwise, unremarkable  right upper quadrant ultrasound.    Objective   Physical Exam  Vitals reviewed.   Constitutional:       Appearance: Normal appearance.   HENT:      Head: Normocephalic and atraumatic.      Mouth/Throat:      Mouth: Mucous membranes are moist.      Pharynx: Oropharynx is clear.   Eyes:      Conjunctiva/sclera: Conjunctivae normal.   Cardiovascular:      Rate and Rhythm: Normal rate and regular rhythm.      Pulses: Normal pulses.      Heart sounds: Normal heart sounds.   Pulmonary:      Effort: Pulmonary effort is normal.      Breath sounds: Normal breath sounds.   Abdominal:      General: There is no distension.      Palpations: Abdomen is soft.   Genitourinary:     Comments: Kenney draining clear urine   Musculoskeletal:         General: Normal range of motion.      Cervical back: Neck supple.      Right lower leg: Edema present.      Left lower leg: Edema present.   Skin:     General: Skin is warm and dry.      Comments: + Bullae on right shin    Neurological:      General: No focal deficit present.      Mental Status: She is alert and oriented to person, place, and time.   Psychiatric:         Mood and Affect: Mood normal.         Behavior: Behavior normal.       Last Recorded Vitals  Blood pressure 119/56, pulse 82, temperature 36.5 °C (97.7 °F), temperature source Temporal, resp. rate 17, height 1.575 m (5' 2\"), weight 73.6 kg (162 lb 3.2 oz), SpO2 97%.  Intake/Output last 3 Shifts:  I/O last 3 completed shifts:  In: 4960.7 (67.4 mL/kg) [I.V.:2560.7 (34.8 mL/kg); Blood:1500; IV Piggyback:900]  Out: 4143 (56.3 mL/kg) [Urine:3503 (1.3 mL/kg/hr); Emesis/NG output:200; Chest Tube:440]  Weight: 73.6 kg     Relevant " Results  Scheduled medications  aspirin, 150 mg, rectal, Daily  cefOXitin, 2 g, intravenous, q8h  heparin, 5,000 Units, subcutaneous, q8h  hydrocortisone sodium succinate, 50 mg, intravenous, q12h  insulin lispro, 0-10 Units, subcutaneous, q4h  pantoprazole, 40 mg, intravenous, Daily before breakfast      Continuous medications  lactated Ringer's, 5 mL/hr, Last Rate: 5 mL/hr (05/19/24 0700)      PRN medications  PRN medications: bisacodyl, calcium gluconate, calcium gluconate, dextrose **OR** glucagon, HYDROmorphone, HYDROmorphone, magnesium sulfate, magnesium sulfate, oxygen, potassium chloride, potassium chloride        Component      Latest Ref Rng 5/19/2024   LEUKOCYTES (10*3/UL) IN BLOOD BY AUTOMATED COUNT, [place]ese      4.4 - 11.3 x10*3/uL 11.9 (H)    nRBC      0.0 - 0.0 /100 WBCs 0.0    ERYTHROCYTES (10*6/UL) IN BLOOD BY AUTOMATED COUNT, [place]ese      4.00 - 5.20 x10*6/uL 3.28 (L)    HEMOGLOBIN      12.0 - 16.0 g/dL 10.6 (L)    HEMATOCRIT      36.0 - 46.0 % 30.4 (L)    MCV      80 - 100 fL 93    MCH      26.0 - 34.0 pg 32.3    MCHC      32.0 - 36.0 g/dL 34.9    RED CELL DISTRIBUTION WIDTH      11.5 - 14.5 % 16.1 (H)    PLATELETS (10*3/UL) IN BLOOD AUTOMATED COUNT, [place]ese      150 - 450 x10*3/uL 101 (L)    GLUCOSE      74 - 99 mg/dL 111 (H)    SODIUM      136 - 145 mmol/L 141    POTASSIUM      3.5 - 5.3 mmol/L 3.8    CHLORIDE      98 - 107 mmol/L 108 (H)    Bicarbonate      21 - 32 mmol/L 23    Anion Gap      10 - 20 mmol/L 14    Blood Urea Nitrogen      6 - 23 mg/dL 32 (H)    Creatinine      0.50 - 1.05 mg/dL 1.41 (H)    EGFR      >60 mL/min/1.73m*2 41 (L)    Calcium      8.6 - 10.6 mg/dL 8.2 (L)    Albumin      3.4 - 5.0 g/dL 3.5    Bilirubin Total      0.0 - 1.2 mg/dL 6.1 (H)    Bilirubin, Direct      0.0 - 0.3 mg/dL 4.1 (H)    Alkaline Phosphatase      33 - 136 U/L 44    ALT      7 - 45 U/L 24    AST      9 - 39 U/L 24    Total Protein      6.4 - 8.2 g/dL 4.8 (L)    MAGNESIUM      1.60 - 2.40 mg/dL 2.22     POCT Calcium, Ionized      1.1 - 1.33 mmol/L 1.18    PHOSPHORUS      2.5 - 4.9 mg/dL 3.4    LDH      84 - 246 U/L 159         Assessment/Plan     Klaudia Ratliff is a 68 y.o. female with past medical history of HTN, HLD, obesity, COPD, gout, RLE DVT, right PE s/p mechanical thrombectomy and IVC filter placement (2/22/2024) discharged on eliquis and bridged to Lovenox (5/13), and aortic occlusion. Patient presents to SICU now s/p aortofem bypass via thoracoabdominal approach. Arrived to SICU intubated, sedated on prop @30, requiring norepi @ 0.04 and epi @ 0.02 to maintain goal MAP 80. Intubated with double lumen tube, neuromuscular blockade reversed by anesthesia team. Extubated overnight on POD 0 and received 1u pRBC. Post-op course stable with good recovery trajectory.      Plan:  NEURO: PMH of sciatica. A+Ox3, no focal deficits, reassuring neurovasc checks overnight. Pain well controlled. No neuro issues at this time.  - hydromorphone as needed pain  - q4h neuro/neurovasc/pain assessments  - PT/OT consulted  - Home meds; Gabapentin 100 mg BID, on hold while NPO      CV: History of HTN, HLD, obesity, RLE DVT, Right PE (s/p mechanical thrombectomy 2/24), and aortic occlusion. Baseline BP (pre-op) 161/76. Baseline cardiac function normal EF (60-65%), elevated LA pressure (2/2/24). Carotid Duplex < 50% ICA stenosis b/l. Now s/p aortofem bypass. Lactate cleared. Hemodynamically stable and now off pressors.   - goal MAP liberalized to >70 per vascular surgery  - continuous ekg/abp monitoring  - Continue rectal aspirin per vascular surgery  - home meds: Eliquis 5 mg BID (held since 5/12, bridged to lovenox 5/13), ASA 81 mg, Atenolol 50 mg, atorvastatin 80 mg, losartan 100 mg, Vitamin C 250 mg     PULM: History of COPD, pulmonary embolism (2/2/24). No diaphragm pacing wires placed intra-op. Arrived to SICU Intubated with AL in place. Extubated overnight on POD 0. No respiratory issues at this time. Minimal O2  requirement.    - wean 02 for goal SpO2 >94%  - Q1h IS   - additional bronchial hygiene as indicated  - Daily CXR  - chest tube to water seal  - Home meds: Tiotropium 2.5 mcg 2 puffs daily.     GI: History of gastric ulcer (3/2024). Strict NPO. No issues at this time.   - continue NG-> LIWS, will repeat KUB to confirm positioning  -Bilirubin rising to 6.1 of unclear etiology. US liver: Hepatic steatosis without focal lesions. Otherwise, unremarkable  right upper quadrant ultrasound--> Recheck this afternoon, if continues to rise vascular surgery would a hepatology consult   - PPI for GI prophylaxis.     : Baseline cr 0.9-1.1. Supraceliac clamp x27 minutes. Additional left renal ischemic time of 47 minutes during bypass. Initial post-op mild ELROY but adequate UOP. Serum Cr now downtrending.  Hypervolemia. Received a dose of lasix 40 mg overnight with a good response   - q12h RFP  - Volume resuscitation as needed.    - Redose lasix as needed for goal neg 1-2 L  - Goal U/O >1-2ml/kg/hr.    - Replete electrolytes to goal K>4, Mg>2, Phos>2.5, ionized Ca>1.10 if creatinine remains WNL or <1.5 with adequate uop.  - Home meds: Lasix 40 mg PRN, Potassium chloride 10 mEq ER      HEME: Acute surgical blood loss anemia. OR EBL 1L, received 2u pRBC and 460ml cell saver. Received another 1u pRBC overnight on POD 0. Mild thrombocytopenia stable. 5/18 Anti PF4 Neg   - Daily CBC, coag  - scds for dvt prophylaxis.  - Continue SQH   - start rectal ASA per vascular surgery  -Obtain an US of lower extremities to r/o DVT Given edema   - Home meds: Lovenox 80 mg q12h     ENDO: No history of diabetes or hypothyroidism. A1c not on file, TSH 1.86. Arrived to ICU on insulin gtt, turned off overnight on POD 0. Mild hyperglycemia controlled with SSI.   - Q4h BG  - SSI Lispro per ICU protocol  -tapering SDS , currently on solucortef 50 mg q 12 --> wean to daily      ID: Afebrile. Mild leukocytosis likely reactive, No signs of infection at this  time.   - q12h WBC  - Continue cefoxitin while chest tube in place  - monitor for s/s infection     Lines:   - PIV right forearm  - Kenney       Dispo: Continue ICU care.  Discussed with SICU attending Dr. Carlton.  SICU team phone [phone]  Minerva Juarez PA-C

## 2024-05-19 NOTE — PROGRESS NOTES
VASCULAR SURGERY PROGRESS NOTE  Subjective   No acute overnight events.  Patient sitting in chair this morning. Reported some pain around the chest tube site.  No return of bowel function. Uptrending bilirubin, no transaminates. No RUQ pain    Objective   Vitals:  Heart Rate:  [70-98]   Temp:  [36.1 °C (97 °F)-36.6 °C (97.9 °F)]   Resp:  [14-26]   BP: (114-119)/(55-71)   SpO2:  [91 %-100 %]     Exam:  Constitutional: No acute distress, resting comfortably  Neuro:  AOx3, grossly intact  ENMT: NGT in place  CV: no tachycardia on bedside monitor  Pulm: non-labored on nasal cannula, left chest tube to suction with serosanguineous output and no airleak  GI: soft, appropriately tender, thoraco-RP incision covered with dressing  Skin: blistering of bilateral lower extremities from volume overload  Musculoskeletal: moving all extremities  Extremities: Palpable bilateral DPs, sensory and motor intact in bilateral lower extremities, bilateral groin incisions covered with dressing    Labs:  Results from last 7 days   Lab Units 05/19/24  0111 05/18/24  1317 05/18/24  1139   WBC AUTO x10*3/uL 11.9* 10.9 12.3*   HEMOGLOBIN g/dL 10.6* 9.9* 10.4*   PLATELETS AUTO x10*3/uL 101* 92* 98*      Results from last 7 days   Lab Units 05/19/24  0607 05/19/24  0111 05/18/24  1730   SODIUM mmol/L 142 141 140   POTASSIUM mmol/L 3.7 3.8 3.9   CHLORIDE mmol/L 107 108* 107   CO2 mmol/L 23 23 22   BUN mg/dL 33* 32* 29*   CREATININE mg/dL 1.39* 1.41* 1.31*   GLUCOSE mg/dL 124* 111* 110*   MAGNESIUM mg/dL  --  2.22  --    PHOSPHORUS mg/dL 3.4 3.4 3.0      Results from last 7 days   Lab Units 05/18/24  0118 05/17/24  1037 05/16/24  1745   INR  1.6* 1.4* 1.4*   PROTIME seconds 18.1* 16.3* 15.8*   APTT seconds 32 28 29     Assessment/Plan   Klaudia Ratliff is 68 y.o. female with history of HTN, HLD, obesity, COPD, gout, RLE DVT/PE s/p mechanical thrombectomy and IVC filter (2/2024, on Eliquis) who is perivisceral aortic endarterectomy and  aortobifemoral bypass.    PF4 negative  Stable H&H  Adequate urine output with diuresis yesterday, stable Cr  No return of bowel function  Elevated bilirubin, no transaminates, RUQ US unremarkable  LDH negative, haptoglobin pending for hemolysis workup, although low suspicion since H&H is stable    Plan:  - q4 NV assessment  - pain control  - bronchopulmonary hygiene  - maintain chest tube to water seal  - daily CxR while chest tube in place  - continue rectal aspirin  - goal MAP >70 mmHg  - discontinue NGT  - strict NPO, no PO meds  - trend LFTs, if rising bilirubin will consult hepatology   - HLIV, IV lasix 40mg and repeat again in afternoon  - Bilateral lower extremity duplex  - Maintain K >4, Mg >2  - maintain Hgb >10, PLT >100, INR <1.4  - continue cefoxitin while chest tube is in place  - PT/OT  - SCDs and SQH    D/w attending, Dr. Garcia Huynh MD  Vascular Surgery PGY-4  Team pager: [phone]

## 2024-05-19 NOTE — PROGRESS NOTES
"                                                     Surgical Intensive Care Unit Progress Note      Subjective   Received a dose of lasix 40 mg overnight with a good response   Bilirubin rising to 6.1 of unclear etiology. US liver: Hepatic steatosis without focal lesions. Otherwise, unremarkable  right upper quadrant ultrasound.    Objective   Physical Exam  Vitals reviewed.   Constitutional:       Appearance: Normal appearance.   HENT:      Head: Normocephalic and atraumatic.      Mouth/Throat:      Mouth: Mucous membranes are moist.      Pharynx: Oropharynx is clear.   Eyes:      Conjunctiva/sclera: Conjunctivae normal.   Cardiovascular:      Rate and Rhythm: Normal rate and regular rhythm.      Pulses: Normal pulses.      Heart sounds: Normal heart sounds.   Pulmonary:      Effort: Pulmonary effort is normal.      Breath sounds: Normal breath sounds.   Abdominal:      General: There is no distension.      Palpations: Abdomen is soft.   Genitourinary:     Comments: Kenney draining clear urine   Musculoskeletal:         General: Normal range of motion.      Cervical back: Neck supple.      Right lower leg: Edema present.      Left lower leg: Edema present.   Skin:     General: Skin is warm and dry.      Comments: + Bullae on right shin    Neurological:      General: No focal deficit present.      Mental Status: She is alert and oriented to person, place, and time.   Psychiatric:         Mood and Affect: Mood normal.         Behavior: Behavior normal.       Last Recorded Vitals  Blood pressure 119/56, pulse 83, temperature 36.5 °C (97.7 °F), temperature source Temporal, resp. rate 20, height 1.575 m (5' 2\"), weight 73.6 kg (162 lb 3.2 oz), SpO2 93%.  Intake/Output last 3 Shifts:  I/O last 3 completed shifts:  In: 4960.7 (67.4 mL/kg) [I.V.:2560.7 (34.8 mL/kg); Blood:1500; IV Piggyback:900]  Out: 4143 (56.3 mL/kg) [Urine:3503 (1.3 mL/kg/hr); Emesis/NG output:200; Chest Tube:440]  Weight: 73.6 kg     Relevant " Results  Scheduled medications  albumin human, 25 g, intravenous, q6h  aspirin, 150 mg, rectal, Daily  cefOXitin, 2 g, intravenous, q8h  heparin, 5,000 Units, subcutaneous, q8h  hydrocortisone sodium succinate, 50 mg, intravenous, q24h  insulin lispro, 0-10 Units, subcutaneous, q4h  pantoprazole, 40 mg, intravenous, Daily before breakfast      Continuous medications  lactated Ringer's, 5 mL/hr, Last Rate: 5 mL/hr (05/19/24 0700)      PRN medications  PRN medications: bisacodyl, calcium gluconate, calcium gluconate, dextrose **OR** glucagon, HYDROmorphone, HYDROmorphone, magnesium sulfate, magnesium sulfate, oxygen, potassium chloride, potassium chloride        Component      Latest Ref Rng 5/19/2024   LEUKOCYTES (10*3/UL) IN BLOOD BY AUTOMATED COUNT, [place]gr[place]      4.4 - 11.3 x10*3/uL 11.9 (H)    nRBC      0.0 - 0.0 /100 WBCs 0.0    ERYTHROCYTES (10*6/UL) IN BLOOD BY AUTOMATED COUNT, [place]      4.00 - 5.20 x10*6/uL 3.28 (L)    HEMOGLOBIN      12.0 - 16.0 g/dL 10.6 (L)    HEMATOCRIT      36.0 - 46.0 % 30.4 (L)    MCV      80 - 100 fL 93    MCH      26.0 - 34.0 pg 32.3    MCHC      32.0 - 36.0 g/dL 34.9    RED CELL DISTRIBUTION WIDTH      11.5 - 14.5 % 16.1 (H)    PLATELETS (10*3/UL) IN BLOOD AUTOMATED COUNT, [place]      150 - 450 x10*3/uL 101 (L)    GLUCOSE      74 - 99 mg/dL 111 (H)    SODIUM      136 - 145 mmol/L 141    POTASSIUM      3.5 - 5.3 mmol/L 3.8    CHLORIDE      98 - 107 mmol/L 108 (H)    Bicarbonate      21 - 32 mmol/L 23    Anion Gap      10 - 20 mmol/L 14    Blood Urea Nitrogen      6 - 23 mg/dL 32 (H)    Creatinine      0.50 - 1.05 mg/dL 1.41 (H)    EGFR      >60 mL/min/1.73m*2 41 (L)    Calcium      8.6 - 10.6 mg/dL 8.2 (L)    Albumin      3.4 - 5.0 g/dL 3.5    Bilirubin Total      0.0 - 1.2 mg/dL 6.1 (H)    Bilirubin, Direct      0.0 - 0.3 mg/dL 4.1 (H)    Alkaline Phosphatase      33 - 136 U/L 44    ALT      7 - 45 U/L 24    AST      9 - 39 U/L 24    Total Protein      6.4 - 8.2 g/dL 4.8 (L)     MAGNESIUM      1.60 - 2.40 mg/dL 2.22    POCT Calcium, Ionized      1.1 - 1.33 mmol/L 1.18    PHOSPHORUS      2.5 - 4.9 mg/dL 3.4    LDH      84 - 246 U/L 159         Assessment/Plan     Klaudia Ratliff is a 68 y.o. female with past medical history of HTN, HLD, obesity, COPD, gout, RLE DVT, right PE s/p mechanical thrombectomy and IVC filter placement (2/22/2024) discharged on eliquis and bridged to Lovenox (5/13), and aortic occlusion. Patient presents to SICU now s/p aortofem bypass via thoracoabdominal approach. Arrived to SICU intubated, sedated on prop @30, requiring norepi @ 0.04 and epi @ 0.02 to maintain goal MAP 80. Intubated with double lumen tube, neuromuscular blockade reversed by anesthesia team. Extubated overnight on POD 0 and received 1u pRBC. Post-op course stable with good recovery trajectory.      Plan:  NEURO: PMH of sciatica. A+Ox3, no focal deficits, reassuring neurovasc checks overnight. Pain well controlled. No neuro issues at this time.  - hydromorphone as needed pain  - q4h neuro/neurovasc/pain assessments  - PT/OT consulted  - Home meds; Gabapentin 100 mg BID, on hold while NPO      CV: History of HTN, HLD, obesity, RLE DVT, Right PE (s/p mechanical thrombectomy 2/24), and aortic occlusion. Baseline BP (pre-op) 161/76. Baseline cardiac function normal EF (60-65%), elevated LA pressure (2/2/24). Carotid Duplex < 50% ICA stenosis b/l. Now s/p aortofem bypass. Lactate cleared. Hemodynamically stable and now off pressors.   - goal MAP liberalized to >70 per vascular surgery  - continuous ekg/abp monitoring  - Continue rectal aspirin per vascular surgery  - home meds: Eliquis 5 mg BID (held since 5/12, bridged to lovenox 5/13), ASA 81 mg, Atenolol 50 mg, atorvastatin 80 mg, losartan 100 mg, Vitamin C 250 mg     PULM: History of COPD, pulmonary embolism (2/2/24). No diaphragm pacing wires placed intra-op. Arrived to SICU Intubated with AL in place. Extubated overnight on POD 0. No  respiratory issues at this time. Minimal O2 requirement.    - wean 02 for goal SpO2 >94%  - Q1h IS   - additional bronchial hygiene as indicated  - Daily CXR  - chest tube to water seal  - Home meds: Tiotropium 2.5 mcg 2 puffs daily.     GI: History of gastric ulcer (3/2024). Strict NPO. No issues at this time.   - continue NG-> LIWS, will repeat KUB to confirm positioning  -Bilirubin rising to 6.1 of unclear etiology. US liver: Hepatic steatosis without focal lesions. Otherwise, unremarkable  right upper quadrant ultrasound--> Recheck this afternoon, if continues to rise vascular surgery would a hepatology consult   - PPI for GI prophylaxis.     : Baseline cr 0.9-1.1. Supraceliac clamp x27 minutes. Additional left renal ischemic time of 47 minutes during bypass. Initial post-op mild ELROY but adequate UOP. Serum Cr now downtrending.  Hypervolemia. Received a dose of lasix 40 mg overnight with a good response   - q12h RFP  - Volume resuscitation as needed.    - Redose lasix as needed for goal neg 1-2 L  - Goal U/O >1-2ml/kg/hr.    - Replete electrolytes to goal K>4, Mg>2, Phos>2.5, ionized Ca>1.10 if creatinine remains WNL or <1.5 with adequate uop.  - Home meds: Lasix 40 mg PRN, Potassium chloride 10 mEq ER      HEME: Acute surgical blood loss anemia. OR EBL 1L, received 2u pRBC and 460ml cell saver. Received another 1u pRBC overnight on POD 0. Mild thrombocytopenia stable. 5/18 Anti PF4 Neg   - Daily CBC, coag  - scds for dvt prophylaxis.  - Continue SQH   - start rectal ASA per vascular surgery  -Obtain an US of lower extremities to r/o DVT Given edema   - Home meds: Lovenox 80 mg q12h     ENDO: No history of diabetes or hypothyroidism. A1c not on file, TSH 1.86. Arrived to ICU on insulin gtt, turned off overnight on POD 0. Mild hyperglycemia controlled with SSI.   - Q4h BG  - SSI Lispro per ICU protocol  -tapering SDS , currently on solucortef 50 mg q 12 --> wean to daily      ID: Afebrile. Mild leukocytosis  likely reactive, No signs of infection at this time.   - q12h WBC  - Continue cefoxitin while chest tube in place  - monitor for s/s infection     Lines:   - PIV right forearm  - Kenney    I have discussed the case with the resident/advanced practice provider. I have personally performed a history, physical exam, and my own medical decision making. I have reviewed the note and agree with the findings and plan with the following exceptions as identified below. I have personally provided 37 minutes of critical care time exclusive of time spent on separately billable procedures. Time includes review of laboratory data, radiology results, discussion with consultants, family and monitoring for potential decompensation. Interventions were performed as documented above.      Family/Surrogate updated with plan of care.  Code status addressed/up to date.     Neo Carlton MD

## 2024-05-20 ENCOUNTER — APPOINTMENT (OUTPATIENT)
Dept: VASCULAR MEDICINE | Facility: HOSPITAL | Age: 69
DRG: 673 | End: 2024-05-20
Payer: MEDICARE

## 2024-05-20 ENCOUNTER — APPOINTMENT (OUTPATIENT)
Dept: RADIOLOGY | Facility: HOSPITAL | Age: 69
DRG: 673 | End: 2024-05-20
Payer: MEDICARE

## 2024-05-20 LAB
ALBUMIN SERPL BCP-MCNC: 3.8 G/DL (ref 3.4–5)
ALBUMIN SERPL BCP-MCNC: 4.2 G/DL (ref 3.4–5)
ALP SERPL-CCNC: 177 U/L (ref 33–136)
ALP SERPL-CCNC: 82 U/L (ref 33–136)
ALT SERPL W P-5'-P-CCNC: 43 U/L (ref 7–45)
ALT SERPL W P-5'-P-CCNC: 80 U/L (ref 7–45)
ANION GAP BLDA CALCULATED.4IONS-SCNC: 12 MMO/L (ref 10–25)
ANION GAP BLDV CALCULATED.4IONS-SCNC: 9 MMOL/L (ref 10–25)
ANION GAP SERPL CALC-SCNC: 14 MMOL/L (ref 10–20)
ANION GAP SERPL CALC-SCNC: 17 MMOL/L (ref 10–20)
APTT PPP: 32 SECONDS (ref 27–38)
AST SERPL W P-5'-P-CCNC: 39 U/L (ref 9–39)
AST SERPL W P-5'-P-CCNC: 82 U/L (ref 9–39)
B2 GLYCOPROT1 IGA SER-ACNC: 7.1 U/ML
B2 GLYCOPROT1 IGG SER-ACNC: <1.4 U/ML
B2 GLYCOPROT1 IGM SER-ACNC: 1.9 U/ML
BASE EXCESS BLDA CALC-SCNC: -0.2 MMOL/L (ref -2–3)
BASE EXCESS BLDV CALC-SCNC: 2.6 MMOL/L (ref -2–3)
BILIRUB DIRECT SERPL-MCNC: 3.2 MG/DL (ref 0–0.3)
BILIRUB DIRECT SERPL-MCNC: 3.3 MG/DL (ref 0–0.3)
BILIRUB SERPL-MCNC: 5.3 MG/DL (ref 0–1.2)
BILIRUB SERPL-MCNC: 5.9 MG/DL (ref 0–1.2)
BODY TEMPERATURE: 37 DEGREES CELSIUS
BODY TEMPERATURE: 37 DEGREES CELSIUS
BUN SERPL-MCNC: 40 MG/DL (ref 6–23)
BUN SERPL-MCNC: 41 MG/DL (ref 6–23)
CA-I BLD-SCNC: 1.19 MMOL/L (ref 1.1–1.33)
CA-I BLD-SCNC: 1.21 MMOL/L (ref 1.1–1.33)
CA-I BLDA-SCNC: 1.2 MMOL/L (ref 1.1–1.33)
CA-I BLDV-SCNC: 1.21 MMOL/L (ref 1.1–1.33)
CALCIUM SERPL-MCNC: 9.1 MG/DL (ref 8.6–10.6)
CALCIUM SERPL-MCNC: 9.3 MG/DL (ref 8.6–10.6)
CARDIOLIPIN IGA SERPL-ACNC: 4 APL U/ML
CARDIOLIPIN IGG SER IA-ACNC: <1.6 GPL U/ML
CARDIOLIPIN IGM SER IA-ACNC: 2.2 MPL U/ML
CHLORIDE BLDA-SCNC: 106 MMOL/L (ref 98–107)
CHLORIDE BLDV-SCNC: 105 MMOL/L (ref 98–107)
CHLORIDE SERPL-SCNC: 102 MMOL/L (ref 98–107)
CHLORIDE SERPL-SCNC: 106 MMOL/L (ref 98–107)
CO2 SERPL-SCNC: 26 MMOL/L (ref 21–32)
CO2 SERPL-SCNC: 26 MMOL/L (ref 21–32)
CREAT SERPL-MCNC: 1.43 MG/DL (ref 0.5–1.05)
CREAT SERPL-MCNC: 1.46 MG/DL (ref 0.5–1.05)
EGFRCR SERPLBLD CKD-EPI 2021: 39 ML/MIN/1.73M*2
EGFRCR SERPLBLD CKD-EPI 2021: 40 ML/MIN/1.73M*2
ERYTHROCYTE [DISTWIDTH] IN BLOOD BY AUTOMATED COUNT: 15.4 % (ref 11.5–14.5)
ERYTHROCYTE [DISTWIDTH] IN BLOOD BY AUTOMATED COUNT: 15.9 % (ref 11.5–14.5)
EST. AVERAGE GLUCOSE BLD GHB EST-MCNC: 105 MG/DL
GLUCOSE BLD MANUAL STRIP-MCNC: 105 MG/DL (ref 74–99)
GLUCOSE BLD MANUAL STRIP-MCNC: 113 MG/DL (ref 74–99)
GLUCOSE BLD MANUAL STRIP-MCNC: 145 MG/DL (ref 74–99)
GLUCOSE BLD MANUAL STRIP-MCNC: 166 MG/DL (ref 74–99)
GLUCOSE BLD MANUAL STRIP-MCNC: 87 MG/DL (ref 74–99)
GLUCOSE BLDA-MCNC: 104 MG/DL (ref 74–99)
GLUCOSE BLDV-MCNC: 110 MG/DL (ref 74–99)
GLUCOSE SERPL-MCNC: 100 MG/DL (ref 74–99)
GLUCOSE SERPL-MCNC: 102 MG/DL (ref 74–99)
HAPTOGLOB SERPL-MCNC: 149 MG/DL (ref 37–246)
HBA1C MFR BLD: 5.3 %
HCO3 BLDA-SCNC: 23.9 MMOL/L (ref 22–26)
HCO3 BLDV-SCNC: 27.2 MMOL/L (ref 22–26)
HCT VFR BLD AUTO: 25.9 % (ref 36–46)
HCT VFR BLD AUTO: 28.8 % (ref 36–46)
HCT VFR BLD EST: 29 % (ref 36–46)
HCT VFR BLD EST: 31 % (ref 36–46)
HGB BLD-MCNC: 10 G/DL (ref 12–16)
HGB BLD-MCNC: 9.4 G/DL (ref 12–16)
HGB BLDA-MCNC: 9.8 G/DL (ref 12–16)
HGB BLDV-MCNC: 10.2 G/DL (ref 12–16)
INHALED O2 CONCENTRATION: 21 %
INHALED O2 CONCENTRATION: 21 %
INR PPP: 1.4 (ref 0.9–1.1)
LACTATE BLDA-SCNC: 0.8 MMOL/L (ref 0.4–2)
LACTATE BLDV-SCNC: 1.1 MMOL/L (ref 0.4–2)
MAGNESIUM SERPL-MCNC: 1.92 MG/DL (ref 1.6–2.4)
MAGNESIUM SERPL-MCNC: 2.16 MG/DL (ref 1.6–2.4)
MCH RBC QN AUTO: 31.8 PG (ref 26–34)
MCH RBC QN AUTO: 32 PG (ref 26–34)
MCHC RBC AUTO-ENTMCNC: 34.7 G/DL (ref 32–36)
MCHC RBC AUTO-ENTMCNC: 36.3 G/DL (ref 32–36)
MCV RBC AUTO: 88 FL (ref 80–100)
MCV RBC AUTO: 92 FL (ref 80–100)
NRBC BLD-RTO: 0 /100 WBCS (ref 0–0)
NRBC BLD-RTO: 0 /100 WBCS (ref 0–0)
OXYHGB MFR BLDA: 93.9 % (ref 94–98)
OXYHGB MFR BLDV: 83.8 % (ref 45–75)
PCO2 BLDA: 36 MM HG (ref 38–42)
PCO2 BLDV: 41 MM HG (ref 41–51)
PH BLDA: 7.43 PH (ref 7.38–7.42)
PH BLDV: 7.43 PH (ref 7.33–7.43)
PHOSPHATE SERPL-MCNC: 2.6 MG/DL (ref 2.5–4.9)
PHOSPHATE SERPL-MCNC: 2.8 MG/DL (ref 2.5–4.9)
PLATELET # BLD AUTO: 117 X10*3/UL (ref 150–450)
PLATELET # BLD AUTO: 135 X10*3/UL (ref 150–450)
PO2 BLDA: 73 MM HG (ref 85–95)
PO2 BLDV: 51 MM HG (ref 35–45)
POTASSIUM BLDA-SCNC: 3.4 MMOL/L (ref 3.5–5.3)
POTASSIUM BLDV-SCNC: 3.6 MMOL/L (ref 3.5–5.3)
POTASSIUM SERPL-SCNC: 3.3 MMOL/L (ref 3.5–5.3)
POTASSIUM SERPL-SCNC: 3.5 MMOL/L (ref 3.5–5.3)
PROT SERPL-MCNC: 5.3 G/DL (ref 6.4–8.2)
PROT SERPL-MCNC: 5.6 G/DL (ref 6.4–8.2)
PROTHROMBIN TIME: 15.4 SECONDS (ref 9.8–12.8)
RBC # BLD AUTO: 2.94 X10*6/UL (ref 4–5.2)
RBC # BLD AUTO: 3.14 X10*6/UL (ref 4–5.2)
SAO2 % BLDA: 97 % (ref 94–100)
SAO2 % BLDV: 86 % (ref 45–75)
SODIUM BLDA-SCNC: 138 MMOL/L (ref 136–145)
SODIUM BLDV-SCNC: 138 MMOL/L (ref 136–145)
SODIUM SERPL-SCNC: 141 MMOL/L (ref 136–145)
SODIUM SERPL-SCNC: 143 MMOL/L (ref 136–145)
UFH PPP CHRO-ACNC: 0.5 IU/ML
WBC # BLD AUTO: 11.6 X10*3/UL (ref 4.4–11.3)
WBC # BLD AUTO: 12.2 X10*3/UL (ref 4.4–11.3)

## 2024-05-20 PROCEDURE — 2500000002 HC RX 250 W HCPCS SELF ADMINISTERED DRUGS (ALT 637 FOR MEDICARE OP, ALT 636 FOR OP/ED): Performed by: NURSE PRACTITIONER

## 2024-05-20 PROCEDURE — 71045 X-RAY EXAM CHEST 1 VIEW: CPT | Performed by: RADIOLOGY

## 2024-05-20 PROCEDURE — 82248 BILIRUBIN DIRECT: CPT | Performed by: STUDENT IN AN ORGANIZED HEALTH CARE EDUCATION/TRAINING PROGRAM

## 2024-05-20 PROCEDURE — C9113 INJ PANTOPRAZOLE SODIUM, VIA: HCPCS | Performed by: STUDENT IN AN ORGANIZED HEALTH CARE EDUCATION/TRAINING PROGRAM

## 2024-05-20 PROCEDURE — 85027 COMPLETE CBC AUTOMATED: CPT | Performed by: STUDENT IN AN ORGANIZED HEALTH CARE EDUCATION/TRAINING PROGRAM

## 2024-05-20 PROCEDURE — 2500000004 HC RX 250 GENERAL PHARMACY W/ HCPCS (ALT 636 FOR OP/ED): Performed by: PHYSICIAN ASSISTANT

## 2024-05-20 PROCEDURE — 71045 X-RAY EXAM CHEST 1 VIEW: CPT

## 2024-05-20 PROCEDURE — 97166 OT EVAL MOD COMPLEX 45 MIN: CPT | Mod: GO

## 2024-05-20 PROCEDURE — 37799 UNLISTED PX VASCULAR SURGERY: CPT

## 2024-05-20 PROCEDURE — 93970 EXTREMITY STUDY: CPT | Performed by: SURGERY

## 2024-05-20 PROCEDURE — 37799 UNLISTED PX VASCULAR SURGERY: CPT | Performed by: STUDENT IN AN ORGANIZED HEALTH CARE EDUCATION/TRAINING PROGRAM

## 2024-05-20 PROCEDURE — 2500000001 HC RX 250 WO HCPCS SELF ADMINISTERED DRUGS (ALT 637 FOR MEDICARE OP): Performed by: STUDENT IN AN ORGANIZED HEALTH CARE EDUCATION/TRAINING PROGRAM

## 2024-05-20 PROCEDURE — 84100 ASSAY OF PHOSPHORUS: CPT | Mod: 91 | Performed by: STUDENT IN AN ORGANIZED HEALTH CARE EDUCATION/TRAINING PROGRAM

## 2024-05-20 PROCEDURE — 82248 BILIRUBIN DIRECT: CPT | Mod: 91 | Performed by: STUDENT IN AN ORGANIZED HEALTH CARE EDUCATION/TRAINING PROGRAM

## 2024-05-20 PROCEDURE — 85610 PROTHROMBIN TIME: CPT | Performed by: STUDENT IN AN ORGANIZED HEALTH CARE EDUCATION/TRAINING PROGRAM

## 2024-05-20 PROCEDURE — 82947 ASSAY GLUCOSE BLOOD QUANT: CPT | Mod: 91

## 2024-05-20 PROCEDURE — 84132 ASSAY OF SERUM POTASSIUM: CPT | Mod: 91 | Performed by: STUDENT IN AN ORGANIZED HEALTH CARE EDUCATION/TRAINING PROGRAM

## 2024-05-20 PROCEDURE — 99291 CRITICAL CARE FIRST HOUR: CPT | Performed by: NURSE PRACTITIONER

## 2024-05-20 PROCEDURE — P9047 ALBUMIN (HUMAN), 25%, 50ML: HCPCS | Mod: JZ

## 2024-05-20 PROCEDURE — 2500000006 HC RX 250 W HCPCS SELF ADMINISTERED DRUGS (ALT 637 FOR ALL PAYERS): Performed by: NURSE PRACTITIONER

## 2024-05-20 PROCEDURE — 82330 ASSAY OF CALCIUM: CPT | Performed by: STUDENT IN AN ORGANIZED HEALTH CARE EDUCATION/TRAINING PROGRAM

## 2024-05-20 PROCEDURE — 2500000004 HC RX 250 GENERAL PHARMACY W/ HCPCS (ALT 636 FOR OP/ED): Mod: JZ

## 2024-05-20 PROCEDURE — 82330 ASSAY OF CALCIUM: CPT | Mod: 91 | Performed by: STUDENT IN AN ORGANIZED HEALTH CARE EDUCATION/TRAINING PROGRAM

## 2024-05-20 PROCEDURE — 2500000004 HC RX 250 GENERAL PHARMACY W/ HCPCS (ALT 636 FOR OP/ED): Performed by: STUDENT IN AN ORGANIZED HEALTH CARE EDUCATION/TRAINING PROGRAM

## 2024-05-20 PROCEDURE — 85027 COMPLETE CBC AUTOMATED: CPT | Mod: 91 | Performed by: STUDENT IN AN ORGANIZED HEALTH CARE EDUCATION/TRAINING PROGRAM

## 2024-05-20 PROCEDURE — 83036 HEMOGLOBIN GLYCOSYLATED A1C: CPT | Performed by: NURSE PRACTITIONER

## 2024-05-20 PROCEDURE — 2500000001 HC RX 250 WO HCPCS SELF ADMINISTERED DRUGS (ALT 637 FOR MEDICARE OP): Performed by: NURSE PRACTITIONER

## 2024-05-20 PROCEDURE — 2500000006 HC RX 250 W HCPCS SELF ADMINISTERED DRUGS (ALT 637 FOR ALL PAYERS): Mod: MUE | Performed by: PHYSICIAN ASSISTANT

## 2024-05-20 PROCEDURE — 83735 ASSAY OF MAGNESIUM: CPT | Performed by: STUDENT IN AN ORGANIZED HEALTH CARE EDUCATION/TRAINING PROGRAM

## 2024-05-20 PROCEDURE — 99292 CRITICAL CARE ADDL 30 MIN: CPT | Performed by: ANESTHESIOLOGY

## 2024-05-20 PROCEDURE — 93970 EXTREMITY STUDY: CPT

## 2024-05-20 PROCEDURE — 2020000001 HC ICU ROOM DAILY

## 2024-05-20 PROCEDURE — 84132 ASSAY OF SERUM POTASSIUM: CPT | Mod: 91

## 2024-05-20 PROCEDURE — 83735 ASSAY OF MAGNESIUM: CPT | Mod: 91 | Performed by: STUDENT IN AN ORGANIZED HEALTH CARE EDUCATION/TRAINING PROGRAM

## 2024-05-20 PROCEDURE — 86147 CARDIOLIPIN ANTIBODY EA IG: CPT | Performed by: NURSE PRACTITIONER

## 2024-05-20 PROCEDURE — 2500000004 HC RX 250 GENERAL PHARMACY W/ HCPCS (ALT 636 FOR OP/ED): Performed by: NURSE PRACTITIONER

## 2024-05-20 PROCEDURE — 85520 HEPARIN ASSAY: CPT | Performed by: NURSE PRACTITIONER

## 2024-05-20 PROCEDURE — 80048 BASIC METABOLIC PNL TOTAL CA: CPT | Performed by: STUDENT IN AN ORGANIZED HEALTH CARE EDUCATION/TRAINING PROGRAM

## 2024-05-20 PROCEDURE — 86146 BETA-2 GLYCOPROTEIN ANTIBODY: CPT | Performed by: NURSE PRACTITIONER

## 2024-05-20 RX ORDER — POTASSIUM CHLORIDE 20 MEQ/1
40 TABLET, EXTENDED RELEASE ORAL ONCE
Status: COMPLETED | OUTPATIENT
Start: 2024-05-20 | End: 2024-05-20

## 2024-05-20 RX ORDER — POTASSIUM CHLORIDE 1.5 G/1.58G
40 POWDER, FOR SOLUTION ORAL ONCE
Status: COMPLETED | OUTPATIENT
Start: 2024-05-20 | End: 2024-05-20

## 2024-05-20 RX ORDER — FUROSEMIDE 10 MG/ML
40 INJECTION INTRAMUSCULAR; INTRAVENOUS ONCE
Status: COMPLETED | OUTPATIENT
Start: 2024-05-20 | End: 2024-05-20

## 2024-05-20 RX ORDER — OXYCODONE HYDROCHLORIDE 5 MG/1
10 TABLET ORAL EVERY 4 HOURS PRN
Status: DISCONTINUED | OUTPATIENT
Start: 2024-05-20 | End: 2024-05-31 | Stop reason: HOSPADM

## 2024-05-20 RX ORDER — POTASSIUM CHLORIDE 29.8 MG/ML
40 INJECTION INTRAVENOUS ONCE
Status: COMPLETED | OUTPATIENT
Start: 2024-05-20 | End: 2024-05-20

## 2024-05-20 RX ORDER — INSULIN LISPRO 100 [IU]/ML
0-5 INJECTION, SOLUTION INTRAVENOUS; SUBCUTANEOUS 4 TIMES DAILY
Status: DISCONTINUED | OUTPATIENT
Start: 2024-05-20 | End: 2024-05-28

## 2024-05-20 RX ORDER — PANTOPRAZOLE SODIUM 40 MG/1
40 TABLET, DELAYED RELEASE ORAL
Status: DISCONTINUED | OUTPATIENT
Start: 2024-05-20 | End: 2024-05-23

## 2024-05-20 RX ORDER — FUROSEMIDE 10 MG/ML
40 INJECTION INTRAMUSCULAR; INTRAVENOUS ONCE
Status: DISCONTINUED | OUTPATIENT
Start: 2024-05-20 | End: 2024-05-20

## 2024-05-20 RX ORDER — AMOXICILLIN 250 MG
2 CAPSULE ORAL 2 TIMES DAILY
Status: DISCONTINUED | OUTPATIENT
Start: 2024-05-20 | End: 2024-05-31 | Stop reason: HOSPADM

## 2024-05-20 RX ORDER — ATORVASTATIN CALCIUM 80 MG/1
80 TABLET, FILM COATED ORAL NIGHTLY
Status: DISCONTINUED | OUTPATIENT
Start: 2024-05-20 | End: 2024-05-31 | Stop reason: HOSPADM

## 2024-05-20 RX ORDER — POTASSIUM CHLORIDE 14.9 MG/ML
20 INJECTION INTRAVENOUS ONCE
Status: DISCONTINUED | OUTPATIENT
Start: 2024-05-20 | End: 2024-05-24

## 2024-05-20 RX ORDER — ACETAMINOPHEN 325 MG/1
650 TABLET ORAL EVERY 4 HOURS PRN
Status: DISCONTINUED | OUTPATIENT
Start: 2024-05-20 | End: 2024-05-24

## 2024-05-20 RX ORDER — NAPROXEN SODIUM 220 MG/1
81 TABLET, FILM COATED ORAL DAILY
Status: DISCONTINUED | OUTPATIENT
Start: 2024-05-20 | End: 2024-05-31 | Stop reason: HOSPADM

## 2024-05-20 RX ORDER — HEPARIN SODIUM 10000 [USP'U]/100ML
0-4000 INJECTION, SOLUTION INTRAVENOUS CONTINUOUS
Status: DISCONTINUED | OUTPATIENT
Start: 2024-05-20 | End: 2024-05-24

## 2024-05-20 RX ORDER — OXYCODONE HYDROCHLORIDE 5 MG/1
5 TABLET ORAL EVERY 4 HOURS PRN
Status: DISCONTINUED | OUTPATIENT
Start: 2024-05-20 | End: 2024-05-31 | Stop reason: HOSPADM

## 2024-05-20 RX ORDER — METOPROLOL TARTRATE 25 MG/1
25 TABLET, FILM COATED ORAL 2 TIMES DAILY
Status: DISCONTINUED | OUTPATIENT
Start: 2024-05-20 | End: 2024-05-26

## 2024-05-20 RX ADMIN — PANTOPRAZOLE SODIUM 40 MG: 40 INJECTION, POWDER, FOR SOLUTION INTRAVENOUS at 06:40

## 2024-05-20 RX ADMIN — FUROSEMIDE 40 MG: 10 INJECTION, SOLUTION INTRAMUSCULAR; INTRAVENOUS at 10:58

## 2024-05-20 RX ADMIN — HYDROMORPHONE HYDROCHLORIDE 0.2 MG: 1 INJECTION, SOLUTION INTRAMUSCULAR; INTRAVENOUS; SUBCUTANEOUS at 06:40

## 2024-05-20 RX ADMIN — ATORVASTATIN CALCIUM 80 MG: 80 TABLET, FILM COATED ORAL at 20:18

## 2024-05-20 RX ADMIN — CEFOXITIN SODIUM 2 G: 2 POWDER, FOR SOLUTION INTRAVENOUS at 05:28

## 2024-05-20 RX ADMIN — SENNOSIDES AND DOCUSATE SODIUM 2 TABLET: 50; 8.6 TABLET ORAL at 08:00

## 2024-05-20 RX ADMIN — OXYCODONE HYDROCHLORIDE 5 MG: 5 TABLET ORAL at 09:12

## 2024-05-20 RX ADMIN — OXYCODONE HYDROCHLORIDE 5 MG: 5 TABLET ORAL at 20:32

## 2024-05-20 RX ADMIN — HEPARIN SODIUM 1000 UNITS/HR: 10000 INJECTION, SOLUTION INTRAVENOUS at 16:42

## 2024-05-20 RX ADMIN — OXYCODONE HYDROCHLORIDE 5 MG: 5 TABLET ORAL at 15:45

## 2024-05-20 RX ADMIN — METOPROLOL TARTRATE 25 MG: 25 TABLET, FILM COATED ORAL at 20:18

## 2024-05-20 RX ADMIN — SALINE NASAL SPRAY 1 SPRAY: 1.5 SOLUTION NASAL at 20:36

## 2024-05-20 RX ADMIN — CEFOXITIN SODIUM 2 G: 2 POWDER, FOR SOLUTION INTRAVENOUS at 12:52

## 2024-05-20 RX ADMIN — MAGNESIUM SULFATE HEPTAHYDRATE 2 G: 40 INJECTION, SOLUTION INTRAVENOUS at 05:37

## 2024-05-20 RX ADMIN — METOPROLOL TARTRATE 25 MG: 25 TABLET, FILM COATED ORAL at 09:17

## 2024-05-20 RX ADMIN — LABETALOL HYDROCHLORIDE 20 MG: 5 INJECTION, SOLUTION INTRAVENOUS at 06:03

## 2024-05-20 RX ADMIN — TIOTROPIUM BROMIDE INHALATION SPRAY 2 PUFF: 3.12 SPRAY, METERED RESPIRATORY (INHALATION) at 10:12

## 2024-05-20 RX ADMIN — INSULIN LISPRO 1 UNITS: 100 INJECTION, SOLUTION INTRAVENOUS; SUBCUTANEOUS at 20:25

## 2024-05-20 RX ADMIN — CEFOXITIN SODIUM 2 G: 2 POWDER, FOR SOLUTION INTRAVENOUS at 21:20

## 2024-05-20 RX ADMIN — ASPIRIN 81 MG 81 MG: 81 TABLET ORAL at 08:00

## 2024-05-20 RX ADMIN — POTASSIUM CHLORIDE 40 MEQ: 1500 TABLET, EXTENDED RELEASE ORAL at 07:59

## 2024-05-20 RX ADMIN — HEPARIN SODIUM 5000 UNITS: 5000 INJECTION INTRAVENOUS; SUBCUTANEOUS at 12:51

## 2024-05-20 RX ADMIN — SODIUM CHLORIDE, POTASSIUM CHLORIDE, SODIUM LACTATE AND CALCIUM CHLORIDE 5 ML/HR: 600; 310; 30; 20 INJECTION, SOLUTION INTRAVENOUS at 05:38

## 2024-05-20 RX ADMIN — SENNOSIDES AND DOCUSATE SODIUM 2 TABLET: 50; 8.6 TABLET ORAL at 20:18

## 2024-05-20 RX ADMIN — HYDROMORPHONE HYDROCHLORIDE 0.2 MG: 1 INJECTION, SOLUTION INTRAMUSCULAR; INTRAVENOUS; SUBCUTANEOUS at 04:29

## 2024-05-20 RX ADMIN — POTASSIUM CHLORIDE 40 MEQ: 29.8 INJECTION, SOLUTION INTRAVENOUS at 02:07

## 2024-05-20 RX ADMIN — POTASSIUM CHLORIDE 40 MEQ: 1500 TABLET, EXTENDED RELEASE ORAL at 17:48

## 2024-05-20 RX ADMIN — ALBUMIN HUMAN 25 G: 0.25 SOLUTION INTRAVENOUS at 05:28

## 2024-05-20 RX ADMIN — HEPARIN SODIUM 5000 UNITS: 5000 INJECTION INTRAVENOUS; SUBCUTANEOUS at 04:29

## 2024-05-20 RX ADMIN — HYDROCORTISONE SODIUM SUCCINATE 50 MG: 100 INJECTION, POWDER, FOR SOLUTION INTRAMUSCULAR; INTRAVENOUS at 11:01

## 2024-05-20 RX ADMIN — POTASSIUM CHLORIDE 40 MEQ: 1.5 POWDER, FOR SOLUTION ORAL at 15:45

## 2024-05-20 ASSESSMENT — PAIN DESCRIPTION - ORIENTATION
ORIENTATION: LEFT

## 2024-05-20 ASSESSMENT — PAIN DESCRIPTION - LOCATION
LOCATION: ABDOMEN

## 2024-05-20 ASSESSMENT — ACTIVITIES OF DAILY LIVING (ADL)
ADL_ASSISTANCE: INDEPENDENT
BATHING_ASSISTANCE: MODERATE

## 2024-05-20 ASSESSMENT — PAIN - FUNCTIONAL ASSESSMENT
PAIN_FUNCTIONAL_ASSESSMENT: 0-10

## 2024-05-20 ASSESSMENT — COGNITIVE AND FUNCTIONAL STATUS - GENERAL
TOILETING: A LOT
DRESSING REGULAR LOWER BODY CLOTHING: A LOT
DAILY ACTIVITIY SCORE: 17
HELP NEEDED FOR BATHING: A LOT
PERSONAL GROOMING: A LITTLE

## 2024-05-20 ASSESSMENT — PAIN SCALES - GENERAL
PAINLEVEL_OUTOF10: 0 - NO PAIN
PAINLEVEL_OUTOF10: 0 - NO PAIN
PAINLEVEL_OUTOF10: 5 - MODERATE PAIN
PAINLEVEL_OUTOF10: 0 - NO PAIN
PAINLEVEL_OUTOF10: 6
PAINLEVEL_OUTOF10: 5 - MODERATE PAIN
PAINLEVEL_OUTOF10: 0 - NO PAIN
PAINLEVEL_OUTOF10: 4
PAINLEVEL_OUTOF10: 5 - MODERATE PAIN

## 2024-05-20 NOTE — PROGRESS NOTES
Surgical Intensive Care Unit Progress Note      Subjective   K repletion  diuresis    Objective   Physical Exam  Vitals reviewed.   Constitutional:       Appearance: Normal appearance.   HENT:      Head: Normocephalic and atraumatic.      Mouth/Throat:      Mouth: Mucous membranes are moist.      Pharynx: Oropharynx is clear.   Eyes:      Conjunctiva/sclera: Conjunctivae normal.      Pupils: Pupils are equal, round, and reactive to light.   Cardiovascular:      Rate and Rhythm: Normal rate and regular rhythm.      Pulses: Normal pulses.      Heart sounds: Normal heart sounds.   Pulmonary:      Effort: Pulmonary effort is normal.      Breath sounds: Normal breath sounds.      Comments: Room air  Chest:      Comments: L chest tube in place, water seal, no air leak, serosang drainage  Abdominal:      General: Abdomen is protuberant. Bowel sounds are decreased. There is no distension.      Palpations: Abdomen is soft.      Comments: L transverse incision CHAS   Genitourinary:     Comments: Kenney draining clear urine   Musculoskeletal:         General: Normal range of motion.      Cervical back: Neck supple.      Right lower leg: Edema present.      Left lower leg: Edema present.   Feet:      Right foot:      Skin integrity: Erythema and warmth present.      Left foot:      Skin integrity: Erythema and warmth present.      Comments: Bilateral ace wraps in place  Skin:     General: Skin is warm and dry.      Comments: + Bullae on right shin    Neurological:      General: No focal deficit present.      Mental Status: She is alert and oriented to person, place, and time.   Psychiatric:         Attention and Perception: Attention normal.         Mood and Affect: Mood normal.         Behavior: Behavior normal.     VS over last 24h  Heart Rate:  [67-93]   Temp:  [36.2 °C (97.2 °F)-36.7 °C (98.1 °F)]   Resp:  [13-27]   SpO2:  [90 %-98 %]        Intake/Output Summary (Last 24  hours) at 5/20/2024 0710  Last data filed at 5/20/2024 0600  Gross per 24 hour   Intake 545 ml   Output 4465 ml   Net -3920 ml        Relevant Results  Scheduled medications  aspirin, 81 mg, oral, Daily  cefOXitin, 2 g, intravenous, q8h  heparin, 5,000 Units, subcutaneous, q8h  hydrocortisone sodium succinate, 50 mg, intravenous, q24h  insulin lispro, 0-10 Units, subcutaneous, q4h  pantoprazole, 40 mg, oral, Daily before breakfast  potassium chloride CR, 40 mEq, oral, Once  sennosides-docusate sodium, 2 tablet, oral, BID  tiotropium, 2 puff, inhalation, Daily      Continuous medications  lactated Ringer's, 5 mL/hr, Last Rate: 5 mL/hr (05/20/24 0538)      PRN medications  PRN medications: bisacodyl, calcium gluconate, calcium gluconate, dextrose **OR** glucagon, hydrALAZINE, HYDROmorphone, HYDROmorphone, labetaloL, magnesium sulfate, magnesium sulfate, oxygen, potassium chloride, potassium chloride        Component      Latest Ref Rng 5/19/2024   LEUKOCYTES (10*3/UL) IN BLOOD BY AUTOMATED COUNT, Maltese      4.4 - 11.3 x10*3/uL 11.9 (H)    nRBC      0.0 - 0.0 /100 WBCs 0.0    ERYTHROCYTES (10*6/UL) IN BLOOD BY AUTOMATED COUNT, Maltese      4.00 - 5.20 x10*6/uL 3.28 (L)    HEMOGLOBIN      12.0 - 16.0 g/dL 10.6 (L)    HEMATOCRIT      36.0 - 46.0 % 30.4 (L)    MCV      80 - 100 fL 93    MCH      26.0 - 34.0 pg 32.3    MCHC      32.0 - 36.0 g/dL 34.9    RED CELL DISTRIBUTION WIDTH      11.5 - 14.5 % 16.1 (H)    PLATELETS (10*3/UL) IN BLOOD AUTOMATED COUNT, Maltese      150 - 450 x10*3/uL 101 (L)    GLUCOSE      74 - 99 mg/dL 111 (H)    SODIUM      136 - 145 mmol/L 141    POTASSIUM      3.5 - 5.3 mmol/L 3.8    CHLORIDE      98 - 107 mmol/L 108 (H)    Bicarbonate      21 - 32 mmol/L 23    Anion Gap      10 - 20 mmol/L 14    Blood Urea Nitrogen      6 - 23 mg/dL 32 (H)    Creatinine      0.50 - 1.05 mg/dL 1.41 (H)    EGFR      >60 mL/min/1.73m*2 41 (L)    Calcium      8.6 - 10.6 mg/dL 8.2 (L)    Albumin      3.4 - 5.0  g/dL 3.5    Bilirubin Total      0.0 - 1.2 mg/dL 6.1 (H)    Bilirubin, Direct      0.0 - 0.3 mg/dL 4.1 (H)    Alkaline Phosphatase      33 - 136 U/L 44    ALT      7 - 45 U/L 24    AST      9 - 39 U/L 24    Total Protein      6.4 - 8.2 g/dL 4.8 (L)    MAGNESIUM      1.60 - 2.40 mg/dL 2.22    POCT Calcium, Ionized      1.1 - 1.33 mmol/L 1.18    PHOSPHORUS      2.5 - 4.9 mg/dL 3.4    LDH      84 - 246 U/L 159         Assessment/Plan     Klaudia Ratliff is a 68 y.o. female with past medical history of HTN, HLD, obesity, COPD, gout, RLE DVT, right PE s/p mechanical thrombectomy and IVC filter placement (2/22/2024) discharged on eliquis and bridged to Lovenox (5/13), and aortic occlusion. Patient presents to SICU now s/p aortofem bypass via thoracoabdominal approach. Arrived to SICU intubated, sedated on prop @30, requiring norepi @ 0.04 and epi @ 0.02 to maintain goal MAP 80. Intubated with double lumen tube, neuromuscular blockade reversed by anesthesia team. Extubated overnight on POD 0 and received 1u pRBC. Post-op course stable with good recovery trajectory.      Plan:  NEURO: PMH of sciatica, on gabapentin. A+Ox3, no focal deficits, reassuring neurovasc checks overnight. Pain well controlled. No neuro issues at this time.  - convert iv hydromorphone to po acetaminophen and oxycodone as needed for pain  - consider resuming home dose gabapentin  - q4h neuro/neurovasc/pain assessments  - PT/OT following -> ambulate today if able to bear weight  - Home meds; Gabapentin 100 mg BID, on hold while NPO      CV: History of HTN, HLD, obesity, RLE DVT, Right PE (s/p mechanical thrombectomy 2/24), and aortic occlusion. Baseline BP (pre-op) 161/76. Baseline cardiac function normal EF (60-65%), elevated LA pressure (2/2/24). Carotid Duplex < 50% ICA stenosis b/l. Now s/p aortofem bypass. Acutely elevated lactate normalized. Hypotension resolved, weaned off pressor support 5/18, tolerating stress dose steroid wean.  Hemodynamically intact without pressor support. Given x1 dose of labetalol early this am for sbp >160.  - goal MAP liberalized to >70 per vascular surgery  - continuous ekg/abp monitoring  - correlate NIBP   - Convert asa from UT to po   - start metoprolol 25mg bid with hold parameters  - resume home atorvastatin  - home meds: Eliquis 5 mg BID (held since 5/12, bridged to lovenox 5/13), ASA 81 mg, Atenolol 50 mg, atorvastatin 80 mg, losartan 100 mg, Vitamin C 250 mg     PULM: History of COPD, pulmonary embolism (2/2/24) s/p mechanical thrombectomy and IVC filter placement. No diaphragm pacing wires placed intra-op. Arrived to SICU Intubated with AL ETT in place. Extubated overnight on POD 0. Trace L apical PTX 5/19. L chest tube remains in place to water seal.  - wean FiO2 for goal SpO2 >94%  - Q1h IS while awake  - additional bronchial hygiene as indicated  - Daily CXR  - chest tube to water seal -> vascular surgery removing later today  - resume tiotropium  - Home meds: Tiotropium 2.5 mcg 2 puffs daily.     GI: History of gastric ulcer (3/2024), PPI every day recommended. Supraceliac clamp x27 minutes. Acute hyperbilirubinemia improving. RUQ US 5/19: Hepatic steatosis without focal lesions. Otherwise, unremarkable.   - adv to CLD  - continue to trend LFTs daily  - PPI for GI prophylaxis/recent gastric ulcer  - start bowel regimen     : Baseline cr 0.9-1.1.  Left renal ischemic time 47 minutes. Prerenal->intrinsic ELROY slightly worse today. Total body fluid overload. Responsive to lasix, last dosed with 40mg IV @ 0000. Hypokalemia.  - q12h and prn RFP  - continue diuresis  - Redose lasix as needed for goal neg 1-2 L  - Goal U/O >1-2ml/kg/hr.    - Replete electrolytes to goal K>4, Mg>2, Phos>2.5, ionized Ca>1.10 if creatinine remains WNL or <1.5 with adequate uop.  - Home meds: Lasix 40 mg PRN, Potassium chloride 10 mEq ER      HEME: Recent PE s/p mechanical thrombectomy and IVC filter placement 2/2024. Bridged  from eliquis to lovenox 5/13/24. Acute surgical blood loss anemia, acute post op thrombocytopenia improving. 5/18 Anti PF4 Neg. [facility] lab US BLE 5/20 prelim read: Acute non-occlusive deep vein thrombosis distal external iliac, common femoral vein, femoral vein proximal thru mid, and profunda vein.   - Daily CBC, prn coags  - scds for dvt prophylaxis.  - Continue SQH and asa  - resumption of AC per [facility] surgery team discretion, aware of prelim results of BLE US  - Home meds: Lovenox 80 mg q12h     ENDO: No history of DM or hypothyroidism. A1c 5.3%. TSH 1.86. Arrived to ICU on insulin gtt, turned off overnight on POD 0. Adequate glycemic control, currently on lispro SSI #2.   - Q4h BG -> convert to AC/HS  - change to SSI Lispro #1  - tapering SDS, currently on solucortef 50 mg q24h     ID: Recent E.coli UTI 5/3, completed 3 day course Augmentin. Afebrile. Mild leukocytosis improving, No signs of infection at this time.   - trend wbc daily  - Continue cefoxitin while chest tube in place per [facility] surg request  - monitor for s/s infection    Skin: R shin and R heel bullae  - vascular surgery to dress RLE wounds today  - continue preventative mepilex to heals/sacrum  - turns q2h  - OOB daily as able  - weekly skin rounds     Lines:   - left internal jugular MAC with mini-mac (5/16) -> remove today after 2nd PIV placed  - right brachial art line (5/16) -> possibly remove today  - PIV right hand  - Kenney -> possibly remove today     Dispo: possible transfer to LT7 SDU later today. Discussed with SICU attending Dr. Carlton.  SICU team phone [phone]    Carmelina Martino, ADRIÁN-CNP    Critical care time: 45 minutes

## 2024-05-20 NOTE — PROGRESS NOTES
Occupational Therapy    Evaluation    Patient Name: Klaudia Ratliff  MRN: [id number]  Today's Date: 5/20/2024  Room: 07/07  Time Calculation  Start Time: 1440  Stop Time: 1458  Time Calculation (min): 18 min    Assessment  IP OT Assessment  OT Assessment: Overall decreased strength and endurance limiting occupational performance. Would benefit from skilled services to maximize functional potential.  Prognosis: Excellent  Barriers to Discharge: None  Evaluation/Treatment Tolerance: Patient tolerated treatment well  Medical Staff Made Aware: Yes  End of Session Communication: Bedside nurse  End of Session Patient Position: Bed, 3 rail up, Alarm off, not on at start of session  Plan:  Treatment Interventions: ADL retraining, Functional transfer training, UE strengthening/ROM, Endurance training, Neuromuscular reeducation  OT Frequency: 3 times per week  OT Discharge Recommendations: Moderate intensity level of continued care  Equipment Recommended upon Discharge: Wheeled walker  OT Recommended Transfer Status: Moderate assist  OT - OK to Discharge: Yes    Subjective   Current Problem:  1. Aortoiliac occlusive disease (Multi)  Anesthesia Intraoperative Transesophageal Echocardiogram    Anesthesia Intraoperative Transesophageal Echocardiogram    Beta-2 Glycoprotein Antibodies (IgA, IgG and IgM)    Anti-Cardiolipin Antibody (IgA, IgG, and IgM)    Lupus Anticoagulant with Interpretation (DRVVT +SCT)      2. Aortic occlusion (CMS-HCC)  Surgical Pathology Exam    Surgical Pathology Exam    Surgical Pathology Exam    Surgical Pathology Exam      3. Venous thromboembolism (VTE)  Lower extremity venous duplex bilateral    Lower extremity venous duplex bilateral    Beta-2 Glycoprotein Antibodies (IgA, IgG and IgM)    Anti-Cardiolipin Antibody (IgA, IgG, and IgM)    Lupus Anticoagulant with Interpretation (DRVVT +SCT)      4. Edema of both legs  Lower extremity venous duplex bilateral    Lower extremity venous duplex bilateral         General:  Reason for Referral: Perivisceral aortic endarterectomy, aortobifemoral bypass graft, left renal bypass, and right femoral endarterectomy.  Past Medical History Relevant to Rehab: HTN, HLD, obesity, COPD, gout, RLE DVT, right PE s/p mechanical thrombectomy and IVC filter placement (2/22/2024)  Prior to Session Communication: Bedside nurse  Patient Position Received: Bed, 3 rail up, Alarm off, not on at start of session  General Comment: Pt is pleasant and cooperative. distal BLE wrapped d/t blisters.   Precautions:  Medical Precautions: Fall precautions, Oxygen therapy device and L/min, Chest tube  Precautions Comment: MAP >80, SpO2 >94% (Currently NWB RLE, per RN, d/t large blister on bottom of foot.)  Vital Signs:  Heart Rate: 80 (81)  Resp: 18 (23)  SpO2: 92 % (95)  BP: 133/64 (135/72)  MAP (mmHg): 93 (100)  BP Location: Right arm  BP Method: Arterial line  Pain:  Pain Assessment  Pain Score: 0 - No pain  Lines/Tubes/Drains:  Arterial Line 05/16/24 Right Brachial (Active)   Number of days: 4       Urethral Catheter Non-latex;Temperature probe 16 Fr. (Active)   Number of days: 4       Chest Tube 1 Left 28 Fr (Active)   Number of days: 3         Objective   Cognition:  Overall Cognitive Status: Within Functional Limits  Orientation Level: Oriented X4           Home Living:  Type of Home: House  Lives With:  (Roommate)  Home Adaptive Equipment: Cane, Walker rolling or standard  Home Layout: One level  Home Access: Stairs to enter with rails  Entrance Stairs-Number of Steps: 1  Bathroom Shower/Tub: Tub/shower unit  Bathroom Equipment: Grab bars in shower, Shower chair with back   Prior Function:  Level of [place]: Independent with ADLs and functional transfers, Independent with homemaking with ambulation  Receives Help From: Friends, Family  ADL Assistance: Independent  Homemaking Assistance: Independent (Admits to increased difficulty recently d/t feeling weak)  Ambulatory Assistance:  Independent (SC and FWW use)  Leisure: Painting  Prior Function Comments: (-)drives, 1 fall d/t RLE weakness. Reports increased difficulty managing self-care and IADLs recently.  IADL History:     ADL:  Grooming Assistance: Minimal  Bathing Assistance: Moderate  UE Dressing Assistance: Minimal  LE Dressing Assistance: Maximal  Toileting Assistance with Device: Moderate  Activity Tolerance:  Endurance: Decreased tolerance for upright activites  Early Mobility/Exercise Safety Screen: Proceed with mobilization - No exclusion criteria met  Balance:  Dynamic Sitting Balance  Dynamic Sitting-Comments: Good when reaching towawrds LE while seated EOB  Static Sitting Balance  Static Sitting-Level of Assistance: Close supervision  Static Sitting-Comment/Number of Minutes: Approx 5min EOB while engaging in task competion.  Bed Mobility/Transfers: Bed Mobility  Bed Mobility: Yes  Bed Mobility 1  Bed Mobility 1: Supine to sitting, Sitting to supine  Level of Assistance 1: Moderate verbal cues  Bed Mobility Comments 1: Cues for hand placement and technique. Needed encourgement throughout. Assist needed mainly for BLE management.  Bed Mobility 2  Bed Mobility  2: Scooting  Bed Mobility Comments 2: Attempted seated EOB scoot. Unable to complete despite MAX A. Asist to zuri NWB RLE.  Functional Mobility  Functional Mobility Performed: No (Deferred this date d/t reported NWB)   and Transfers  Transfer:  (Deferred this date d/t reported NWB)  IADL's:      Vision:     and Vision - Complex Assessment  Ocular Range of Motion: Within Functional Limits  Sensation:  Light Touch: No apparent deficits  Strength:  Strength Comments: BUE strength overall 4-/5  Perception:  Inattention/Neglect: Appears intact  Coordination:      Hand Function:  Hand Function  Gross Grasp: Functional  Coordination: Functional  Extremities:  ,  ,  , and      Outcome Measures: [facility] Daily Activity  Putting on and taking off regular lower body clothing: A  lot  Bathing (including washing, rinsing, drying): A lot  Putting on and taking off regular upper body clothing: None  Toileting, which includes using toilet, bedpan or urinal: A lot  Taking care of personal grooming such as brushing teeth: A little  Eating Meals: None  Daily Activity - Total Score: 17        ICU Mobility Screen  Early Mobility/Exercise Safety Screen: Proceed with mobilization - No exclusion criteria met,          Education Documentation  Body Mechanics, taught by Kymberly Boone OT at 5/20/2024  3:18 PM.  Learner: Patient  Readiness: Acceptance  Method: Explanation  Response: Verbalizes Understanding    Precautions, taught by Kymberly Boone OT at 5/20/2024  3:18 PM.  Learner: Patient  Readiness: Acceptance  Method: Explanation  Response: Verbalizes Understanding    ADL Training, taught by Kymberly Boone OT at 5/20/2024  3:18 PM.  Learner: Patient  Readiness: Acceptance  Method: Explanation  Response: Verbalizes Understanding    Education Comments  No comments found.        Goals:     Encounter Problems       Encounter Problems (Active)       ADLs       Patient will complete lower body dressing with MIN A for donning and doffing all LE clothes in order to increase Indep with task participation.        Start:  05/20/24    Expected End:  06/03/24            Patient will complete toileting, including clothing management and hygiene, with MIN A in order to maximize functional Indep with task completion.        Start:  05/20/24    Expected End:  06/03/24               EXERCISE/STRENGTHENING       Pt will increase overall BUE strength to 4/5 in order to increase functional task participation.        Start:  05/20/24    Expected End:  06/03/24            Pt will increase endurance to tolerate 15-20min of activity with no more than 1 rest break in order to increase ability to engage in ADL completion.        Start:  05/20/24    Expected End:  06/03/24               TRANSFERS       Patient will complete  functional transfers using least restrictive device with CGA in order to maximize functional potential and increase safety.        Start:  05/20/24    Expected End:  06/03/24 05/20/24 at 3:18 PM   Kymberly Boone OT   Rehab Office: [phone]

## 2024-05-20 NOTE — PROGRESS NOTES
VASCULAR SURGERY PROGRESS NOTE  Subjective   No acute events overnight  Responded to Lasix appropriately, NG removed  Right foot with a significant blister on the sole, limiting mobility  Venous duplex showing new non-occlusive deep vein thrombosis distal external iliac, common femoral vein, femoral vein proximal thru mid, and profunda vein    Objective   Vitals:  Heart Rate:  [67-93]   Temp:  [36.2 °C (97.2 °F)-36.7 °C (98.1 °F)]   Resp:  [13-27]   BP: (118)/(58)   SpO2:  [90 %-95 %]     Exam:  Constitutional: No acute distress, resting comfortably  Neuro:  AOx3, grossly intact  CV: no tachycardia on bedside monitor  Pulm: non-labored on nasal cannula, left chest tube to gravity with serosanguineous output and no airleak  GI: soft, appropriately tender, thoraco-RP incision covered with dressing  Skin: blistering of bilateral lower extremities from volume overload  Musculoskeletal: moving all extremities  Extremities: Palpable bilateral DPs, sensory and motor intact in bilateral lower extremities, bilateral groin incisions covered with dressing    Labs:  Results from last 7 days   Lab Units 05/20/24  0000 05/19/24  1617 05/19/24  0111   WBC AUTO x10*3/uL 11.6* 12.5* 11.9*   HEMOGLOBIN g/dL 9.4* 10.1* 10.6*   PLATELETS AUTO x10*3/uL 117* 114* 101*      Results from last 7 days   Lab Units 05/20/24  0000 05/19/24  1617 05/19/24  0607   SODIUM mmol/L 143 141 142   POTASSIUM mmol/L 3.3* 3.3* 3.7   CHLORIDE mmol/L 106 105 107   CO2 mmol/L 26 24 23   BUN mg/dL 40* 37* 33*   CREATININE mg/dL 1.46* 1.39* 1.39*   GLUCOSE mg/dL 102* 116* 124*   MAGNESIUM mg/dL 1.92 2.09  --    PHOSPHORUS mg/dL 2.8 3.3 3.4      Results from last 7 days   Lab Units 05/20/24  0000 05/19/24  0936 05/18/24  0118   INR  1.4* 1.3* 1.6*   PROTIME seconds 15.4* 14.4* 18.1*   APTT seconds 32 27 32     Assessment/Plan   Klaudia Ratliff is 68 y.o. female with history of HTN, HLD, obesity, COPD, gout, RLE DVT/PE s/p mechanical thrombectomy and IVC  filter (2/2024, on Eliquis) who is perivisceral aortic endarterectomy and aortobifemoral bypass.    Plan:  - Plan for chest tube removal today - Immediate post-pull CXR, followed by 4 hour scan.  - Lower extremity duplex performed today showing acute non-occlusive deep vein thrombosis visualized in the distal external iliac, common femoral, profunda, proximal femoral and mid femoral veins. Plan TBD, discussing with Dr. Santa  - Significant development of bilateral serous filled blisters. Plan for evacuation of right sole blister later this evening.  - q4 NV assessment  - pain control  - bronchopulmonary hygiene  - continue rectal aspirin  - goal MAP >70 mmHg  - trend LFTs  - Maintain K >4, Mg >2  - maintain Hgb >10, PLT >100, INR <1.4  - continue cefoxitin while chest tube is in place  - PT/OT  - SCDs and SQH    Seen and discussed with vascular fellow, Dr. Delgado  D/w attending, Dr. Garcia Diamond,   Department of Surgery / IR Integrated PGY1  Vascular [id number]

## 2024-05-20 NOTE — PROGRESS NOTES
Surgical Intensive Care Unit Progress Note      Subjective   K repletion  diuresis    Objective   Physical Exam  Vitals reviewed.   Constitutional:       Appearance: Normal appearance.   HENT:      Head: Normocephalic and atraumatic.      Mouth/Throat:      Mouth: Mucous membranes are moist.      Pharynx: Oropharynx is clear.   Eyes:      Conjunctiva/sclera: Conjunctivae normal.      Pupils: Pupils are equal, round, and reactive to light.   Cardiovascular:      Rate and Rhythm: Normal rate and regular rhythm.      Pulses: Normal pulses.      Heart sounds: Normal heart sounds.   Pulmonary:      Effort: Pulmonary effort is normal.      Breath sounds: Normal breath sounds.      Comments: Room air  Chest:      Comments: L chest tube in place, water seal, no air leak, serosang drainage  Abdominal:      General: Abdomen is protuberant. Bowel sounds are decreased. There is no distension.      Palpations: Abdomen is soft.      Comments: L transverse incision CHAS   Genitourinary:     Comments: Kenney draining clear urine   Musculoskeletal:         General: Normal range of motion.      Cervical back: Neck supple.      Right lower leg: Edema present.      Left lower leg: Edema present.   Feet:      Right foot:      Skin integrity: Erythema and warmth present.      Left foot:      Skin integrity: Erythema and warmth present.      Comments: Bilateral ace wraps in place  Skin:     General: Skin is warm and dry.      Comments: + Bullae on right shin    Neurological:      General: No focal deficit present.      Mental Status: She is alert and oriented to person, place, and time.   Psychiatric:         Attention and Perception: Attention normal.         Mood and Affect: Mood normal.         Behavior: Behavior normal.     VS over last 24h  Heart Rate:  [71-93]   Temp:  [36.2 °C (97.2 °F)-36.9 °C (98.4 °F)]   Resp:  [14-27]   BP: (118-133)/(58-64)   SpO2:  [90 %-95 %]         Intake/Output Summary (Last 24 hours) at 5/20/2024 1631  Last data filed at 5/20/2024 1322  Gross per 24 hour   Intake 445 ml   Output 4115 ml   Net -3670 ml        Relevant Results  Scheduled medications  aspirin, 81 mg, oral, Daily  atorvastatin, 80 mg, oral, Nightly  cefOXitin, 2 g, intravenous, q8h  heparin, 5,000 Units, subcutaneous, q8h  insulin lispro, 0-5 Units, subcutaneous, 4x daily  metoprolol tartrate, 25 mg, oral, BID  pantoprazole, 40 mg, oral, Daily before breakfast  potassium chloride, 20 mEq, intravenous, Once  sennosides-docusate sodium, 2 tablet, oral, BID  tiotropium, 2 puff, inhalation, Daily      Continuous medications  heparin, 0-4,000 Units/hr  lactated Ringer's, 5 mL/hr, Last Rate: 5 mL/hr (05/20/24 0538)      PRN medications  PRN medications: acetaminophen, bisacodyl, calcium gluconate, calcium gluconate, dextrose **OR** glucagon, hydrALAZINE, labetaloL, magnesium sulfate, magnesium sulfate, oxyCODONE, oxyCODONE, oxygen, potassium chloride, potassium chloride        Component      Latest Ref Rng 5/19/2024   LEUKOCYTES (10*3/UL) IN BLOOD BY AUTOMATED COUNT, [place]      4.4 - 11.3 x10*3/uL 11.9 (H)    nRBC      0.0 - 0.0 /100 WBCs 0.0    ERYTHROCYTES (10*6/UL) IN BLOOD BY AUTOMATED COUNT, [place]      4.00 - 5.20 x10*6/uL 3.28 (L)    HEMOGLOBIN      12.0 - 16.0 g/dL 10.6 (L)    HEMATOCRIT      36.0 - 46.0 % 30.4 (L)    MCV      80 - 100 fL 93    MCH      26.0 - 34.0 pg 32.3    MCHC      32.0 - 36.0 g/dL 34.9    RED CELL DISTRIBUTION WIDTH      11.5 - 14.5 % 16.1 (H)    PLATELETS (10*3/UL) IN BLOOD AUTOMATED COUNT, [place]      150 - 450 x10*3/uL 101 (L)    GLUCOSE      74 - 99 mg/dL 111 (H)    SODIUM      136 - 145 mmol/L 141    POTASSIUM      3.5 - 5.3 mmol/L 3.8    CHLORIDE      98 - 107 mmol/L 108 (H)    Bicarbonate      21 - 32 mmol/L 23    Anion Gap      10 - 20 mmol/L 14    Blood Urea Nitrogen      6 - 23 mg/dL 32 (H)    Creatinine      0.50 - 1.05 mg/dL 1.41 (H)    EGFR       >60 mL/min/1.73m*2 41 (L)    Calcium      8.6 - 10.6 mg/dL 8.2 (L)    Albumin      3.4 - 5.0 g/dL 3.5    Bilirubin Total      0.0 - 1.2 mg/dL 6.1 (H)    Bilirubin, Direct      0.0 - 0.3 mg/dL 4.1 (H)    Alkaline Phosphatase      33 - 136 U/L 44    ALT      7 - 45 U/L 24    AST      9 - 39 U/L 24    Total Protein      6.4 - 8.2 g/dL 4.8 (L)    MAGNESIUM      1.60 - 2.40 mg/dL 2.22    POCT Calcium, Ionized      1.1 - 1.33 mmol/L 1.18    PHOSPHORUS      2.5 - 4.9 mg/dL 3.4    LDH      84 - 246 U/L 159         Assessment/Plan     Klaudia Ratliff is a 68 y.o. female with past medical history of HTN, HLD, obesity, COPD, gout, RLE DVT, right PE s/p mechanical thrombectomy and IVC filter placement (2/22/2024) discharged on eliquis and bridged to Lovenox (5/13), and aortic occlusion. Patient presents to SICU now s/p aortofem bypass via thoracoabdominal approach. Arrived to SICU intubated, sedated on prop @30, requiring norepi @ 0.04 and epi @ 0.02 to maintain goal MAP 80. Intubated with double lumen tube, neuromuscular blockade reversed by anesthesia team. Extubated overnight on POD 0 and received 1u pRBC. Post-op course stable with good recovery trajectory.      Plan:  NEURO: PMH of sciatica, on gabapentin. A+Ox3, no focal deficits, reassuring neurovasc checks overnight. Pain well controlled. No neuro issues at this time.  - convert iv hydromorphone to po acetaminophen and oxycodone as needed for pain  - consider resuming home dose gabapentin  - q4h neuro/neurovasc/pain assessments  - PT/OT following -> ambulate today if able to bear weight  - Home meds; Gabapentin 100 mg BID, on hold while NPO      CV: History of HTN, HLD, obesity, RLE DVT, Right PE (s/p mechanical thrombectomy 2/24), and aortic occlusion. Baseline BP (pre-op) 161/76. Baseline cardiac function normal EF (60-65%), elevated LA pressure (2/2/24). Carotid Duplex < 50% ICA stenosis b/l. Now s/p aortofem bypass. Acutely elevated lactate normalized.  Hypotension resolved, weaned off pressor support 5/18, tolerating stress dose steroid wean. Hemodynamically intact without pressor support. Given x1 dose of labetalol early this am for sbp >160.  - goal MAP liberalized to >70 per vascular surgery  - continuous ekg/abp monitoring  - correlate NIBP   - Convert asa from OK to po   - start metoprolol 25mg bid with hold parameters  - resume home atorvastatin  - home meds: Eliquis 5 mg BID (held since 5/12, bridged to lovenox 5/13), ASA 81 mg, Atenolol 50 mg, atorvastatin 80 mg, losartan 100 mg, Vitamin C 250 mg     PULM: History of COPD, pulmonary embolism (2/2/24) s/p mechanical thrombectomy and IVC filter placement. No diaphragm pacing wires placed intra-op. Arrived to SICU Intubated with AL ETT in place. Extubated overnight on POD 0. Trace L apical PTX 5/19. L chest tube remains in place to water seal.  - wean FiO2 for goal SpO2 >94%  - Q1h IS while awake  - additional bronchial hygiene as indicated  - Daily CXR  - chest tube to water seal -> vascular surgery removing later today  - resume tiotropium  - Home meds: Tiotropium 2.5 mcg 2 puffs daily.     GI: History of gastric ulcer (3/2024), PPI every day recommended. Supraceliac clamp x27 minutes. Acute hyperbilirubinemia improving. RUQ US 5/19: Hepatic steatosis without focal lesions. Otherwise, unremarkable.   - adv to [facility]  - continue to trend LFTs daily  - PPI for GI prophylaxis/recent gastric ulcer  - start bowel regimen     : Baseline cr 0.9-1.1.  Left renal ischemic time 47 minutes. Prerenal->intrinsic ELROY slightly worse today. Total body fluid overload. Responsive to lasix, last dosed with 40mg IV @ 0000. Hypokalemia.  - q12h and prn RFP  - continue diuresis  - Redose lasix as needed for goal neg 1-2 L  - Goal U/O >1-2ml/kg/hr.    - Replete electrolytes to goal K>4, Mg>2, Phos>2.5, ionized Ca>1.10 if creatinine remains WNL or <1.5 with adequate uop.  - Home meds: Lasix 40 mg PRN, Potassium chloride 10 mEq ER       HEME: Recent PE s/p mechanical thrombectomy and IVC filter placement 2/2024. Bridged from eliquis to lovenox 5/13/24. Acute surgical blood loss anemia, acute post op thrombocytopenia improving. 5/18 Anti PF4 Neg. [facility] lab US BLE 5/20 prelim read: Acute non-occlusive deep vein thrombosis distal external iliac, common femoral vein, femoral vein proximal thru mid, and profunda vein.   - Daily CBC, prn coags  - scds for dvt prophylaxis.  - Continue SQH and asa  - resumption of AC per [facility] surgery team discretion, aware of prelim results of BLE US  - Home meds: Lovenox 80 mg q12h     ENDO: No history of DM or hypothyroidism. A1c 5.3%. TSH 1.86. Arrived to ICU on insulin gtt, turned off overnight on POD 0. Adequate glycemic control, currently on lispro SSI #2.   - Q4h BG -> convert to AC/HS  - change to SSI Lispro #1  - tapering SDS, currently on solucortef 50 mg q24h     ID: Recent E.coli UTI 5/3, completed 3 day course Augmentin. Afebrile. Mild leukocytosis improving, No signs of infection at this time.   - trend wbc daily  - Continue cefoxitin while chest tube in place per [facility] surg request  - monitor for s/s infection    Skin: R shin and R heel bullae  - vascular surgery to dress RLE wounds today  - continue preventative mepilex to heals/sacrum  - turns q2h  - OOB daily as able  - weekly skin rounds     Lines:   - left internal jugular MAC with mini-mac (5/16) -> remove today after 2nd PIV placed  - right brachial art line (5/16) -> possibly remove today  - PIV right hand  - Kenney -> possibly remove today     I have discussed the case with the resident/advanced practice provider. I have personally performed a history, physical exam, and my own medical decision making. I have reviewed the note and agree with the findings and plan with the following exceptions as identified below. I have personally provided 38 minutes of critical care time exclusive of time spent on separately billable procedures. Time includes  review of laboratory data, radiology results, discussion with consultants, family and monitoring for potential decompensation. Interventions were performed as documented above.      Family/Surrogate updated with plan of care.  Code status addressed/up to date.

## 2024-05-21 ENCOUNTER — APPOINTMENT (OUTPATIENT)
Dept: RADIOLOGY | Facility: HOSPITAL | Age: 69
DRG: 673 | End: 2024-05-21
Payer: MEDICARE

## 2024-05-21 LAB
ALBUMIN SERPL BCP-MCNC: 3.2 G/DL (ref 3.4–5)
ALBUMIN SERPL BCP-MCNC: 3.2 G/DL (ref 3.4–5)
ALBUMIN SERPL BCP-MCNC: 3.4 G/DL (ref 3.4–5)
ALBUMIN SERPL BCP-MCNC: 3.6 G/DL (ref 3.4–5)
ALP SERPL-CCNC: 177 U/L (ref 33–136)
ALP SERPL-CCNC: 181 U/L (ref 33–136)
ALP SERPL-CCNC: 205 U/L (ref 33–136)
ALT SERPL W P-5'-P-CCNC: 110 U/L (ref 7–45)
ALT SERPL W P-5'-P-CCNC: 115 U/L (ref 7–45)
ALT SERPL W P-5'-P-CCNC: 120 U/L (ref 7–45)
ANION GAP SERPL CALC-SCNC: 15 MMOL/L (ref 10–20)
ANION GAP SERPL CALC-SCNC: 16 MMOL/L (ref 10–20)
ANION GAP SERPL CALC-SCNC: 17 MMOL/L (ref 10–20)
ANION GAP SERPL CALC-SCNC: 19 MMOL/L (ref 10–20)
APTT PPP: 81 SECONDS (ref 27–38)
AST SERPL W P-5'-P-CCNC: 63 U/L (ref 9–39)
AST SERPL W P-5'-P-CCNC: 83 U/L (ref 9–39)
AST SERPL W P-5'-P-CCNC: 88 U/L (ref 9–39)
BILIRUB DIRECT SERPL-MCNC: 2 MG/DL (ref 0–0.3)
BILIRUB DIRECT SERPL-MCNC: 2.1 MG/DL (ref 0–0.3)
BILIRUB SERPL-MCNC: 3.9 MG/DL (ref 0–1.2)
BILIRUB SERPL-MCNC: 4.3 MG/DL (ref 0–1.2)
BILIRUB SERPL-MCNC: 4.5 MG/DL (ref 0–1.2)
BUN SERPL-MCNC: 39 MG/DL (ref 6–23)
BUN SERPL-MCNC: 40 MG/DL (ref 6–23)
BUN SERPL-MCNC: 40 MG/DL (ref 6–23)
BUN SERPL-MCNC: 42 MG/DL (ref 6–23)
CA-I BLD-SCNC: 1.18 MMOL/L (ref 1.1–1.33)
CALCIUM SERPL-MCNC: 8.1 MG/DL (ref 8.6–10.6)
CALCIUM SERPL-MCNC: 8.4 MG/DL (ref 8.6–10.6)
CALCIUM SERPL-MCNC: 8.4 MG/DL (ref 8.6–10.6)
CALCIUM SERPL-MCNC: 8.6 MG/DL (ref 8.6–10.6)
CHLORIDE SERPL-SCNC: 101 MMOL/L (ref 98–107)
CHLORIDE SERPL-SCNC: 101 MMOL/L (ref 98–107)
CHLORIDE SERPL-SCNC: 99 MMOL/L (ref 98–107)
CHLORIDE SERPL-SCNC: 99 MMOL/L (ref 98–107)
CO2 SERPL-SCNC: 21 MMOL/L (ref 21–32)
CO2 SERPL-SCNC: 24 MMOL/L (ref 21–32)
CO2 SERPL-SCNC: 26 MMOL/L (ref 21–32)
CO2 SERPL-SCNC: 27 MMOL/L (ref 21–32)
CREAT SERPL-MCNC: 1.3 MG/DL (ref 0.5–1.05)
CREAT SERPL-MCNC: 1.32 MG/DL (ref 0.5–1.05)
CREAT SERPL-MCNC: 1.35 MG/DL (ref 0.5–1.05)
CREAT SERPL-MCNC: 1.4 MG/DL (ref 0.5–1.05)
EGFRCR SERPLBLD CKD-EPI 2021: 41 ML/MIN/1.73M*2
EGFRCR SERPLBLD CKD-EPI 2021: 43 ML/MIN/1.73M*2
EGFRCR SERPLBLD CKD-EPI 2021: 44 ML/MIN/1.73M*2
EGFRCR SERPLBLD CKD-EPI 2021: 45 ML/MIN/1.73M*2
ERYTHROCYTE [DISTWIDTH] IN BLOOD BY AUTOMATED COUNT: 15.1 % (ref 11.5–14.5)
ERYTHROCYTE [DISTWIDTH] IN BLOOD BY AUTOMATED COUNT: 15.9 % (ref 11.5–14.5)
GLUCOSE BLD MANUAL STRIP-MCNC: 124 MG/DL (ref 74–99)
GLUCOSE BLD MANUAL STRIP-MCNC: 126 MG/DL (ref 74–99)
GLUCOSE BLD MANUAL STRIP-MCNC: 98 MG/DL (ref 74–99)
GLUCOSE SERPL-MCNC: 102 MG/DL (ref 74–99)
GLUCOSE SERPL-MCNC: 108 MG/DL (ref 74–99)
GLUCOSE SERPL-MCNC: 145 MG/DL (ref 74–99)
GLUCOSE SERPL-MCNC: 99 MG/DL (ref 74–99)
HCT VFR BLD AUTO: 25.6 % (ref 36–46)
HCT VFR BLD AUTO: 31.6 % (ref 36–46)
HCV AB SER QL: NONREACTIVE
HGB BLD-MCNC: 10.4 G/DL (ref 12–16)
HGB BLD-MCNC: 9.5 G/DL (ref 12–16)
INR PPP: 1.5 (ref 0.9–1.1)
MAGNESIUM SERPL-MCNC: 1.88 MG/DL (ref 1.6–2.4)
MAGNESIUM SERPL-MCNC: 2.12 MG/DL (ref 1.6–2.4)
MCH RBC QN AUTO: 30.5 PG (ref 26–34)
MCH RBC QN AUTO: 31.7 PG (ref 26–34)
MCHC RBC AUTO-ENTMCNC: 32.9 G/DL (ref 32–36)
MCHC RBC AUTO-ENTMCNC: 37.1 G/DL (ref 32–36)
MCV RBC AUTO: 85 FL (ref 80–100)
MCV RBC AUTO: 93 FL (ref 80–100)
NRBC BLD-RTO: 0 /100 WBCS (ref 0–0)
NRBC BLD-RTO: 0 /100 WBCS (ref 0–0)
PHOSPHATE SERPL-MCNC: 2.3 MG/DL (ref 2.5–4.9)
PHOSPHATE SERPL-MCNC: 2.4 MG/DL (ref 2.5–4.9)
PHOSPHATE SERPL-MCNC: 3.5 MG/DL (ref 2.5–4.9)
PLATELET # BLD AUTO: 133 X10*3/UL (ref 150–450)
PLATELET # BLD AUTO: 157 X10*3/UL (ref 150–450)
POTASSIUM SERPL-SCNC: 3.2 MMOL/L (ref 3.5–5.3)
POTASSIUM SERPL-SCNC: 3.6 MMOL/L (ref 3.5–5.3)
POTASSIUM SERPL-SCNC: 3.9 MMOL/L (ref 3.5–5.3)
POTASSIUM SERPL-SCNC: 4 MMOL/L (ref 3.5–5.3)
PROT SERPL-MCNC: 4.7 G/DL (ref 6.4–8.2)
PROT SERPL-MCNC: 4.9 G/DL (ref 6.4–8.2)
PROT SERPL-MCNC: 5.2 G/DL (ref 6.4–8.2)
PROTHROMBIN TIME: 16.9 SECONDS (ref 9.8–12.8)
RBC # BLD AUTO: 3 X10*6/UL (ref 4–5.2)
RBC # BLD AUTO: 3.41 X10*6/UL (ref 4–5.2)
SODIUM SERPL-SCNC: 135 MMOL/L (ref 136–145)
SODIUM SERPL-SCNC: 136 MMOL/L (ref 136–145)
SODIUM SERPL-SCNC: 139 MMOL/L (ref 136–145)
SODIUM SERPL-SCNC: 140 MMOL/L (ref 136–145)
UFH PPP CHRO-ACNC: 0.6 IU/ML
WBC # BLD AUTO: 11.9 X10*3/UL (ref 4.4–11.3)
WBC # BLD AUTO: 12.9 X10*3/UL (ref 4.4–11.3)

## 2024-05-21 PROCEDURE — 85027 COMPLETE CBC AUTOMATED: CPT | Mod: 91 | Performed by: STUDENT IN AN ORGANIZED HEALTH CARE EDUCATION/TRAINING PROGRAM

## 2024-05-21 PROCEDURE — 2500000001 HC RX 250 WO HCPCS SELF ADMINISTERED DRUGS (ALT 637 FOR MEDICARE OP): Performed by: NURSE PRACTITIONER

## 2024-05-21 PROCEDURE — 2500000004 HC RX 250 GENERAL PHARMACY W/ HCPCS (ALT 636 FOR OP/ED): Performed by: PHYSICIAN ASSISTANT

## 2024-05-21 PROCEDURE — 2500000004 HC RX 250 GENERAL PHARMACY W/ HCPCS (ALT 636 FOR OP/ED): Performed by: NURSE PRACTITIONER

## 2024-05-21 PROCEDURE — 71045 X-RAY EXAM CHEST 1 VIEW: CPT | Performed by: RADIOLOGY

## 2024-05-21 PROCEDURE — 37799 UNLISTED PX VASCULAR SURGERY: CPT | Performed by: STUDENT IN AN ORGANIZED HEALTH CARE EDUCATION/TRAINING PROGRAM

## 2024-05-21 PROCEDURE — 94640 AIRWAY INHALATION TREATMENT: CPT

## 2024-05-21 PROCEDURE — 2500000001 HC RX 250 WO HCPCS SELF ADMINISTERED DRUGS (ALT 637 FOR MEDICARE OP)

## 2024-05-21 PROCEDURE — 86803 HEPATITIS C AB TEST: CPT

## 2024-05-21 PROCEDURE — 71045 X-RAY EXAM CHEST 1 VIEW: CPT

## 2024-05-21 PROCEDURE — 83735 ASSAY OF MAGNESIUM: CPT | Performed by: STUDENT IN AN ORGANIZED HEALTH CARE EDUCATION/TRAINING PROGRAM

## 2024-05-21 PROCEDURE — 85610 PROTHROMBIN TIME: CPT | Performed by: STUDENT IN AN ORGANIZED HEALTH CARE EDUCATION/TRAINING PROGRAM

## 2024-05-21 PROCEDURE — 84100 ASSAY OF PHOSPHORUS: CPT | Performed by: STUDENT IN AN ORGANIZED HEALTH CARE EDUCATION/TRAINING PROGRAM

## 2024-05-21 PROCEDURE — 99222 1ST HOSP IP/OBS MODERATE 55: CPT | Performed by: INTERNAL MEDICINE

## 2024-05-21 PROCEDURE — 82248 BILIRUBIN DIRECT: CPT | Mod: 91

## 2024-05-21 PROCEDURE — 85520 HEPARIN ASSAY: CPT | Performed by: NURSE PRACTITIONER

## 2024-05-21 PROCEDURE — 83735 ASSAY OF MAGNESIUM: CPT | Mod: 91

## 2024-05-21 PROCEDURE — 99291 CRITICAL CARE FIRST HOUR: CPT

## 2024-05-21 PROCEDURE — 80069 RENAL FUNCTION PANEL: CPT | Mod: CCI | Performed by: STUDENT IN AN ORGANIZED HEALTH CARE EDUCATION/TRAINING PROGRAM

## 2024-05-21 PROCEDURE — 2500000005 HC RX 250 GENERAL PHARMACY W/O HCPCS: Performed by: NURSE PRACTITIONER

## 2024-05-21 PROCEDURE — 2500000004 HC RX 250 GENERAL PHARMACY W/ HCPCS (ALT 636 FOR OP/ED): Performed by: STUDENT IN AN ORGANIZED HEALTH CARE EDUCATION/TRAINING PROGRAM

## 2024-05-21 PROCEDURE — 82330 ASSAY OF CALCIUM: CPT | Performed by: STUDENT IN AN ORGANIZED HEALTH CARE EDUCATION/TRAINING PROGRAM

## 2024-05-21 PROCEDURE — 36415 COLL VENOUS BLD VENIPUNCTURE: CPT

## 2024-05-21 PROCEDURE — 82248 BILIRUBIN DIRECT: CPT | Performed by: STUDENT IN AN ORGANIZED HEALTH CARE EDUCATION/TRAINING PROGRAM

## 2024-05-21 PROCEDURE — 82947 ASSAY GLUCOSE BLOOD QUANT: CPT | Mod: 91

## 2024-05-21 PROCEDURE — 2060000001 HC INTERMEDIATE ICU ROOM DAILY

## 2024-05-21 PROCEDURE — 2500000006 HC RX 250 W HCPCS SELF ADMINISTERED DRUGS (ALT 637 FOR ALL PAYERS)

## 2024-05-21 PROCEDURE — 84075 ASSAY ALKALINE PHOSPHATASE: CPT

## 2024-05-21 PROCEDURE — 97530 THERAPEUTIC ACTIVITIES: CPT | Mod: GP

## 2024-05-21 PROCEDURE — 80048 BASIC METABOLIC PNL TOTAL CA: CPT | Performed by: STUDENT IN AN ORGANIZED HEALTH CARE EDUCATION/TRAINING PROGRAM

## 2024-05-21 RX ORDER — POTASSIUM CHLORIDE 20 MEQ/1
40 TABLET, EXTENDED RELEASE ORAL ONCE
Status: COMPLETED | OUTPATIENT
Start: 2024-05-21 | End: 2024-05-21

## 2024-05-21 RX ORDER — GABAPENTIN 100 MG/1
100 CAPSULE ORAL 2 TIMES DAILY
Status: DISCONTINUED | OUTPATIENT
Start: 2024-05-21 | End: 2024-05-31 | Stop reason: HOSPADM

## 2024-05-21 RX ORDER — POTASSIUM CHLORIDE 1.5 G/1.58G
40 POWDER, FOR SOLUTION ORAL ONCE
Status: COMPLETED | OUTPATIENT
Start: 2024-05-21 | End: 2024-05-21

## 2024-05-21 RX ORDER — POTASSIUM CHLORIDE 20 MEQ/1
20 TABLET, EXTENDED RELEASE ORAL ONCE
Status: COMPLETED | OUTPATIENT
Start: 2024-05-21 | End: 2024-05-21

## 2024-05-21 RX ADMIN — POTASSIUM CHLORIDE 40 MEQ: 1500 TABLET, EXTENDED RELEASE ORAL at 02:44

## 2024-05-21 RX ADMIN — POTASSIUM PHOSPHATE, MONOBASIC POTASSIUM PHOSPHATE, DIBASIC 15 MMOL: 224; 236 INJECTION, SOLUTION, CONCENTRATE INTRAVENOUS at 10:44

## 2024-05-21 RX ADMIN — OXYCODONE HYDROCHLORIDE 5 MG: 5 TABLET ORAL at 21:36

## 2024-05-21 RX ADMIN — METOPROLOL TARTRATE 25 MG: 25 TABLET, FILM COATED ORAL at 21:36

## 2024-05-21 RX ADMIN — OXYCODONE HYDROCHLORIDE 5 MG: 5 TABLET ORAL at 05:22

## 2024-05-21 RX ADMIN — POTASSIUM CHLORIDE 20 MEQ: 1500 TABLET, EXTENDED RELEASE ORAL at 05:22

## 2024-05-21 RX ADMIN — METOPROLOL TARTRATE 25 MG: 25 TABLET, FILM COATED ORAL at 08:13

## 2024-05-21 RX ADMIN — TIOTROPIUM BROMIDE INHALATION SPRAY 2 PUFF: 3.12 SPRAY, METERED RESPIRATORY (INHALATION) at 08:15

## 2024-05-21 RX ADMIN — ATORVASTATIN CALCIUM 80 MG: 80 TABLET, FILM COATED ORAL at 21:36

## 2024-05-21 RX ADMIN — CEFOXITIN SODIUM 2 G: 2 POWDER, FOR SOLUTION INTRAVENOUS at 05:22

## 2024-05-21 RX ADMIN — POTASSIUM CHLORIDE 20 MEQ: 200 INJECTION, SOLUTION INTRAVENOUS at 04:02

## 2024-05-21 RX ADMIN — GABAPENTIN 100 MG: 100 CAPSULE ORAL at 08:13

## 2024-05-21 RX ADMIN — PANTOPRAZOLE SODIUM 40 MG: 40 TABLET, DELAYED RELEASE ORAL at 06:50

## 2024-05-21 RX ADMIN — OXYCODONE HYDROCHLORIDE 5 MG: 5 TABLET ORAL at 11:13

## 2024-05-21 RX ADMIN — ASPIRIN 81 MG 81 MG: 81 TABLET ORAL at 08:13

## 2024-05-21 RX ADMIN — SENNOSIDES AND DOCUSATE SODIUM 2 TABLET: 50; 8.6 TABLET ORAL at 21:36

## 2024-05-21 RX ADMIN — SENNOSIDES AND DOCUSATE SODIUM 2 TABLET: 50; 8.6 TABLET ORAL at 08:13

## 2024-05-21 RX ADMIN — HEPARIN SODIUM 1000 UNITS/HR: 10000 INJECTION, SOLUTION INTRAVENOUS at 15:17

## 2024-05-21 RX ADMIN — OXYCODONE HYDROCHLORIDE 10 MG: 5 TABLET ORAL at 15:38

## 2024-05-21 RX ADMIN — MAGNESIUM SULFATE HEPTAHYDRATE 2 G: 40 INJECTION, SOLUTION INTRAVENOUS at 02:13

## 2024-05-21 RX ADMIN — GABAPENTIN 100 MG: 100 CAPSULE ORAL at 21:36

## 2024-05-21 RX ADMIN — POTASSIUM CHLORIDE 40 MEQ: 1.5 POWDER, FOR SOLUTION ORAL at 18:11

## 2024-05-21 ASSESSMENT — PAIN - FUNCTIONAL ASSESSMENT
PAIN_FUNCTIONAL_ASSESSMENT: 0-10

## 2024-05-21 ASSESSMENT — PAIN SCALES - GENERAL
PAINLEVEL_OUTOF10: 8
PAINLEVEL_OUTOF10: 0 - NO PAIN
PAINLEVEL_OUTOF10: 0 - NO PAIN
PAINLEVEL_OUTOF10: 5 - MODERATE PAIN
PAINLEVEL_OUTOF10: 2
PAINLEVEL_OUTOF10: 2
PAINLEVEL_OUTOF10: 0 - NO PAIN
PAINLEVEL_OUTOF10: 5 - MODERATE PAIN
PAINLEVEL_OUTOF10: 0 - NO PAIN

## 2024-05-21 ASSESSMENT — PAIN DESCRIPTION - LOCATION
LOCATION: ABDOMEN
LOCATION: ABDOMEN

## 2024-05-21 ASSESSMENT — COGNITIVE AND FUNCTIONAL STATUS - GENERAL
MOVING FROM LYING ON BACK TO SITTING ON SIDE OF FLAT BED WITH BEDRAILS: A LITTLE
TURNING FROM BACK TO SIDE WHILE IN FLAT BAD: A LITTLE
WALKING IN HOSPITAL ROOM: A LOT
MOBILITY SCORE: 16
CLIMB 3 TO 5 STEPS WITH RAILING: A LOT
STANDING UP FROM CHAIR USING ARMS: A LITTLE
MOVING TO AND FROM BED TO CHAIR: A LITTLE

## 2024-05-21 ASSESSMENT — PAIN DESCRIPTION - ORIENTATION: ORIENTATION: LEFT

## 2024-05-21 NOTE — CONSULTS
[facility]   [facility]  INITIAL CONSULT NOTE       Reason For Consult: hyperbilirubinemia and elevated transaminases    SUBJECTIVE     History Of Present Illness  Klaudia Ratliff is a 68 y.o. female with a past medical history of HTN/ HLD and DVT/PE s/p thrombectomy and IVC filter placement admitted on 5/16/2024 for perivisceral aortic endarterectomy and aortobifemoral bypass . Hepatology is consulted for elevated transaminases and hyperbilirubinemia. Bilirubin peaked postoperatively, but AST/ALT and ALK have consistently risen from a baseline of normal.    Patient denies prior history of abnormal liver tests or history of liver disease. She has mild alcohol use history, none since 2018. She has an extensive tobacco use history, but quit in Feb 2024. She otherwise denies IVDU. Tattoos done in [place] tattoo parlors. She denies abdominal pain, melena, hematochezia, hematemesis, melena. She denies abdominal fullness, early satiety. She was started on high dose atorvastatin in Feb 2024. She was also started on gabapentin around that time due to claudication and neuropathic pain in the foot. Otherwise, she denies herbal supplements or other medication changes.     Review of Systems: negative except as per HPI    Past Medical History:    Past Medical History:   Diagnosis Date    Abnormal ECG 02/20/2024    Normal sinus rhythm Nonspecific ST abnormality Abnormal ECG    Acute sinusitis, unspecified 03/16/2016    Acute sinusitis    Aortic occlusion (CMS-HCC)     Claudication of both lower extremities (CMS-HCC)     COPD (chronic obstructive pulmonary disease) (Multi)     Coronary artery disease     Coronary artery disease without angina pectoris    DVT (deep venous thrombosis) (Multi)     RLE DVT, right PE (s/p mechanical thrombectomy 2/22/24    Essential (primary) hypertension 07/18/2022    Hypertension    Headache, unspecified 07/09/2013    Headache    Hyperlipidemia      Joint pain     Melena     Right shoulder pain 2023    Ulcer, stomach peptic     Wound of left groin     left groin nonhealing access site       Home Medications  Medications Prior to Admission   Medication Sig Dispense Refill Last Dose    apixaban (Eliquis) 5 mg tablet Take 1 tablet (5 mg) by mouth 2 times a day. (Patient taking differently: Take 1 tablet (5 mg) by mouth 2 times a day. Last dose .) 60 tablet 0 Past Week    ascorbic acid (Vitamin C) 250 mg tablet Take 1 tablet (250 mg) by mouth once daily.   Past Week    aspirin 81 mg chewable tablet Chew 1 tablet (81 mg) once daily. 90 tablet 3 5/15/2024    atenolol (Tenormin) 50 mg tablet Take 1 tablet (50 mg) by mouth 2 times a day. 60 tablet 0 2024 at 0400    atorvastatin (Lipitor) 80 mg tablet Take 1 tablet (80 mg) by mouth once daily at bedtime. 90 tablet 3 5/15/2024    cholecalciferol (Vitamin D-3) 50 MCG (2000 UT) tablet Take 1 tablet (50 mcg) by mouth once daily.   5/15/2024    enoxaparin (Lovenox) 80 mg/0.8 mL syringe Inject 0.8 mL (80 mg) under the skin every 12 hours. Started 05/13/24   5/15/2024 at 1930    furosemide (Lasix) 40 mg tablet Take 1 tablet (40 mg) by mouth once daily. As needed for foot swelling 30 tablet 11 2024    gabapentin (Neurontin) 100 mg capsule Take 2 capsules (200 mg) by mouth 2 times a day. 120 capsule 0 5/15/2024    losartan (Cozaar) 100 mg tablet Take 1 tablet (100 mg) by mouth once daily. 30 tablet 5 5/15/2024    potassium chloride CR 10 mEq ER tablet TAKE 5 TABLETS BY MOUTH ONCE  DAILY 450 tablet 1 5/15/2024    tiotropium (Spiriva Respimat) 2.5 mcg/actuation inhaler Inhale 2 puffs once daily. (Patient taking differently: Inhale 2 puffs once daily. morning) 8 g 11 5/15/2024    [] sulfamethoxazole-trimethoprim (Bactrim) 400-80 mg tablet Take 1 tablet by mouth 2 times a day for 10 days. 20 tablet 0          Surgical History:    Past Surgical History:   Procedure Laterality Date    CARDIAC  CATHETERIZATION N/A 02/16/2024    Procedure: Left Heart Cath;  Surgeon: Aditya Sky MD PhD;  Location: [facility] Cardiac Cath Lab;  Service: Cardiovascular;  Laterality: N/A;  STAT    COLONOSCOPY      CT ABDOMEN PELVIS W AND WO IV CONTRAST  03/15/2024    IMPRESSION: 1. 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 2. No evidence of acute GI bleeding. 3. Diverticulosis without evidence acute diverticulitis. 4. No evidence of acute process in the abdomen or pelvis.    ECHOCARDIOGRAM 2 D M MODE PANEL  02/21/2024    Left ventricular systolic function is normal with a 60-65% estimated ejection fraction.  2. There is an elevated mean left atrial pressure.    ESOPHAGOGASTRODUODENOSCOPY      INVASIVE VASCULAR PROCEDURE N/A 02/22/2024    Procedure: Lower Extremity Angiogram;  Surgeon: Bar Day DO;  Location: [place] [address] Cardiac Cath Lab;  Service: Cardiovascular;  Laterality: N/A;    INVASIVE VASCULAR PROCEDURE N/A 02/22/2024    Procedure: Pulmonary Angiogram;  Surgeon: Bar Day DO;  Location: [place] [address] Cardiac Cath Lab;  Service: Cardiovascular;  Laterality: N/A;    IR INTERVENTION FILTER PLACEMENT  03/15/2024    MR CARDIAC MORPHOLOGY AND FUNCTION W AND WO IV CONTRAST  02/23/2024    THROMBECTOMY  02/2002    mechanical thrombectomy 2/22/24       Allergies:    Allergies   Allergen Reactions    Amlodipine Swelling     Bilateral foot swelling    Clarithromycin Other and Dizziness     Slept for days    Doxycycline Diarrhea       Social History:    Social History     Socioeconomic History    Marital status:      Spouse name: Not on file    Number of children: Not on file    Years of education: Not on file    Highest education level: Not on file   Occupational History    Not on file   Tobacco Use    Smoking status: Former     Current packs/day: 0.00     Types: Cigarettes     Quit date:  2024     Years since quittin.2     Passive exposure: Current    Smokeless tobacco: Never   Vaping Use    Vaping status: Never Used   Substance and Sexual Activity    Alcohol use: Not Currently     Comment: Occasional    Drug use: Yes     Types: Marijuana     Comment: used gummies once a couple weeks ago    Sexual activity: Not on file   Other Topics Concern    Not on file   Social History Narrative    Not on file     Social Determinants of Health     Financial Resource Strain: Low Risk  (2024)    Overall Financial Resource Strain (CARDIA)     Difficulty of Paying Living Expenses: Not hard at all   Food Insecurity: No Food Insecurity (2024)    Hunger Vital Sign     Worried About Running Out of Food in the Last Year: Never true     Ran Out of Food in the Last Year: Never true   Transportation Needs: No Transportation Needs (2024)    PRAPARE - Transportation     Lack of Transportation (Medical): No     Lack of Transportation (Non-Medical): No   Physical Activity: Sufficiently Active (2024)    Exercise Vital Sign     Days of Exercise per Week: 6 days     Minutes of Exercise per Session: 30 min   Stress: No Stress Concern Present (2024)    [facility] - Occupational Stress Questionnaire     Feeling of Stress : Not at all   Social Connections: Socially Isolated (2024)    Social Connection and Isolation Panel [NHANES]     Frequency of Communication with Friends and Family: More than three times a week     Frequency of Social Gatherings with Friends and Family: More than three times a week     Attends [employer] Services: Never     Active Member of Clubs or Organizations: No     Attends Club or Organization Meetings: Never     Marital Status:    Intimate Partner Violence: Not At Risk (2024)    Humiliation, Afraid, Rape, and Kick questionnaire     Fear of Current or Ex-Partner: No     Emotionally Abused: No     Physically Abused: No     Sexually  Abused: No   Housing Stability: Low Risk  (5/17/2024)    Housing Stability Vital Sign     Unable to Pay for Housing in the Last Year: No     Number of Places Lived in the Last Year: 1     Unstable Housing in the Last Year: No       Family History:  No family history on file.    EXAM     Vitals:    Vitals:    05/21/24 0400 05/21/24 0700 05/21/24 0800 05/21/24 0815   BP:       BP Location:       Pulse:  69 74 71   Resp:  20 19 22   Temp: 36 °C (96.8 °F)      TempSrc: Temporal      SpO2:  92% 93% 94%   Weight:       Height:         Failed to redirect to the Timeline version of the [employer] SmartLink.    Intake/Output Summary (Last 24 hours) at 5/21/2024 0835  Last data filed at 5/21/2024 0800  Gross per 24 hour   Intake 457.58 ml   Output 3975 ml   Net -3517.42 ml       Physical Exam  General: well-nourished, no acute distress, no jaundice  HEENT: EOMI. Moist mucosa, scleral icterus  Respiratory: nonlabored breathing on room air  Cardiovascular: RRR, no murmurs/rubs/gallops  Abdomen: Soft, nontender, nondistended, bowel sounds present. No masses palpated  Extremities: no edema, no asterixis  Neuro: alert and oriented, CNII-XII grossly intact, moves all 4 extremities with no focal deficits    OBJECTIVE                                                                              Medications       Current Facility-Administered Medications:     acetaminophen (Tylenol) tablet 650 mg, 650 mg, oral, q4h PRN, ADRIÁN Carrasco-CNP    aspirin chewable tablet 81 mg, 81 mg, oral, Daily, ADRIÁN Carrasco-CNP, 81 mg at 05/21/24 0813    atorvastatin (Lipitor) tablet 80 mg, 80 mg, oral, Nightly, ADRIÁN Carrasco-CNP, 80 mg at 05/20/24 2018    bisacodyl (Dulcolax) suppository 10 mg, 10 mg, rectal, Daily PRN, Earnest Jacobs MD    calcium gluconate in NS IV 1 g, 1 g, intravenous, q6h PRN, Earnest Jacobs MD    calcium gluconate in NS IV 2 g, 2 g, intravenous, q6h PRN, Earnest Jacobs MD    cefOXitin  (Mefoxin) 2 g in dextrose 5 % in water (D5W) 100 mL IV, 2 g, intravenous, q8h, Earnest Jacobs MD, Stopped at 05/21/24 0552    dextrose 50 % injection 25 g, 25 g, intravenous, q15 min PRN **OR** glucagon (Glucagen) injection 1 mg, 1 mg, intramuscular, q15 min PRN, Earnest Jacobs MD    gabapentin (Neurontin) capsule 100 mg, 100 mg, oral, BID, Archana Sifuentes, , 100 mg at 05/21/24 0813    heparin (porcine) injection 5,000 Units, 5,000 Units, subcutaneous, q8h, Minerva Juarez PA-C, 5,000 Units at 05/20/24 1251    heparin 25,000 Units in dextrose 5% 250 mL (100 Units/mL) infusion (premix), 0-4,000 Units/hr, intravenous, Continuous, HUMBERTO Carrasco, Last Rate: 10 mL/hr at 05/21/24 0800, 1,000 Units/hr at 05/21/24 0800    hydrALAZINE (Apresoline) injection 5 mg, 5 mg, intravenous, q4h PRN, Stanley Toussaint MD    insulin lispro (HumaLOG) injection 0-5 Units, 0-5 Units, subcutaneous, 4x daily, HUMBERTO Carrasco, 1 Units at 05/20/24 2025    labetaloL (Normodyne,Trandate) injection 20 mg, 20 mg, intravenous, q4h PRN, Stanley Toussaint MD, 20 mg at 05/20/24 0603    lactated Ringer's infusion, 5 mL/hr, intravenous, Continuous, Earnest Jacobs MD, Last Rate: 5 mL/hr at 05/21/24 0800, 5 mL/hr at 05/21/24 0800    magnesium sulfate IV 2 g, 2 g, intravenous, q6h PRN, Earnest Jacobs MD, Stopped at 05/21/24 0413    magnesium sulfate IV 4 g, 4 g, intravenous, q6h PRN, Earnest Jacobs MD, Stopped at 05/16/24 2239    metoprolol tartrate (Lopressor) tablet 25 mg, 25 mg, oral, BID, ADRIÁN Carrasco-CNP, 25 mg at 05/21/24 0813    oxyCODONE (Roxicodone) immediate release tablet 10 mg, 10 mg, oral, q4h PRN, ADRIÁN Carrasco-CNP    oxyCODONE (Roxicodone) immediate release tablet 5 mg, 5 mg, oral, q4h PRN, ADRIÁN Carrasco-CNP, 5 mg at 05/21/24 0522    oxygen (O2) therapy, , inhalation, Continuous PRN - O2/gases, Stanley Toussaint MD    pantoprazole (ProtoNix) EC tablet  40 mg, 40 mg, oral, Daily before breakfast, Carmelina SHAYE Mosleyic, APRN-CNP, 40 mg at 05/21/24 0650    potassium chloride 20 mEq in 100 mL IV premix, 20 mEq, intravenous, q6h PRN, Earnest Jacobs MD, Stopped at 05/21/24 0450    potassium chloride 20 mEq in 100 mL IV premix, 20 mEq, intravenous, Once, Carmelina C Melanyic, APRN-CNP    potassium chloride 40 mEq in 100 mL IV premix, 40 mEq, intravenous, q6h PRN, Earnest Jacobs MD, Stopped at 05/18/24 0855    sennosides-docusate sodium (Roxanna-Colace) 8.6-50 mg per tablet 2 tablet, 2 tablet, oral, BID, Carmelina SHAYE Mosleyic, APRN-CNP, 2 tablet at 05/21/24 0813    sodium chloride (Ocean) 0.65 % nasal spray 1 spray, 1 spray, Each Nostril, 4x daily PRN, Earnest Jacobs MD, 1 spray at 05/20/24 2036    tiotropium (Spiriva Respimat) 2.5 mcg/actuation inhaler 2 puff, 2 puff, inhalation, Daily, Carmelina SHAYE Mosleyic, APRN-CNP, 2 puff at 05/21/24 0815                                                                            Labs     Results for orders placed or performed during the hospital encounter of 05/16/24 (from the past 24 hour(s))   BLOOD GAS VENOUS FULL PANEL   Result Value Ref Range    POCT pH, Venous 7.43 7.33 - 7.43 pH    POCT pCO2, Venous 41 41 - 51 mm Hg    POCT pO2, Venous 51 (H) 35 - 45 mm Hg    POCT SO2, Venous 86 (H) 45 - 75 %    POCT Oxy Hemoglobin, Venous 83.8 (H) 45.0 - 75.0 %    POCT Hematocrit Calculated, Venous 31.0 (L) 36.0 - 46.0 %    POCT Sodium, Venous 138 136 - 145 mmol/L    POCT Potassium, Venous 3.6 3.5 - 5.3 mmol/L    POCT Chloride, Venous 105 98 - 107 mmol/L    POCT Ionized Calicum, Venous 1.21 1.10 - 1.33 mmol/L    POCT Glucose, Venous 110 (H) 74 - 99 mg/dL    POCT Lactate, Venous 1.1 0.4 - 2.0 mmol/L    POCT Base Excess, Venous 2.6 -2.0 - 3.0 mmol/L    POCT HCO3 Calculated, Venous 27.2 (H) 22.0 - 26.0 mmol/L    POCT Hemoglobin, Venous 10.2 (L) 12.0 - 16.0 g/dL    POCT Anion Gap, Venous 9.0 (L) 10.0 - 25.0 mmol/L    Patient Temperature  37.0 degrees Celsius    FiO2 21 %   BLOOD GAS ARTERIAL FULL PANEL   Result Value Ref Range    POCT pH, Arterial 7.43 (H) 7.38 - 7.42 pH    POCT pCO2, Arterial 36 (L) 38 - 42 mm Hg    POCT pO2, Arterial 73 (L) 85 - 95 mm Hg    POCT SO2, Arterial 97 94 - 100 %    POCT Oxy Hemoglobin, Arterial 93.9 (L) 94.0 - 98.0 %    POCT Hematocrit Calculated, Arterial 29.0 (L) 36.0 - 46.0 %    POCT Sodium, Arterial 138 136 - 145 mmol/L    POCT Potassium, Arterial 3.4 (L) 3.5 - 5.3 mmol/L    POCT Chloride, Arterial 106 98 - 107 mmol/L    POCT Ionized Calcium, Arterial 1.20 1.10 - 1.33 mmol/L    POCT Glucose, Arterial 104 (H) 74 - 99 mg/dL    POCT Lactate, Arterial 0.8 0.4 - 2.0 mmol/L    POCT Base Excess, Arterial -0.2 -2.0 - 3.0 mmol/L    POCT HCO3 Calculated, Arterial 23.9 22.0 - 26.0 mmol/L    POCT Hemoglobin, Arterial 9.8 (L) 12.0 - 16.0 g/dL    POCT Anion Gap, Arterial 12 10 - 25 mmo/L    Patient Temperature 37.0 degrees Celsius    FiO2 21 %   POCT GLUCOSE   Result Value Ref Range    POCT Glucose 113 (H) 74 - 99 mg/dL   Magnesium   Result Value Ref Range    Magnesium 2.16 1.60 - 2.40 mg/dL   Hepatic function panel   Result Value Ref Range    Albumin 3.8 3.4 - 5.0 g/dL    Bilirubin, Total 5.9 (H) 0.0 - 1.2 mg/dL    Bilirubin, Direct 3.3 (H) 0.0 - 0.3 mg/dL    Alkaline Phosphatase 177 (H) 33 - 136 U/L    ALT 80 (H) 7 - 45 U/L    AST 82 (H) 9 - 39 U/L    Total Protein 5.3 (L) 6.4 - 8.2 g/dL   CBC   Result Value Ref Range    WBC 12.2 (H) 4.4 - 11.3 x10*3/uL    nRBC 0.0 0.0 - 0.0 /100 WBCs    RBC 3.14 (L) 4.00 - 5.20 x10*6/uL    Hemoglobin 10.0 (L) 12.0 - 16.0 g/dL    Hematocrit 28.8 (L) 36.0 - 46.0 %    MCV 92 80 - 100 fL    MCH 31.8 26.0 - 34.0 pg    MCHC 34.7 32.0 - 36.0 g/dL    RDW 15.9 (H) 11.5 - 14.5 %    Platelets 135 (L) 150 - 450 x10*3/uL   Calcium, ionized   Result Value Ref Range    POCT Calcium, Ionized 1.21 1.1 - 1.33 mmol/L   Basic Metabolic Panel   Result Value Ref Range    Glucose 100 (H) 74 - 99 mg/dL    Sodium 141  136 - 145 mmol/L    Potassium 3.5 3.5 - 5.3 mmol/L    Chloride 102 98 - 107 mmol/L    Bicarbonate 26 21 - 32 mmol/L    Anion Gap 17 10 - 20 mmol/L    Urea Nitrogen 41 (H) 6 - 23 mg/dL    Creatinine 1.43 (H) 0.50 - 1.05 mg/dL    eGFR 40 (L) >60 mL/min/1.73m*2    Calcium 9.1 8.6 - 10.6 mg/dL   Phosphorus   Result Value Ref Range    Phosphorus 2.6 2.5 - 4.9 mg/dL   POCT GLUCOSE   Result Value Ref Range    POCT Glucose 145 (H) 74 - 99 mg/dL   POCT GLUCOSE   Result Value Ref Range    POCT Glucose 166 (H) 74 - 99 mg/dL   Heparin Assay, UFH   Result Value Ref Range    Heparin Unfractionated 0.5 See Comment Below for Therapeutic Ranges IU/mL   Calcium, ionized   Result Value Ref Range    POCT Calcium, Ionized 1.18 1.1 - 1.33 mmol/L   CBC   Result Value Ref Range    WBC 11.9 (H) 4.4 - 11.3 x10*3/uL    nRBC 0.0 0.0 - 0.0 /100 WBCs    RBC 3.00 (L) 4.00 - 5.20 x10*6/uL    Hemoglobin 9.5 (L) 12.0 - 16.0 g/dL    Hematocrit 25.6 (L) 36.0 - 46.0 %    MCV 85 80 - 100 fL    MCH 31.7 26.0 - 34.0 pg    MCHC 37.1 (H) 32.0 - 36.0 g/dL    RDW 15.1 (H) 11.5 - 14.5 %    Platelets 133 (L) 150 - 450 x10*3/uL   Coagulation Screen   Result Value Ref Range    Protime 16.9 (H) 9.8 - 12.8 seconds    INR 1.5 (H) 0.9 - 1.1    aPTT 81 (H) 27 - 38 seconds   Heparin Assay, UFH   Result Value Ref Range    Heparin Unfractionated 0.6 See Comment Below for Therapeutic Ranges IU/mL   Hepatic function panel   Result Value Ref Range    Albumin 3.6 3.4 - 5.0 g/dL    Bilirubin, Total 4.5 (H) 0.0 - 1.2 mg/dL    Bilirubin, Direct 2.1 (H) 0.0 - 0.3 mg/dL    Alkaline Phosphatase 177 (H) 33 - 136 U/L     (H) 7 - 45 U/L    AST 88 (H) 9 - 39 U/L    Total Protein 4.9 (L) 6.4 - 8.2 g/dL   Magnesium   Result Value Ref Range    Magnesium 1.88 1.60 - 2.40 mg/dL   Basic Metabolic Panel   Result Value Ref Range    Glucose 99 74 - 99 mg/dL    Sodium 140 136 - 145 mmol/L    Potassium 3.2 (L) 3.5 - 5.3 mmol/L    Chloride 101 98 - 107 mmol/L    Bicarbonate 27 21 - 32  mmol/L    Anion Gap 15 10 - 20 mmol/L    Urea Nitrogen 42 (H) 6 - 23 mg/dL    Creatinine 1.40 (H) 0.50 - 1.05 mg/dL    eGFR 41 (L) >60 mL/min/1.73m*2    Calcium 8.6 8.6 - 10.6 mg/dL   Phosphorus   Result Value Ref Range    Phosphorus 2.3 (L) 2.5 - 4.9 mg/dL   POCT GLUCOSE   Result Value Ref Range    POCT Glucose 126 (H) 74 - 99 mg/dL                                                                              Imaging   RUQ US 5/19/24  IMPRESSION:  Hepatic steatosis without focal lesions. Otherwise, unremarkable right upper quadrant ultrasound.                                                                         GI Procedures     EGD March 2024  Impression  The esophagus appeared normal.  Single ulcer in the antrum with clean base (Ranjeet III)  Moderate abnormal mucosa in the antrum, consistent with gastritis; performed cold forceps biopsy to rule out H. pylori  The duodenal bulb, 1st part of the duodenum and 2nd part of the duodenum appeared normal     A. STOMACH ANTRUM BIOPSY:      -- Antral type gastric mucosa with chemical/reactive gastropathy.  --Fundic type gastric mucosa with features suggestive of proton pump inhibitor effect.  --Helicobacter pylori-like organisms are not identified.    Colonoscopy 2022  Impression:            - Diverticulosis in the entire examined colon.                         - Many diminutive polyps in the sigmoid colon.                          Biopsied.                         - The examination was otherwise normal.  Recommendations: repeat in 5 years      ASSESSMENT / PLAN                  ASSESSMENT/PLAN:  Kladuia Ratliff is a 68 y.o. female presenting with claudication and is now s/p perivisceral aortic endarterectomy and aortobifemoral bypass.  Hepatology is consulted for elevated transaminases and hyperbilirubinemia. Bilirubin seems to have peaked and is improving daily with the majority being indirect bilirubinemia. This is possibly related to shearing post vascular  surgery and should continue to improve.  The rise in transaminases is less clear. Ddx is ischemic hepatitis which is less likely because of degree of rise and timeline of increase vs medication induced (gabapentin or atorvastatin) vs ICU jaundice which is common in post cardiac/ vascular patients. Patient does have hepatic steatosis but this is not likely contributing to her acute presentation.    Recommendations:  -Continue to monitor hepatic function panel daily.  -If labs trending up, will need to consider holding atorvastatin and/ or gabapentin  -Outpatient risk factor modification for fatty liver disease: weight loss, low carb diet and exercise.  -Please screen for Hep C.    Patient was seen and discussed with Dr. Smith.    Recommendations communicated with the primary team via secure chat.  Thank you for the consultation. Hepatology will continue to follow.  - During weekday hours of 7am- 5pm, please do not hesitate to contact me on [facility] Chat or page [phone] if there are any further questions   - After hours, on weekends, and on holidays, please page the [place]-call GI fellow at [phone]. Thank you.       Lian Garrison MD  Gastroenterology and Hepatology Fellow  [facility]

## 2024-05-21 NOTE — PROGRESS NOTES
VASCULAR SURGERY PROGRESS NOTE  Subjective   No acute events overnight  Chest tube removed without signs of PTX or respiratory distress  Right foot blister opened and drained at bedside    Objective   Vitals:  Heart Rate:  [65-85]   Temp:  [36 °C (96.8 °F)-36.9 °C (98.4 °F)]   Resp:  [17-25]   BP: (118-133)/(58-64)   SpO2:  [92 %-96 %]     Exam:  Constitutional: No acute distress, resting comfortably  Neuro:  AOx3, grossly intact  CV: no tachycardia on bedside monitor  Pulm: non-labored on nasal cannula, Dressing overlying CT incision  GI: soft, appropriately tender, thoraco-RP incision covered with dressing  Skin: blistering of bilateral lower extremities from volume overload  Musculoskeletal: moving all extremities  Extremities: Palpable bilateral DPs, sensory and motor intact in bilateral lower extremities, bilateral groin incisions covered with dressing    Labs:  Results from last 7 days   Lab Units 05/21/24  0028 05/20/24  1218 05/20/24  0000   WBC AUTO x10*3/uL 11.9* 12.2* 11.6*   HEMOGLOBIN g/dL 9.5* 10.0* 9.4*   PLATELETS AUTO x10*3/uL 133* 135* 117*      Results from last 7 days   Lab Units 05/21/24  0028 05/20/24  1218 05/20/24  0000   SODIUM mmol/L 140 141 143   POTASSIUM mmol/L 3.2* 3.5 3.3*   CHLORIDE mmol/L 101 102 106   CO2 mmol/L 27 26 26   BUN mg/dL 42* 41* 40*   CREATININE mg/dL 1.40* 1.43* 1.46*   GLUCOSE mg/dL 99 100* 102*   MAGNESIUM mg/dL 1.88 2.16 1.92   PHOSPHORUS mg/dL 2.3* 2.6 2.8      Results from last 7 days   Lab Units 05/21/24  0028 05/20/24  0000 05/19/24  0936   INR  1.5* 1.4* 1.3*   PROTIME seconds 16.9* 15.4* 14.4*   APTT seconds 81* 32 27     Assessment/Plan   Klaudia Ratliff is 68 y.o. 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    Plan:  - Recommend engaging hepatology for further evaluation of elevated liver enzymes,  bilirubin   - Continue therapeutic heparin for new, RLE DVTs  - Recommend interdry to bilateral groin incision sites  - OOB/PT/OT  - q4 NV assessment  - pain control  - bronchopulmonary hygiene  - ASA  - goal MAP >70 mmHg  - trend LFTs  - Maintain K >4, Mg >2  - maintain Hgb >10, PLT >100, INR <1.4  - SCDs  - Okay for transfer to SDU    Seen and discussed with vascular fellow, Dr. Delgado  D/w attending, Dr. Garcia Diamond,   Department of Surgery / IR Integrated PGY1  Vascular [id number]

## 2024-05-21 NOTE — PROGRESS NOTES
Physical Therapy    Physical Therapy Treatment    Patient Name: Klaudia Ratliff  MRN: [id number]  Today's Date: 5/21/2024  Time Calculation  Start Time: 1214  Stop Time: 1245  Time Calculation (min): 31 min    Assessment/Plan   PT Assessment  PT Assessment Results: Decreased strength, Decreased endurance, Impaired balance, Decreased mobility  Rehab Prognosis: Good  Evaluation/Treatment Tolerance: Patient tolerated treatment well  Medical Staff Made Aware: Yes  End of Session Communication: Bedside nurse  End of Session Patient Position: Alarm off, not on at start of session, Up in chair  PT Plan  Inpatient/Swing Bed or Outpatient: Inpatient  PT Plan  Treatment/Interventions: Bed mobility, Transfer training, Gait training, Balance training, Neuromuscular re-education, Strengthening, Endurance training, Therapeutic exercise, Therapeutic activity  PT Plan: Skilled PT  PT Frequency: 3 times per week  PT Discharge Recommendations: Moderate intensity level of continued care  PT Recommended Transfer Status: Assist x1, Assistive device  PT - OK to Discharge: Yes      General Visit Information:   PT  Visit  PT Received On: 05/21/24  Response to Previous Treatment: Patient with no complaints from previous session.  General  Missed Visit: No  Family/Caregiver Present: No  Prior to Session Communication: Bedside nurse  Patient Position Received: Bed, 3 rail up, Alarm off, not on at start of session  Preferred Learning Style: auditory, verbal  General Comment: Pt awake, alert and willing to participate in PT session.  Pt complete bed mobility, sit<>stand transfer and bed>chair transfer with FWW.  CGA provided with all functional mobility.  Limited weight through RLE d/t pain however able to bear weight through heel to complete transfer safely.  Vitals stable. (mateo Olvera foley)    Subjective   Precautions:  Precautions  Medical Precautions: Fall precautions, Oxygen therapy device and L/min, Chest tube  Precautions Comment:  MAP >80, SpO2 >94%  Vital Signs:  Vital Signs  Heart Rate: 72 (Post: 81)  Heart Rate Source: Monitor  Resp: (!) 28 (Post: 19)  SpO2: 97 % (Post: 95)  BP: 138/69 (Post; 125/72)  MAP (mmHg): 95 (Post: 92)  BP Location: Right arm  BP Method: Arterial line  Patient Position:  (Supine start, sitting end of session)    Objective   Pain:  Pain Assessment  Pain Assessment: 0-10  Pain Score: 0 - No pain  Cognition:  Cognition  Overall Cognitive Status: Within Functional Limits  Orientation Level: Oriented X4  Activity Tolerance:  Activity Tolerance  Endurance: Tolerates 10 - 20 min exercise with multiple rests  Early Mobility/Exercise Safety Screen: Proceed with mobilization - No exclusion criteria met  Treatments:  Therapeutic Exercise  Therapeutic Exercise Performed: Yes  Therapeutic Exercise Activity 1: 1x10 reps of incentive spirometer: </=1000    Therapeutic Activity  Therapeutic Activity Performed: Yes  Therapeutic Activity 1: Increased time for skilled ICU line/tube management for safe functional mobility  Therapeutic Activity 2: Pt sat EOB ~10 min with SBA.  Assisted pt with washing and brushing her hair per pts request.    Bed Mobility  Bed Mobility: Yes  Bed Mobility 1  Bed Mobility 1: Supine to sitting  Level of Assistance 1: Contact guard  Bed Mobility Comments 1: HOB elevated, cues for sequencing, log roll completed    Ambulation/Gait Training  Ambulation/Gait Training Performed: Yes  Ambulation/Gait Training 1  Surface 1: Level tile  Device 1: Rolling walker  Assistance 1: Contact guard  Comments/Distance (ft) 1: ~4 lateral steps bed>chair (Weight through R heel d/t pain on forefoot.  Increased weight throughout UEs to off load RLE.  Limited distance d/t pain.)  Transfers  Transfer: Yes  Transfer 1  Transfer From 1: Sit to  Transfer to 1: Stand  Technique 1: Sit to stand  Transfer Device 1: Walker  Transfer Level of Assistance 1: Contact guard  Trials/Comments 1: Increased height of bed to complete, cues for  hand placement  Transfers 2  Transfer From 2: Stand to  Transfer to 2: Sit  Technique 2: Stand to sit  Transfer Device 2: Walker  Transfer Level of Assistance 2: Contact guard  Trials/Comments 2: Cues for hand placement    Stairs  Stairs: No    Outcome Measures:  [facility] Basic Mobility  Turning from your back to your side while in a flat bed without using bedrails: A little  Moving from lying on your back to sitting on the side of a flat bed without using bedrails: A little  Moving to and from bed to chair (including a wheelchair): A little  Standing up from a chair using your arms (e.g. wheelchair or bedside chair): A little  To walk in hospital room: A lot  Climbing 3-5 steps with railing: A lot  Basic Mobility - Total Score: 16    FSS-ICU  Ambulation: Walks <50 feet with any assistance x1 or walks any distance with assistance x2 people  Rolling: Supervision or set-up only  Sitting: Supervision or set-up only  Transfer Sit-to-Stand: Minimal assistance (performs 75% or more of task)  Transfer Supine-to-Sit: Supervision or set-up only  Total Score: 20    ICU Mobility Screen  Early Mobility/Exercise Safety Screen: Proceed with mobilization - No exclusion criteria met  ICU Mobility Scale: Transferring bed to chair    Education Documentation  Precautions, taught by Vivi Lugo PT at 5/21/2024  2:57 PM.  Learner: Patient  Readiness: Acceptance  Method: Demonstration, Explanation  Response: Verbalizes Understanding, Demonstrated Understanding    Body Mechanics, taught by Vivi Lugo PT at 5/21/2024  2:57 PM.  Learner: Patient  Readiness: Acceptance  Method: Demonstration, Explanation  Response: Verbalizes Understanding, Demonstrated Understanding    Mobility Training, taught by Vivi Lugo PT at 5/21/2024  2:57 PM.  Learner: Patient  Readiness: Acceptance  Method: Demonstration, Explanation  Response: Verbalizes Understanding, Demonstrated Understanding    Education Comments  No comments found.    EDUCATION:        Encounter Problems       Encounter Problems (Active)       Balance       Pt will demonstrate ability to complete sitting/standing static/dynamic balance activities with unilateral UE support and no LOB for increase in safety up D/C.  (Progressing)       Start:  05/17/24    Expected End:  05/31/24               Mobility       Pt will demonstrated ability to ambulate >/=50ft with proper form, LRAD and no balance deficits for safe DC.   (Progressing)       Start:  05/17/24    Expected End:  05/31/24            Pt will demonstrate ability to complete ther ex activities to improve functional strength for increase in independence upon DC.  (Progressing)       Start:  05/17/24    Expected End:  05/31/24               PT Transfers       Pt will demonstrated ability to complete bed mobility and sit<>stand transfers without assistance and use of assistive device to safely DC. (Progressing)       Start:  05/17/24    Expected End:  05/31/24

## 2024-05-21 NOTE — PROGRESS NOTES
1/1 CTICU     VASCULAR SURGERY   5/16 Perivisceral aortic endarterectomy, aortobifemoral--bypass graft, left renal bypass, and right femoral endarterectomy (Jovan Garcia)      DC PLAN  SNF: _______     > auth needed     PAYOR   [facility]     AWAIT  ( ) SNF choice from patient or her g-dtr (Kai) -5/22     PT/OT   5/21 PT (16) Mod  5/20 OT (17) Mod     COMPLETED  (X) Daily ongoing review of patient via chart and/or (M-F) IDT rounds  (X) 05/21 - Checked in with pt. For SNF choices. Kai to have tomorrow.   (X) 05/17 DC/SDOH Assessments with pt. / gdt (Kai) and daughter from [place].       CONTACTS  Grand-dtr (Kai) and Roommate (Lon) here in [place]  Daughter from [place] and son from [place]     NOTE  5/17 - Met with patient / g'dgr (Kai) and daughter. Patient's dtr/son out of state but Kai lives near pt. & with roommate is support for patient.  Patient at home and uses walker/cane.  Recently stopped driving. Kai and Lon support transport & shopping.  Per patient, still active with [facility] from last admit.  However all 3 want to start planning for SNF.  Patient thinks the one she'd prefer is in [place].  Lists for [place] and [place] given to g-dtr and highlighted (2) [place] facilities.  Expectation is d-dtr will follow up with author with 3 SNF choices.      Hyacinth Eubanks (LSW, MSW)

## 2024-05-21 NOTE — PROGRESS NOTES
Surgical Intensive Care Unit Progress Note      Subjective   No acute events overnight. Slept well, not in a lot of pain. IV potassium stopped jail through due to discomfort, transitioned to PO. Chest tube removed. No CP or SOB. ROS otherwise neg.    Objective   Physical Exam  Vitals reviewed.   Constitutional:       Appearance: Normal appearance.   HENT:      Head: Normocephalic and atraumatic.      Mouth/Throat:      Mouth: Mucous membranes are moist.      Pharynx: Oropharynx is clear.   Eyes:      Conjunctiva/sclera: Conjunctivae normal.      Pupils: Pupils are equal, round, and reactive to light.   Cardiovascular:      Rate and Rhythm: Normal rate and regular rhythm.      Pulses: Normal pulses.      Heart sounds: Normal heart sounds.   Pulmonary:      Effort: Pulmonary effort is normal.      Breath sounds: Normal breath sounds.   Chest:      Comments: L chest tube dressing, intact  Abdominal:      General: Abdomen is protuberant.      Palpations: Abdomen is soft.      Comments: L transverse incision CHAS   Genitourinary:     Comments: Kenney draining clear urine   Musculoskeletal:         General: Normal range of motion.      Cervical back: Neck supple.      Right lower leg: Edema present.      Left lower leg: Edema present.   Feet:      Right foot:      Skin integrity: Erythema and warmth present.      Left foot:      Skin integrity: Erythema and warmth present.      Comments: Bilateral ace wraps in place  Skin:     General: Skin is warm and dry.   Neurological:      General: No focal deficit present.      Mental Status: She is alert and oriented to person, place, and time.   Psychiatric:         Attention and Perception: Attention normal.         Mood and Affect: Mood normal.         Behavior: Behavior normal.     VS over last 24h  Heart Rate:  [65-85]   Temp:  [36 °C (96.8 °F)-36.9 °C (98.4 °F)]   Resp:  [17-25]   BP: (118-133)/(58-64)   SpO2:  [92 %-96 %]        Intake/Output Summary (Last 24 hours) at  5/21/2024 0657  Last data filed at 5/21/2024 0600  Gross per 24 hour   Intake 397.58 ml   Output 4135 ml   Net -3737.42 ml        Relevant Results  Scheduled medications  aspirin, 81 mg, oral, Daily  atorvastatin, 80 mg, oral, Nightly  cefOXitin, 2 g, intravenous, q8h  heparin, 5,000 Units, subcutaneous, q8h  insulin lispro, 0-5 Units, subcutaneous, 4x daily  metoprolol tartrate, 25 mg, oral, BID  pantoprazole, 40 mg, oral, Daily before breakfast  potassium chloride, 20 mEq, intravenous, Once  sennosides-docusate sodium, 2 tablet, oral, BID  tiotropium, 2 puff, inhalation, Daily      Continuous medications  heparin, 0-4,000 Units/hr, Last Rate: 1,000 Units/hr (05/21/24 0300)  lactated Ringer's, 5 mL/hr, Last Rate: 5 mL/hr (05/21/24 0300)      PRN medications  PRN medications: acetaminophen, bisacodyl, calcium gluconate, calcium gluconate, dextrose **OR** glucagon, hydrALAZINE, labetaloL, magnesium sulfate, magnesium sulfate, oxyCODONE, oxyCODONE, oxygen, potassium chloride, potassium chloride, sodium chloride        Component      Latest Ref Rng 5/19/2024   LEUKOCYTES (10*3/UL) IN BLOOD BY AUTOMATED COUNT, [place]      4.4 - 11.3 x10*3/uL 11.9 (H)    nRBC      0.0 - 0.0 /100 WBCs 0.0    ERYTHROCYTES (10*6/UL) IN BLOOD BY AUTOMATED COUNT, [place]      4.00 - 5.20 x10*6/uL 3.28 (L)    HEMOGLOBIN      12.0 - 16.0 g/dL 10.6 (L)    HEMATOCRIT      36.0 - 46.0 % 30.4 (L)    MCV      80 - 100 fL 93    MCH      26.0 - 34.0 pg 32.3    MCHC      32.0 - 36.0 g/dL 34.9    RED CELL DISTRIBUTION WIDTH      11.5 - 14.5 % 16.1 (H)    PLATELETS (10*3/UL) IN BLOOD AUTOMATED COUNT, [place]      150 - 450 x10*3/uL 101 (L)    GLUCOSE      74 - 99 mg/dL 111 (H)    SODIUM      136 - 145 mmol/L 141    POTASSIUM      3.5 - 5.3 mmol/L 3.8    CHLORIDE      98 - 107 mmol/L 108 (H)    Bicarbonate      21 - 32 mmol/L 23    Anion Gap      10 - 20 mmol/L 14    Blood Urea Nitrogen      6 - 23 mg/dL 32 (H)    Creatinine      0.50 - 1.05 mg/dL  1.41 (H)    EGFR      >60 mL/min/1.73m*2 41 (L)    Calcium      8.6 - 10.6 mg/dL 8.2 (L)    Albumin      3.4 - 5.0 g/dL 3.5    Bilirubin Total      0.0 - 1.2 mg/dL 6.1 (H)    Bilirubin, Direct      0.0 - 0.3 mg/dL 4.1 (H)    Alkaline Phosphatase      33 - 136 U/L 44    ALT      7 - 45 U/L 24    AST      9 - 39 U/L 24    Total Protein      6.4 - 8.2 g/dL 4.8 (L)    MAGNESIUM      1.60 - 2.40 mg/dL 2.22    POCT Calcium, Ionized      1.1 - 1.33 mmol/L 1.18    PHOSPHORUS      2.5 - 4.9 mg/dL 3.4    LDH      84 - 246 U/L 159         Assessment/Plan     Klaudia Ratliff is a 68 y.o. female with past medical history of HTN, HLD, obesity, COPD, gout, RLE DVT, right PE s/p mechanical thrombectomy and IVC filter placement (2/22/2024) discharged on eliquis and bridged to Lovenox (5/13), and aortic occlusion. Patient presents to SICU now s/p aortofem bypass via thoracoabdominal approach. Arrived to SICU intubated, sedated on prop @30, requiring norepi @ 0.04 and epi @ 0.02 to maintain goal MAP 80. Intubated with double lumen tube, neuromuscular blockade reversed by anesthesia team. Extubated overnight on POD 0 and received 1u pRBC. Post-op course stable with good recovery trajectory.      Plan:  NEURO: PMH of sciatica, on gabapentin. A+Ox3, no focal deficits, reassuring neurovasc checks overnight. Pain well controlled. No neuro issues at this time.  - acetaminophen and oxycodone as needed for pain  - restarted home dose gabapentin  - q4h neuro/neurovasc/pain assessments  - PT/OT following -> ambulate today if able to bear weight  - Home meds; Gabapentin 100 mg BID      CV: History of HTN, HLD, obesity, RLE DVT, Right PE (s/p mechanical thrombectomy 2/24), and aortic occlusion. Baseline BP (pre-op) 161/76. Baseline cardiac function normal EF (60-65%), elevated LA pressure (2/2/24). Carotid Duplex < 50% ICA stenosis b/l. Now s/p aortofem bypass. Acutely elevated lactate normalized. Hypotension resolved, weaned off pressor  support 5/18, tolerating stress dose steroid wean. Hemodynamically intact without pressor support. Given x1 dose of labetalol early this am for sbp >160.  - goal MAP liberalized to >70 per vascular surgery  - continuous ekg/abp monitoring  - correlate NIBP   - PO ASA  - cont metoprolol 25mg bid with hold parameters  - cont home atorvastatin  - home meds: Eliquis 5 mg BID (held since 5/12, bridged to lovenox 5/13), ASA 81 mg, Atenolol 50 mg, atorvastatin 80 mg, losartan 100 mg, Vitamin C 250 mg     PULM: History of COPD, pulmonary embolism (2/2/24) s/p mechanical thrombectomy and IVC filter placement. No diaphragm pacing wires placed intra-op. Arrived to SICU Intubated with AL ETT in place. Extubated overnight on POD 0. Trace L apical PTX 5/19. L chest tube removed 5/21 AM.  - wean FiO2 for goal SpO2 >94%  - Q1h IS while awake  - additional bronchial hygiene as indicated  - Daily CXR  - cont tiotropium  - Home meds: Tiotropium 2.5 mcg 2 puffs daily.     GI: History of gastric ulcer (3/2024), PPI every day recommended. Supraceliac clamp x27 minutes. Acute hyperbilirubinemia improving. RUQ US 5/19: Hepatic steatosis without focal lesions. Otherwise, unremarkable.   - adv to CLD  - continue to trend LFTs daily  - PPI for GI prophylaxis/recent gastric ulcer  - bowel regimen  - Hepatology consult per vascular surgery     : Baseline cr 0.9-1.1.  Left renal ischemic time 47 minutes. Prerenal->intrinsic ELROY slightly worse today. Total body fluid overload. Responsive to lasix, last dosed with 40mg IV @ 0000. Hypokalemia.  - q12h and prn RFP  - continue diuresis  - Redose lasix as needed for goal neg 1-2 L  - Goal U/O >1-2ml/kg/hr.    - Replete electrolytes to goal K>4, Mg>2, Phos>2.5, ionized Ca>1.10 if creatinine remains WNL or <1.5 with adequate uop.  - Home meds: Lasix 40 mg PRN, Potassium chloride 10 mEq ER      HEME: Recent PE s/p mechanical thrombectomy and IVC filter placement 2/2024. Bridged from eliquis to lovenox  5/13/24. Acute surgical blood loss anemia, acute post op thrombocytopenia improving. 5/18 Anti PF4 Neg. Vasc lab US BLE 5/20 prelim read: Acute non-occlusive deep vein thrombosis distal external iliac, common femoral vein, femoral vein proximal thru mid, and profunda vein.   - Daily CBC, prn coags  - scds for dvt prophylaxis.  - Continue asa  - therapeutic heparin gtt  - Home meds: Lovenox 80 mg q12h     ENDO: No history of DM or hypothyroidism. A1c 5.3%. TSH 1.86. Arrived to ICU on insulin gtt, turned off overnight on POD 0. Adequate glycemic control, currently on lispro SSI #1.   - Q4h BG -> convert to AC/HS  - was getting hypoglycemic on SSI 2--> Cont SSI 1     ID: Recent E.coli UTI 5/3, completed 3 day course Augmentin. Afebrile. Mild leukocytosis improving, No signs of infection at this time. Completed course of cefoxitin with chest tube removal.   - trend wbc daily  - monitor for s/s infection    Skin: R shin and R heel bullae --> unroofed this AM by vascular surgery  - continue preventative mepilex to heals/sacrum  - turns q2h  - OOB daily as able  - weekly skin rounds     Lines:   - right brachial art line (5/16) -> remove today  - PIV right hand  - Kenney -> remove today     Family/Surrogate updated with plan of care.  Code status addressed/up to date.     Atif Sifuentes,   SICU [id number]

## 2024-05-22 ENCOUNTER — APPOINTMENT (OUTPATIENT)
Dept: RADIOLOGY | Facility: HOSPITAL | Age: 69
DRG: 673 | End: 2024-05-22
Payer: MEDICARE

## 2024-05-22 LAB
ABO GROUP (TYPE) IN BLOOD: NORMAL
ALBUMIN SERPL BCP-MCNC: 2.8 G/DL (ref 3.4–5)
ALBUMIN SERPL BCP-MCNC: 3.3 G/DL (ref 3.4–5)
ALP SERPL-CCNC: 116 U/L (ref 33–136)
ALP SERPL-CCNC: 152 U/L (ref 33–136)
ALT SERPL W P-5'-P-CCNC: 54 U/L (ref 7–45)
ALT SERPL W P-5'-P-CCNC: 79 U/L (ref 7–45)
ANION GAP BLDA CALCULATED.4IONS-SCNC: 10 MMO/L (ref 10–25)
ANION GAP BLDA CALCULATED.4IONS-SCNC: 12 MMO/L (ref 10–25)
ANION GAP SERPL CALC-SCNC: 13 MMOL/L (ref 10–20)
ANION GAP SERPL CALC-SCNC: 15 MMOL/L (ref 10–20)
ANTIBODY SCREEN: NORMAL
APTT PPP: 26 SECONDS (ref 27–38)
APTT PPP: 36 SECONDS (ref 27–38)
AST SERPL W P-5'-P-CCNC: 23 U/L (ref 9–39)
AST SERPL W P-5'-P-CCNC: 39 U/L (ref 9–39)
BASE EXCESS BLDA CALC-SCNC: -1.6 MMOL/L (ref -2–3)
BASE EXCESS BLDA CALC-SCNC: -2.8 MMOL/L (ref -2–3)
BASOPHILS # BLD AUTO: 0.04 X10*3/UL (ref 0–0.1)
BASOPHILS NFR BLD AUTO: 0.3 %
BILIRUB DIRECT SERPL-MCNC: 2.2 MG/DL (ref 0–0.3)
BILIRUB SERPL-MCNC: 4.1 MG/DL (ref 0–1.2)
BILIRUB SERPL-MCNC: 4.6 MG/DL (ref 0–1.2)
BODY TEMPERATURE: 37 DEGREES CELSIUS
BODY TEMPERATURE: 37 DEGREES CELSIUS
BUN SERPL-MCNC: 47 MG/DL (ref 6–23)
BUN SERPL-MCNC: 48 MG/DL (ref 6–23)
CA-I BLD-SCNC: 1.11 MMOL/L (ref 1.1–1.33)
CA-I BLDA-SCNC: 1.01 MMOL/L (ref 1.1–1.33)
CA-I BLDA-SCNC: 1.12 MMOL/L (ref 1.1–1.33)
CALCIUM SERPL-MCNC: 7.8 MG/DL (ref 8.6–10.6)
CALCIUM SERPL-MCNC: 8 MG/DL (ref 8.6–10.6)
CHLORIDE BLDA-SCNC: 101 MMOL/L (ref 98–107)
CHLORIDE BLDA-SCNC: 99 MMOL/L (ref 98–107)
CHLORIDE SERPL-SCNC: 97 MMOL/L (ref 98–107)
CHLORIDE SERPL-SCNC: 98 MMOL/L (ref 98–107)
CO2 SERPL-SCNC: 22 MMOL/L (ref 21–32)
CO2 SERPL-SCNC: 25 MMOL/L (ref 21–32)
CREAT SERPL-MCNC: 1.62 MG/DL (ref 0.5–1.05)
CREAT SERPL-MCNC: 1.76 MG/DL (ref 0.5–1.05)
EGFRCR SERPLBLD CKD-EPI 2021: 31 ML/MIN/1.73M*2
EGFRCR SERPLBLD CKD-EPI 2021: 35 ML/MIN/1.73M*2
EOSINOPHIL # BLD AUTO: 0.25 X10*3/UL (ref 0–0.7)
EOSINOPHIL NFR BLD AUTO: 1.7 %
ERYTHROCYTE [DISTWIDTH] IN BLOOD BY AUTOMATED COUNT: 15.5 % (ref 11.5–14.5)
ERYTHROCYTE [DISTWIDTH] IN BLOOD BY AUTOMATED COUNT: 15.8 % (ref 11.5–14.5)
ERYTHROCYTE [DISTWIDTH] IN BLOOD BY AUTOMATED COUNT: 16.2 % (ref 11.5–14.5)
GLUCOSE BLD MANUAL STRIP-MCNC: 100 MG/DL (ref 74–99)
GLUCOSE BLD MANUAL STRIP-MCNC: 108 MG/DL (ref 74–99)
GLUCOSE BLD MANUAL STRIP-MCNC: 98 MG/DL (ref 74–99)
GLUCOSE BLDA-MCNC: 105 MG/DL (ref 74–99)
GLUCOSE BLDA-MCNC: 95 MG/DL (ref 74–99)
GLUCOSE SERPL-MCNC: 100 MG/DL (ref 74–99)
GLUCOSE SERPL-MCNC: 98 MG/DL (ref 74–99)
HCO3 BLDA-SCNC: 21.7 MMOL/L (ref 22–26)
HCO3 BLDA-SCNC: 21.8 MMOL/L (ref 22–26)
HCT VFR BLD AUTO: 26.1 % (ref 36–46)
HCT VFR BLD AUTO: 26.4 % (ref 36–46)
HCT VFR BLD AUTO: 29.7 % (ref 36–46)
HCT VFR BLD EST: 29 % (ref 36–46)
HCT VFR BLD EST: ABNORMAL %
HCV RNA SERPL NAA+PROBE-ACNC: NOT DETECTED K[IU]/ML
HCV RNA SERPL NAA+PROBE-LOG IU: NORMAL {LOG_IU}/ML
HGB BLD-MCNC: 8.6 G/DL (ref 12–16)
HGB BLD-MCNC: 8.8 G/DL (ref 12–16)
HGB BLD-MCNC: 8.8 G/DL (ref 12–16)
HGB BLDA-MCNC: 9.6 G/DL (ref 12–16)
HGB BLDA-MCNC: ABNORMAL G/DL
IMM GRANULOCYTES # BLD AUTO: 0.45 X10*3/UL (ref 0–0.7)
IMM GRANULOCYTES NFR BLD AUTO: 3.1 % (ref 0–0.9)
INHALED O2 CONCENTRATION: 28 %
INHALED O2 CONCENTRATION: 28 %
INR PPP: 1.5 (ref 0.9–1.1)
INR PPP: 1.5 (ref 0.9–1.1)
LACTATE BLDA-SCNC: 0.6 MMOL/L (ref 0.4–2)
LACTATE BLDA-SCNC: 0.8 MMOL/L (ref 0.4–2)
LYMPHOCYTES # BLD AUTO: 2.01 X10*3/UL (ref 1.2–4.8)
LYMPHOCYTES NFR BLD AUTO: 13.9 %
MAGNESIUM SERPL-MCNC: 1.96 MG/DL (ref 1.6–2.4)
MAGNESIUM SERPL-MCNC: 2.03 MG/DL (ref 1.6–2.4)
MCH RBC QN AUTO: 30.6 PG (ref 26–34)
MCH RBC QN AUTO: 31 PG (ref 26–34)
MCH RBC QN AUTO: 31.1 PG (ref 26–34)
MCHC RBC AUTO-ENTMCNC: 29.6 G/DL (ref 32–36)
MCHC RBC AUTO-ENTMCNC: 32.6 G/DL (ref 32–36)
MCHC RBC AUTO-ENTMCNC: 33.7 G/DL (ref 32–36)
MCV RBC AUTO: 105 FL (ref 80–100)
MCV RBC AUTO: 92 FL (ref 80–100)
MCV RBC AUTO: 94 FL (ref 80–100)
MONOCYTES # BLD AUTO: 1 X10*3/UL (ref 0.1–1)
MONOCYTES NFR BLD AUTO: 6.9 %
NEUTROPHILS # BLD AUTO: 10.73 X10*3/UL (ref 1.2–7.7)
NEUTROPHILS NFR BLD AUTO: 74.1 %
NRBC BLD-RTO: 0 /100 WBCS (ref 0–0)
OXYHGB MFR BLDA: 94.9 % (ref 94–98)
OXYHGB MFR BLDA: ABNORMAL %
PCO2 BLDA: 32 MM HG (ref 38–42)
PCO2 BLDA: 36 MM HG (ref 38–42)
PH BLDA: 7.39 PH (ref 7.38–7.42)
PH BLDA: 7.44 PH (ref 7.38–7.42)
PHOSPHATE SERPL-MCNC: 4.1 MG/DL (ref 2.5–4.9)
PLATELET # BLD AUTO: 126 X10*3/UL (ref 150–450)
PLATELET # BLD AUTO: 132 X10*3/UL (ref 150–450)
PLATELET # BLD AUTO: 154 X10*3/UL (ref 150–450)
PO2 BLDA: 54 MM HG (ref 85–95)
PO2 BLDA: 84 MM HG (ref 85–95)
POTASSIUM BLDA-SCNC: 3.7 MMOL/L (ref 3.5–5.3)
POTASSIUM BLDA-SCNC: 4.1 MMOL/L (ref 3.5–5.3)
POTASSIUM SERPL-SCNC: 4 MMOL/L (ref 3.5–5.3)
POTASSIUM SERPL-SCNC: 4.4 MMOL/L (ref 3.5–5.3)
PROT SERPL-MCNC: 4.8 G/DL (ref 6.4–8.2)
PROT SERPL-MCNC: 4.9 G/DL (ref 6.4–8.2)
PROTHROMBIN TIME: 16.6 SECONDS (ref 9.8–12.8)
PROTHROMBIN TIME: 17.2 SECONDS (ref 9.8–12.8)
RBC # BLD AUTO: 2.81 X10*6/UL (ref 4–5.2)
RBC # BLD AUTO: 2.83 X10*6/UL (ref 4–5.2)
RBC # BLD AUTO: 2.84 X10*6/UL (ref 4–5.2)
RH FACTOR (ANTIGEN D): NORMAL
SAO2 % BLDA: 99 % (ref 94–100)
SAO2 % BLDA: ABNORMAL %
SODIUM BLDA-SCNC: 127 MMOL/L (ref 136–145)
SODIUM BLDA-SCNC: 131 MMOL/L (ref 136–145)
SODIUM SERPL-SCNC: 131 MMOL/L (ref 136–145)
SODIUM SERPL-SCNC: 131 MMOL/L (ref 136–145)
UFH PPP CHRO-ACNC: 0.5 IU/ML
WBC # BLD AUTO: 10.8 X10*3/UL (ref 4.4–11.3)
WBC # BLD AUTO: 13.6 X10*3/UL (ref 4.4–11.3)
WBC # BLD AUTO: 14.5 X10*3/UL (ref 4.4–11.3)

## 2024-05-22 PROCEDURE — 36430 TRANSFUSION BLD/BLD COMPNT: CPT

## 2024-05-22 PROCEDURE — 83735 ASSAY OF MAGNESIUM: CPT

## 2024-05-22 PROCEDURE — 2500000004 HC RX 250 GENERAL PHARMACY W/ HCPCS (ALT 636 FOR OP/ED)

## 2024-05-22 PROCEDURE — P9045 ALBUMIN (HUMAN), 5%, 250 ML: HCPCS | Mod: JZ

## 2024-05-22 PROCEDURE — 51701 INSERT BLADDER CATHETER: CPT

## 2024-05-22 PROCEDURE — 86923 COMPATIBILITY TEST ELECTRIC: CPT

## 2024-05-22 PROCEDURE — 82947 ASSAY GLUCOSE BLOOD QUANT: CPT | Mod: 91

## 2024-05-22 PROCEDURE — 36556 INSERT NON-TUNNEL CV CATH: CPT | Performed by: STUDENT IN AN ORGANIZED HEALTH CARE EDUCATION/TRAINING PROGRAM

## 2024-05-22 PROCEDURE — 2500000001 HC RX 250 WO HCPCS SELF ADMINISTERED DRUGS (ALT 637 FOR MEDICARE OP)

## 2024-05-22 PROCEDURE — 85610 PROTHROMBIN TIME: CPT | Mod: 91

## 2024-05-22 PROCEDURE — 71275 CT ANGIOGRAPHY CHEST: CPT

## 2024-05-22 PROCEDURE — 85520 HEPARIN ASSAY: CPT

## 2024-05-22 PROCEDURE — 2500000004 HC RX 250 GENERAL PHARMACY W/ HCPCS (ALT 636 FOR OP/ED): Mod: JZ

## 2024-05-22 PROCEDURE — 36556 INSERT NON-TUNNEL CV CATH: CPT | Mod: GC

## 2024-05-22 PROCEDURE — 84100 ASSAY OF PHOSPHORUS: CPT

## 2024-05-22 PROCEDURE — 85027 COMPLETE CBC AUTOMATED: CPT

## 2024-05-22 PROCEDURE — 99291 CRITICAL CARE FIRST HOUR: CPT

## 2024-05-22 PROCEDURE — 71045 X-RAY EXAM CHEST 1 VIEW: CPT

## 2024-05-22 PROCEDURE — 36556 INSERT NON-TUNNEL CV CATH: CPT

## 2024-05-22 PROCEDURE — 82330 ASSAY OF CALCIUM: CPT | Mod: 91

## 2024-05-22 PROCEDURE — 30233K1 TRANSFUSION OF NONAUTOLOGOUS FROZEN PLASMA INTO PERIPHERAL VEIN, PERCUTANEOUS APPROACH: ICD-10-PCS | Performed by: SURGERY

## 2024-05-22 PROCEDURE — 99233 SBSQ HOSP IP/OBS HIGH 50: CPT | Performed by: INTERNAL MEDICINE

## 2024-05-22 PROCEDURE — 51702 INSERT TEMP BLADDER CATH: CPT

## 2024-05-22 PROCEDURE — 36415 COLL VENOUS BLD VENIPUNCTURE: CPT | Performed by: NURSE PRACTITIONER

## 2024-05-22 PROCEDURE — 2020000001 HC ICU ROOM DAILY

## 2024-05-22 PROCEDURE — 82248 BILIRUBIN DIRECT: CPT

## 2024-05-22 PROCEDURE — 84132 ASSAY OF SERUM POTASSIUM: CPT | Mod: 91

## 2024-05-22 PROCEDURE — 83735 ASSAY OF MAGNESIUM: CPT | Mod: 91

## 2024-05-22 PROCEDURE — 86901 BLOOD TYPING SEROLOGIC RH(D): CPT | Performed by: NURSE PRACTITIONER

## 2024-05-22 PROCEDURE — 85610 PROTHROMBIN TIME: CPT | Performed by: STUDENT IN AN ORGANIZED HEALTH CARE EDUCATION/TRAINING PROGRAM

## 2024-05-22 PROCEDURE — 85025 COMPLETE CBC W/AUTO DIFF WBC: CPT | Performed by: STUDENT IN AN ORGANIZED HEALTH CARE EDUCATION/TRAINING PROGRAM

## 2024-05-22 PROCEDURE — 2500000004 HC RX 250 GENERAL PHARMACY W/ HCPCS (ALT 636 FOR OP/ED): Performed by: STUDENT IN AN ORGANIZED HEALTH CARE EDUCATION/TRAINING PROGRAM

## 2024-05-22 PROCEDURE — 36415 COLL VENOUS BLD VENIPUNCTURE: CPT

## 2024-05-22 PROCEDURE — 85027 COMPLETE CBC AUTOMATED: CPT | Mod: 91

## 2024-05-22 PROCEDURE — 80053 COMPREHEN METABOLIC PANEL: CPT

## 2024-05-22 PROCEDURE — 87522 HEPATITIS C REVRS TRNSCRPJ: CPT

## 2024-05-22 PROCEDURE — 71275 CT ANGIOGRAPHY CHEST: CPT | Performed by: RADIOLOGY

## 2024-05-22 PROCEDURE — 74174 CTA ABD&PLVS W/CONTRAST: CPT | Performed by: RADIOLOGY

## 2024-05-22 PROCEDURE — 2550000001 HC RX 255 CONTRASTS: Performed by: SURGERY

## 2024-05-22 PROCEDURE — P9016 RBC LEUKOCYTES REDUCED: HCPCS

## 2024-05-22 RX ORDER — SODIUM CHLORIDE 9 MG/ML
125 INJECTION, SOLUTION INTRAVENOUS CONTINUOUS
Status: DISCONTINUED | OUTPATIENT
Start: 2024-05-22 | End: 2024-05-23

## 2024-05-22 RX ORDER — ALBUMIN HUMAN 250 G/1000ML
12.5 SOLUTION INTRAVENOUS ONCE
Status: DISCONTINUED | OUTPATIENT
Start: 2024-05-22 | End: 2024-05-22

## 2024-05-22 RX ORDER — ALBUMIN HUMAN 50 G/1000ML
12.5 SOLUTION INTRAVENOUS ONCE
Status: COMPLETED | OUTPATIENT
Start: 2024-05-22 | End: 2024-05-22

## 2024-05-22 RX ADMIN — SODIUM CHLORIDE 125 ML/HR: 9 INJECTION, SOLUTION INTRAVENOUS at 18:33

## 2024-05-22 RX ADMIN — GABAPENTIN 100 MG: 100 CAPSULE ORAL at 09:11

## 2024-05-22 RX ADMIN — ALBUMIN HUMAN 12.5 G: 0.05 INJECTION, SOLUTION INTRAVENOUS at 18:22

## 2024-05-22 RX ADMIN — OXYCODONE HYDROCHLORIDE 5 MG: 5 TABLET ORAL at 09:11

## 2024-05-22 RX ADMIN — SODIUM CHLORIDE, POTASSIUM CHLORIDE, SODIUM LACTATE AND CALCIUM CHLORIDE 1000 ML: 600; 310; 30; 20 INJECTION, SOLUTION INTRAVENOUS at 05:51

## 2024-05-22 RX ADMIN — SODIUM CHLORIDE, POTASSIUM CHLORIDE, SODIUM LACTATE AND CALCIUM CHLORIDE 500 ML: 600; 310; 30; 20 INJECTION, SOLUTION INTRAVENOUS at 00:47

## 2024-05-22 RX ADMIN — OXYCODONE HYDROCHLORIDE 5 MG: 5 TABLET ORAL at 17:02

## 2024-05-22 RX ADMIN — IOHEXOL 150 ML: 350 INJECTION, SOLUTION INTRAVENOUS at 14:26

## 2024-05-22 RX ADMIN — ASPIRIN 81 MG 81 MG: 81 TABLET ORAL at 09:11

## 2024-05-22 RX ADMIN — PANTOPRAZOLE SODIUM 40 MG: 40 TABLET, DELAYED RELEASE ORAL at 09:11

## 2024-05-22 RX ADMIN — SENNOSIDES AND DOCUSATE SODIUM 2 TABLET: 50; 8.6 TABLET ORAL at 09:11

## 2024-05-22 RX ADMIN — SODIUM CHLORIDE 125 ML/HR: 9 INJECTION, SOLUTION INTRAVENOUS at 10:56

## 2024-05-22 ASSESSMENT — COGNITIVE AND FUNCTIONAL STATUS - GENERAL
MOBILITY SCORE: 13
TOILETING: A LOT
TURNING FROM BACK TO SIDE WHILE IN FLAT BAD: A LITTLE
DAILY ACTIVITIY SCORE: 16
CLIMB 3 TO 5 STEPS WITH RAILING: TOTAL
HELP NEEDED FOR BATHING: A LOT
MOVING FROM LYING ON BACK TO SITTING ON SIDE OF FLAT BED WITH BEDRAILS: A LITTLE
MOVING TO AND FROM BED TO CHAIR: A LOT
DRESSING REGULAR UPPER BODY CLOTHING: A LOT
DRESSING REGULAR LOWER BODY CLOTHING: A LOT
STANDING UP FROM CHAIR USING ARMS: A LOT
WALKING IN HOSPITAL ROOM: A LOT

## 2024-05-22 ASSESSMENT — PAIN SCALES - GENERAL
PAINLEVEL_OUTOF10: 0 - NO PAIN
PAINLEVEL_OUTOF10: 0 - NO PAIN
PAINLEVEL_OUTOF10: 6
PAINLEVEL_OUTOF10: 6
PAINLEVEL_OUTOF10: 4

## 2024-05-22 ASSESSMENT — PAIN DESCRIPTION - LOCATION
LOCATION: ABDOMEN
LOCATION: ABDOMEN

## 2024-05-22 ASSESSMENT — PAIN - FUNCTIONAL ASSESSMENT
PAIN_FUNCTIONAL_ASSESSMENT: 0-10

## 2024-05-22 ASSESSMENT — PAIN DESCRIPTION - ORIENTATION
ORIENTATION: LEFT
ORIENTATION: LEFT

## 2024-05-22 NOTE — PROGRESS NOTES
VASCULAR SURGERY PROGRESS NOTE  Subjective   Overnight, patient became hypotensive with systolics in the 70s. Low UOP.   Received 500ml bolus followed by another 1L bolus.  Intermittent improvement in systolics to 110s  Hgb drop this AM from 10.4 to 8.6    Objective   Vitals:  Heart Rate:  [65-81]   Temp:  [36.3 °C (97.3 °F)-37.4 °C (99.3 °F)]   Resp:  [14-28]   BP: ()/(38-69)   Weight:  [85.2 kg (187 lb 13.3 oz)]   SpO2:  [89 %-100 %]     Exam:  Constitutional: No acute distress, resting comfortably  Neuro:  AOx3, grossly intact  CV: no tachycardia on bedside monitor  Pulm: non-labored on nasal cannula, Dressing overlying CT incision  GI: soft, appropriately tender, thoraco-RP incision covered with dressing  Skin: blistering of bilateral lower extremities from volume overload  Musculoskeletal: moving all extremities  Extremities: Palpable bilateral DPs, sensory and motor intact in bilateral lower extremities, bilateral groin incisions covered with dressing    Labs:  Results from last 7 days   Lab Units 05/22/24  0550 05/21/24  1344 05/21/24  0028   WBC AUTO x10*3/uL 13.6* 12.9* 11.9*   HEMOGLOBIN g/dL 8.6* 10.4* 9.5*   PLATELETS AUTO x10*3/uL 154 157 133*      Results from last 7 days   Lab Units 05/22/24  0550 05/21/24  1644 05/21/24  1344   SODIUM mmol/L 131* 135* 136   POTASSIUM mmol/L 4.4 4.0 3.9   CHLORIDE mmol/L 97* 99 99   CO2 mmol/L 25 21 24   BUN mg/dL 47* 40* 39*   CREATININE mg/dL 1.76* 1.35* 1.30*   GLUCOSE mg/dL 98 145* 102*   MAGNESIUM mg/dL 2.03  --  2.12   PHOSPHORUS mg/dL  --   --  3.5      Results from last 7 days   Lab Units 05/21/24  0028 05/20/24  0000 05/19/24  0936   INR  1.5* 1.4* 1.3*   PROTIME seconds 16.9* 15.4* 14.4*   APTT seconds 81* 32 27     Assessment/Plan   Klaudia Ratliff is 68 y.o. female with history of HTN, HLD, obesity, COPD, gout, RLE DVT/PE s/p mechanical thrombectomy and IVC filter (2/2024, on Eliquis) who is perivisceral aortic endarterectomy and aortobifemoral  bypass.    Plan:  - STAT CTA A/P as well as CT Chest to assess for hemorrhage  - Follow-up CBC at 1200  - Therapeutic heparin held  - Strict monitoring of I/Os  - IVFs 125ml/hr  - q2 NV assessment  - Appreciate hepatology input regarding elevated liver enzymes/bili  - OOB/PT/OT  - pain control  - bronchopulmonary hygiene  - ASA  - goal MAP >70 mmHg  - trend LFTs  - Maintain K >4, Mg >2  - maintain Hgb >8, PLT >100, INR <1.4  - SCDs    Seen and discussed with vascular fellow, Dr. Delgado  D/w attending, Dr. Garcia Diamond,   Department of Surgery / IR Integrated PGY1  Vascular [id number]

## 2024-05-22 NOTE — PROGRESS NOTES
"[facility]  [facility]  CONSULT FOLLOW-UP     Reason For Consult: hyperbilirubinemia and elevated transaminases     History of Present Illness  Klaudia Ratliff is a 68 y.o. female with a past medical history of HTN/ HLD and DVT/PE s/p thrombectomy and IVC filter placement admitted on 5/16/2024 for perivisceral aortic endarterectomy and aortobifemoral bypass.  Hepatology is consulted for elevated transaminases and hyperbilirubinemia.     SUBJECTIVE     Patient transferred from ICU overnight. She is feeling well.    EXAM     Last Recorded Vitals  Blood pressure 94/54, pulse 71, temperature 36.5 °C (97.7 °F), temperature source Temporal, resp. rate 16, height 1.575 m (5' 2\"), weight 85.2 kg (187 lb 13.3 oz), SpO2 100%.      Intake/Output Summary (Last 24 hours) at 5/22/2024 1111  Last data filed at 5/22/2024 0932  Gross per 24 hour   Intake 236 ml   Output 460 ml   Net -224 ml          Physical Exam  General: well-nourished, no acute distress, no jaundice  HEENT: EOMI. Moist mucosa, scleral icterus  Respiratory: nonlabored breathing on room air  Cardiovascular: RRR, no murmurs/rubs/gallops  Abdomen: Soft, nontender, nondistended, bowel sounds present. No masses palpated  Extremities: no edema, no asterixis  Neuro: alert and oriented, CNII-XII grossly intact, moves all 4 extremities with no focal deficits      OBJECTIVE                                                                              Medications             Current Facility-Administered Medications:     acetaminophen (Tylenol) tablet 650 mg, 650 mg, oral, q4h PRN, Spenser Diamond DO    aspirin chewable tablet 81 mg, 81 mg, oral, Daily, Spenser Diamond DO, 81 mg at 05/22/24 0911    atorvastatin (Lipitor) tablet 80 mg, 80 mg, oral, Nightly, Spenser Diamond DO, 80 mg at 05/21/24 2136    bisacodyl (Dulcolax) suppository 10 mg, 10 mg, rectal, Daily PRN, Spenser Diamond DO    dextrose 50 % injection 25 g, 25 g, " intravenous, q15 min PRN **OR** [DISCONTINUED] glucagon (Glucagen) injection 1 mg, 1 mg, intramuscular, q15 min PRN, Spenser Diamond DO    gabapentin (Neurontin) capsule 100 mg, 100 mg, oral, BID, Spenser Diamond DO, 100 mg at 05/22/24 0911    [Held by provider] heparin 25,000 Units in dextrose 5% 250 mL (100 Units/mL) infusion (premix), 0-4,000 Units/hr, intravenous, Continuous, Spenser Diamond DO, Stopped at 05/22/24 0836    hydrALAZINE (Apresoline) injection 5 mg, 5 mg, intravenous, q4h PRN, Spenser Diamond DO    insulin lispro (HumaLOG) injection 0-5 Units, 0-5 Units, subcutaneous, 4x daily, Spenser Diamond DO, 1 Units at 05/20/24 2025    labetaloL (Normodyne,Trandate) injection 20 mg, 20 mg, intravenous, q4h PRN, Spenser Diamond DO, 20 mg at 05/20/24 0603    metoprolol tartrate (Lopressor) tablet 25 mg, 25 mg, oral, BID, Spenser Diamond DO, 25 mg at 05/21/24 2136    oxyCODONE (Roxicodone) immediate release tablet 10 mg, 10 mg, oral, q4h PRN, Spenser Diamond DO, 10 mg at 05/21/24 1538    oxyCODONE (Roxicodone) immediate release tablet 5 mg, 5 mg, oral, q4h PRN, Spenser Diamond DO, 5 mg at 05/22/24 0911    oxygen (O2) therapy, , inhalation, Continuous PRN - O2/gases, Spenser Diamond DO    pantoprazole (ProtoNix) EC tablet 40 mg, 40 mg, oral, Daily before breakfast, Spenser Diamond DO, 40 mg at 05/22/24 0911    potassium chloride 20 mEq in 100 mL IV premix, 20 mEq, intravenous, Once, Spenser Diamond DO    sennosides-docusate sodium (Roxanna-Colace) 8.6-50 mg per tablet 2 tablet, 2 tablet, oral, BID, Spenser Diamond DO, 2 tablet at 05/22/24 0911    sodium chloride (Ocean) 0.65 % nasal spray 1 spray, 1 spray, Each Nostril, 4x daily PRN, Spenser Diamond DO, 1 spray at 05/20/24 2036    sodium chloride 0.9% infusion, 125 mL/hr, intravenous, Continuous, Giuliano Delgado MD, Last Rate: 125 mL/hr at 05/22/24 1056, 125 mL/hr at 05/22/24 1056    tiotropium (Spiriva Respimat) 2.5 mcg/actuation inhaler 2 puff, 2 puff, inhalation, Daily, Spenser Diamond DO, 2  miguel ángel at 05/21/24 0815                                                                            Labs     Results for orders placed or performed during the hospital encounter of 05/16/24 (from the past 24 hour(s))   POCT GLUCOSE   Result Value Ref Range    POCT Glucose 124 (H) 74 - 99 mg/dL   CBC   Result Value Ref Range    WBC 12.9 (H) 4.4 - 11.3 x10*3/uL    nRBC 0.0 0.0 - 0.0 /100 WBCs    RBC 3.41 (L) 4.00 - 5.20 x10*6/uL    Hemoglobin 10.4 (L) 12.0 - 16.0 g/dL    Hematocrit 31.6 (L) 36.0 - 46.0 %    MCV 93 80 - 100 fL    MCH 30.5 26.0 - 34.0 pg    MCHC 32.9 32.0 - 36.0 g/dL    RDW 15.9 (H) 11.5 - 14.5 %    Platelets 157 150 - 450 x10*3/uL   Hepatic function panel   Result Value Ref Range    Albumin 3.4 3.4 - 5.0 g/dL    Bilirubin, Total 4.3 (H) 0.0 - 1.2 mg/dL    Bilirubin, Direct 2.0 (H) 0.0 - 0.3 mg/dL    Alkaline Phosphatase 205 (H) 33 - 136 U/L     (H) 7 - 45 U/L    AST 83 (H) 9 - 39 U/L    Total Protein 5.2 (L) 6.4 - 8.2 g/dL   Magnesium   Result Value Ref Range    Magnesium 2.12 1.60 - 2.40 mg/dL   Basic Metabolic Panel   Result Value Ref Range    Glucose 102 (H) 74 - 99 mg/dL    Sodium 136 136 - 145 mmol/L    Potassium 3.9 3.5 - 5.3 mmol/L    Chloride 99 98 - 107 mmol/L    Bicarbonate 24 21 - 32 mmol/L    Anion Gap 17 10 - 20 mmol/L    Urea Nitrogen 39 (H) 6 - 23 mg/dL    Creatinine 1.30 (H) 0.50 - 1.05 mg/dL    eGFR 45 (L) >60 mL/min/1.73m*2    Calcium 8.4 (L) 8.6 - 10.6 mg/dL   Phosphorus   Result Value Ref Range    Phosphorus 3.5 2.5 - 4.9 mg/dL   Comprehensive Metabolic Panel   Result Value Ref Range    Glucose 145 (H) 74 - 99 mg/dL    Sodium 135 (L) 136 - 145 mmol/L    Potassium 4.0 3.5 - 5.3 mmol/L    Chloride 99 98 - 107 mmol/L    Bicarbonate 21 21 - 32 mmol/L    Anion Gap 19 10 - 20 mmol/L    Urea Nitrogen 40 (H) 6 - 23 mg/dL    Creatinine 1.35 (H) 0.50 - 1.05 mg/dL    eGFR 43 (L) >60 mL/min/1.73m*2    Calcium 8.1 (L) 8.6 - 10.6 mg/dL    Albumin 3.2 (L) 3.4 - 5.0 g/dL    Alkaline Phosphatase  181 (H) 33 - 136 U/L    Total Protein 4.7 (L) 6.4 - 8.2 g/dL    AST 63 (H) 9 - 39 U/L    Bilirubin, Total 3.9 (H) 0.0 - 1.2 mg/dL     (H) 7 - 45 U/L   POCT GLUCOSE   Result Value Ref Range    POCT Glucose 98 74 - 99 mg/dL   Magnesium   Result Value Ref Range    Magnesium 2.03 1.60 - 2.40 mg/dL   CBC   Result Value Ref Range    WBC 13.6 (H) 4.4 - 11.3 x10*3/uL    nRBC 0.0 0.0 - 0.0 /100 WBCs    RBC 2.81 (L) 4.00 - 5.20 x10*6/uL    Hemoglobin 8.6 (L) 12.0 - 16.0 g/dL    Hematocrit 26.4 (L) 36.0 - 46.0 %    MCV 94 80 - 100 fL    MCH 30.6 26.0 - 34.0 pg    MCHC 32.6 32.0 - 36.0 g/dL    RDW 15.8 (H) 11.5 - 14.5 %    Platelets 154 150 - 450 x10*3/uL   Comprehensive Metabolic Panel   Result Value Ref Range    Glucose 98 74 - 99 mg/dL    Sodium 131 (L) 136 - 145 mmol/L    Potassium 4.4 3.5 - 5.3 mmol/L    Chloride 97 (L) 98 - 107 mmol/L    Bicarbonate 25 21 - 32 mmol/L    Anion Gap 13 10 - 20 mmol/L    Urea Nitrogen 47 (H) 6 - 23 mg/dL    Creatinine 1.76 (H) 0.50 - 1.05 mg/dL    eGFR 31 (L) >60 mL/min/1.73m*2    Calcium 8.0 (L) 8.6 - 10.6 mg/dL    Albumin 2.8 (L) 3.4 - 5.0 g/dL    Alkaline Phosphatase 152 (H) 33 - 136 U/L    Total Protein 4.8 (L) 6.4 - 8.2 g/dL    AST 39 9 - 39 U/L    Bilirubin, Total 4.1 (H) 0.0 - 1.2 mg/dL    ALT 79 (H) 7 - 45 U/L   Heparin Assay, UFH   Result Value Ref Range    Heparin Unfractionated 0.5 See Comment Below for Therapeutic Ranges IU/mL   POCT GLUCOSE   Result Value Ref Range    POCT Glucose 100 (H) 74 - 99 mg/dL   Type and Screen   Result Value Ref Range    ABO TYPE B     Rh TYPE POS     ANTIBODY SCREEN NEG    POCT GLUCOSE   Result Value Ref Range    POCT Glucose 98 74 - 99 mg/dL       Imaging   RUQ US 5/19/24  IMPRESSION:  Hepatic steatosis without focal lesions. Otherwise, unremarkable right upper quadrant ultrasound.                                                                         GI Procedures     EGD March 2024  Impression  The esophagus appeared normal.  Single ulcer in  the antrum with clean base (Ranjeet III)  Moderate abnormal mucosa in the antrum, consistent with gastritis; performed cold forceps biopsy to rule out H. pylori  The duodenal bulb, 1st part of the duodenum and 2nd part of the duodenum appeared normal      A. STOMACH ANTRUM BIOPSY:      -- Antral type gastric mucosa with chemical/reactive gastropathy.  --Fundic type gastric mucosa with features suggestive of proton pump inhibitor effect.  --Helicobacter pylori-like organisms are not identified.     Colonoscopy 2022  Impression:            - Diverticulosis in the entire examined colon.                         - Many diminutive polyps in the sigmoid colon.                          Biopsied.                         - The examination was otherwise normal.  Recommendations: repeat in 5 years    ASSESSMENT / PLAN     ASSESSMENT/PLAN:  Klaudia Ratliff is a 68 y.o. female presenting with claudication and is now s/p perivisceral aortic endarterectomy and aortobifemoral bypass.  Hepatology is consulted for elevated transaminases and hyperbilirubinemia. Bilirubin seems to have peaked and is improving daily with the majority being indirect bilirubinemia. This is possibly related to shearing post vascular surgery and should continue to improve.  The rise in transaminases is less clear. Ddx is ischemic hepatitis which is less likely because of degree of rise and timeline of increase vs medication induced (gabapentin or atorvastatin) vs ICU jaundice which is common in post cardiac/ vascular patients. Patient does have hepatic steatosis but this is not likely contributing to her acute presentation.    Liver tests are stable today.     Recommendations:  -Continue to monitor hepatic function panel daily.  -If labs trending up, will need to consider holding atorvastatin and/ or gabapentin  -Outpatient risk factor modification for fatty liver disease: weight loss, low carb diet and exercise.    Patient was seen and discussed with   Luis.    Recommendations communicated with the primary team via secure chat.  Thank you for the consultation. Hepatology will continue to follow.  - During weekday hours of 7am- 5pm, please do not hesitate to contact me on [email or web address] Chat or page [phone] if there are any further questions   - After hours, on weekends, and on holidays, please page the on-call GI fellow at [phone]. Thank you.       Lian Garrison MD  Gastroenterology and Hepatology Fellow  [facility]

## 2024-05-22 NOTE — PROGRESS NOTES
"Klaudia Ratliff is a 68 y.o. female on day 6 of admission presenting with Aortic occlusion (CMS-[facility]).    Subjective   68 y.o. female with past medical history of HTN, HLD, obesity, COPD, gout, RLE DVT, right PE s/p mechanical thrombectomy and IVC filter placement (2/22/2024) discharged on eliquis and bridged to Lovenox (5/13), and aortic occlusion. Patient initially presented to SICU s/p aortofem bypass via thoracoabdominal approach. Discharged to floor yesterday.     Representing to SICU for acute hypotension. CTA C/A/P s/f retroperitoneal bleed into left flank. Hgb stable at 8.8 from 8.6. 2 units of PRBC and 1 unit of FFP ordered by vascular team.        Objective     Physical Exam  Constitutional:       General: She is not in acute distress.     Appearance: Normal appearance. She is not toxic-appearing.   HENT:      Head: Normocephalic and atraumatic.      Mouth/Throat:      Mouth: Mucous membranes are moist.   Eyes:      Conjunctiva/sclera: Conjunctivae normal.   Cardiovascular:      Rate and Rhythm: Normal rate and regular rhythm.   Pulmonary:      Effort: Pulmonary effort is normal.      Comments: On nasal cannula  Genitourinary:     Comments: Kenney in place draining clear yellow urine  Musculoskeletal:      Comments: LE with bandages   Skin:     General: Skin is warm and dry.      Coloration: Skin is pale.   Neurological:      General: No focal deficit present.      Mental Status: She is alert and oriented to person, place, and time.   Psychiatric:      Comments: Anxious         Last Recorded Vitals  Blood pressure (!) 78/48, pulse 72, temperature 36 °C (96.8 °F), temperature source Temporal, resp. rate 19, height 1.575 m (5' 2\"), weight 85.2 kg (187 lb 13.3 oz), SpO2 100%.  Intake/Output last 3 Shifts:  I/O last 3 completed shifts:  In: 462.6 (5.4 mL/kg) [I.V.:462.6 (5.4 mL/kg)]  Out: 2280 (26.8 mL/kg) [Urine:2280 (0.7 mL/kg/hr)]  Weight: 85.2 kg     Relevant Results  Scheduled medications  albumin human, " 12.5 g, intravenous, Once  [Held by provider] aspirin, 81 mg, oral, Daily  [Held by provider] atorvastatin, 80 mg, oral, Nightly  [Held by provider] gabapentin, 100 mg, oral, BID  [Transfer Hold] insulin lispro, 0-5 Units, subcutaneous, 4x daily  [Held by provider] metoprolol tartrate, 25 mg, oral, BID  [Transfer Hold] pantoprazole, 40 mg, oral, Daily before breakfast  [Transfer Hold] potassium chloride, 20 mEq, intravenous, Once  [Transfer Hold] sennosides-docusate sodium, 2 tablet, oral, BID  [Transfer Hold] tiotropium, 2 puff, inhalation, Daily      Continuous medications  [Held by provider] heparin, 0-4,000 Units/hr, Last Rate: Stopped (05/22/24 0836)  sodium chloride 0.9%, 125 mL/hr, Last Rate: 125 mL/hr (05/22/24 1056)      PRN medications  PRN medications: [Transfer Hold] acetaminophen, [Transfer Hold] bisacodyl, [Transfer Hold] dextrose **OR** [DISCONTINUED] glucagon, [Transfer Hold] hydrALAZINE, [Transfer Hold] labetaloL, [Transfer Hold] oxyCODONE, [Transfer Hold] oxyCODONE, oxygen, [Transfer Hold] sodium chloride    Results for orders placed or performed during the hospital encounter of 05/16/24 (from the past 24 hour(s))   POCT GLUCOSE   Result Value Ref Range    POCT Glucose 98 74 - 99 mg/dL   Hepatitis C RNA, Quantitative, PCR   Result Value Ref Range    Hepatitis C RNA PCR Log      HCV RNA Result Not Detected Not detected   Magnesium   Result Value Ref Range    Magnesium 2.03 1.60 - 2.40 mg/dL   CBC   Result Value Ref Range    WBC 13.6 (H) 4.4 - 11.3 x10*3/uL    nRBC 0.0 0.0 - 0.0 /100 WBCs    RBC 2.81 (L) 4.00 - 5.20 x10*6/uL    Hemoglobin 8.6 (L) 12.0 - 16.0 g/dL    Hematocrit 26.4 (L) 36.0 - 46.0 %    MCV 94 80 - 100 fL    MCH 30.6 26.0 - 34.0 pg    MCHC 32.6 32.0 - 36.0 g/dL    RDW 15.8 (H) 11.5 - 14.5 %    Platelets 154 150 - 450 x10*3/uL   Comprehensive Metabolic Panel   Result Value Ref Range    Glucose 98 74 - 99 mg/dL    Sodium 131 (L) 136 - 145 mmol/L    Potassium 4.4 3.5 - 5.3 mmol/L     Chloride 97 (L) 98 - 107 mmol/L    Bicarbonate 25 21 - 32 mmol/L    Anion Gap 13 10 - 20 mmol/L    Urea Nitrogen 47 (H) 6 - 23 mg/dL    Creatinine 1.76 (H) 0.50 - 1.05 mg/dL    eGFR 31 (L) >60 mL/min/1.73m*2    Calcium 8.0 (L) 8.6 - 10.6 mg/dL    Albumin 2.8 (L) 3.4 - 5.0 g/dL    Alkaline Phosphatase 152 (H) 33 - 136 U/L    Total Protein 4.8 (L) 6.4 - 8.2 g/dL    AST 39 9 - 39 U/L    Bilirubin, Total 4.1 (H) 0.0 - 1.2 mg/dL    ALT 79 (H) 7 - 45 U/L   Heparin Assay, UFH   Result Value Ref Range    Heparin Unfractionated 0.5 See Comment Below for Therapeutic Ranges IU/mL   POCT GLUCOSE   Result Value Ref Range    POCT Glucose 100 (H) 74 - 99 mg/dL   Type and Screen   Result Value Ref Range    ABO TYPE B     Rh TYPE POS     ANTIBODY SCREEN NEG    POCT GLUCOSE   Result Value Ref Range    POCT Glucose 98 74 - 99 mg/dL   CBC and Auto Differential   Result Value Ref Range    WBC 14.5 (H) 4.4 - 11.3 x10*3/uL    nRBC 0.0 0.0 - 0.0 /100 WBCs    RBC 2.84 (L) 4.00 - 5.20 x10*6/uL    Hemoglobin 8.8 (L) 12.0 - 16.0 g/dL    Hematocrit 29.7 (L) 36.0 - 46.0 %     (H) 80 - 100 fL    MCH 31.0 26.0 - 34.0 pg    MCHC 29.6 (L) 32.0 - 36.0 g/dL    RDW 16.2 (H) 11.5 - 14.5 %    Platelets 132 (L) 150 - 450 x10*3/uL    Neutrophils % 74.1 40.0 - 80.0 %    Immature Granulocytes %, Automated 3.1 (H) 0.0 - 0.9 %    Lymphocytes % 13.9 13.0 - 44.0 %    Monocytes % 6.9 2.0 - 10.0 %    Eosinophils % 1.7 0.0 - 6.0 %    Basophils % 0.3 0.0 - 2.0 %    Neutrophils Absolute 10.73 (H) 1.20 - 7.70 x10*3/uL    Immature Granulocytes Absolute, Automated 0.45 0.00 - 0.70 x10*3/uL    Lymphocytes Absolute 2.01 1.20 - 4.80 x10*3/uL    Monocytes Absolute 1.00 0.10 - 1.00 x10*3/uL    Eosinophils Absolute 0.25 0.00 - 0.70 x10*3/uL    Basophils Absolute 0.04 0.00 - 0.10 x10*3/uL   Coagulation Screen   Result Value Ref Range    Protime 17.2 (H) 9.8 - 12.8 seconds    INR 1.5 (H) 0.9 - 1.1    aPTT 36 27 - 38 seconds   Prepare RBC: 2 Units   Result Value Ref Range     PRODUCT CODE B7657L68     Unit Number [id number]     Unit ABO B     Unit RH POS     XM INTEP COMP     Dispense Status XM     Blood Expiration Date June 05, 2024 23:59 EDT     PRODUCT BLOOD TYPE 7300     UNIT VOLUME 350     PRODUCT CODE P5391H53     Unit Number [id number]     Unit ABO B     Unit RH POS     XM INTEP COMP     Dispense Status IS     Blood Expiration Date June 05, 2024 23:59 EDT     PRODUCT BLOOD TYPE 7300     UNIT VOLUME 350    Prepare Plasma: 1 Units   Result Value Ref Range    PRODUCT CODE D6241UW2     Unit Number [id number]     Unit ABO B     Unit RH POS     Dispense Status XM     Blood Expiration Date May 27, 2024 16:46 EDT     PRODUCT BLOOD TYPE 7300     UNIT VOLUME 212        Assessment/Plan   Principal Problem:    Aortic occlusion (CMS-[facility])  Active Problems:    Aortoiliac occlusive disease (Multi)    Klaudia Ratliff is a 68 y.o. female with past medical history of HTN, HLD, obesity, COPD, gout, RLE DVT, right PE s/p mechanical thrombectomy and IVC filter placement (2/22/2024) discharged on eliquis and bridged to Lovenox (5/13), and aortic occlusion. Patient initially presented to SICU s/p aortofem bypass via thoracoabdominal approach on 5/16.  Discharged to floor yesterday. Now representing to SICU for acute hypotension. CTA C/A/P s/f L flank retroperitoneal bleed. Hgb stable at 8.8 from 8.6. 2 units of PRBC and 1 unit of FFP ordered by vascular team.    Plan:  NEURO: PMH of sciatica, on gabapentin. A+Ox3, no focal deficits. Pain well controlled. No neuro issues at this time.  - acetaminophen and oxycodone as needed for pain  - continue home gabapentin  - q4hr neuro/neurovasc/pain assessments  - PT/OT following  - Home meds; Gabapentin 100 mg BID      CV: History of HTN, HLD, obesity, RLE DVT, Right PE (s/p mechanical thrombectomy 2/24), and aortic occlusion. Baseline BP (pre-op) 161/76. Baseline cardiac function normal EF (60-65%), elevated LA pressure (2/2/24). Carotid  Duplex < 50% ICA stenosis b/l. S/p aortofem bypass 5/16. Now w/ acute hypotension (70s/50s) 2/2 retroperitoneal bleed.  Received 500mL bolus on floor. 2u PRBC and 1u FFP ordered by vascular team. No current plan to RTOR.   - continuous ekg/abp monitoring  - hold metoprolol and asa   - cont home atorvastatin  - home meds: Eliquis 5 mg BID (held since 5/12, bridged to lovenox 5/13), ASA 81 mg, Atenolol 50 mg, atorvastatin 80 mg, losartan 100 mg, Vitamin C 250 mg     PULM: History of COPD, pulmonary embolism (2/2/24) s/p mechanical thrombectomy and IVC filter placement. No diaphragm pacing wires placed intra-op.   - wean FiO2 for goal SpO2 >94%  - Q1h IS while awake  - additional bronchial hygiene as indicated  - Daily CXR  - cont tiotropium  - Home meds: Tiotropium 2.5 mcg 2 puffs daily.     GI: History of gastric ulcer (3/2024), PPI every day recommended. Supraceliac clamp x27 minutes. Acute hyperbilirubinemia improving. RUQ US 5/19: Hepatic steatosis without focal lesions. Otherwise, unremarkable.   - NPO for now   - continue to trend LFTs daily  - PPI for GI prophylaxis/recent gastric ulcer  - bowel regimen  - Hepatology consult per vascular surgery     : Baseline cr 0.9-1.1. Left renal ischemic time 47 minutes. Worsening ELROY.   - q12h and prn RFP  - Goal U/O >1-2ml/kg/hr.    - Replete electrolytes to goal K>4, Mg>2, Phos>2.5, ionized Ca>1.10 if creatinine remains WNL or <1.5 with adequate uop.  - Home meds: Lasix 40 mg PRN, Potassium chloride 10 mEq ER      HEME: Recent PE s/p mechanical thrombectomy and IVC filter placement 2/2024. Bridged from eliquis to lovenox 5/13/24. Acute surgical blood loss anemia, acute post op thrombocytopenia improving. 5/18 Anti PF4 Neg. Vasc lab US BLE 5/20 prelim read: Acute non-occlusive deep vein thrombosis distal external iliac, common femoral vein, femoral vein proximal thru mid, and profunda vein. Now w/ retroperitoneal bleed. Hgb stable at 8.8.  - Daily CBC, prn coags  - scds  for dvt prophylaxis.  - hold asa   - hold heparin ggt   - Home meds: Lovenox 80 mg q12h     ENDO: No history of DM or hypothyroidism. A1c 5.3%. TSH 1.86. Adequate glycemic control, currently on lispro SSI #1.   - Q6h BG  - Cont SSI 1     ID: Recent E.coli UTI 5/3, completed 3 day course Augmentin. Afebrile. Mild leukocytosis. No signs of infection at this time. Completed course of cefoxitin with chest tube removal.   - trend wbc daily  - monitor for s/s infection     Skin: R shin and R heel bullae   - continue preventative mepilex to heals/sacrum  - turns q2h  - OOB daily as able  - weekly skin rounds     Lines:   - PIVs     Code status addressed/up to date.      Atif Sifuentes DO  SIC [phone]

## 2024-05-22 NOTE — SIGNIFICANT EVENT
Rapid Response RN responding for RADAR score of 7 due to the following VS: T 37.0 °Celsius; HR 81 ; RR 26; BP 98/61; SPO2 92% .      Reviewed abnormal VS with bedside RN who expressed no concerns regarding the patient's current condition based upon these.  No interventions by Rapid Response team indicated at this time.

## 2024-05-22 NOTE — PROCEDURES
"Arterial Puncture/Cannulation    Date/Time: 5/22/2024 7:28 PM    Performed by: Archana Sifuentes DO  Authorized by: Neo Carlton MD  Consent: Verbal consent obtained.  Risks and benefits: risks, benefits and alternatives were discussed  Consent given by: patient  Patient understanding: patient states understanding of the procedure being performed  Patient consent: the patient's understanding of the procedure matches consent given  Patient identity confirmed: verbally with patient and arm band  Time out: Immediately prior to procedure a \"time out\" was called to verify the correct patient, procedure, equipment, support staff and site/side marked as required.  Preparation: Patient was prepped and draped in the usual sterile fashion.  Local anesthesia used: yes  Anesthesia: local infiltration    Anesthesia:  Local anesthesia used: yes  Local Anesthetic: lidocaine 1% without epinephrine  Anesthetic total: 0.5 mL    Sedation:  Patient sedated: no    Patient tolerance: patient tolerated the procedure well with no immediate complications          Procedure performed under US visualization. 1 attempt using Seldinger technique, wire removed. No hematoma.  "

## 2024-05-22 NOTE — PROGRESS NOTES
5/22/2024 Care coordination  Klaudia Ratliff is a 68 y.o. female on day 6 of admission presenting with Aortic occlusion (CMS-[facility]).  Pt was transferred to [facility] from ICU.  PT?OT recs Mod.  Message left for Raul Quinn to follow up regarding SNF list.

## 2024-05-22 NOTE — CONSULTS
"Nutrition Initial Assessment:   Nutrition Assessment    Reason for Assessment: NPO/clear liquids >5 days    Patient is a 68 y.o. female presenting for vascular surgery.      5/16 s/p perivisceral aortic endarterectomy, aortobifemoral bypass graft, left renal bypass, and right femoral endarterectomy     HTN, HLD, obesity, COPD, gout, RLE DVT/PE s/p mechanical thrombectomy and IVC filter     Nutrition History:  Diet history: NPO 5/16-5/19; Clear Liquids 5/20-present  Energy Intake: Fair 50-75 %  Food and Nutrient History: Pt states that she has been taking the fruit ice and jello and a little bit of juice. She hasn't had a BM so doesn't want to eat too much. Reports that she was generally eating well prior to admission. Stated that she lost about 10# prior to admission on the advice of her surgeon. She is open to trying a clear liquid supplement.  Food Allergies/Intolerances:  None  GI Symptoms: None  Oral Problems: None       Anthropometrics:  Height: 157.5 cm (5' 2\")   Weight: 85.2 kg (187 lb 13.3 oz)   BMI (Calculated): 34.35  IBW/kg (Dietitian Calculated): 50 kg          Weight History:   Admission Weight: 73.6 kg (162 lb 3.2 oz)    Wt Readings from Last 10 Encounters:   05/22/24 85.2 kg (187 lb 13.3 oz)   05/06/24 73.9 kg (163 lb)   04/12/24 74.1 kg (163 lb 6.4 oz)   03/29/24 73.5 kg (162 lb)   03/21/24 75.7 kg (166 lb 12.8 oz)   02/25/24 77.6 kg (170 lb 15.5 oz)   02/16/24 77.4 kg (170 lb 10.2 oz)   02/14/24 77.6 kg (171 lb)   01/12/24 81.6 kg (180 lb)   12/29/23 82.6 kg (182 lb)      Recorded weight up 11.2kg - recommend rechecking.     Weight Change %:   10.9% weight loss in 5 months.     Nutrition Focused Physical Exam Findings:    Subcutaneous Fat Loss:   Orbital Fat Pads: Well nourished (slightly bulging fat pads)  Buccal Fat Pads: Well nourished (full, rounded cheeks)  Triceps: Well nourished (ample fat tissue)  Muscle Wasting:  Temporalis: Well nourished (well-defined muscle)  Pectoralis (Clavicular " Region): Well nourished (clavicle not visible)  Deltoid/Trapezius: Well nourished (rounded appearance at arm, shoulder, neck)  Interosseous: Well nourished (muscle bulges)  Edema:     Physical Findings:  Skin: Positive (foot blisters and surgical incision sites.)    Nutrition Significant Labs:  CBC Trend:   Results from last 7 days   Lab Units 05/22/24  0550 05/21/24  1344 05/21/24  0028 05/20/24  1218   WBC AUTO x10*3/uL 13.6* 12.9* 11.9* 12.2*   RBC AUTO x10*6/uL 2.81* 3.41* 3.00* 3.14*   HEMOGLOBIN g/dL 8.6* 10.4* 9.5* 10.0*   HEMATOCRIT % 26.4* 31.6* 25.6* 28.8*   MCV fL 94 93 85 92   PLATELETS AUTO x10*3/uL 154 157 133* 135*    , BMP Trend:   Results from last 7 days   Lab Units 05/22/24  0550 05/21/24  1644 05/21/24  1344 05/21/24  0740   GLUCOSE mg/dL 98 145* 102* 108*   CALCIUM mg/dL 8.0* 8.1* 8.4* 8.4*   SODIUM mmol/L 131* 135* 136 139   POTASSIUM mmol/L 4.4 4.0 3.9 3.6   CO2 mmol/L 25 21 24 26   CHLORIDE mmol/L 97* 99 99 101   BUN mg/dL 47* 40* 39* 40*   CREATININE mg/dL 1.76* 1.35* 1.30* 1.32*    , BG POCT trend:   Results from last 7 days   Lab Units 05/22/24  1057 05/22/24  0800 05/21/24  2019 05/21/24  1342 05/21/24  0648   POCT GLUCOSE mg/dL 98 100* 98 124* 126*        Nutrition Specific Medications:  SSI, Roxanna colace    I/O:    No BM since admission    Dietary Orders (From admission, onward)       Start     Ordered    05/20/24 0616  Adult diet Clear Liquid  Diet effective now        Question:  Diet type  Answer:  Clear Liquid    05/20/24 0616                     Estimated Needs:   Total Energy Estimated Needs (kCal): 1800 kCal  Method for Estimating Needs: 25kcal/kg UBW  Total Protein Estimated Needs (g): 70 g  Method for Estimating Needs: 1.4 gm/kg IBW  Total Fluid Estimated Needs (mL):  (per team)        Nutrition Diagnosis   Malnutrition Diagnosis  Patient has Malnutrition Diagnosis: No    Nutrition Diagnosis  Patient has Nutrition Diagnosis: Yes  Diagnosis Status (1): New  Nutrition Diagnosis 1:  Inadequate oral intake  Related to (1): extended NPO/clear liquid status  As Evidenced by (1): NPO x4 days and clear liquid day #3.       Nutrition Interventions/Recommendations         Nutrition Prescription:  Individualized Nutrition Prescription Provided for : Start Ensure Clear TID to provide 240 calories and 8 gm pro per serving.   Monitor for advancement of diet- if not able to advance within next 24 hours may need to consider nutrition support.   Recheck weight.         Nutrition Interventions:   Interventions: Meals and snacks, Medical food supplement  Goal: Consume 2-3 ONS per day      Nutrition Education:   None at this time.        Nutrition Monitoring and Evaluation   Food/Nutrient Related History Monitoring  Monitoring and Evaluation Plan: Energy intake, Amount of food  Criteria: Diet order will be adequate to meet nutrition needs    Body Composition/Growth/Weight History  Monitoring and Evaluation Plan: Weight  Criteria: stable weight    Biochemical Data, Medical Tests and Procedures  Monitoring and Evaluation Plan: Electrolyte/renal panel, Glucose/endocrine profile  Criteria: Labs WNL    Time Spent/Follow-up Reminder:   Time Spent (min): 45 minutes  Last Date of Nutrition Visit: 05/22/24  Nutrition Follow-Up Needed?: Dietitian to reassess per policy

## 2024-05-22 NOTE — SIGNIFICANT EVENT
Patient with GFR 31.4, however with 2 point drop in Hgb this AM. CT with IV contrast ordered to r/o any potential bleeding. Risks of contrast administration for CT scan are outweighed by the benefits of this scan at this time.    Discussed with fellow Dr. Sandy Rubio MD  PGY-3 General Surgery  Vascular Surgery [id number]

## 2024-05-22 NOTE — SIGNIFICANT EVENT
RAPID RESPONSE RN NOTE- RADAR 7   05/22/24 1620   Onset Documentation   Rapid Response Initiated By Radar auto page   Location/Room [facility] [place]  (LT 7030)   Pager Time [phone]   Arrival Time 1620   Event End Time 1630   Level II Called No   Primary Reason for Call Radar auto page  (RADAR 7)     VS: 36, 75, 24, 77/52, 100%.  Patient relaxing comfortably in bed-warm and dry, oriented x4, denies pain/dizziness/SOB, family at bedside.  Discussed VS and POC with Elida RN- per RN the patient is receiving ordered IV fuid bolus now and will receive 2 units PRBC's and 1 of FFP once they come up from blood bank.   The patient is assigned to a TSICU room- Staff waiting for bed to be cleaned and will then transfer patient.  Rapid Response Team to remain on division to assist with ICU transfer.    Addendum: At 1800 TSICU bed ready- RN report called, patient safely transferred to TSICU after receiving 1 unit of PRBC's.   Second unit of PRBC's and unit of FFP released by TSICU Nursing upon arrival to the unit.

## 2024-05-22 NOTE — SIGNIFICANT EVENT
Plan update:  Patient with systolic pressures in high 70's again on the floor. Hgb stable at 8.8 from 8.6, but some areas of slow extrav noted on CT from today. Given continued hypotension, will bolus an additional 500 ml crystalloid and transfer her back to ICU. Ordering 2 units of PRBC and 1 unit of FFP.   SICU team notified. Nursing updated.    Giuliano Delgado MD  Vascular Surgery Fellow  Service Pager: [phone]

## 2024-05-22 NOTE — SIGNIFICANT EVENT
Alerted by nursing that patient was hypotensive in SBP high 70s. Repeat manual showed SBP 78. Patient not tachycardic. Of note patient had fox removed earlier today and has not voided. Bladder scan showed around 250cc urine. Patient recently transitioned to CLD and off IVF. Patient states she has not been drinking much. Confirmed with patient no history of heart failure. Patient otherwise feeling well without pain, SOB, dizziness. Will give 500cc bolus LR and recheck BP as well as monitor for urine output.     Nivia Olea MD  General Surgery  Vascular [id number]    3AM update: Bladder scan showed nearly 300cc after bolus with no urine output. BP improved to 90/52 per nursing. Will straight cath and monitor for further hypotension.

## 2024-05-23 LAB
ALBUMIN SERPL BCP-MCNC: 3.3 G/DL (ref 3.4–5)
ALBUMIN SERPL BCP-MCNC: 3.3 G/DL (ref 3.4–5)
ALBUMIN SERPL BCP-MCNC: 3.7 G/DL (ref 3.4–5)
ALP SERPL-CCNC: 110 U/L (ref 33–136)
ALT SERPL W P-5'-P-CCNC: 45 U/L (ref 7–45)
ANION GAP SERPL CALC-SCNC: 15 MMOL/L (ref 10–20)
ANION GAP SERPL CALC-SCNC: 15 MMOL/L (ref 10–20)
APTT PPP: 25 SECONDS (ref 27–38)
APTT PPP: 26 SECONDS (ref 27–38)
AST SERPL W P-5'-P-CCNC: 24 U/L (ref 9–39)
BILIRUB DIRECT SERPL-MCNC: 2.7 MG/DL (ref 0–0.3)
BILIRUB SERPL-MCNC: 5.1 MG/DL (ref 0–1.2)
BLOOD EXPIRATION DATE: NORMAL
BUN SERPL-MCNC: 46 MG/DL (ref 6–23)
BUN SERPL-MCNC: 50 MG/DL (ref 6–23)
CALCIUM SERPL-MCNC: 7.9 MG/DL (ref 8.6–10.6)
CALCIUM SERPL-MCNC: 8.3 MG/DL (ref 8.6–10.6)
CHLORIDE SERPL-SCNC: 101 MMOL/L (ref 98–107)
CHLORIDE SERPL-SCNC: 97 MMOL/L (ref 98–107)
CO2 SERPL-SCNC: 23 MMOL/L (ref 21–32)
CO2 SERPL-SCNC: 24 MMOL/L (ref 21–32)
CREAT SERPL-MCNC: 1.38 MG/DL (ref 0.5–1.05)
CREAT SERPL-MCNC: 1.54 MG/DL (ref 0.5–1.05)
DISPENSE STATUS: NORMAL
EGFRCR SERPLBLD CKD-EPI 2021: 37 ML/MIN/1.73M*2
EGFRCR SERPLBLD CKD-EPI 2021: 42 ML/MIN/1.73M*2
ERYTHROCYTE [DISTWIDTH] IN BLOOD BY AUTOMATED COUNT: 15.6 % (ref 11.5–14.5)
ERYTHROCYTE [DISTWIDTH] IN BLOOD BY AUTOMATED COUNT: 15.9 % (ref 11.5–14.5)
ERYTHROCYTE [DISTWIDTH] IN BLOOD BY AUTOMATED COUNT: 16 % (ref 11.5–14.5)
ERYTHROCYTE [DISTWIDTH] IN BLOOD BY AUTOMATED COUNT: 16.2 % (ref 11.5–14.5)
GLUCOSE BLD MANUAL STRIP-MCNC: 102 MG/DL (ref 74–99)
GLUCOSE BLD MANUAL STRIP-MCNC: 89 MG/DL (ref 74–99)
GLUCOSE BLD MANUAL STRIP-MCNC: 90 MG/DL (ref 74–99)
GLUCOSE BLD MANUAL STRIP-MCNC: 91 MG/DL (ref 74–99)
GLUCOSE BLD MANUAL STRIP-MCNC: 94 MG/DL (ref 74–99)
GLUCOSE SERPL-MCNC: 107 MG/DL (ref 74–99)
GLUCOSE SERPL-MCNC: 83 MG/DL (ref 74–99)
HCT VFR BLD AUTO: 27.2 % (ref 36–46)
HCT VFR BLD AUTO: 30.5 % (ref 36–46)
HCT VFR BLD AUTO: 30.6 % (ref 36–46)
HCT VFR BLD AUTO: 33.8 % (ref 36–46)
HGB BLD-MCNC: 10.6 G/DL (ref 12–16)
HGB BLD-MCNC: 11 G/DL (ref 12–16)
HGB BLD-MCNC: 11.3 G/DL (ref 12–16)
HGB BLD-MCNC: 8.9 G/DL (ref 12–16)
INR PPP: 1.3 (ref 0.9–1.1)
INR PPP: 1.3 (ref 0.9–1.1)
LDH SERPL L TO P-CCNC: 175 U/L (ref 84–246)
MAGNESIUM SERPL-MCNC: 2.06 MG/DL (ref 1.6–2.4)
MCH RBC QN AUTO: 29.8 PG (ref 26–34)
MCH RBC QN AUTO: 29.8 PG (ref 26–34)
MCH RBC QN AUTO: 31.1 PG (ref 26–34)
MCH RBC QN AUTO: 31.3 PG (ref 26–34)
MCHC RBC AUTO-ENTMCNC: 32.7 G/DL (ref 32–36)
MCHC RBC AUTO-ENTMCNC: 33.4 G/DL (ref 32–36)
MCHC RBC AUTO-ENTMCNC: 34.6 G/DL (ref 32–36)
MCHC RBC AUTO-ENTMCNC: 36.1 G/DL (ref 32–36)
MCV RBC AUTO: 87 FL (ref 80–100)
MCV RBC AUTO: 89 FL (ref 80–100)
MCV RBC AUTO: 90 FL (ref 80–100)
MCV RBC AUTO: 91 FL (ref 80–100)
NRBC BLD-RTO: 0 /100 WBCS (ref 0–0)
PHOSPHATE SERPL-MCNC: 4.1 MG/DL (ref 2.5–4.9)
PHOSPHATE SERPL-MCNC: 4.5 MG/DL (ref 2.5–4.9)
PLATELET # BLD AUTO: 120 X10*3/UL (ref 150–450)
PLATELET # BLD AUTO: 123 X10*3/UL (ref 150–450)
PLATELET # BLD AUTO: 147 X10*3/UL (ref 150–450)
PLATELET # BLD AUTO: 152 X10*3/UL (ref 150–450)
POTASSIUM SERPL-SCNC: 3.7 MMOL/L (ref 3.5–5.3)
POTASSIUM SERPL-SCNC: 4 MMOL/L (ref 3.5–5.3)
PRODUCT BLOOD TYPE: 7300
PRODUCT CODE: NORMAL
PROT SERPL-MCNC: 5.5 G/DL (ref 6.4–8.2)
PROTHROMBIN TIME: 14.9 SECONDS (ref 9.8–12.8)
PROTHROMBIN TIME: 15.2 SECONDS (ref 9.8–12.8)
RBC # BLD AUTO: 2.99 X10*6/UL (ref 4–5.2)
RBC # BLD AUTO: 3.41 X10*6/UL (ref 4–5.2)
RBC # BLD AUTO: 3.52 X10*6/UL (ref 4–5.2)
RBC # BLD AUTO: 3.79 X10*6/UL (ref 4–5.2)
SODIUM SERPL-SCNC: 131 MMOL/L (ref 136–145)
SODIUM SERPL-SCNC: 136 MMOL/L (ref 136–145)
UFH PPP CHRO-ACNC: 0.1 IU/ML
UNIT ABO: NORMAL
UNIT NUMBER: NORMAL
UNIT RH: NORMAL
UNIT VOLUME: 212
UNIT VOLUME: 350
UNIT VOLUME: 350
WBC # BLD AUTO: 10.4 X10*3/UL (ref 4.4–11.3)
WBC # BLD AUTO: 10.7 X10*3/UL (ref 4.4–11.3)
WBC # BLD AUTO: 12 X10*3/UL (ref 4.4–11.3)
WBC # BLD AUTO: 12.1 X10*3/UL (ref 4.4–11.3)
XM INTEP: NORMAL
XM INTEP: NORMAL

## 2024-05-23 PROCEDURE — 83615 LACTATE (LD) (LDH) ENZYME: CPT

## 2024-05-23 PROCEDURE — 2500000004 HC RX 250 GENERAL PHARMACY W/ HCPCS (ALT 636 FOR OP/ED)

## 2024-05-23 PROCEDURE — C9113 INJ PANTOPRAZOLE SODIUM, VIA: HCPCS

## 2024-05-23 PROCEDURE — 85610 PROTHROMBIN TIME: CPT

## 2024-05-23 PROCEDURE — 2020000001 HC ICU ROOM DAILY

## 2024-05-23 PROCEDURE — 36430 TRANSFUSION BLD/BLD COMPNT: CPT

## 2024-05-23 PROCEDURE — 85520 HEPARIN ASSAY: CPT | Performed by: STUDENT IN AN ORGANIZED HEALTH CARE EDUCATION/TRAINING PROGRAM

## 2024-05-23 PROCEDURE — 82040 ASSAY OF SERUM ALBUMIN: CPT

## 2024-05-23 PROCEDURE — P9016 RBC LEUKOCYTES REDUCED: HCPCS

## 2024-05-23 PROCEDURE — 37799 UNLISTED PX VASCULAR SURGERY: CPT | Performed by: STUDENT IN AN ORGANIZED HEALTH CARE EDUCATION/TRAINING PROGRAM

## 2024-05-23 PROCEDURE — 82947 ASSAY GLUCOSE BLOOD QUANT: CPT

## 2024-05-23 PROCEDURE — 85610 PROTHROMBIN TIME: CPT | Mod: 91 | Performed by: STUDENT IN AN ORGANIZED HEALTH CARE EDUCATION/TRAINING PROGRAM

## 2024-05-23 PROCEDURE — 85027 COMPLETE CBC AUTOMATED: CPT | Mod: 91 | Performed by: STUDENT IN AN ORGANIZED HEALTH CARE EDUCATION/TRAINING PROGRAM

## 2024-05-23 PROCEDURE — P9017 PLASMA 1 DONOR FRZ W/IN 8 HR: HCPCS

## 2024-05-23 PROCEDURE — 83735 ASSAY OF MAGNESIUM: CPT | Performed by: STUDENT IN AN ORGANIZED HEALTH CARE EDUCATION/TRAINING PROGRAM

## 2024-05-23 PROCEDURE — 99291 CRITICAL CARE FIRST HOUR: CPT

## 2024-05-23 PROCEDURE — 71045 X-RAY EXAM CHEST 1 VIEW: CPT | Performed by: RADIOLOGY

## 2024-05-23 PROCEDURE — 99233 SBSQ HOSP IP/OBS HIGH 50: CPT | Performed by: INTERNAL MEDICINE

## 2024-05-23 PROCEDURE — 82040 ASSAY OF SERUM ALBUMIN: CPT | Mod: 91 | Performed by: STUDENT IN AN ORGANIZED HEALTH CARE EDUCATION/TRAINING PROGRAM

## 2024-05-23 PROCEDURE — 37799 UNLISTED PX VASCULAR SURGERY: CPT

## 2024-05-23 RX ORDER — PANTOPRAZOLE SODIUM 40 MG/10ML
40 INJECTION, POWDER, LYOPHILIZED, FOR SOLUTION INTRAVENOUS DAILY
Status: DISCONTINUED | OUTPATIENT
Start: 2024-05-23 | End: 2024-05-31 | Stop reason: HOSPADM

## 2024-05-23 RX ORDER — HYDROMORPHONE HYDROCHLORIDE 1 MG/ML
0.2 INJECTION, SOLUTION INTRAMUSCULAR; INTRAVENOUS; SUBCUTANEOUS EVERY 4 HOURS PRN
Status: DISCONTINUED | OUTPATIENT
Start: 2024-05-23 | End: 2024-05-24

## 2024-05-23 RX ORDER — HYDROMORPHONE HYDROCHLORIDE 1 MG/ML
0.4 INJECTION, SOLUTION INTRAMUSCULAR; INTRAVENOUS; SUBCUTANEOUS EVERY 4 HOURS PRN
Status: DISCONTINUED | OUTPATIENT
Start: 2024-05-23 | End: 2024-05-24

## 2024-05-23 RX ADMIN — PANTOPRAZOLE SODIUM 40 MG: 40 INJECTION, POWDER, FOR SOLUTION INTRAVENOUS at 08:59

## 2024-05-23 RX ADMIN — PHYTONADIONE 10 MG: 10 INJECTION, EMULSION INTRAMUSCULAR; INTRAVENOUS; SUBCUTANEOUS at 12:12

## 2024-05-23 ASSESSMENT — PAIN - FUNCTIONAL ASSESSMENT
PAIN_FUNCTIONAL_ASSESSMENT: 0-10

## 2024-05-23 ASSESSMENT — PAIN SCALES - GENERAL
PAINLEVEL_OUTOF10: 0 - NO PAIN

## 2024-05-23 NOTE — PROGRESS NOTES
Klaudia Ratliff is a 68 y.o. female on day 7 of admission presenting with Aortic occlusion (CMS-Formerly [facility]).    68 y.o. female with past medical history of HTN, HLD, obesity, COPD, gout, RLE DVT, right PE s/p mechanical thrombectomy and IVC filter placement (2/22/2024) discharged on eliquis and bridged to Lovenox (5/13), and aortic occlusion. Patient initially presented to SICU s/p aortofem bypass via thoracoabdominal approach. Discharged to floor on 5/22 and returned to ICU on 5/23 for acute hypotension likely related to retroperitoneal bleeding.     Subjective   Received a total of 4u PRBC and 4u FFP without appropriate hb incrementation. RIJ central line placed. No vasopressors. Doing well, notes she slept most of the night. Not feeling dizzy, lightheaded, or nauseous. No CP or SOB. No fevers or chills.        Objective     Physical Exam  Constitutional:       General: She is not in acute distress.     Appearance: Normal appearance. She is not toxic-appearing.   HENT:      Head: Normocephalic and atraumatic.      Mouth/Throat:      Mouth: Mucous membranes are moist.   Eyes:      General: Scleral icterus present.   Cardiovascular:      Rate and Rhythm: Normal rate and regular rhythm.   Pulmonary:      Effort: Pulmonary effort is normal.      Comments: On nasal cannula  Abdominal:      Tenderness: There is no guarding or rebound.      Comments: Left sided incision site appears intact and dry. Notes tenderness to palpation laterally over the incision. Dressings otherwise appear intact   Genitourinary:     Comments: Kenney in place draining dark yellow urine  Musculoskeletal:      Right lower leg: No edema.      Left lower leg: No edema.      Comments: R foot with bandage, L foot in ace wrap   Skin:     General: Skin is warm and dry.      Coloration: Skin is pale.   Neurological:      General: No focal deficit present.      Mental Status: She is alert and oriented to person, place, and time.   Psychiatric:          "Behavior: Behavior normal.      Comments: Anxious         Last Recorded Vitals  Blood pressure 141/75, pulse 82, temperature 37 °C (98.6 °F), temperature source Temporal, resp. rate 20, height 1.575 m (5' 2\"), weight 85.2 kg (187 lb 13.3 oz), SpO2 97%.  Intake/Output last 3 Shifts:  I/O last 3 completed shifts:  In: 776 (9.1 mL/kg) [I.V.:176 (2.1 mL/kg); Blood:600]  Out: 1200 (14.1 mL/kg) [Urine:1200 (0.4 mL/kg/hr)]  Weight: 85.2 kg     Relevant Results  Scheduled medications  [Held by provider] aspirin, 81 mg, oral, Daily  [Held by provider] atorvastatin, 80 mg, oral, Nightly  [Held by provider] gabapentin, 100 mg, oral, BID  insulin lispro, 0-5 Units, subcutaneous, 4x daily  [Held by provider] metoprolol tartrate, 25 mg, oral, BID  pantoprazole, 40 mg, intravenous, Daily  potassium chloride, 20 mEq, intravenous, Once  [Held by provider] sennosides-docusate sodium, 2 tablet, oral, BID  tiotropium, 2 puff, inhalation, Daily      Continuous medications  [Held by provider] heparin, 0-4,000 Units/hr, Last Rate: Stopped (05/22/24 0836)      PRN medications  PRN medications: acetaminophen, bisacodyl, dextrose **OR** [DISCONTINUED] glucagon, [Held by provider] hydrALAZINE, [Held by provider] labetaloL, oxyCODONE, oxyCODONE, oxygen, sodium chloride    Results for orders placed or performed during the hospital encounter of 05/16/24 (from the past 24 hour(s))   POCT GLUCOSE   Result Value Ref Range    POCT Glucose 100 (H) 74 - 99 mg/dL   Type and Screen   Result Value Ref Range    ABO TYPE B     Rh TYPE POS     ANTIBODY SCREEN NEG    POCT GLUCOSE   Result Value Ref Range    POCT Glucose 98 74 - 99 mg/dL   CBC and Auto Differential   Result Value Ref Range    WBC 14.5 (H) 4.4 - 11.3 x10*3/uL    nRBC 0.0 0.0 - 0.0 /100 WBCs    RBC 2.84 (L) 4.00 - 5.20 x10*6/uL    Hemoglobin 8.8 (L) 12.0 - 16.0 g/dL    Hematocrit 29.7 (L) 36.0 - 46.0 %     (H) 80 - 100 fL    MCH 31.0 26.0 - 34.0 pg    MCHC 29.6 (L) 32.0 - 36.0 g/dL    " RDW 16.2 (H) 11.5 - 14.5 %    Platelets 132 (L) 150 - 450 x10*3/uL    Neutrophils % 74.1 40.0 - 80.0 %    Immature Granulocytes %, Automated 3.1 (H) 0.0 - 0.9 %    Lymphocytes % 13.9 13.0 - 44.0 %    Monocytes % 6.9 2.0 - 10.0 %    Eosinophils % 1.7 0.0 - 6.0 %    Basophils % 0.3 0.0 - 2.0 %    Neutrophils Absolute 10.73 (H) 1.20 - 7.70 x10*3/uL    Immature Granulocytes Absolute, Automated 0.45 0.00 - 0.70 x10*3/uL    Lymphocytes Absolute 2.01 1.20 - 4.80 x10*3/uL    Monocytes Absolute 1.00 0.10 - 1.00 x10*3/uL    Eosinophils Absolute 0.25 0.00 - 0.70 x10*3/uL    Basophils Absolute 0.04 0.00 - 0.10 x10*3/uL   Coagulation Screen   Result Value Ref Range    Protime 17.2 (H) 9.8 - 12.8 seconds    INR 1.5 (H) 0.9 - 1.1    aPTT 36 27 - 38 seconds   Prepare RBC: 2 Units   Result Value Ref Range    PRODUCT CODE V5018W32     Unit Number [id number]     Unit ABO B     Unit RH POS     XM INTEP COMP     Dispense Status IS     Blood Expiration Date June 05, 2024 23:59 EDT     PRODUCT BLOOD TYPE 7300     UNIT VOLUME 350     PRODUCT CODE N8636X31     Unit Number [id number]     Unit ABO B     Unit RH POS     XM INTEP COMP     Dispense Status TR     Blood Expiration Date June 05, 2024 23:59 EDT     PRODUCT BLOOD TYPE 7300     UNIT VOLUME 350    Prepare Plasma: 1 Units   Result Value Ref Range    PRODUCT CODE P1670JZ4     Unit Number [id number]     Unit ABO B     Unit RH POS     Dispense Status IS     Blood Expiration Date May 27, 2024 16:46 EDT     PRODUCT BLOOD TYPE 7300     UNIT VOLUME 212    POCT GLUCOSE   Result Value Ref Range    POCT Glucose 108 (H) 74 - 99 mg/dL   Calcium, Ionized   Result Value Ref Range    POCT Calcium, Ionized 1.11 1.1 - 1.33 mmol/L   Blood Gas Arterial Full Panel   Result Value Ref Range    POCT pH, Arterial 7.44 (H) 7.38 - 7.42 pH    POCT pCO2, Arterial 32 (L) 38 - 42 mm Hg    POCT pO2, Arterial 54 (L) 85 - 95 mm Hg    POCT SO2, Arterial      POCT Oxy Hemoglobin, Arterial      POCT  Hematocrit Calculated, Arterial      POCT Sodium, Arterial 131 (L) 136 - 145 mmol/L    POCT Potassium, Arterial 3.7 3.5 - 5.3 mmol/L    POCT Chloride, Arterial 101 98 - 107 mmol/L    POCT Ionized Calcium, Arterial 1.01 (L) 1.10 - 1.33 mmol/L    POCT Glucose, Arterial 95 74 - 99 mg/dL    POCT Lactate, Arterial 0.8 0.4 - 2.0 mmol/L    POCT Base Excess, Arterial -1.6 -2.0 - 3.0 mmol/L    POCT HCO3 Calculated, Arterial 21.7 (L) 22.0 - 26.0 mmol/L    POCT Hemoglobin, Arterial      POCT Anion Gap, Arterial 12 10 - 25 mmo/L    Patient Temperature 37.0 degrees Celsius    FiO2 28 %   CBC   Result Value Ref Range    WBC 10.8 4.4 - 11.3 x10*3/uL    nRBC 0.0 0.0 - 0.0 /100 WBCs    RBC 2.83 (L) 4.00 - 5.20 x10*6/uL    Hemoglobin 8.8 (L) 12.0 - 16.0 g/dL    Hematocrit 26.1 (L) 36.0 - 46.0 %    MCV 92 80 - 100 fL    MCH 31.1 26.0 - 34.0 pg    MCHC 33.7 32.0 - 36.0 g/dL    RDW 15.5 (H) 11.5 - 14.5 %    Platelets 126 (L) 150 - 450 x10*3/uL   Coagulation Screen   Result Value Ref Range    Protime 16.6 (H) 9.8 - 12.8 seconds    INR 1.5 (H) 0.9 - 1.1    aPTT 26 (L) 27 - 38 seconds   Hepatic Function Panel   Result Value Ref Range    Albumin 3.3 (L) 3.4 - 5.0 g/dL    Bilirubin, Total 4.6 (H) 0.0 - 1.2 mg/dL    Bilirubin, Direct 2.2 (H) 0.0 - 0.3 mg/dL    Alkaline Phosphatase 116 33 - 136 U/L    ALT 54 (H) 7 - 45 U/L    AST 23 9 - 39 U/L    Total Protein 4.9 (L) 6.4 - 8.2 g/dL   Magnesium   Result Value Ref Range    Magnesium 1.96 1.60 - 2.40 mg/dL   Basic Metabolic Panel   Result Value Ref Range    Glucose 100 (H) 74 - 99 mg/dL    Sodium 131 (L) 136 - 145 mmol/L    Potassium 4.0 3.5 - 5.3 mmol/L    Chloride 98 98 - 107 mmol/L    Bicarbonate 22 21 - 32 mmol/L    Anion Gap 15 10 - 20 mmol/L    Urea Nitrogen 48 (H) 6 - 23 mg/dL    Creatinine 1.62 (H) 0.50 - 1.05 mg/dL    eGFR 35 (L) >60 mL/min/1.73m*2    Calcium 7.8 (L) 8.6 - 10.6 mg/dL   Phosphorus   Result Value Ref Range    Phosphorus 4.1 2.5 - 4.9 mg/dL   Blood Gas Arterial Full Panel    Result Value Ref Range    POCT pH, Arterial 7.39 7.38 - 7.42 pH    POCT pCO2, Arterial 36 (L) 38 - 42 mm Hg    POCT pO2, Arterial 84 (L) 85 - 95 mm Hg    POCT SO2, Arterial 99 94 - 100 %    POCT Oxy Hemoglobin, Arterial 94.9 94.0 - 98.0 %    POCT Hematocrit Calculated, Arterial 29.0 (L) 36.0 - 46.0 %    POCT Sodium, Arterial 127 (L) 136 - 145 mmol/L    POCT Potassium, Arterial 4.1 3.5 - 5.3 mmol/L    POCT Chloride, Arterial 99 98 - 107 mmol/L    POCT Ionized Calcium, Arterial 1.12 1.10 - 1.33 mmol/L    POCT Glucose, Arterial 105 (H) 74 - 99 mg/dL    POCT Lactate, Arterial 0.6 0.4 - 2.0 mmol/L    POCT Base Excess, Arterial -2.8 (L) -2.0 - 3.0 mmol/L    POCT HCO3 Calculated, Arterial 21.8 (L) 22.0 - 26.0 mmol/L    POCT Hemoglobin, Arterial 9.6 (L) 12.0 - 16.0 g/dL    POCT Anion Gap, Arterial 10 10 - 25 mmo/L    Patient Temperature 37.0 degrees Celsius    FiO2 28 %   Prepare RBC: 2 Units   Result Value Ref Range    PRODUCT CODE A6303F05     Unit Number [id number]     Unit ABO B     Unit RH POS     XM INTEP COMP     Dispense Status IS     Blood Expiration Date June 05, 2024 23:59 EDT     PRODUCT BLOOD TYPE 7300     UNIT VOLUME 350     PRODUCT CODE A6156X05     Unit Number [id number]     Unit ABO B     Unit RH POS     XM INTEP COMP     Dispense Status IS     Blood Expiration Date June 05, 2024 23:59 EDT     PRODUCT BLOOD TYPE 7300     UNIT VOLUME 350    Prepare Plasma: 2 Units   Result Value Ref Range    PRODUCT CODE Q6851S92     Unit Number [id number]     Unit ABO B     Unit RH POS     Dispense Status IS     Blood Expiration Date May 28, 2024 01:24 EDT     PRODUCT BLOOD TYPE 7300     UNIT VOLUME 337     PRODUCT CODE D8625B76     Unit Number [id number]     Unit ABO B     Unit RH POS     Dispense Status IS     Blood Expiration Date May 28, 2024 01:24 EDT     PRODUCT BLOOD TYPE 7300     UNIT VOLUME 346    Lactate dehydrogenase   Result Value Ref Range     84 - 246 U/L   CBC   Result Value Ref  Range    WBC 10.4 4.4 - 11.3 x10*3/uL    nRBC 0.0 0.0 - 0.0 /100 WBCs    RBC 2.99 (L) 4.00 - 5.20 x10*6/uL    Hemoglobin 8.9 (L) 12.0 - 16.0 g/dL    Hematocrit 27.2 (L) 36.0 - 46.0 %    MCV 91 80 - 100 fL    MCH 29.8 26.0 - 34.0 pg    MCHC 32.7 32.0 - 36.0 g/dL    RDW 15.9 (H) 11.5 - 14.5 %    Platelets 123 (L) 150 - 450 x10*3/uL   Renal Function Panel   Result Value Ref Range    Glucose 107 (H) 74 - 99 mg/dL    Sodium 131 (L) 136 - 145 mmol/L    Potassium 4.0 3.5 - 5.3 mmol/L    Chloride 97 (L) 98 - 107 mmol/L    Bicarbonate 23 21 - 32 mmol/L    Anion Gap 15 10 - 20 mmol/L    Urea Nitrogen 50 (H) 6 - 23 mg/dL    Creatinine 1.54 (H) 0.50 - 1.05 mg/dL    eGFR 37 (L) >60 mL/min/1.73m*2    Calcium 7.9 (L) 8.6 - 10.6 mg/dL    Phosphorus 4.5 2.5 - 4.9 mg/dL    Albumin 3.7 3.4 - 5.0 g/dL   Coagulation Screen   Result Value Ref Range    Protime 14.9 (H) 9.8 - 12.8 seconds    INR 1.3 (H) 0.9 - 1.1    aPTT 25 (L) 27 - 38 seconds   Magnesium   Result Value Ref Range    Magnesium 2.06 1.60 - 2.40 mg/dL   Prepare RBC: 2 Units   Result Value Ref Range    PRODUCT CODE P8433F95     Unit Number [id number]     Unit ABO B     Unit RH POS     XM INTEP COMP     Dispense Status XM     Blood Expiration Date June 05, 2024 23:59 EDT     PRODUCT BLOOD TYPE 7300     UNIT VOLUME 350     PRODUCT CODE R3565E53     Unit Number [id number]     Unit ABO B     Unit RH POS     XM INTEP COMP     Dispense Status XM     Blood Expiration Date Sarah 10, 2024 23:59 EDT     PRODUCT BLOOD TYPE 7300     UNIT VOLUME 350    Prepare Plasma: 2 Units   Result Value Ref Range    PRODUCT CODE W9974W30     Unit Number [id number]     Unit ABO B     Unit RH POS     Dispense Status XM     Blood Expiration Date May 28, 2024 03:45 EDT     PRODUCT BLOOD TYPE 7300     UNIT VOLUME 222     PRODUCT CODE Y8153I93     Unit Number [id number]     Unit ABO B     Unit RH POS     Dispense Status IS     Blood Expiration Date May 28, 2024 03:45 EDT     PRODUCT BLOOD  TYPE 7300     UNIT VOLUME 340        Assessment/Plan   Principal Problem:    Aortic occlusion (CMS-[facility])  Active Problems:    Aortoiliac occlusive disease (Multi)    Klaudia Ratliff is a 68 y.o. female with past medical history of HTN, HLD, obesity, COPD, gout, RLE DVT, right PE s/p mechanical thrombectomy and IVC filter placement (2/22/2024) discharged on eliquis and bridged to Lovenox (5/13), and aortic occlusion. Patient initially presented to SICU s/p aortofem bypass via thoracoabdominal approach on 5/16.  Discharged to floor yesterday. Now representing to SICU for acute hypotension. CTA C/A/P s/f L flank retroperitoneal bleed. Hgb in the 8s despite transfusion with 4u PRBC.     Plan:  NEURO: PMH of sciatica, on gabapentin. A+Ox3, no focal deficits. Pain well controlled. No neuro issues at this time.  - acetaminophen and oxycodone --> hydromorphone while npo as needed for pain  - home gabapentin (hold while NPO)  - q4hr neuro/neurovasc/pain assessments  - PT/OT following  - Home meds; Gabapentin 100 mg BID      CV: History of HTN, HLD, obesity, RLE DVT, Right PE (s/p mechanical thrombectomy 2/24), and aortic occlusion. Baseline BP (pre-op) 161/76. Baseline cardiac function normal EF (60-65%), elevated LA pressure (2/2/24). Carotid Duplex < 50% ICA stenosis b/l. S/p aortofem bypass 5/16. Now w/ acute hypotension (70s/50s) 2/2 retroperitoneal bleed.  Received 500mL bolus on floor. 2u PRBC and 1u FFP ordered by vascular team. No current plan to RTOR.   - continuous ekg/abp monitoring  - hold metoprolol and asa   - hold home atorvastatin while NPO  - home meds: Eliquis 5 mg BID (held since 5/12, bridged to lovenox 5/13), ASA 81 mg, Atenolol 50 mg, atorvastatin 80 mg, losartan 100 mg, Vitamin C 250 mg     PULM: History of COPD, pulmonary embolism (2/2/24) s/p mechanical thrombectomy and IVC filter placement. No diaphragm pacing wires placed intra-op.   - wean FiO2 for goal SpO2 >94%  - Q1h IS while awake  -  additional bronchial hygiene as indicated  - Daily CXR: L pleural effusion, RIJ line in place.   - cont tiotropium  - Home meds: Tiotropium 2.5 mcg 2 puffs daily.     GI: History of gastric ulcer (3/2024), PPI every day recommended. Supraceliac clamp x27 minutes. Acute hyperbilirubinemia improving. RUQ US 5/19: Hepatic steatosis without focal lesions. Otherwise, unremarkable.   - NPO for now   - continue to trend LFTs daily  - PPI for GI prophylaxis/recent gastric ulcer  - bowel regimen  - Hepatology following; Inc in LFTs likely multifactorial, potentially ICU jaundice. If LFTs cont to rise, can hold gabapentin and/or statin     : Baseline cr 0.9-1.1. Left renal ischemic time 47 minutes. Worsening ELROY.   - q12h and prn RFP  - Goal U/O >1-2ml/kg/hr.    - Replete electrolytes to goal K>4, Mg>2, Phos>2.5, ionized Ca>1.10 if creatinine remains WNL or <1.5 with adequate uop.  - Home meds: Lasix 40 mg PRN, Potassium chloride 10 mEq ER      HEME: Recent PE s/p mechanical thrombectomy and IVC filter placement 2/2024. Bridged from eliquis to lovenox 5/13/24. Acute surgical blood loss anemia, acute post op thrombocytopenia improving. 5/18 Anti PF4 Neg. Vasc lab US BLE 5/20 prelim read: Acute non-occlusive deep vein thrombosis distal external iliac, common femoral vein, femoral vein proximal thru mid, and profunda vein. Now w/ retroperitoneal bleed. Hgb incremented by 1.5 points after 4uPRBC.   - Daily CBC, prn coags  - scds for dvt prophylaxis.  - hold asa   - hold heparin ggt   - Abdominal binder for bleed  - Vit K x3 days   - Will assess ongoing transfusion needs as required  - Home meds: Lovenox 80 mg q12h     ENDO: No history of DM or hypothyroidism. A1c 5.3%. TSH 1.86. Adequate glycemic control, currently on lispro SSI #1.   - Q6h BG  - Cont SSI 1     ID: Recent E.coli UTI 5/3, completed 3 day course Augmentin. Afebrile. Mild leukocytosis. No signs of infection at this time. Completed course of cefoxitin with chest  tube removal.   - trend wbc daily  - monitor for s/s infection     Skin: R shin and R heel bullae   - continue preventative mepilex to heals/sacrum  - turns q2h  - OOB daily as able  - weekly skin rounds     Lines:   - PIVs     Code status addressed/up to date.     Patient seen and discussed with Dr. Carter Sifuentes, DO  [facility] [id number]

## 2024-05-23 NOTE — PROGRESS NOTES
VASCULAR SURGERY PROGRESS NOTE  Subjective   Transferred back to ICU due to hypotension 2/2 hemorrhage  CTA noted several small areas of slow, active hemorrhage in the RP space  Received a total of 4PRBC/4FFP over past 24  MAPs currently in the 80s    Objective   Vitals:  Heart Rate:  [69-93]   Temp:  [35.8 °C (96.4 °F)-37 °C (98.6 °F)]   Resp:  [16-28]   BP: ()/(47-97)   SpO2:  [93 %-100 %]     Exam:  Constitutional: No acute distress, resting comfortably  Neuro:  AOx3, grossly intact  CV: no tachycardia on bedside monitor  Pulm: non-labored on nasal cannula, Dressing overlying CT incision  GI: soft, appropriately tender, thoraco-RP incision covered with dressing  Skin: blistering of bilateral lower extremities from volume overload  Musculoskeletal: moving all extremities  Extremities: Palpable bilateral DPs, sensory and motor intact in bilateral lower extremities, bilateral groin incisions covered with dressing    Labs:  Results from last 7 days   Lab Units 05/23/24  0702 05/23/24 0310 05/22/24 2217   WBC AUTO x10*3/uL 10.7 10.4 10.8   HEMOGLOBIN g/dL 10.6* 8.9* 8.8*   PLATELETS AUTO x10*3/uL 120* 123* 126*      Results from last 7 days   Lab Units 05/23/24  0310 05/22/24 2217 05/22/24  0550   SODIUM mmol/L 131* 131* 131*   POTASSIUM mmol/L 4.0 4.0 4.4   CHLORIDE mmol/L 97* 98 97*   CO2 mmol/L 23 22 25   BUN mg/dL 50* 48* 47*   CREATININE mg/dL 1.54* 1.62* 1.76*   GLUCOSE mg/dL 107* 100* 98   MAGNESIUM mg/dL 2.06 1.96 2.03   PHOSPHORUS mg/dL 4.5 4.1  --       Results from last 7 days   Lab Units 05/23/24 0310 05/22/24 2217 05/22/24  1537   INR  1.3* 1.5* 1.5*   PROTIME seconds 14.9* 16.6* 17.2*   APTT seconds 25* 26* 36     Assessment/Plan   Klaudia Ratliff is 68 y.o. 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 Transferred back to ICU on 5/22 for  hypotension secondary to slow, active extravasation in RP seen on CTA    Plan:  - Continue to transfuse as appropriate. Currently, MAPs stabilized in the 70s/80s after 4:4  - Please apply abdominal binder  - If hypotension continues, plan for non-contrast CT  - Continue to trend CBC  - Continue to hold anticoagulation  - Strict monitoring of I/Os  - q2 NV assessment  - NPO  - Appreciate hepatology input regarding elevated liver enzymes/bili  - OOB/PT/OT  - pain control  - bronchopulmonary hygiene  - ASA held  - goal MAP >70 mmHg  - trend LFTs  - Maintain K >4, Mg >2  - maintain Hgb >8, PLT >100, INR <1.4  - SCDs    Seen and discussed with vascular fellow, Dr. Delgado  D/w attending, Dr. Willis Diamond,   Department of Surgery / IR Integrated PGY1  Vascular [id number]

## 2024-05-23 NOTE — PROGRESS NOTES
"[facility]  [facility]  CONSULT FOLLOW-UP     Reason For Consult: hyperbilirubinemia and elevated transaminases     History of Present Illness  Klaudia Ratliff is a 68 y.o. female with a past medical history of HTN/ HLD and DVT/PE s/p thrombectomy and IVC filter placement admitted on 5/16/2024 for perivisceral aortic endarterectomy and aortobifemoral bypass.  Hepatology is consulted for elevated transaminases and hyperbilirubinemia.     SUBJECTIVE     Patient transferred back to ICU with concern for retroperitoneal bleeding. She is overall feeling well. States that she feels better compared to yesterday.    EXAM     Last Recorded Vitals  Blood pressure 137/79, pulse 87, temperature 36.6 °C (97.9 °F), temperature source Temporal, resp. rate (!) 28, height 1.575 m (5' 2\"), weight 85.2 kg (187 lb 13.3 oz), SpO2 93%.      Intake/Output Summary (Last 24 hours) at 5/23/2024 0849  Last data filed at 5/23/2024 0800  Gross per 24 hour   Intake 776 ml   Output 1335 ml   Net -559 ml          Physical Exam  General: well-nourished, no acute distress, jaundice  HEENT: EOMI. Moist mucosa, scleral icterus  Respiratory: nonlabored breathing on room air  Cardiovascular: RRR, no murmurs/rubs/gallops  Abdomen: Soft, nontender, nondistended, bowel sounds present. No masses palpated  Extremities: no edema, no asterixis  Neuro: alert and oriented, CNII-XII grossly intact, moves all 4 extremities with no focal deficits      OBJECTIVE                                                                              Medications             Current Facility-Administered Medications:     acetaminophen (Tylenol) tablet 650 mg, 650 mg, oral, q4h PRN, Devin Barros DO    [Held by provider] aspirin chewable tablet 81 mg, 81 mg, oral, Daily, Dayton Ureña DO, 81 mg at 05/22/24 0911    [Held by provider] atorvastatin (Lipitor) tablet 80 mg, 80 mg, oral, Nightly, Dayton Ureña DO, 80 mg at 05/21/24 " 2136    bisacodyl (Dulcolax) suppository 10 mg, 10 mg, rectal, Daily PRN, Devin Barros DO    dextrose 50 % injection 25 g, 25 g, intravenous, q15 min PRN **OR** [DISCONTINUED] glucagon (Glucagen) injection 1 mg, 1 mg, intramuscular, q15 min PRN, Spenser Diamond DO    [Held by provider] gabapentin (Neurontin) capsule 100 mg, 100 mg, oral, BID, Dayton Ureña DO, 100 mg at 05/22/24 0911    [Held by provider] heparin 25,000 Units in dextrose 5% 250 mL (100 Units/mL) infusion (premix), 0-4,000 Units/hr, intravenous, Continuous, Dayton Ureña DO, Stopped at 05/22/24 0836    [Held by provider] hydrALAZINE (Apresoline) injection 5 mg, 5 mg, intravenous, q4h PRN, Devin Barros DO    insulin lispro (HumaLOG) injection 0-5 Units, 0-5 Units, subcutaneous, 4x daily, Devin Barros DO, 1 Units at 05/20/24 2025    [Held by provider] labetaloL (Normodyne,Trandate) injection 20 mg, 20 mg, intravenous, q4h PRN, Devin Barros DO, 20 mg at 05/20/24 0603    [Held by provider] metoprolol tartrate (Lopressor) tablet 25 mg, 25 mg, oral, BID, Dayton Ureña DO, 25 mg at 05/21/24 2136    oxyCODONE (Roxicodone) immediate release tablet 10 mg, 10 mg, oral, q4h PRN, Devin Barros DO, 10 mg at 05/21/24 1538    oxyCODONE (Roxicodone) immediate release tablet 5 mg, 5 mg, oral, q4h PRN, Devin Barros DO, 5 mg at 05/22/24 1702    oxygen (O2) therapy, , inhalation, Continuous PRN - O2/gases, Devin Barros DO    pantoprazole (ProtoNix) injection 40 mg, 40 mg, intravenous, Daily, Abhijith Kudaravalli, DO    phytonadione (Vitamin K) 10 mg in dextrose 5% 50 mL IV, 10 mg, intravenous, Daily, Archana Sifuentes DO    potassium chloride 20 mEq in 100 mL IV premix, 20 mEq, intravenous, Once, Devin Barros DO    [Held by provider] sennosides-docusate sodium (Roxanna-Colace) 8.6-50 mg per tablet 2 tablet, 2 tablet, oral, BID, Devin Barros DO, 2 tablet at 05/22/24 0911    sodium chloride (Ocean) 0.65 % nasal spray 1 spray, 1 spray, Each  Nostril, 4x daily PRN, Devin Barros DO, 1 spray at 05/20/24 2036    tiotropium (Spiriva Respimat) 2.5 mcg/actuation inhaler 2 puff, 2 puff, inhalation, Daily, Devin Barros DO, 2 puff at 05/21/24 0815                                                                            Labs     Results for orders placed or performed during the hospital encounter of 05/16/24 (from the past 24 hour(s))   Type and Screen   Result Value Ref Range    ABO TYPE B     Rh TYPE POS     ANTIBODY SCREEN NEG    POCT GLUCOSE   Result Value Ref Range    POCT Glucose 98 74 - 99 mg/dL   CBC and Auto Differential   Result Value Ref Range    WBC 14.5 (H) 4.4 - 11.3 x10*3/uL    nRBC 0.0 0.0 - 0.0 /100 WBCs    RBC 2.84 (L) 4.00 - 5.20 x10*6/uL    Hemoglobin 8.8 (L) 12.0 - 16.0 g/dL    Hematocrit 29.7 (L) 36.0 - 46.0 %     (H) 80 - 100 fL    MCH 31.0 26.0 - 34.0 pg    MCHC 29.6 (L) 32.0 - 36.0 g/dL    RDW 16.2 (H) 11.5 - 14.5 %    Platelets 132 (L) 150 - 450 x10*3/uL    Neutrophils % 74.1 40.0 - 80.0 %    Immature Granulocytes %, Automated 3.1 (H) 0.0 - 0.9 %    Lymphocytes % 13.9 13.0 - 44.0 %    Monocytes % 6.9 2.0 - 10.0 %    Eosinophils % 1.7 0.0 - 6.0 %    Basophils % 0.3 0.0 - 2.0 %    Neutrophils Absolute 10.73 (H) 1.20 - 7.70 x10*3/uL    Immature Granulocytes Absolute, Automated 0.45 0.00 - 0.70 x10*3/uL    Lymphocytes Absolute 2.01 1.20 - 4.80 x10*3/uL    Monocytes Absolute 1.00 0.10 - 1.00 x10*3/uL    Eosinophils Absolute 0.25 0.00 - 0.70 x10*3/uL    Basophils Absolute 0.04 0.00 - 0.10 x10*3/uL   Coagulation Screen   Result Value Ref Range    Protime 17.2 (H) 9.8 - 12.8 seconds    INR 1.5 (H) 0.9 - 1.1    aPTT 36 27 - 38 seconds   Prepare RBC: 2 Units   Result Value Ref Range    PRODUCT CODE T2292G11     Unit Number [id number]     Unit ABO B     Unit RH POS     XM INTEP COMP     Dispense Status IS     Blood Expiration Date June 05, 2024 23:59 EDT     PRODUCT BLOOD TYPE 7300     UNIT VOLUME 350     PRODUCT CODE V3901P73      Unit Number [id number]     Unit ABO B     Unit RH POS     XM INTEP COMP     Dispense Status TR     Blood Expiration Date June 05, 2024 23:59 EDT     PRODUCT BLOOD TYPE 7300     UNIT VOLUME 350    Prepare Plasma: 1 Units   Result Value Ref Range    PRODUCT CODE O9813LV0     Unit Number [id number]     Unit ABO B     Unit RH POS     Dispense Status IS     Blood Expiration Date May 27, 2024 16:46 EDT     PRODUCT BLOOD TYPE 7300     UNIT VOLUME 212    POCT GLUCOSE   Result Value Ref Range    POCT Glucose 108 (H) 74 - 99 mg/dL   Calcium, Ionized   Result Value Ref Range    POCT Calcium, Ionized 1.11 1.1 - 1.33 mmol/L   Blood Gas Arterial Full Panel   Result Value Ref Range    POCT pH, Arterial 7.44 (H) 7.38 - 7.42 pH    POCT pCO2, Arterial 32 (L) 38 - 42 mm Hg    POCT pO2, Arterial 54 (L) 85 - 95 mm Hg    POCT SO2, Arterial      POCT Oxy Hemoglobin, Arterial      POCT Hematocrit Calculated, Arterial      POCT Sodium, Arterial 131 (L) 136 - 145 mmol/L    POCT Potassium, Arterial 3.7 3.5 - 5.3 mmol/L    POCT Chloride, Arterial 101 98 - 107 mmol/L    POCT Ionized Calcium, Arterial 1.01 (L) 1.10 - 1.33 mmol/L    POCT Glucose, Arterial 95 74 - 99 mg/dL    POCT Lactate, Arterial 0.8 0.4 - 2.0 mmol/L    POCT Base Excess, Arterial -1.6 -2.0 - 3.0 mmol/L    POCT HCO3 Calculated, Arterial 21.7 (L) 22.0 - 26.0 mmol/L    POCT Hemoglobin, Arterial      POCT Anion Gap, Arterial 12 10 - 25 mmo/L    Patient Temperature 37.0 degrees Celsius    FiO2 28 %   CBC   Result Value Ref Range    WBC 10.8 4.4 - 11.3 x10*3/uL    nRBC 0.0 0.0 - 0.0 /100 WBCs    RBC 2.83 (L) 4.00 - 5.20 x10*6/uL    Hemoglobin 8.8 (L) 12.0 - 16.0 g/dL    Hematocrit 26.1 (L) 36.0 - 46.0 %    MCV 92 80 - 100 fL    MCH 31.1 26.0 - 34.0 pg    MCHC 33.7 32.0 - 36.0 g/dL    RDW 15.5 (H) 11.5 - 14.5 %    Platelets 126 (L) 150 - 450 x10*3/uL   Coagulation Screen   Result Value Ref Range    Protime 16.6 (H) 9.8 - 12.8 seconds    INR 1.5 (H) 0.9 - 1.1    aPTT 26 (L)  27 - 38 seconds   Hepatic Function Panel   Result Value Ref Range    Albumin 3.3 (L) 3.4 - 5.0 g/dL    Bilirubin, Total 4.6 (H) 0.0 - 1.2 mg/dL    Bilirubin, Direct 2.2 (H) 0.0 - 0.3 mg/dL    Alkaline Phosphatase 116 33 - 136 U/L    ALT 54 (H) 7 - 45 U/L    AST 23 9 - 39 U/L    Total Protein 4.9 (L) 6.4 - 8.2 g/dL   Magnesium   Result Value Ref Range    Magnesium 1.96 1.60 - 2.40 mg/dL   Basic Metabolic Panel   Result Value Ref Range    Glucose 100 (H) 74 - 99 mg/dL    Sodium 131 (L) 136 - 145 mmol/L    Potassium 4.0 3.5 - 5.3 mmol/L    Chloride 98 98 - 107 mmol/L    Bicarbonate 22 21 - 32 mmol/L    Anion Gap 15 10 - 20 mmol/L    Urea Nitrogen 48 (H) 6 - 23 mg/dL    Creatinine 1.62 (H) 0.50 - 1.05 mg/dL    eGFR 35 (L) >60 mL/min/1.73m*2    Calcium 7.8 (L) 8.6 - 10.6 mg/dL   Phosphorus   Result Value Ref Range    Phosphorus 4.1 2.5 - 4.9 mg/dL   Blood Gas Arterial Full Panel   Result Value Ref Range    POCT pH, Arterial 7.39 7.38 - 7.42 pH    POCT pCO2, Arterial 36 (L) 38 - 42 mm Hg    POCT pO2, Arterial 84 (L) 85 - 95 mm Hg    POCT SO2, Arterial 99 94 - 100 %    POCT Oxy Hemoglobin, Arterial 94.9 94.0 - 98.0 %    POCT Hematocrit Calculated, Arterial 29.0 (L) 36.0 - 46.0 %    POCT Sodium, Arterial 127 (L) 136 - 145 mmol/L    POCT Potassium, Arterial 4.1 3.5 - 5.3 mmol/L    POCT Chloride, Arterial 99 98 - 107 mmol/L    POCT Ionized Calcium, Arterial 1.12 1.10 - 1.33 mmol/L    POCT Glucose, Arterial 105 (H) 74 - 99 mg/dL    POCT Lactate, Arterial 0.6 0.4 - 2.0 mmol/L    POCT Base Excess, Arterial -2.8 (L) -2.0 - 3.0 mmol/L    POCT HCO3 Calculated, Arterial 21.8 (L) 22.0 - 26.0 mmol/L    POCT Hemoglobin, Arterial 9.6 (L) 12.0 - 16.0 g/dL    POCT Anion Gap, Arterial 10 10 - 25 mmo/L    Patient Temperature 37.0 degrees Celsius    FiO2 28 %   Prepare RBC: 2 Units   Result Value Ref Range    PRODUCT CODE D9308L11     Unit Number [id number]     Unit ABO B     Unit RH POS     XM INTEP COMP     Dispense Status IS      Blood Expiration Date June 05, 2024 23:59 EDT     PRODUCT BLOOD TYPE 7300     UNIT VOLUME 350     PRODUCT CODE O1611G24     Unit Number [id number]     Unit ABO B     Unit RH POS     XM INTEP COMP     Dispense Status IS     Blood Expiration Date June 05, 2024 23:59 EDT     PRODUCT BLOOD TYPE 7300     UNIT VOLUME 350    Prepare Plasma: 2 Units   Result Value Ref Range    PRODUCT CODE U7874W23     Unit Number [id number]     Unit ABO B     Unit RH POS     Dispense Status IS     Blood Expiration Date May 28, 2024 01:24 EDT     PRODUCT BLOOD TYPE 7300     UNIT VOLUME 337     PRODUCT CODE B1493L56     Unit Number [id number]     Unit ABO B     Unit RH POS     Dispense Status IS     Blood Expiration Date May 28, 2024 01:24 EDT     PRODUCT BLOOD TYPE 7300     UNIT VOLUME 346    Lactate dehydrogenase   Result Value Ref Range     84 - 246 U/L   CBC   Result Value Ref Range    WBC 10.4 4.4 - 11.3 x10*3/uL    nRBC 0.0 0.0 - 0.0 /100 WBCs    RBC 2.99 (L) 4.00 - 5.20 x10*6/uL    Hemoglobin 8.9 (L) 12.0 - 16.0 g/dL    Hematocrit 27.2 (L) 36.0 - 46.0 %    MCV 91 80 - 100 fL    MCH 29.8 26.0 - 34.0 pg    MCHC 32.7 32.0 - 36.0 g/dL    RDW 15.9 (H) 11.5 - 14.5 %    Platelets 123 (L) 150 - 450 x10*3/uL   Renal Function Panel   Result Value Ref Range    Glucose 107 (H) 74 - 99 mg/dL    Sodium 131 (L) 136 - 145 mmol/L    Potassium 4.0 3.5 - 5.3 mmol/L    Chloride 97 (L) 98 - 107 mmol/L    Bicarbonate 23 21 - 32 mmol/L    Anion Gap 15 10 - 20 mmol/L    Urea Nitrogen 50 (H) 6 - 23 mg/dL    Creatinine 1.54 (H) 0.50 - 1.05 mg/dL    eGFR 37 (L) >60 mL/min/1.73m*2    Calcium 7.9 (L) 8.6 - 10.6 mg/dL    Phosphorus 4.5 2.5 - 4.9 mg/dL    Albumin 3.7 3.4 - 5.0 g/dL   Coagulation Screen   Result Value Ref Range    Protime 14.9 (H) 9.8 - 12.8 seconds    INR 1.3 (H) 0.9 - 1.1    aPTT 25 (L) 27 - 38 seconds   Magnesium   Result Value Ref Range    Magnesium 2.06 1.60 - 2.40 mg/dL   Prepare RBC: 2 Units   Result Value Ref Range     PRODUCT CODE G0778W60     Unit Number [id number]     Unit ABO B     Unit RH POS     XM INTEP COMP     Dispense Status XM     Blood Expiration Date June 05, 2024 23:59 EDT     PRODUCT BLOOD TYPE 7300     UNIT VOLUME 350     PRODUCT CODE W8618O49     Unit Number [id number]     Unit ABO B     Unit RH POS     XM INTEP COMP     Dispense Status XM     Blood Expiration Date Sarah 10, 2024 23:59 EDT     PRODUCT BLOOD TYPE 7300     UNIT VOLUME 350    Prepare Plasma: 2 Units   Result Value Ref Range    PRODUCT CODE S3318Z48     Unit Number [id number]     Unit ABO B     Unit RH POS     Dispense Status XM     Blood Expiration Date May 28, 2024 03:45 EDT     PRODUCT BLOOD TYPE 7300     UNIT VOLUME 222     PRODUCT CODE X4022C05     Unit Number [id number]     Unit ABO B     Unit RH POS     Dispense Status IS     Blood Expiration Date May 28, 2024 03:45 EDT     PRODUCT BLOOD TYPE 7300     UNIT VOLUME 340    CBC   Result Value Ref Range    WBC 10.7 4.4 - 11.3 x10*3/uL    nRBC 0.0 0.0 - 0.0 /100 WBCs    RBC 3.41 (L) 4.00 - 5.20 x10*6/uL    Hemoglobin 10.6 (L) 12.0 - 16.0 g/dL    Hematocrit 30.6 (L) 36.0 - 46.0 %    MCV 90 80 - 100 fL    MCH 31.1 26.0 - 34.0 pg    MCHC 34.6 32.0 - 36.0 g/dL    RDW 15.6 (H) 11.5 - 14.5 %    Platelets 120 (L) 150 - 450 x10*3/uL       Imaging   RUQ US 5/19/24  IMPRESSION:  Hepatic steatosis without focal lesions. Otherwise, unremarkable right upper quadrant ultrasound.                                                                         GI Procedures     EGD March 2024  Impression  The esophagus appeared normal.  Single ulcer in the antrum with clean base (Ranjeet III)  Moderate abnormal mucosa in the antrum, consistent with gastritis; performed cold forceps biopsy to rule out H. pylori  The duodenal bulb, 1st part of the duodenum and 2nd part of the duodenum appeared normal      A. STOMACH ANTRUM BIOPSY:      -- Antral type gastric mucosa with chemical/reactive  gastropathy.  --Fundic type gastric mucosa with features suggestive of proton pump inhibitor effect.  --Helicobacter pylori-like organisms are not identified.     Colonoscopy 2022  Impression:            - Diverticulosis in the entire examined colon.                         - Many diminutive polyps in the sigmoid colon.                          Biopsied.                         - The examination was otherwise normal.  Recommendations: repeat in 5 years    ASSESSMENT / PLAN     ASSESSMENT/PLAN:  Klaudia Ratliff is a 68 y.o. female presenting with claudication and is now s/p perivisceral aortic endarterectomy and aortobifemoral bypass.  Hepatology is consulted for elevated transaminases and hyperbilirubinemia. Bilirubin seems to have peaked and is improving daily with the majority being indirect bilirubinemia. This is possibly related to shearing post vascular surgery and should continue to improve.  The rise in transaminases is less clear. Ddx is ischemic hepatitis which is less likely because of degree of rise and timeline of increase vs medication induced (gabapentin or atorvastatin) vs ICU jaundice which is common in post cardiac/ vascular patients. Patient does have hepatic steatosis but this is not likely contributing to her acute presentation.    Bilirubin increased today which is expected in the setting of acute bleed. Transaminases are improved but they may rise transiently over the next few days in the setting of bleeding and intermittent hypoperfusion.     Recommendations:  -Continue to monitor hepatic function panel daily.  -If labs trending up, will need to consider holding atorvastatin and/ or gabapentin  -Outpatient risk factor modification for fatty liver disease: weight loss, low carb diet and exercise.    Patient was seen and discussed with Dr. Smith.    Recommendations communicated with the primary team via secure chat.  Thank you for the consultation. Hepatology will continue to follow.  -  During weekday hours of 7am- 5pm, please do not hesitate to contact me on [employer] Chat or page [phone] if there are any further questions   - After hours, on weekends, and on holidays, please page the [place] at [phone]. Thank you.       Lian Garrison MD  Gastroenterology and Hepatology Fellow  [facility]

## 2024-05-23 NOTE — PROGRESS NOTES
Physical Therapy                 Therapy Communication Note    Patient Name: lKaudia Ratliff  MRN: [id number]  Today's Date: 5/23/2024     Discipline: Physical Therapy     Missed Visit Reason:  (0857: Discussed this AM in ID rounds with team. Pt with active bleeding. Will hold mobility this date. Continuing to follow.)    Missed Time: Attempt

## 2024-05-23 NOTE — PROGRESS NOTES
Occupational Therapy                 Therapy Communication Note    Patient Name: Klaudia Ratliff  MRN: [id number]  Today's Date: 5/23/2024     Discipline: Occupational Therapy    Missed Visit Reason: Missed Visit Reason:  (Discussed in ID rounds has active bleed. Will hold at this time and will attempt as appropriate)    Missed Time: Attempt    Comment:

## 2024-05-23 NOTE — PROGRESS NOTES
Kai (inbound call) with SNF choices:     1) [facility]  2) [facility]  3) [facility]    Referrals sent. [employer] insurance will require auth with accepting facility    Hyacinth Eubanks (LSW, MSW)

## 2024-05-23 NOTE — CONSULTS
Wound Care Consult     Visit Date: 5/23/2024      Patient Name: Klaudia Ratliff         MRN: [id number]           YOB: 1955     Reason for Consult: BLE and RUE wounds         Wound History: First encounter with the patient      Wound Assessment:    Wound 05/22/24 Other (comment) Foot Right;Ventral;Dorsal foot (Active)   Wound Image   05/23/24 1150   Site Assessment Fragile;Pink;Red 05/23/24 1200   Roxanna-Wound Assessment Moist ;Painful;Pink 05/23/24 1200   Non-staged Wound Description Partial thickness 05/23/24 1150   Shape round 05/23/24 1150   Wound Length (cm) 3 cm 05/23/24 1150   Wound Width (cm) 3.5 cm 05/23/24 1150   Wound Surface Area (cm^2) 10.5 cm^2 05/23/24 1150   Wound Depth (cm) 0 cm 05/23/24 1150   Wound Volume (cm^3) 0 cm^3 05/23/24 1150   State of Healing Non-healing 05/23/24 1150   Margins Well-defined edges 05/23/24 1200   Drainage Description Serosanguineous 05/23/24 1200   Drainage Amount Small 05/23/24 1200   Dressing Kerlix/rolled gauze;Moist to dry;Xeroform;ABD 05/23/24 1200   Dressing Changed New 05/23/24 1150   Dressing Status Clean;Dry;Occlusive 05/23/24 1200       Wound 05/22/24 Other (comment) Left;Dorsal foot (Active)   Wound Image   05/23/24 1149   Site Assessment Delgado;Pale;Painful 05/23/24 1200   Roxanna-Wound Assessment Pink 05/23/24 1200   Non-staged Wound Description Partial thickness 05/23/24 1149   Shape round 05/23/24 1149   State of Healing Non-healing 05/23/24 1149   Margins Well-defined edges 05/23/24 1200   Drainage Description Serosanguineous 05/23/24 1200   Drainage Amount Small 05/23/24 1200   Dressing ABD;Xeroform 05/23/24 1200   Dressing Changed New 05/23/24 1149   Dressing Status Clean;Dry;Occlusive 05/23/24 1200       Wound 05/22/24 Other (comment) Leg Right;Upper;Anterior (Active)           Site Assessment                           Edema;Fragile;Pink;Tan                           05/23/24 1200   Roxanna-Wound Assessment Edematous;Moist  05/23/24 1200    Non-staged Wound Description Partial thickness 05/23/24 1200   Shape irregular 05/23/24 1149   State of Healing Healing ridge 05/23/24 1200   Margins Well-defined edges 05/23/24 1200   Drainage Description Serosanguineous;Serous 05/23/24 1200   Drainage Amount Small 05/23/24 1200   Dressing Non adherent;Silicone border dressing;ABD 05/23/24 1149   Dressing Changed New 05/23/24 1149   Dressing Status Clean;Dry;Occlusive 05/23/24 1200       Wound 05/23/24 Other (comment) Foot Right;Plantar (Active)   Wound Image   05/23/24 1147   Site Assessment Red;Painful 05/23/24 1147   Roxanna-Wound Assessment Maceration 05/23/24 1147   Non-staged Wound Description Partial thickness 05/23/24 1147   Shape Round 05/23/24 1147   Wound Length (cm) 5 cm 05/23/24 1147   Wound Width (cm) 9 cm 05/23/24 1147   Wound Surface Area (cm^2) 45 cm^2 05/23/24 1147   Wound Depth (cm) 0.1 cm 05/23/24 1147   Wound Volume (cm^3) 4.5 cm^3 05/23/24 1147   State of Healing Non-healing 05/23/24 1147   Margins Well-defined edges 05/23/24 1147   Drainage Description Serosanguineous 05/23/24 1147   Drainage Amount Small 05/23/24 1147   Dressing Non adherent;Silicone border dressing;ABD;Kerlix/rolled gauze 05/23/24 1147   Dressing Changed New 05/23/24 1147   Dressing Status Clean;Dry 05/23/24 1147       Wound Team Summary Assessment: The wound care team came to bedside to assess the patient's BLE and RUE wounds.  The patient right planter wound was cleansed with normal saline and the wound edges were gently pat dry with a 4x4 cover sponge.  A strip of mepitel contact layer was applied to the wound and then was covered with; mepilex transfer, ABD pad and kerlix rolled gauze. The patient has several blisters in between her toes on the right foot that were cleansed with normal saline. Aquacel Ag rope was used and applied between to the toes to wick drainage away from the toes.  The left plantar foot has an intact blister that was covered with a mepilex lite  silicone dressing at this time. Finally the patient has an unroofed blister on the right upper thigh/groin that was cleansed with normal saline and dressed with mepilex transfer and ABD pad.      Wound Team Plan:   Recommendation: Change every 3 days.    Right plantar wound: Cleanse with normal saline   Apply a strip of mepitel silicone non contact layer to the wound  Cover with a strip of mepilex transfer and ABD pad  Wrap the foot with Kerlix rolled gauze     Right toe blisters: Cleanse with normal saline and gently pat the area dry with a cover sponge   Apply Aquacel Ag in between the toes to wick away drainage.    Right shin wound: Cleanse with normal saline and pat the area dry with a cover sponge   Apply a strip of mepilex transfer over the wound     Right upper thigh/groin: Cleanse with normal saline and pat the area dry with a cover sponge   Apply a strip of mepilex transfer over the wound and cover with an ABD pad.    Left plantar foot blister:Cleanse with normal saline and pat the area dry with a cover sponge   Apply mepilex lite silicone dressing over the wound    Darell Carrillo RN CWON  5/23/2024  1:31 PM

## 2024-05-23 NOTE — SIGNIFICANT EVENT
Patient examined after arrival to ICU. Patient reports she is feeling well, no changes to pain, no dizziness, no chest pain. Patient exam unchanged. She received 2 units pRBCs and 1 FFP, did not increment appropriately (Hg still 8.8). INR is also unchanged at 1.5. SICU team placing central line. UO has been stable since ICU admission.    Will give additional FFP and continue to monitor labs, blood pressure, and UO.    Nivia Olea MD  General Surgery  [id number]    AM update:  Patient seen around 3:30am. Patient had received an additonal 1 unit pRBC and 2 FFP with no improvement in Hg. Urine output is decreasing in the last hour. Patient exam unchanged however patient more fatigued than prior. Updated vascular fellow. SICU to order additional unit.    Nivia Olea MD  General Surgery

## 2024-05-23 NOTE — PROCEDURES
Central Line    Date/Time: 5/22/2024 11:30 PM    Performed by: Becky Norman MD  Authorized by: Dayton Ureña DO    Consent:     Consent obtained:  Written    Consent given by:  Patient    Risks, benefits, and alternatives were discussed: yes      Risks discussed:  Arterial puncture, incorrect placement, nerve damage, pneumothorax, infection and bleeding    Alternatives discussed:  No treatment, alternative treatment and delayed treatment  Universal protocol:     Procedure explained and questions answered to patient or proxy's satisfaction: yes      Relevant documents present and verified: yes      Test results available: yes      Imaging studies available: yes      Required blood products, implants, devices, and special equipment available: yes      Site/side marked: yes      Immediately prior to procedure, a time out was called: yes      Patient identity confirmed:  Verbally with patient  Pre-procedure details:     Indication(s): central venous access, hemodynamic monitoring and insufficient peripheral access      Hand hygiene: Hand hygiene performed prior to insertion      Sterile barrier technique: All elements of maximal sterile technique followed      Skin preparation:  Chlorhexidine    Skin preparation agent: Skin preparation agent completely dried prior to procedure    Sedation:     Sedation type:  None  Anesthesia:     Anesthesia method:  Local infiltration    Local anesthetic:  Lidocaine 1% w/o epi  Procedure details:     Location:  R internal jugular    Patient position:  Trendelenburg    Procedural supplies:  Double lumen    Catheter size:  9 Fr    Landmarks identified: yes      Ultrasound guidance: yes      Ultrasound guidance timing: real time      Sterile ultrasound techniques: Sterile gel and sterile probe covers were used      Number of attempts:  1    Successful placement: yes    Post-procedure details:     Post-procedure:  Dressing applied and line sutured    Assessment:  Blood return through  all ports, no pneumothorax on x-ray, placement verified by x-ray and free fluid flow    Procedure completion:  Tolerated    Complications:  None apparent

## 2024-05-24 ENCOUNTER — APPOINTMENT (OUTPATIENT)
Dept: RADIOLOGY | Facility: HOSPITAL | Age: 69
DRG: 673 | End: 2024-05-24
Payer: MEDICARE

## 2024-05-24 LAB
ALBUMIN SERPL BCP-MCNC: 3.1 G/DL (ref 3.4–5)
ALBUMIN SERPL BCP-MCNC: 3.2 G/DL (ref 3.4–5)
ALP SERPL-CCNC: 138 U/L (ref 33–136)
ALT SERPL W P-5'-P-CCNC: 41 U/L (ref 7–45)
ANION GAP BLDA CALCULATED.4IONS-SCNC: 16 MMO/L (ref 10–25)
ANION GAP SERPL CALC-SCNC: 14 MMOL/L (ref 10–20)
ANION GAP SERPL CALC-SCNC: 17 MMOL/L (ref 10–20)
APTT PPP: 25 SECONDS (ref 27–38)
AST SERPL W P-5'-P-CCNC: 26 U/L (ref 9–39)
BASE EXCESS BLDA CALC-SCNC: -2.8 MMOL/L (ref -2–3)
BILIRUB DIRECT SERPL-MCNC: 3.5 MG/DL (ref 0–0.3)
BILIRUB SERPL-MCNC: 6 MG/DL (ref 0–1.2)
BLOOD EXPIRATION DATE: NORMAL
BODY TEMPERATURE: 37 DEGREES CELSIUS
BUN SERPL-MCNC: 46 MG/DL (ref 6–23)
BUN SERPL-MCNC: 49 MG/DL (ref 6–23)
CA-I BLD-SCNC: 1.14 MMOL/L (ref 1.1–1.33)
CA-I BLDA-SCNC: 1.19 MMOL/L (ref 1.1–1.33)
CALCIUM SERPL-MCNC: 8.3 MG/DL (ref 8.6–10.6)
CALCIUM SERPL-MCNC: 8.3 MG/DL (ref 8.6–10.6)
CHLORIDE BLDA-SCNC: 101 MMOL/L (ref 98–107)
CHLORIDE SERPL-SCNC: 102 MMOL/L (ref 98–107)
CHLORIDE SERPL-SCNC: 102 MMOL/L (ref 98–107)
CO2 SERPL-SCNC: 23 MMOL/L (ref 21–32)
CO2 SERPL-SCNC: 24 MMOL/L (ref 21–32)
CREAT SERPL-MCNC: 1.23 MG/DL (ref 0.5–1.05)
CREAT SERPL-MCNC: 1.29 MG/DL (ref 0.5–1.05)
DISPENSE STATUS: NORMAL
EGFRCR SERPLBLD CKD-EPI 2021: 46 ML/MIN/1.73M*2
EGFRCR SERPLBLD CKD-EPI 2021: 48 ML/MIN/1.73M*2
ERYTHROCYTE [DISTWIDTH] IN BLOOD BY AUTOMATED COUNT: 16.4 % (ref 11.5–14.5)
GLUCOSE BLD MANUAL STRIP-MCNC: 105 MG/DL (ref 74–99)
GLUCOSE BLD MANUAL STRIP-MCNC: 139 MG/DL (ref 74–99)
GLUCOSE BLD MANUAL STRIP-MCNC: 86 MG/DL (ref 74–99)
GLUCOSE BLD MANUAL STRIP-MCNC: 87 MG/DL (ref 74–99)
GLUCOSE BLD MANUAL STRIP-MCNC: 99 MG/DL (ref 74–99)
GLUCOSE BLDA-MCNC: 82 MG/DL (ref 74–99)
GLUCOSE SERPL-MCNC: 139 MG/DL (ref 74–99)
GLUCOSE SERPL-MCNC: 83 MG/DL (ref 74–99)
HCO3 BLDA-SCNC: 21.9 MMOL/L (ref 22–26)
HCT VFR BLD AUTO: 32.3 % (ref 36–46)
HCT VFR BLD EST: 35 % (ref 36–46)
HGB BLD-MCNC: 11.3 G/DL (ref 12–16)
HGB BLDA-MCNC: 11.5 G/DL (ref 12–16)
INHALED O2 CONCENTRATION: 21 %
INR PPP: 1.3 (ref 0.9–1.1)
LACTATE BLDA-SCNC: 0.7 MMOL/L (ref 0.4–2)
MAGNESIUM SERPL-MCNC: 2.1 MG/DL (ref 1.6–2.4)
MCH RBC QN AUTO: 31.4 PG (ref 26–34)
MCHC RBC AUTO-ENTMCNC: 35 G/DL (ref 32–36)
MCV RBC AUTO: 90 FL (ref 80–100)
NRBC BLD-RTO: 0 /100 WBCS (ref 0–0)
OXYHGB MFR BLDA: 90.9 % (ref 94–98)
PCO2 BLDA: 37 MM HG (ref 38–42)
PH BLDA: 7.38 PH (ref 7.38–7.42)
PHOSPHATE SERPL-MCNC: 4 MG/DL (ref 2.5–4.9)
PHOSPHATE SERPL-MCNC: 4.4 MG/DL (ref 2.5–4.9)
PLATELET # BLD AUTO: 173 X10*3/UL (ref 150–450)
PO2 BLDA: 67 MM HG (ref 85–95)
POTASSIUM BLDA-SCNC: 3.6 MMOL/L (ref 3.5–5.3)
POTASSIUM SERPL-SCNC: 3.7 MMOL/L (ref 3.5–5.3)
POTASSIUM SERPL-SCNC: 3.7 MMOL/L (ref 3.5–5.3)
PRODUCT BLOOD TYPE: 7300
PRODUCT CODE: NORMAL
PROT SERPL-MCNC: 5.6 G/DL (ref 6.4–8.2)
PROTHROMBIN TIME: 14.2 SECONDS (ref 9.8–12.8)
RBC # BLD AUTO: 3.6 X10*6/UL (ref 4–5.2)
SAO2 % BLDA: 95 % (ref 94–100)
SODIUM BLDA-SCNC: 135 MMOL/L (ref 136–145)
SODIUM SERPL-SCNC: 136 MMOL/L (ref 136–145)
SODIUM SERPL-SCNC: 138 MMOL/L (ref 136–145)
UNIT ABO: NORMAL
UNIT NUMBER: NORMAL
UNIT RH: NORMAL
UNIT VOLUME: 222
UNIT VOLUME: 337
UNIT VOLUME: 340
UNIT VOLUME: 346
UNIT VOLUME: 350
UNIT VOLUME: 350
WBC # BLD AUTO: 12.5 X10*3/UL (ref 4.4–11.3)
XM INTEP: NORMAL
XM INTEP: NORMAL

## 2024-05-24 PROCEDURE — 2500000001 HC RX 250 WO HCPCS SELF ADMINISTERED DRUGS (ALT 637 FOR MEDICARE OP): Performed by: NURSE PRACTITIONER

## 2024-05-24 PROCEDURE — 85027 COMPLETE CBC AUTOMATED: CPT | Performed by: STUDENT IN AN ORGANIZED HEALTH CARE EDUCATION/TRAINING PROGRAM

## 2024-05-24 PROCEDURE — 83735 ASSAY OF MAGNESIUM: CPT | Mod: 91

## 2024-05-24 PROCEDURE — 84132 ASSAY OF SERUM POTASSIUM: CPT | Mod: 91

## 2024-05-24 PROCEDURE — C9113 INJ PANTOPRAZOLE SODIUM, VIA: HCPCS

## 2024-05-24 PROCEDURE — 36415 COLL VENOUS BLD VENIPUNCTURE: CPT

## 2024-05-24 PROCEDURE — 80076 HEPATIC FUNCTION PANEL: CPT | Performed by: STUDENT IN AN ORGANIZED HEALTH CARE EDUCATION/TRAINING PROGRAM

## 2024-05-24 PROCEDURE — 2500000001 HC RX 250 WO HCPCS SELF ADMINISTERED DRUGS (ALT 637 FOR MEDICARE OP)

## 2024-05-24 PROCEDURE — 2500000004 HC RX 250 GENERAL PHARMACY W/ HCPCS (ALT 636 FOR OP/ED)

## 2024-05-24 PROCEDURE — 84132 ASSAY OF SERUM POTASSIUM: CPT | Mod: 91 | Performed by: STUDENT IN AN ORGANIZED HEALTH CARE EDUCATION/TRAINING PROGRAM

## 2024-05-24 PROCEDURE — 99233 SBSQ HOSP IP/OBS HIGH 50: CPT | Performed by: INTERNAL MEDICINE

## 2024-05-24 PROCEDURE — 83735 ASSAY OF MAGNESIUM: CPT | Performed by: STUDENT IN AN ORGANIZED HEALTH CARE EDUCATION/TRAINING PROGRAM

## 2024-05-24 PROCEDURE — 2500000001 HC RX 250 WO HCPCS SELF ADMINISTERED DRUGS (ALT 637 FOR MEDICARE OP): Performed by: STUDENT IN AN ORGANIZED HEALTH CARE EDUCATION/TRAINING PROGRAM

## 2024-05-24 PROCEDURE — 97530 THERAPEUTIC ACTIVITIES: CPT | Mod: GP

## 2024-05-24 PROCEDURE — 99232 SBSQ HOSP IP/OBS MODERATE 35: CPT

## 2024-05-24 PROCEDURE — 2500000002 HC RX 250 W HCPCS SELF ADMINISTERED DRUGS (ALT 637 FOR MEDICARE OP, ALT 636 FOR OP/ED)

## 2024-05-24 PROCEDURE — 71045 X-RAY EXAM CHEST 1 VIEW: CPT

## 2024-05-24 PROCEDURE — 82947 ASSAY GLUCOSE BLOOD QUANT: CPT

## 2024-05-24 PROCEDURE — 2060000001 HC INTERMEDIATE ICU ROOM DAILY

## 2024-05-24 PROCEDURE — 2500000004 HC RX 250 GENERAL PHARMACY W/ HCPCS (ALT 636 FOR OP/ED): Performed by: STUDENT IN AN ORGANIZED HEALTH CARE EDUCATION/TRAINING PROGRAM

## 2024-05-24 PROCEDURE — 82330 ASSAY OF CALCIUM: CPT | Performed by: STUDENT IN AN ORGANIZED HEALTH CARE EDUCATION/TRAINING PROGRAM

## 2024-05-24 PROCEDURE — 84100 ASSAY OF PHOSPHORUS: CPT | Performed by: STUDENT IN AN ORGANIZED HEALTH CARE EDUCATION/TRAINING PROGRAM

## 2024-05-24 PROCEDURE — 85610 PROTHROMBIN TIME: CPT | Performed by: STUDENT IN AN ORGANIZED HEALTH CARE EDUCATION/TRAINING PROGRAM

## 2024-05-24 PROCEDURE — 97164 PT RE-EVAL EST PLAN CARE: CPT | Mod: GP

## 2024-05-24 PROCEDURE — 71045 X-RAY EXAM CHEST 1 VIEW: CPT | Performed by: STUDENT IN AN ORGANIZED HEALTH CARE EDUCATION/TRAINING PROGRAM

## 2024-05-24 RX ORDER — FUROSEMIDE 10 MG/ML
10 INJECTION INTRAMUSCULAR; INTRAVENOUS ONCE
Status: COMPLETED | OUTPATIENT
Start: 2024-05-24 | End: 2024-05-24

## 2024-05-24 RX ORDER — POTASSIUM CHLORIDE 14.9 MG/ML
20 INJECTION INTRAVENOUS
Status: COMPLETED | OUTPATIENT
Start: 2024-05-24 | End: 2024-05-24

## 2024-05-24 RX ORDER — POTASSIUM CHLORIDE 20 MEQ/1
20 TABLET, EXTENDED RELEASE ORAL ONCE
Status: COMPLETED | OUTPATIENT
Start: 2024-05-24 | End: 2024-05-24

## 2024-05-24 RX ORDER — ACETAMINOPHEN 325 MG/1
650 TABLET ORAL EVERY 8 HOURS PRN
Status: DISCONTINUED | OUTPATIENT
Start: 2024-05-24 | End: 2024-05-31 | Stop reason: HOSPADM

## 2024-05-24 RX ORDER — POTASSIUM CHLORIDE 14.9 MG/ML
20 INJECTION INTRAVENOUS ONCE
Status: COMPLETED | OUTPATIENT
Start: 2024-05-24 | End: 2024-05-24

## 2024-05-24 RX ORDER — POTASSIUM CHLORIDE 1.5 G/1.58G
40 POWDER, FOR SOLUTION ORAL ONCE
Status: COMPLETED | OUTPATIENT
Start: 2024-05-24 | End: 2024-05-24

## 2024-05-24 RX ORDER — FUROSEMIDE 10 MG/ML
40 INJECTION INTRAMUSCULAR; INTRAVENOUS ONCE
Status: COMPLETED | OUTPATIENT
Start: 2024-05-24 | End: 2024-05-24

## 2024-05-24 RX ADMIN — PHYTONADIONE 10 MG: 10 INJECTION, EMULSION INTRAMUSCULAR; INTRAVENOUS; SUBCUTANEOUS at 09:10

## 2024-05-24 RX ADMIN — SENNOSIDES AND DOCUSATE SODIUM 2 TABLET: 50; 8.6 TABLET ORAL at 20:10

## 2024-05-24 RX ADMIN — ATORVASTATIN CALCIUM 80 MG: 80 TABLET, FILM COATED ORAL at 20:10

## 2024-05-24 RX ADMIN — METOPROLOL TARTRATE 25 MG: 25 TABLET, FILM COATED ORAL at 20:10

## 2024-05-24 RX ADMIN — POTASSIUM CHLORIDE 20 MEQ: 1500 TABLET, EXTENDED RELEASE ORAL at 09:09

## 2024-05-24 RX ADMIN — POTASSIUM CHLORIDE 20 MEQ: 14.9 INJECTION, SOLUTION INTRAVENOUS at 20:10

## 2024-05-24 RX ADMIN — SENNOSIDES AND DOCUSATE SODIUM 2 TABLET: 50; 8.6 TABLET ORAL at 09:09

## 2024-05-24 RX ADMIN — OXYCODONE HYDROCHLORIDE 5 MG: 5 TABLET ORAL at 22:15

## 2024-05-24 RX ADMIN — FUROSEMIDE 40 MG: 10 INJECTION, SOLUTION INTRAMUSCULAR; INTRAVENOUS at 17:42

## 2024-05-24 RX ADMIN — OXYCODONE HYDROCHLORIDE 5 MG: 5 TABLET ORAL at 13:10

## 2024-05-24 RX ADMIN — POTASSIUM CHLORIDE 20 MEQ: 14.9 INJECTION, SOLUTION INTRAVENOUS at 05:28

## 2024-05-24 RX ADMIN — METOPROLOL TARTRATE 25 MG: 25 TABLET, FILM COATED ORAL at 09:09

## 2024-05-24 RX ADMIN — FUROSEMIDE 10 MG: 10 INJECTION, SOLUTION INTRAMUSCULAR; INTRAVENOUS at 09:09

## 2024-05-24 RX ADMIN — GABAPENTIN 100 MG: 100 CAPSULE ORAL at 09:09

## 2024-05-24 RX ADMIN — PANTOPRAZOLE SODIUM 40 MG: 40 INJECTION, POWDER, FOR SOLUTION INTRAVENOUS at 09:09

## 2024-05-24 RX ADMIN — POTASSIUM CHLORIDE 20 MEQ: 14.9 INJECTION, SOLUTION INTRAVENOUS at 17:41

## 2024-05-24 RX ADMIN — POTASSIUM CHLORIDE 40 MEQ: 1.5 POWDER, FOR SOLUTION ORAL at 17:41

## 2024-05-24 RX ADMIN — GABAPENTIN 100 MG: 100 CAPSULE ORAL at 20:10

## 2024-05-24 ASSESSMENT — COGNITIVE AND FUNCTIONAL STATUS - GENERAL
STANDING UP FROM CHAIR USING ARMS: A LOT
TURNING FROM BACK TO SIDE WHILE IN FLAT BAD: A LOT
MOBILITY SCORE: 10
WALKING IN HOSPITAL ROOM: TOTAL
MOVING FROM LYING ON BACK TO SITTING ON SIDE OF FLAT BED WITH BEDRAILS: A LOT
CLIMB 3 TO 5 STEPS WITH RAILING: TOTAL
MOVING TO AND FROM BED TO CHAIR: A LOT

## 2024-05-24 ASSESSMENT — PAIN SCALES - GENERAL
PAINLEVEL_OUTOF10: 3
PAINLEVEL_OUTOF10: 0 - NO PAIN
PAINLEVEL_OUTOF10: 0 - NO PAIN
PAINLEVEL_OUTOF10: 4
PAINLEVEL_OUTOF10: 3
PAINLEVEL_OUTOF10: 0 - NO PAIN
PAINLEVEL_OUTOF10: 6
PAINLEVEL_OUTOF10: 6

## 2024-05-24 ASSESSMENT — PAIN - FUNCTIONAL ASSESSMENT
PAIN_FUNCTIONAL_ASSESSMENT: 0-10

## 2024-05-24 NOTE — CONSULTS
Wound Care Consult     Visit Date: 5/24/2024      Patient Name: Klaudia Ratliff         MRN: [id number]           YOB: 1955     Reason for Consult: Potential DTI on the sacrum        Wound Assessment:    Wound 05/23/24 Other (comment) Sacrum (Active)   Wound Image   05/24/24 1048   Site Assessment Purple;Fragile 05/24/24 1048   Roxanna-Wound Assessment Blanchable erythema 05/24/24 1048   Non-staged Wound Description Not applicable 05/24/24 1048   Shape Irregular 05/24/24 1048   Wound Length (cm) 4.5 cm 05/24/24 1048   Wound Width (cm) 6.5 cm 05/24/24 1048   Wound Surface Area (cm^2) 29.25 cm^2 05/24/24 1048   Wound Depth (cm) 0 cm 05/24/24 1048   Wound Volume (cm^3) 0 cm^3 05/24/24 1048   State of Healing Non-healing 05/24/24 1048   Margins Poorly defined 05/24/24 1048   Drainage Description None 05/24/24 1048   Drainage Amount None 05/24/24 1048   Dressing Non adherent;Silicone border dressing 05/24/24 1048   Dressing Changed New 05/24/24 1048   Dressing Status Clean;Dry 05/24/24 1048       Wound Team Summary Assessment: The wound care team came to bedside to assess the patient's sacrum for a potential DTI on the sacrum.  The wound is deep purple but intact at this time.  The wound was cleansed with normal saline and pat dry with a cover sponge.  3M cavilon barrier film was applied the DTI and covered with a strip of adaptic and sacral border dressing.      Wound Team Plan: Follow up in 5-7 days for all wounds.  Recommendations: Daily   Cleanse with normal saline and pat dry with a cover sponge  Apply a strip of adaptic over the DTI and cover with a sacrum border dressing.    Please turn the Patient every 2 hours utilize Air mattress, Wedges, TAP system, Boots. MAIN goal is to offload the sacral wound as often as possible.      SANDRO Mayo  5/24/2024  11:56 AM

## 2024-05-24 NOTE — PROGRESS NOTES
"Klaudia Ratliff is a 68 y.o. female on day 8 of admission presenting with Aortic occlusion (CMS-[facility]).    68 y.o. female with past medical history of HTN, HLD, obesity, COPD, gout, RLE DVT, right PE s/p mechanical thrombectomy and IVC filter placement (2/22/2024) discharged on eliquis and bridged to Lovenox (5/13), and aortic occlusion. Patient initially presented to SICU s/p aortofem bypass via thoracoabdominal approach. Discharged to floor on 5/22 and returned to ICU on 5/23 for acute hypotension likely related to retroperitoneal bleeding.     Subjective   No acute events overnight. Feels great today, looking forward to eating. Denies pain. No CP or SOB. No nausea or vomiting. No fevers or chills.        Objective     Physical Exam  Constitutional:       General: She is not in acute distress.     Appearance: Normal appearance. She is not toxic-appearing.   HENT:      Head: Normocephalic and atraumatic.      Mouth/Throat:      Mouth: Mucous membranes are moist.   Eyes:      General: Scleral icterus present.   Cardiovascular:      Rate and Rhythm: Normal rate and regular rhythm.   Pulmonary:      Effort: Pulmonary effort is normal.   Abdominal:      Palpations: Abdomen is soft.      Tenderness: There is no guarding or rebound.      Comments: Abdominal binder in place   Genitourinary:     Comments: Kenney in place draining dark yellow urine  Musculoskeletal:      Right lower leg: No edema.      Left lower leg: No edema.      Comments: L foot with mepilex dressing. R foot with bandage   Skin:     General: Skin is warm and dry.   Neurological:      General: No focal deficit present.      Mental Status: She is alert and oriented to person, place, and time.   Psychiatric:         Behavior: Behavior normal.         Last Recorded Vitals  Blood pressure 120/64, pulse 80, temperature 36 °C (96.8 °F), temperature source Temporal, resp. rate 24, height 1.575 m (5' 2\"), weight 92.5 kg (203 lb 14.8 oz), SpO2 93%.  Intake/Output " last 3 Shifts:  I/O last 3 completed shifts:  In: 50 (0.5 mL/kg) [IV Piggyback:50]  Out: 2580 (27.9 mL/kg) [Urine:2580 (0.8 mL/kg/hr)]  Weight: 92.5 kg     Relevant Results  Scheduled medications  [Held by provider] aspirin, 81 mg, oral, Daily  atorvastatin, 80 mg, oral, Nightly  gabapentin, 100 mg, oral, BID  insulin lispro, 0-5 Units, subcutaneous, 4x daily  metoprolol tartrate, 25 mg, oral, BID  pantoprazole, 40 mg, intravenous, Daily  phytonadione, 10 mg, intravenous, Daily  potassium chloride, 20 mEq, intravenous, Once  sennosides-docusate sodium, 2 tablet, oral, BID  tiotropium, 2 puff, inhalation, Daily      Continuous medications  [Held by provider] heparin, 0-4,000 Units/hr, Last Rate: Stopped (05/22/24 0836)      PRN medications  PRN medications: [Held by provider] acetaminophen, bisacodyl, dextrose **OR** [DISCONTINUED] glucagon, [Held by provider] hydrALAZINE, HYDROmorphone, HYDROmorphone, [Held by provider] labetaloL, [Held by provider] oxyCODONE, [Held by provider] oxyCODONE, oxygen, sodium chloride    Results for orders placed or performed during the hospital encounter of 05/16/24 (from the past 24 hour(s))   POCT GLUCOSE   Result Value Ref Range    POCT Glucose 102 (H) 74 - 99 mg/dL   POCT GLUCOSE   Result Value Ref Range    POCT Glucose 91 74 - 99 mg/dL   Heparin Assay, UFH   Result Value Ref Range    Heparin Unfractionated 0.1 See Comment Below for Therapeutic Ranges IU/mL   CBC   Result Value Ref Range    WBC 12.1 (H) 4.4 - 11.3 x10*3/uL    nRBC 0.0 0.0 - 0.0 /100 WBCs    RBC 3.79 (L) 4.00 - 5.20 x10*6/uL    Hemoglobin 11.3 (L) 12.0 - 16.0 g/dL    Hematocrit 33.8 (L) 36.0 - 46.0 %    MCV 89 80 - 100 fL    MCH 29.8 26.0 - 34.0 pg    MCHC 33.4 32.0 - 36.0 g/dL    RDW 16.0 (H) 11.5 - 14.5 %    Platelets 147 (L) 150 - 450 x10*3/uL   Coagulation Screen   Result Value Ref Range    Protime 15.2 (H) 9.8 - 12.8 seconds    INR 1.3 (H) 0.9 - 1.1    aPTT 26 (L) 27 - 38 seconds   POCT GLUCOSE   Result Value  Ref Range    POCT Glucose 90 74 - 99 mg/dL   CBC   Result Value Ref Range    WBC 12.0 (H) 4.4 - 11.3 x10*3/uL    nRBC 0.0 0.0 - 0.0 /100 WBCs    RBC 3.52 (L) 4.00 - 5.20 x10*6/uL    Hemoglobin 11.0 (L) 12.0 - 16.0 g/dL    Hematocrit 30.5 (L) 36.0 - 46.0 %    MCV 87 80 - 100 fL    MCH 31.3 26.0 - 34.0 pg    MCHC 36.1 (H) 32.0 - 36.0 g/dL    RDW 16.2 (H) 11.5 - 14.5 %    Platelets 152 150 - 450 x10*3/uL   Renal Function Panel   Result Value Ref Range    Glucose 83 74 - 99 mg/dL    Sodium 136 136 - 145 mmol/L    Potassium 3.7 3.5 - 5.3 mmol/L    Chloride 101 98 - 107 mmol/L    Bicarbonate 24 21 - 32 mmol/L    Anion Gap 15 10 - 20 mmol/L    Urea Nitrogen 46 (H) 6 - 23 mg/dL    Creatinine 1.38 (H) 0.50 - 1.05 mg/dL    eGFR 42 (L) >60 mL/min/1.73m*2    Calcium 8.3 (L) 8.6 - 10.6 mg/dL    Phosphorus 4.1 2.5 - 4.9 mg/dL    Albumin 3.3 (L) 3.4 - 5.0 g/dL   POCT GLUCOSE   Result Value Ref Range    POCT Glucose 89 74 - 99 mg/dL   POCT GLUCOSE   Result Value Ref Range    POCT Glucose 94 74 - 99 mg/dL   Calcium, Ionized   Result Value Ref Range    POCT Calcium, Ionized 1.14 1.1 - 1.33 mmol/L   CBC   Result Value Ref Range    WBC 12.5 (H) 4.4 - 11.3 x10*3/uL    nRBC 0.0 0.0 - 0.0 /100 WBCs    RBC 3.60 (L) 4.00 - 5.20 x10*6/uL    Hemoglobin 11.3 (L) 12.0 - 16.0 g/dL    Hematocrit 32.3 (L) 36.0 - 46.0 %    MCV 90 80 - 100 fL    MCH 31.4 26.0 - 34.0 pg    MCHC 35.0 32.0 - 36.0 g/dL    RDW 16.4 (H) 11.5 - 14.5 %    Platelets 173 150 - 450 x10*3/uL   Coagulation Screen   Result Value Ref Range    Protime 14.2 (H) 9.8 - 12.8 seconds    INR 1.3 (H) 0.9 - 1.1    aPTT 25 (L) 27 - 38 seconds   Hepatic Function Panel   Result Value Ref Range    Albumin 3.2 (L) 3.4 - 5.0 g/dL    Bilirubin, Total 6.0 (H) 0.0 - 1.2 mg/dL    Bilirubin, Direct 3.5 (H) 0.0 - 0.3 mg/dL    Alkaline Phosphatase 138 (H) 33 - 136 U/L    ALT 41 7 - 45 U/L    AST 26 9 - 39 U/L    Total Protein 5.6 (L) 6.4 - 8.2 g/dL   Magnesium   Result Value Ref Range    Magnesium 2.10  1.60 - 2.40 mg/dL   Basic Metabolic Panel   Result Value Ref Range    Glucose 83 74 - 99 mg/dL    Sodium 138 136 - 145 mmol/L    Potassium 3.7 3.5 - 5.3 mmol/L    Chloride 102 98 - 107 mmol/L    Bicarbonate 23 21 - 32 mmol/L    Anion Gap 17 10 - 20 mmol/L    Urea Nitrogen 46 (H) 6 - 23 mg/dL    Creatinine 1.23 (H) 0.50 - 1.05 mg/dL    eGFR 48 (L) >60 mL/min/1.73m*2    Calcium 8.3 (L) 8.6 - 10.6 mg/dL   Phosphorus   Result Value Ref Range    Phosphorus 4.4 2.5 - 4.9 mg/dL   POCT GLUCOSE   Result Value Ref Range    POCT Glucose 86 74 - 99 mg/dL   Blood Gas Arterial Full Panel   Result Value Ref Range    POCT pH, Arterial 7.38 7.38 - 7.42 pH    POCT pCO2, Arterial 37 (L) 38 - 42 mm Hg    POCT pO2, Arterial 67 (L) 85 - 95 mm Hg    POCT SO2, Arterial 95 94 - 100 %    POCT Oxy Hemoglobin, Arterial 90.9 (L) 94.0 - 98.0 %    POCT Hematocrit Calculated, Arterial 35.0 (L) 36.0 - 46.0 %    POCT Sodium, Arterial 135 (L) 136 - 145 mmol/L    POCT Potassium, Arterial 3.6 3.5 - 5.3 mmol/L    POCT Chloride, Arterial 101 98 - 107 mmol/L    POCT Ionized Calcium, Arterial 1.19 1.10 - 1.33 mmol/L    POCT Glucose, Arterial 82 74 - 99 mg/dL    POCT Lactate, Arterial 0.7 0.4 - 2.0 mmol/L    POCT Base Excess, Arterial -2.8 (L) -2.0 - 3.0 mmol/L    POCT HCO3 Calculated, Arterial 21.9 (L) 22.0 - 26.0 mmol/L    POCT Hemoglobin, Arterial 11.5 (L) 12.0 - 16.0 g/dL    POCT Anion Gap, Arterial 16 10 - 25 mmo/L    Patient Temperature 37.0 degrees Celsius    FiO2 21 %       Assessment/Plan   Principal Problem:    Aortic occlusion (CMS-HCC)  Active Problems:    Aortoiliac occlusive disease (Multi)    Klaudia Ratliff is a 68 y.o. female with past medical history of HTN, HLD, obesity, COPD, gout, RLE DVT, right PE s/p mechanical thrombectomy and IVC filter placement (2/22/2024) discharged on eliquis and bridged to Lovenox (5/13), and aortic occlusion. Patient initially presented to SICU s/p aortofem bypass via thoracoabdominal approach on 5/16.   Discharged to floor yesterday. Now representing to SICU for acute hypotension. CTA C/A/P s/f L flank retroperitoneal bleed. Transfused with 4u PRBC and 4u FFP, abdominal binder applied. Post transfusion, slowly incremented and remained normotensive.     Plan:  NEURO: PMH of sciatica, on gabapentin. A+Ox3, no focal deficits. Pain well controlled. No neuro issues at this time.  - acetaminophen and oxycodone   - home gabapentin - resume  - q4hr neuro/neurovasc/pain assessments  - PT/OT following  - Home meds; Gabapentin 100 mg BID      CV: History of HTN, HLD, obesity, RLE DVT, Right PE (s/p mechanical thrombectomy 2/24), and aortic occlusion. Baseline BP (pre-op) 161/76. Baseline cardiac function normal EF (60-65%), elevated LA pressure (2/2/24). Carotid Duplex < 50% ICA stenosis b/l. S/p aortofem bypass 5/16. Now w/ acute hypotension (70s/50s) 2/2 retroperitoneal bleed.  Received 500mL bolus on floor. 2u PRBC and 1u FFP ordered by vascular team. No current plan to RTOR.   - continuous ekg/abp monitoring  - restart home metoprolol and atorvastatin   - hold ASA  - home meds: Eliquis 5 mg BID (held since 5/12, bridged to lovenox 5/13), ASA 81 mg, Atenolol 50 mg, atorvastatin 80 mg, losartan 100 mg, Vitamin C 250 mg     PULM: History of COPD, pulmonary embolism (2/2/24) s/p mechanical thrombectomy and IVC filter placement. No diaphragm pacing wires placed intra-op.   - wean FiO2 for goal SpO2 >94%  - Q1h IS while awake  - additional bronchial hygiene as indicated  - Daily CXR: L pleural effusion, RIJ line in place.   - cont tiotropium  - Home meds: Tiotropium 2.5 mcg 2 puffs daily.     GI: History of gastric ulcer (3/2024), PPI every day recommended. Supraceliac clamp x27 minutes. Acute hyperbilirubinemia improving. RUQ US 5/19: Hepatic steatosis without focal lesions. Otherwise, unremarkable.   - Okay for regular diet  - continue to trend LFTs daily  - PPI for GI prophylaxis/recent gastric ulcer  - bowel regimen  -  Hepatology following; Inc in LFTs likely multifactorial, potentially ICU jaundice. If LFTs cont to rise, can hold gabapentin and/or statin  - T bili and direct bili continuing to increment; AST/ALT down    : Baseline cr 0.9-1.1. Left renal ischemic time 47 minutes. ELROY improving, adequate UOP.    - q12h and prn RFP  - Goal U/O >1-2ml/kg/hr.    - Replete electrolytes to goal K>4, Mg>2, Phos>2.5, ionized Ca>1.10 if creatinine remains WNL or <1.5 with adequate uop.  - Home meds: Lasix 40 mg PRN, Potassium chloride 10 mEq ER     HEME: Recent PE s/p mechanical thrombectomy and IVC filter placement 2/2024. Bridged from eliquis to lovenox 5/13/24. Acute surgical blood loss anemia, acute post op thrombocytopenia improving. 5/18 Anti PF4 Neg. Vasc lab US BLE 5/20 prelim read: Acute non-occlusive deep vein thrombosis distal external iliac, common femoral vein, femoral vein proximal thru mid, and profunda vein. Now w/ retroperitoneal bleed, appears to have stabilized s/p abdominal binder and transfusion. S/p 4 PRBC and 4u FFP.   - Daily CBC, prn coags  - scds for dvt prophylaxis.  - hold asa   - hold heparin ggt   - Abdominal binder for bleed; can remove  - Vit K x3 days   - Will assess ongoing transfusion needs as required  - Home meds: Lovenox 80 mg q12h     ENDO: No history of DM or hypothyroidism. A1c 5.3%. TSH 1.86. Adequate glycemic control, currently on lispro SSI #1.   - Q6h BG  - Cont SSI 1     ID: Recent E.coli UTI 5/3, completed 3 day course Augmentin. Afebrile. Mild leukocytosis. No signs of infection at this time. Completed course of cefoxitin with chest tube removal.   - trend wbc daily  - monitor for s/s infection     Skin: R shin and R heel bullae   - continue preventative mepilex to heals/sacrum  - turns q2h  - OOB daily as able  - weekly skin rounds     Lines:   - PIVs  - MAC line (remove today)  - A line (remove today)  - Kenney      Dispo: Transfer to vascular stepdown    Code status addressed/up to date.      Patient seen and discussed with Dr. Carter Sifuentes, [facility] [id number]

## 2024-05-24 NOTE — PROGRESS NOTES
Physical Therapy    Physical Therapy Re-Evaluation and Treatment    Patient Name: Klaudia Ratliff  MRN: [id number]  Today's Date: 5/24/2024  Time Calculation  Start Time: 1018  Stop Time: 1054  Time Calculation (min): 36 min    Assessment/Plan   PT Assessment  End of Session Communication: Bedside nurse  Assessment Comment: Pt with increased drainage from L chest tube site upon sitting EOB so pt returned to supine.  Strength testing completed in supine with decreased knee extension strength and decreased hip flexion strength.  Pt unable to attempt standing this session due to wound on foot.  May need consult for weight-bearing orders for R foot due to wound.  Pt completed bed level ther ex with difficulty with heel slides due to weakness.  Pt continues to benefit from skilled PT and remains appropriate for moderate intensity PT upon discharge.  End of Session Patient Position: Bed, 3 rail up, Alarm off, not on at start of session (wound care in room)  PT Plan  Inpatient/Swing Bed or Outpatient: Inpatient  PT Plan  Treatment/Interventions: Bed mobility, Transfer training, Gait training, Balance training, Neuromuscular re-education, Strengthening, Endurance training, Therapeutic exercise, Therapeutic activity  PT Plan: Skilled PT  PT Frequency: 3 times per week  PT Discharge Recommendations: Moderate intensity level of continued care  Equipment Recommended upon Discharge:  (TBD at next level of care)  PT Recommended Transfer Status: Assist x1, Assistive device  PT - OK to Discharge: Yes (eval complete and discharge recommendations made)      General Visit Information:   PT  Visit  PT Received On: 05/24/24  General  Reason for Referral: Perivisceral aortic endarterectomy, aortobifemoral bypass graft, left renal bypass, and right femoral endarterectomy. Transferred back to ICU due to hypotension 2/2 retroperitoneal hemorrhage  Past Medical History Relevant to Rehab: HTN, HLD, obesity, COPD, gout, RLE DVT, right PE s/p  "mechanical thrombectomy and IVC filter placement (2/22/2024)  Prior to Session Communication: Bedside nurse  Patient Position Received: Bed, 3 rail up, Alarm off, not on at start of session  General Comment: Pt cleared for PT by RN. Pt alert and agreeable to sitting EOB and ther ex with PT. Requesting to not stand due to foot wound. May need consult for foot wound to determine WB status, RN notified. +PIVx2, tele, BP cuff, pulse ox, fox    Subjective   Precautions:  Precautions  Medical Precautions: Fall precautions, Cardiac precautions, Abdominal precautions  Post-Surgical Precautions: Abdominal surgery precautions  Vital Signs:  Vital Signs  Heart Rate:  (pre:76, post:76)  Heart Rate Source: Monitor  Resp:  (pre:27, post:17)  SpO2: 94 %  BP:  (pre:145/77(95), post:140/79(96))  BP Location: Right arm  BP Method: Automatic    Objective   Pain:  Pain Assessment  Pain Assessment: 0-10  Pain Score: 4  Pain Type: Acute pain, Surgical pain  Pain Location:  (R foot and abdomen)  Pain Interventions: Repositioned, Rest  Response to Interventions: pain decreased sitting EOB  Cognition:  Cognition  Overall Cognitive Status: Within Functional Limits  Coordination:  Movements are Fluid and Coordinated: Yes  Postural Control:  Postural Control  Postural Control: Within Functional Limits (rounded shoulders)  Static Sitting Balance  Static Sitting-Balance Support: Bilateral upper extremity supported, Feet unsupported  Static Sitting-Level of Assistance: Close supervision  Activity Tolerance:  Activity Tolerance  Endurance: Tolerates 10 - 20 min exercise with multiple rests  Treatments:  Therapeutic Exercise  Therapeutic Exercise Performed: Yes  Therapeutic Exercise Activity 1: ankle pumps, heel slides, quad sets x3\" hold, x10 each LE    Therapeutic Activity  Therapeutic Activity Performed: Yes  Therapeutic Activity 1: Bed mobility, vital sign monitoring, patient education    Bed Mobility  Bed Mobility: Yes  Bed Mobility 1  Bed " Mobility 1: Supine to sitting  Level of Assistance 1: Moderate assistance, Maximum assistance  Bed Mobility Comments 1: HOB maximally elevated  Bed Mobility 2  Bed Mobility  2: Sitting to supine  Level of Assistance 2: Moderate assistance (LE management)  Bed Mobility 3  Bed Mobility 3: Rolling right  Level of Assistance 3: Moderate assistance  Bed Mobility Comments 3: 2x throughout session    Ambulation/Gait Training  Ambulation/Gait Training Performed: No  Transfers  Transfer: No    Outcome Measures:  [facility] Basic Mobility  Turning from your back to your side while in a flat bed without using bedrails: A lot  Moving from lying on your back to sitting on the side of a flat bed without using bedrails: A lot  Moving to and from bed to chair (including a wheelchair): A lot  Standing up from a chair using your arms (e.g. wheelchair or bedside chair): A lot  To walk in hospital room: Total  Climbing 3-5 steps with railing: Total  Basic Mobility - Total Score: 10    FSS-ICU  Ambulation: Unable to attempt due to weakness  Rolling: Moderate assistance (performs 50 - 74% of task)  Sitting: Supervision or set-up only  Transfer Sit-to-Stand: Unable to perform  Transfer Supine-to-Sit: Moderate assistance (performs 50 - 74% of task)  Total Score: 11  ICU Mobility Screen  Early Mobility/Exercise Safety Screen: Proceed with mobilization - No exclusion criteria met    Education Documentation  Home Exercise Program, taught by Alpa RAGSDALE PT at 5/24/2024 12:12 PM.  Learner: Patient  Readiness: Acceptance  Method: Explanation  Response: Verbalizes Understanding  Comment: Abdominal precautions    Precautions, taught by Alpa RAGSDALE PT at 5/24/2024 12:12 PM.  Learner: Patient  Readiness: Acceptance  Method: Explanation  Response: Verbalizes Understanding  Comment: Abdominal precautions    Mobility Training, taught by Alpa RAGSDALE PT at 5/24/2024 12:12 PM.  Learner: Patient  Readiness: Acceptance  Method:  Explanation  Response: Verbalizes Understanding  Comment: Abdominal precautions    Education Comments  No comments found.        Encounter Problems       Encounter Problems (Active)       Balance       Pt will demonstrate ability to complete sitting/standing static/dynamic balance activities with unilateral UE support and no LOB for increase in safety up D/C.  (Progressing)       Start:  05/17/24    Expected End:  05/31/24               Mobility       Pt will demonstrated ability to ambulate >/=50ft with proper form, LRAD and no balance deficits for safe DC.   (Progressing)       Start:  05/17/24    Expected End:  05/31/24            Pt will demonstrate ability to complete ther ex activities to improve functional strength for increase in independence upon DC.  (Progressing)       Start:  05/17/24    Expected End:  05/31/24               PT Transfers       Pt will demonstrated ability to complete bed mobility and sit<>stand transfers without assistance and use of assistive device to safely DC. (Progressing)       Start:  05/17/24    Expected End:  05/31/24

## 2024-05-24 NOTE — PROGRESS NOTES
PLAN: OK for [facility]     PAYOR: [facility]    DISPO: SNF-accepted at [facility]  -will need precert  -updates sent 05/24    SUPPORT/CONTACT: Kai [phone]

## 2024-05-24 NOTE — PROGRESS NOTES
"[facility]  [facility]  CONSULT FOLLOW-UP     Reason For Consult: hyperbilirubinemia and elevated transaminases     History of Present Illness  Klaudia Ratliff is a 68 y.o. female with a past medical history of HTN/ HLD and DVT/PE s/p thrombectomy and IVC filter placement admitted on 5/16/2024 for perivisceral aortic endarterectomy and aortobifemoral bypass.  Hepatology is consulted for elevated transaminases and hyperbilirubinemia.     SUBJECTIVE     Patient feeling well today.    EXAM     Last Recorded Vitals  Blood pressure 143/77, pulse 71, temperature 36 °C (96.8 °F), temperature source Temporal, resp. rate 22, height 1.575 m (5' 2\"), weight 92.5 kg (203 lb 14.8 oz), SpO2 94%.      Intake/Output Summary (Last 24 hours) at 5/24/2024 1148  Last data filed at 5/24/2024 1100  Gross per 24 hour   Intake 441 ml   Output 1810 ml   Net -1369 ml          Physical Exam  General: well-nourished, no acute distress, jaundice  HEENT: EOMI. Moist mucosa, scleral icterus  Respiratory: nonlabored breathing on room air  Cardiovascular: RRR, no murmurs/rubs/gallops  Abdomen: Soft, nontender, nondistended, bowel sounds present. No masses palpated  Extremities: no edema, no asterixis  Neuro: alert and oriented, CNII-XII grossly intact, moves all 4 extremities with no focal deficits      OBJECTIVE                                                                              Medications             Current Facility-Administered Medications:     [Held by provider] acetaminophen (Tylenol) tablet 650 mg, 650 mg, oral, q4h PRN, Devin Barros DO    [Held by provider] aspirin chewable tablet 81 mg, 81 mg, oral, Daily, Dayton Ureña, DO, 81 mg at 05/22/24 0911    atorvastatin (Lipitor) tablet 80 mg, 80 mg, oral, Nightly, Archana Sifuentes, DO, 80 mg at 05/21/24 2136    bisacodyl (Dulcolax) suppository 10 mg, 10 mg, rectal, Daily PRN, eDvin Barros DO    dextrose 50 % injection 25 " g, 25 g, intravenous, q15 min PRN **OR** [DISCONTINUED] glucagon (Glucagen) injection 1 mg, 1 mg, intramuscular, q15 min PRN, Spenser Diamond DO    gabapentin (Neurontin) capsule 100 mg, 100 mg, oral, BID, Archana Sifuentse DO, 100 mg at 05/24/24 0909    [Held by provider] heparin 25,000 Units in dextrose 5% 250 mL (100 Units/mL) infusion (premix), 0-4,000 Units/hr, intravenous, Continuous, Dayton Ureña DO, Stopped at 05/22/24 0836    [Held by provider] hydrALAZINE (Apresoline) injection 5 mg, 5 mg, intravenous, q4h PRN, Devin Barros DO    insulin lispro (HumaLOG) injection 0-5 Units, 0-5 Units, subcutaneous, 4x daily, Devin Barros DO, 1 Units at 05/20/24 2025    [Held by provider] labetaloL (Normodyne,Trandate) injection 20 mg, 20 mg, intravenous, q4h PRN, Devin Barros DO, 20 mg at 05/20/24 0603    metoprolol tartrate (Lopressor) tablet 25 mg, 25 mg, oral, BID, Archana Sifuentes, DO, 25 mg at 05/24/24 0909    oxyCODONE (Roxicodone) immediate release tablet 10 mg, 10 mg, oral, q4h PRN, Archana Sifuentes, DO, 10 mg at 05/21/24 1538    oxyCODONE (Roxicodone) immediate release tablet 5 mg, 5 mg, oral, q4h PRN, Archana Sifuentes, DO, 5 mg at 05/22/24 1702    oxygen (O2) therapy, , inhalation, Continuous PRN - O2/gases, Devin Barros DO    pantoprazole (ProtoNix) injection 40 mg, 40 mg, intravenous, Daily, Archana Sifuentes DO, 40 mg at 05/24/24 0909    phytonadione (Vitamin K) 10 mg in dextrose 5% 50 mL IV, 10 mg, intravenous, Daily, Archana Sifuentes DO, Stopped at 05/24/24 0940    potassium chloride 20 mEq in 100 mL IV premix, 20 mEq, intravenous, Once, Devin Barros DO    sennosides-docusate sodium (Roxanna-Colace) 8.6-50 mg per tablet 2 tablet, 2 tablet, oral, BID, Archana Sifuentes DO, 2 tablet at 05/24/24 0909    sodium chloride (Ocean) 0.65 % nasal spray 1 spray, 1 spray, Each Nostril, 4x daily PRN, Devin Barros DO, 1 spray at 05/20/24 2036    tiotropium (Spiriva  Respimat) 2.5 mcg/actuation inhaler 2 puff, 2 puff, inhalation, Daily, Devin Barros, DO, 2 puff at 05/21/24 0815                                                                            Labs     Results for orders placed or performed during the hospital encounter of 05/16/24 (from the past 24 hour(s))   Heparin Assay, UFH   Result Value Ref Range    Heparin Unfractionated 0.1 See Comment Below for Therapeutic Ranges IU/mL   CBC   Result Value Ref Range    WBC 12.1 (H) 4.4 - 11.3 x10*3/uL    nRBC 0.0 0.0 - 0.0 /100 WBCs    RBC 3.79 (L) 4.00 - 5.20 x10*6/uL    Hemoglobin 11.3 (L) 12.0 - 16.0 g/dL    Hematocrit 33.8 (L) 36.0 - 46.0 %    MCV 89 80 - 100 fL    MCH 29.8 26.0 - 34.0 pg    MCHC 33.4 32.0 - 36.0 g/dL    RDW 16.0 (H) 11.5 - 14.5 %    Platelets 147 (L) 150 - 450 x10*3/uL   Coagulation Screen   Result Value Ref Range    Protime 15.2 (H) 9.8 - 12.8 seconds    INR 1.3 (H) 0.9 - 1.1    aPTT 26 (L) 27 - 38 seconds   POCT GLUCOSE   Result Value Ref Range    POCT Glucose 90 74 - 99 mg/dL   CBC   Result Value Ref Range    WBC 12.0 (H) 4.4 - 11.3 x10*3/uL    nRBC 0.0 0.0 - 0.0 /100 WBCs    RBC 3.52 (L) 4.00 - 5.20 x10*6/uL    Hemoglobin 11.0 (L) 12.0 - 16.0 g/dL    Hematocrit 30.5 (L) 36.0 - 46.0 %    MCV 87 80 - 100 fL    MCH 31.3 26.0 - 34.0 pg    MCHC 36.1 (H) 32.0 - 36.0 g/dL    RDW 16.2 (H) 11.5 - 14.5 %    Platelets 152 150 - 450 x10*3/uL   Renal Function Panel   Result Value Ref Range    Glucose 83 74 - 99 mg/dL    Sodium 136 136 - 145 mmol/L    Potassium 3.7 3.5 - 5.3 mmol/L    Chloride 101 98 - 107 mmol/L    Bicarbonate 24 21 - 32 mmol/L    Anion Gap 15 10 - 20 mmol/L    Urea Nitrogen 46 (H) 6 - 23 mg/dL    Creatinine 1.38 (H) 0.50 - 1.05 mg/dL    eGFR 42 (L) >60 mL/min/1.73m*2    Calcium 8.3 (L) 8.6 - 10.6 mg/dL    Phosphorus 4.1 2.5 - 4.9 mg/dL    Albumin 3.3 (L) 3.4 - 5.0 g/dL   POCT GLUCOSE   Result Value Ref Range    POCT Glucose 89 74 - 99 mg/dL   POCT GLUCOSE   Result Value Ref Range    POCT  Glucose 94 74 - 99 mg/dL   Calcium, Ionized   Result Value Ref Range    POCT Calcium, Ionized 1.14 1.1 - 1.33 mmol/L   CBC   Result Value Ref Range    WBC 12.5 (H) 4.4 - 11.3 x10*3/uL    nRBC 0.0 0.0 - 0.0 /100 WBCs    RBC 3.60 (L) 4.00 - 5.20 x10*6/uL    Hemoglobin 11.3 (L) 12.0 - 16.0 g/dL    Hematocrit 32.3 (L) 36.0 - 46.0 %    MCV 90 80 - 100 fL    MCH 31.4 26.0 - 34.0 pg    MCHC 35.0 32.0 - 36.0 g/dL    RDW 16.4 (H) 11.5 - 14.5 %    Platelets 173 150 - 450 x10*3/uL   Coagulation Screen   Result Value Ref Range    Protime 14.2 (H) 9.8 - 12.8 seconds    INR 1.3 (H) 0.9 - 1.1    aPTT 25 (L) 27 - 38 seconds   Hepatic Function Panel   Result Value Ref Range    Albumin 3.2 (L) 3.4 - 5.0 g/dL    Bilirubin, Total 6.0 (H) 0.0 - 1.2 mg/dL    Bilirubin, Direct 3.5 (H) 0.0 - 0.3 mg/dL    Alkaline Phosphatase 138 (H) 33 - 136 U/L    ALT 41 7 - 45 U/L    AST 26 9 - 39 U/L    Total Protein 5.6 (L) 6.4 - 8.2 g/dL   Magnesium   Result Value Ref Range    Magnesium 2.10 1.60 - 2.40 mg/dL   Basic Metabolic Panel   Result Value Ref Range    Glucose 83 74 - 99 mg/dL    Sodium 138 136 - 145 mmol/L    Potassium 3.7 3.5 - 5.3 mmol/L    Chloride 102 98 - 107 mmol/L    Bicarbonate 23 21 - 32 mmol/L    Anion Gap 17 10 - 20 mmol/L    Urea Nitrogen 46 (H) 6 - 23 mg/dL    Creatinine 1.23 (H) 0.50 - 1.05 mg/dL    eGFR 48 (L) >60 mL/min/1.73m*2    Calcium 8.3 (L) 8.6 - 10.6 mg/dL   Phosphorus   Result Value Ref Range    Phosphorus 4.4 2.5 - 4.9 mg/dL   POCT GLUCOSE   Result Value Ref Range    POCT Glucose 86 74 - 99 mg/dL   Blood Gas Arterial Full Panel   Result Value Ref Range    POCT pH, Arterial 7.38 7.38 - 7.42 pH    POCT pCO2, Arterial 37 (L) 38 - 42 mm Hg    POCT pO2, Arterial 67 (L) 85 - 95 mm Hg    POCT SO2, Arterial 95 94 - 100 %    POCT Oxy Hemoglobin, Arterial 90.9 (L) 94.0 - 98.0 %    POCT Hematocrit Calculated, Arterial 35.0 (L) 36.0 - 46.0 %    POCT Sodium, Arterial 135 (L) 136 - 145 mmol/L    POCT Potassium, Arterial 3.6 3.5 -  5.3 mmol/L    POCT Chloride, Arterial 101 98 - 107 mmol/L    POCT Ionized Calcium, Arterial 1.19 1.10 - 1.33 mmol/L    POCT Glucose, Arterial 82 74 - 99 mg/dL    POCT Lactate, Arterial 0.7 0.4 - 2.0 mmol/L    POCT Base Excess, Arterial -2.8 (L) -2.0 - 3.0 mmol/L    POCT HCO3 Calculated, Arterial 21.9 (L) 22.0 - 26.0 mmol/L    POCT Hemoglobin, Arterial 11.5 (L) 12.0 - 16.0 g/dL    POCT Anion Gap, Arterial 16 10 - 25 mmo/L    Patient Temperature 37.0 degrees Celsius    FiO2 21 %   POCT GLUCOSE   Result Value Ref Range    POCT Glucose 87 74 - 99 mg/dL   POCT GLUCOSE   Result Value Ref Range    POCT Glucose 105 (H) 74 - 99 mg/dL       Imaging   RUQ US 5/19/24  IMPRESSION:  Hepatic steatosis without focal lesions. Otherwise, unremarkable right upper quadrant ultrasound.                                                                         GI Procedures     EGD March 2024  Impression  The esophagus appeared normal.  Single ulcer in the antrum with clean base (Ranjeet III)  Moderate abnormal mucosa in the antrum, consistent with gastritis; performed cold forceps biopsy to rule out H. pylori  The duodenal bulb, 1st part of the duodenum and 2nd part of the duodenum appeared normal      A. STOMACH ANTRUM BIOPSY:      -- Antral type gastric mucosa with chemical/reactive gastropathy.  --Fundic type gastric mucosa with features suggestive of proton pump inhibitor effect.  --Helicobacter pylori-like organisms are not identified.     Colonoscopy 2022  Impression:            - Diverticulosis in the entire examined colon.                         - Many diminutive polyps in the sigmoid colon.                          Biopsied.                         - The examination was otherwise normal.  Recommendations: repeat in 5 years    ASSESSMENT / PLAN     ASSESSMENT/PLAN:  Klaudia Ratliff is a 68 y.o. female presenting with claudication and is now s/p perivisceral aortic endarterectomy and aortobifemoral bypass.  Hepatology is  consulted for elevated transaminases and hyperbilirubinemia. Rise in bilirubin is possibly related to shearing post vascular surgery and should continue to improve.  The rise in transaminases is less clear. Ddx is ischemic hepatitis which is less likely because of degree of rise and timeline of increase vs medication induced (gabapentin or atorvastatin) vs ICU jaundice which is common in post cardiac/ vascular patients. Patient does have hepatic steatosis but this is not likely contributing to her acute presentation.    Hospitalization complicated by retroperitoneal bleed that requiring 4u pRBC and 4u FFP transfusion.    Bilirubin increased today which is expected in the setting of acute bleed and a major vascular surgery. We anticipate the bilirubin will continue to rise in this context. Transaminases are improved but they may rise transiently over the next few days in the setting of bleeding and intermittent hypoperfusion.     Recommendations:  -Continue to monitor hepatic function panel daily.  -If labs trending up, will need to consider holding atorvastatin and/ or gabapentin  -Outpatient risk factor modification for fatty liver disease: weight loss, low carb diet and exercise.    Patient was seen and discussed with Dr. Smith.    Recommendations communicated with the primary team at the bedside.  Thank you for the consultation. Hepatology will sign off.  - During weekday hours of 7am- 5pm, please do not hesitate to contact me on [facility] Chat or page [phone] if there are any further questions   - After hours, on weekends, and on holidays, please page the on-call GI fellow at [phone]. Thank you.       Lian Garrison MD  Gastroenterology and Hepatology Fellow  [facility]

## 2024-05-24 NOTE — PROGRESS NOTES
VASCULAR SURGERY PROGRESS NOTE  Subjective   Hgb and BP stable overnight  Denies any pain in her side or flank  Resting comfortably this AM    Objective   Vitals:  Heart Rate:  [71-87]   Temp:  [36 °C (96.8 °F)-36.9 °C (98.4 °F)]   Resp:  [18-29]   BP: (116-144)/(64-79)   Weight:  [92.5 kg (203 lb 14.8 oz)]   SpO2:  [92 %-98 %]     Exam:  Constitutional: No acute distress, resting comfortably  Neuro:  AOx3, grossly intact  CV: no tachycardia on bedside monitor  Pulm: non-labored on nasal cannula, Dressing overlying CT incision  GI: soft, appropriately tender, thoraco-RP incision covered with dressing  Skin: blistering of bilateral lower extremities from volume overload  Musculoskeletal: moving all extremities  Extremities: Palpable bilateral DPs, sensory and motor intact in bilateral lower extremities, bilateral groin incisions covered with dressing    Labs:  Results from last 7 days   Lab Units 05/24/24  0143 05/23/24  1825 05/23/24  1224   WBC AUTO x10*3/uL 12.5* 12.0* 12.1*   HEMOGLOBIN g/dL 11.3* 11.0* 11.3*   PLATELETS AUTO x10*3/uL 173 152 147*      Results from last 7 days   Lab Units 05/24/24  0143 05/23/24  1834 05/23/24  0310   SODIUM mmol/L 138 136 131*   POTASSIUM mmol/L 3.7 3.7 4.0   CHLORIDE mmol/L 102 101 97*   CO2 mmol/L 23 24 23   BUN mg/dL 46* 46* 50*   CREATININE mg/dL 1.23* 1.38* 1.54*   GLUCOSE mg/dL 83 83 107*   MAGNESIUM mg/dL 2.10  --  2.06   PHOSPHORUS mg/dL 4.4 4.1 4.5      Results from last 7 days   Lab Units 05/24/24  0143 05/23/24  1224 05/23/24  0310   INR  1.3* 1.3* 1.3*   PROTIME seconds 14.2* 15.2* 14.9*   APTT seconds 25* 26* 25*     Assessment/Plan   Klaudia Ratliff is 68 y.o. 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 Transferred back to ICU on 5/22 for hypotension secondary to slow, active extravasation in RP seen on  CTA    Plan:  - Okay for transfer out of ICU to LT7 SDU  - q2 neurovascular checks  - okay for abdominal binder to be removed  - Okay for regular diet  - Patient is 20lbs up from admission weight. 40 IV lasix chased with 40 K. Plan to reassess this afternoon will likely repeat 40 Lasix  - Continue to trend CBC  - Continue to hold anticoagulation  - Strict monitoring of I/Os  - Appreciate hepatology input regarding elevated liver enzymes/bili  - OOB/PT/OT  - pain control  - bronchopulmonary hygiene  - ASA held  - goal MAP >70 mmHg  - trend LFTs  - Maintain K >4, Mg >2  - maintain Hgb >8, PLT >100, INR <1.4  - SCDs    Seen and discussed with vascular fellow, Dr. Delgado  D/w attending, Dr. Willis Diamond,   Department of Surgery / IR Integrated PGY1  Vascular [id number]

## 2024-05-25 LAB
ALBUMIN SERPL BCP-MCNC: 2.9 G/DL (ref 3.4–5)
ALP SERPL-CCNC: 220 U/L (ref 33–136)
ALT SERPL W P-5'-P-CCNC: 72 U/L (ref 7–45)
ANION GAP SERPL CALC-SCNC: 15 MMOL/L (ref 10–20)
APTT PPP: 26 SECONDS (ref 27–38)
AST SERPL W P-5'-P-CCNC: 66 U/L (ref 9–39)
BILIRUB DIRECT SERPL-MCNC: 2.1 MG/DL (ref 0–0.3)
BILIRUB SERPL-MCNC: 4 MG/DL (ref 0–1.2)
BLOOD EXPIRATION DATE: NORMAL
BLOOD EXPIRATION DATE: NORMAL
BUN SERPL-MCNC: 50 MG/DL (ref 6–23)
CALCIUM SERPL-MCNC: 8.2 MG/DL (ref 8.6–10.6)
CHLORIDE SERPL-SCNC: 103 MMOL/L (ref 98–107)
CO2 SERPL-SCNC: 23 MMOL/L (ref 21–32)
CREAT SERPL-MCNC: 1.4 MG/DL (ref 0.5–1.05)
DISPENSE STATUS: NORMAL
DISPENSE STATUS: NORMAL
EGFRCR SERPLBLD CKD-EPI 2021: 41 ML/MIN/1.73M*2
ERYTHROCYTE [DISTWIDTH] IN BLOOD BY AUTOMATED COUNT: 16.4 % (ref 11.5–14.5)
GLUCOSE BLD MANUAL STRIP-MCNC: 107 MG/DL (ref 74–99)
GLUCOSE BLD MANUAL STRIP-MCNC: 184 MG/DL (ref 74–99)
GLUCOSE BLD MANUAL STRIP-MCNC: 90 MG/DL (ref 74–99)
GLUCOSE SERPL-MCNC: 95 MG/DL (ref 74–99)
HCT VFR BLD AUTO: 34.7 % (ref 36–46)
HGB BLD-MCNC: 11.4 G/DL (ref 12–16)
INR PPP: 1.2 (ref 0.9–1.1)
MAGNESIUM SERPL-MCNC: 1.88 MG/DL (ref 1.6–2.4)
MCH RBC QN AUTO: 31 PG (ref 26–34)
MCHC RBC AUTO-ENTMCNC: 32.9 G/DL (ref 32–36)
MCV RBC AUTO: 94 FL (ref 80–100)
NRBC BLD-RTO: 0 /100 WBCS (ref 0–0)
PHOSPHATE SERPL-MCNC: 4.1 MG/DL (ref 2.5–4.9)
PLATELET # BLD AUTO: 236 X10*3/UL (ref 150–450)
POTASSIUM SERPL-SCNC: 4.4 MMOL/L (ref 3.5–5.3)
PRODUCT BLOOD TYPE: 7300
PRODUCT BLOOD TYPE: 7300
PRODUCT CODE: NORMAL
PRODUCT CODE: NORMAL
PROT SERPL-MCNC: 5.4 G/DL (ref 6.4–8.2)
PROTHROMBIN TIME: 13.2 SECONDS (ref 9.8–12.8)
RBC # BLD AUTO: 3.68 X10*6/UL (ref 4–5.2)
SODIUM SERPL-SCNC: 137 MMOL/L (ref 136–145)
UNIT ABO: NORMAL
UNIT ABO: NORMAL
UNIT NUMBER: NORMAL
UNIT NUMBER: NORMAL
UNIT RH: NORMAL
UNIT RH: NORMAL
UNIT VOLUME: 350
UNIT VOLUME: 350
WBC # BLD AUTO: 14.5 X10*3/UL (ref 4.4–11.3)
XM INTEP: NORMAL
XM INTEP: NORMAL

## 2024-05-25 PROCEDURE — 85610 PROTHROMBIN TIME: CPT | Performed by: STUDENT IN AN ORGANIZED HEALTH CARE EDUCATION/TRAINING PROGRAM

## 2024-05-25 PROCEDURE — 94640 AIRWAY INHALATION TREATMENT: CPT

## 2024-05-25 PROCEDURE — 76937 US GUIDE VASCULAR ACCESS: CPT

## 2024-05-25 PROCEDURE — C9113 INJ PANTOPRAZOLE SODIUM, VIA: HCPCS | Performed by: STUDENT IN AN ORGANIZED HEALTH CARE EDUCATION/TRAINING PROGRAM

## 2024-05-25 PROCEDURE — 82947 ASSAY GLUCOSE BLOOD QUANT: CPT

## 2024-05-25 PROCEDURE — 36415 COLL VENOUS BLD VENIPUNCTURE: CPT | Performed by: STUDENT IN AN ORGANIZED HEALTH CARE EDUCATION/TRAINING PROGRAM

## 2024-05-25 PROCEDURE — 2500000001 HC RX 250 WO HCPCS SELF ADMINISTERED DRUGS (ALT 637 FOR MEDICARE OP): Performed by: STUDENT IN AN ORGANIZED HEALTH CARE EDUCATION/TRAINING PROGRAM

## 2024-05-25 PROCEDURE — 84100 ASSAY OF PHOSPHORUS: CPT | Performed by: STUDENT IN AN ORGANIZED HEALTH CARE EDUCATION/TRAINING PROGRAM

## 2024-05-25 PROCEDURE — 85027 COMPLETE CBC AUTOMATED: CPT | Performed by: STUDENT IN AN ORGANIZED HEALTH CARE EDUCATION/TRAINING PROGRAM

## 2024-05-25 PROCEDURE — 83735 ASSAY OF MAGNESIUM: CPT | Performed by: STUDENT IN AN ORGANIZED HEALTH CARE EDUCATION/TRAINING PROGRAM

## 2024-05-25 PROCEDURE — 2500000004 HC RX 250 GENERAL PHARMACY W/ HCPCS (ALT 636 FOR OP/ED): Performed by: STUDENT IN AN ORGANIZED HEALTH CARE EDUCATION/TRAINING PROGRAM

## 2024-05-25 PROCEDURE — 80048 BASIC METABOLIC PNL TOTAL CA: CPT | Performed by: STUDENT IN AN ORGANIZED HEALTH CARE EDUCATION/TRAINING PROGRAM

## 2024-05-25 PROCEDURE — 84075 ASSAY ALKALINE PHOSPHATASE: CPT | Performed by: STUDENT IN AN ORGANIZED HEALTH CARE EDUCATION/TRAINING PROGRAM

## 2024-05-25 PROCEDURE — 2060000001 HC INTERMEDIATE ICU ROOM DAILY

## 2024-05-25 RX ORDER — MAGNESIUM SULFATE HEPTAHYDRATE 40 MG/ML
2 INJECTION, SOLUTION INTRAVENOUS ONCE
Status: COMPLETED | OUTPATIENT
Start: 2024-05-25 | End: 2024-05-25

## 2024-05-25 RX ADMIN — GABAPENTIN 100 MG: 100 CAPSULE ORAL at 09:29

## 2024-05-25 RX ADMIN — METOPROLOL TARTRATE 25 MG: 25 TABLET, FILM COATED ORAL at 20:30

## 2024-05-25 RX ADMIN — PANTOPRAZOLE SODIUM 40 MG: 40 INJECTION, POWDER, FOR SOLUTION INTRAVENOUS at 09:30

## 2024-05-25 RX ADMIN — SENNOSIDES AND DOCUSATE SODIUM 2 TABLET: 50; 8.6 TABLET ORAL at 20:29

## 2024-05-25 RX ADMIN — METOPROLOL TARTRATE 25 MG: 25 TABLET, FILM COATED ORAL at 09:29

## 2024-05-25 RX ADMIN — GABAPENTIN 100 MG: 100 CAPSULE ORAL at 20:30

## 2024-05-25 RX ADMIN — TIOTROPIUM BROMIDE INHALATION SPRAY 2 PUFF: 3.12 SPRAY, METERED RESPIRATORY (INHALATION) at 12:02

## 2024-05-25 RX ADMIN — OXYCODONE HYDROCHLORIDE 5 MG: 5 TABLET ORAL at 12:42

## 2024-05-25 RX ADMIN — SENNOSIDES AND DOCUSATE SODIUM 2 TABLET: 50; 8.6 TABLET ORAL at 09:30

## 2024-05-25 RX ADMIN — MAGNESIUM SULFATE HEPTAHYDRATE 2 G: 40 INJECTION, SOLUTION INTRAVENOUS at 16:04

## 2024-05-25 RX ADMIN — ATORVASTATIN CALCIUM 80 MG: 80 TABLET, FILM COATED ORAL at 20:30

## 2024-05-25 ASSESSMENT — COGNITIVE AND FUNCTIONAL STATUS - GENERAL
DRESSING REGULAR UPPER BODY CLOTHING: A LITTLE
DAILY ACTIVITIY SCORE: 17
DRESSING REGULAR LOWER BODY CLOTHING: A LOT
DRESSING REGULAR UPPER BODY CLOTHING: A LOT
DRESSING REGULAR LOWER BODY CLOTHING: A LOT
MOVING FROM LYING ON BACK TO SITTING ON SIDE OF FLAT BED WITH BEDRAILS: A LOT
MOBILITY SCORE: 11
HELP NEEDED FOR BATHING: A LOT
MOVING FROM LYING ON BACK TO SITTING ON SIDE OF FLAT BED WITH BEDRAILS: A LITTLE
CLIMB 3 TO 5 STEPS WITH RAILING: TOTAL
TOILETING: A LOT
TURNING FROM BACK TO SIDE WHILE IN FLAT BAD: A LOT
TURNING FROM BACK TO SIDE WHILE IN FLAT BAD: A LOT
CLIMB 3 TO 5 STEPS WITH RAILING: TOTAL
HELP NEEDED FOR BATHING: A LOT
DAILY ACTIVITIY SCORE: 16
STANDING UP FROM CHAIR USING ARMS: A LOT
WALKING IN HOSPITAL ROOM: TOTAL
MOVING TO AND FROM BED TO CHAIR: A LOT
MOBILITY SCORE: 10
TOILETING: A LOT
WALKING IN HOSPITAL ROOM: TOTAL
STANDING UP FROM CHAIR USING ARMS: A LOT
MOVING TO AND FROM BED TO CHAIR: A LOT

## 2024-05-25 ASSESSMENT — PAIN SCALES - GENERAL
PAINLEVEL_OUTOF10: 0 - NO PAIN
PAINLEVEL_OUTOF10: 5 - MODERATE PAIN

## 2024-05-25 ASSESSMENT — PAIN - FUNCTIONAL ASSESSMENT: PAIN_FUNCTIONAL_ASSESSMENT: 0-10

## 2024-05-25 NOTE — PROGRESS NOTES
VASCULAR SURGERY PROGRESS NOTE  Subjective   No events overnight  Bps slightly lower when sleeping; rising to more normal when recycling cuff during exam this morning. Patient feels OK  Food doesn't taste right per her, trying to eat as much as possible  Endorsing some serosanguinous leakage from her L chest tube site.  Endorsing subjective numbness in bilateral feet    Objective   Vitals:  Heart Rate:  [66-89]   Temp:  [36.2 °C (97.2 °F)-37 °C (98.6 °F)]   Resp:  [16-34]   BP: ()/(50-89)   Weight:  [89.3 kg (196 lb 13.9 oz)]   SpO2:  [90 %-96 %]     Exam:  Constitutional: No acute distress, resting comfortably  Neuro:  AOx3, grossly intact  CV: no tachycardia on bedside monitor  Pulm: non-labored on nasal cannula, Dressing overlying CT incision with serosanguinous strikethrough  GI: soft, appropriately tender, thoraco-RP incision covered with dressing; skin closed and overlain with dermabond  Skin: blistering of bilateral lower extremities from volume overload  Musculoskeletal: moving all extremities  Extremities: Palpable bilateral DPs, sensory and motor intact in bilateral lower extremities, bilateral groin incisions covered with dressing    Labs:  Results from last 7 days   Lab Units 05/24/24  0143 05/23/24  1825 05/23/24  1224   WBC AUTO x10*3/uL 12.5* 12.0* 12.1*   HEMOGLOBIN g/dL 11.3* 11.0* 11.3*   PLATELETS AUTO x10*3/uL 173 152 147*      Results from last 7 days   Lab Units 05/24/24  1527 05/24/24  0143 05/23/24  1834   SODIUM mmol/L 136 138 136   POTASSIUM mmol/L 3.7 3.7 3.7   CHLORIDE mmol/L 102 102 101   CO2 mmol/L 24 23 24   BUN mg/dL 49* 46* 46*   CREATININE mg/dL 1.29* 1.23* 1.38*   GLUCOSE mg/dL 139* 83 83   MAGNESIUM mg/dL  --  2.10  --    PHOSPHORUS mg/dL 4.0 4.4 4.1      Results from last 7 days   Lab Units 05/24/24  0143 05/23/24  1224 05/23/24  0310   INR  1.3* 1.3* 1.3*   PROTIME seconds 14.2* 15.2* 14.9*   APTT seconds 25* 26* 25*     Assessment/Plan   Klaudia Ratliff is 68 y.o.  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 Transferred back to ICU on 5/22 for hypotension secondary to slow, active extravasation in RP seen on CTA    Soft Bps overnight while sleeping, improved one exam this morning. May be related to diuresis yesterday (-2L net yesterday).   Awaiting morning labs; will likely pursue lighter diuresis today.    Plan:  - Continue care on T7 SDU  - q2 neurovascular checks  - Regular diet as tolerated  - If AM labs reassuring, will give 20mg lasix this morning with any needed potassium/magnesium repletion  - Continue to trend CBC  - Continue to hold anticoagulation for now. Will restart SQH and ASA if AM labs stable  - Strict monitoring of I/Os  - Appreciate hepatology input regarding elevated liver enzymes/bili. Assessment as benign hyperbilirubemia that is multifactorial in nature; could be due to RP hematoma and blood product breakdown as well  - OOB/PT/OT  - pain control  - bronchopulmonary hygiene  - goal MAP >70 mmHg  - trend LFTs  - Maintain K >4, Mg >2  - maintain Hgb >8, PLT >100, INR <1.4  - SCDs    Discussed with Dr. Willis Toussaint MD  PGY5 - Integrated Vascular Surgery

## 2024-05-25 NOTE — SIGNIFICANT EVENT
Rapid Response RN Note    Rapid response RN following upfor RADAR score 6 due to the following VS: T 36.2 °Celsius; HR 80; RR 23; BP 91/45; SPO2 92%.     Reviewed above VS with bedside RN.  VS within patient's current trends.  Patient denied pain, shortness of breath, dizziness or lightheadedness.  No interventions by rapid response team indicated at this time.      Primary RN encouraged to page rapid response for any concerns or acute change in condition/VS.

## 2024-05-26 ENCOUNTER — APPOINTMENT (OUTPATIENT)
Dept: RADIOLOGY | Facility: HOSPITAL | Age: 69
DRG: 673 | End: 2024-05-26
Payer: MEDICARE

## 2024-05-26 LAB
ALBUMIN SERPL BCP-MCNC: 3.3 G/DL (ref 3.4–5)
ALP SERPL-CCNC: 264 U/L (ref 33–136)
ALT SERPL W P-5'-P-CCNC: 81 U/L (ref 7–45)
ANION GAP SERPL CALC-SCNC: 16 MMOL/L (ref 10–20)
AST SERPL W P-5'-P-CCNC: 64 U/L (ref 9–39)
BILIRUB DIRECT SERPL-MCNC: 2.1 MG/DL (ref 0–0.3)
BILIRUB SERPL-MCNC: 3.9 MG/DL (ref 0–1.2)
BUN SERPL-MCNC: 42 MG/DL (ref 6–23)
CALCIUM SERPL-MCNC: 8.5 MG/DL (ref 8.6–10.6)
CHLORIDE SERPL-SCNC: 101 MMOL/L (ref 98–107)
CO2 SERPL-SCNC: 21 MMOL/L (ref 21–32)
CREAT SERPL-MCNC: 1.13 MG/DL (ref 0.5–1.05)
EGFRCR SERPLBLD CKD-EPI 2021: 53 ML/MIN/1.73M*2
ERYTHROCYTE [DISTWIDTH] IN BLOOD BY AUTOMATED COUNT: 15.4 % (ref 11.5–14.5)
ERYTHROCYTE [DISTWIDTH] IN BLOOD BY AUTOMATED COUNT: 15.8 % (ref 11.5–14.5)
GLUCOSE BLD MANUAL STRIP-MCNC: 121 MG/DL (ref 74–99)
GLUCOSE BLD MANUAL STRIP-MCNC: 123 MG/DL (ref 74–99)
GLUCOSE BLD MANUAL STRIP-MCNC: 145 MG/DL (ref 74–99)
GLUCOSE SERPL-MCNC: 154 MG/DL (ref 74–99)
HCT VFR BLD AUTO: 36.6 % (ref 36–46)
HCT VFR BLD AUTO: 38.6 % (ref 36–46)
HGB BLD-MCNC: 12.2 G/DL (ref 12–16)
HGB BLD-MCNC: 12.5 G/DL (ref 12–16)
MAGNESIUM SERPL-MCNC: 2.16 MG/DL (ref 1.6–2.4)
MCH RBC QN AUTO: 30.7 PG (ref 26–34)
MCH RBC QN AUTO: 31.7 PG (ref 26–34)
MCHC RBC AUTO-ENTMCNC: 32.4 G/DL (ref 32–36)
MCHC RBC AUTO-ENTMCNC: 33.3 G/DL (ref 32–36)
MCV RBC AUTO: 92 FL (ref 80–100)
MCV RBC AUTO: 98 FL (ref 80–100)
NRBC BLD-RTO: 0 /100 WBCS (ref 0–0)
NRBC BLD-RTO: 0 /100 WBCS (ref 0–0)
PHOSPHATE SERPL-MCNC: 3.6 MG/DL (ref 2.5–4.9)
PLATELET # BLD AUTO: 257 X10*3/UL (ref 150–450)
PLATELET # BLD AUTO: 265 X10*3/UL (ref 150–450)
POTASSIUM SERPL-SCNC: 4 MMOL/L (ref 3.5–5.3)
PROT SERPL-MCNC: 6 G/DL (ref 6.4–8.2)
RBC # BLD AUTO: 3.94 X10*6/UL (ref 4–5.2)
RBC # BLD AUTO: 3.98 X10*6/UL (ref 4–5.2)
SODIUM SERPL-SCNC: 134 MMOL/L (ref 136–145)
WBC # BLD AUTO: 17.5 X10*3/UL (ref 4.4–11.3)
WBC # BLD AUTO: 18.1 X10*3/UL (ref 4.4–11.3)

## 2024-05-26 PROCEDURE — 76705 ECHO EXAM OF ABDOMEN: CPT

## 2024-05-26 PROCEDURE — 76705 ECHO EXAM OF ABDOMEN: CPT | Performed by: RADIOLOGY

## 2024-05-26 PROCEDURE — 71045 X-RAY EXAM CHEST 1 VIEW: CPT | Performed by: RADIOLOGY

## 2024-05-26 PROCEDURE — 85027 COMPLETE CBC AUTOMATED: CPT | Mod: 91

## 2024-05-26 PROCEDURE — 2500000001 HC RX 250 WO HCPCS SELF ADMINISTERED DRUGS (ALT 637 FOR MEDICARE OP): Performed by: STUDENT IN AN ORGANIZED HEALTH CARE EDUCATION/TRAINING PROGRAM

## 2024-05-26 PROCEDURE — C9113 INJ PANTOPRAZOLE SODIUM, VIA: HCPCS | Performed by: STUDENT IN AN ORGANIZED HEALTH CARE EDUCATION/TRAINING PROGRAM

## 2024-05-26 PROCEDURE — 36415 COLL VENOUS BLD VENIPUNCTURE: CPT

## 2024-05-26 PROCEDURE — 83735 ASSAY OF MAGNESIUM: CPT | Performed by: STUDENT IN AN ORGANIZED HEALTH CARE EDUCATION/TRAINING PROGRAM

## 2024-05-26 PROCEDURE — 71045 X-RAY EXAM CHEST 1 VIEW: CPT

## 2024-05-26 PROCEDURE — 94640 AIRWAY INHALATION TREATMENT: CPT

## 2024-05-26 PROCEDURE — 82248 BILIRUBIN DIRECT: CPT | Performed by: STUDENT IN AN ORGANIZED HEALTH CARE EDUCATION/TRAINING PROGRAM

## 2024-05-26 PROCEDURE — 84100 ASSAY OF PHOSPHORUS: CPT | Performed by: STUDENT IN AN ORGANIZED HEALTH CARE EDUCATION/TRAINING PROGRAM

## 2024-05-26 PROCEDURE — 36415 COLL VENOUS BLD VENIPUNCTURE: CPT | Performed by: STUDENT IN AN ORGANIZED HEALTH CARE EDUCATION/TRAINING PROGRAM

## 2024-05-26 PROCEDURE — 2500000004 HC RX 250 GENERAL PHARMACY W/ HCPCS (ALT 636 FOR OP/ED): Performed by: STUDENT IN AN ORGANIZED HEALTH CARE EDUCATION/TRAINING PROGRAM

## 2024-05-26 PROCEDURE — 87040 BLOOD CULTURE FOR BACTERIA: CPT

## 2024-05-26 PROCEDURE — 2500000004 HC RX 250 GENERAL PHARMACY W/ HCPCS (ALT 636 FOR OP/ED)

## 2024-05-26 PROCEDURE — 2060000001 HC INTERMEDIATE ICU ROOM DAILY

## 2024-05-26 PROCEDURE — 85027 COMPLETE CBC AUTOMATED: CPT | Performed by: STUDENT IN AN ORGANIZED HEALTH CARE EDUCATION/TRAINING PROGRAM

## 2024-05-26 PROCEDURE — 82947 ASSAY GLUCOSE BLOOD QUANT: CPT

## 2024-05-26 PROCEDURE — 80048 BASIC METABOLIC PNL TOTAL CA: CPT | Performed by: STUDENT IN AN ORGANIZED HEALTH CARE EDUCATION/TRAINING PROGRAM

## 2024-05-26 RX ORDER — MAGNESIUM SULFATE HEPTAHYDRATE 40 MG/ML
2 INJECTION, SOLUTION INTRAVENOUS ONCE
Status: COMPLETED | OUTPATIENT
Start: 2024-05-26 | End: 2024-05-26

## 2024-05-26 RX ORDER — HEPARIN SODIUM 5000 [USP'U]/ML
5000 INJECTION, SOLUTION INTRAVENOUS; SUBCUTANEOUS EVERY 8 HOURS
Status: DISCONTINUED | OUTPATIENT
Start: 2024-05-26 | End: 2024-05-28

## 2024-05-26 RX ORDER — VANCOMYCIN HYDROCHLORIDE 1 G/20ML
INJECTION, POWDER, LYOPHILIZED, FOR SOLUTION INTRAVENOUS DAILY PRN
Status: DISCONTINUED | OUTPATIENT
Start: 2024-05-26 | End: 2024-05-30

## 2024-05-26 RX ORDER — METOPROLOL TARTRATE 25 MG/1
12.5 TABLET, FILM COATED ORAL 2 TIMES DAILY
Status: DISCONTINUED | OUTPATIENT
Start: 2024-05-26 | End: 2024-05-28

## 2024-05-26 RX ORDER — FUROSEMIDE 10 MG/ML
20 INJECTION INTRAMUSCULAR; INTRAVENOUS ONCE
Status: COMPLETED | OUTPATIENT
Start: 2024-05-26 | End: 2024-05-26

## 2024-05-26 RX ORDER — POTASSIUM CHLORIDE 1.5 G/1.58G
40 POWDER, FOR SOLUTION ORAL ONCE
Status: COMPLETED | OUTPATIENT
Start: 2024-05-26 | End: 2024-05-26

## 2024-05-26 RX ORDER — POLYETHYLENE GLYCOL 3350 17 G/17G
17 POWDER, FOR SOLUTION ORAL DAILY
Status: DISCONTINUED | OUTPATIENT
Start: 2024-05-26 | End: 2024-05-31 | Stop reason: HOSPADM

## 2024-05-26 RX ADMIN — POLYETHYLENE GLYCOL 3350 17 G: 17 POWDER, FOR SOLUTION ORAL at 09:00

## 2024-05-26 RX ADMIN — OXYCODONE HYDROCHLORIDE 5 MG: 5 TABLET ORAL at 13:51

## 2024-05-26 RX ADMIN — FUROSEMIDE 20 MG: 10 INJECTION, SOLUTION INTRAVENOUS at 12:42

## 2024-05-26 RX ADMIN — MAGNESIUM SULFATE HEPTAHYDRATE 2 G: 40 INJECTION, SOLUTION INTRAVENOUS at 12:42

## 2024-05-26 RX ADMIN — OXYCODONE HYDROCHLORIDE 10 MG: 5 TABLET ORAL at 21:17

## 2024-05-26 RX ADMIN — SENNOSIDES AND DOCUSATE SODIUM 2 TABLET: 50; 8.6 TABLET ORAL at 21:17

## 2024-05-26 RX ADMIN — OXYCODONE HYDROCHLORIDE 5 MG: 5 TABLET ORAL at 09:00

## 2024-05-26 RX ADMIN — GABAPENTIN 100 MG: 100 CAPSULE ORAL at 21:18

## 2024-05-26 RX ADMIN — SENNOSIDES AND DOCUSATE SODIUM 2 TABLET: 50; 8.6 TABLET ORAL at 09:00

## 2024-05-26 RX ADMIN — TIOTROPIUM BROMIDE INHALATION SPRAY 2 PUFF: 3.12 SPRAY, METERED RESPIRATORY (INHALATION) at 11:40

## 2024-05-26 RX ADMIN — ATORVASTATIN CALCIUM 80 MG: 80 TABLET, FILM COATED ORAL at 21:18

## 2024-05-26 RX ADMIN — POTASSIUM CHLORIDE 40 MEQ: 1.5 POWDER, FOR SOLUTION ORAL at 12:42

## 2024-05-26 RX ADMIN — HEPARIN SODIUM 5000 UNITS: 5000 INJECTION INTRAVENOUS; SUBCUTANEOUS at 21:17

## 2024-05-26 RX ADMIN — HEPARIN SODIUM 5000 UNITS: 5000 INJECTION INTRAVENOUS; SUBCUTANEOUS at 06:23

## 2024-05-26 RX ADMIN — ASPIRIN 81 MG 81 MG: 81 TABLET ORAL at 09:00

## 2024-05-26 RX ADMIN — METOPROLOL TARTRATE 12.5 MG: 25 TABLET, FILM COATED ORAL at 21:17

## 2024-05-26 RX ADMIN — PIPERACILLIN SODIUM AND TAZOBACTAM SODIUM 3.38 G: 3; .375 INJECTION, SOLUTION INTRAVENOUS at 23:50

## 2024-05-26 RX ADMIN — PANTOPRAZOLE SODIUM 40 MG: 40 INJECTION, POWDER, FOR SOLUTION INTRAVENOUS at 09:00

## 2024-05-26 RX ADMIN — GABAPENTIN 100 MG: 100 CAPSULE ORAL at 09:00

## 2024-05-26 RX ADMIN — METOPROLOL TARTRATE 12.5 MG: 25 TABLET, FILM COATED ORAL at 09:00

## 2024-05-26 RX ADMIN — HEPARIN SODIUM 5000 UNITS: 5000 INJECTION INTRAVENOUS; SUBCUTANEOUS at 13:51

## 2024-05-26 ASSESSMENT — COGNITIVE AND FUNCTIONAL STATUS - GENERAL
TURNING FROM BACK TO SIDE WHILE IN FLAT BAD: A LOT
DRESSING REGULAR LOWER BODY CLOTHING: A LOT
CLIMB 3 TO 5 STEPS WITH RAILING: TOTAL
STANDING UP FROM CHAIR USING ARMS: A LOT
WALKING IN HOSPITAL ROOM: TOTAL
HELP NEEDED FOR BATHING: A LOT
MOVING TO AND FROM BED TO CHAIR: A LOT
MOVING FROM LYING ON BACK TO SITTING ON SIDE OF FLAT BED WITH BEDRAILS: A LITTLE
DRESSING REGULAR UPPER BODY CLOTHING: A LITTLE
PERSONAL GROOMING: A LITTLE
TOILETING: A LOT
MOBILITY SCORE: 11
DAILY ACTIVITIY SCORE: 16

## 2024-05-26 ASSESSMENT — PAIN - FUNCTIONAL ASSESSMENT
PAIN_FUNCTIONAL_ASSESSMENT: 0-10

## 2024-05-26 ASSESSMENT — PAIN SCALES - GENERAL
PAINLEVEL_OUTOF10: 2
PAINLEVEL_OUTOF10: 2
PAINLEVEL_OUTOF10: 5 - MODERATE PAIN
PAINLEVEL_OUTOF10: 5 - MODERATE PAIN

## 2024-05-26 ASSESSMENT — PAIN DESCRIPTION - LOCATION: LOCATION: KNEE

## 2024-05-26 ASSESSMENT — PAIN DESCRIPTION - ORIENTATION: ORIENTATION: LEFT

## 2024-05-26 NOTE — CONSULTS
Wound Care Progress Note     Visit Date: 5/26/2024      Patient Name: Klaudia Ratliff         MRN: [id number]           YOB: 1955     Reason for Consult: redress R foot wounds     Assessment/Intervention: Asked to redress pt's RLE wounds following dressing removal by MDs. Pt resting in bed with no visitors present. Large blister at dorsum of R foot now completely decompressed and largely re-epithelialized. Remains very painful for pt. This was cleaned and redressed with piece of Mepilex TRANSFER foam. Blisters to ankle dry and decompressed. Transfer foam left in place for protection. All toe wounds dried up with the exception of fragile, decompressed, blistered skin to 4th toe. This was gently wrapped with strip of Mepilex Transfer. Transfer covered with ABD pad/4x4s and foot wrapped with kerlix. R groin and L former chest tube site redressed with Mepilex Transfer and Mepilex bordered foam, respectively. Use of Mepiex transfer (ie., do not remove daily, leave in place for several days and just change absorbent cover dressing PRN) d/w primary RN and Vascular Surgery Team). Extra sheet left at bedside.      Wound Team Recs: Please keep open, draining wounds (e.g., R dorsum of R foot) covered with moisture-wicking, silicone-backed Mepilex Transfer foam (oracle #[id number]), ABD pad and kerlix roll. Please leave primary dressing - Mepilex Transfer foam - in place for 3-7 days, changing only the cover dressing (ABD pad, kerlix) PRN for strikethrough. This allows fragile wounds to heal without daily traumatic dressing removal. Please no Xeroform per pt preference.     Provider, please review.      Zenobia Nascimento RN, CWON  5/26/2024  4:48 PM

## 2024-05-26 NOTE — CARE PLAN
The patient's goals for the shift include  pain control.    The clinical goals for the shift include patient will reposition Q2 hours    Over the shift, the patient did make progress toward the following goals.       Problem: Safety - Medical Restraint  Goal: Remains free of injury from restraints (Restraint for Interference with Medical Device)  Outcome: Progressing  Goal: Free from restraint(s) (Restraint for Interference with Medical Device)  Outcome: Progressing     Problem: Meds/Post-op Pain  Goal: Pain controlled to tolerate pain level  Outcome: Progressing  Goal: Tolerates prescribed medication  Outcome: Progressing     Problem: DVT/VTE Prevention/Activity  Goal: No decrease in circulation/sensation  Outcome: Progressing  Goal: Prevent skin breakdown  Outcome: Progressing  Goal: Return to preop oxygenation status  Outcome: Progressing  Goal: Tolerates optimal activity  Outcome: Progressing  Goal: Increase self care and/or family involvement in 24 hrs.  Outcome: Progressing     Problem: Wound care/infection prevention  Goal: No signs of infection in 24 hrs.  Outcome: Progressing  Goal: No unexpected bleeding from incision this shift  Outcome: Progressing     Problem: Diet/fluid balance  Goal: Adequate urinary output  Outcome: Progressing  Goal: Free from nausea/vomiting  Outcome: Progressing  Goal: Return in bowel function  Outcome: Progressing  Goal: Tolerates prescribed diet  Outcome: Progressing     Problem: Other goals  Goal: No change in neurological status  Outcome: Progressing  Goal: Stabilize vital signs (return to 10% of baseline)  Outcome: Progressing     Problem: Skin  Goal: Decreased wound size/increased tissue granulation at next dressing change  Outcome: Progressing  Goal: Participates in plan/prevention/treatment measures  Outcome: Progressing  Goal: Prevent/manage excess moisture  Outcome: Progressing  Goal: Prevent/minimize sheer/friction injuries  Outcome: Progressing  Goal: Promote/optimize  nutrition  Outcome: Progressing  Goal: Promote skin healing  Outcome: Progressing     Problem: Pain  Goal: My pain/discomfort is manageable  Outcome: Progressing     Problem: Safety  Goal: Patient will be injury free during hospitalization  Outcome: Progressing  Goal: I will remain free of falls  Outcome: Progressing     Problem: Daily Care  Goal: Daily care needs are met  Outcome: Progressing     Problem: Psychosocial Needs  Goal: Demonstrates ability to cope with hospitalization/illness  Outcome: Progressing  Goal: Collaborate with me, my family, and caregiver to identify my specific goals  Outcome: Progressing

## 2024-05-26 NOTE — PROGRESS NOTES
VASCULAR SURGERY PROGRESS NOTE  Subjective   No events overnight. Still states food does not taste good but is in better spirits    Objective   Vitals:  Heart Rate:  [72-88]   Temp:  [36.2 °C (97.2 °F)-36.8 °C (98.2 °F)]   Resp:  [16-26]   BP: ()/(45-78)   SpO2:  [89 %-96 %]     Exam:  Constitutional: No acute distress, resting comfortably  Neuro:  AOx3, grossly intact  CV: no tachycardia on bedside monitor  Pulm: non-labored on nasal cannula, Dressing overlying CT incision with serosanguinous strikethrough  GI: soft, appropriately tender, thoraco-RP incision covered with dressing; skin closed and overlain with dermabond  Skin: blistering of bilateral lower extremities from volume overload  Musculoskeletal: moving all extremities  Extremities: Palpable bilateral DPs, sensory and motor intact in bilateral lower extremities, bilateral groin incisions covered with dressing. RLE blister clean and dry    Labs:  Results from last 7 days   Lab Units 05/25/24  0716 05/24/24  0143 05/23/24  1825   WBC AUTO x10*3/uL 14.5* 12.5* 12.0*   HEMOGLOBIN g/dL 11.4* 11.3* 11.0*   PLATELETS AUTO x10*3/uL 236 173 152      Results from last 7 days   Lab Units 05/25/24  0716 05/24/24  1527 05/24/24  0143   SODIUM mmol/L 137 136 138   POTASSIUM mmol/L 4.4 3.7 3.7   CHLORIDE mmol/L 103 102 102   CO2 mmol/L 23 24 23   BUN mg/dL 50* 49* 46*   CREATININE mg/dL 1.40* 1.29* 1.23*   GLUCOSE mg/dL 95 139* 83   MAGNESIUM mg/dL 1.88  --  2.10   PHOSPHORUS mg/dL 4.1 4.0 4.4      Results from last 7 days   Lab Units 05/25/24  0716 05/24/24  0143 05/23/24  1224   INR  1.2* 1.3* 1.3*   PROTIME seconds 13.2* 14.2* 15.2*   APTT seconds 26* 25* 26*     Assessment/Plan   Klaudia Ratliff is 68 y.o. 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 Transferred back to ICU on 5/22 for hypotension  secondary to slow, active extravasation in RP seen on CTA    Soft Bps overnight while sleeping, improved one exam this morning. May be related to diuresis yesterday (-2L net yesterday).   Awaiting morning labs; will likely pursue lighter diuresis today.    Plan:  - Continue care on T7 SDU  - q2 neurovascular checks  - Regular diet as tolerated  - If AM labs reassuring, will give 20mg lasix this morning with any needed potassium/magnesium repletion  - Continue to trend CBC  - Continue to hold anticoagulation for now. Ok for SQH and ASA  - Strict monitoring of I/Os  - Appreciate hepatology input regarding elevated liver enzymes/bili. Assessment as benign hyperbilirubemia that is multifactorial in nature; could be due to RP hematoma and blood product breakdown as well. Downtrending tbili as of 5/25  - OOB/PT/OT  - pain control  - bronchopulmonary hygiene  - goal MAP >70 mmHg  - trend LFTs  - Maintain K >4, Mg >2  - maintain Hgb >8, PLT >100, INR <1.4  - SCDs    Patient seen with chief Dr. Toussaint and discussed with attending Dr. Kiet Rubio MD  PGY-3 General Surgery  Vascular Surgery [id number]

## 2024-05-26 NOTE — CARE PLAN
Problem: Safety - Medical Restraint  Goal: Remains free of injury from restraints (Restraint for Interference with Medical Device)  Outcome: Progressing  Goal: Free from restraint(s) (Restraint for Interference with Medical Device)  Outcome: Progressing     Problem: Meds/Post-op Pain  Goal: Pain controlled to tolerate pain level  Outcome: Progressing  Goal: Tolerates prescribed medication  Outcome: Progressing     Problem: DVT/VTE Prevention/Activity  Goal: No decrease in circulation/sensation  Outcome: Progressing  Goal: Prevent skin breakdown  Outcome: Progressing  Goal: Return to preop oxygenation status  Outcome: Progressing  Goal: Tolerates optimal activity  Outcome: Progressing  Goal: Increase self care and/or family involvement in 24 hrs.  Outcome: Progressing     Problem: Wound care/infection prevention  Goal: No signs of infection in 24 hrs.  Outcome: Progressing  Goal: No unexpected bleeding from incision this shift  Outcome: Progressing     Problem: Diet/fluid balance  Goal: Adequate urinary output  Outcome: Progressing  Goal: Free from nausea/vomiting  Outcome: Progressing  Goal: Return in bowel function  Outcome: Progressing  Goal: Tolerates prescribed diet  Outcome: Progressing   The patient's goals for the shift include      The clinical goals for the shift include sys<160

## 2024-05-27 LAB
ALBUMIN SERPL BCP-MCNC: 3 G/DL (ref 3.4–5)
ALBUMIN SERPL BCP-MCNC: 3.2 G/DL (ref 3.4–5)
ALP SERPL-CCNC: 239 U/L (ref 33–136)
ALT SERPL W P-5'-P-CCNC: 73 U/L (ref 7–45)
ANION GAP SERPL CALC-SCNC: 14 MMOL/L (ref 10–20)
ANION GAP SERPL CALC-SCNC: 21 MMOL/L (ref 10–20)
APPEARANCE UR: CLEAR
AST SERPL W P-5'-P-CCNC: 54 U/L (ref 9–39)
BILIRUB DIRECT SERPL-MCNC: 1.2 MG/DL (ref 0–0.3)
BILIRUB SERPL-MCNC: 3.7 MG/DL (ref 0–1.2)
BILIRUB UR STRIP.AUTO-MCNC: NEGATIVE MG/DL
BUN SERPL-MCNC: 34 MG/DL (ref 6–23)
BUN SERPL-MCNC: 35 MG/DL (ref 6–23)
CALCIUM SERPL-MCNC: 8 MG/DL (ref 8.6–10.6)
CALCIUM SERPL-MCNC: 8.2 MG/DL (ref 8.6–10.6)
CHLORIDE SERPL-SCNC: 99 MMOL/L (ref 98–107)
CHLORIDE SERPL-SCNC: 99 MMOL/L (ref 98–107)
CO2 SERPL-SCNC: 16 MMOL/L (ref 21–32)
CO2 SERPL-SCNC: 23 MMOL/L (ref 21–32)
COLOR UR: YELLOW
CREAT SERPL-MCNC: 1.06 MG/DL (ref 0.5–1.05)
CREAT SERPL-MCNC: 1.12 MG/DL (ref 0.5–1.05)
EGFRCR SERPLBLD CKD-EPI 2021: 54 ML/MIN/1.73M*2
EGFRCR SERPLBLD CKD-EPI 2021: 58 ML/MIN/1.73M*2
ERYTHROCYTE [DISTWIDTH] IN BLOOD BY AUTOMATED COUNT: 15.7 % (ref 11.5–14.5)
GLUCOSE BLD MANUAL STRIP-MCNC: 107 MG/DL (ref 74–99)
GLUCOSE BLD MANUAL STRIP-MCNC: 118 MG/DL (ref 74–99)
GLUCOSE BLD MANUAL STRIP-MCNC: 124 MG/DL (ref 74–99)
GLUCOSE BLD MANUAL STRIP-MCNC: 138 MG/DL (ref 74–99)
GLUCOSE SERPL-MCNC: 107 MG/DL (ref 74–99)
GLUCOSE SERPL-MCNC: 88 MG/DL (ref 74–99)
GLUCOSE UR STRIP.AUTO-MCNC: NORMAL MG/DL
HCT VFR BLD AUTO: 36.4 % (ref 36–46)
HGB BLD-MCNC: 11.7 G/DL (ref 12–16)
HOLD SPECIMEN: NORMAL
KETONES UR STRIP.AUTO-MCNC: NEGATIVE MG/DL
LEUKOCYTE ESTERASE UR QL STRIP.AUTO: NEGATIVE
MAGNESIUM SERPL-MCNC: 2.09 MG/DL (ref 1.6–2.4)
MAGNESIUM SERPL-MCNC: 2.24 MG/DL (ref 1.6–2.4)
MCH RBC QN AUTO: 31 PG (ref 26–34)
MCHC RBC AUTO-ENTMCNC: 32.1 G/DL (ref 32–36)
MCV RBC AUTO: 97 FL (ref 80–100)
MUCOUS THREADS #/AREA URNS AUTO: ABNORMAL /LPF
NITRITE UR QL STRIP.AUTO: NEGATIVE
NRBC BLD-RTO: 0 /100 WBCS (ref 0–0)
PH UR STRIP.AUTO: 6.5 [PH]
PHOSPHATE SERPL-MCNC: 4 MG/DL (ref 2.5–4.9)
PHOSPHATE SERPL-MCNC: 4 MG/DL (ref 2.5–4.9)
PLATELET # BLD AUTO: 294 X10*3/UL (ref 150–450)
POTASSIUM SERPL-SCNC: 4.1 MMOL/L (ref 3.5–5.3)
POTASSIUM SERPL-SCNC: 4.3 MMOL/L (ref 3.5–5.3)
PROT SERPL-MCNC: 5.8 G/DL (ref 6.4–8.2)
PROT UR STRIP.AUTO-MCNC: NORMAL MG/DL
RBC # BLD AUTO: 3.77 X10*6/UL (ref 4–5.2)
RBC # UR STRIP.AUTO: NEGATIVE /UL
RBC #/AREA URNS AUTO: ABNORMAL /HPF
SODIUM SERPL-SCNC: 132 MMOL/L (ref 136–145)
SODIUM SERPL-SCNC: 132 MMOL/L (ref 136–145)
SP GR UR STRIP.AUTO: 1.02
SQUAMOUS #/AREA URNS AUTO: ABNORMAL /HPF
UROBILINOGEN UR STRIP.AUTO-MCNC: NORMAL MG/DL
WBC # BLD AUTO: 17.7 X10*3/UL (ref 4.4–11.3)
WBC #/AREA URNS AUTO: ABNORMAL /HPF
YEAST BUDDING #/AREA UR COMP ASSIST: PRESENT /HPF

## 2024-05-27 PROCEDURE — 2500000002 HC RX 250 W HCPCS SELF ADMINISTERED DRUGS (ALT 637 FOR MEDICARE OP, ALT 636 FOR OP/ED): Mod: MUE | Performed by: STUDENT IN AN ORGANIZED HEALTH CARE EDUCATION/TRAINING PROGRAM

## 2024-05-27 PROCEDURE — 2060000001 HC INTERMEDIATE ICU ROOM DAILY

## 2024-05-27 PROCEDURE — 2500000001 HC RX 250 WO HCPCS SELF ADMINISTERED DRUGS (ALT 637 FOR MEDICARE OP): Performed by: STUDENT IN AN ORGANIZED HEALTH CARE EDUCATION/TRAINING PROGRAM

## 2024-05-27 PROCEDURE — 94640 AIRWAY INHALATION TREATMENT: CPT

## 2024-05-27 PROCEDURE — 82248 BILIRUBIN DIRECT: CPT | Performed by: STUDENT IN AN ORGANIZED HEALTH CARE EDUCATION/TRAINING PROGRAM

## 2024-05-27 PROCEDURE — 82947 ASSAY GLUCOSE BLOOD QUANT: CPT | Mod: 91

## 2024-05-27 PROCEDURE — 36415 COLL VENOUS BLD VENIPUNCTURE: CPT | Performed by: STUDENT IN AN ORGANIZED HEALTH CARE EDUCATION/TRAINING PROGRAM

## 2024-05-27 PROCEDURE — 2500000004 HC RX 250 GENERAL PHARMACY W/ HCPCS (ALT 636 FOR OP/ED): Performed by: STUDENT IN AN ORGANIZED HEALTH CARE EDUCATION/TRAINING PROGRAM

## 2024-05-27 PROCEDURE — 81003 URINALYSIS AUTO W/O SCOPE: CPT

## 2024-05-27 PROCEDURE — 80048 BASIC METABOLIC PNL TOTAL CA: CPT | Performed by: STUDENT IN AN ORGANIZED HEALTH CARE EDUCATION/TRAINING PROGRAM

## 2024-05-27 PROCEDURE — 84100 ASSAY OF PHOSPHORUS: CPT | Performed by: STUDENT IN AN ORGANIZED HEALTH CARE EDUCATION/TRAINING PROGRAM

## 2024-05-27 PROCEDURE — 85027 COMPLETE CBC AUTOMATED: CPT | Performed by: STUDENT IN AN ORGANIZED HEALTH CARE EDUCATION/TRAINING PROGRAM

## 2024-05-27 PROCEDURE — 83735 ASSAY OF MAGNESIUM: CPT | Performed by: STUDENT IN AN ORGANIZED HEALTH CARE EDUCATION/TRAINING PROGRAM

## 2024-05-27 PROCEDURE — 80069 RENAL FUNCTION PANEL: CPT | Mod: CCI | Performed by: STUDENT IN AN ORGANIZED HEALTH CARE EDUCATION/TRAINING PROGRAM

## 2024-05-27 PROCEDURE — 2500000004 HC RX 250 GENERAL PHARMACY W/ HCPCS (ALT 636 FOR OP/ED)

## 2024-05-27 PROCEDURE — A4217 STERILE WATER/SALINE, 500 ML: HCPCS

## 2024-05-27 PROCEDURE — C9113 INJ PANTOPRAZOLE SODIUM, VIA: HCPCS | Performed by: STUDENT IN AN ORGANIZED HEALTH CARE EDUCATION/TRAINING PROGRAM

## 2024-05-27 RX ORDER — FUROSEMIDE 10 MG/ML
40 INJECTION INTRAMUSCULAR; INTRAVENOUS ONCE
Status: COMPLETED | OUTPATIENT
Start: 2024-05-27 | End: 2024-05-27

## 2024-05-27 RX ORDER — FUROSEMIDE 10 MG/ML
40 INJECTION INTRAMUSCULAR; INTRAVENOUS ONCE
Status: DISCONTINUED | OUTPATIENT
Start: 2024-05-27 | End: 2024-05-27

## 2024-05-27 RX ORDER — POTASSIUM CHLORIDE 20 MEQ/1
40 TABLET, EXTENDED RELEASE ORAL ONCE
Status: DISCONTINUED | OUTPATIENT
Start: 2024-05-27 | End: 2024-05-27

## 2024-05-27 RX ORDER — POTASSIUM CHLORIDE 14.9 MG/ML
20 INJECTION INTRAVENOUS
Status: COMPLETED | OUTPATIENT
Start: 2024-05-27 | End: 2024-05-27

## 2024-05-27 RX ORDER — POTASSIUM CHLORIDE 20 MEQ/1
40 TABLET, EXTENDED RELEASE ORAL ONCE
Status: COMPLETED | OUTPATIENT
Start: 2024-05-27 | End: 2024-05-27

## 2024-05-27 RX ADMIN — OXYCODONE HYDROCHLORIDE 5 MG: 5 TABLET ORAL at 08:38

## 2024-05-27 RX ADMIN — SENNOSIDES AND DOCUSATE SODIUM 2 TABLET: 50; 8.6 TABLET ORAL at 08:36

## 2024-05-27 RX ADMIN — GABAPENTIN 100 MG: 100 CAPSULE ORAL at 08:36

## 2024-05-27 RX ADMIN — METOPROLOL TARTRATE 12.5 MG: 25 TABLET, FILM COATED ORAL at 08:36

## 2024-05-27 RX ADMIN — HEPARIN SODIUM 5000 UNITS: 5000 INJECTION INTRAVENOUS; SUBCUTANEOUS at 23:04

## 2024-05-27 RX ADMIN — PIPERACILLIN SODIUM AND TAZOBACTAM SODIUM 3.38 G: 3; .375 INJECTION, SOLUTION INTRAVENOUS at 06:32

## 2024-05-27 RX ADMIN — PANTOPRAZOLE SODIUM 40 MG: 40 INJECTION, POWDER, FOR SOLUTION INTRAVENOUS at 08:36

## 2024-05-27 RX ADMIN — METOPROLOL TARTRATE 12.5 MG: 25 TABLET, FILM COATED ORAL at 20:33

## 2024-05-27 RX ADMIN — OXYCODONE HYDROCHLORIDE 5 MG: 5 TABLET ORAL at 15:49

## 2024-05-27 RX ADMIN — PIPERACILLIN SODIUM AND TAZOBACTAM SODIUM 3.38 G: 3; .375 INJECTION, SOLUTION INTRAVENOUS at 18:13

## 2024-05-27 RX ADMIN — POTASSIUM CHLORIDE 20 MEQ: 14.9 INJECTION, SOLUTION INTRAVENOUS at 12:15

## 2024-05-27 RX ADMIN — POTASSIUM CHLORIDE 20 MEQ: 14.9 INJECTION, SOLUTION INTRAVENOUS at 10:34

## 2024-05-27 RX ADMIN — SENNOSIDES AND DOCUSATE SODIUM 2 TABLET: 50; 8.6 TABLET ORAL at 20:33

## 2024-05-27 RX ADMIN — ASPIRIN 81 MG 81 MG: 81 TABLET ORAL at 08:36

## 2024-05-27 RX ADMIN — PIPERACILLIN SODIUM AND TAZOBACTAM SODIUM 3.38 G: 3; .375 INJECTION, SOLUTION INTRAVENOUS at 10:42

## 2024-05-27 RX ADMIN — HEPARIN SODIUM 5000 UNITS: 5000 INJECTION INTRAVENOUS; SUBCUTANEOUS at 06:32

## 2024-05-27 RX ADMIN — FUROSEMIDE 40 MG: 10 INJECTION, SOLUTION INTRAVENOUS at 20:32

## 2024-05-27 RX ADMIN — HEPARIN SODIUM 5000 UNITS: 5000 INJECTION INTRAVENOUS; SUBCUTANEOUS at 15:51

## 2024-05-27 RX ADMIN — PIPERACILLIN SODIUM AND TAZOBACTAM SODIUM 3.38 G: 3; .375 INJECTION, SOLUTION INTRAVENOUS at 23:04

## 2024-05-27 RX ADMIN — FUROSEMIDE 40 MG: 10 INJECTION, SOLUTION INTRAVENOUS at 10:33

## 2024-05-27 RX ADMIN — GABAPENTIN 100 MG: 100 CAPSULE ORAL at 20:33

## 2024-05-27 RX ADMIN — TIOTROPIUM BROMIDE INHALATION SPRAY 2 PUFF: 3.12 SPRAY, METERED RESPIRATORY (INHALATION) at 11:27

## 2024-05-27 RX ADMIN — ATORVASTATIN CALCIUM 80 MG: 80 TABLET, FILM COATED ORAL at 20:33

## 2024-05-27 RX ADMIN — VANCOMYCIN HYDROCHLORIDE 1500 MG: 5 INJECTION, POWDER, LYOPHILIZED, FOR SOLUTION INTRAVENOUS at 01:32

## 2024-05-27 RX ADMIN — POTASSIUM CHLORIDE 40 MEQ: 1500 TABLET, EXTENDED RELEASE ORAL at 20:32

## 2024-05-27 ASSESSMENT — COGNITIVE AND FUNCTIONAL STATUS - GENERAL
DRESSING REGULAR LOWER BODY CLOTHING: A LOT
DRESSING REGULAR UPPER BODY CLOTHING: A LITTLE
MOVING TO AND FROM BED TO CHAIR: A LOT
TURNING FROM BACK TO SIDE WHILE IN FLAT BAD: A LOT
DAILY ACTIVITIY SCORE: 16
EATING MEALS: A LITTLE
TOILETING: A LITTLE
CLIMB 3 TO 5 STEPS WITH RAILING: TOTAL
HELP NEEDED FOR BATHING: A LOT
WALKING IN HOSPITAL ROOM: TOTAL
PERSONAL GROOMING: A LITTLE
MOBILITY SCORE: 11
STANDING UP FROM CHAIR USING ARMS: A LOT
MOVING FROM LYING ON BACK TO SITTING ON SIDE OF FLAT BED WITH BEDRAILS: A LITTLE

## 2024-05-27 ASSESSMENT — PAIN SCALES - GENERAL: PAINLEVEL_OUTOF10: 0 - NO PAIN

## 2024-05-27 NOTE — CONSULTS
Vancomycin Dosing by Pharmacy- INITIAL    Klaudia AMERICA Ratliff is a 68 y.o. year old female who Pharmacy has been consulted for vancomycin dosing for other abdominal infection . Based on the patient's indication and renal status this patient will be dosed based on a goal AUC of 400-600.     Renal function is currently stable.  Please monitor renal function.    Visit Vitals  /55   Pulse 95   Temp 37.2 °C (99 °F)   Resp 20        Lab Results   Component Value Date    CREATININE 1.13 (H) 2024    CREATININE 1.40 (H) 2024    CREATININE 1.29 (H) 2024    CREATININE 1.23 (H) 2024        Patient weight is as follows:   Vitals:    24 0400   Weight: 89.3 kg (196 lb 13.9 oz)       Cultures:  No results found for the encounter in last 14 days.        I/O last 3 completed shifts:  In: 614.6 (6.9 mL/kg) [P.O.:480; I.V.:134.6 (1.5 mL/kg)]  Out: 2550 (28.6 mL/kg) [Urine:2550 (0.8 mL/kg/hr)]  Weight: 89.3 kg   I/O during current shift:  No intake/output data recorded.    Temp (24hrs), Av.7 °C (98.1 °F), Min:36.3 °C (97.3 °F), Max:37.2 °C (99 °F)         Assessment/Plan     Patient will not be given a loading dose.  Will initiate vancomycin maintenance,  1500 mg every 24 hours.    This dosing regimen is predicted by [employer] to result in the following pharmacokinetic parameters:  Loading dose: N/A  Regimen: 1500 mg IV every 24 hours.  Start time: 23:14 on 2024  Exposure target: AUC24 (range)400-600 mg/L.hr   AUC24,ss: 500 mg/L.hr  Probability of AUC24 > 400: 75 %  Ctrough,ss: 14.8 mg/L  Probability of Ctrough,ss > 20: 23 %  Probability of nephrotoxicity (Lodise FERNANDO ): 10 %      Follow-up level will be ordered on  at AM lab unless clinically indicated sooner.  Will continue to monitor renal function daily while on vancomycin and order serum creatinine at least every 48 hours if not already ordered.  Follow for continued vancomycin needs, clinical response, and signs/symptoms of  toxicity.       Jesse Pedro, PharmD

## 2024-05-27 NOTE — PROGRESS NOTES
.  VASCULAR SURGERY PROGRESS NOTE  Subjective   Overnight patient had soft blood pressure again. This has been occurring intermittently. Infectious workup started and pt started on vanc and zosyn. Patient reports she feels well. Pain well controlled. Tolerating diet. No other acute patient concerns.    Objective   Vitals:  Heart Rate:  [74-95]   Temp:  [36.5 °C (97.7 °F)-37.2 °C (99 °F)]   Resp:  [16-20]   BP: (114-136)/(55-73)   Weight:  [86.3 kg (190 lb 4.1 oz)]   SpO2:  [93 %-96 %]     Exam:  Constitutional: No acute distress, resting comfortably  Neuro:  AOx3, grossly intact  CV: no tachycardia on bedside monitor  Pulm: non-labored on nasal cannula, Dressing overlying CT incision with serosanguinous strikethrough  GI: soft, appropriately tender, thoraco-RP incision covered with dressing; skin closed and overlain with dermabond  Skin: blistering of bilateral lower extremities from volume overload  Musculoskeletal: moving all extremities  Extremities: Palpable bilateral DPs, sensory and motor intact in bilateral lower extremities, bilateral groin incisions covered with dressing. RLE blister clean and dry    Labs:  Results from last 7 days   Lab Units 05/26/24  2137 05/26/24  0939 05/25/24  0716   WBC AUTO x10*3/uL 18.1* 17.5* 14.5*   HEMOGLOBIN g/dL 12.2 12.5 11.4*   PLATELETS AUTO x10*3/uL 265 257 236      Results from last 7 days   Lab Units 05/26/24  0940 05/25/24  0716 05/24/24  1527   SODIUM mmol/L 134* 137 136   POTASSIUM mmol/L 4.0 4.4 3.7   CHLORIDE mmol/L 101 103 102   CO2 mmol/L 21 23 24   BUN mg/dL 42* 50* 49*   CREATININE mg/dL 1.13* 1.40* 1.29*   GLUCOSE mg/dL 154* 95 139*   MAGNESIUM mg/dL 2.16 1.88  --    PHOSPHORUS mg/dL 3.6 4.1 4.0      Results from last 7 days   Lab Units 05/25/24  0716 05/24/24  0143 05/23/24  1224   INR  1.2* 1.3* 1.3*   PROTIME seconds 13.2* 14.2* 15.2*   APTT seconds 26* 25* 26*     Assessment/Plan   Klaudia Ratliff is 68 y.o. female with history of HTN, HLD, obesity,  COPD, gout, RLE DVT/PE s/p mechanical thrombectomy and IVC filter (2/2024, on Eliquis) who is perivisceral aortic endarterectomy and aortobifemoral bypass. Transferred back to ICU on 5/22 for hypotension secondary to slow, active extravasation in RP seen on CTA    Soft Bps overnight. Infectious workup started and pending. Started on Vanc and Zosyn. Also had some abdominal pain yesterday. RUQ US showed mildly dilated CBD. No other concerning findings.     Plan:  - Continue care on T7 SDU  - q2 neurovascular checks  - Regular diet as tolerated  - if renal function okay, will give 40mg lasix this morning with any needed potassium/magnesium repletion  - Continue to trend CBC  - Continue to hold anticoagulation for now. Ok for SQH and ASA  - Strict monitoring of I/Os  - Appreciate hepatology input regarding elevated liver enzymes/bili. Assessment as benign hyperbilirubemia that is multifactorial in nature; could be due to RP hematoma and blood product breakdown as well. Downtrending tbili as of 5/25  -follow up blood cultures  - follow up UA  - OOB/PT/OT  - pain control  - bronchopulmonary hygiene  - goal MAP >70 mmHg  - trend LFTs  - Maintain K >4, Mg >2  - maintain Hgb >8, PLT >100, INR <1.4  - SCDs    Patient seen with chief Dr. Toussaint. To be discussed with attending Dr. Kiet Basilio MD  PGY-1 General Surgery  Vascular Surgery [id number]

## 2024-05-27 NOTE — PROGRESS NOTES
VASCULAR SURGERY PROGRESS NOTE  Subjective   No issues overnight  Says she feels very well this morning  Passing formed stools  Voiding without dysuria or hematuria  Empiric abx initiated and cultures drawn yesterday due to rising leukocytosis.   RUQ normal  CXR with pulm edema and atelectasis    Objective   Vitals:  Heart Rate:  [74-95]   Temp:  [36.5 °C (97.7 °F)-37.2 °C (99 °F)]   Resp:  [16-20]   BP: (114-136)/(55-73)   Weight:  [86.3 kg (190 lb 4.1 oz)]   SpO2:  [93 %-96 %]     Exam:  Constitutional: No acute distress, resting comfortably  Neuro:  AOx3, grossly intact  CV: no tachycardia on bedside monitor  Pulm: non-labored on nasal cannula, old CT site seems well apposed now and with minimal serous drainage  GI: soft, appropriately tender, thoraco-RP incision covered with dressing; skin closed and overlain with dermabond  Skin: Much improved R foot blistering from volume overload  Musculoskeletal: moving all extremities  Extremities: Palpable bilateral DPs, sensory intact in bilateral lower extremities, motor is difficult but on encouragement can move b/l LE with good strength. L groin intact, healing well; R groin with blistering that has dried out and healing well, incision proper is healing well intact. RLE blister clean and dry    Labs:  Results from last 7 days   Lab Units 05/26/24  2137 05/26/24  0939 05/25/24  0716   WBC AUTO x10*3/uL 18.1* 17.5* 14.5*   HEMOGLOBIN g/dL 12.2 12.5 11.4*   PLATELETS AUTO x10*3/uL 265 257 236      Results from last 7 days   Lab Units 05/26/24  0940 05/25/24  0716 05/24/24  1527   SODIUM mmol/L 134* 137 136   POTASSIUM mmol/L 4.0 4.4 3.7   CHLORIDE mmol/L 101 103 102   CO2 mmol/L 21 23 24   BUN mg/dL 42* 50* 49*   CREATININE mg/dL 1.13* 1.40* 1.29*   GLUCOSE mg/dL 154* 95 139*   MAGNESIUM mg/dL 2.16 1.88  --    PHOSPHORUS mg/dL 3.6 4.1 4.0      Results from last 7 days   Lab Units 05/25/24  0716 05/24/24  0143 05/23/24  1224   INR  1.2* 1.3* 1.3*   PROTIME seconds 13.2*  14.2* 15.2*   APTT seconds 26* 25* 26*     Assessment/Plan   Klaudia Ratliff is 68 y.o. 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 Transferred back to ICU on 5/22 for hypotension secondary to slow, active extravasation in RP seen on CTA    Overall hemodynamically stable without fevers. She feels very well today. Tolerating diet, passing bowel movements and voiding after fox. Rising leukocytosis and so cultures taken and empiric abx initiated; has had several prior UTIs that postponed surgery. Otherwise needs aggressive diuresis, PT/OT    Plan:  - Continue care on T7 SDU  - q2 neurovascular checks  - Regular diet as tolerated  - Pending AM renal fxn, will continue diuresis with 40mg IV lasix  - Continue to trend CBC. WBC rising last several days 12->14->17->18k. Afberile  - SQH and ASA. Holding full anticoagulation for now  - Strict monitoring of I/Os  - Trend LFTs. Likely related to resolving critical illness and RP bleed with breakdown of blood products  - OOB/PT/OT  - pain control  - bronchopulmonary hygiene  - goal MAP >70 mmHg  - Maintain K >4, Mg >2  - maintain Hgb >8, PLT >100, INR <1.4  - SCDs    Discussed with Dr. Kiet Toussaint MD  PGY5 - Integrated Vascular Surgery

## 2024-05-28 ENCOUNTER — APPOINTMENT (OUTPATIENT)
Dept: CARDIOLOGY | Facility: HOSPITAL | Age: 69
DRG: 673 | End: 2024-05-28
Payer: MEDICARE

## 2024-05-28 LAB
ABO GROUP (TYPE) IN BLOOD: NORMAL
ALBUMIN SERPL BCP-MCNC: 2.6 G/DL (ref 3.4–5)
ALBUMIN SERPL BCP-MCNC: 2.8 G/DL (ref 3.4–5)
ALBUMIN SERPL BCP-MCNC: 3 G/DL (ref 3.4–5)
ALP SERPL-CCNC: 247 U/L (ref 33–136)
ALT SERPL W P-5'-P-CCNC: 64 U/L (ref 7–45)
ANION GAP SERPL CALC-SCNC: 15 MMOL/L (ref 10–20)
ANION GAP SERPL CALC-SCNC: 17 MMOL/L (ref 10–20)
ANION GAP SERPL CALC-SCNC: 20 MMOL/L (ref 10–20)
ANTIBODY SCREEN: NORMAL
APTT PPP: 26 SECONDS (ref 27–38)
AST SERPL W P-5'-P-CCNC: 48 U/L (ref 9–39)
ATRIAL RATE: 153 BPM
ATRIAL RATE: 286 BPM
BILIRUB DIRECT SERPL-MCNC: 2.2 MG/DL (ref 0–0.3)
BILIRUB SERPL-MCNC: 3.7 MG/DL (ref 0–1.2)
BUN SERPL-MCNC: 31 MG/DL (ref 6–23)
BUN SERPL-MCNC: 31 MG/DL (ref 6–23)
BUN SERPL-MCNC: 32 MG/DL (ref 6–23)
CALCIUM SERPL-MCNC: 7.8 MG/DL (ref 8.6–10.6)
CALCIUM SERPL-MCNC: 7.8 MG/DL (ref 8.6–10.6)
CALCIUM SERPL-MCNC: 8.4 MG/DL (ref 8.6–10.6)
CHLORIDE SERPL-SCNC: 97 MMOL/L (ref 98–107)
CHLORIDE SERPL-SCNC: 98 MMOL/L (ref 98–107)
CHLORIDE SERPL-SCNC: 99 MMOL/L (ref 98–107)
CO2 SERPL-SCNC: 19 MMOL/L (ref 21–32)
CO2 SERPL-SCNC: 23 MMOL/L (ref 21–32)
CO2 SERPL-SCNC: 24 MMOL/L (ref 21–32)
CREAT SERPL-MCNC: 1.12 MG/DL (ref 0.5–1.05)
CREAT SERPL-MCNC: 1.23 MG/DL (ref 0.5–1.05)
CREAT SERPL-MCNC: 1.26 MG/DL (ref 0.5–1.05)
EGFRCR SERPLBLD CKD-EPI 2021: 47 ML/MIN/1.73M*2
EGFRCR SERPLBLD CKD-EPI 2021: 48 ML/MIN/1.73M*2
EGFRCR SERPLBLD CKD-EPI 2021: 54 ML/MIN/1.73M*2
EJECTION FRACTION APICAL 4 CHAMBER: 64
ERYTHROCYTE [DISTWIDTH] IN BLOOD BY AUTOMATED COUNT: 15.4 % (ref 11.5–14.5)
ERYTHROCYTE [DISTWIDTH] IN BLOOD BY AUTOMATED COUNT: 15.6 % (ref 11.5–14.5)
GLUCOSE SERPL-MCNC: 104 MG/DL (ref 74–99)
GLUCOSE SERPL-MCNC: 110 MG/DL (ref 74–99)
GLUCOSE SERPL-MCNC: 121 MG/DL (ref 74–99)
HCT VFR BLD AUTO: 35.5 % (ref 36–46)
HCT VFR BLD AUTO: 37.1 % (ref 36–46)
HGB BLD-MCNC: 11.3 G/DL (ref 12–16)
HGB BLD-MCNC: 11.7 G/DL (ref 12–16)
INR PPP: 1.4 (ref 0.9–1.1)
LEFT ATRIUM VOLUME AREA LENGTH INDEX BSA: 26.9 ML/M2
LV EJECTION FRACTION BIPLANE: 65 %
MAGNESIUM SERPL-MCNC: 1.55 MG/DL (ref 1.6–2.4)
MAGNESIUM SERPL-MCNC: 1.58 MG/DL (ref 1.6–2.4)
MAGNESIUM SERPL-MCNC: 2.3 MG/DL (ref 1.6–2.4)
MCH RBC QN AUTO: 29.9 PG (ref 26–34)
MCH RBC QN AUTO: 30.8 PG (ref 26–34)
MCHC RBC AUTO-ENTMCNC: 30.5 G/DL (ref 32–36)
MCHC RBC AUTO-ENTMCNC: 33 G/DL (ref 32–36)
MCV RBC AUTO: 93 FL (ref 80–100)
MCV RBC AUTO: 98 FL (ref 80–100)
NRBC BLD-RTO: 0 /100 WBCS (ref 0–0)
NRBC BLD-RTO: 0 /100 WBCS (ref 0–0)
PHOSPHATE SERPL-MCNC: 4.2 MG/DL (ref 2.5–4.9)
PHOSPHATE SERPL-MCNC: 4.2 MG/DL (ref 2.5–4.9)
PHOSPHATE SERPL-MCNC: 4.3 MG/DL (ref 2.5–4.9)
PLATELET # BLD AUTO: 305 X10*3/UL (ref 150–450)
PLATELET # BLD AUTO: 336 X10*3/UL (ref 150–450)
POTASSIUM SERPL-SCNC: 3.6 MMOL/L (ref 3.5–5.3)
POTASSIUM SERPL-SCNC: 3.8 MMOL/L (ref 3.5–5.3)
POTASSIUM SERPL-SCNC: 4.5 MMOL/L (ref 3.5–5.3)
PROT SERPL-MCNC: 5.1 G/DL (ref 6.4–8.2)
PROTHROMBIN TIME: 15.3 SECONDS (ref 9.8–12.8)
Q ONSET: 218 MS
Q ONSET: 218 MS
QRS COUNT: 19 BEATS
QRS COUNT: 22 BEATS
QRS DURATION: 100 MS
QRS DURATION: 88 MS
QT INTERVAL: 280 MS
QT INTERVAL: 310 MS
QTC CALCULATION(BAZETT): 389 MS
QTC CALCULATION(BAZETT): 465 MS
QTC FREDERICIA: 349 MS
QTC FREDERICIA: 406 MS
R AXIS: 16 DEGREES
R AXIS: 19 DEGREES
RBC # BLD AUTO: 3.78 X10*6/UL (ref 4–5.2)
RBC # BLD AUTO: 3.8 X10*6/UL (ref 4–5.2)
RH FACTOR (ANTIGEN D): NORMAL
RIGHT VENTRICLE FREE WALL PEAK S': 14 CM/S
RIGHT VENTRICLE PEAK SYSTOLIC PRESSURE: 34.4 MMHG
SODIUM SERPL-SCNC: 133 MMOL/L (ref 136–145)
T AXIS: 201 DEGREES
T AXIS: 241 DEGREES
T OFFSET: 358 MS
T OFFSET: 373 MS
TRICUSPID ANNULAR PLANE SYSTOLIC EXCURSION: 2.3 CM
UFH PPP CHRO-ACNC: 0.4 IU/ML
UFH PPP CHRO-ACNC: 0.5 IU/ML
VANCOMYCIN SERPL-MCNC: 20.4 UG/ML (ref 5–20)
VENTRICULAR RATE: 116 BPM
VENTRICULAR RATE: 135 BPM
WBC # BLD AUTO: 14.8 X10*3/UL (ref 4.4–11.3)
WBC # BLD AUTO: 15.8 X10*3/UL (ref 4.4–11.3)

## 2024-05-28 PROCEDURE — 85520 HEPARIN ASSAY: CPT | Performed by: NURSE PRACTITIONER

## 2024-05-28 PROCEDURE — 2500000004 HC RX 250 GENERAL PHARMACY W/ HCPCS (ALT 636 FOR OP/ED)

## 2024-05-28 PROCEDURE — 93308 TTE F-UP OR LMTD: CPT | Performed by: INTERNAL MEDICINE

## 2024-05-28 PROCEDURE — 2500000001 HC RX 250 WO HCPCS SELF ADMINISTERED DRUGS (ALT 637 FOR MEDICARE OP): Performed by: STUDENT IN AN ORGANIZED HEALTH CARE EDUCATION/TRAINING PROGRAM

## 2024-05-28 PROCEDURE — 80069 RENAL FUNCTION PANEL: CPT | Mod: CCI

## 2024-05-28 PROCEDURE — 2500000004 HC RX 250 GENERAL PHARMACY W/ HCPCS (ALT 636 FOR OP/ED): Performed by: STUDENT IN AN ORGANIZED HEALTH CARE EDUCATION/TRAINING PROGRAM

## 2024-05-28 PROCEDURE — 85027 COMPLETE CBC AUTOMATED: CPT | Mod: 91

## 2024-05-28 PROCEDURE — 80053 COMPREHEN METABOLIC PANEL: CPT

## 2024-05-28 PROCEDURE — 82248 BILIRUBIN DIRECT: CPT

## 2024-05-28 PROCEDURE — 2500000005 HC RX 250 GENERAL PHARMACY W/O HCPCS: Performed by: STUDENT IN AN ORGANIZED HEALTH CARE EDUCATION/TRAINING PROGRAM

## 2024-05-28 PROCEDURE — 86901 BLOOD TYPING SEROLOGIC RH(D): CPT | Performed by: STUDENT IN AN ORGANIZED HEALTH CARE EDUCATION/TRAINING PROGRAM

## 2024-05-28 PROCEDURE — 83735 ASSAY OF MAGNESIUM: CPT | Mod: 91 | Performed by: STUDENT IN AN ORGANIZED HEALTH CARE EDUCATION/TRAINING PROGRAM

## 2024-05-28 PROCEDURE — 99223 1ST HOSP IP/OBS HIGH 75: CPT | Performed by: STUDENT IN AN ORGANIZED HEALTH CARE EDUCATION/TRAINING PROGRAM

## 2024-05-28 PROCEDURE — C9113 INJ PANTOPRAZOLE SODIUM, VIA: HCPCS | Performed by: STUDENT IN AN ORGANIZED HEALTH CARE EDUCATION/TRAINING PROGRAM

## 2024-05-28 PROCEDURE — 36415 COLL VENOUS BLD VENIPUNCTURE: CPT

## 2024-05-28 PROCEDURE — 84443 ASSAY THYROID STIM HORMONE: CPT | Performed by: STUDENT IN AN ORGANIZED HEALTH CARE EDUCATION/TRAINING PROGRAM

## 2024-05-28 PROCEDURE — 36415 COLL VENOUS BLD VENIPUNCTURE: CPT | Performed by: STUDENT IN AN ORGANIZED HEALTH CARE EDUCATION/TRAINING PROGRAM

## 2024-05-28 PROCEDURE — 94760 N-INVAS EAR/PLS OXIMETRY 1: CPT

## 2024-05-28 PROCEDURE — 93005 ELECTROCARDIOGRAM TRACING: CPT

## 2024-05-28 PROCEDURE — A4217 STERILE WATER/SALINE, 500 ML: HCPCS

## 2024-05-28 PROCEDURE — 93325 DOPPLER ECHO COLOR FLOW MAPG: CPT | Performed by: INTERNAL MEDICINE

## 2024-05-28 PROCEDURE — 93308 TTE F-UP OR LMTD: CPT

## 2024-05-28 PROCEDURE — 84100 ASSAY OF PHOSPHORUS: CPT | Mod: 91 | Performed by: STUDENT IN AN ORGANIZED HEALTH CARE EDUCATION/TRAINING PROGRAM

## 2024-05-28 PROCEDURE — 2060000001 HC INTERMEDIATE ICU ROOM DAILY

## 2024-05-28 PROCEDURE — 2500000002 HC RX 250 W HCPCS SELF ADMINISTERED DRUGS (ALT 637 FOR MEDICARE OP, ALT 636 FOR OP/ED): Mod: MUE | Performed by: STUDENT IN AN ORGANIZED HEALTH CARE EDUCATION/TRAINING PROGRAM

## 2024-05-28 PROCEDURE — 85027 COMPLETE CBC AUTOMATED: CPT

## 2024-05-28 PROCEDURE — 83735 ASSAY OF MAGNESIUM: CPT

## 2024-05-28 PROCEDURE — 93321 DOPPLER ECHO F-UP/LMTD STD: CPT | Performed by: INTERNAL MEDICINE

## 2024-05-28 PROCEDURE — 2500000004 HC RX 250 GENERAL PHARMACY W/ HCPCS (ALT 636 FOR OP/ED): Performed by: NURSE PRACTITIONER

## 2024-05-28 PROCEDURE — 80202 ASSAY OF VANCOMYCIN: CPT

## 2024-05-28 PROCEDURE — 85610 PROTHROMBIN TIME: CPT | Performed by: STUDENT IN AN ORGANIZED HEALTH CARE EDUCATION/TRAINING PROGRAM

## 2024-05-28 RX ORDER — MAGNESIUM SULFATE HEPTAHYDRATE 40 MG/ML
2 INJECTION, SOLUTION INTRAVENOUS ONCE
Status: COMPLETED | OUTPATIENT
Start: 2024-05-28 | End: 2024-05-28

## 2024-05-28 RX ORDER — METOPROLOL TARTRATE 1 MG/ML
5 INJECTION, SOLUTION INTRAVENOUS ONCE
Status: COMPLETED | OUTPATIENT
Start: 2024-05-28 | End: 2024-05-28

## 2024-05-28 RX ORDER — HEPARIN SODIUM 10000 [USP'U]/100ML
0-4000 INJECTION, SOLUTION INTRAVENOUS CONTINUOUS
Status: DISPENSED | OUTPATIENT
Start: 2024-05-28 | End: 2024-05-30

## 2024-05-28 RX ORDER — MAGNESIUM SULFATE HEPTAHYDRATE 40 MG/ML
4 INJECTION, SOLUTION INTRAVENOUS ONCE
Status: COMPLETED | OUTPATIENT
Start: 2024-05-28 | End: 2024-05-28

## 2024-05-28 RX ORDER — POTASSIUM CHLORIDE 20 MEQ/1
20 TABLET, EXTENDED RELEASE ORAL ONCE
Status: COMPLETED | OUTPATIENT
Start: 2024-05-28 | End: 2024-05-28

## 2024-05-28 RX ORDER — METOPROLOL TARTRATE 1 MG/ML
5 INJECTION, SOLUTION INTRAVENOUS ONCE
Status: DISCONTINUED | OUTPATIENT
Start: 2024-05-28 | End: 2024-05-30

## 2024-05-28 RX ORDER — FUROSEMIDE 10 MG/ML
40 INJECTION INTRAMUSCULAR; INTRAVENOUS ONCE
Status: COMPLETED | OUTPATIENT
Start: 2024-05-28 | End: 2024-05-28

## 2024-05-28 RX ORDER — POTASSIUM CHLORIDE 14.9 MG/ML
20 INJECTION INTRAVENOUS
Status: COMPLETED | OUTPATIENT
Start: 2024-05-28 | End: 2024-05-28

## 2024-05-28 RX ORDER — AMIODARONE HYDROCHLORIDE 200 MG/1
400 TABLET ORAL 2 TIMES DAILY
Status: DISCONTINUED | OUTPATIENT
Start: 2024-05-28 | End: 2024-05-31 | Stop reason: HOSPADM

## 2024-05-28 RX ORDER — METOPROLOL TARTRATE 25 MG/1
25 TABLET, FILM COATED ORAL 2 TIMES DAILY
Status: DISCONTINUED | OUTPATIENT
Start: 2024-05-28 | End: 2024-05-31 | Stop reason: HOSPADM

## 2024-05-28 RX ADMIN — PIPERACILLIN SODIUM AND TAZOBACTAM SODIUM 3.38 G: 3; .375 INJECTION, SOLUTION INTRAVENOUS at 23:19

## 2024-05-28 RX ADMIN — FUROSEMIDE 40 MG: 10 INJECTION, SOLUTION INTRAVENOUS at 18:41

## 2024-05-28 RX ADMIN — METOPROLOL TARTRATE 25 MG: 25 TABLET, FILM COATED ORAL at 08:31

## 2024-05-28 RX ADMIN — VANCOMYCIN HYDROCHLORIDE 1500 MG: 5 INJECTION, POWDER, LYOPHILIZED, FOR SOLUTION INTRAVENOUS at 00:05

## 2024-05-28 RX ADMIN — METOPROLOL TARTRATE 5 MG: 1 INJECTION, SOLUTION INTRAVENOUS at 07:15

## 2024-05-28 RX ADMIN — PIPERACILLIN SODIUM AND TAZOBACTAM SODIUM 3.38 G: 3; .375 INJECTION, SOLUTION INTRAVENOUS at 04:59

## 2024-05-28 RX ADMIN — PIPERACILLIN SODIUM AND TAZOBACTAM SODIUM 3.38 G: 3; .375 INJECTION, SOLUTION INTRAVENOUS at 11:05

## 2024-05-28 RX ADMIN — METOPROLOL TARTRATE 5 MG: 5 INJECTION, SOLUTION INTRAVENOUS at 07:30

## 2024-05-28 RX ADMIN — POTASSIUM CHLORIDE 20 MEQ: 14.9 INJECTION, SOLUTION INTRAVENOUS at 13:02

## 2024-05-28 RX ADMIN — POLYETHYLENE GLYCOL 3350 17 G: 17 POWDER, FOR SOLUTION ORAL at 08:41

## 2024-05-28 RX ADMIN — PIPERACILLIN SODIUM AND TAZOBACTAM SODIUM 3.38 G: 3; .375 INJECTION, SOLUTION INTRAVENOUS at 17:20

## 2024-05-28 RX ADMIN — METOPROLOL TARTRATE 5 MG: 1 INJECTION, SOLUTION INTRAVENOUS at 08:40

## 2024-05-28 RX ADMIN — PANTOPRAZOLE SODIUM 40 MG: 40 INJECTION, POWDER, FOR SOLUTION INTRAVENOUS at 08:41

## 2024-05-28 RX ADMIN — PERFLUTREN 10 ML OF DILUTION: 6.52 INJECTION, SUSPENSION INTRAVENOUS at 15:02

## 2024-05-28 RX ADMIN — MAGNESIUM SULFATE HEPTAHYDRATE 2 G: 40 INJECTION, SOLUTION INTRAVENOUS at 18:41

## 2024-05-28 RX ADMIN — POTASSIUM CHLORIDE 20 MEQ: 1500 TABLET, EXTENDED RELEASE ORAL at 18:41

## 2024-05-28 RX ADMIN — METOPROLOL TARTRATE 25 MG: 25 TABLET, FILM COATED ORAL at 20:30

## 2024-05-28 RX ADMIN — MAGNESIUM SULFATE HEPTAHYDRATE 4 G: 40 INJECTION, SOLUTION INTRAVENOUS at 08:37

## 2024-05-28 RX ADMIN — GABAPENTIN 100 MG: 100 CAPSULE ORAL at 08:31

## 2024-05-28 RX ADMIN — ASPIRIN 81 MG 81 MG: 81 TABLET ORAL at 08:31

## 2024-05-28 RX ADMIN — VANCOMYCIN HYDROCHLORIDE 1250 MG: 5 INJECTION, POWDER, LYOPHILIZED, FOR SOLUTION INTRAVENOUS at 23:20

## 2024-05-28 RX ADMIN — SODIUM CHLORIDE, POTASSIUM CHLORIDE, SODIUM LACTATE AND CALCIUM CHLORIDE 1000 ML: 600; 310; 30; 20 INJECTION, SOLUTION INTRAVENOUS at 06:55

## 2024-05-28 RX ADMIN — SENNOSIDES AND DOCUSATE SODIUM 2 TABLET: 50; 8.6 TABLET ORAL at 20:30

## 2024-05-28 RX ADMIN — SENNOSIDES AND DOCUSATE SODIUM 2 TABLET: 50; 8.6 TABLET ORAL at 08:31

## 2024-05-28 RX ADMIN — GABAPENTIN 100 MG: 100 CAPSULE ORAL at 20:30

## 2024-05-28 RX ADMIN — HEPARIN SODIUM 1100 UNITS/HR: 10000 INJECTION, SOLUTION INTRAVENOUS at 08:31

## 2024-05-28 RX ADMIN — ATORVASTATIN CALCIUM 80 MG: 80 TABLET, FILM COATED ORAL at 20:30

## 2024-05-28 RX ADMIN — AMIODARONE HYDROCHLORIDE 400 MG: 200 TABLET ORAL at 20:30

## 2024-05-28 RX ADMIN — POTASSIUM CHLORIDE 20 MEQ: 14.9 INJECTION, SOLUTION INTRAVENOUS at 11:05

## 2024-05-28 SDOH — SOCIAL STABILITY: SOCIAL INSECURITY: ARE YOU OR HAVE YOU BEEN THREATENED OR ABUSED PHYSICALLY, EMOTIONALLY, OR SEXUALLY BY ANYONE?: NO

## 2024-05-28 SDOH — SOCIAL STABILITY: SOCIAL INSECURITY: WERE YOU ABLE TO COMPLETE ALL THE BEHAVIORAL HEALTH SCREENINGS?: YES

## 2024-05-28 SDOH — SOCIAL STABILITY: SOCIAL INSECURITY: HAVE YOU HAD ANY THOUGHTS OF HARMING ANYONE ELSE?: NO

## 2024-05-28 SDOH — SOCIAL STABILITY: SOCIAL INSECURITY: ARE THERE ANY APPARENT SIGNS OF INJURIES/BEHAVIORS THAT COULD BE RELATED TO ABUSE/NEGLECT?: NO

## 2024-05-28 SDOH — SOCIAL STABILITY: SOCIAL INSECURITY: DOES ANYONE TRY TO KEEP YOU FROM HAVING/CONTACTING OTHER FRIENDS OR DOING THINGS OUTSIDE YOUR HOME?: NO

## 2024-05-28 SDOH — SOCIAL STABILITY: SOCIAL INSECURITY: HAVE YOU HAD THOUGHTS OF HARMING ANYONE ELSE?: NO

## 2024-05-28 SDOH — SOCIAL STABILITY: SOCIAL INSECURITY: ABUSE: ADULT

## 2024-05-28 SDOH — SOCIAL STABILITY: SOCIAL INSECURITY: HAS ANYONE EVER THREATENED TO HURT YOUR FAMILY OR YOUR PETS?: NO

## 2024-05-28 SDOH — SOCIAL STABILITY: SOCIAL INSECURITY: DO YOU FEEL UNSAFE GOING BACK TO THE PLACE WHERE YOU ARE LIVING?: NO

## 2024-05-28 SDOH — SOCIAL STABILITY: SOCIAL INSECURITY: DO YOU FEEL ANYONE HAS EXPLOITED OR TAKEN ADVANTAGE OF YOU FINANCIALLY OR OF YOUR PERSONAL PROPERTY?: NO

## 2024-05-28 ASSESSMENT — COGNITIVE AND FUNCTIONAL STATUS - GENERAL
HELP NEEDED FOR BATHING: A LOT
WALKING IN HOSPITAL ROOM: TOTAL
MOBILITY SCORE: 11
MOVING TO AND FROM BED TO CHAIR: A LOT
DRESSING REGULAR LOWER BODY CLOTHING: A LOT
DAILY ACTIVITIY SCORE: 16
EATING MEALS: A LITTLE
MOBILITY SCORE: 11
DRESSING REGULAR UPPER BODY CLOTHING: A LITTLE
TOILETING: A LITTLE
MOVING FROM LYING ON BACK TO SITTING ON SIDE OF FLAT BED WITH BEDRAILS: A LITTLE
PERSONAL GROOMING: A LITTLE
MOVING TO AND FROM BED TO CHAIR: A LOT
TOILETING: A LITTLE
HELP NEEDED FOR BATHING: A LOT
STANDING UP FROM CHAIR USING ARMS: A LOT
PERSONAL GROOMING: A LITTLE
CLIMB 3 TO 5 STEPS WITH RAILING: TOTAL
MOVING FROM LYING ON BACK TO SITTING ON SIDE OF FLAT BED WITH BEDRAILS: A LITTLE
DRESSING REGULAR UPPER BODY CLOTHING: A LITTLE
STANDING UP FROM CHAIR USING ARMS: A LOT
PATIENT BASELINE BEDBOUND: NO
TURNING FROM BACK TO SIDE WHILE IN FLAT BAD: A LOT
DRESSING REGULAR LOWER BODY CLOTHING: A LOT
WALKING IN HOSPITAL ROOM: TOTAL
EATING MEALS: A LITTLE
CLIMB 3 TO 5 STEPS WITH RAILING: TOTAL
DAILY ACTIVITIY SCORE: 16
TURNING FROM BACK TO SIDE WHILE IN FLAT BAD: A LOT

## 2024-05-28 ASSESSMENT — ACTIVITIES OF DAILY LIVING (ADL)
GROOMING: INDEPENDENT
ADEQUATE_TO_COMPLETE_ADL: YES
TOILETING: INDEPENDENT
BATHING: INDEPENDENT
HEARING - RIGHT EAR: FUNCTIONAL
FEEDING YOURSELF: INDEPENDENT
JUDGMENT_ADEQUATE_SAFELY_COMPLETE_DAILY_ACTIVITIES: YES
HEARING - LEFT EAR: FUNCTIONAL
DRESSING YOURSELF: INDEPENDENT
PATIENT'S MEMORY ADEQUATE TO SAFELY COMPLETE DAILY ACTIVITIES?: YES
ASSISTIVE_DEVICE: CANE;EYEGLASSES
WALKS IN HOME: INDEPENDENT

## 2024-05-28 ASSESSMENT — LIFESTYLE VARIABLES
HOW MANY STANDARD DRINKS CONTAINING ALCOHOL DO YOU HAVE ON A TYPICAL DAY: PATIENT DOES NOT DRINK
SKIP TO QUESTIONS 9-10: 1
AUDIT-C TOTAL SCORE: 0
HOW OFTEN DO YOU HAVE 6 OR MORE DRINKS ON ONE OCCASION: NEVER
AUDIT-C TOTAL SCORE: 0
HOW OFTEN DO YOU HAVE A DRINK CONTAINING ALCOHOL: NEVER

## 2024-05-28 ASSESSMENT — PATIENT HEALTH QUESTIONNAIRE - PHQ9
1. LITTLE INTEREST OR PLEASURE IN DOING THINGS: NOT AT ALL
2. FEELING DOWN, DEPRESSED OR HOPELESS: NOT AT ALL
SUM OF ALL RESPONSES TO PHQ9 QUESTIONS 1 & 2: 0

## 2024-05-28 ASSESSMENT — PAIN SCALES - GENERAL
PAINLEVEL_OUTOF10: 0 - NO PAIN
PAINLEVEL_OUTOF10: 3

## 2024-05-28 ASSESSMENT — PAIN - FUNCTIONAL ASSESSMENT: PAIN_FUNCTIONAL_ASSESSMENT: 0-10

## 2024-05-28 NOTE — PROGRESS NOTES
Physical Therapy                 Therapy Communication Note    Patient Name: Klaudia Ratliff  MRN: [id number]  Today's Date: 5/28/2024     Discipline: Physical Therapy    Missed Visit Reason: Missed Visit Reason:  (spoke with RN, pts HR fluctuating in 140s at rest with pauses. will re-attempt when medically appropriate.)    Missed Time: Attempt    Comment:

## 2024-05-28 NOTE — CONSULTS
Inpatient consult to Electrophysiology  Consult performed by: Shanthi Parrish MD  Consult ordered by: Jovan Zelaya MD    Inpatient Electrophysiology Consult Note  Reason for consult: Aflutter with RVR    HPI:   Klaudia Ratliff 68F PMHx HTN, RLE DVT/PE s/p thrombectomy+IVC-f 2/24 on eliquis, HFpEF (EF 60-65% 2/24)  admitted for perivisceral aortic endarterectomy/aortobifemoral bypass 5/16/24 c/b RP bleed now stabilized. EP consult for aflutter with RVR.     Tele showing Aflutter/fib with transitional pauses <3s and then SR. Asymptomatic, laying flat in bed.    All system reviewed and negative unless mentioned above.     Cardiac studies:   EKG 5/28 - Atrial flutter with variable conduction  EKG 5/20 - SR    TTE 2/21/24:   1. Left ventricular systolic function is normal with a 60-65% estimated ejection fraction.   2. There is an elevated mean left atrial pressure.   3. There is a prominent echodensity in the apex of the RV, similar in density to surrounding muscle, that is present on multiple views but is not visible with Definity. This may represent a large moderator band or RV trabeculations, vs less likely thrombus or myxoma. Recommend cMRI for further characterization.      Current Facility-Administered Medications:     acetaminophen (Tylenol) tablet 650 mg, 650 mg, oral, q8h PRN, Giuliano Delgado MD    aspirin chewable tablet 81 mg, 81 mg, oral, Daily, Luis Toussaint MD, 81 mg at 05/28/24 0831    atorvastatin (Lipitor) tablet 80 mg, 80 mg, oral, Nightly, Giuliano Delgado MD, 80 mg at 05/27/24 2033    bisacodyl (Dulcolax) suppository 10 mg, 10 mg, rectal, Daily PRN, Giuliano Delgado MD    dextrose 50 % injection 25 g, 25 g, intravenous, q15 min PRN **OR** [DISCONTINUED] glucagon (Glucagen) injection 1 mg, 1 mg, intramuscular, q15 min PRN, Spenser Diamond DO    gabapentin (Neurontin) capsule 100 mg, 100 mg, oral, BID, Giuliano Delgado MD, 100 mg at 05/28/24 0831    heparin 25,000 Units in dextrose 5% 250 mL  (100 Units/mL) infusion (premix), 0-4,000 Units/hr, intravenous, Continuous, Kae Steward, APRN-CNP, Last Rate: 11 mL/hr at 05/28/24 0831, 1,100 Units/hr at 05/28/24 0831    insulin lispro (HumaLOG) injection 0-5 Units, 0-5 Units, subcutaneous, 4x daily, Giuliano Delgado MD, 1 Units at 05/20/24 2025    magnesium sulfate IV 4 g, 4 g, intravenous, Once, Luis Toussaint MD, Last Rate: 25 mL/hr at 05/28/24 0837, 4 g at 05/28/24 0837    metoprolol tartrate (Lopressor) injection 5 mg, 5 mg, intravenous, Once, Kelsea Basilio MD    metoprolol tartrate (Lopressor) tablet 25 mg, 25 mg, oral, BID, Luis Toussaint MD, 25 mg at 05/28/24 0831    oxyCODONE (Roxicodone) immediate release tablet 10 mg, 10 mg, oral, q4h PRN, Giuliano Delgado MD, 10 mg at 05/26/24 2117    oxyCODONE (Roxicodone) immediate release tablet 5 mg, 5 mg, oral, q4h PRN, Giuliano Delgado MD, 5 mg at 05/27/24 1549    oxygen (O2) therapy, , inhalation, Continuous PRN - O2/gases, Giuliano Delgado MD    pantoprazole (ProtoNix) injection 40 mg, 40 mg, intravenous, Daily, Giuliano Delgado MD, 40 mg at 05/28/24 0841    piperacillin-tazobactam-dextrose (Zosyn) IV 3.375 g, 3.375 g, intravenous, q6h, Nivia Olea MD, Stopped at 05/28/24 0529    polyethylene glycol (Glycolax, Miralax) packet 17 g, 17 g, oral, Daily, Gloria Rubio MD, 17 g at 05/28/24 0841    potassium chloride 20 mEq in 100 mL IV premix, 20 mEq, intravenous, q2h, Luis Toussaint MD    sennosides-docusate sodium (Roxanna-Colace) 8.6-50 mg per tablet 2 tablet, 2 tablet, oral, BID, Giuliano Delgado MD, 2 tablet at 05/28/24 0831    sodium chloride (Ocean) 0.65 % nasal spray 1 spray, 1 spray, Each Nostril, 4x daily PRN, Giuliano Delgado MD, 1 spray at 05/20/24 2036    tiotropium (Spiriva Respimat) 2.5 mcg/actuation inhaler 2 puff, 2 puff, inhalation, Daily, Giuliano Delgado MD, 2 puff at 05/27/24 1127    [START ON 5/29/2024] vancomycin (Vancocin) in dextrose 5 % water (D5W) 250 mL IV 1,250 mg,  1,250 mg, intravenous, q24h, Tara Chávez    vancomycin (Vancocin) pharmacy to dose - pharmacy monitoring, , miscellaneous, Daily PRN, Nivia Olea MD    Objective:    Vitals:    05/28/24 0732   BP:    Pulse:    Resp:    Temp: 36.7 °C (98.1 °F)   SpO2:        Exam:  General - well appearing   Neck - No JVD   Chest - CTAB ant exam  Cardiac - S1, S2, regular  Lower ext - Mild LE edema     Labs/Imaging:     Results from last 72 hours   Lab Units 05/28/24  0710   SODIUM mmol/L 133*   POTASSIUM mmol/L 3.8   CO2 mmol/L 19*   BUN mg/dL 31*   CREATININE mg/dL 1.23*   MAGNESIUM mg/dL 1.58*   PHOSPHORUS mg/dL 4.3      Results from last 72 hours   Lab Units 05/28/24  0710   WBC AUTO x10*3/uL 14.8*   HEMOGLOBIN g/dL 11.3*   PLATELETS AUTO x10*3/uL 305        Assessment and Plan:   68F PMHx HTN, RLE DVT/PE s/p thrombectomy+IVC-f 2/24 on eliquis, admitted for perivisceral aortic endarterectomy/aortobifemoral bypass 5/16/24 c/b RP bleed now stabilized. EP consult for aflutter with RVR.     #Atrial flutter with RVR  #Atrial Fibrillation  CHADSVASC=4  Patient in new flutter with pauses which are happening as she flips back and forth into SR. Recommend attempting to maintain SR with AART and discharge with event monitor. Limited echo showing normal LVEF, LA/RA normal size.   - Please start amiodarone 400 BID while she is inpatient, please switch to 200mg BID x14 days then 200mg daily prior to discharge, which she will continue until EP follow up   - Patient will need to have a 30 day event monitor placed prior to discharge - Plan long term to discontinue amiodarone in 2-3 months and if pt has no further recurrence of AF then stop AC and monitor, if AF recurrent then will discuss plan for long term rhythm control and AC for CVA PPX  - Please check TSH level  - Please keep pt on tele and keep K>4, Mg>2  - Please arrange for follow up with EP Dr. Ahumada at [facility] in 8 weeks     Discussed with Dr. Ahumada. EP will follow.     Nell  Shivani  Cardiology Fellow   PGY4    I saw and evaluated the patient. I personally obtained the key and critical portions of the history and physical exam or was physically present for key and critical portions performed by the resident/fellow. I reviewed the resident/fellow's documentation and discussed the patient with the resident/fellow. I agree with the resident/fellow's medical decision making as documented in the note, and my edits (bold and italic) have been made to the document as needed.     Bobby Ahumada MD [facility]  Cardiac Electrophysiology    **Disclaimer: This note was dictated by speech recognition, and every effort has been made to prevent any error in transcription, however minor errors may be present**

## 2024-05-28 NOTE — PROGRESS NOTES
Occupational Therapy                 Therapy Communication Note    Patient Name: Klaudia Ratliff  MRN: [id number]  Today's Date: 5/28/2024     Discipline: Occupational Therapy    Missed Visit Reason: Missed Visit Reason:  (Per chart and PT, pt with elevated HR at this time, will follow up as appropriate.)    Missed Time: Attempt

## 2024-05-28 NOTE — PROGRESS NOTES
Vancomycin Dosing by Pharmacy- FOLLOW UP    Klaudia Ratliff is a 68 y.o. year old female who Pharmacy has been consulted for vancomycin dosing for other healthcare-associated abdominal infection . Based on the patient's indication and renal status this patient is being dosed based on a goal AUC of 400-600.     Renal function is currently stable, however a bit elevated. Continue monitoring.    Last vancomycin dose: 1500 mg given every 24 hours    Most recent random level: 20.4 mcg/mL    Visit Vitals  /85   Pulse (!) 157   Temp 36.7 °C (98.1 °F)   Resp 25        Lab Results   Component Value Date    CREATININE 1.23 (H) 2024    CREATININE 1.26 (H) 2024    CREATININE 1.12 (H) 2024    CREATININE 1.06 (H) 2024        Patient weight is as follows:   Vitals:    24 0443   Weight: 87.8 kg (193 lb 9 oz)       Cultures:  No results found for the encounter in last 14 days.       I/O last 3 completed shifts:  In: 320 (3.6 mL/kg) [P.O.:120; IV Piggyback:200]  Out: 2200 (25.1 mL/kg) [Urine:2200 (0.7 mL/kg/hr)]  Weight: 87.8 kg   I/O during current shift:  No intake/output data recorded.    Temp (24hrs), Av.8 °C (98.2 °F), Min:36.7 °C (98.1 °F), Max:37 °C (98.6 °F)      Assessment/Plan    Orders placed for vancomycin 1250 mg every 24 hours.    This dosing regimen is predicted by [employer] to result in the following pharmacokinetic parameters:  AUC24,ss: 494 mg/L.hr  Probability of AUC24 > 400: 83 %  Ctrough,ss: 14.7 mg/L  Probability of Ctrough,ss > 20: 17 %  Probability of nephrotoxicity (Lodise FERNANDO ): 10 %    The next level will be obtained on  with AM labs. May be obtained sooner if clinically indicated.   Will continue to monitor renal function daily while on vancomycin and order serum creatinine at least every 48 hours if not already ordered.  Follow for continued vancomycin needs, clinical response, and signs/symptoms of toxicity.       RAIN CONNOR, PharmD

## 2024-05-28 NOTE — PROGRESS NOTES
VASCULAR SURGERY PROGRESS NOTE  Subjective   No acute overnight events. This morning during rounds, patient developed tachycardia to the 140s and was found to be in atrial fibrillation. This self resolved. About an hour later, patient developed afib with RVR. A rapid response was called. The DACR at bedside ordered 1L bolus and 5mg IV metop and patient received 700cc prior to our teams arrival.  On our arrival, IVF was d/c'ed and patient received an additional 5mg of IV metop. She responded well and converted to NSR.    Since the rapid response, she has had intermittent breakthrough of atrial fibrillation, rate controlled. Her home dose of metop was increased to 25mg BID and she was given her morning dose early. Stat labs showed a mag of 1.5 which is now being repleted.    Pt states she was asymptomatic through all of the events. Denies fluttering or pounding in her chest, shortness of breath, dizziness, or chest pain. Overall, pt reports she feels well and does not have any acute concerns.     Objective   Vitals:  Heart Rate:  []   Temp:  [36.7 °C (98.1 °F)-37 °C (98.6 °F)]   Resp:  [16-25]   BP: (107-132)/(52-85)   Weight:  [87.8 kg (193 lb 9 oz)]   SpO2:  [91 %-97 %]     Exam:  Constitutional: No acute distress, resting comfortably  Neuro:  AOx3, grossly intact  CV: intermittently tachycardic on bedside monitor  Pulm: non-labored on nasal cannula, old CT site seems well apposed now and with minimal serous drainage  GI: soft, appropriately tender, thoraco-RP incision covered with dressing; skin closed and overlain with dermabond  Skin: Much improved R foot blistering from volume overload  Musculoskeletal: moving all extremities  Extremities: Palpable bilateral DPs, sensory intact in bilateral lower extremities, motor is difficult but on encouragement can move b/l LE with good strength. L groin intact, healing well; R groin with blistering that has dried out and healing well, incision proper is healing well  intact. RLE blister clean and dry    Labs:  Results from last 7 days   Lab Units 05/28/24  0710 05/28/24  0656 05/27/24  0728   WBC AUTO x10*3/uL 14.8* 15.8* 17.7*   HEMOGLOBIN g/dL 11.3* 11.7* 11.7*   PLATELETS AUTO x10*3/uL 305 336 294      Results from last 7 days   Lab Units 05/28/24  0710 05/28/24  0656 05/27/24  1547   SODIUM mmol/L 133* 133* 132*   POTASSIUM mmol/L 3.8 3.6 4.3   CHLORIDE mmol/L 98 97* 99   CO2 mmol/L 19* 23 16*   BUN mg/dL 31* 32* 34*   CREATININE mg/dL 1.23* 1.26* 1.12*   GLUCOSE mg/dL 104* 110* 107*   MAGNESIUM mg/dL 1.58* 1.55*  --    PHOSPHORUS mg/dL 4.3 4.2 4.0      Results from last 7 days   Lab Units 05/28/24  0710 05/25/24  0716 05/24/24  0143   INR  1.4* 1.2* 1.3*   PROTIME seconds 15.3* 13.2* 14.2*   APTT seconds 26* 26* 25*     Assessment/Plan   Klaudia Ratliff is 68 y.o. female with history of HTN, HLD, obesity, COPD, gout, RLE DVT/PE s/p mechanical thrombectomy and IVC filter (2/2024, on [facility]) who is perivisceral aortic endarterectomy and aortobifemoral bypass. Transferred back to ICU on 5/22 for hypotension secondary to slow, active extravasation in RP seen on CTA    Overall hemodynamically stable without fevers. She feels very well today. Tolerating diet, passing bowel movements and voiding after fox. Rising leukocytosis and so cultures taken and empiric abx initiated; has had several prior UTIs that postponed surgery. Otherwise needs aggressive diuresis, PT/OT.    Today developed AF with RVR. Was asymptomatic during the episode. Now rate controlled and intermittently converting back to NSR. Continues to need aggressive diruesis, electrolyte repletion, and monitoring of cardiac rhythm    Plan:  - Continue care on T7 SDU  - q2 neurovascular checks  - Regular diet as tolerated  - Continue to trend CBC  - ASA. Will start heparin gtt  - continue increased dose of PO metop 25mg BID  - will give prn IV metop as needed  - Strict monitoring of I/O  - Trend LFTs  - OOB/PT/OT  -  pain control  - Maintain K >4, Mg >2  -will follow up PM labs  - SCDs    Discussed with Dr. Amanda Basilio MD  General Surgery PGY1  Vascular Surgery Service

## 2024-05-28 NOTE — PROGRESS NOTES
Transitional Care Coordination Progress Note:  PLAN: Afib with RVR today. Consulting EP. Diuresing. Awaiting labs.     PAYOR: [employer]    DISPO: ADOD Wednesday or Thursday pending EP recs and labs. Accepted at [facility] and will need precert. Need updated PT/OT for precert - requested in [facility] communication columns. Updates sent to [facility].     SUPPORT/CONTACT: Kai Slaughter, [phone]    Kymberly Fierro RN, [facility]

## 2024-05-28 NOTE — SIGNIFICANT EVENT
05/28/24 0725   Onset Documentation   Rapid Response Initiated By RN   Location/Room [facility]   Pager Time [phone]   Arrival Time 0650   Event End Time 0725   Level II Called No   Primary Reason for Call HR greater than or equal to 130     Rapid called for new onset a fib RVR. Rapid Team to bedside with VIVI Perkins. -194 on monitor, repeat BP 88/64. Patient awake, alert, overall asymptomatic. Primary team requested back to bedside.  1L LR initiated via pressure bag. Secondary PIV access large bore accessed, labs obtained. BP increased to 117/85 and Metop 5 mg IVP administered with improvement in HR to 120's (a fib). Primary team resident to bedside. IV fluids discontinued (700cc infused total). Second dose of Metop 5 mg IVP given. HR remained  a fib however team did not want to repeat dose. Patient to remain on current division. Team talked about increasing PO dose.

## 2024-05-29 LAB
ALBUMIN SERPL BCP-MCNC: 2.9 G/DL (ref 3.4–5)
ANION GAP SERPL CALC-SCNC: 13 MMOL/L (ref 10–20)
ANION GAP SERPL CALC-SCNC: 15 MMOL/L (ref 10–20)
BUN SERPL-MCNC: 30 MG/DL (ref 6–23)
BUN SERPL-MCNC: 30 MG/DL (ref 6–23)
CALCIUM SERPL-MCNC: 8.1 MG/DL (ref 8.6–10.6)
CALCIUM SERPL-MCNC: 8.4 MG/DL (ref 8.6–10.6)
CHLORIDE SERPL-SCNC: 98 MMOL/L (ref 98–107)
CHLORIDE SERPL-SCNC: 98 MMOL/L (ref 98–107)
CO2 SERPL-SCNC: 25 MMOL/L (ref 21–32)
CO2 SERPL-SCNC: 26 MMOL/L (ref 21–32)
CREAT SERPL-MCNC: 1.18 MG/DL (ref 0.5–1.05)
CREAT SERPL-MCNC: 1.23 MG/DL (ref 0.5–1.05)
EGFRCR SERPLBLD CKD-EPI 2021: 48 ML/MIN/1.73M*2
EGFRCR SERPLBLD CKD-EPI 2021: 51 ML/MIN/1.73M*2
ERYTHROCYTE [DISTWIDTH] IN BLOOD BY AUTOMATED COUNT: 15.2 % (ref 11.5–14.5)
ERYTHROCYTE [DISTWIDTH] IN BLOOD BY AUTOMATED COUNT: 15.4 % (ref 11.5–14.5)
GLUCOSE BLD MANUAL STRIP-MCNC: 121 MG/DL (ref 74–99)
GLUCOSE SERPL-MCNC: 113 MG/DL (ref 74–99)
GLUCOSE SERPL-MCNC: 95 MG/DL (ref 74–99)
HCT VFR BLD AUTO: 32.7 % (ref 36–46)
HCT VFR BLD AUTO: 32.7 % (ref 36–46)
HGB BLD-MCNC: 10.7 G/DL (ref 12–16)
HGB BLD-MCNC: 11 G/DL (ref 12–16)
LABORATORY COMMENT REPORT: NORMAL
LABORATORY COMMENT REPORT: NORMAL
MAGNESIUM SERPL-MCNC: 2.17 MG/DL (ref 1.6–2.4)
MAGNESIUM SERPL-MCNC: 2.19 MG/DL (ref 1.6–2.4)
MCH RBC QN AUTO: 30.5 PG (ref 26–34)
MCH RBC QN AUTO: 31.1 PG (ref 26–34)
MCHC RBC AUTO-ENTMCNC: 32.7 G/DL (ref 32–36)
MCHC RBC AUTO-ENTMCNC: 33.6 G/DL (ref 32–36)
MCV RBC AUTO: 92 FL (ref 80–100)
MCV RBC AUTO: 93 FL (ref 80–100)
NRBC BLD-RTO: 0 /100 WBCS (ref 0–0)
NRBC BLD-RTO: 0 /100 WBCS (ref 0–0)
PATH REPORT.FINAL DX SPEC: NORMAL
PATH REPORT.FINAL DX SPEC: NORMAL
PATH REPORT.GROSS SPEC: NORMAL
PATH REPORT.GROSS SPEC: NORMAL
PATH REPORT.RELEVANT HX SPEC: NORMAL
PATH REPORT.RELEVANT HX SPEC: NORMAL
PATH REPORT.TOTAL CANCER: NORMAL
PATH REPORT.TOTAL CANCER: NORMAL
PHOSPHATE SERPL-MCNC: 4.2 MG/DL (ref 2.5–4.9)
PLATELET # BLD AUTO: 393 X10*3/UL (ref 150–450)
PLATELET # BLD AUTO: 407 X10*3/UL (ref 150–450)
POTASSIUM SERPL-SCNC: 3.4 MMOL/L (ref 3.5–5.3)
POTASSIUM SERPL-SCNC: 3.5 MMOL/L (ref 3.5–5.3)
RBC # BLD AUTO: 3.51 X10*6/UL (ref 4–5.2)
RBC # BLD AUTO: 3.54 X10*6/UL (ref 4–5.2)
SODIUM SERPL-SCNC: 133 MMOL/L (ref 136–145)
SODIUM SERPL-SCNC: 135 MMOL/L (ref 136–145)
TSH SERPL-ACNC: 3.17 MIU/L (ref 0.44–3.98)
UFH PPP CHRO-ACNC: 0.3 IU/ML
UFH PPP CHRO-ACNC: 0.5 IU/ML
UFH PPP CHRO-ACNC: 0.7 IU/ML
VANCOMYCIN SERPL-MCNC: 23 UG/ML (ref 5–20)
WBC # BLD AUTO: 10.9 X10*3/UL (ref 4.4–11.3)
WBC # BLD AUTO: 12.1 X10*3/UL (ref 4.4–11.3)

## 2024-05-29 PROCEDURE — 2500000002 HC RX 250 W HCPCS SELF ADMINISTERED DRUGS (ALT 637 FOR MEDICARE OP, ALT 636 FOR OP/ED): Mod: MUE

## 2024-05-29 PROCEDURE — 97110 THERAPEUTIC EXERCISES: CPT | Mod: GP

## 2024-05-29 PROCEDURE — 2500000004 HC RX 250 GENERAL PHARMACY W/ HCPCS (ALT 636 FOR OP/ED): Performed by: NURSE PRACTITIONER

## 2024-05-29 PROCEDURE — 2500000001 HC RX 250 WO HCPCS SELF ADMINISTERED DRUGS (ALT 637 FOR MEDICARE OP): Performed by: STUDENT IN AN ORGANIZED HEALTH CARE EDUCATION/TRAINING PROGRAM

## 2024-05-29 PROCEDURE — 97535 SELF CARE MNGMENT TRAINING: CPT | Mod: GO

## 2024-05-29 PROCEDURE — 99233 SBSQ HOSP IP/OBS HIGH 50: CPT | Performed by: NURSE PRACTITIONER

## 2024-05-29 PROCEDURE — 2060000001 HC INTERMEDIATE ICU ROOM DAILY

## 2024-05-29 PROCEDURE — 80048 BASIC METABOLIC PNL TOTAL CA: CPT | Mod: CCI | Performed by: STUDENT IN AN ORGANIZED HEALTH CARE EDUCATION/TRAINING PROGRAM

## 2024-05-29 PROCEDURE — 82947 ASSAY GLUCOSE BLOOD QUANT: CPT

## 2024-05-29 PROCEDURE — 2500000002 HC RX 250 W HCPCS SELF ADMINISTERED DRUGS (ALT 637 FOR MEDICARE OP, ALT 636 FOR OP/ED)

## 2024-05-29 PROCEDURE — 2500000004 HC RX 250 GENERAL PHARMACY W/ HCPCS (ALT 636 FOR OP/ED)

## 2024-05-29 PROCEDURE — 80202 ASSAY OF VANCOMYCIN: CPT

## 2024-05-29 PROCEDURE — 94640 AIRWAY INHALATION TREATMENT: CPT

## 2024-05-29 PROCEDURE — 36415 COLL VENOUS BLD VENIPUNCTURE: CPT | Performed by: NURSE PRACTITIONER

## 2024-05-29 PROCEDURE — 99233 SBSQ HOSP IP/OBS HIGH 50: CPT | Performed by: STUDENT IN AN ORGANIZED HEALTH CARE EDUCATION/TRAINING PROGRAM

## 2024-05-29 PROCEDURE — 85027 COMPLETE CBC AUTOMATED: CPT | Performed by: NURSE PRACTITIONER

## 2024-05-29 PROCEDURE — 2500000004 HC RX 250 GENERAL PHARMACY W/ HCPCS (ALT 636 FOR OP/ED): Performed by: STUDENT IN AN ORGANIZED HEALTH CARE EDUCATION/TRAINING PROGRAM

## 2024-05-29 PROCEDURE — 97530 THERAPEUTIC ACTIVITIES: CPT | Mod: GP

## 2024-05-29 PROCEDURE — 83735 ASSAY OF MAGNESIUM: CPT | Mod: 91 | Performed by: STUDENT IN AN ORGANIZED HEALTH CARE EDUCATION/TRAINING PROGRAM

## 2024-05-29 PROCEDURE — 85027 COMPLETE CBC AUTOMATED: CPT | Mod: 91 | Performed by: STUDENT IN AN ORGANIZED HEALTH CARE EDUCATION/TRAINING PROGRAM

## 2024-05-29 PROCEDURE — 85520 HEPARIN ASSAY: CPT | Performed by: NURSE PRACTITIONER

## 2024-05-29 PROCEDURE — 84520 ASSAY OF UREA NITROGEN: CPT | Mod: 91

## 2024-05-29 PROCEDURE — 83735 ASSAY OF MAGNESIUM: CPT

## 2024-05-29 PROCEDURE — A4217 STERILE WATER/SALINE, 500 ML: HCPCS

## 2024-05-29 PROCEDURE — 2500000002 HC RX 250 W HCPCS SELF ADMINISTERED DRUGS (ALT 637 FOR MEDICARE OP, ALT 636 FOR OP/ED): Mod: MUE | Performed by: STUDENT IN AN ORGANIZED HEALTH CARE EDUCATION/TRAINING PROGRAM

## 2024-05-29 PROCEDURE — C9113 INJ PANTOPRAZOLE SODIUM, VIA: HCPCS | Performed by: STUDENT IN AN ORGANIZED HEALTH CARE EDUCATION/TRAINING PROGRAM

## 2024-05-29 RX ORDER — POTASSIUM CHLORIDE 20 MEQ/1
40 TABLET, EXTENDED RELEASE ORAL ONCE
Status: DISCONTINUED | OUTPATIENT
Start: 2024-05-29 | End: 2024-05-29

## 2024-05-29 RX ORDER — FUROSEMIDE 10 MG/ML
40 INJECTION INTRAMUSCULAR; INTRAVENOUS ONCE
Status: COMPLETED | OUTPATIENT
Start: 2024-05-29 | End: 2024-05-29

## 2024-05-29 RX ORDER — POTASSIUM CHLORIDE 14.9 MG/ML
20 INJECTION INTRAVENOUS
Status: DISCONTINUED | OUTPATIENT
Start: 2024-05-29 | End: 2024-05-29

## 2024-05-29 RX ORDER — POTASSIUM CHLORIDE 20 MEQ/1
40 TABLET, EXTENDED RELEASE ORAL
Status: DISCONTINUED | OUTPATIENT
Start: 2024-05-29 | End: 2024-05-30

## 2024-05-29 RX ORDER — MAGNESIUM SULFATE HEPTAHYDRATE 40 MG/ML
2 INJECTION, SOLUTION INTRAVENOUS ONCE
Status: COMPLETED | OUTPATIENT
Start: 2024-05-29 | End: 2024-05-29

## 2024-05-29 RX ORDER — POTASSIUM CHLORIDE 20 MEQ/1
60 TABLET, EXTENDED RELEASE ORAL ONCE
Status: COMPLETED | OUTPATIENT
Start: 2024-05-29 | End: 2024-05-29

## 2024-05-29 RX ORDER — MAGNESIUM SULFATE HEPTAHYDRATE 40 MG/ML
2 INJECTION, SOLUTION INTRAVENOUS ONCE
Status: COMPLETED | OUTPATIENT
Start: 2024-05-29 | End: 2024-05-30

## 2024-05-29 RX ADMIN — AMIODARONE HYDROCHLORIDE 400 MG: 200 TABLET ORAL at 20:47

## 2024-05-29 RX ADMIN — ASPIRIN 81 MG 81 MG: 81 TABLET ORAL at 08:44

## 2024-05-29 RX ADMIN — FUROSEMIDE 40 MG: 10 INJECTION, SOLUTION INTRAVENOUS at 11:57

## 2024-05-29 RX ADMIN — METOPROLOL TARTRATE 25 MG: 25 TABLET, FILM COATED ORAL at 08:45

## 2024-05-29 RX ADMIN — MAGNESIUM SULFATE HEPTAHYDRATE 2 G: 40 INJECTION, SOLUTION INTRAVENOUS at 11:57

## 2024-05-29 RX ADMIN — HEPARIN SODIUM 900 UNITS/HR: 10000 INJECTION, SOLUTION INTRAVENOUS at 12:09

## 2024-05-29 RX ADMIN — PIPERACILLIN SODIUM AND TAZOBACTAM SODIUM 3.38 G: 3; .375 INJECTION, SOLUTION INTRAVENOUS at 11:58

## 2024-05-29 RX ADMIN — SENNOSIDES AND DOCUSATE SODIUM 2 TABLET: 50; 8.6 TABLET ORAL at 08:45

## 2024-05-29 RX ADMIN — TIOTROPIUM BROMIDE INHALATION SPRAY 2 PUFF: 3.12 SPRAY, METERED RESPIRATORY (INHALATION) at 09:12

## 2024-05-29 RX ADMIN — AMIODARONE HYDROCHLORIDE 400 MG: 200 TABLET ORAL at 08:44

## 2024-05-29 RX ADMIN — ATORVASTATIN CALCIUM 80 MG: 80 TABLET, FILM COATED ORAL at 20:47

## 2024-05-29 RX ADMIN — POTASSIUM CHLORIDE 40 MEQ: 1500 TABLET, EXTENDED RELEASE ORAL at 23:07

## 2024-05-29 RX ADMIN — SENNOSIDES AND DOCUSATE SODIUM 2 TABLET: 50; 8.6 TABLET ORAL at 20:47

## 2024-05-29 RX ADMIN — PIPERACILLIN SODIUM AND TAZOBACTAM SODIUM 3.38 G: 3; .375 INJECTION, SOLUTION INTRAVENOUS at 17:05

## 2024-05-29 RX ADMIN — OXYCODONE HYDROCHLORIDE 5 MG: 5 TABLET ORAL at 23:23

## 2024-05-29 RX ADMIN — GABAPENTIN 100 MG: 100 CAPSULE ORAL at 08:44

## 2024-05-29 RX ADMIN — GABAPENTIN 100 MG: 100 CAPSULE ORAL at 20:47

## 2024-05-29 RX ADMIN — HEPARIN SODIUM 1100 UNITS/HR: 10000 INJECTION, SOLUTION INTRAVENOUS at 04:23

## 2024-05-29 RX ADMIN — POLYETHYLENE GLYCOL 3350 17 G: 17 POWDER, FOR SOLUTION ORAL at 08:44

## 2024-05-29 RX ADMIN — POTASSIUM CHLORIDE 60 MEQ: 1500 TABLET, EXTENDED RELEASE ORAL at 11:57

## 2024-05-29 RX ADMIN — MAGNESIUM SULFATE HEPTAHYDRATE 2 G: 40 INJECTION, SOLUTION INTRAVENOUS at 23:08

## 2024-05-29 RX ADMIN — PANTOPRAZOLE SODIUM 40 MG: 40 INJECTION, POWDER, FOR SOLUTION INTRAVENOUS at 08:45

## 2024-05-29 RX ADMIN — FUROSEMIDE 40 MG: 10 INJECTION, SOLUTION INTRAMUSCULAR; INTRAVENOUS at 23:02

## 2024-05-29 RX ADMIN — METOPROLOL TARTRATE 25 MG: 25 TABLET, FILM COATED ORAL at 20:47

## 2024-05-29 RX ADMIN — PIPERACILLIN SODIUM AND TAZOBACTAM SODIUM 3.38 G: 3; .375 INJECTION, SOLUTION INTRAVENOUS at 04:20

## 2024-05-29 ASSESSMENT — ACTIVITIES OF DAILY LIVING (ADL): HOME_MANAGEMENT_TIME_ENTRY: 16

## 2024-05-29 ASSESSMENT — COGNITIVE AND FUNCTIONAL STATUS - GENERAL
STANDING UP FROM CHAIR USING ARMS: A LOT
HELP NEEDED FOR BATHING: A LOT
PERSONAL GROOMING: A LITTLE
CLIMB 3 TO 5 STEPS WITH RAILING: TOTAL
DRESSING REGULAR UPPER BODY CLOTHING: A LITTLE
WALKING IN HOSPITAL ROOM: A LOT
MOVING FROM LYING ON BACK TO SITTING ON SIDE OF FLAT BED WITH BEDRAILS: A LITTLE
MOBILITY SCORE: 13
WALKING IN HOSPITAL ROOM: A LOT
MOVING FROM LYING ON BACK TO SITTING ON SIDE OF FLAT BED WITH BEDRAILS: A LITTLE
MOVING TO AND FROM BED TO CHAIR: A LOT
STANDING UP FROM CHAIR USING ARMS: A LOT
DRESSING REGULAR LOWER BODY CLOTHING: A LOT
MOBILITY SCORE: 12
TURNING FROM BACK TO SIDE WHILE IN FLAT BAD: A LITTLE
MOVING TO AND FROM BED TO CHAIR: A LOT
CLIMB 3 TO 5 STEPS WITH RAILING: TOTAL
TOILETING: A LOT
DRESSING REGULAR LOWER BODY CLOTHING: A LITTLE
DAILY ACTIVITIY SCORE: 15
HELP NEEDED FOR BATHING: A LOT
EATING MEALS: A LITTLE
TOILETING: A LITTLE
DAILY ACTIVITIY SCORE: 18
DRESSING REGULAR UPPER BODY CLOTHING: A LITTLE
PERSONAL GROOMING: A LITTLE
TURNING FROM BACK TO SIDE WHILE IN FLAT BAD: A LOT

## 2024-05-29 ASSESSMENT — PAIN - FUNCTIONAL ASSESSMENT
PAIN_FUNCTIONAL_ASSESSMENT: 0-10

## 2024-05-29 ASSESSMENT — PAIN SCALES - GENERAL
PAINLEVEL_OUTOF10: 5 - MODERATE PAIN
PAINLEVEL_OUTOF10: 0 - NO PAIN
PAINLEVEL_OUTOF10: 0 - NO PAIN
PAINLEVEL_OUTOF10: 1
PAINLEVEL_OUTOF10: 0 - NO PAIN

## 2024-05-29 ASSESSMENT — PAIN DESCRIPTION - LOCATION: LOCATION: ABDOMEN

## 2024-05-29 NOTE — PROGRESS NOTES
"Occupational Therapy    Occupational Therapy Treatment    Name: Klaudia Ratliff  MRN: [id number]  : 1955  Date: 24  Room: [address] [place] [address]      Time Calculation  Start Time: 1113  Stop Time: 1129  Time Calculation (min): 16 min    Assessment:  Evaluation/Treatment Tolerance: Patient limited by fatigue  Medical Staff Made Aware: Yes  End of Session Communication: Bedside nurse  End of Session Patient Position: Bed, 3 rail up, Alarm on  Plan:  Treatment Interventions: ADL retraining, Functional transfer training, UE strengthening/ROM, Endurance training, Neuromuscular reeducation  OT Frequency: 3 times per week  OT Discharge Recommendations: Moderate intensity level of continued care  Equipment Recommended upon Discharge:  (TBD at next level of care)  OT Recommended Transfer Status: Moderate assist  OT - OK to Discharge: Yes    Subjective   General:  OT Last Visit  OT Received On: 24  Prior to Session Communication: Bedside nurse  Patient Position Received: Bed, 3 rail up, Alarm on  Family/Caregiver Present: No  General Comment: Pt agreeable to OT session, however reports fatigue and \"my head is still spinning\" from previous PT session.   Precautions:  Hearing/Visual Limitations: WFL  Medical Precautions: Fall precautions, Cardiac precautions, Abdominal precautions  Post-Surgical Precautions: Abdominal surgery precautions  Precautions Comment: MAP >80, SpO2 >94%  Cognition:  Orientation Level: Oriented X4    Pain Assessment:  Pain Assessment  Pain Assessment: 0-10  Pain Score: 1  Pain Type: Acute pain  Pain Location: Abdomen  Pain Orientation: Left     Objective   Activities of Daily Living:    Grooming  Grooming Level of Assistance: Close supervision  Grooming Where Assessed: Bed level  Grooming Comments: Education provided on improved positioning in bed for grooming, pt denies comleting while sitting EOB. Setup assist and SUP/SBA provided for safety. Increased time to complete.  Bed " Mobility/Transfers:     Bed Mobility  Bed Mobility: No (Pt denies due to fatigue from previous PT session.)       Outcome Measures:  [facility] Daily Activity  Putting on and taking off regular lower body clothing: A lot  Bathing (including washing, rinsing, drying): A lot  Putting on and taking off regular upper body clothing: A little  Toileting, which includes using toilet, bedpan or urinal: A lot  Taking care of personal grooming such as brushing teeth: A little  Eating Meals: A little  Daily Activity - Total Score: 15     Education Documentation  Body Mechanics, taught by Mu Valenzuela OT at 5/29/2024 12:10 PM.  Learner: Patient  Readiness: Acceptance  Method: Explanation, Demonstration  Response: Verbalizes Understanding, Demonstrated Understanding    Precautions, taught by Mu Valenzuela OT at 5/29/2024 12:10 PM.  Learner: Patient  Readiness: Acceptance  Method: Explanation, Demonstration  Response: Verbalizes Understanding, Demonstrated Understanding    ADL Training, taught by Mu Valenzuela OT at 5/29/2024 12:10 PM.  Learner: Patient  Readiness: Acceptance  Method: Explanation, Demonstration  Response: Verbalizes Understanding, Demonstrated Understanding    Education Comments  No comments found.        Goals:  Encounter Problems       Encounter Problems (Active)       ADLs       Patient will complete lower body dressing with MIN A for donning and doffing all LE clothes in order to increase Indep with task participation.  (Progressing)       Start:  05/20/24    Expected End:  06/03/24            Patient will complete toileting, including clothing management and hygiene, with MIN A in order to maximize functional Indep with task completion.  (Progressing)       Start:  05/20/24    Expected End:  06/03/24               EXERCISE/STRENGTHENING       Pt will increase overall BUE strength to 4/5 in order to increase functional task participation.  (Progressing)       Start:  05/20/24    Expected End:  06/03/24             Pt will increase endurance to tolerate 15-20min of activity with no more than 1 rest break in order to increase ability to engage in ADL completion.  (Progressing)       Start:  05/20/24    Expected End:  06/03/24               TRANSFERS       Patient will complete functional transfers using least restrictive device with CGA in order to maximize functional potential and increase safety.  (Progressing)       Start:  05/20/24    Expected End:  06/03/24 05/29/24 at 12:10 PM   Mu Valenzuela, OT   [phone]

## 2024-05-29 NOTE — SIGNIFICANT EVENT
RAPID RESPONSE RN NOTE - RADAR 6   05/29/24 1041   Onset Documentation   Rapid Response Initiated By Radar auto page   Location/Room [facility] [place]  (LT 7031)   Pager Time 1041   Arrival Time 1042   Event End Time 1055   Level II Called No   Primary Reason for Call Radar auto page  (RADAR 6)     VS: 36.6, 70, 22, 91/55, 93%.  Upon arrival to room patient found sitting up in bed, on phone, comfortable and laughing.  VS consistent with 24 hr trend- Nursing to contact Rapid Response Team with any acute concerns or patient deterioration.

## 2024-05-29 NOTE — SIGNIFICANT EVENT
Rapid Response RN Note    Rapid response RN at bedside for RADAR score 6 due to the following VS: T 36.2 °Celsius; HR 72; RR 22; /82; SPO2 91%.     Reviewed above VS with bedside RN.  VS within patient's current trends. Ordered SPO2 goal >88%.  Patient denied pain, shortness of breath, dizziness or lightheadedness.  No interventions by rapid response team indicated at this time.      Staff to page rapid response for any concerns or acute change in condition/VS.

## 2024-05-29 NOTE — CONSULTS
"Nutrition Follow Up Assessment:   Nutrition Assessment    Reason for Assessment: Dietitian discretion (nutrition follow up)    Patient is a 68 y.o. female presenting for vascular surgery    5/16 s/p perivisceral aortic endarterectomy, aortobifemoral bypass graft, left renal bypass, and right femoral endarterectomy    5/22 transferred to ICU for hypotension secondary to slow, active extravasation in RP seen on CTA, returned to T7 on 5/24 5/24 Diet advanced to solids   5/28   developed AF with RVR. Was asymptomatic during the episode. Continues to need aggressive diruesis, electrolyte repletion, and monitoring of cardiac rhythm.     HTN, HLD, obesity, COPD, gout, RLE DVT/PE s/p mechanical thrombectomy and IVC filter     Nutrition History:  Energy Intake: Poor < 50 %  Food and Nutrient History: Pt was NPO or clear liquids x 9 days. Pt states that she has been eating about 25% of meals. Her appetite is ok but food has been tasting off, metallic tasting. Meats taste the worst and sweets are tasting the best. She is ordering cottage cheese and yogurt instead of meat. She was drinking the Ensure Clear but has stopped because she felt it was too heavy. She politely declined other supplements.       Anthropometrics:  Height: 157.5 cm (5' 2\")   Weight: 88 kg (194 lb 0.1 oz)   BMI (Calculated): 35.47  IBW/kg (Dietitian Calculated): 50 kg  Percent of IBW: 176 %       Weight History:   Weight         5/24/2024  0500 5/25/2024  0400 5/27/2024  0600 5/28/2024  0443 5/29/2024  0456    Weight: 92.5 kg (203 lb 14.8 oz) 89.3 kg (196 lb 13.9 oz) 86.3 kg (190 lb 4.1 oz) 87.8 kg (193 lb 9 oz) 88 kg (194 lb 0.1 oz)           Weight remains ~15kg over admission weight      Nutrition Significant Labs:  CBC Trend:   Results from last 7 days   Lab Units 05/29/24  0828 05/28/24  0710 05/28/24  0656 05/27/24  0728   WBC AUTO x10*3/uL 12.1* 14.8* 15.8* 17.7*   RBC AUTO x10*6/uL 3.51* 3.78* 3.80* 3.77*   HEMOGLOBIN g/dL 10.7* 11.3* 11.7* 11.7* "   HEMATOCRIT % 32.7* 37.1 35.5* 36.4   MCV fL 93 98 93 97   PLATELETS AUTO x10*3/uL 393 305 336 294    , BMP Trend:   Results from last 7 days   Lab Units 05/29/24  0828 05/28/24  1654 05/28/24  0710 05/28/24  0656   GLUCOSE mg/dL 95 121* 104* 110*   CALCIUM mg/dL 8.1* 7.8* 7.8* 8.4*   SODIUM mmol/L 133* 133* 133* 133*   POTASSIUM mmol/L 3.4* 4.5 3.8 3.6   CO2 mmol/L 25 24 19* 23   CHLORIDE mmol/L 98 99 98 97*   BUN mg/dL 30* 31* 31* 32*   CREATININE mg/dL 1.23* 1.12* 1.23* 1.26*        Nutrition Specific Medications:  None    I/O:   Last BM Date: 05/25/24; Stool Appearance: Formed, Soft (05/25/24 1230)    Dietary Orders (From admission, onward)       Start     Ordered    05/24/24 1254  Adult diet Regular  Diet effective now        Question:  Diet type  Answer:  Regular    05/24/24 1254    05/24/24 1254  Oral nutritional supplements  Until discontinued        Question Answer Comment   Deliver with All meals    Select supplement: Ensure Clear        05/24/24 1254                     Estimated Needs:   Total Energy Estimated Needs (kCal): 1800 kCal  Method for Estimating Needs: 25kcal/kg UBW  Total Protein Estimated Needs (g): 70 g  Method for Estimating Needs: 1.4 gm/kg IBW  Total Fluid Estimated Needs (mL):  (per team)        Nutrition Diagnosis   Malnutrition Diagnosis  Patient has Malnutrition Diagnosis: No    Nutrition Diagnosis  Patient has Nutrition Diagnosis: Yes  Diagnosis Status (1): Declining  Nutrition Diagnosis 1: Inadequate oral intake  Related to (1): extended NPO/clear liquid status  As Evidenced by (1): NPO x4 days and clear liquid day #3.       Nutrition Interventions/Recommendations         Nutrition Prescription:  Individualized Nutrition Prescription Provided for : Stop Ensure Clear per pt request. Continue Regular diet. Encourage intake. Monitor need for supplements        Nutrition Interventions:   Goal: Consume >75% of meals.      Nutrition Education:   Discussed importance of improving PO  intake to help in recovery.        Nutrition Monitoring and Evaluation   Food/Nutrient Related History Monitoring  Monitoring and Evaluation Plan: Energy intake, Amount of food  Criteria: PO intake will be adequate to meet nutrition needs    Body Composition/Growth/Weight History  Monitoring and Evaluation Plan: Weight  Criteria: reduce weight from fluid    Biochemical Data, Medical Tests and Procedures  Monitoring and Evaluation Plan: Electrolyte/renal panel, Glucose/endocrine profile  Criteria: Labs WNL    Time Spent/Follow-up Reminder:   Time Spent (min): 45 minutes  Last Date of Nutrition Visit: 05/29/24  Nutrition Follow-Up Needed?: Dietitian to reassess per policy

## 2024-05-29 NOTE — PROGRESS NOTES
Physical Therapy    Physical Therapy Treatment    Patient Name: Klaudia Ratliff  MRN: [id number]  Today's Date: 5/29/2024  Time Calculation  Start Time: 0937  Stop Time: 1015  Time Calculation (min): 38 min       Assessment/Plan   PT Assessment  End of Session Communication: Bedside nurse  End of Session Patient Position: Bed, 3 rail up, Alarm off, not on at start of session  PT Plan  Inpatient/Swing Bed or Outpatient: Inpatient  PT Plan  Treatment/Interventions: Bed mobility, Transfer training, Gait training, Balance training, Neuromuscular re-education, Strengthening, Endurance training, Therapeutic exercise, Therapeutic activity  PT Plan: Skilled PT  PT Frequency: 3 times per week  PT Discharge Recommendations: Moderate intensity level of continued care  Equipment Recommended upon Discharge:  (TBD at next level of care)  PT Recommended Transfer Status: Assist x1, Assistive device  PT - OK to Discharge: Yes (eval complete and discharge recommendations made)      General Visit Information:   PT  Visit  PT Received On: 05/29/24  Prior to Session Communication: Bedside nurse  Patient Position Received: Bed, 3 rail up, Alarm off, not on at start of session  Family/Caregiver Present: No  General Comment: pt supine upon arrival. alert and agreeable for PT. eager to sit EOB. at times anxious in regards to STS. Able to complete x2 STS with FWW and modA. took ~4 lateral steps with modA and FWW. declined sitting in chair, plan for next session     Subjective   Precautions:  Precautions  Medical Precautions: Fall precautions, Cardiac precautions, Abdominal precautions  Post-Surgical Precautions: Abdominal surgery precautions  Precautions Comment: MAP >80, SpO2 >94%  Vital Signs:  Vital Signs  Heart Rate: 73  SpO2: 94 %  BP: 107/66  BP Location: Left arm  BP Method: Automatic  Patient Position: Sitting    Objective   Pain:  Pain Assessment  Pain Assessment: 0-10  Pain Score: 0 - No pain  Cognition:  Cognition  Orientation  Level: Oriented X4  Lines/Tubes/Drains:   IV; tele; continuous pulse-ox   PT Treatments:  Therapeutic Exercise  Therapeutic Exercise Performed: Yes  Therapeutic Exercise Activity 1: 1x10 heel raises in sitting  Therapeutic Exercise Activity 2: 1x10 toe raises in sitting  Therapeutic Exercise Activity 3: 1x10 LAQs BLE  Therapeutic Activity  Therapeutic Activity Performed: Yes  Therapeutic Activity 1: pt sat EOB ~15-18 min with grossly SBA. no LOB.  Therapeutic Activity 2: completed dynamic sitting activities at EOB and ther-ex. pt demonstrated good upright posture  Therapeutic Activity 3: x2 STS from EOB, height of bed elevated, BUE support on FWW. VC on body mechanics. able to take lateral steps with modA and FWW, forward flexed posture     Bed Mobility  Bed Mobility: Yes  Bed Mobility 1  Bed Mobility 1: Supine to sitting, Sitting to supine  Level of Assistance 1: Moderate assistance, Moderate verbal cues  Bed Mobility Comments 1: HOB min elevated; log roll  Bed Mobility 2  Bed Mobility  2: Scooting  Level of Assistance 2: Maximum assistance, +2  Bed Mobility Comments 2: boost towards HOB  Ambulation/Gait Training  Ambulation/Gait Training Performed: Yes  Ambulation/Gait Training 1  Surface 1: Level tile  Device 1: Rolling walker  Assistance 1: Moderate assistance, Moderate verbal cues  Quality of Gait 1: Wide base of support, Inconsistent stride length, Decreased step length, Soft knee(s), Forward flexed posture, Antalgic  Comments/Distance (ft) 1: ~4 lateral steps to the R  Transfers  Transfer: Yes  Transfer 1  Transfer From 1: Bed to  Transfer to 1: Stand, Sit  Technique 1: Sit to stand, Stand to sit  Transfer Device 1: Walker  Transfer Level of Assistance 1: Moderate assistance, Moderate verbal cues  Trials/Comments 1: x2 trials. heavy forward flexed posture 1st standing trial. VC to extend hips, trunk and knees. improved posture with 2nd standing trial however still favored mild forward flexed  posture.  Stairs  Stairs: No          Outcome Measures:  [facility] Basic Mobility  Turning from your back to your side while in a flat bed without using bedrails: A little  Moving from lying on your back to sitting on the side of a flat bed without using bedrails: A lot  Moving to and from bed to chair (including a wheelchair): A lot  Standing up from a chair using your arms (e.g. wheelchair or bedside chair): A lot  To walk in hospital room: A lot  Climbing 3-5 steps with railing: Total  Basic Mobility - Total Score: 12                            Education Documentation  Home Exercise Program, taught by Saba Smith PT at 5/29/2024 12:50 PM.  Learner: Patient  Readiness: Acceptance  Method: Explanation  Response: Verbalizes Understanding    Precautions, taught by Saba Smith PT at 5/29/2024 12:50 PM.  Learner: Patient  Readiness: Acceptance  Method: Explanation  Response: Verbalizes Understanding    Body Mechanics, taught by Saba Smith PT at 5/29/2024 12:50 PM.  Learner: Patient  Readiness: Acceptance  Method: Explanation  Response: Verbalizes Understanding    Mobility Training, taught by Saba Smith PT at 5/29/2024 12:50 PM.  Learner: Patient  Readiness: Acceptance  Method: Explanation  Response: Verbalizes Understanding    Education Comments  No comments found.          OP EDUCATION:       Encounter Problems       Encounter Problems (Active)       Balance       Pt will demonstrate ability to complete sitting/standing static/dynamic balance activities with unilateral UE support and no LOB for increase in safety up D/C.  (Progressing)       Start:  05/17/24    Expected End:  05/31/24               Mobility       Pt will demonstrated ability to ambulate >/=50ft with proper form, LRAD and no balance deficits for safe DC.   (Progressing)       Start:  05/17/24    Expected End:  05/31/24            Pt will demonstrate ability to complete ther ex activities to improve functional strength  for increase in independence upon DC.  (Progressing)       Start:  05/17/24    Expected End:  05/31/24               PT Transfers       Pt will demonstrated ability to complete bed mobility and sit<>stand transfers without assistance and use of assistive device to safely DC. (Progressing)       Start:  05/17/24    Expected End:  05/31/24 05/29/24 at 12:52 PM   Saba Smith, PT   Rehab Office: [phone]

## 2024-05-29 NOTE — PROGRESS NOTES
VASCULAR SURGERY PROGRESS NOTE  Subjective   Feels better Today, happy that she was able to work with PT. Denies CP, dyspnea, N/V. Voiding and having BMs    Objective   Vitals:    05/29/24 1200   BP: 98/56   Pulse: 66   Resp: 24   Temp:    SpO2: 94%      Exam:  Constitutional: No acute distress, resting comfortably  Neuro:  AOx3, grossly intact  ENMT: moist mucous membranes  CV: no tachycardia, monitor with NSR  Pulm: non-labored on room air  GI: soft, non-tender, non-distended  Skin: warm and dry, bilateral groin surgical wounds dry and well approximated  Musculoskeletal: moving all extremities  Extremities: palpable DP bilateral, 2+ pedal edema. Feet warm    Relevant Results  Medications:  Scheduled Meds:amiodarone, 400 mg, oral, BID  aspirin, 81 mg, oral, Daily  atorvastatin, 80 mg, oral, Nightly  gabapentin, 100 mg, oral, BID  magnesium sulfate, 2 g, intravenous, Once  metoprolol, 5 mg, intravenous, Once  metoprolol tartrate, 25 mg, oral, BID  pantoprazole, 40 mg, intravenous, Daily  piperacillin-tazobactam, 3.375 g, intravenous, q6h  polyethylene glycol, 17 g, oral, Daily  sennosides-docusate sodium, 2 tablet, oral, BID  tiotropium, 2 puff, inhalation, Daily  vancomycin, 1,250 mg, intravenous, q24h      Continuous Infusions:heparin, 0-4,000 Units/hr, Last Rate: 900 Units/hr (05/29/24 1209)      PRN Meds:.PRN medications: acetaminophen, bisacodyl, dextrose **OR** [DISCONTINUED] glucagon, oxyCODONE, oxyCODONE, oxygen, sodium chloride, vancomycin    Labs:  Results from last 7 days   Lab Units 05/29/24  0828 05/28/24  0710 05/28/24  0656   WBC AUTO x10*3/uL 12.1* 14.8* 15.8*   HEMOGLOBIN g/dL 10.7* 11.3* 11.7*   PLATELETS AUTO x10*3/uL 393 305 336      Results from last 7 days   Lab Units 05/29/24  0828 05/28/24  1654 05/28/24  0710   SODIUM mmol/L 133* 133* 133*   POTASSIUM mmol/L 3.4* 4.5 3.8   CHLORIDE mmol/L 98 99 98   CO2 mmol/L 25 24 19*   BUN mg/dL 30* 31* 31*   CREATININE mg/dL 1.23* 1.12* 1.23*   GLUCOSE  mg/dL 95 121* 104*   MAGNESIUM mg/dL 2.17 2.30 1.58*   PHOSPHORUS mg/dL 4.2 4.2 4.3      Results from last 7 days   Lab Units 05/28/24  0710 05/25/24  0716 05/24/24  0143   INR  1.4* 1.2* 1.3*   PROTIME seconds 15.3* 13.2* 14.2*   APTT seconds 26* 26* 25*     Assessment/Plan   Klaudia Ratliff is 68 y.o. 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 Transferred back to ICU on 5/22 for hypotension secondary to slow, active extravasation in RP seen on CTA.  Now stable.     Assessment/Plan:  Neuro: acute postoperative pain, well controlled   - continue tylenol/oxy PRN   - continue lidoderm patch      CV: Had intermittent afib with RVR yesterday, asymptomatic, converted to NSR with metoprolol  - heparin infusion, low intensity started- consider discontinue if remains in NSR  - cardiology consult- rec      amiodarone 400mg BID, then on discharge  switch to  200mg  BID  x14 days, then  200mg daily prior to discharge, which she will continue until EP follow up     30 day event monitor placed prior to discharge, please order this on AM of dc      check TSH level- WNL     keep pt on tele and keep K>4, Mg>2     arrange for follow up with EP Dr. Ahumada at [place] in 8 weeks       continue statin/ASA     Pulm:   - continue to encourage IS hourly while awake  - OOB to chair and increase ambulation as tolerated     FENGI:   - tolerating regular diet  - continue bowel regimen to prevent OIC   - continue PPI  - monitor and replete electrolytes  - diuresis today- lasix     Endo: TSH was WNL     Heme: acute blood loss anemia stabilized  - no s/sx of bleeding  - monitor H/H, transfuse for Hgb <7     ID:  had leukocytosis, sepsis work up negative, blood cultures from 5/26 NGTD  - continue Zosyn and vanco   - monitor s/s of infection  - trend WBC (down trending)  - wound care to eval  for pressure sore prevention    Dispo-  - SNF per PT/OT recs       Kae Steward, APRN-CNP

## 2024-05-29 NOTE — CONSULTS
Wound Care Consult     Visit Date: 5/29/2024      Patient Name: Klaudia Ratliff         MRN: [id number]           YOB: 1955     Reason for Consult: Sacrum wound recommendations        Wound History: Last seen on 5/24     Wound Assessment:    Wound 05/23/24 Other (comment) Sacrum (Active)   Wound Image   05/29/24 1633   Site Assessment Blanchable erythema;Clean;Red 05/29/24 1633   Roxanna-Wound Assessment Fragile 05/29/24 1633   Non-staged Wound Description Partial thickness 05/29/24 1633   Shape Irregular  05/29/24 1633   Wound Length (cm) 3.5 cm 05/29/24 1633   Wound Width (cm) 3.5 cm 05/29/24 1633   Wound Surface Area (cm^2) 12.25 cm^2 05/29/24 1633   Wound Depth (cm) 0.1 cm 05/29/24 1633   Wound Volume (cm^3) 1.225 cm^3 05/29/24 1633   State of Healing Healing ridge 05/29/24 1633   Margins Well-defined edges 05/29/24 1633   Drainage Description None 05/29/24 1633   Drainage Amount None 05/29/24 1633   Dressing Foam 05/29/24 1633   Dressing Changed New 05/29/24 1633   Dressing Status Clean;Dry 05/29/24 1633       Wound Team Summary Assessment: The wound care team came to bedside to assess the patient's sacrum.  The wound was cleansed with normal saline and pat dry with a 4x4 cover sponge. The patient had very loose and fragile tissue on the sacrum and buttocks that was removed at this time.  After removing the friable tissue, the wound now appears to be blanchable partial thickness skin loss possibly caused by moisture and friction.  3Mcavilon barrier film was applied to the wound edges and a sacrum mepilex border dressing was applied to the sacrum.       Wound Team Plan:   Recommendations: Daily Check, Change the dressing Q3 days  Cleanse the wound with normal saline, bath wipes or soap and water   Cover the sacrum with a sacrum mepilex border dressing.    Continue to encourage the patient to turn off of the sacrum at least 2 hours.  Utilize wedges, Pillows, and air mattress to offload the bony  prominences at often as possible.      Darell Carrillo RN CWON  5/29/2024  5:13 PM

## 2024-05-29 NOTE — PROGRESS NOTES
Vancomycin Dosing by Pharmacy- FOLLOW UP    Klaudia Ratliff is a 68 y.o. year old female who Pharmacy has been consulted for vancomycin dosing for other abdominal infection . Based on the patient's indication and renal status this patient is being dosed based on a goal AUC of 500-600.     Renal function is currently declining.    Current vancomycin dose: 1250 mg given every 24 hours    Most recent random level: 23 mcg/mL    Visit Vitals  BP 91/55   Pulse 70   Temp 36.4 °C (97.5 °F)   Resp 22        Lab Results   Component Value Date    CREATININE 1.23 (H) 2024    CREATININE 1.12 (H) 2024    CREATININE 1.23 (H) 2024    CREATININE 1.26 (H) 2024        Patient weight is as follows:   Vitals:    24 0456   Weight: 88 kg (194 lb 0.1 oz)       Cultures:  No results found for the encounter in last 14 days.       I/O last 3 completed shifts:  In: 474.2 (5.4 mL/kg) [I.V.:474.2 (5.4 mL/kg)]  Out: 1200 (13.6 mL/kg) [Urine:1200 (0.4 mL/kg/hr)]  Weight: 88 kg   I/O during current shift:  I/O this shift:  In: 108.4 [I.V.:108.4]  Out: 1200 [Urine:1200]    Temp (24hrs), Av.6 °C (97.9 °F), Min:36.2 °C (97.2 °F), Max:36.7 °C (98.1 °F)      Assessment/Plan    Within goal AUC range. Continue current vancomycin regimen.    This dosing regimen is predicted by [employer] to result in the following pharmacokinetic parameters:  Loading dose: N/A  Regimen: 1250 mg IV every 24 hours.  Start time: 23:20 on 2024  Exposure target: AUC24 (range)400-600 mg/L.hr   AUC24,ss: 549 mg/L.hr  Probability of AUC24 > 400: 97 %  Ctrough,ss: 16.9 mg/L  Probability of Ctrough,ss > 20: 26 %  Probability of nephrotoxicity (Lodise FERNANDO ): 13 %      The next level will be obtained on  at 0600. May be obtained sooner if clinically indicated.   Will continue to monitor renal function daily while on vancomycin and order serum creatinine at least every 48 hours if not already ordered.  Follow for continued vancomycin  needs, clinical response, and signs/symptoms of toxicity.       Harika Leal, PharmD

## 2024-05-29 NOTE — PROGRESS NOTES
Electrophysiology Progress Note    Updates:   Remains in SR on tele, HR in the 70s. Feels well.     All system reviewed and negative unless mentioned above.       Current Facility-Administered Medications:     acetaminophen (Tylenol) tablet 650 mg, 650 mg, oral, q8h PRN, Giuliano Delgado MD    amiodarone (Pacerone) tablet 400 mg, 400 mg, oral, BID, Luis Toussaint MD, 400 mg at 05/29/24 0844    aspirin chewable tablet 81 mg, 81 mg, oral, Daily, Luis Toussaint MD, 81 mg at 05/29/24 0844    atorvastatin (Lipitor) tablet 80 mg, 80 mg, oral, Nightly, Giuliano Delgado MD, 80 mg at 05/28/24 2030    bisacodyl (Dulcolax) suppository 10 mg, 10 mg, rectal, Daily PRN, Giuliano Delgado MD    dextrose 50 % injection 25 g, 25 g, intravenous, q15 min PRN **OR** [DISCONTINUED] glucagon (Glucagen) injection 1 mg, 1 mg, intramuscular, q15 min PRN, Spenser Diamond DO    gabapentin (Neurontin) capsule 100 mg, 100 mg, oral, BID, Giuliano Delgado MD, 100 mg at 05/29/24 0844    heparin 25,000 Units in dextrose 5% 250 mL (100 Units/mL) infusion (premix), 0-4,000 Units/hr, intravenous, Continuous, Kae Steward, APRN-CNP, Last Rate: 9 mL/hr at 05/29/24 1209, 900 Units/hr at 05/29/24 1209    magnesium sulfate IV 2 g, 2 g, intravenous, Once, Luis Toussaint MD, Last Rate: 25 mL/hr at 05/29/24 1157, 2 g at 05/29/24 1157    metoprolol tartrate (Lopressor) injection 5 mg, 5 mg, intravenous, Once, Kelsea Basilio MD    metoprolol tartrate (Lopressor) tablet 25 mg, 25 mg, oral, BID, Luis Toussaint MD, 25 mg at 05/29/24 0845    oxyCODONE (Roxicodone) immediate release tablet 10 mg, 10 mg, oral, q4h PRN, Giuliano Delgado MD, 10 mg at 05/26/24 2117    oxyCODONE (Roxicodone) immediate release tablet 5 mg, 5 mg, oral, q4h PRN, Giuliano Delgado MD, 5 mg at 05/27/24 1549    oxygen (O2) therapy, , inhalation, Continuous PRN - O2/gases, Giuliano Delgado MD    pantoprazole (ProtoNix) injection 40 mg, 40 mg, intravenous, Daily, Giuliano Delgado MD, 40  mg at 05/29/24 0845    piperacillin-tazobactam-dextrose (Zosyn) IV 3.375 g, 3.375 g, intravenous, q6h, Nivia Olea MD, Stopped at 05/29/24 1228    polyethylene glycol (Glycolax, Miralax) packet 17 g, 17 g, oral, Daily, Gloria Rubio MD, 17 g at 05/29/24 0844    sennosides-docusate sodium (Roxanna-Colace) 8.6-50 mg per tablet 2 tablet, 2 tablet, oral, BID, Giuliano Delgado MD, 2 tablet at 05/29/24 0845    sodium chloride (Ocean) 0.65 % nasal spray 1 spray, 1 spray, Each Nostril, 4x daily PRN, Giuliano Delgado MD, 1 spray at 05/20/24 2036    tiotropium (Spiriva Respimat) 2.5 mcg/actuation inhaler 2 puff, 2 puff, inhalation, Daily, Giuliano Delgado MD, 2 puff at 05/29/24 0912    vancomycin (Vancocin) in dextrose 5 % water (D5W) 250 mL IV 1,250 mg, 1,250 mg, intravenous, q24h, Tara Chávez, Stopped at 05/29/24 0035    vancomycin (Vancocin) pharmacy to dose - pharmacy monitoring, , miscellaneous, Daily PRN, Nivia Olea MD    Objective:    Vitals:    05/29/24 1200   BP: 98/56   Pulse: 66   Resp: 24   Temp:    SpO2: 94%       Exam:  General - well appearing   Neck - No JVD   Chest - CTAB ant exam  Cardiac - S1, S2, regular  Lower ext - Mild LE edema     Labs/Imaging:     Results from last 72 hours   Lab Units 05/29/24  0828   SODIUM mmol/L 133*   POTASSIUM mmol/L 3.4*   CO2 mmol/L 25   BUN mg/dL 30*   CREATININE mg/dL 1.23*   MAGNESIUM mg/dL 2.17   PHOSPHORUS mg/dL 4.2      Results from last 72 hours   Lab Units 05/29/24  0828   WBC AUTO x10*3/uL 12.1*   HEMOGLOBIN g/dL 10.7*   PLATELETS AUTO x10*3/uL 393        Assessment and Plan:   68F PMHx HTN, RLE DVT/PE s/p thrombectomy+IVC-f 2/24 on eliquis, admitted for perivisceral aortic endarterectomy/aortobifemoral bypass 5/16/24 c/b RP bleed now stabilized. EP consult for aflutter with RVR.      #Atrial flutter with RVR  #Atrial Fibrillation  CHADSVASC=4  Patient in new flutter with pauses which are happening as she flips back and forth into SR. Recommend  attempting to maintain SR with AART and discharge with event monitor. Limited echo showing normal LVEF, LA/RA normal size. Plan to dc on amiodarone and then stop in ~2-3mo if no recurrence of AF on monitor and then stop AC at that time, if she has recurrence will discuss long term rhythm control and stroke ppx   - Please start amiodarone 400 BID while she is inpatient, please switch to 200mg BID x14 days then 200mg daily prior to discharge, which she will continue until EP follow up  - Patient will need to have a 30 day event monitor placed prior to discharge, please order this on AM of dc   - Keep K>4, Mg>2  - Please arrange for follow up with EP Dr. Ahumada at [facility] in 8 weeks     EP will sign off.    Nell Parrish  Cardiology Fellow   PGY4    I saw and evaluated the patient. I personally obtained the key and critical portions of the history and physical exam or was physically present for key and critical portions performed by the resident/fellow. I reviewed the resident/fellow's documentation and discussed the patient with the resident/fellow. I agree with the resident/fellow's medical decision making as documented in the note, and my edits (bold and italic) have been made to the document as needed.     Bobby Ahumada MD [facility]  Cardiac Electrophysiology    **Disclaimer: This note was dictated by speech recognition, and every effort has been made to prevent any error in transcription, however minor errors may be present**

## 2024-05-30 DIAGNOSIS — I70.1 RENAL ARTERY STENOSIS (CMS-HCC): Primary | ICD-10-CM

## 2024-05-30 LAB
ALBUMIN SERPL BCP-MCNC: 3 G/DL (ref 3.4–5)
ANION GAP SERPL CALC-SCNC: 16 MMOL/L (ref 10–20)
ANION GAP SERPL CALC-SCNC: 17 MMOL/L (ref 10–20)
BACTERIA BLD CULT: NORMAL
BACTERIA BLD CULT: NORMAL
BUN SERPL-MCNC: 28 MG/DL (ref 6–23)
BUN SERPL-MCNC: 29 MG/DL (ref 6–23)
CALCIUM SERPL-MCNC: 8.2 MG/DL (ref 8.6–10.6)
CALCIUM SERPL-MCNC: 8.8 MG/DL (ref 8.6–10.6)
CHLORIDE SERPL-SCNC: 96 MMOL/L (ref 98–107)
CHLORIDE SERPL-SCNC: 96 MMOL/L (ref 98–107)
CO2 SERPL-SCNC: 25 MMOL/L (ref 21–32)
CO2 SERPL-SCNC: 25 MMOL/L (ref 21–32)
CREAT SERPL-MCNC: 1.25 MG/DL (ref 0.5–1.05)
CREAT SERPL-MCNC: 1.27 MG/DL (ref 0.5–1.05)
EGFRCR SERPLBLD CKD-EPI 2021: 46 ML/MIN/1.73M*2
EGFRCR SERPLBLD CKD-EPI 2021: 47 ML/MIN/1.73M*2
ERYTHROCYTE [DISTWIDTH] IN BLOOD BY AUTOMATED COUNT: 15.4 % (ref 11.5–14.5)
GLUCOSE BLD MANUAL STRIP-MCNC: 134 MG/DL (ref 74–99)
GLUCOSE SERPL-MCNC: 117 MG/DL (ref 74–99)
GLUCOSE SERPL-MCNC: 124 MG/DL (ref 74–99)
HCT VFR BLD AUTO: 34 % (ref 36–46)
HGB BLD-MCNC: 11.1 G/DL (ref 12–16)
MAGNESIUM SERPL-MCNC: 2.16 MG/DL (ref 1.6–2.4)
MAGNESIUM SERPL-MCNC: 2.35 MG/DL (ref 1.6–2.4)
MCH RBC QN AUTO: 30.9 PG (ref 26–34)
MCHC RBC AUTO-ENTMCNC: 32.6 G/DL (ref 32–36)
MCV RBC AUTO: 95 FL (ref 80–100)
NRBC BLD-RTO: 0 /100 WBCS (ref 0–0)
PHOSPHATE SERPL-MCNC: 4.2 MG/DL (ref 2.5–4.9)
PLATELET # BLD AUTO: 432 X10*3/UL (ref 150–450)
POTASSIUM SERPL-SCNC: 3.3 MMOL/L (ref 3.5–5.3)
POTASSIUM SERPL-SCNC: 4.2 MMOL/L (ref 3.5–5.3)
RBC # BLD AUTO: 3.59 X10*6/UL (ref 4–5.2)
SODIUM SERPL-SCNC: 133 MMOL/L (ref 136–145)
SODIUM SERPL-SCNC: 135 MMOL/L (ref 136–145)
UFH PPP CHRO-ACNC: 0.4 IU/ML
WBC # BLD AUTO: 10.7 X10*3/UL (ref 4.4–11.3)

## 2024-05-30 PROCEDURE — 82248 BILIRUBIN DIRECT: CPT | Performed by: STUDENT IN AN ORGANIZED HEALTH CARE EDUCATION/TRAINING PROGRAM

## 2024-05-30 PROCEDURE — 80048 BASIC METABOLIC PNL TOTAL CA: CPT | Mod: CCI | Performed by: STUDENT IN AN ORGANIZED HEALTH CARE EDUCATION/TRAINING PROGRAM

## 2024-05-30 PROCEDURE — 99024 POSTOP FOLLOW-UP VISIT: CPT | Performed by: NURSE PRACTITIONER

## 2024-05-30 PROCEDURE — C9113 INJ PANTOPRAZOLE SODIUM, VIA: HCPCS | Performed by: STUDENT IN AN ORGANIZED HEALTH CARE EDUCATION/TRAINING PROGRAM

## 2024-05-30 PROCEDURE — 2500000004 HC RX 250 GENERAL PHARMACY W/ HCPCS (ALT 636 FOR OP/ED): Performed by: STUDENT IN AN ORGANIZED HEALTH CARE EDUCATION/TRAINING PROGRAM

## 2024-05-30 PROCEDURE — 1200000002 HC GENERAL ROOM WITH TELEMETRY DAILY

## 2024-05-30 PROCEDURE — 36415 COLL VENOUS BLD VENIPUNCTURE: CPT | Performed by: SURGERY

## 2024-05-30 PROCEDURE — 2500000004 HC RX 250 GENERAL PHARMACY W/ HCPCS (ALT 636 FOR OP/ED)

## 2024-05-30 PROCEDURE — 2500000001 HC RX 250 WO HCPCS SELF ADMINISTERED DRUGS (ALT 637 FOR MEDICARE OP): Performed by: STUDENT IN AN ORGANIZED HEALTH CARE EDUCATION/TRAINING PROGRAM

## 2024-05-30 PROCEDURE — 94640 AIRWAY INHALATION TREATMENT: CPT

## 2024-05-30 PROCEDURE — 82947 ASSAY GLUCOSE BLOOD QUANT: CPT

## 2024-05-30 PROCEDURE — 83735 ASSAY OF MAGNESIUM: CPT

## 2024-05-30 PROCEDURE — 97530 THERAPEUTIC ACTIVITIES: CPT | Mod: GP

## 2024-05-30 PROCEDURE — 80069 RENAL FUNCTION PANEL: CPT

## 2024-05-30 PROCEDURE — 83735 ASSAY OF MAGNESIUM: CPT | Mod: 91 | Performed by: STUDENT IN AN ORGANIZED HEALTH CARE EDUCATION/TRAINING PROGRAM

## 2024-05-30 PROCEDURE — 2500000001 HC RX 250 WO HCPCS SELF ADMINISTERED DRUGS (ALT 637 FOR MEDICARE OP): Performed by: NURSE PRACTITIONER

## 2024-05-30 PROCEDURE — 2500000004 HC RX 250 GENERAL PHARMACY W/ HCPCS (ALT 636 FOR OP/ED): Performed by: NURSE PRACTITIONER

## 2024-05-30 PROCEDURE — 85520 HEPARIN ASSAY: CPT | Performed by: SURGERY

## 2024-05-30 PROCEDURE — 85027 COMPLETE CBC AUTOMATED: CPT | Performed by: NURSE PRACTITIONER

## 2024-05-30 PROCEDURE — A4217 STERILE WATER/SALINE, 500 ML: HCPCS

## 2024-05-30 PROCEDURE — 2500000002 HC RX 250 W HCPCS SELF ADMINISTERED DRUGS (ALT 637 FOR MEDICARE OP, ALT 636 FOR OP/ED): Mod: MUE | Performed by: NURSE PRACTITIONER

## 2024-05-30 PROCEDURE — 36415 COLL VENOUS BLD VENIPUNCTURE: CPT

## 2024-05-30 PROCEDURE — 2500000002 HC RX 250 W HCPCS SELF ADMINISTERED DRUGS (ALT 637 FOR MEDICARE OP, ALT 636 FOR OP/ED): Mod: MUE | Performed by: STUDENT IN AN ORGANIZED HEALTH CARE EDUCATION/TRAINING PROGRAM

## 2024-05-30 RX ORDER — POTASSIUM CHLORIDE 20 MEQ/1
40 TABLET, EXTENDED RELEASE ORAL ONCE
Status: COMPLETED | OUTPATIENT
Start: 2024-05-30 | End: 2024-05-30

## 2024-05-30 RX ORDER — POTASSIUM CHLORIDE 20 MEQ/1
60 TABLET, EXTENDED RELEASE ORAL ONCE
Status: COMPLETED | OUTPATIENT
Start: 2024-05-30 | End: 2024-05-31

## 2024-05-30 RX ORDER — FUROSEMIDE 10 MG/ML
40 INJECTION INTRAMUSCULAR; INTRAVENOUS ONCE
Status: COMPLETED | OUTPATIENT
Start: 2024-05-30 | End: 2024-05-30

## 2024-05-30 RX ORDER — POTASSIUM CHLORIDE 1.5 G/1.58G
40 POWDER, FOR SOLUTION ORAL ONCE
Status: DISCONTINUED | OUTPATIENT
Start: 2024-05-30 | End: 2024-05-30

## 2024-05-30 RX ORDER — MAGNESIUM SULFATE HEPTAHYDRATE 40 MG/ML
2 INJECTION, SOLUTION INTRAVENOUS ONCE
Status: COMPLETED | OUTPATIENT
Start: 2024-05-30 | End: 2024-05-30

## 2024-05-30 RX ADMIN — METOPROLOL TARTRATE 25 MG: 25 TABLET, FILM COATED ORAL at 09:21

## 2024-05-30 RX ADMIN — OXYCODONE HYDROCHLORIDE 5 MG: 5 TABLET ORAL at 09:20

## 2024-05-30 RX ADMIN — APIXABAN 5 MG: 5 TABLET, FILM COATED ORAL at 21:29

## 2024-05-30 RX ADMIN — GABAPENTIN 100 MG: 100 CAPSULE ORAL at 21:28

## 2024-05-30 RX ADMIN — MAGNESIUM SULFATE HEPTAHYDRATE 2 G: 40 INJECTION, SOLUTION INTRAVENOUS at 13:58

## 2024-05-30 RX ADMIN — PANTOPRAZOLE SODIUM 40 MG: 40 INJECTION, POWDER, FOR SOLUTION INTRAVENOUS at 09:21

## 2024-05-30 RX ADMIN — ATORVASTATIN CALCIUM 80 MG: 80 TABLET, FILM COATED ORAL at 21:28

## 2024-05-30 RX ADMIN — ASPIRIN 81 MG 81 MG: 81 TABLET ORAL at 09:21

## 2024-05-30 RX ADMIN — PIPERACILLIN SODIUM AND TAZOBACTAM SODIUM 3.38 G: 3; .375 INJECTION, SOLUTION INTRAVENOUS at 00:05

## 2024-05-30 RX ADMIN — HEPARIN SODIUM 900 UNITS/HR: 10000 INJECTION, SOLUTION INTRAVENOUS at 05:45

## 2024-05-30 RX ADMIN — POLYETHYLENE GLYCOL 3350 17 G: 17 POWDER, FOR SOLUTION ORAL at 09:24

## 2024-05-30 RX ADMIN — POTASSIUM CHLORIDE 40 MEQ: 1500 TABLET, EXTENDED RELEASE ORAL at 13:58

## 2024-05-30 RX ADMIN — AMIODARONE HYDROCHLORIDE 400 MG: 200 TABLET ORAL at 21:28

## 2024-05-30 RX ADMIN — AMIODARONE HYDROCHLORIDE 400 MG: 200 TABLET ORAL at 09:21

## 2024-05-30 RX ADMIN — SENNOSIDES AND DOCUSATE SODIUM 2 TABLET: 50; 8.6 TABLET ORAL at 09:21

## 2024-05-30 RX ADMIN — VANCOMYCIN HYDROCHLORIDE 1250 MG: 5 INJECTION, POWDER, LYOPHILIZED, FOR SOLUTION INTRAVENOUS at 00:06

## 2024-05-30 RX ADMIN — FUROSEMIDE 40 MG: 10 INJECTION, SOLUTION INTRAMUSCULAR; INTRAVENOUS at 13:58

## 2024-05-30 RX ADMIN — GABAPENTIN 100 MG: 100 CAPSULE ORAL at 09:21

## 2024-05-30 RX ADMIN — TIOTROPIUM BROMIDE INHALATION SPRAY 2 PUFF: 3.12 SPRAY, METERED RESPIRATORY (INHALATION) at 11:47

## 2024-05-30 RX ADMIN — METOPROLOL TARTRATE 25 MG: 25 TABLET, FILM COATED ORAL at 21:28

## 2024-05-30 ASSESSMENT — COGNITIVE AND FUNCTIONAL STATUS - GENERAL
EATING MEALS: A LITTLE
MOBILITY SCORE: 12
PERSONAL GROOMING: A LITTLE
MOVING FROM LYING ON BACK TO SITTING ON SIDE OF FLAT BED WITH BEDRAILS: A LITTLE
TURNING FROM BACK TO SIDE WHILE IN FLAT BAD: A LITTLE
TURNING FROM BACK TO SIDE WHILE IN FLAT BAD: A LOT
WALKING IN HOSPITAL ROOM: A LOT
DRESSING REGULAR UPPER BODY CLOTHING: A LITTLE
MOVING FROM LYING ON BACK TO SITTING ON SIDE OF FLAT BED WITH BEDRAILS: A LITTLE
WALKING IN HOSPITAL ROOM: A LOT
DAILY ACTIVITIY SCORE: 15
CLIMB 3 TO 5 STEPS WITH RAILING: TOTAL
DRESSING REGULAR LOWER BODY CLOTHING: A LOT
STANDING UP FROM CHAIR USING ARMS: A LOT
MOVING TO AND FROM BED TO CHAIR: A LOT
DRESSING REGULAR LOWER BODY CLOTHING: A LOT
TURNING FROM BACK TO SIDE WHILE IN FLAT BAD: A LOT
PERSONAL GROOMING: A LITTLE
MOBILITY SCORE: 14
STANDING UP FROM CHAIR USING ARMS: A LOT
STANDING UP FROM CHAIR USING ARMS: A LOT
DRESSING REGULAR UPPER BODY CLOTHING: A LITTLE
HELP NEEDED FOR BATHING: A LOT
MOVING TO AND FROM BED TO CHAIR: A LITTLE
CLIMB 3 TO 5 STEPS WITH RAILING: TOTAL
WALKING IN HOSPITAL ROOM: A LOT
DAILY ACTIVITIY SCORE: 15
MOVING TO AND FROM BED TO CHAIR: A LOT
MOVING FROM LYING ON BACK TO SITTING ON SIDE OF FLAT BED WITH BEDRAILS: A LITTLE
TOILETING: A LOT
HELP NEEDED FOR BATHING: A LOT
TOILETING: A LOT
EATING MEALS: A LITTLE
MOBILITY SCORE: 12
CLIMB 3 TO 5 STEPS WITH RAILING: TOTAL

## 2024-05-30 ASSESSMENT — PAIN SCALES - GENERAL
PAINLEVEL_OUTOF10: 0 - NO PAIN
PAINLEVEL_OUTOF10: 6
PAINLEVEL_OUTOF10: 6
PAINLEVEL_OUTOF10: 0 - NO PAIN

## 2024-05-30 ASSESSMENT — PAIN - FUNCTIONAL ASSESSMENT
PAIN_FUNCTIONAL_ASSESSMENT: 0-10

## 2024-05-30 ASSESSMENT — PAIN DESCRIPTION - ORIENTATION: ORIENTATION: RIGHT

## 2024-05-30 ASSESSMENT — PAIN DESCRIPTION - LOCATION: LOCATION: ANKLE

## 2024-05-30 NOTE — PROGRESS NOTES
5/30/2024 Care coordination  Per vasc surg, pt is medically ready.  [facility] will submit precert for [place].  Precert obtained, Granddaughter notified  The [facility] Vehicle you requested for Klaudia ALICEA in unit/room LT [id number] on 05/31/2024 is scheduled to arrive at 1:00pm EDT! [facility] is handling this ride and you can contact them at [phone].

## 2024-05-30 NOTE — PROGRESS NOTES
"Physical Therapy    Physical Therapy Treatment    Patient Name: Klaudia Ratliff  MRN: [id number]  Today's Date: 5/30/2024  Time Calculation  Start Time: 0857  Stop Time: 1233  Time Calculation (min): 216 min (total time with pt 50 minutes; out of room 166 min while pt sat upright in chair)   Session 1: 08:57-09:30  Session 2: 12:18-12:33     Assessment/Plan   PT Assessment  End of Session Communication: Bedside nurse  End of Session Patient Position:  (end of split session in bed with 3 BR up and no alarm on)  PT Plan  Inpatient/Swing Bed or Outpatient: Inpatient  PT Plan  Treatment/Interventions: Bed mobility, Transfer training, Gait training, Balance training, Neuromuscular re-education, Strengthening, Endurance training, Therapeutic exercise, Therapeutic activity  PT Plan: Skilled PT  PT Frequency: 3 times per week  PT Discharge Recommendations: Moderate intensity level of continued care  Equipment Recommended upon Discharge:  (TBD at next level of care)  PT Recommended Transfer Status: Assist x1, Assistive device  PT - OK to Discharge: Yes (eval complete and discharge recommendations made)      General Visit Information:   PT  Visit  PT Received On: 05/30/24  General  Family/Caregiver Present: No  Prior to Session Communication: Bedside nurse  Patient Position Received: Bed, 3 rail up, Alarm off, not on at start of session  General Comment: pt supine upon arrival. alert and agreeable for PT. c/o increased \"joint pain\" at R ankle. able to transfer to chair modA and FWW. split session returned to assist pt back to bed from chair, modA and FWW.    Subjective   Precautions:  Precautions  Medical Precautions: Fall precautions, Cardiac precautions, Abdominal precautions  Post-Surgical Precautions: Abdominal surgery precautions  Vital Signs:  Vital Signs  Heart Rate: 74  SpO2: 93 %  BP: 137/63  Patient Position: Sitting    Objective   Pain:  Pain Assessment  Pain Assessment: 0-10  Pain Score: 6  Pain Type: Acute " pain  Pain Location: Ankle  Pain Orientation: Right  Cognition:  Cognition  Orientation Level: Oriented X4  Coordination:  Movements are Fluid and Coordinated: Yes  Postural Control:  Postural Control  Postural Control: Within Functional Limits  Static Sitting Balance  Static Sitting-Balance Support: Bilateral upper extremity supported  Static Sitting-Level of Assistance: Close supervision  Static Sitting-Comment/Number of Minutes: 10-15 min  Dynamic Sitting Balance  Dynamic Sitting-Balance Support: Bilateral upper extremity supported  Dynamic Sitting-Balance: Trunk control activities  Dynamic Sitting-Comments: SBA  Static Standing Balance  Static Standing-Balance Support: Bilateral upper extremity supported (FWW)  Static Standing-Level of Assistance: Minimum assistance  Dynamic Standing Balance  Dynamic Standing-Balance Support: Bilateral upper extremity supported (FWW)  Dynamic Standing-Balance: Turning  Dynamic Standing-Comments: modA  Extremity/Trunk Assessments:   Difficulty moving R ankle into DF 2/2 increased pain. Noted BLE edema (RLE>LLE)      Activity Tolerance:  Activity Tolerance  Endurance: Tolerates 30 min exercise with multiple rests  Treatments:  Therapeutic Exercise  Therapeutic Exercise Performed: Yes  Therapeutic Exercise Activity 1: 1x10 APs    Therapeutic Activity  Therapeutic Activity Performed: Yes  Therapeutic Activity 1: static and dynamic sitting at EOB, grossly SBA. no LOB. 1-2 UE support.  educated pt on positioning of LEs prior to STS trials  Therapeutic Activity 2: x2 STS trials from EOB; transfer from bed to chair with FWW and modA. demonstrated body mechanics to pt prior to transfer  Therapeutic Activity 3: split session: returned to assist pt back to bed from chair modA and FWW. extended time to reposition pt for comfort    Bed Mobility  Bed Mobility: Yes  Bed Mobility 1  Bed Mobility 1: Supine to sitting, Sitting to supine  Level of Assistance 1: Moderate assistance, Moderate verbal  cues  Bed Mobility Comments 1: log roll  Bed Mobility 2  Bed Mobility  2: Scooting  Level of Assistance 2: Contact guard  Bed Mobility Comments 2: anteriorly scooting hips at EOB    Ambulation/Gait Training  Ambulation/Gait Training Performed: Yes  Ambulation/Gait Training 1  Surface 1: Level tile  Device 1: Rolling walker  Assistance 1: Moderate assistance, Moderate verbal cues  Quality of Gait 1: Wide base of support, Inconsistent stride length, Decreased step length, Soft knee(s), Forward flexed posture, Antalgic (favoring R LE 2/2 pain)  Comments/Distance (ft) 1: ~4ftx2  Transfers  Transfer: Yes  Transfer 1  Transfer From 1: Bed to  Transfer to 1: Stand  Technique 1: Sit to stand  Transfer Device 1: Walker  Transfer Level of Assistance 1: Moderate assistance, Moderate verbal cues  Trials/Comments 1: 1st attempt failed; 2nd attempt able to achieve full stand. favored forward flexed posture; VC for hand placement  Transfers 2  Transfer From 2: Stand to  Transfer to 2: Chair with arms  Technique 2: Stand to sit  Transfer Device 2: Walker  Transfer Level of Assistance 2: Minimum assistance, Minimal verbal cues  Trials/Comments 2: decreased eccentric control  Transfers 3  Transfer From 3: Chair with arms to  Transfer to 3: Stand  Technique 3: Sit to stand  Transfer Device 3: Walker  Transfer Level of Assistance 3: Moderate assistance, Minimal verbal cues  Trials/Comments 3: x1  Transfers 4  Transfer From 4: Stand to  Transfer to 4: Bed  Technique 4: Stand to sit  Transfer Device 4: Walker  Transfer Level of Assistance 4: Moderate assistance, Moderate verbal cues  Trials/Comments 4: x1    Stairs  Stairs: No    Outcome Measures:  [facility] Basic Mobility  Turning from your back to your side while in a flat bed without using bedrails: A little  Moving from lying on your back to sitting on the side of a flat bed without using bedrails: A lot  Moving to and from bed to chair (including a wheelchair): A lot  Standing up from a  chair using your arms (e.g. wheelchair or bedside chair): A lot  To walk in hospital room: A lot  Climbing 3-5 steps with railing: Total  Basic Mobility - Total Score: 12    Education Documentation  Home Exercise Program, taught by Saba Smith PT at 5/30/2024  1:14 PM.  Learner: Patient  Readiness: Acceptance  Method: Explanation  Response: Verbalizes Understanding    Precautions, taught by Saba Smith PT at 5/30/2024  1:14 PM.  Learner: Patient  Readiness: Acceptance  Method: Explanation  Response: Verbalizes Understanding    Body Mechanics, taught by Saba Smith PT at 5/30/2024  1:14 PM.  Learner: Patient  Readiness: Acceptance  Method: Explanation  Response: Verbalizes Understanding    Mobility Training, taught by Saba Smith PT at 5/30/2024  1:14 PM.  Learner: Patient  Readiness: Acceptance  Method: Explanation  Response: Verbalizes Understanding    Education Comments  No comments found.        OP EDUCATION:       Encounter Problems       Encounter Problems (Active)       Balance       Pt will demonstrate ability to complete sitting/standing static/dynamic balance activities with unilateral UE support and no LOB for increase in safety up D/C.  (Progressing)       Start:  05/17/24    Expected End:  05/31/24               Mobility       Pt will demonstrated ability to ambulate >/=50ft with proper form, LRAD and no balance deficits for safe DC.   (Progressing)       Start:  05/17/24    Expected End:  05/31/24            Pt will demonstrate ability to complete ther ex activities to improve functional strength for increase in independence upon DC.  (Progressing)       Start:  05/17/24    Expected End:  05/31/24               PT Transfers       Pt will demonstrated ability to complete bed mobility and sit<>stand transfers without assistance and use of assistive device to safely DC. (Progressing)       Start:  05/17/24    Expected End:  05/31/24 05/30/24 at 1:15 PM  - Saba Smith, PT

## 2024-05-30 NOTE — PROGRESS NOTES
VASCULAR SURGERY PROGRESS NOTE  Subjective   Still complaining of right foot swelling, mild discomfort.     Objective   Vitals:    05/30/24 0710   BP:    Pulse:    Resp:    Temp: 36.6 °C (97.9 °F)   SpO2:       Exam:  Constitutional: No acute distress, resting comfortably  Neuro:  AOx3, grossly intact  ENMT: moist mucous membranes  CV: no tachycardia, monitor with NSR  Pulm: non-labored on room air  GI: soft, non-tender, non-distended, RP incision intact.   Skin: warm and dry, bilateral groin surgical wounds dry and well approximated  Musculoskeletal: moving all extremities  Extremities: palpable DP bilateral, 2+ pedal edema. Feet warm    Relevant Results  Medications:  Scheduled Meds:  amiodarone, 400 mg, oral, BID  aspirin, 81 mg, oral, Daily  atorvastatin, 80 mg, oral, Nightly  gabapentin, 100 mg, oral, BID  metoprolol tartrate, 25 mg, oral, BID  pantoprazole, 40 mg, intravenous, Daily  polyethylene glycol, 17 g, oral, Daily  sennosides-docusate sodium, 2 tablet, oral, BID  tiotropium, 2 puff, inhalation, Daily      Continuous Infusions:  heparin, 0-4,000 Units/hr, Last Rate: 900 Units/hr (05/30/24 0545)      PRN Meds:.  PRN medications: acetaminophen, bisacodyl, dextrose **OR** [DISCONTINUED] glucagon, oxyCODONE, oxyCODONE, oxygen, sodium chloride    Labs:  Results from last 7 days   Lab Units 05/29/24 1933 05/29/24  0828 05/28/24  0710   WBC AUTO x10*3/uL 10.9 12.1* 14.8*   HEMOGLOBIN g/dL 11.0* 10.7* 11.3*   PLATELETS AUTO x10*3/uL 407 393 305      Results from last 7 days   Lab Units 05/29/24 1933 05/29/24  0828 05/28/24  1654   SODIUM mmol/L 135* 133* 133*   POTASSIUM mmol/L 3.5 3.4* 4.5   CHLORIDE mmol/L 98 98 99   CO2 mmol/L 26 25 24   BUN mg/dL 30* 30* 31*   CREATININE mg/dL 1.18* 1.23* 1.12*   GLUCOSE mg/dL 113* 95 121*   MAGNESIUM mg/dL 2.19 2.17 2.30   PHOSPHORUS mg/dL  --  4.2 4.2      Results from last 7 days   Lab Units 05/28/24  0710 05/25/24  0716 05/24/24  0143   INR  1.4* 1.2* 1.3*   PROTIME  seconds 15.3* 13.2* 14.2*   APTT seconds 26* 26* 25*     Assessment/Plan   Klaudia Ratliff is 68 y.o. 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 Transferred back to ICU on 5/22 for hypotension secondary to slow, active extravasation in RP seen on CTA. Now stable.      Plan:  Neuro: acute postoperative pain  - continue tylenol/oxy PRN   - continue lidoderm patch, gabapentin      CV: postoperative afib with RVR, aortic occlusion, HTN, HLD, RLE DVT/PE  - converted to NSR with metoprolol   - continue heparin infusion, aspirin & statin (holding home eliquis)   - cardiology recs:   amiodarone 400mg BID, then at discharge switch to 200mg  BID  x14 days, then 200mg daily, which she will continue until EP follow up  30 day event monitor placed prior to discharge  keep pt on tele and keep K>4, Mg>2  arrange for follow up with EP Dr. Ahumada at [place] in 8 weeks (requested)      Pulm: COPD, PE (s/p IVC filter)  - continue to encourage IS hourly while awake  - OOB to chair and increase ambulation as tolerated  - continue home spiriva      FENGI: obesity, hypokalemia, CKD 3a, hypoalbuminemia,   - tolerating regular diet with oral supplements   - continue bowel regimen to prevent OIC   - continue PPI  - monitor and replete electrolytes  - RFP as clinically indicated      Endo:   - no issues      Heme: acute blood loss anemia   - no s/sx of bleeding  - monitor H/H, transfuse for Hgb <7     ID: leukocytosis (resolved), sacral ulcer   - sepsis work up negative, blood cultures from 5/26 NGTD  - discontinue Zosyn and Vanco   - monitor s/s of infection  - wound care recs: cleanse wound with normal saline, bath wipes or soap and water, cover with mepilex border dressing every 3 days.   - q2h turns      Dispo-  - SNF per PT/OT recs     Mary Toussaint, ADRIÁN-CNP

## 2024-05-30 NOTE — PROGRESS NOTES
Vancomycin Dosing by Pharmacy- Cessation of Therapy    Consult to pharmacy for vancomycin dosing has been discontinued by the prescriber, pharmacy will sign off at this time.    Please call pharmacy if there are further questions or re-enter a consult if vancomycin is resumed.     Jesse Pedro, PharmD

## 2024-05-30 NOTE — CARE PLAN
The patient's goals for the shift include  to gain uninterrupted sleep to promote healing.      The clinical goals for the shift include pt will remain hemodynamically stable throughout the shift

## 2024-05-31 ENCOUNTER — APPOINTMENT (OUTPATIENT)
Dept: CARDIOLOGY | Facility: HOSPITAL | Age: 69
DRG: 673 | End: 2024-05-31
Payer: MEDICARE

## 2024-05-31 VITALS
RESPIRATION RATE: 19 BRPM | WEIGHT: 189.15 LBS | HEART RATE: 69 BPM | OXYGEN SATURATION: 94 % | DIASTOLIC BLOOD PRESSURE: 71 MMHG | TEMPERATURE: 96.8 F | BODY MASS INDEX: 34.81 KG/M2 | HEIGHT: 62 IN | SYSTOLIC BLOOD PRESSURE: 139 MMHG

## 2024-05-31 PROBLEM — I70.0 AORTIC OCCLUSION (CMS-HCC): Status: RESOLVED | Noted: 2024-02-02 | Resolved: 2024-05-31

## 2024-05-31 PROBLEM — I74.09 AORTOILIAC OCCLUSIVE DISEASE (MULTI): Status: RESOLVED | Noted: 2024-03-26 | Resolved: 2024-05-31

## 2024-05-31 LAB
ALBUMIN SERPL BCP-MCNC: 3.2 G/DL (ref 3.4–5)
ALP SERPL-CCNC: 236 U/L (ref 33–136)
ALT SERPL W P-5'-P-CCNC: 49 U/L (ref 7–45)
AST SERPL W P-5'-P-CCNC: 41 U/L (ref 9–39)
BILIRUB DIRECT SERPL-MCNC: 1.4 MG/DL (ref 0–0.3)
BILIRUB SERPL-MCNC: 2.6 MG/DL (ref 0–1.2)
BODY SURFACE AREA: 1.94 M2
PROT SERPL-MCNC: 6.2 G/DL (ref 6.4–8.2)

## 2024-05-31 PROCEDURE — 93246 EXT ECG>7D<15D RECORDING: CPT

## 2024-05-31 PROCEDURE — 2500000002 HC RX 250 W HCPCS SELF ADMINISTERED DRUGS (ALT 637 FOR MEDICARE OP, ALT 636 FOR OP/ED): Mod: MUE | Performed by: STUDENT IN AN ORGANIZED HEALTH CARE EDUCATION/TRAINING PROGRAM

## 2024-05-31 PROCEDURE — 93248 EXT ECG>7D<15D REV&INTERPJ: CPT | Performed by: STUDENT IN AN ORGANIZED HEALTH CARE EDUCATION/TRAINING PROGRAM

## 2024-05-31 PROCEDURE — 2500000001 HC RX 250 WO HCPCS SELF ADMINISTERED DRUGS (ALT 637 FOR MEDICARE OP): Performed by: STUDENT IN AN ORGANIZED HEALTH CARE EDUCATION/TRAINING PROGRAM

## 2024-05-31 PROCEDURE — 2500000002 HC RX 250 W HCPCS SELF ADMINISTERED DRUGS (ALT 637 FOR MEDICARE OP, ALT 636 FOR OP/ED)

## 2024-05-31 PROCEDURE — C9113 INJ PANTOPRAZOLE SODIUM, VIA: HCPCS | Performed by: STUDENT IN AN ORGANIZED HEALTH CARE EDUCATION/TRAINING PROGRAM

## 2024-05-31 PROCEDURE — 2500000001 HC RX 250 WO HCPCS SELF ADMINISTERED DRUGS (ALT 637 FOR MEDICARE OP): Performed by: NURSE PRACTITIONER

## 2024-05-31 PROCEDURE — 99239 HOSP IP/OBS DSCHRG MGMT >30: CPT | Performed by: NURSE PRACTITIONER

## 2024-05-31 PROCEDURE — 2500000004 HC RX 250 GENERAL PHARMACY W/ HCPCS (ALT 636 FOR OP/ED): Performed by: STUDENT IN AN ORGANIZED HEALTH CARE EDUCATION/TRAINING PROGRAM

## 2024-05-31 PROCEDURE — 94640 AIRWAY INHALATION TREATMENT: CPT

## 2024-05-31 RX ORDER — AMIODARONE HYDROCHLORIDE 200 MG/1
200 TABLET ORAL 2 TIMES DAILY
Qty: 60 TABLET | Refills: 1 | Status: SHIPPED | OUTPATIENT
Start: 2024-05-31

## 2024-05-31 RX ORDER — OXYCODONE HYDROCHLORIDE 10 MG/1
10 TABLET ORAL EVERY 4 HOURS PRN
Qty: 28 TABLET | Refills: 0 | Status: SHIPPED | OUTPATIENT
Start: 2024-05-31 | End: 2024-06-07

## 2024-05-31 RX ORDER — ACETAMINOPHEN 325 MG/1
650 TABLET ORAL EVERY 8 HOURS PRN
Qty: 100 TABLET | Refills: 0 | Status: SHIPPED | OUTPATIENT
Start: 2024-05-31

## 2024-05-31 RX ORDER — AMOXICILLIN 250 MG
2 CAPSULE ORAL DAILY
Qty: 14 TABLET | Refills: 0 | Status: SHIPPED | OUTPATIENT
Start: 2024-05-31

## 2024-05-31 RX ORDER — POLYETHYLENE GLYCOL 3350 17 G/17G
17 POWDER, FOR SOLUTION ORAL DAILY PRN
Qty: 14 PACKET | Refills: 0 | Status: SHIPPED | OUTPATIENT
Start: 2024-05-31

## 2024-05-31 RX ORDER — METOPROLOL TARTRATE 25 MG/1
25 TABLET, FILM COATED ORAL 2 TIMES DAILY
Qty: 60 TABLET | Refills: 3 | Status: SHIPPED | OUTPATIENT
Start: 2024-05-31

## 2024-05-31 RX ADMIN — AMIODARONE HYDROCHLORIDE 400 MG: 200 TABLET ORAL at 08:41

## 2024-05-31 RX ADMIN — OXYCODONE HYDROCHLORIDE 5 MG: 5 TABLET ORAL at 08:41

## 2024-05-31 RX ADMIN — TIOTROPIUM BROMIDE INHALATION SPRAY 2 PUFF: 3.12 SPRAY, METERED RESPIRATORY (INHALATION) at 12:37

## 2024-05-31 RX ADMIN — GABAPENTIN 100 MG: 100 CAPSULE ORAL at 08:41

## 2024-05-31 RX ADMIN — POTASSIUM CHLORIDE 60 MEQ: 1500 TABLET, EXTENDED RELEASE ORAL at 00:53

## 2024-05-31 RX ADMIN — PANTOPRAZOLE SODIUM 40 MG: 40 INJECTION, POWDER, FOR SOLUTION INTRAVENOUS at 08:41

## 2024-05-31 RX ADMIN — METOPROLOL TARTRATE 25 MG: 25 TABLET, FILM COATED ORAL at 08:41

## 2024-05-31 RX ADMIN — ASPIRIN 81 MG 81 MG: 81 TABLET ORAL at 08:41

## 2024-05-31 RX ADMIN — APIXABAN 5 MG: 5 TABLET, FILM COATED ORAL at 08:41

## 2024-05-31 NOTE — DISCHARGE SUMMARY
Discharge Diagnosis  Aortic occlusion (CMS-HCC)    Issues Requiring Follow-Up  Cardiac event monitor for 30 days    Test Results Pending At Discharge  Pending Labs       No current pending labs.            Hospital Course  Klaudia Ratliff is a 68 y.o. female Hx of PE and RLE DVT in February 2024 and underwent mechanical thrombectomy via left femoral vein access. She had an aortic occlusion causing right foot numbness and short distance claudication. 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   She is S/P Perivisceral aortic endarterectomy, aortobifemoral bypass graft, left renal bypass, right femoral endarterectomy on 5/16/2024. Post op course was complicated by transient elevation in hepatic anzymes and bilirubin. 5 days post op  she had acute drop in H/H with hypotension, work up included CT Chest/ABD/ pelvis that showed L flank retroperitoneal bleed treated conservatively with volume resuscitation and blood products. She developed asymptomatic intermittent A fib with RVR, converted with metoprolol. Cardiology was consulted and placed on Amiodarone.   On 5/31/24 she was discharged to skilled nursing facility with stable VS, labs WNL. A cardiac event monitor was placed prior to departure. A follow up visit was requested with Dr. Ahumada in cardiology at [facility]. Vascular surgery follow up visits scheduled.     Pertinent Physical Exam At Time of Discharge  Physical Exam  Constitutional: No acute distress, resting comfortably  Neuro:  AOx3, grossly intact  ENMT: moist mucous membranes  CV: no tachycardia  Pulm: non-labored  GI: soft, non-tender  Skin: warm and dry, bilateral groin surgical wounds dry and well approximated with Dermabond covering  Musculoskeletal: moving all extremities  Extremities: pedal pulses palpable, feet warm and perfused, 1+ pedal edema    Home Medications     Medication List      START taking these medications     acetaminophen 325 mg tablet; Commonly known as: Tylenol; Take 2  tablets   (650 mg) by mouth every 8 hours if needed for mild pain (1 - 3) or   moderate pain (4 - 6).   amiodarone 200 mg tablet; Commonly known as: Pacerone; Take 1 tablet   (200 mg) by mouth 2 times a day. After 2 weeks (6/14/24) reduce dose to   200mg once daily   metoprolol tartrate 25 mg tablet; Commonly known as: Lopressor; Take 1   tablet (25 mg) by mouth 2 times a day.   oxyCODONE 10 mg immediate release tablet; Commonly known as: Roxicodone;   Take 1 tablet (10 mg) by mouth every 4 hours if needed for severe pain (7   - 10) for up to 7 days.   polyethylene glycol 17 gram packet; Commonly known as: Glycolax,   Miralax; Take 17 g by mouth once daily as needed (constipation).   sennosides-docusate sodium 8.6-50 mg tablet; Commonly known as:   Roxanna-Colace; Take 2 tablets by mouth once daily.     CHANGE how you take these medications     apixaban 5 mg tablet; Commonly known as: Eliquis; Take 1 tablet (5 mg)   by mouth 2 times a day.; What changed: additional instructions   Spiriva Respimat 2.5 mcg/actuation inhaler; Generic drug: tiotropium;   Inhale 2 puffs once daily.; What changed: additional instructions     CONTINUE taking these medications     ascorbic acid 250 mg tablet; Commonly known as: Vitamin C   aspirin 81 mg chewable tablet; Chew 1 tablet (81 mg) once daily.   atorvastatin 80 mg tablet; Commonly known as: Lipitor; Take 1 tablet (80   mg) by mouth once daily at bedtime.   cholecalciferol 50 MCG (2000 UT) tablet; Commonly known as: Vitamin D-3   gabapentin 100 mg capsule; Commonly known as: Neurontin; Take 2 capsules   (200 mg) by mouth 2 times a day.     STOP taking these medications     atenolol 50 mg tablet; Commonly known as: Tenormin   enoxaparin 80 mg/0.8 mL syringe; Commonly known as: Lovenox   furosemide 40 mg tablet; Commonly known as: Lasix   losartan 100 mg tablet; Commonly known as: Cozaar   potassium chloride CR 10 mEq ER tablet; Commonly known as: Klor-Con    sulfamethoxazole-trimethoprim 400-80 mg tablet; Commonly known as:   Bactrim       Outpatient Follow-Up  Future Appointments   Date Time Provider Department Center   7/3/2024  1:00 PM [facility] 4 [id number] [facility]   7/3/2024  2:00 PM HUMBERTO Sommer [id number] [facility]   8/7/2024  4:00 PM ADRIÁN Tierney-CNP [id number] [place]       ADRIÁN Ross-CNP

## 2024-05-31 NOTE — CARE PLAN
The patient's goals for the shift include  promoting rest to obtain would healing.       The clinical goals for the shift include pt will remain hemodynamically stable throughout the shift

## 2024-05-31 NOTE — HOSPITAL COURSE
Klaudia Ratliff is a 68 y.o. female Hx of PE and RLE DVT in February 2024 and underwent mechanical thrombectomy via left femoral vein access. She had an aortic occlusion causing right foot numbness and short distance claudication. 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   She is S/P Perivisceral aortic endarterectomy, aortobifemoral bypass graft, left renal bypass, right femoral endarterectomy on 5/16/2024. Post op course was complicated by transient elevation in hepatic anzymes and bilirubin. 5 days post op  she had acute drop in H/H with hypotension, work up included CT Chest/ABD/ pelvis that showed L flank retroperitoneal bleed treated conservatively with volume resuscitation and blood products. She developed asymptomatic intermittent A fib with RVR, converted with metoprolol. Cardiology was consulted and placed on Amiodarone.   On 5/31/24 she was discharged to skilled nursing facility with stable VS, labs WNL. A cardiac event monitor was placed prior to departure. A follow up visit was requested with Dr. Ahumada in cardiology at [facility]. Vascular surgery follow up visits scheduled.

## 2024-05-31 NOTE — NURSING NOTE
Discharge note:  Pt discharge to [facility] via [facility]. Ivs and tele removed, Report called to receiving facility. Pranav Fitzpatrick RN

## 2024-06-07 ENCOUNTER — DOCUMENTATION (OUTPATIENT)
Dept: HOME HEALTH SERVICES | Facility: HOME HEALTH | Age: 69
End: 2024-06-07
Payer: MEDICARE

## 2024-06-07 ENCOUNTER — HOME HEALTH ADMISSION (OUTPATIENT)
Dept: HOME HEALTH SERVICES | Facility: HOME HEALTH | Age: 69
End: 2024-06-07
Payer: MEDICARE

## 2024-06-07 NOTE — HH CARE COORDINATION
Home Care received a Referral for Nursing, Physical Therapy, Occupational Therapy, and Home Health Aide. We have processed the referral for a Start of Care on 6/10-6/11/24.     If you have any questions or concerns, please feel free to contact us at [phone]. Follow the prompts, enter your five digit zip code, and you will be directed to your care team on WEST 2.

## 2024-06-10 ENCOUNTER — HOME CARE VISIT (OUTPATIENT)
Dept: HOME HEALTH SERVICES | Facility: HOME HEALTH | Age: 69
End: 2024-06-10
Payer: MEDICARE

## 2024-06-10 VITALS
TEMPERATURE: 97 F | HEART RATE: 68 BPM | RESPIRATION RATE: 20 BRPM | DIASTOLIC BLOOD PRESSURE: 66 MMHG | OXYGEN SATURATION: 98 % | SYSTOLIC BLOOD PRESSURE: 114 MMHG

## 2024-06-10 PROCEDURE — G0299 HHS/HOSPICE OF RN EA 15 MIN: HCPCS

## 2024-06-10 ASSESSMENT — ENCOUNTER SYMPTOMS
SUBJECTIVE PAIN PROGRESSION: UNCHANGED
LOWEST PAIN SEVERITY IN PAST 24 HOURS: 0/10
LAST BOWEL MOVEMENT: 67001
APPETITE LEVEL: FAIR
PAIN: 1
PAIN LOCATION: BACK
LOSS OF SENSATION IN FEET: 1
PAIN LOCATION - PAIN FREQUENCY: INTERMITTENT
OCCASIONAL FEELINGS OF UNSTEADINESS: 0
PERSON REPORTING PAIN: PATIENT
DEPRESSION: 0
HIGHEST PAIN SEVERITY IN PAST 24 HOURS: 2/10
PAIN LOCATION - PAIN SEVERITY: 2/10
PAIN LOCATION - PAIN QUALITY: ACHES
CHANGE IN APPETITE: UNCHANGED
PAIN SEVERITY GOAL: 0/10

## 2024-06-10 ASSESSMENT — PAIN SCALES - PAIN ASSESSMENT IN ADVANCED DEMENTIA (PAINAD)
BREATHING: 0
TOTALSCORE: 0
NEGVOCALIZATION: 0
FACIALEXPRESSION: 0 - SMILING OR INEXPRESSIVE.
BODYLANGUAGE: 0
CONSOLABILITY: 0
BODYLANGUAGE: 0 - RELAXED.
NEGVOCALIZATION: 0 - NONE.
CONSOLABILITY: 0 - NO NEED TO CONSOLE.
FACIALEXPRESSION: 0

## 2024-06-10 ASSESSMENT — ACTIVITIES OF DAILY LIVING (ADL)
ENTERING_EXITING_HOME: MODERATE ASSIST
OASIS_M1830: 06

## 2024-06-11 ENCOUNTER — TELEPHONE (OUTPATIENT)
Dept: VASCULAR SURGERY | Facility: HOSPITAL | Age: 69
End: 2024-06-11
Payer: MEDICARE

## 2024-06-11 ENCOUNTER — HOME CARE VISIT (OUTPATIENT)
Dept: HOME HEALTH SERVICES | Facility: HOME HEALTH | Age: 69
End: 2024-06-11
Payer: MEDICARE

## 2024-06-11 VITALS — SYSTOLIC BLOOD PRESSURE: 110 MMHG | HEART RATE: 65 BPM | DIASTOLIC BLOOD PRESSURE: 78 MMHG | OXYGEN SATURATION: 98 %

## 2024-06-11 PROCEDURE — G0151 HHCP-SERV OF PT,EA 15 MIN: HCPCS

## 2024-06-11 ASSESSMENT — ENCOUNTER SYMPTOMS
PAIN LOCATION: RIGHT LEG
PAIN: 1
PAIN LOCATION - PAIN SEVERITY: 1/10
PERSON REPORTING PAIN: PATIENT
PAIN LOCATION - PAIN SEVERITY: 1/10
OCCASIONAL FEELINGS OF UNSTEADINESS: 1
PAIN LOCATION: LEFT LEG

## 2024-06-11 ASSESSMENT — ACTIVITIES OF DAILY LIVING (ADL): AMBULATION ASSISTANCE ON FLAT SURFACES: 1

## 2024-06-11 NOTE — HOME HEALTH
SKILLED NURSING VISIT FOR ADMISSION TO [facility]OM[facility]ARE SERVICES ASSESSMENT TEACHING OF MEDICATIONS DISEASE PROCESS. PT TOLERATED PROCEDURES WELL. PTS LEFT FLANK AND BILATERAL GROIN INCISIONS WELL APPROXIMATED WITHOUT REDNESS OR DRAINAGE. CHAS. PT HAS BLE EDEMA AND IS USING ACE WRAPS FOR COMPRESSION. PT HAD SNF STAY BUT REMAINS DECONDITIONED. PT ACCEPTS SN PT AND OT SERVICES. PT DECLINES HOMECARE AIDE SERVICE AT THIS TIME.

## 2024-06-11 NOTE — TELEPHONE ENCOUNTER
Spoke with patient she states since she left the hospital she has been retaining fluid in her legs and feet, she says they are very swollen, hard, tight. She says that while inpatient she was encouraged to wrap her legs during the day once at home and states it is not helping. She is wondering if there is a water pill she can be prescribed to help. Other than that patient states she is feeling great. Pt is also requesting to review discharge medication list from 5/31/24. Also for medications to be called into [facility].     Pt sees a new PCP on 6/24. Follow up with vascular on 7/3. Follow up with cardiology on 7/25.     Reached out to Mary Toussaint NP, she states patient is to follow cardiology, pt needs to elevate and continue wrapping legs.   Called patient back and encouraged to continue elevating legs, wrapping them prior to getting out of bed in the morning, removing wraps at bedtime. Provided pt with number to cardiology and may speak with their office and request to be on a waitlist for a sooner appt.   I emailed 5/31 discharge medication list to patient per her request and reviewed list with her.  Encouraged patient to call back with any questions or concerns and to head to ED if she experiences any pain, color or temperature changes to legs/feet, SOB.

## 2024-06-12 ENCOUNTER — HOME CARE VISIT (OUTPATIENT)
Dept: HOME HEALTH SERVICES | Facility: HOME HEALTH | Age: 69
End: 2024-06-12
Payer: MEDICARE

## 2024-06-12 PROCEDURE — G0152 HHCP-SERV OF OT,EA 15 MIN: HCPCS

## 2024-06-13 SDOH — ECONOMIC STABILITY: HOUSING INSECURITY
HOME SAFETY: CY. PATIENT IN AGREEMENT WITH PLAN OF CARE AND VISIT FREQUENCY.    DISCIPLINE COMMUNICATION: COTA, MD, PT, SN   PLAN FOR NEXT VISIT: UE HEP

## 2024-06-13 SDOH — ECONOMIC STABILITY: HOUSING INSECURITY
HOME SAFETY: 67 YO FEMALE WITH PMH OF PE AND RLE DVT 2/2024, S/P MECHANICAL THROMBECTOMY VIA LEFT FEMORAL VEIN ACCESS. PATIENT HAD AORTIC OCCLUSION CAUSING R FOOT NUMBNESS AND CLAUDATION. S/P PERIVISCERAL AORTIC ENDARTERECTOMY, AORTOBIFEMORAL BYPASS GRAFT, L RENA

## 2024-06-13 SDOH — ECONOMIC STABILITY: HOUSING INSECURITY
HOME SAFETY: OR INCREASED STRENGTH B UE FOR CARRYOVER WITH ADL/TRANSFERS/MOBILITY, INCREASED FUNCTIONAL ACTIVITY TOLERANCE FOR IMPROVED FUNCTIONAL PERFORMANCE IN ALL AREAS OF ADL/IADL, EDUCATION AND INSTRUCTION IN ENERGY CONSERVATION, WORK SIMPLIFICATION.   INSTR

## 2024-06-13 SDOH — ECONOMIC STABILITY: HOUSING INSECURITY
HOME SAFETY: UCTION PROVIDED: BENEFITS OF DME FOR ENERGY CONSERVATION WITH ADLS, PROPER SET UP AND US OF DME, HOME SAFETY AND FALL PREVENTION  PATIENT RESPONSE TO INSTRUCTION: PATIENT VERBALIZES UNDERSTANDING   PATIENT INSTRUCTED ON PLAN OF CARE AND VISIT FREQUEN

## 2024-06-13 SDOH — ECONOMIC STABILITY: HOUSING INSECURITY
HOME SAFETY: ESENTS WITH DECREASED STRENGTH AND FUNCTIONAL ACTIVITY TOLERANCE, INCREASED EDEMA BLE AND DECREASED SENSATION BLE, PATIENT GRADUALLY WEANING SELF OFF OF USING WHEELED WALKER FOR TRANSFERS AND MOBILITY.   SKILLED NEEDS: SKILLED OT SERVICES INDICATED F

## 2024-06-13 SDOH — ECONOMIC STABILITY: HOUSING INSECURITY

## 2024-06-13 ASSESSMENT — ACTIVITIES OF DAILY LIVING (ADL)
BATHING ASSESSED: 1
AMBULATION ASSISTANCE: SUPERVISION
PHYSICAL TRANSFERS ASSESSED: 1
DRESSING_UB_CURRENT_FUNCTION: INDEPENDENT
BATHING_CURRENT_FUNCTION: INDEPENDENT
TOILETING: INDEPENDENT
AMBULATION ASSISTANCE: 1
TOILETING: 1
CURRENT_FUNCTION: SUPERVISION
DRESSING_LB_CURRENT_FUNCTION: INDEPENDENT

## 2024-06-13 ASSESSMENT — ENCOUNTER SYMPTOMS
PAIN: 1
PAIN LOCATION: LEFT LEG
PERSON REPORTING PAIN: PATIENT
PAIN LOCATION - PAIN SEVERITY: 1/10
PAIN LOCATION - PAIN SEVERITY: 1/10
PAIN LOCATION: RIGHT LEG

## 2024-06-14 ENCOUNTER — HOME CARE VISIT (OUTPATIENT)
Dept: HOME HEALTH SERVICES | Facility: HOME HEALTH | Age: 69
End: 2024-06-14
Payer: MEDICARE

## 2024-06-14 ENCOUNTER — APPOINTMENT (OUTPATIENT)
Dept: HOME HEALTH SERVICES | Facility: HOME HEALTH | Age: 69
End: 2024-06-14
Payer: MEDICARE

## 2024-06-14 VITALS
SYSTOLIC BLOOD PRESSURE: 132 MMHG | OXYGEN SATURATION: 99 % | TEMPERATURE: 97 F | DIASTOLIC BLOOD PRESSURE: 66 MMHG | HEART RATE: 72 BPM | RESPIRATION RATE: 20 BRPM

## 2024-06-14 PROCEDURE — G0157 HHC PT ASSISTANT EA 15: HCPCS | Mod: CQ

## 2024-06-14 PROCEDURE — G0299 HHS/HOSPICE OF RN EA 15 MIN: HCPCS

## 2024-06-14 ASSESSMENT — ENCOUNTER SYMPTOMS
PAIN SEVERITY GOAL: 0/10
DENIES PAIN: 1
HIGHEST PAIN SEVERITY IN PAST 24 HOURS: 0/10
LOWEST PAIN SEVERITY IN PAST 24 HOURS: 0/10
LOSS OF SENSATION IN FEET: 0
DEPRESSION: 0
CHANGE IN APPETITE: UNCHANGED
APPETITE LEVEL: FAIR
PERSON REPORTING PAIN: PATIENT
OCCASIONAL FEELINGS OF UNSTEADINESS: 0

## 2024-06-14 NOTE — Clinical Note
PT CONTINUES WITH BLE EDEMA. LUNG SOUNDS CLEAR T/O. 132/66 [id number]. PT CONTACTED SURGEON AND CARDIOLOGIST REGARDING HER EDEMA AND WAS TOLD TO CONTACT YOU (PCP) FOR DIURETIC. PLEASE CALL PT WITH INSTRUCTIONS [phone].  THANK YOU

## 2024-06-14 NOTE — HOME HEALTH
SKILLED NURSING VISIT FOR ASSESSMENT TEACHING OF MEDICATIONS DISEASE PROCESS. PT TOLERATED PROCEDURES WELL. PTS BLE PERSISTS. PT REPORTS SHE CONTACTED SURGEON AND CARDIOLOGISTS OFFICES AND DIRECTED TO PCP. SN SENT MESSAGE TO PTS PCP DR. KOO REGARDING STATUS. PT DENIES ANY CHEST PAIN OR SOB.

## 2024-06-15 ASSESSMENT — ENCOUNTER SYMPTOMS
PAIN: 1
PAIN LOCATION: LEFT LEG
PERSON REPORTING PAIN: PATIENT
PAIN LOCATION: RIGHT LEG
PAIN SEVERITY GOAL: 0/10
LOWEST PAIN SEVERITY IN PAST 24 HOURS: 0/10
HIGHEST PAIN SEVERITY IN PAST 24 HOURS: 3/10

## 2024-06-17 ENCOUNTER — HOME CARE VISIT (OUTPATIENT)
Dept: HOME HEALTH SERVICES | Facility: HOME HEALTH | Age: 69
End: 2024-06-17
Payer: MEDICARE

## 2024-06-17 ENCOUNTER — TELEPHONE (OUTPATIENT)
Dept: PRIMARY CARE | Facility: CLINIC | Age: 69
End: 2024-06-17
Payer: MEDICARE

## 2024-06-17 VITALS — TEMPERATURE: 97.1 F

## 2024-06-17 DIAGNOSIS — E87.6 HYPOKALEMIA: ICD-10-CM

## 2024-06-17 DIAGNOSIS — R60.0 BILATERAL LEG EDEMA: Primary | ICD-10-CM

## 2024-06-17 PROCEDURE — G0158 HHC OT ASSISTANT EA 15: HCPCS | Mod: CO

## 2024-06-17 RX ORDER — FUROSEMIDE 80 MG/1
80 TABLET ORAL DAILY
Qty: 30 TABLET | Refills: 0 | Status: SHIPPED | OUTPATIENT
Start: 2024-06-17 | End: 2024-07-17

## 2024-06-17 RX ORDER — POTASSIUM CHLORIDE 20 MEQ/1
40 TABLET, EXTENDED RELEASE ORAL DAILY
Qty: 60 TABLET | Refills: 0 | Status: SHIPPED | OUTPATIENT
Start: 2024-06-17 | End: 2024-07-17

## 2024-06-17 ASSESSMENT — ENCOUNTER SYMPTOMS
PAIN: 1
PERSON REPORTING PAIN: PATIENT
PAIN LOCATION: GENERALIZED
LOWEST PAIN SEVERITY IN PAST 24 HOURS: 1/10
HIGHEST PAIN SEVERITY IN PAST 24 HOURS: 3/10

## 2024-06-17 NOTE — TELEPHONE ENCOUNTER
I spoke with patient this afternoon and as noted she claims that she was just recently released from the hospital and she is unable to come in for a visit.  Her home health care nurse is requesting a water pill for her.  She tells me she has been in touch with her surgeon and also she contacted her cardiologist and they instructed her to contact her PCP for medication.  She had a potassium per her chart on 5/30/2024 which was low at 3.3.  She tells me she has gained 30 pounds since the beginning of her multiple surgeries that she has had.  Will start the patient on Lasix 80 mg daily and potassium 40 mEq daily and will have her follow-up in the office here in 7 to 10 days.  In the meantime she is to weigh herself daily and if she gains more than 2 pounds she is to contact me.  She states she will do so.

## 2024-06-17 NOTE — TELEPHONE ENCOUNTER
Note  Patient called in this morning stating that her home healthcare nurse sent a message on Friday regarding putting the patient on a water pill and potasium.  Patient states that she just recently had surgery and she is retaining water.  Patient states that she is unable to come in today with just having surgery and retaining water.  Patient can be reached at [phone].

## 2024-06-17 NOTE — TELEPHONE ENCOUNTER
Patient called in this morning stating that her home healthcare nurse sent a message on Friday regarding putting the patient on a water pill and potasium.  Patient states that she just recently had surgery and she is retaining water.  Patient states that she is unable to come in today with just having surgery and retaining water.  Patient can be reached at [phone].        Thank You

## 2024-06-18 ENCOUNTER — HOME CARE VISIT (OUTPATIENT)
Dept: HOME HEALTH SERVICES | Facility: HOME HEALTH | Age: 69
End: 2024-06-18
Payer: MEDICARE

## 2024-06-18 VITALS
RESPIRATION RATE: 20 BRPM | SYSTOLIC BLOOD PRESSURE: 122 MMHG | HEART RATE: 70 BPM | DIASTOLIC BLOOD PRESSURE: 70 MMHG | TEMPERATURE: 97.3 F | OXYGEN SATURATION: 99 %

## 2024-06-18 PROCEDURE — G0299 HHS/HOSPICE OF RN EA 15 MIN: HCPCS

## 2024-06-18 ASSESSMENT — PAIN SCALES - PAIN ASSESSMENT IN ADVANCED DEMENTIA (PAINAD)
CONSOLABILITY: 0
FACIALEXPRESSION: 0 - SMILING OR INEXPRESSIVE.
CONSOLABILITY: 0 - NO NEED TO CONSOLE.
NEGVOCALIZATION: 0
NEGVOCALIZATION: 0 - NONE.
TOTALSCORE: 0
BREATHING: 0
BODYLANGUAGE: 0
BODYLANGUAGE: 0 - RELAXED.
FACIALEXPRESSION: 0

## 2024-06-18 ASSESSMENT — ENCOUNTER SYMPTOMS
OCCASIONAL FEELINGS OF UNSTEADINESS: 0
LOWEST PAIN SEVERITY IN PAST 24 HOURS: 0/10
SUBJECTIVE PAIN PROGRESSION: UNCHANGED
HIGHEST PAIN SEVERITY IN PAST 24 HOURS: 0/10
LAST BOWEL MOVEMENT: 67009
LOSS OF SENSATION IN FEET: 0
PAIN SEVERITY GOAL: 0/10
CHANGE IN APPETITE: UNCHANGED
APPETITE LEVEL: FAIR
DEPRESSION: 0
DENIES PAIN: 1
PERSON REPORTING PAIN: PATIENT

## 2024-06-19 ENCOUNTER — HOME CARE VISIT (OUTPATIENT)
Dept: HOME HEALTH SERVICES | Facility: HOME HEALTH | Age: 69
End: 2024-06-19
Payer: MEDICARE

## 2024-06-19 PROCEDURE — G0157 HHC PT ASSISTANT EA 15: HCPCS | Mod: CQ

## 2024-06-19 ASSESSMENT — ENCOUNTER SYMPTOMS
PAIN: 1
PAIN LOCATION: RIGHT LEG
HIGHEST PAIN SEVERITY IN PAST 24 HOURS: 4/10
PERSON REPORTING PAIN: PATIENT
PAIN LOCATION: LEFT LEG
PAIN SEVERITY GOAL: 0/10
LOWEST PAIN SEVERITY IN PAST 24 HOURS: 0/10

## 2024-06-19 NOTE — DOCUMENTATION CLARIFICATION NOTE
"    PATIENT:               JORGE L MANUEL  ACCT #:                  [id number]  MRN:                       [id number]  :                       1955  ADMIT DATE:       2024 5:53 AM  DISCH DATE:        2024 12:59 PM  RESPONDING PROVIDER #:        [id number]          PROVIDER RESPONSE TEXT:    The patient's DVTs were Present on Admission    CDI QUERY TEXT:    Clarification    Instruction:    Based on your assessment of the patient and the clinical information, please provide the requested documentation by clicking on the appropriate radio button and enter any additional information if prompted.    Question: Please further clarify the Present on Admission status for this patient's DVTs as    When answering this query, please exercise your independent professional judgment. The fact that a question is being asked, does not imply that any particular answer is desired or expected.    The patient's clinical indicators include:  Clinical Information:  24 LORENZO Zelaya MD This pt is a 68 y.o. female with h/o PE and RLE DVT in 2024 and underwent mechanical thrombectomy via left femoral vein access (Pathology came back as organized thrombus) admitted for perivisceral aortic endarterectomy and aortobifemoral bypass on 24.    Clinical Indicators:  24 LORENZO Zelaya MD  ?EXTREMITIES: Feet slightly cool. Mild right leg edema?    24 Surgical Critical Care LEANNA Juarez PA-C ?Obtain an US of lower extremities to r/o DVT Given edema?    24 Bilateral Venous Duplex finding a right side there is acute non-occlusive deep vein thrombosis visualized in the distal external iliac, common femoral, profunda, proximal femoral and mid femoral veins.    24 Vascular US Lower Extremity Venous Duplex Bilateral  \"Critical Result: Acute non-occlusive deep vein thrombosis distal external iliac, common femoral vein, femoral vein proximal thru mid, and profunda vein.  Notification called to " "Carmelina BILLY at bedside on 5/20/2024 at 8:30:00 AM by Adelita Luciano ABDIAZIZ.\"    Treatment:  BLE US  5/16/24 -Perivisceral aortic endartectomy, aorta bifemoral bypass graft, and left renal artery bypass graft    Risk Factors: RLE DVT in February, surgical intervention to RLE, Aortoiliac occlusive disease  Options provided:  -- The patient's DVTs were Present on Admission  -- The patient's DVTs were not Present on Admission  -- Unable to determine POA status of the patient's DVTs  -- Other - I will add my own diagnosis  -- Refer to Clinical Documentation Reviewer    Query created by: Yareli Camilo on 6/12/2024 1:24 PM      Electronically signed by:  MANE CALDWELL MD 6/19/2024 1:29 PM          "

## 2024-06-19 NOTE — HOME HEALTH
SKILLED NURSING VISIT FOR ASSESSMENT TEACHING OF MEDICATIONS DISEASE PROCESS. PT TOLERATED PROCEDURES WELL. PT REPORTS SHE STARTED THE PRESCRIBED LASIX YESTERDAY AND HAS LOST 9 LBS OF FLUID THIS AM. BLE EDEMA SLIGHTLY IMPROVED. BLISTER LLE RESOLVED. LEFT FLANK INCISION WELL APPROXIMATED WITHOUT REDNESS OR DRAINAGE.

## 2024-06-21 ENCOUNTER — HOME CARE VISIT (OUTPATIENT)
Dept: HOME HEALTH SERVICES | Facility: HOME HEALTH | Age: 69
End: 2024-06-21
Payer: MEDICARE

## 2024-06-21 PROCEDURE — G0299 HHS/HOSPICE OF RN EA 15 MIN: HCPCS

## 2024-06-21 PROCEDURE — G0157 HHC PT ASSISTANT EA 15: HCPCS | Mod: CQ

## 2024-06-21 ASSESSMENT — ENCOUNTER SYMPTOMS
PAIN SEVERITY GOAL: 0/10
PERSON REPORTING PAIN: PATIENT
PAIN LOCATION: RIGHT FOOT
LOWEST PAIN SEVERITY IN PAST 24 HOURS: 0/10
PAIN LOCATION: LEFT FOOT
HIGHEST PAIN SEVERITY IN PAST 24 HOURS: 2/10
PAIN: 1

## 2024-06-22 VITALS
DIASTOLIC BLOOD PRESSURE: 66 MMHG | OXYGEN SATURATION: 96 % | HEART RATE: 72 BPM | TEMPERATURE: 97.1 F | RESPIRATION RATE: 20 BRPM | SYSTOLIC BLOOD PRESSURE: 112 MMHG

## 2024-06-22 ASSESSMENT — PAIN SCALES - PAIN ASSESSMENT IN ADVANCED DEMENTIA (PAINAD)
BREATHING: 0
TOTALSCORE: 0
BODYLANGUAGE: 0
FACIALEXPRESSION: 0
FACIALEXPRESSION: 0 - SMILING OR INEXPRESSIVE.
NEGVOCALIZATION: 0
BODYLANGUAGE: 0 - RELAXED.
CONSOLABILITY: 0 - NO NEED TO CONSOLE.
NEGVOCALIZATION: 0 - NONE.
CONSOLABILITY: 0

## 2024-06-22 ASSESSMENT — ENCOUNTER SYMPTOMS
DEPRESSION: 0
OCCASIONAL FEELINGS OF UNSTEADINESS: 0
LOSS OF SENSATION IN FEET: 0
DENIES PAIN: 1
LOWEST PAIN SEVERITY IN PAST 24 HOURS: 0/10
LAST BOWEL MOVEMENT: 67012
HIGHEST PAIN SEVERITY IN PAST 24 HOURS: 0/10
PAIN SEVERITY GOAL: 0/10
APPETITE LEVEL: FAIR
SUBJECTIVE PAIN PROGRESSION: UNCHANGED
PERSON REPORTING PAIN: PATIENT
CHANGE IN APPETITE: UNCHANGED

## 2024-06-22 NOTE — HOME HEALTH
SKILLED NURSING VISIT FOR ASSESSMENT TEACHING OF MEDICATIONS DISEASE PROCESS. PT TOLERATED PROCEDURES WELL. PTS BLE EDEMA IMPROVING. LEFT FLANK AND BILATERAL GROIN INCISIONS WELL APPROXIMATED WITHOUT REDNESS OR DRAINAGE.

## 2024-06-24 ENCOUNTER — APPOINTMENT (OUTPATIENT)
Dept: PRIMARY CARE | Facility: CLINIC | Age: 69
End: 2024-06-24
Payer: MEDICARE

## 2024-06-24 VITALS
TEMPERATURE: 98 F | HEART RATE: 61 BPM | SYSTOLIC BLOOD PRESSURE: 141 MMHG | HEIGHT: 62 IN | WEIGHT: 189 LBS | BODY MASS INDEX: 34.78 KG/M2 | DIASTOLIC BLOOD PRESSURE: 75 MMHG

## 2024-06-24 DIAGNOSIS — I25.10 CORONARY ARTERY DISEASE WITHOUT ANGINA PECTORIS: ICD-10-CM

## 2024-06-24 DIAGNOSIS — I48.19 ATRIAL FIBRILLATION, PERSISTENT (MULTI): ICD-10-CM

## 2024-06-24 DIAGNOSIS — I48.91 ATRIAL FIBRILLATION, UNSPECIFIED TYPE (MULTI): ICD-10-CM

## 2024-06-24 DIAGNOSIS — R60.9 EDEMA, UNSPECIFIED TYPE: ICD-10-CM

## 2024-06-24 DIAGNOSIS — T81.49XA SURGICAL WOUND INFECTION: ICD-10-CM

## 2024-06-24 DIAGNOSIS — E53.8 VITAMIN B12 DEFICIENCY: ICD-10-CM

## 2024-06-24 DIAGNOSIS — M10.9 GOUT, UNSPECIFIED CAUSE, UNSPECIFIED CHRONICITY, UNSPECIFIED SITE: ICD-10-CM

## 2024-06-24 DIAGNOSIS — E78.2 MIXED HYPERLIPIDEMIA: ICD-10-CM

## 2024-06-24 DIAGNOSIS — I10 PRIMARY HYPERTENSION: Primary | ICD-10-CM

## 2024-06-24 DIAGNOSIS — E55.9 VITAMIN D DEFICIENCY: ICD-10-CM

## 2024-06-24 PROBLEM — R26.81 UNSTEADINESS ON FEET: Status: ACTIVE | Noted: 2024-06-01

## 2024-06-24 PROBLEM — I26.99 PULMONARY EMBOLISM WITHOUT ACUTE COR PULMONALE (MULTI): Status: ACTIVE | Noted: 2024-05-31

## 2024-06-24 PROBLEM — R26.2 DIFFICULTY IN WALKING, NOT ELSEWHERE CLASSIFIED: Status: ACTIVE | Noted: 2024-06-01

## 2024-06-24 PROBLEM — E03.9 HYPOTHYROIDISM, UNSPECIFIED: Status: ACTIVE | Noted: 2024-05-31

## 2024-06-24 PROBLEM — E80.6 OTHER DISORDERS OF BILIRUBIN METABOLISM: Status: ACTIVE | Noted: 2024-05-31

## 2024-06-24 PROBLEM — K21.9 GASTRO-ESOPHAGEAL REFLUX DISEASE WITHOUT ESOPHAGITIS: Status: ACTIVE | Noted: 2024-05-31

## 2024-06-24 PROBLEM — M62.81 MUSCLE WEAKNESS (GENERALIZED): Status: ACTIVE | Noted: 2024-06-01

## 2024-06-24 PROBLEM — Z95.820 PERIPHERAL VASCULAR ANGIOPLASTY STATUS WITH IMPLANTS AND GRAFTS: Status: ACTIVE | Noted: 2024-05-31

## 2024-06-24 PROBLEM — I82.401 ACUTE EMBOLISM AND THROMBOSIS OF UNSPECIFIED DEEP VEINS OF RIGHT LOWER EXTREMITY (MULTI): Status: ACTIVE | Noted: 2024-05-31

## 2024-06-24 PROBLEM — D64.9 ANEMIA, UNSPECIFIED: Status: ACTIVE | Noted: 2024-05-31

## 2024-06-24 PROCEDURE — 3077F SYST BP >= 140 MM HG: CPT | Performed by: INTERNAL MEDICINE

## 2024-06-24 PROCEDURE — 1111F DSCHRG MED/CURRENT MED MERGE: CPT | Performed by: INTERNAL MEDICINE

## 2024-06-24 PROCEDURE — 1159F MED LIST DOCD IN RCRD: CPT | Performed by: INTERNAL MEDICINE

## 2024-06-24 PROCEDURE — 1160F RVW MEDS BY RX/DR IN RCRD: CPT | Performed by: INTERNAL MEDICINE

## 2024-06-24 PROCEDURE — 99214 OFFICE O/P EST MOD 30 MIN: CPT | Performed by: INTERNAL MEDICINE

## 2024-06-24 PROCEDURE — 3078F DIAST BP <80 MM HG: CPT | Performed by: INTERNAL MEDICINE

## 2024-06-24 RX ORDER — AMIODARONE HYDROCHLORIDE 200 MG/1
200 TABLET ORAL DAILY
Qty: 90 TABLET | Refills: 0 | Status: SHIPPED | OUTPATIENT
Start: 2024-06-24 | End: 2024-07-01 | Stop reason: SDUPTHER

## 2024-06-24 RX ORDER — ATORVASTATIN CALCIUM 80 MG/1
80 TABLET, FILM COATED ORAL NIGHTLY
Qty: 90 TABLET | Refills: 3 | Status: SHIPPED | OUTPATIENT
Start: 2024-06-24 | End: 2025-06-24

## 2024-06-24 RX ORDER — METOPROLOL TARTRATE 25 MG/1
25 TABLET, FILM COATED ORAL 2 TIMES DAILY
Qty: 180 TABLET | Refills: 3 | Status: SHIPPED | OUTPATIENT
Start: 2024-06-24 | End: 2025-06-24

## 2024-06-24 RX ORDER — CEPHALEXIN 500 MG/1
500 CAPSULE ORAL 2 TIMES DAILY
Qty: 20 CAPSULE | Refills: 0 | Status: SHIPPED | OUTPATIENT
Start: 2024-06-24 | End: 2024-07-04

## 2024-06-24 ASSESSMENT — ENCOUNTER SYMPTOMS
NAUSEA: 0
SHORTNESS OF BREATH: 0
CHILLS: 0
OCCASIONAL FEELINGS OF UNSTEADINESS: 0
FEVER: 0
DIARRHEA: 0
ABDOMINAL PAIN: 0
SORE THROAT: 0
DEPRESSION: 0
COUGH: 0
AGITATION: 0
LOSS OF SENSATION IN FEET: 0
VOMITING: 0

## 2024-06-24 ASSESSMENT — PATIENT HEALTH QUESTIONNAIRE - PHQ9
1. LITTLE INTEREST OR PLEASURE IN DOING THINGS: NOT AT ALL
2. FEELING DOWN, DEPRESSED OR HOPELESS: NOT AT ALL
SUM OF ALL RESPONSES TO PHQ9 QUESTIONS 1 AND 2: 0

## 2024-06-24 NOTE — PROGRESS NOTES
"Subjective   Patient ID: Klaudia Ratliff is a 68 y.o. female who presents for Establish Care.    HPI  Patient here to establish care today.  Her previous PCP was Dr. Jesse Mendes. Patient following with [facility] for aorta repair in May 16 of last year.  Patient called her cardiologist about this and then called her primary care doctor.  Patient is on lasix for edema started by her prior PCP. Patient is on lasix 80mg daily and 40meq of potassium.  Patient was in afib after surgery and is on anticoagulation with eliquis. Denies bleeding.  She is on Amiodarone. Patient states her legs feel better since she had surgery.  Patient has follow-up with Dr. Zelaya on July 3, 2024.     She noticed her incision opened in the right groin yesterday and smelled a foul smell to the area and noticed drainage of pus. Denies fever or chills. We did send a message to the patient's surgeon Dr. Zelaya and surgical NP Mary Toussaint regarding this findings.     Review of Systems   Constitutional:  Negative for chills and fever.   HENT:  Negative for sore throat.    Eyes:  Negative for visual disturbance.   Respiratory:  Negative for cough and shortness of breath.    Cardiovascular:  Positive for leg swelling. Negative for chest pain.   Gastrointestinal:  Negative for abdominal pain, diarrhea, nausea and vomiting.   Skin:  Negative for rash.   Neurological:  Negative for syncope.   Psychiatric/Behavioral:  Negative for agitation.        Objective   /75   Pulse 61   Temp 36.7 °C (98 °F) (Temporal)   Ht 1.575 m (5' 2\")   Wt 85.7 kg (189 lb)   BMI 34.57 kg/m²     Physical Exam  Vitals and nursing note reviewed.   Constitutional:       General: She is not in acute distress.     Appearance: She is obese. She is not ill-appearing, toxic-appearing or diaphoretic.   HENT:      Head: Normocephalic and atraumatic.   Musculoskeletal:      Cervical back: Neck supple.   Neurological:      Mental Status: She is alert.       Assessment/Plan   Problem " List Items Addressed This Visit             ICD-10-CM    Hyperlipidemia E78.5    Relevant Orders    Lipid panel (Completed)    Hypertension - Primary I10    Relevant Orders    Comprehensive metabolic panel (Completed)    Surgical wound infection T81.49XA     Other Visit Diagnoses         Codes    Edema, unspecified type     R60.9    Relevant Orders    Comprehensive metabolic panel (Completed)    Vitamin D deficiency     E55.9    Relevant Orders    Vitamin D 25-Hydroxy,Total (for eval of Vitamin D levels) (Completed)    Vitamin B12 deficiency     E53.8    Relevant Orders    Vitamin B12 (Completed)    Gout, unspecified cause, unspecified chronicity, unspecified site     M10.9    Relevant Orders    Uric acid (Completed)    Atrial fibrillation, unspecified type (Multi)     I48.91    Relevant Medications    metoprolol tartrate (Lopressor) 25 mg tablet    Atrial fibrillation, persistent (Multi)     I48.19    Relevant Medications    apixaban (Eliquis) 5 mg tablet    metoprolol tartrate (Lopressor) 25 mg tablet    Coronary artery disease without angina pectoris     I25.10    Relevant Medications    metoprolol tartrate (Lopressor) 25 mg tablet    atorvastatin (Lipitor) 80 mg tablet

## 2024-06-25 ENCOUNTER — TELEPHONE (OUTPATIENT)
Dept: VASCULAR SURGERY | Facility: HOSPITAL | Age: 69
End: 2024-06-25

## 2024-06-25 ENCOUNTER — LAB (OUTPATIENT)
Dept: LAB | Facility: LAB | Age: 69
End: 2024-06-25
Payer: MEDICARE

## 2024-06-25 ENCOUNTER — HOME CARE VISIT (OUTPATIENT)
Dept: HOME HEALTH SERVICES | Facility: HOME HEALTH | Age: 69
End: 2024-06-25
Payer: MEDICARE

## 2024-06-25 VITALS
OXYGEN SATURATION: 98 % | HEART RATE: 78 BPM | DIASTOLIC BLOOD PRESSURE: 70 MMHG | SYSTOLIC BLOOD PRESSURE: 126 MMHG | TEMPERATURE: 96.8 F

## 2024-06-25 DIAGNOSIS — E78.2 MIXED HYPERLIPIDEMIA: ICD-10-CM

## 2024-06-25 DIAGNOSIS — R60.9 EDEMA, UNSPECIFIED TYPE: ICD-10-CM

## 2024-06-25 DIAGNOSIS — I10 PRIMARY HYPERTENSION: ICD-10-CM

## 2024-06-25 DIAGNOSIS — M10.9 GOUT, UNSPECIFIED CAUSE, UNSPECIFIED CHRONICITY, UNSPECIFIED SITE: ICD-10-CM

## 2024-06-25 DIAGNOSIS — E53.8 VITAMIN B12 DEFICIENCY: ICD-10-CM

## 2024-06-25 DIAGNOSIS — E55.9 VITAMIN D DEFICIENCY: ICD-10-CM

## 2024-06-25 LAB
25(OH)D3 SERPL-MCNC: 30 NG/ML (ref 30–100)
ALBUMIN SERPL BCP-MCNC: 3.8 G/DL (ref 3.4–5)
ALP SERPL-CCNC: 70 U/L (ref 33–136)
ALT SERPL W P-5'-P-CCNC: 16 U/L (ref 7–45)
ANION GAP SERPL CALC-SCNC: 13 MMOL/L (ref 10–20)
AST SERPL W P-5'-P-CCNC: 14 U/L (ref 9–39)
BILIRUB SERPL-MCNC: 1.3 MG/DL (ref 0–1.2)
BUN SERPL-MCNC: 22 MG/DL (ref 6–23)
CALCIUM SERPL-MCNC: 9.1 MG/DL (ref 8.6–10.3)
CHLORIDE SERPL-SCNC: 97 MMOL/L (ref 98–107)
CHOLEST SERPL-MCNC: 184 MG/DL (ref 0–199)
CHOLESTEROL/HDL RATIO: 3.5
CO2 SERPL-SCNC: 34 MMOL/L (ref 21–32)
CREAT SERPL-MCNC: 1.23 MG/DL (ref 0.5–1.05)
EGFRCR SERPLBLD CKD-EPI 2021: 48 ML/MIN/1.73M*2
GLUCOSE SERPL-MCNC: 93 MG/DL (ref 74–99)
HDLC SERPL-MCNC: 52.6 MG/DL
LDLC SERPL CALC-MCNC: 103 MG/DL
NON HDL CHOLESTEROL: 131 MG/DL (ref 0–149)
POTASSIUM SERPL-SCNC: 3.9 MMOL/L (ref 3.5–5.3)
PROT SERPL-MCNC: 6.5 G/DL (ref 6.4–8.2)
SODIUM SERPL-SCNC: 140 MMOL/L (ref 136–145)
TRIGL SERPL-MCNC: 141 MG/DL (ref 0–149)
URATE SERPL-MCNC: 8.9 MG/DL (ref 2.3–6.7)
VIT B12 SERPL-MCNC: 296 PG/ML (ref 211–911)
VLDL: 28 MG/DL (ref 0–40)

## 2024-06-25 PROCEDURE — 80053 COMPREHEN METABOLIC PANEL: CPT

## 2024-06-25 PROCEDURE — 80061 LIPID PANEL: CPT

## 2024-06-25 PROCEDURE — 82607 VITAMIN B-12: CPT

## 2024-06-25 PROCEDURE — 84550 ASSAY OF BLOOD/URIC ACID: CPT

## 2024-06-25 PROCEDURE — 36415 COLL VENOUS BLD VENIPUNCTURE: CPT

## 2024-06-25 PROCEDURE — 82306 VITAMIN D 25 HYDROXY: CPT

## 2024-06-25 PROCEDURE — G0299 HHS/HOSPICE OF RN EA 15 MIN: HCPCS

## 2024-06-25 NOTE — TELEPHONE ENCOUNTER
Per secure chat message, this patient was in to see Dr. Jeffry Diamond, which states that she appears to have a superficial infection of her right groin incision a 1 day. It is 2 cm open with drainage of yellow pus. I am going to put her on keflex for 10 days.   This RN spoke with the patient today and she states she started Keflex last night, per Mary Toussaint CNP this nurse encouraged her to cleanse the wound with half strength hydrogen peroxide and NS and cover with dry gauze. She states she will do that and that her HC RN is coming today for a visit and can call back or chart in Epic the depth of wound. Pt emailed this picture of incision today.

## 2024-06-25 NOTE — HOME HEALTH
SKILLED NURSING VISIT FOR ASSESSMENT TEACHING OF MEDICATIONS DISEASE PROCESS. PT TOLERATED PROCEDURES WELL. PT HAD APPT WITH NEW PCP DR. GIRALDO YESTERDAY WHO STARTED HER ON KEFLEX 500 MG. 2 X DAY X 7DAYS DUE TO SCAB FELL OFF RIGHT GROIN INCISION AND HAD SOME DRAINAGE. OPEN AREA SUPERFICIAL WITHOUT DRAINAGE TODAY. PT REPORTS SHE SPOKE WITH DAYSI AT DR. STOREY OFFICE AND SENT PICTURE AND  INSTRUCTED PT TO KEEP CLEAN AND COVER WITH DSD DAILY. SN CONTACTED [facility] AND WOUND CARE ORDERS RECIEVED AS ABOVE. PT DECLINED SECOND SN VISIT LATER IN WEEK FOR ASSESSMENT OF AREA AND INDICATES UNDERSTANDING OF INSTRUCTIONS AND TO CALL SN OR MD OFFICE WITH ANY FURTHER CONCERNS. BLE EDEMA IMPROVING.

## 2024-06-26 ENCOUNTER — HOME CARE VISIT (OUTPATIENT)
Dept: HOME HEALTH SERVICES | Facility: HOME HEALTH | Age: 69
End: 2024-06-26
Payer: MEDICARE

## 2024-06-26 VITALS — TEMPERATURE: 97.7 F

## 2024-06-26 PROCEDURE — G0157 HHC PT ASSISTANT EA 15: HCPCS | Mod: CQ

## 2024-06-26 PROCEDURE — G0158 HHC OT ASSISTANT EA 15: HCPCS | Mod: CO

## 2024-06-26 ASSESSMENT — ENCOUNTER SYMPTOMS
HIGHEST PAIN SEVERITY IN PAST 24 HOURS: 4/10
SUBJECTIVE PAIN PROGRESSION: WAXING AND WANING
PAIN: 1
PERSON REPORTING PAIN: PATIENT
DENIES PAIN: 1

## 2024-06-27 ENCOUNTER — HOME CARE VISIT (OUTPATIENT)
Dept: HOME HEALTH SERVICES | Facility: HOME HEALTH | Age: 69
End: 2024-06-27
Payer: MEDICARE

## 2024-06-27 PROCEDURE — G0157 HHC PT ASSISTANT EA 15: HCPCS | Mod: CQ

## 2024-06-27 ASSESSMENT — ENCOUNTER SYMPTOMS
HIGHEST PAIN SEVERITY IN PAST 24 HOURS: 0/10
LOWEST PAIN SEVERITY IN PAST 24 HOURS: 0/10
SUBJECTIVE PAIN PROGRESSION: UNCHANGED
DENIES PAIN: 1
DENIES PAIN: 1
OCCASIONAL FEELINGS OF UNSTEADINESS: 0
LAST BOWEL MOVEMENT: 67016
LOSS OF SENSATION IN FEET: 0
APPETITE LEVEL: GOOD
PERSON REPORTING PAIN: PATIENT
DEPRESSION: 0
PAIN SEVERITY GOAL: 0/10
CHANGE IN APPETITE: UNCHANGED

## 2024-06-27 ASSESSMENT — PAIN SCALES - PAIN ASSESSMENT IN ADVANCED DEMENTIA (PAINAD)
TOTALSCORE: 0
FACIALEXPRESSION: 0 - SMILING OR INEXPRESSIVE.
FACIALEXPRESSION: 0
BODYLANGUAGE: 0 - RELAXED.
CONSOLABILITY: 0 - NO NEED TO CONSOLE.
NEGVOCALIZATION: 0 - NONE.
NEGVOCALIZATION: 0
BODYLANGUAGE: 0
BREATHING: 0
CONSOLABILITY: 0

## 2024-07-01 DIAGNOSIS — I48.91 ATRIAL FIBRILLATION, UNSPECIFIED TYPE (MULTI): ICD-10-CM

## 2024-07-02 ENCOUNTER — HOME CARE VISIT (OUTPATIENT)
Dept: HOME HEALTH SERVICES | Facility: HOME HEALTH | Age: 69
End: 2024-07-02
Payer: MEDICARE

## 2024-07-02 VITALS
SYSTOLIC BLOOD PRESSURE: 116 MMHG | OXYGEN SATURATION: 98 % | HEART RATE: 60 BPM | RESPIRATION RATE: 20 BRPM | DIASTOLIC BLOOD PRESSURE: 60 MMHG | TEMPERATURE: 97 F

## 2024-07-02 VITALS — OXYGEN SATURATION: 97 % | HEART RATE: 67 BPM

## 2024-07-02 PROCEDURE — G0157 HHC PT ASSISTANT EA 15: HCPCS | Mod: CQ

## 2024-07-02 PROCEDURE — G0158 HHC OT ASSISTANT EA 15: HCPCS | Mod: CO

## 2024-07-02 PROCEDURE — G0299 HHS/HOSPICE OF RN EA 15 MIN: HCPCS

## 2024-07-02 RX ORDER — AMIODARONE HYDROCHLORIDE 200 MG/1
200 TABLET ORAL DAILY
Qty: 90 TABLET | Refills: 0 | Status: SHIPPED | OUTPATIENT
Start: 2024-07-02 | End: 2024-09-30

## 2024-07-02 ASSESSMENT — PAIN SCALES - PAIN ASSESSMENT IN ADVANCED DEMENTIA (PAINAD)
BODYLANGUAGE: 0 - RELAXED.
NEGVOCALIZATION: 0
CONSOLABILITY: 0
CONSOLABILITY: 0 - NO NEED TO CONSOLE.
TOTALSCORE: 0
NEGVOCALIZATION: 0 - NONE.
FACIALEXPRESSION: 0
BREATHING: 0
FACIALEXPRESSION: 0 - SMILING OR INEXPRESSIVE.
BODYLANGUAGE: 0

## 2024-07-02 ASSESSMENT — ENCOUNTER SYMPTOMS
DEPRESSION: 0
PAIN LOCATION: RIGHT FOOT
SUBJECTIVE PAIN PROGRESSION: GRADUALLY IMPROVING
PERSON REPORTING PAIN: PATIENT
HIGHEST PAIN SEVERITY IN PAST 24 HOURS: 3/10
PAIN SEVERITY GOAL: 0/10
HIGHEST PAIN SEVERITY IN PAST 24 HOURS: 0/10
OCCASIONAL FEELINGS OF UNSTEADINESS: 0
SUBJECTIVE PAIN PROGRESSION: UNCHANGED
LOWEST PAIN SEVERITY IN PAST 24 HOURS: 0/10
PERSON REPORTING PAIN: PATIENT
PAIN: 1
CHANGE IN APPETITE: UNCHANGED
DENIES PAIN: 1
DENIES PAIN: 1
APPETITE LEVEL: FAIR
LAST BOWEL MOVEMENT: 67023
LOWEST PAIN SEVERITY IN PAST 24 HOURS: 1/10
LOSS OF SENSATION IN FEET: 0

## 2024-07-02 NOTE — HOME HEALTH
SKILLED NURSING VISIT FOR ASSESSMENT TEACHING OF MEDICATIONS AND WOUND CARE TO RIGHT GROIN INCISION. PT TOLERATED PROCEDURES WELL. BENJI BUCKLEY COMPLETING PT TREATMENT UPON SN ARRIVAL. PTS EDEMA AND EDNURANCE IMPROVED. RIGHT GROIN WOUND HEALED. PT HAS APPT. WITH DR. CALDWELL TOMORROW.

## 2024-07-03 ENCOUNTER — OFFICE VISIT (OUTPATIENT)
Dept: VASCULAR SURGERY | Facility: HOSPITAL | Age: 69
End: 2024-07-03
Payer: MEDICARE

## 2024-07-03 ENCOUNTER — HOSPITAL ENCOUNTER (OUTPATIENT)
Dept: VASCULAR MEDICINE | Facility: HOSPITAL | Age: 69
Discharge: HOME | End: 2024-07-03
Payer: MEDICARE

## 2024-07-03 VITALS
BODY MASS INDEX: 30.59 KG/M2 | SYSTOLIC BLOOD PRESSURE: 156 MMHG | OXYGEN SATURATION: 96 % | RESPIRATION RATE: 16 BRPM | HEIGHT: 62 IN | DIASTOLIC BLOOD PRESSURE: 75 MMHG | WEIGHT: 166.2 LBS | HEART RATE: 56 BPM

## 2024-07-03 DIAGNOSIS — I82.409 DEEP VEIN THROMBOSIS (DVT) OF LOWER EXTREMITY, UNSPECIFIED CHRONICITY, UNSPECIFIED LATERALITY, UNSPECIFIED VEIN (MULTI): Primary | ICD-10-CM

## 2024-07-03 DIAGNOSIS — I82.491 ACUTE DEEP VEIN THROMBOSIS (DVT) OF OTHER SPECIFIED VEIN OF RIGHT LOWER EXTREMITY (MULTI): ICD-10-CM

## 2024-07-03 DIAGNOSIS — I70.1 RENAL ARTERY STENOSIS (CMS-HCC): ICD-10-CM

## 2024-07-03 DIAGNOSIS — I70.0 AORTIC OCCLUSION (CMS-HCC): ICD-10-CM

## 2024-07-03 PROCEDURE — 93975 VASCULAR STUDY: CPT | Performed by: SURGERY

## 2024-07-03 PROCEDURE — 93975 VASCULAR STUDY: CPT

## 2024-07-03 PROCEDURE — 99212 OFFICE O/P EST SF 10 MIN: CPT | Performed by: NURSE PRACTITIONER

## 2024-07-03 ASSESSMENT — PAIN SCALES - GENERAL: PAINLEVEL: 0-NO PAIN

## 2024-07-03 ASSESSMENT — COLUMBIA-SUICIDE SEVERITY RATING SCALE - C-SSRS
6. HAVE YOU EVER DONE ANYTHING, STARTED TO DO ANYTHING, OR PREPARED TO DO ANYTHING TO END YOUR LIFE?: NO
1. IN THE PAST MONTH, HAVE YOU WISHED YOU WERE DEAD OR WISHED YOU COULD GO TO SLEEP AND NOT WAKE UP?: NO
2. HAVE YOU ACTUALLY HAD ANY THOUGHTS OF KILLING YOURSELF?: NO

## 2024-07-03 ASSESSMENT — PATIENT HEALTH QUESTIONNAIRE - PHQ9
SUM OF ALL RESPONSES TO PHQ9 QUESTIONS 1 AND 2: 0
2. FEELING DOWN, DEPRESSED OR HOPELESS: NOT AT ALL
1. LITTLE INTEREST OR PLEASURE IN DOING THINGS: NOT AT ALL

## 2024-07-04 NOTE — PROGRESS NOTES
Vascular Surgery Clinic Note    CC: POV     History Of Present Illness:   Klaudia Ratliff is a 68 y.o. female here postoperatively. She underwent an aortobifemoral bypass grafting, left renal artery bypass grafting, left renal endarterectomy, transaortic perivisceral endarterectomy and right CFA thromboendarterectomy on 5/16/2024 for aortic occlusion with rest pain. She no longer has rest pain. She is recovering very well. She does have occasional nausea after eating, but this is improving each day. She still has leg edema (R>L) and is on a diuretic with much improvement in her symptoms. She continues on aspirin and eliquis.     Medical History:  Patient Active Problem List   Diagnosis    Varicose veins of both lower extremities    Cigarette nicotine dependence without complication    Hyperlipidemia    Hypertension    Hypokalemia    Lichen planus    Chronic right-sided low back pain with left-sided sciatica    Headache    Positive colorectal cancer screening using Cologuard test    Acute sinusitis    Class 1 obesity    Venous intermittent claudication    Cramp of both lower extremities    DDD (degenerative disc disease), lumbosacral    Disturbance of skin sensation    Gait instability    Lumbosacral plexus lesion    Overweight with body mass index (BMI) 25.0-29.9    Reflex abnormality    Urinary tract infection    Arteriosclerosis of coronary artery    Multiple subsegmental pulmonary emboli without acute cor pulmonale (Multi)    Pulmonary embolism (Multi)    Melena    Chronic obstructive pulmonary disease (Multi)    Venous thromboembolism (VTE)    Gastric ulcer    Bilateral carotid artery stenosis    Acute embolism and thrombosis of unspecified deep veins of right lower extremity (Multi)    Anemia, unspecified    Difficulty in walking, not elsewhere classified    Gastro-esophageal reflux disease without esophagitis    Hypothyroidism, unspecified    Muscle weakness (generalized)    Other disorders of bilirubin  metabolism    Peripheral vascular angioplasty status with implants and grafts    Unsteadiness on feet    Pulmonary embolism without acute cor pulmonale (Multi)        SH:    Social Determinants of Health     Tobacco Use: Medium Risk (7/3/2024)    Patient History     Smoking Tobacco Use: Former     Smokeless Tobacco Use: Never     Passive Exposure: Current   Alcohol Use: Not At Risk (5/28/2024)    AUDIT-C     Frequency of Alcohol Consumption: Never     Average Number of Drinks: Patient does not drink     Frequency of Binge Drinking: Never   Financial Resource Strain: Low Risk  (5/17/2024)    Overall Financial Resource Strain (CARDIA)     Difficulty of Paying Living Expenses: Not hard at all   Food Insecurity: No Food Insecurity (5/17/2024)    Hunger Vital Sign     Worried About Running Out of Food in the Last Year: Never true     Ran Out of Food in the Last Year: Never true   Transportation Needs: No Transportation Needs (6/10/2024)    OASIS : Transportation     Lack of Transportation (Medical): No     Lack of Transportation (Non-Medical): No     Patient Unable or Declines to Respond: No   Physical Activity: Sufficiently Active (5/17/2024)    Exercise Vital Sign     Days of Exercise per Week: 6 days     Minutes of Exercise per Session: 30 min   Stress: No Stress Concern Present (5/17/2024)    [employer] [place] [employer] - Occupational Stress Questionnaire     Feeling of Stress : Not at all   Social Connections: Feeling Socially Integrated (6/10/2024)    OASIS : Social Isolation     Frequency of experiencing loneliness or isolation: Never   Recent Concern: Social Connections - Socially Isolated (5/17/2024)    Social Connection and Isolation Panel [NHANES]     Frequency of Communication with Friends and Family: More than three times a week     Frequency of Social Gatherings with Friends and Family: More than three times a week     Attends Jewish Services: Never     Active Member of Clubs or  "Organizations: No     Attends Club or Organization Meetings: Never     Marital Status:    Intimate Partner Violence: Not At Risk (5/17/2024)    Humiliation, Afraid, Rape, and Kick questionnaire     Fear of Current or Ex-Partner: No     Emotionally Abused: No     Physically Abused: No     Sexually Abused: No   Depression: Not at risk (7/3/2024)    PHQ-2     PHQ-2 Score: 0   Housing Stability: Low Risk  (5/17/2024)    Housing Stability Vital Sign     Unable to Pay for Housing in the Last Year: No     Number of Places Lived in the Last Year: 1     Unstable Housing in the Last Year: No   Utilities: Not At Risk (5/17/2024)    [facility] Utilities     Threatened with loss of utilities: No   Digital Equity: Not on file   Health Literacy: Adequate Health Literacy (6/10/2024)    OASIS : Health Literacy     Frequency of needing help to read materials from doctor or pharmacy: Never        FH:  No family history on file.     Allergies:   Allergies   Allergen Reactions    Amlodipine Swelling     Bilateral foot swelling    Clarithromycin Other and Dizziness     Slept for days    Doxycycline Diarrhea       ROS:  All systems were reviewed and noted to be negative, other than described above.     Objective:  Last Recorded Vitals  Blood pressure 156/75, pulse 56, resp. rate 16, height 1.575 m (5' 2\"), weight 75.4 kg (166 lb 3.2 oz), SpO2 96%.    Meds:   Current Outpatient Medications   Medication Instructions    amiodarone (PACERONE) 200 mg, oral, Daily    apixaban (ELIQUIS) 5 mg, oral, 2 times daily    ascorbic acid (VITAMIN C) 250 mg, oral, Daily    aspirin 81 mg, oral, Daily    atorvastatin (LIPITOR) 80 mg, oral, Nightly    cephalexin (KEFLEX) 500 mg, oral, 2 times daily    cephalexin (KEFTAB) 500 mg, oral, 2 times daily    cholecalciferol (VITAMIN D-3) 50 mcg, oral, Daily    D-MANNOSE ORAL 1,300 mg, oral, Daily    furosemide (LASIX) 80 mg, oral, Daily    gabapentin (NEURONTIN) 200 mg, oral, 2 times daily    metoprolol " "tartrate (LOPRESSOR) 25 mg, oral, 2 times daily    potassium chloride CR 20 mEq ER tablet 40 mEq, oral, Daily, Do not crush, chew, or split.       Exam:  Constitutional: Well appearing, NAD   PSYCH: Appropriate mood and affect  Eyes: Sclera clear  Neck: Supple   CV: No tachycardia  RESP: Unlabored breathing   GI: Soft, nontender, non-distended. Healed RP incision.   SKIN: No lesions  NEURO: No focal deficits noted. Motor/sensory intact.   EXTREMITIES: Warm & well perfused. 2-3+ right leg edema, 2+ left leg edema. No evidence of arterial ischemia. Healed groin incisions.     Imaging Reviewed:  Vascular US renal artery duplex complete 07/03/2024    [place] [id number]  Tel [phone] and Fax [phone]      Vascular Lab Report  [facility] US RENAL ARTERY DUPLEX COMPLETE      Patient Name:     JORGE L MANUEL Reading           [id number] Oswaldo Santa MD,  Physician:        RPVI  Study Date:       7/3/2024           Ordering          [id number] MANE S CHO  Physician:  MRN/PID:          [id number]           Technologist:     Yamilka Delcid RVT  Accession#:       [id number]       Technologist 2:  Date of           1955 / 68      Encounter#:       [id number]  Birth/Age:        years  Gender:           F  Admission Status: Outpatient         Location          [facility]  Performed:      Diagnosis/ICD: Atherosclerosis of renal artery-I70.1  Indication:    s/p left renal artery endarterectomy and bypass  CPT Codes:     24303 Abdominal Visceral Renal      Patient History S/P left renal artery bypass and left renal artery  endarterectomy, aortobifemoral bypass 5/16/24. Known \"severe  ostial stenosis of the right renal artery\" by CTA 5/22/24.      CONCLUSIONS:  Right Renal Artery: At the proximal right renal artery there are increased velocities that are consistent with greater than 60% stenosis. The right renal vein is widely patent.  Left Renal Artery: The left " renal vein is widely patent. Single renal cyst documented in the left kidney.  The left renal artery bypass is widely patent.    Imaging & Doppler Findings:    AORTA    PSV  Mid  46.2 cm/s      Renal Artery Duplex Right Kidney: 10.2 cm                           Left Kidney: 9.9 cm  Systolic        Diastolic     ARTERY           Systolic       Diastolic  255 cm/s         74 cm/s      Origin           110 cm/s        30 cm/s  243 cm/s         98 cm/s       Prox             59 cm/s        13 cm/s  99 cm/s         32 cm/s        Mid             48 cm/s        15 cm/s  87 cm/s         20 cm/s      Distal            59 cm/s        14 cm/s  19 cm/s         7 cm/s      Superior           17 cm/s        6 cm/s  13 cm/s         4 cm/s      Inferior           20 cm/s        4 cm/s  Right                          Left  5.5       R/A Ratio            2.4  0.7    Resistive Index         0.7    Left  Renal Cyst 1.8 cm        Ao Dist Diam 2.26 cm  Mid Ao       46 cm/s        [id number] Oswaldo Santa MD, RPVI  Electronically signed by [id number] Oswaldo Santa MD, RPVI on 7/3/2024 at 3:12:49 PM        ** Final **     Assessment & Plan:  1. Deep vein thrombosis (DVT) of lower extremity, unspecified chronicity, unspecified laterality, unspecified vein (Multi)  Vascular US Lower Extremity Venous Duplex Right      2. Aortic occlusion (CMS-HCC)  Vascular US ankle brachial index (CHETAN) without exercise      3. Acute deep vein thrombosis (DVT) of other specified vein of right lower extremity (Multi)  Vascular US Lower Extremity Venous Duplex Right        Aortic occlusion s/p aortobifemoral bypass grafting, left renal artery bypass grafting, left renal endarterectomy, transaortic perivisceral endarterectomy and right CFA thromboendarterectomy 5/16/2024.   Her rest pain has resolved.   Recovering as expected.   Renal duplex demonstrates widely patent L renal bypass graft.   Obtain RLE DVT study to determine ability to come off eliquis as it is costly for  her, she does have an IVC filter in place   Obtain CHETAN  RTC in 3 months     Mary Toussaint, APRN-CNP

## 2024-07-05 ENCOUNTER — HOME CARE VISIT (OUTPATIENT)
Dept: HOME HEALTH SERVICES | Facility: HOME HEALTH | Age: 69
End: 2024-07-05
Payer: MEDICARE

## 2024-07-05 PROCEDURE — G0157 HHC PT ASSISTANT EA 15: HCPCS | Mod: CQ

## 2024-07-05 ASSESSMENT — ENCOUNTER SYMPTOMS: DENIES PAIN: 1

## 2024-07-08 ENCOUNTER — HOME CARE VISIT (OUTPATIENT)
Dept: HOME HEALTH SERVICES | Facility: HOME HEALTH | Age: 69
End: 2024-07-08
Payer: MEDICARE

## 2024-07-08 VITALS
TEMPERATURE: 97 F | SYSTOLIC BLOOD PRESSURE: 114 MMHG | HEART RATE: 72 BPM | OXYGEN SATURATION: 99 % | RESPIRATION RATE: 20 BRPM | DIASTOLIC BLOOD PRESSURE: 70 MMHG

## 2024-07-08 PROCEDURE — G0152 HHCP-SERV OF OT,EA 15 MIN: HCPCS

## 2024-07-08 PROCEDURE — G0299 HHS/HOSPICE OF RN EA 15 MIN: HCPCS

## 2024-07-08 ASSESSMENT — ENCOUNTER SYMPTOMS
LOSS OF SENSATION IN FEET: 0
SUBJECTIVE PAIN PROGRESSION: UNCHANGED
DENIES PAIN: 1
LAST BOWEL MOVEMENT: 67029
HIGHEST PAIN SEVERITY IN PAST 24 HOURS: 0/10
LOWEST PAIN SEVERITY IN PAST 24 HOURS: 0/10
CHANGE IN APPETITE: UNCHANGED
DEPRESSION: 0
OCCASIONAL FEELINGS OF UNSTEADINESS: 0
PAIN SEVERITY GOAL: 0/10
APPETITE LEVEL: FAIR
PERSON REPORTING PAIN: PATIENT
DENIES PAIN: 1

## 2024-07-08 ASSESSMENT — PAIN SCALES - PAIN ASSESSMENT IN ADVANCED DEMENTIA (PAINAD)
BODYLANGUAGE: 0 - RELAXED.
FACIALEXPRESSION: 0
BREATHING: 0
CONSOLABILITY: 0
CONSOLABILITY: 0 - NO NEED TO CONSOLE.
NEGVOCALIZATION: 0
NEGVOCALIZATION: 0 - NONE.
TOTALSCORE: 0
BODYLANGUAGE: 0
FACIALEXPRESSION: 0 - SMILING OR INEXPRESSIVE.

## 2024-07-08 NOTE — HOME HEALTH
SKILLED NURSING SERVICE FOR ASSESSMENT TEACHING OF MEDICATIONS DISEASE PROCESS. PT TOLERATED PROCEDURES WELL. BLE EDEMA CONTINUES TO IMPROVE. RIGHT GROIN OPEN AREA IS HEALED. PT REPORTS APPT WITH DR. CALDWELL LAST WEEK WENT WELL AND MD IS SATISFIED WITH PTS STATUS.

## 2024-07-09 ENCOUNTER — HOME CARE VISIT (OUTPATIENT)
Dept: HOME HEALTH SERVICES | Facility: HOME HEALTH | Age: 69
End: 2024-07-09
Payer: MEDICARE

## 2024-07-09 VITALS — SYSTOLIC BLOOD PRESSURE: 112 MMHG | HEART RATE: 50 BPM | DIASTOLIC BLOOD PRESSURE: 78 MMHG | OXYGEN SATURATION: 98 %

## 2024-07-09 LAB — BODY SURFACE AREA: 1.94 M2

## 2024-07-09 PROCEDURE — G0151 HHCP-SERV OF PT,EA 15 MIN: HCPCS

## 2024-07-09 ASSESSMENT — ENCOUNTER SYMPTOMS
DENIES PAIN: 1
PERSON REPORTING PAIN: PATIENT

## 2024-07-15 DIAGNOSIS — E87.6 HYPOKALEMIA: ICD-10-CM

## 2024-07-15 DIAGNOSIS — M54.42 CHRONIC RIGHT-SIDED LOW BACK PAIN WITH LEFT-SIDED SCIATICA: ICD-10-CM

## 2024-07-15 DIAGNOSIS — G89.29 CHRONIC RIGHT-SIDED LOW BACK PAIN WITH LEFT-SIDED SCIATICA: ICD-10-CM

## 2024-07-15 DIAGNOSIS — R60.0 BILATERAL LEG EDEMA: ICD-10-CM

## 2024-07-15 PROBLEM — T81.49XA SURGICAL WOUND INFECTION: Status: ACTIVE | Noted: 2024-07-15

## 2024-07-16 ENCOUNTER — HOME CARE VISIT (OUTPATIENT)
Dept: HOME HEALTH SERVICES | Facility: HOME HEALTH | Age: 69
End: 2024-07-16
Payer: MEDICARE

## 2024-07-16 VITALS
HEART RATE: 78 BPM | SYSTOLIC BLOOD PRESSURE: 124 MMHG | RESPIRATION RATE: 20 BRPM | DIASTOLIC BLOOD PRESSURE: 70 MMHG | TEMPERATURE: 97.8 F | OXYGEN SATURATION: 99 %

## 2024-07-16 PROCEDURE — G0299 HHS/HOSPICE OF RN EA 15 MIN: HCPCS

## 2024-07-16 RX ORDER — GABAPENTIN 100 MG/1
200 CAPSULE ORAL 2 TIMES DAILY
Qty: 120 CAPSULE | Refills: 0 | Status: SHIPPED | OUTPATIENT
Start: 2024-07-16 | End: 2024-08-15

## 2024-07-16 RX ORDER — FUROSEMIDE 80 MG/1
80 TABLET ORAL DAILY
Qty: 30 TABLET | Refills: 0 | Status: SHIPPED | OUTPATIENT
Start: 2024-07-16 | End: 2024-08-15

## 2024-07-16 RX ORDER — POTASSIUM CHLORIDE 20 MEQ/1
40 TABLET, EXTENDED RELEASE ORAL DAILY
Qty: 60 TABLET | Refills: 0 | Status: SHIPPED | OUTPATIENT
Start: 2024-07-16 | End: 2024-08-15

## 2024-07-16 ASSESSMENT — PAIN SCALES - PAIN ASSESSMENT IN ADVANCED DEMENTIA (PAINAD)
BODYLANGUAGE: 0 - RELAXED.
NEGVOCALIZATION: 0
FACIALEXPRESSION: 0 - SMILING OR INEXPRESSIVE.
FACIALEXPRESSION: 0
BODYLANGUAGE: 0
BREATHING: 0
TOTALSCORE: 0
CONSOLABILITY: 0 - NO NEED TO CONSOLE.
NEGVOCALIZATION: 0 - NONE.
CONSOLABILITY: 0

## 2024-07-16 ASSESSMENT — ENCOUNTER SYMPTOMS
CHANGE IN APPETITE: UNCHANGED
SUBJECTIVE PAIN PROGRESSION: UNCHANGED
HIGHEST PAIN SEVERITY IN PAST 24 HOURS: 0/10
PERSON REPORTING PAIN: PATIENT
OCCASIONAL FEELINGS OF UNSTEADINESS: 0
LOSS OF SENSATION IN FEET: 0
PAIN: 1
PAIN SEVERITY GOAL: 0/10
APPETITE LEVEL: GOOD
DEPRESSION: 0
LAST BOWEL MOVEMENT: 67037
LOWEST PAIN SEVERITY IN PAST 24 HOURS: 0/10

## 2024-07-16 NOTE — HOME HEALTH
SKILLED NURSING VISIT FOR ASSESSMENT AND TEACHING OF MEDICATIONS DISEASE PROCESS. PT TOLERATED PROCEDURES WELL. BLE REMAIN SLIGHTLY EDEMATOUS RLE GREATER THAN LLE. PT HA CALL INTO PCP FOR SCRIPTS FOR FUROSEMIDIE AND POTASSIUM. ALL INCISIONS WELL APPROXIMATED WITHOUT REDNESS OR DRAINAGE, PT HAS APPT. WITH DR. CALDWELL IN OCTOBER.

## 2024-07-23 ENCOUNTER — HOME CARE VISIT (OUTPATIENT)
Dept: HOME HEALTH SERVICES | Facility: HOME HEALTH | Age: 69
End: 2024-07-23
Payer: MEDICARE

## 2024-07-23 VITALS
RESPIRATION RATE: 20 BRPM | SYSTOLIC BLOOD PRESSURE: 118 MMHG | OXYGEN SATURATION: 99 % | TEMPERATURE: 96.2 F | DIASTOLIC BLOOD PRESSURE: 62 MMHG | HEART RATE: 60 BPM

## 2024-07-23 PROCEDURE — G0299 HHS/HOSPICE OF RN EA 15 MIN: HCPCS

## 2024-07-23 ASSESSMENT — PAIN SCALES - PAIN ASSESSMENT IN ADVANCED DEMENTIA (PAINAD)
FACIALEXPRESSION: 0 - SMILING OR INEXPRESSIVE.
NEGVOCALIZATION: 0
BODYLANGUAGE: 0 - RELAXED.
TOTALSCORE: 0
BODYLANGUAGE: 0
CONSOLABILITY: 0 - NO NEED TO CONSOLE.
FACIALEXPRESSION: 0
BREATHING: 0
NEGVOCALIZATION: 0 - NONE.
CONSOLABILITY: 0

## 2024-07-23 ASSESSMENT — ENCOUNTER SYMPTOMS
CHANGE IN APPETITE: UNCHANGED
LOSS OF SENSATION IN FEET: 0
DEPRESSION: 0
LAST BOWEL MOVEMENT: 67044
APPETITE LEVEL: GOOD
OCCASIONAL FEELINGS OF UNSTEADINESS: 0

## 2024-07-25 ENCOUNTER — APPOINTMENT (OUTPATIENT)
Dept: CARDIOLOGY | Facility: CLINIC | Age: 69
End: 2024-07-25
Payer: MEDICARE

## 2024-07-25 VITALS
HEART RATE: 54 BPM | OXYGEN SATURATION: 97 % | WEIGHT: 165 LBS | BODY MASS INDEX: 30.36 KG/M2 | HEIGHT: 62 IN | SYSTOLIC BLOOD PRESSURE: 148 MMHG | DIASTOLIC BLOOD PRESSURE: 78 MMHG

## 2024-07-25 DIAGNOSIS — I48.91 ATRIAL FIBRILLATION, UNSPECIFIED TYPE (MULTI): ICD-10-CM

## 2024-07-25 DIAGNOSIS — I48.0 PAROXYSMAL ATRIAL FIBRILLATION (MULTI): Primary | ICD-10-CM

## 2024-07-25 DIAGNOSIS — I82.401 ACUTE EMBOLISM AND THROMBOSIS OF UNSPECIFIED DEEP VEINS OF RIGHT LOWER EXTREMITY (MULTI): ICD-10-CM

## 2024-07-25 DIAGNOSIS — I48.19 ATRIAL FIBRILLATION, PERSISTENT (MULTI): ICD-10-CM

## 2024-07-25 NOTE — PROGRESS NOTES
"    Cardiac Electrophysiology Office Visit     Referred by Dr. Toussaint, ADRIÁN Dia-CNP for   Chief Complaint   Patient presents with    Atrial Fibrillation    Establish Care     HPI:  Klaudia Ratliff is a 68 y.o. year old female patient with h/o HTN, RLE DVT/PE//B thrombectomy plus IVC filter (February 2024), HFpEF, s/p perivisceral aortic enterectomy/aortofemoral bypass May 2024 C/B RP bleed presenting today to establish care    Objective  Current Outpatient Medications   Medication Instructions    amiodarone (PACERONE) 200 mg, oral, Daily    apixaban (ELIQUIS) 5 mg, oral, 2 times daily    ascorbic acid (VITAMIN C) 250 mg, oral, Daily    aspirin 81 mg, oral, Daily    atorvastatin (LIPITOR) 80 mg, oral, Nightly    cephalexin (KEFTAB) 500 mg, oral, 2 times daily    cholecalciferol (VITAMIN D-3) 50 mcg, oral, Daily    D-MANNOSE ORAL 1,300 mg, oral, Daily    furosemide (LASIX) 80 mg, oral, Daily    gabapentin (NEURONTIN) 200 mg, oral, 2 times daily    metoprolol tartrate (LOPRESSOR) 25 mg, oral, 2 times daily    potassium chloride CR 20 mEq ER tablet 40 mEq, oral, Daily, Do not crush, chew, or split.         Visit Vitals  /78 (BP Location: Right arm, Patient Position: Sitting)   Pulse 54   Ht 1.575 m (5' 2\")   Wt 74.8 kg (165 lb)   SpO2 97%   BMI 30.18 kg/m²   OB Status Postmenopausal   Smoking Status Former   BSA 1.81 m²      Physical Exam  Vitals reviewed.   Constitutional:       Appearance: Normal appearance.   HENT:      Head: Normocephalic.   Cardiovascular:      Rate and Rhythm: Normal rate and regular rhythm.   Pulmonary:      Effort: Pulmonary effort is normal. No respiratory distress.      Breath sounds: No wheezing.   Musculoskeletal:      Right lower leg: Swelling present. 1+ Edema present.      Left lower leg: Swelling present. 1+ Edema present.   Skin:     General: Skin is warm and dry.      Capillary Refill: Capillary refill takes less than 2 seconds.   Neurological:      Mental Status: She is " alert.   Psychiatric:         Mood and Affect: Mood normal.         My Interpretation of Reviewed Study(s):  Echo (May 2024): Normal left ventricular systolic function EF of 60-65%, normal biatrial size, trivial to small pericardial effusion  14-day cardiac monitor (May 2024): Predominant rhythm sinus with an average heart rate of 68 bpm.  No atrial fibrillation or atrial flutter noted.  [id number] Score  Age 65-74: 1   Sex Female: 1   CHF History No: 0   HTN No: 0   Stroke/TIA/Thromboembolism Yes: 2   Vascular Dz: CAD/PAD/Aortic Plaque Yes: 1   DM No: 0   Total Score 5     Assessment/Plan   #Post-operative atrial fibrillation  AF Dx History: Diagnosed in May after aortofemoral bypass surgery was noted to have atrial flutter with RVR with short paroxysms of arrhythmia; h/o Cardioversion: No; AAD Use: Amiodarone 200mg (current); Anticoagulation use: Apixaban 5mg BID (current); h/o Ablation: none; [id number] Score: 5  Patient had no prior history of atrial fibrillation/flutter and was all postoperative after vascular surgery.  On amiodarone patient has been rhythm controlled with no evidence of recurrence.  At this time our plan would be to discontinue amiodarone and reevaluate in a couple months with another 30-day monitor if no recurrence then can continue monitoring.  However if does have recurrence then may need to plan for long-term rhythm control and long-term anticoagulation  c/w AC: Apixaban 5mg BID - Continue for now until post amiodarone monitor is complete to show no recurrence of atrial fibrillation  Stop amiodarone  30-day monitor in 2 months to assess AF burden    #HTN  RN visit weekly at home and have been around 113-120.   Metoprolol tart 25mg Daily      Return to Clinic: Patient should return to the EP Clinic in 3 months    Bobby Ahumada MD [facility]  Cardiac Electrophysiology  [email or web address]    **Disclaimer: This note was dictated by speech recognition, and every effort has been made  to prevent any error in transcription, however minor errors may be present**

## 2024-07-29 ENCOUNTER — HOME CARE VISIT (OUTPATIENT)
Dept: HOME HEALTH SERVICES | Facility: HOME HEALTH | Age: 69
End: 2024-07-29
Payer: MEDICARE

## 2024-07-29 VITALS
SYSTOLIC BLOOD PRESSURE: 110 MMHG | OXYGEN SATURATION: 98 % | RESPIRATION RATE: 20 BRPM | HEART RATE: 70 BPM | TEMPERATURE: 97 F | DIASTOLIC BLOOD PRESSURE: 70 MMHG

## 2024-07-29 DIAGNOSIS — Z95.828 H/O AORTO-FEMORAL BYPASS: Primary | ICD-10-CM

## 2024-07-29 PROCEDURE — G0299 HHS/HOSPICE OF RN EA 15 MIN: HCPCS

## 2024-07-29 RX ORDER — AMOXICILLIN AND CLAVULANATE POTASSIUM 875; 125 MG/1; MG/1
875 TABLET, FILM COATED ORAL 2 TIMES DAILY
Qty: 10 TABLET | Refills: 0 | Status: SHIPPED | OUTPATIENT
Start: 2024-07-29 | End: 2024-08-03

## 2024-07-29 ASSESSMENT — ENCOUNTER SYMPTOMS
DEPRESSION: 0
CHANGE IN APPETITE: UNCHANGED
PAIN SEVERITY GOAL: 0/10
DENIES PAIN: 1
PERSON REPORTING PAIN: PATIENT
SUBJECTIVE PAIN PROGRESSION: UNCHANGED
HIGHEST PAIN SEVERITY IN PAST 24 HOURS: 0/10
LOWEST PAIN SEVERITY IN PAST 24 HOURS: 0/10
LOSS OF SENSATION IN FEET: 0
OCCASIONAL FEELINGS OF UNSTEADINESS: 0
APPETITE LEVEL: GOOD
LAST BOWEL MOVEMENT: 67050

## 2024-07-29 ASSESSMENT — PAIN SCALES - PAIN ASSESSMENT IN ADVANCED DEMENTIA (PAINAD)
BODYLANGUAGE: 0 - RELAXED.
BODYLANGUAGE: 0
CONSOLABILITY: 0 - NO NEED TO CONSOLE.
FACIALEXPRESSION: 0 - SMILING OR INEXPRESSIVE.
FACIALEXPRESSION: 0
BREATHING: 0
CONSOLABILITY: 0
NEGVOCALIZATION: 0 - NONE.
TOTALSCORE: 0
NEGVOCALIZATION: 0

## 2024-07-31 ENCOUNTER — HOSPITAL ENCOUNTER (OUTPATIENT)
Dept: VASCULAR MEDICINE | Facility: HOSPITAL | Age: 69
Discharge: HOME | End: 2024-07-31
Payer: MEDICARE

## 2024-07-31 ENCOUNTER — HOSPITAL ENCOUNTER (OUTPATIENT)
Dept: RADIOLOGY | Facility: HOSPITAL | Age: 69
Discharge: HOME | End: 2024-07-31
Payer: MEDICARE

## 2024-07-31 DIAGNOSIS — I82.491 ACUTE DEEP VEIN THROMBOSIS (DVT) OF OTHER SPECIFIED VEIN OF RIGHT LOWER EXTREMITY (MULTI): ICD-10-CM

## 2024-07-31 DIAGNOSIS — I82.409 DEEP VEIN THROMBOSIS (DVT) OF LOWER EXTREMITY, UNSPECIFIED CHRONICITY, UNSPECIFIED LATERALITY, UNSPECIFIED VEIN (MULTI): ICD-10-CM

## 2024-07-31 DIAGNOSIS — I70.0 AORTIC OCCLUSION (CMS-HCC): ICD-10-CM

## 2024-07-31 PROCEDURE — 93971 EXTREMITY STUDY: CPT | Performed by: STUDENT IN AN ORGANIZED HEALTH CARE EDUCATION/TRAINING PROGRAM

## 2024-07-31 PROCEDURE — 93971 EXTREMITY STUDY: CPT

## 2024-07-31 PROCEDURE — 93922 UPR/L XTREMITY ART 2 LEVELS: CPT

## 2024-07-31 PROCEDURE — 93922 UPR/L XTREMITY ART 2 LEVELS: CPT | Performed by: SURGERY

## 2024-08-06 ENCOUNTER — HOME CARE VISIT (OUTPATIENT)
Dept: HOME HEALTH SERVICES | Facility: HOME HEALTH | Age: 69
End: 2024-08-06
Payer: MEDICARE

## 2024-08-06 PROCEDURE — G0299 HHS/HOSPICE OF RN EA 15 MIN: HCPCS

## 2024-08-06 ASSESSMENT — ENCOUNTER SYMPTOMS
LOWEST PAIN SEVERITY IN PAST 24 HOURS: 0/10
HIGHEST PAIN SEVERITY IN PAST 24 HOURS: 0/10
PERSON REPORTING PAIN: PATIENT
SUBJECTIVE PAIN PROGRESSION: UNCHANGED
DENIES PAIN: 1

## 2024-08-06 ASSESSMENT — PAIN SCALES - PAIN ASSESSMENT IN ADVANCED DEMENTIA (PAINAD)
CONSOLABILITY: 0
BODYLANGUAGE: 0 - RELAXED.
FACIALEXPRESSION: 0
BREATHING: 0
TOTALSCORE: 0
CONSOLABILITY: 0 - NO NEED TO CONSOLE.
NEGVOCALIZATION: 0
BODYLANGUAGE: 0
NEGVOCALIZATION: 0 - NONE.
FACIALEXPRESSION: 0 - SMILING OR INEXPRESSIVE.

## 2024-08-06 ASSESSMENT — ACTIVITIES OF DAILY LIVING (ADL)
HOME_HEALTH_OASIS: 00
OASIS_M1830: 00

## 2024-08-06 NOTE — HOME HEALTH
SKILLED NURSING SERVICE FOR ASSESSMENT AND AGENCY DISCHARGE. MEDICATIONS RECONCILLED, ALL INCISIONS WELL APPROXIMATED AND HEALED.

## 2024-08-07 ENCOUNTER — APPOINTMENT (OUTPATIENT)
Dept: UROLOGY | Facility: CLINIC | Age: 69
End: 2024-08-07
Payer: MEDICARE

## 2024-08-11 DIAGNOSIS — R60.0 BILATERAL LEG EDEMA: ICD-10-CM

## 2024-08-11 DIAGNOSIS — E87.6 HYPOKALEMIA: ICD-10-CM

## 2024-08-15 RX ORDER — POTASSIUM CHLORIDE 20 MEQ/1
40 TABLET, EXTENDED RELEASE ORAL DAILY
Qty: 60 TABLET | Refills: 0 | Status: SHIPPED | OUTPATIENT
Start: 2024-08-15

## 2024-08-15 RX ORDER — FUROSEMIDE 80 MG/1
80 TABLET ORAL DAILY
Qty: 30 TABLET | Refills: 0 | Status: SHIPPED | OUTPATIENT
Start: 2024-08-15

## 2024-09-06 ENCOUNTER — TELEPHONE (OUTPATIENT)
Dept: VASCULAR SURGERY | Facility: HOSPITAL | Age: 69
End: 2024-09-06
Payer: MEDICARE

## 2024-09-06 NOTE — TELEPHONE ENCOUNTER
This patient is wondering if she can begin lifting over 10lbs, exercise, and get a massage since surgery from 5/16/24 she underwent a Perivisceral aortic endartectomy, aorta bifemoral bypass graft, and left renal artery bypass graft with Dr. Zelaya.   Left patient a return voicemail instructing slow paced walking on a treadmill would be fine but no sit ups, ab workouts, to avoid causing a hernia, continue lifting 10 pounds until cleared by [facility] 10/9, no massages to abdominal area.

## 2024-09-08 DIAGNOSIS — R60.0 BILATERAL LEG EDEMA: ICD-10-CM

## 2024-09-08 DIAGNOSIS — E87.6 HYPOKALEMIA: ICD-10-CM

## 2024-09-12 RX ORDER — FUROSEMIDE 80 MG/1
80 TABLET ORAL DAILY
Qty: 90 TABLET | Refills: 0 | Status: SHIPPED | OUTPATIENT
Start: 2024-09-12

## 2024-09-12 RX ORDER — POTASSIUM CHLORIDE 20 MEQ/1
TABLET, EXTENDED RELEASE ORAL
Qty: 180 TABLET | Refills: 0 | Status: SHIPPED | OUTPATIENT
Start: 2024-09-12

## 2024-09-18 ENCOUNTER — HOSPITAL ENCOUNTER (OUTPATIENT)
Dept: CARDIOLOGY | Facility: HOSPITAL | Age: 69
Discharge: HOME | End: 2024-09-18
Payer: MEDICARE

## 2024-09-18 DIAGNOSIS — I48.0 PAROXYSMAL ATRIAL FIBRILLATION (MULTI): ICD-10-CM

## 2024-09-18 PROCEDURE — 93270 REMOTE 30 DAY ECG REV/REPORT: CPT

## 2024-09-23 ENCOUNTER — TELEPHONE (OUTPATIENT)
Dept: VASCULAR SURGERY | Facility: HOSPITAL | Age: 69
End: 2024-09-23
Payer: MEDICARE

## 2024-09-23 NOTE — TELEPHONE ENCOUNTER
Left voicemail for patient regarding her request for antibiotic refill for dental procedure in November.     Informed her Dr. Zelaya is out of town but she is scheduled for a follow-up appointment with Dr. Zelaya on 10/9/24 and can address the need for antibiotics at that time.     Steph Villalta, APRN-CNP  Vascular Surgery

## 2024-09-24 DIAGNOSIS — Z95.820 PERIPHERAL VASCULAR ANGIOPLASTY STATUS WITH IMPLANTS AND GRAFTS: Primary | ICD-10-CM

## 2024-09-24 RX ORDER — AMOXICILLIN AND CLAVULANATE POTASSIUM 875; 125 MG/1; MG/1
1 TABLET, FILM COATED ORAL 2 TIMES DAILY
Qty: 10 TABLET | Refills: 0 | Status: SHIPPED | OUTPATIENT
Start: 2024-09-24 | End: 2024-09-29

## 2024-10-09 ENCOUNTER — OFFICE VISIT (OUTPATIENT)
Dept: VASCULAR SURGERY | Facility: HOSPITAL | Age: 69
End: 2024-10-09
Payer: MEDICARE

## 2024-10-09 VITALS
HEIGHT: 62 IN | OXYGEN SATURATION: 94 % | WEIGHT: 171 LBS | BODY MASS INDEX: 31.47 KG/M2 | SYSTOLIC BLOOD PRESSURE: 170 MMHG | HEART RATE: 54 BPM | DIASTOLIC BLOOD PRESSURE: 90 MMHG

## 2024-10-09 DIAGNOSIS — I71.60 THORACOABDOMINAL AORTIC ANEURYSM (TAAA) WITHOUT RUPTURE, UNSPECIFIED PART (CMS-HCC): Primary | ICD-10-CM

## 2024-10-09 PROCEDURE — 3008F BODY MASS INDEX DOCD: CPT | Performed by: SURGERY

## 2024-10-09 PROCEDURE — 99215 OFFICE O/P EST HI 40 MIN: CPT | Performed by: SURGERY

## 2024-10-09 PROCEDURE — 1126F AMNT PAIN NOTED NONE PRSNT: CPT | Performed by: SURGERY

## 2024-10-09 PROCEDURE — 1159F MED LIST DOCD IN RCRD: CPT | Performed by: SURGERY

## 2024-10-09 PROCEDURE — 3080F DIAST BP >= 90 MM HG: CPT | Performed by: SURGERY

## 2024-10-09 PROCEDURE — 3077F SYST BP >= 140 MM HG: CPT | Performed by: SURGERY

## 2024-10-09 ASSESSMENT — PATIENT HEALTH QUESTIONNAIRE - PHQ9
SUM OF ALL RESPONSES TO PHQ9 QUESTIONS 1 AND 2: 0
1. LITTLE INTEREST OR PLEASURE IN DOING THINGS: NOT AT ALL
2. FEELING DOWN, DEPRESSED OR HOPELESS: NOT AT ALL

## 2024-10-09 ASSESSMENT — PAIN SCALES - GENERAL: PAINLEVEL: 0-NO PAIN

## 2024-10-10 NOTE — PROGRESS NOTES
History Of Present Illness  Klaudia Ratliff is a 69 y.o. female who presents in follow-up of type IV thoracoabdominal aortic aneurysm repair that she underwent on May 16, 2024.  She has done well from the operation and is without any complaints.  Her right lower extremity ischemic symptoms have resolved.  Her right lower extremity  swelling also has improved significantly although there is some element of residual edema.     Past Medical History  She has a past medical history of Abnormal ECG (02/20/2024), Acute sinusitis, unspecified (03/16/2016), Aortic occlusion (CMS-[facility]), Claudication of both lower extremities (CMS-[facility]), COPD (chronic obstructive pulmonary disease) (Multi), Coronary artery disease, DVT (deep venous thrombosis) (Multi), Essential (primary) hypertension (07/18/2022), Headache, unspecified (07/09/2013), Hyperlipidemia, Joint pain, Melena, Right shoulder pain (11/01/2023), Ulcer, stomach peptic, and Wound of left groin.    Surgical History    Social History  She reports that she quit smoking about 7 months ago. Her smoking use included cigarettes. She has been exposed to tobacco smoke. She has never used smokeless tobacco. She reports current alcohol use. She reports that she does not currently use drugs after having used the following drugs: Marijuana.    Family History  No family history on file.     Allergies  Amlodipine, Clarithromycin, and Doxycycline    Physical Exam  Gen: alert and awake. Oriented to time, place and person. In no acute distress.  HEENT: normocephalic, sclera non icteric. EOMI. Supple without lymphadenopathy  Cor: RRR,  Lung: Unlabored breathing  Abd: soft, non tender and non distended.  The incision is healing well without evidence of hernia.  No evidence of hepatosplenomegaly  Ext: Warm and well-perfused.  There is mild degree of edema in both lower extremities slightly worse on the right.  Pulse Exam: Palpable bilateral femoral pulses.  Doppler signals are heard over  "both pedal vessels bilaterally.  Psych: Normal    Last Recorded Vitals  Blood pressure 170/90, pulse 54, height 1.575 m (5' 2\"), weight 77.6 kg (171 lb), SpO2 94%.       Assessment/Plan   Status post open repair of type IV thoracoabdominal aortic aneurysm 5 months postoperative: Doing well.    Continue with current medication.    Continue with smoking cessation.    Return to my clinic in May of next year for 1 year follow-up.       Jovan Zelaya MD  Professor & Chief, Division of Vascular Surgery & Endovascular Therapy  , Vascular Residency & Fellowship Training Programs  [facility] [employer] [facility] [place]  [facility] / [facility] [place]  "

## 2024-10-29 ENCOUNTER — TELEPHONE (OUTPATIENT)
Dept: PRIMARY CARE | Facility: CLINIC | Age: 69
End: 2024-10-29
Payer: MEDICARE

## 2024-10-29 NOTE — TELEPHONE ENCOUNTER
Patient has been having muscle spasms and wants to know if it is safe to take ibuprofen with her other meds

## 2024-10-31 ENCOUNTER — APPOINTMENT (OUTPATIENT)
Dept: CARDIOLOGY | Facility: CLINIC | Age: 69
End: 2024-10-31
Payer: MEDICARE

## 2024-10-31 VITALS
DIASTOLIC BLOOD PRESSURE: 93 MMHG | SYSTOLIC BLOOD PRESSURE: 158 MMHG | HEART RATE: 59 BPM | BODY MASS INDEX: 31.83 KG/M2 | OXYGEN SATURATION: 96 % | HEIGHT: 62 IN | WEIGHT: 173 LBS

## 2024-10-31 DIAGNOSIS — I48.0 PAROXYSMAL ATRIAL FIBRILLATION (MULTI): Primary | ICD-10-CM

## 2024-10-31 DIAGNOSIS — I10 HYPERTENSION: ICD-10-CM

## 2024-10-31 DIAGNOSIS — E78.5 HYPERLIPIDEMIA: ICD-10-CM

## 2024-10-31 PROCEDURE — 93000 ELECTROCARDIOGRAM COMPLETE: CPT | Performed by: STUDENT IN AN ORGANIZED HEALTH CARE EDUCATION/TRAINING PROGRAM

## 2024-10-31 PROCEDURE — 3077F SYST BP >= 140 MM HG: CPT | Performed by: STUDENT IN AN ORGANIZED HEALTH CARE EDUCATION/TRAINING PROGRAM

## 2024-10-31 PROCEDURE — 99215 OFFICE O/P EST HI 40 MIN: CPT | Performed by: STUDENT IN AN ORGANIZED HEALTH CARE EDUCATION/TRAINING PROGRAM

## 2024-10-31 PROCEDURE — 3080F DIAST BP >= 90 MM HG: CPT | Performed by: STUDENT IN AN ORGANIZED HEALTH CARE EDUCATION/TRAINING PROGRAM

## 2024-10-31 PROCEDURE — 3008F BODY MASS INDEX DOCD: CPT | Performed by: STUDENT IN AN ORGANIZED HEALTH CARE EDUCATION/TRAINING PROGRAM

## 2024-10-31 PROCEDURE — 1159F MED LIST DOCD IN RCRD: CPT | Performed by: STUDENT IN AN ORGANIZED HEALTH CARE EDUCATION/TRAINING PROGRAM

## 2024-10-31 RX ORDER — PRAVASTATIN SODIUM 40 MG/1
40 TABLET ORAL DAILY
Qty: 30 TABLET | Refills: 11 | Status: SHIPPED | OUTPATIENT
Start: 2024-10-31 | End: 2025-10-31

## 2024-11-11 DIAGNOSIS — I82.409 DEEP VEIN THROMBOSIS (DVT) OF LOWER EXTREMITY, UNSPECIFIED CHRONICITY, UNSPECIFIED LATERALITY, UNSPECIFIED VEIN (MULTI): Primary | ICD-10-CM

## 2024-11-13 DIAGNOSIS — Z12.31 ENCOUNTER FOR SCREENING MAMMOGRAM FOR BREAST CANCER: ICD-10-CM

## 2024-11-18 DIAGNOSIS — Z12.31 ENCOUNTER FOR SCREENING MAMMOGRAM FOR BREAST CANCER: ICD-10-CM

## 2024-11-19 NOTE — TELEPHONE ENCOUNTER
No it is not safe. She is on Eliquis and aspirin which are blood thinners. Adding Ibuprofen could make her bleed.

## 2024-11-30 DIAGNOSIS — R60.0 BILATERAL LEG EDEMA: ICD-10-CM

## 2024-11-30 DIAGNOSIS — E87.6 HYPOKALEMIA: ICD-10-CM

## 2024-12-03 RX ORDER — FUROSEMIDE 80 MG/1
80 TABLET ORAL DAILY
Qty: 90 TABLET | Refills: 0 | Status: SHIPPED | OUTPATIENT
Start: 2024-12-03

## 2024-12-03 RX ORDER — POTASSIUM CHLORIDE 20 MEQ/1
TABLET, EXTENDED RELEASE ORAL
Qty: 180 TABLET | Refills: 0 | Status: SHIPPED | OUTPATIENT
Start: 2024-12-03

## 2024-12-13 ENCOUNTER — APPOINTMENT (OUTPATIENT)
Dept: CARDIOLOGY | Facility: CLINIC | Age: 69
End: 2024-12-13
Payer: MEDICARE

## 2024-12-13 ENCOUNTER — LAB (OUTPATIENT)
Dept: LAB | Facility: LAB | Age: 69
End: 2024-12-13
Payer: MEDICARE

## 2024-12-13 VITALS
BODY MASS INDEX: 30.91 KG/M2 | DIASTOLIC BLOOD PRESSURE: 82 MMHG | WEIGHT: 168 LBS | HEART RATE: 54 BPM | HEIGHT: 62 IN | SYSTOLIC BLOOD PRESSURE: 130 MMHG

## 2024-12-13 DIAGNOSIS — I51.3 RV (RIGHT VENTRICULAR) MURAL THROMBUS: ICD-10-CM

## 2024-12-13 DIAGNOSIS — I10 PRIMARY HYPERTENSION: ICD-10-CM

## 2024-12-13 DIAGNOSIS — I73.9 PAD (PERIPHERAL ARTERY DISEASE) (CMS-HCC): ICD-10-CM

## 2024-12-13 DIAGNOSIS — I25.10 CORONARY ARTERY DISEASE INVOLVING NATIVE CORONARY ARTERY OF NATIVE HEART WITHOUT ANGINA PECTORIS: Primary | ICD-10-CM

## 2024-12-13 DIAGNOSIS — E78.5 HYPERLIPIDEMIA, UNSPECIFIED HYPERLIPIDEMIA TYPE: ICD-10-CM

## 2024-12-13 DIAGNOSIS — I50.32 CHRONIC DIASTOLIC HEART FAILURE: ICD-10-CM

## 2024-12-13 LAB
ALBUMIN SERPL BCP-MCNC: 4.3 G/DL (ref 3.4–5)
ALP SERPL-CCNC: 57 U/L (ref 33–136)
ALT SERPL W P-5'-P-CCNC: 13 U/L (ref 7–45)
ANION GAP SERPL CALC-SCNC: 12 MMOL/L (ref 10–20)
AST SERPL W P-5'-P-CCNC: 14 U/L (ref 9–39)
BILIRUB SERPL-MCNC: 0.5 MG/DL (ref 0–1.2)
BUN SERPL-MCNC: 31 MG/DL (ref 6–23)
CALCIUM SERPL-MCNC: 9.6 MG/DL (ref 8.6–10.3)
CHLORIDE SERPL-SCNC: 101 MMOL/L (ref 98–107)
CO2 SERPL-SCNC: 33 MMOL/L (ref 21–32)
CREAT SERPL-MCNC: 1.23 MG/DL (ref 0.5–1.05)
EGFRCR SERPLBLD CKD-EPI 2021: 48 ML/MIN/1.73M*2
GLUCOSE SERPL-MCNC: 79 MG/DL (ref 74–99)
POTASSIUM SERPL-SCNC: 4.2 MMOL/L (ref 3.5–5.3)
PROT SERPL-MCNC: 7 G/DL (ref 6.4–8.2)
SODIUM SERPL-SCNC: 142 MMOL/L (ref 136–145)

## 2024-12-13 PROCEDURE — 36415 COLL VENOUS BLD VENIPUNCTURE: CPT

## 2024-12-13 PROCEDURE — 80053 COMPREHEN METABOLIC PANEL: CPT

## 2024-12-13 RX ORDER — LISINOPRIL 10 MG/1
10 TABLET ORAL DAILY
Qty: 90 TABLET | Refills: 3 | Status: SHIPPED | OUTPATIENT
Start: 2024-12-13 | End: 2025-12-13

## 2024-12-13 RX ORDER — ROSUVASTATIN CALCIUM 40 MG/1
40 TABLET, COATED ORAL DAILY
Qty: 90 TABLET | Refills: 3 | Status: SHIPPED | OUTPATIENT
Start: 2024-12-13 | End: 2025-12-13

## 2024-12-13 NOTE — PROGRESS NOTES
Name : Klaudia Ratliff   : 1955   MRN : [id number]   ENC Date : 2024    Referred by: Dr. Ahumada    CC:   New Patient Visit     HPI:    Klaudia Ratliff is a 69 y.o. female with PMHx sig for nonobstructive CAD, HTN, RLE DVT/PE s/p thrombectomy plus IVC filter (2024), HFpEF, s/p Transaortic perivisceral aortic endarterectomy, Aortobifemoral bypass grafting, right common femoral artery thromboendarterectomy, left renal artery endarterectomy (May 2024) c/b RP bleed who presents today to establish care.    She saw Dr. Ahumada in July & October to establish care after experiencing postoperative atrial flutter with RVR in May following her aortofemoral bypass surgery. She was treated with Amiodarone & Eliquis. Per Dr. Ahumada's note & based on holter monitor, she does not need anticoagulation from an EP standpoint. Amiodarone course completed.    Reports that her leg swelling & pain is much improved. Will still get numbness in her toes if she moves them a certain way. No chest pain, pressure, SOB/GALEANO, PND, orthopnea, LE edema, palpitations, lightheadedness, dizziness, or syncope.     Smoking again    CV Diagnostics:  30 day holter monitor 2024: Sinus rhythm, Avg HR 58bpm (HR range of 44 - 115 bpm). 64% of time spent with HR <60bpm, PAC/PVC burden < 1%, no AF or atrial flutter.    Limited Echo 24: EF 60-65%, paradoxical septal wall motion, prominent RV trabeculations near the RV apex. ( Seen on prior echo). Slightly elevated RVSP. Compared with the prior exam from 2024 there are no significant changes.    TANMAY 24: I cannot see results    Echo 24: EF 60-65%, elevated mean LAP, prominent echodensity in the apex of the RV, similar in density to surrounding muscle, that is present on multiple views but is not visible with Definity. This may represent a large moderator band or RV trabeculations, vs less likely thrombus or myxoma. Recommend cMRI for further characterization.     [facility] 24:    Angiographically normal left main  Luminal irregularities throughout LAD with a 30% stenosis after a diagonal branch.  Proximal left circumflex 30% stenosis with luminal irregularities in the rest of the vessel.  Proximal RCA 40-50% stenosis with luminal irregularities in the rest of the vessel.  Right dominant coronary artery system  LVEDP 16 mmHg with no significant stenosis across aortic valve.    ROS: unless otherwise noted in the history of present illness, all other systems were reviewed and they are negative for complaints     PMH:  She has a past medical history of Abnormal ECG (02/20/2024), Acute sinusitis, unspecified (03/16/2016), Aortic occlusion (CMS-[facility]), Claudication of both lower extremities (CMS-[facility]), COPD (chronic obstructive pulmonary disease) (Multi), Coronary artery disease, DVT (deep venous thrombosis) (Multi), Essential (primary) hypertension (07/18/2022), Headache, unspecified (07/09/2013), Hyperlipidemia, Joint pain, Melena, Right shoulder pain (11/01/2023), Ulcer, stomach peptic, and Wound of left groin.    PSH:  She has a past surgical history that includes Cardiac catheterization (N/A, 02/16/2024); Invasive Vascular Procedure (N/A, 02/22/2024); Invasive Vascular Procedure (N/A, 02/22/2024); Esophagogastroduodenoscopy; Colonoscopy; MR cardiac morphology and function w and wo IV contrast (02/23/2024); IR intervention filter placement (03/15/2024); Thrombectomy (02/2002); echocardiogram 2 d m mode panel (02/21/2024); and CT abdomen pelvis w and wo IV contrast (03/15/2024).    SHx:  She reports that she has been smoking cigarettes. She started smoking about 2 months ago. She has a 0.1 pack-year smoking history. She has been exposed to tobacco smoke. She has never used smokeless tobacco. She reports current alcohol use. She reports that she does not currently use drugs after having used the following drugs: Marijuana.  Tobacco- smoking again  ETOH- social  Drugs-  "Marijuana    FHx:  Noncontributory    Allergies:  Amlodipine, Clarithromycin, Doxycycline, and Atorvastatin    Outpatient Medications:  Current Outpatient Medications   Medication Instructions    apixaban (ELIQUIS) 5 mg, oral, 2 times daily    ascorbic acid (VITAMIN C) 250 mg, Daily    aspirin 81 mg, oral, Daily    cholecalciferol (VITAMIN D-3) 50 mcg, Daily    D-MANNOSE ORAL 1,300 mg, Daily    furosemide (LASIX) 80 mg, oral, Daily    lisinopril 10 mg, oral, Daily    metoprolol tartrate (LOPRESSOR) 25 mg, oral, 2 times daily    potassium chloride CR 20 mEq ER tablet TAKE 2 TABLETS BY MOUTH ONCE DAILY. Do not crush, chew, or spit    rosuvastatin (CRESTOR) 40 mg, oral, Daily     Last Recorded Vitals:  Vitals:    12/13/24 1300   BP: 130/82   BP Location: Left arm   Patient Position: Sitting   Pulse: 54   Weight: 76.2 kg (168 lb)   Height: 1.575 m (5' 2\")     Physical Exam:  On exam Ms. Klaudia Ratliff appears her stated age, is alert and oriented x3, and in no acute distress. Her sclera are anicteric and her oropharynx has moist mucous membranes. Her neck is supple and without thyromegaly. The JVP is ~5 cm of water above the right atrium. Her cardiac exam has regular rhythm, normal S1, S2. No S3/4. There are no murmurs. Her lungs are clear to auscultation bilaterally and there is no dullness to percussion. Her abdomen is soft, nontender with normoactive bowel sounds. There is no HJR. The extremities are warm and without edema. The skin is dry. There is no rash present. The distal pulses are 2-3+ in all four extremities. Her mood and affect are appropriate for todays encounter.      Last Labs:  CBC -  Lab Results   Component Value Date    WBC 10.7 05/30/2024    HGB 11.1 (L) 05/30/2024    HCT 34.0 (L) 05/30/2024    MCV 95 05/30/2024     05/30/2024       CMP -  Lab Results   Component Value Date    CALCIUM 9.1 06/25/2024    PHOS 4.2 05/30/2024    PROT 6.5 06/25/2024    ALBUMIN 3.8 06/25/2024    AST 14 06/25/2024 "    ALT 16 06/25/2024    ALKPHOS 70 06/25/2024    BILITOT 1.3 (H) 06/25/2024       LIPID PANEL -   Lab Results   Component Value Date    CHOL 184 06/25/2024    TRIG 141 06/25/2024    HDL 52.6 06/25/2024    CHHDL 3.5 06/25/2024    LDLF 129 (H) 02/03/2023    VLDL 28 06/25/2024    NHDL 131 06/25/2024       RENAL FUNCTION PANEL -   Lab Results   Component Value Date    GLUCOSE 93 06/25/2024     06/25/2024    K 3.9 06/25/2024    CL 97 (L) 06/25/2024    CO2 34 (H) 06/25/2024    ANIONGAP 13 06/25/2024    BUN 22 06/25/2024    CREATININE 1.23 (H) 06/25/2024    CALCIUM 9.1 06/25/2024    PHOS 4.2 05/30/2024    ALBUMIN 3.8 06/25/2024        Lab Results   Component Value Date    BNP 15 02/20/2024    HGBA1C 5.3 05/20/2024     I have reviewed the above labs & diagnostics    Assessment/Plan:  Diastolic Heart Failure. Asymptomatic. Euvolemic. HR nicely controlled. C/w lasix 80mg every day.    Hypertension. /82 mmHg today. Given her CAD & PAD her BP is slightly elevated. Start lisinopril 10mg every day, c/w lasix & metoprolol    Hyperlipidemia. Had nausea with atorvastatin so Dr. Ahumada switched her to pravastatin. Given her CAD, PAD & HLD, I would like for her to try Crestor instead. Repeat lipid panel in 1 month    Nonobstructive CAD. C/w aggressive risk factor modification. Statin therapy as above. HR is nicely controlled. BP control as above. Currently on ASA & Eliquis. Need to discuss anticoagulation with VM & VS. Should consider ASA with low dose Xarelto lifelong based on COMPASS trial once she has completed her anticoagulation course.    RV Mural Thrombus. Trabeculation noted on several echocardiogram. Thought to be less likely thrombus, but will get cMRI for better characterization.    PAD s/p Transaortic perivisceral aortic endarterectomy, Aortobifemoral bypass grafting, right common femoral artery thromboendarterectomy, left renal artery endarterectomy (May 2024). Following with Dr. Garcia KAMINSKI DVT/PE s/p  thrombectomy plus IVC filter placed (February 2024). Establishing with Dr. Aydee Molina    Tobacco Abuse. Educated on the risks of smoking & reviewed benefits of cessation  - Discussed need for complete cessation    Follow up after cMRI    Tracy M Schwab, APRN-CNP

## 2024-12-13 NOTE — PATIENT INSTRUCTIONS
- blood work today & in 1 month (Lipid panel)  - start lisinopril for your blood pressure  - stop pravastatin, start Rosuvastatin   - cardiac MRI  - Keep a diet log & track your daily sodium intake. No more than 2,000 mg in a day.   - nasal saline & humidifier for bloody nose  - zippered compression stockings. 20-30 mmHg. Knee high.    You need to stop smoking. As you know, smoking increases the risk of future heart attacks, strokes, cancer, and emphysema. In addition to these problems, someone who smokes a pack a day spends $2000 per year on tobacco alone. Those costs add up, so that someone who has smoked a pack a day for twenty years has spent $40,000 out of pocket on tobacco in their lifetime. To help you quit smoking, you should call the [place] Quit Line at 5-034-QUIT-NOW. They will have other tips to help you quit. Also, there are good medicines to help you quit smoking. If you are interested, let me know.

## 2025-01-08 ENCOUNTER — PREP FOR PROCEDURE (OUTPATIENT)
Dept: VASCULAR SURGERY | Facility: HOSPITAL | Age: 70
End: 2025-01-08
Payer: MEDICARE

## 2025-01-08 ENCOUNTER — HOSPITAL ENCOUNTER (OUTPATIENT)
Facility: HOSPITAL | Age: 70
Setting detail: OUTPATIENT SURGERY
End: 2025-01-08
Attending: SURGERY | Admitting: SURGERY
Payer: MEDICARE

## 2025-01-08 DIAGNOSIS — Z95.828 PRESENCE OF IVC FILTER: Primary | ICD-10-CM

## 2025-01-14 ENCOUNTER — TELEMEDICINE (OUTPATIENT)
Dept: PRIMARY CARE | Facility: CLINIC | Age: 70
End: 2025-01-14
Payer: MEDICARE

## 2025-01-14 DIAGNOSIS — J01.90 ACUTE SINUSITIS, RECURRENCE NOT SPECIFIED, UNSPECIFIED LOCATION: Primary | ICD-10-CM

## 2025-01-14 PROCEDURE — 99213 OFFICE O/P EST LOW 20 MIN: CPT | Performed by: FAMILY MEDICINE

## 2025-01-14 RX ORDER — AMOXICILLIN 875 MG/1
875 TABLET, FILM COATED ORAL 2 TIMES DAILY
Qty: 20 TABLET | Refills: 0 | Status: SHIPPED | OUTPATIENT
Start: 2025-01-14 | End: 2025-01-24

## 2025-01-14 NOTE — PROGRESS NOTES
Subjective   Patient ID: Klaudia Ratliff is a 69 y.o. female who presents for Cough (Since 1/4 has been coughing, sneezing, fevers and really bad diarrhea. Imodium helped with that until this morning. She feels very exhausted. No testing done. Room mate is sick with same symptoms. Has to be careful with OTC meds because she has bad reactions. ).    HPI  Patient is seen today with an interactive audio and video telecommunication system which permits real time communications between the patient (at the originating site) and provider (at the distant site) to provide this telehealth service.  The patient was informed about the telehealth clinical encounter including benefits to avoiding travel and limitations to the assessment.  In person care may be recommended if needed.  Telehealth sessions are not being recorded and personal health information is protected.  Verbal consent was requested and obtained from Klaudia on this date, 1/14/2025 for a telehealth visit.      Patient presents today complaining of cough, fevers, diarrhea and weakness since 1/4/2025.  She has not done any COVID testing.  She states her roommate has same type of symptoms.  She is complaining of a runny nose congestion and sinus pressure.  She states she initially had diarrhea which improved until this morning it returned.  Imodium has helped.  She states she has been eating okay.  She denies any COVID exposure and states that 2 of her granddaughters did have sinus and upper respiratory infections during the holidays.  She complains of congestion in her throat and her sinuses.    Review of Systems   All other systems reviewed and are negative.    Objective   Vitals:  There were no vitals taken for this visit.    Physical Exam  Vitals reviewed.       Assessment/Plan   Problem List Items Addressed This Visit       Acute sinusitis - Primary    Relevant Medications    amoxicillin (Amoxil) 875 mg tablet   Use Mucinex DM 1 every 12 hrs as needed for  cough  Use meds as directed.  Return to clinic if symptoms do not improve in 10-14 days.    Should the condition worsen contact me and return here or if after hours seek further evaluation at an urgent care or in the emergency room.  Medication list reconciled.  Due to the novel SARS-CoV-2 outbreak causing the corona virus disease of 2019 (nicknamed COVID-19) which has been declared a pandemic, we have accommodated this patient to be evaluated via telemedicine. All documentation here reflects my evaluation as well as our plan for today's visit.       Professional services: Some of this note was completed using Dragon voice technology and sometimes the software misinterprets words. This may include unintended errors with respect to translation of words, typographical errors or grammar errors which may not have been identified prior to finalization of the chart note. Please take this into account when reading the note. Thank you.       Jesse Mendes DO 01/16/25 2:02 PM

## 2025-01-22 ENCOUNTER — TELEMEDICINE (OUTPATIENT)
Dept: PRIMARY CARE | Facility: CLINIC | Age: 70
End: 2025-01-22
Payer: MEDICARE

## 2025-01-22 DIAGNOSIS — Z95.820 PERIPHERAL VASCULAR ANGIOPLASTY STATUS WITH IMPLANTS AND GRAFTS: ICD-10-CM

## 2025-01-22 DIAGNOSIS — I25.10 CORONARY ARTERY DISEASE INVOLVING NATIVE CORONARY ARTERY OF NATIVE HEART WITHOUT ANGINA PECTORIS: ICD-10-CM

## 2025-01-22 DIAGNOSIS — Z95.828 PRESENCE OF IVC FILTER: ICD-10-CM

## 2025-01-22 DIAGNOSIS — I26.94 MULTIPLE SUBSEGMENTAL PULMONARY EMBOLI WITHOUT ACUTE COR PULMONALE: ICD-10-CM

## 2025-01-22 DIAGNOSIS — N18.31 CHRONIC KIDNEY DISEASE, STAGE 3A (MULTI): ICD-10-CM

## 2025-01-22 DIAGNOSIS — J01.90 ACUTE SINUSITIS, RECURRENCE NOT SPECIFIED, UNSPECIFIED LOCATION: Primary | ICD-10-CM

## 2025-01-22 PROBLEM — I50.32 CHRONIC DIASTOLIC HEART FAILURE: Status: RESOLVED | Noted: 2024-12-13 | Resolved: 2025-01-22

## 2025-01-22 PROBLEM — I26.99 PULMONARY EMBOLISM WITHOUT ACUTE COR PULMONALE: Status: RESOLVED | Noted: 2024-05-31 | Resolved: 2025-01-22

## 2025-01-22 PROBLEM — I48.0 PAROXYSMAL ATRIAL FIBRILLATION (MULTI): Status: RESOLVED | Noted: 2024-07-25 | Resolved: 2025-01-22

## 2025-01-22 PROBLEM — I82.401 ACUTE EMBOLISM AND THROMBOSIS OF UNSPECIFIED DEEP VEINS OF RIGHT LOWER EXTREMITY (MULTI): Status: RESOLVED | Noted: 2024-05-31 | Resolved: 2025-01-22

## 2025-01-22 PROCEDURE — 1159F MED LIST DOCD IN RCRD: CPT | Performed by: FAMILY MEDICINE

## 2025-01-22 PROCEDURE — 1123F ACP DISCUSS/DSCN MKR DOCD: CPT | Performed by: FAMILY MEDICINE

## 2025-01-22 PROCEDURE — 99214 OFFICE O/P EST MOD 30 MIN: CPT | Performed by: FAMILY MEDICINE

## 2025-01-22 RX ORDER — CEFDINIR 300 MG/1
300 CAPSULE ORAL 2 TIMES DAILY
Qty: 20 CAPSULE | Refills: 0 | Status: SHIPPED | OUTPATIENT
Start: 2025-01-22 | End: 2025-02-01

## 2025-01-22 NOTE — PROGRESS NOTES
Subjective   Patient ID: Klaudia Ratliff is a 69 y.o. female who presents for Cough (She is 70% better, she has 2 days of ATB left. She has been up vomiting and upset stomach the last 2 nights. Still congested and coughing up phlegm. Energy still low. ).    HPI  Patient is seen today with an interactive audio and video telecommunication system which permits real time communications between the patient (at the originating site) and provider (at the distant site) to provide this telehealth service.  The patient was informed about the telehealth clinical encounter including benefits to avoiding travel and limitations to the assessment.  In person care may be recommended if needed.  Telehealth sessions are not being recorded and personal health information is protected.  Verbal consent was requested and obtained from Klaudia on this date, 1/22/2025 for a telehealth visit.    Patient reports today that her sinusitis is 70% improved.  She has 2 days of antibiotics left but she is still coughing and still has had congestion.  She also started having some vomiting and upset stomach in the last 2 days.  She denies any new medications.  She has not had problems with amoxicillin before but this could be the cause of her GI problem now.  She is concerned because she is supposed to have her vena cava filter removed 2/6/2025.    Review of Systems   All other systems reviewed and are negative.      Objective   Vitals:  There were no vitals taken for this visit.    Physical Exam    Assessment/Plan   Problem List Items Addressed This Visit       Acute sinusitis - Primary    Relevant Medications    cefdinir (Omnicef) 300 mg capsule    Coronary artery disease involving native coronary artery of native heart without angina pectoris    Multiple subsegmental pulmonary emboli without acute cor pulmonale    Peripheral vascular angioplasty status with implants and grafts    Presence of IVC filter    Chronic kidney disease, stage 3a  (Multi)    Overview     CMP done 12/2024.  Continue to monitor.        Use meds as directed.  Return to clinic if symptoms do not improve in 10-14 days.    Should the condition worsen contact me and return here or if after hours seek further evaluation at an urgent care or in the emergency room.  Medication list reconciled.  Thank you for visiting today!      Professional services: Some of this note was completed using Dragon voice technology and sometimes the software misinterprets words. This may include unintended errors with respect to translation of words, typographical errors or grammar errors which may not have been identified prior to finalization of the chart note. Please take this into account when reading the note. Thank you.           Jesse Mendes DO 01/23/25 7:05 AM

## 2025-01-30 ENCOUNTER — TELEPHONE (OUTPATIENT)
Dept: VASCULAR SURGERY | Facility: HOSPITAL | Age: 70
End: 2025-01-30
Payer: MEDICARE

## 2025-01-30 DIAGNOSIS — Z95.828 PRESENCE OF IVC FILTER: ICD-10-CM

## 2025-01-30 DIAGNOSIS — Z51.89 ENCOUNTER FOR THERAPEUTIC PROCEDURE: Primary | ICD-10-CM

## 2025-01-30 NOTE — TELEPHONE ENCOUNTER
Called to uncover how patient is feeling.    She is starting to feel better, still congestion, coughing, sneezing.     Instructed her to get labs this Monday and those will be good for 30 days, if surgery needs pushed back d/t respiratory infection.     Reviewed the pre-op instructions below.   Informed her that someone from our office will contact her on Monday to uncover if she is still sick and/or if she needs cleared by her PCP that infection is gone before proceeding with surgery.    Steph Villalta, APRN-CNP  Vascular Surgery    Procedure Date:  Feb 6th 2025  Location:  [facility], [address] Registration Desk, [address] [place] [address].  Tentative Arrival Time: 10:20 am. You'll receive a phone call from the OR confirming the exact time the day prior to surgery.     IMPORTANT:    Get your labs drawn this Monday at any  lab, orders are in.    Hold Eliquis for 2 days before surgery  Do not take the following medications the morning of surgery: eliquis, Lisinopril, furosemide (Lasix), potassium, and hold all vitamins.     The Day BEFORE Surgery:    Do NOT eat any food after midnight the night before your surgery.   You are permitted to have clear liquids with your medications the morning of surgery.  Must be taken 2 hours before your surgery.    Shower the night before or the morning of surgery.     The Day OF your Surgery:  You can take the following medications as scheduled with small sips of clear liquids the morning of your surgery: aspirin, metoprolol, rosuvastatin.    Again, do not take: Eliquis, Lisinopril, furosemide (Lasix), potassium, and hold all vitamins the morning of surgery.   Bring a current list of medications, photo ID, and insurance card and report to the [address] [place] [address] Registration Desk at your scheduled arrival time.    Dress comfortably.  Loose clothing is recommended.  Do not bring valuables such as cash or jewelry.    Prosthetic devices such as contact lenses, hearing aids, dentures must be removed before surgery.   Your surgeon will give you specific instructions before and after surgery.

## 2025-02-03 ENCOUNTER — OFFICE VISIT (OUTPATIENT)
Dept: PRIMARY CARE | Facility: CLINIC | Age: 70
End: 2025-02-03
Payer: MEDICARE

## 2025-02-03 ENCOUNTER — TELEPHONE (OUTPATIENT)
Dept: PRIMARY CARE | Facility: CLINIC | Age: 70
End: 2025-02-03
Payer: MEDICARE

## 2025-02-03 VITALS
BODY MASS INDEX: 31.28 KG/M2 | SYSTOLIC BLOOD PRESSURE: 115 MMHG | OXYGEN SATURATION: 95 % | RESPIRATION RATE: 16 BRPM | HEART RATE: 57 BPM | WEIGHT: 171 LBS | TEMPERATURE: 98.4 F | DIASTOLIC BLOOD PRESSURE: 72 MMHG

## 2025-02-03 DIAGNOSIS — I26.94 MULTIPLE SUBSEGMENTAL PULMONARY EMBOLI WITHOUT ACUTE COR PULMONALE: ICD-10-CM

## 2025-02-03 DIAGNOSIS — I73.9 PAD (PERIPHERAL ARTERY DISEASE) (CMS-HCC): ICD-10-CM

## 2025-02-03 DIAGNOSIS — N18.31 CHRONIC KIDNEY DISEASE, STAGE 3A (MULTI): ICD-10-CM

## 2025-02-03 DIAGNOSIS — I25.10 CORONARY ARTERY DISEASE INVOLVING NATIVE CORONARY ARTERY OF NATIVE HEART WITHOUT ANGINA PECTORIS: ICD-10-CM

## 2025-02-03 DIAGNOSIS — Z95.828 PRESENCE OF IVC FILTER: ICD-10-CM

## 2025-02-03 DIAGNOSIS — J20.9 ACUTE BRONCHITIS, UNSPECIFIED ORGANISM: Primary | ICD-10-CM

## 2025-02-03 DIAGNOSIS — Z95.820 PERIPHERAL VASCULAR ANGIOPLASTY STATUS WITH IMPLANTS AND GRAFTS: ICD-10-CM

## 2025-02-03 PROCEDURE — 3074F SYST BP LT 130 MM HG: CPT | Performed by: FAMILY MEDICINE

## 2025-02-03 PROCEDURE — 1159F MED LIST DOCD IN RCRD: CPT | Performed by: FAMILY MEDICINE

## 2025-02-03 PROCEDURE — 3078F DIAST BP <80 MM HG: CPT | Performed by: FAMILY MEDICINE

## 2025-02-03 PROCEDURE — 1123F ACP DISCUSS/DSCN MKR DOCD: CPT | Performed by: FAMILY MEDICINE

## 2025-02-03 PROCEDURE — 99214 OFFICE O/P EST MOD 30 MIN: CPT | Performed by: FAMILY MEDICINE

## 2025-02-03 RX ORDER — CIPROFLOXACIN 500 MG/1
500 TABLET ORAL 2 TIMES DAILY
Qty: 14 TABLET | Refills: 0 | Status: SHIPPED | OUTPATIENT
Start: 2025-02-03 | End: 2025-02-10

## 2025-02-03 ASSESSMENT — PATIENT HEALTH QUESTIONNAIRE - PHQ9
2. FEELING DOWN, DEPRESSED OR HOPELESS: NOT AT ALL
SUM OF ALL RESPONSES TO PHQ9 QUESTIONS 1 AND 2: 0
1. LITTLE INTEREST OR PLEASURE IN DOING THINGS: NOT AT ALL

## 2025-02-03 ASSESSMENT — ENCOUNTER SYMPTOMS
DEPRESSION: 0
LOSS OF SENSATION IN FEET: 0
OCCASIONAL FEELINGS OF UNSTEADINESS: 0

## 2025-02-03 NOTE — PROGRESS NOTES
"Subjective   Patient ID: Klaudia Ratliff is a 69 y.o. female who presents for Cough (Got sick around Jan 3rd, cough, runny nose, ears plugged. Was seen virtually for this imani, Rx for ATB both times. Never fully cleared. ).    HPI  Patient comes in today for follow-up on her cough.  Her symptoms began around January 4 with coughing, sneezing, fevers and diarrhea.  Her roommate was sick as well with the same symptoms.  She called here for a telemedicine visit on 1/14/2025 and was started on amoxicillin 875 mg twice daily.      She called back and was feeling \"about 70% better\" with 2 days of antibiotic left on 1/22/2025.  Her antibiotic was changed to cefdinir 300 mg twice daily.    She comes in in person today for evaluation as she was supposed to have her IVC filter removed on Thursday of this week however between the time she made the appointment this morning and her visit this afternoon, the surgery office called and canceled her appointment because of her illness.  She states she is feeling a lot better today but she is still coughing.  She states that her stools are hovering between loose and formed.  There are no longer diarrhea and she is not constipated.    Review of Systems   All other systems reviewed and are negative.      Objective   Vitals:  /72   Pulse 57   Temp 36.9 °C (98.4 °F)   Resp 16   Wt 77.6 kg (171 lb)   SpO2 95%   BMI 31.28 kg/m²     Physical Exam  Constitutional:       Appearance: She is well-developed. She is obese.   HENT:      Head: Normocephalic and atraumatic.      Right Ear: Tympanic membrane, ear canal and external ear normal.      Left Ear: Tympanic membrane, ear canal and external ear normal.      Nose: Nose normal.      Mouth/Throat:      Mouth: Mucous membranes are moist.      Pharynx: Oropharynx is clear.   Eyes:      Extraocular Movements: Extraocular movements intact.      Conjunctiva/sclera: Conjunctivae normal.      Pupils: Pupils are equal, round, and reactive " to light.   Cardiovascular:      Rate and Rhythm: Normal rate and regular rhythm.      Heart sounds: Normal heart sounds. No murmur heard.  Pulmonary:      Effort: Pulmonary effort is normal.      Breath sounds: Rhonchi (Mild scattered rhonchi bilateral lung fields without wheeze) present. No wheezing.   Abdominal:      General: Abdomen is flat. Bowel sounds are normal.      Palpations: Abdomen is soft.   Musculoskeletal:         General: Normal range of motion.   Skin:     General: Skin is warm and dry.   Neurological:      General: No focal deficit present.      Mental Status: She is alert and oriented to person, place, and time. Mental status is at baseline.   Psychiatric:         Mood and Affect: Mood normal.         Behavior: Behavior normal.         Thought Content: Thought content normal.         Judgment: Judgment normal.         Assessment/Plan   Problem List Items Addressed This Visit       Coronary artery disease involving native coronary artery of native heart without angina pectoris    Multiple subsegmental pulmonary emboli without acute cor pulmonale    Peripheral vascular angioplasty status with implants and grafts    PAD (peripheral artery disease) (CMS-[facility])    Presence of IVC filter    Chronic kidney disease, stage 3a (Multi)    Overview     CMP done 12/2024.  Continue to monitor.          Other Visit Diagnoses       Acute bronchitis, unspecified organism    -  Primary    Relevant Medications    ciprofloxacin (Cipro) 500 mg tablet        Continue follow-up with surgeon and multiple specialists as scheduled  Use meds as directed.  Return to clinic if symptoms do not improve in 10-14 days.    Should the condition worsen contact me and return here or if after hours seek further evaluation at an urgent care or in the emergency room.  Medication list reconciled.  Thank you for visiting today!      Professional services: Some of this note was completed using Dragon voice technology and sometimes the  software misinterprets words. This may include unintended errors with respect to translation of words, typographical errors or grammar errors which may not have been identified prior to finalization of the chart note. Please take this into account when reading the note. Thank you.       Jesse Mendes DO 02/04/25 7:08 AM

## 2025-02-06 ENCOUNTER — TELEPHONE (OUTPATIENT)
Dept: PRIMARY CARE | Facility: CLINIC | Age: 70
End: 2025-02-06
Payer: MEDICARE

## 2025-02-06 NOTE — TELEPHONE ENCOUNTER
Patient called in this afternoon to give the doctor an update since starting her new antibiotic.  Patient states that she is doing much better and she is only a couple days in with taking the medication.  Patient states that she will call in again on Monday and let the doctor know how she did over the weekend.  Patient can be reached at [phone].        Thank You    No

## 2025-02-11 ENCOUNTER — TELEPHONE (OUTPATIENT)
Dept: PRIMARY CARE | Facility: CLINIC | Age: 70
End: 2025-02-11
Payer: MEDICARE

## 2025-02-11 NOTE — TELEPHONE ENCOUNTER
Patient called in today to give the doctor an update on her condition.  Patient states that she has finished the antibiotic and that she still has some congestion in her ears, and still getting dizzy from time to time when she stands and also still has the nasty metal taste in her mouth other than that she says she is feeling 98% better.  Patient is not sure if she will need another appointment or not?  Patient can be reached at [phone].        Thank You

## 2025-02-11 NOTE — TELEPHONE ENCOUNTER
Good!  Have her continue to monitor her symptoms and let us know if anything changes.  It does not sound like she will need a follow-up appointment right now.

## 2025-02-20 ENCOUNTER — APPOINTMENT (OUTPATIENT)
Dept: RADIOLOGY | Facility: HOSPITAL | Age: 70
End: 2025-02-20
Payer: MEDICARE

## 2025-02-21 ENCOUNTER — ANESTHESIA EVENT (OUTPATIENT)
Dept: OPERATING ROOM | Facility: HOSPITAL | Age: 70
End: 2025-02-21
Payer: MEDICARE

## 2025-02-21 ENCOUNTER — PRE-ADMISSION TESTING (OUTPATIENT)
Dept: PREADMISSION TESTING | Facility: HOSPITAL | Age: 70
End: 2025-02-21
Payer: MEDICARE

## 2025-02-21 VITALS
OXYGEN SATURATION: 100 % | SYSTOLIC BLOOD PRESSURE: 103 MMHG | TEMPERATURE: 97.8 F | DIASTOLIC BLOOD PRESSURE: 62 MMHG | BODY MASS INDEX: 31.32 KG/M2 | WEIGHT: 170.2 LBS | HEIGHT: 62 IN | HEART RATE: 62 BPM

## 2025-02-21 DIAGNOSIS — Z01.810 PREOPERATIVE CARDIOVASCULAR EXAMINATION: ICD-10-CM

## 2025-02-21 DIAGNOSIS — Z01.818 PREOPERATIVE CLEARANCE: ICD-10-CM

## 2025-02-21 DIAGNOSIS — Z01.811 PREOPERATIVE RESPIRATORY EXAMINATION: Primary | ICD-10-CM

## 2025-02-21 DIAGNOSIS — Z41.9 SURGERY, ELECTIVE: ICD-10-CM

## 2025-02-21 LAB
ABO GROUP (TYPE) IN BLOOD: NORMAL
ALBUMIN SERPL BCP-MCNC: 4.1 G/DL (ref 3.4–5)
ALP SERPL-CCNC: 50 U/L (ref 33–136)
ALT SERPL W P-5'-P-CCNC: 12 U/L (ref 7–45)
ANION GAP SERPL CALC-SCNC: 23 MMOL/L (ref 10–20)
APTT PPP: 34 SECONDS (ref 27–38)
AST SERPL W P-5'-P-CCNC: 13 U/L (ref 9–39)
BILIRUB SERPL-MCNC: 0.3 MG/DL (ref 0–1.2)
BUN SERPL-MCNC: 85 MG/DL (ref 6–23)
CALCIUM SERPL-MCNC: 8.8 MG/DL (ref 8.6–10.6)
CHLORIDE SERPL-SCNC: 105 MMOL/L (ref 98–107)
CO2 SERPL-SCNC: 16 MMOL/L (ref 21–32)
CREAT SERPL-MCNC: 3.79 MG/DL (ref 0.5–1.05)
EGFRCR SERPLBLD CKD-EPI 2021: 12 ML/MIN/1.73M*2
GLUCOSE SERPL-MCNC: 64 MG/DL (ref 74–99)
INR PPP: 1.6 (ref 0.9–1.1)
POTASSIUM SERPL-SCNC: 5.6 MMOL/L (ref 3.5–5.3)
PROT SERPL-MCNC: 6.5 G/DL (ref 6.4–8.2)
PROTHROMBIN TIME: 18.6 SECONDS (ref 9.8–12.8)
RH FACTOR (ANTIGEN D): NORMAL
SODIUM SERPL-SCNC: 138 MMOL/L (ref 136–145)

## 2025-02-21 PROCEDURE — 84520 ASSAY OF UREA NITROGEN: CPT

## 2025-02-21 PROCEDURE — 87081 CULTURE SCREEN ONLY: CPT

## 2025-02-21 PROCEDURE — 85610 PROTHROMBIN TIME: CPT

## 2025-02-21 PROCEDURE — 86850 RBC ANTIBODY SCREEN: CPT

## 2025-02-21 PROCEDURE — 99215 OFFICE O/P EST HI 40 MIN: CPT | Performed by: ANESTHESIOLOGY

## 2025-02-21 RX ORDER — CHLORHEXIDINE GLUCONATE ORAL RINSE 1.2 MG/ML
15 SOLUTION DENTAL DAILY
Qty: 30 ML | Refills: 0 | Status: SHIPPED | OUTPATIENT
Start: 2025-02-21 | End: 2025-02-23

## 2025-02-21 RX ORDER — CHLORHEXIDINE GLUCONATE 40 MG/ML
1 SOLUTION TOPICAL DAILY
Qty: 25 ML | Refills: 0 | Status: SHIPPED | OUTPATIENT
Start: 2025-02-21 | End: 2025-02-26

## 2025-02-21 ASSESSMENT — DUKE ACTIVITY SCORE INDEX (DASI)
CAN YOU DO HEAVY WORK AROUND THE HOUSE LIKE SCRUBBING FLOORS OR LIFTING AND MOVING HEAVY FURNITURE: NO
CAN YOU WALK A BLOCK OR TWO ON LEVEL GROUND: YES
DASI METS SCORE: 5.6
CAN YOU WALK INDOORS, SUCH AS AROUND YOUR HOUSE: YES
CAN YOU PARTICIPATE IN MODERATE RECREATIONAL ACTIVITIES LIKE GOLF, BOWLING, DANCING, DOUBLES TENNIS OR THROWING A BASEBALL OR FOOTBALL: NO
CAN YOU CLIMB A FLIGHT OF STAIRS OR WALK UP A HILL: NO
CAN YOU DO LIGHT WORK AROUND THE HOUSE LIKE DUSTING OR WASHING DISHES: YES
CAN YOU PARTICIPATE IN STRENOUS SPORTS LIKE SWIMMING, SINGLES TENNIS, FOOTBALL, BASKETBALL, OR SKIING: NO
TOTAL_SCORE: 23.2
CAN YOU RUN A SHORT DISTANCE: NO
CAN YOU DO MODERATE WORK AROUND THE HOUSE LIKE VACUUMING, SWEEPING FLOORS OR CARRYING GROCERIES: YES
CAN YOU TAKE CARE OF YOURSELF (EAT, DRESS, BATHE, OR USE TOILET): YES
CAN YOU DO YARD WORK LIKE RAKING LEAVES, WEEDING OR PUSHING A MOWER: YES
CAN YOU HAVE SEXUAL RELATIONS: YES

## 2025-02-21 ASSESSMENT — ENCOUNTER SYMPTOMS
CARDIOVASCULAR NEGATIVE: 1
CONSTITUTIONAL NEGATIVE: 1
RESPIRATORY NEGATIVE: 1
DIARRHEA: 1
ENDOCRINE NEGATIVE: 1
RHINORRHEA: 1
VOMITING: 1

## 2025-02-21 ASSESSMENT — LIFESTYLE VARIABLES: SMOKING_STATUS: NONSMOKER

## 2025-02-21 NOTE — CPM/PAT H&P
CPM/PAT Evaluation       Name: Klaudia Ratliff (Klaudia Ratliff)  /Age: 1955/69 y.o.     In-Person       Chief Complaint: IVC Filter Removal     HPI  A 70 y/o F scheduled for  IVC Filter Removal  on 2025 with Dr. Zelaya     PMHx includes  - HTN  - HLD  - Nonobstructive-CAD   - HFpEF  - PAD s/p perivisceral aortic endarterectomy, aortobifemoral bypass graft, left renal bypass, right femoral endarterectomy on 2024.  - Hx of VTE s/p IVC     Presents to CPM today for perioperative risk stratification and optimization.      Past Medical History:   Diagnosis Date    Abnormal ECG 2024    Normal sinus rhythm Nonspecific ST abnormality Abnormal ECG    Acute embolism and thrombosis of unspecified deep veins of right lower extremity (Multi) 2024    Acute sinusitis, unspecified 2016    Acute sinusitis    Aortic occlusion (CMS-HCC)     Claudication of both lower extremities (CMS-[facility])     COPD (chronic obstructive pulmonary disease) (Multi)     Coronary artery disease     Coronary artery disease without angina pectoris    DVT (deep venous thrombosis) (Multi)     RLE DVT, right PE (s/p mechanical thrombectomy 24    Essential (primary) hypertension 2022    Hypertension    Headache, unspecified 2013    Headache    Hyperlipidemia     Joint pain     Melena     Right shoulder pain 2023    Ulcer, stomach peptic     Wound of left groin     left groin nonhealing access site       Past Surgical History:   Procedure Laterality Date    CARDIAC CATHETERIZATION N/A 2024    Procedure: Left Heart Cath;  Surgeon: Aditya Sky MD PhD;  Location: [facility] Cardiac Cath Lab;  Service: Cardiovascular;  Laterality: N/A;  STAT    COLONOSCOPY      CT ABDOMEN PELVIS W AND WO IV CONTRAST  03/15/2024    IMPRESSION: 1. 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 2. No evidence of  acute GI bleeding. 3. Diverticulosis without evidence acute diverticulitis. 4. No evidence of acute process in the abdomen or pelvis.    ECHOCARDIOGRAM 2 D M MODE PANEL  02/21/2024    Left ventricular systolic function is normal with a 60-65% estimated ejection fraction.  2. There is an elevated mean left atrial pressure.    ESOPHAGOGASTRODUODENOSCOPY      INVASIVE VASCULAR PROCEDURE N/A 02/22/2024    Procedure: Lower Extremity Angiogram;  Surgeon: Bar Day DO;  Location: [place] [address] Cardiac Cath Lab;  Service: Cardiovascular;  Laterality: N/A;    INVASIVE VASCULAR PROCEDURE N/A 02/22/2024    Procedure: Pulmonary Angiogram;  Surgeon: Bar Day DO;  Location: [place] [address] Cardiac Cath Lab;  Service: Cardiovascular;  Laterality: N/A;    IR INTERVENTION FILTER PLACEMENT  03/15/2024    MR CARDIAC MORPHOLOGY AND FUNCTION W AND WO IV CONTRAST  02/23/2024    THROMBECTOMY  02/2002    mechanical thrombectomy 2/22/24       Patient Sexual activity questions deferred to the physician.    Family History   Problem Relation Name Age of Onset    No Known Problems Mother      No Known Problems Father      Other (heart issues) Sister         Allergies   Allergen Reactions    Amlodipine Swelling     Bilateral foot swelling    Clarithromycin Other and Dizziness     Slept for days    Doxycycline Diarrhea    Atorvastatin GI Upset and Nausea/vomiting       Prior to Admission medications    Medication Sig Start Date End Date Taking? Authorizing Provider   apixaban (Eliquis) 5 mg tablet Take 1 tablet (5 mg) by mouth 2 times a day. 7/25/24 7/25/25 Yes Bobby Ahumada MD   ascorbic acid (Vitamin C) 250 mg tablet Take 1 tablet (250 mg) by mouth once daily. 3/23/24  Yes Historical Provider, MD   aspirin 81 mg chewable tablet Chew 1 tablet (81 mg) once daily. 2/16/24 2/21/25 Yes Aditya Sky MD PhD   cholecalciferol (Vitamin D-3) 50 MCG (2000 UT) tablet Take 1 tablet (50 mcg) by mouth once daily. 3/23/24  Yes  Historical Provider, MD   furosemide (Lasix) 80 mg tablet TAKE 1 TABLET BY MOUTH EVERY DAY 12/3/24  Yes Jared Diamond DO   lisinopril 10 mg tablet Take 1 tablet (10 mg) by mouth once daily. 12/13/24 12/13/25 Yes Tracy M Schwab, APRN-CNP   metoprolol tartrate (Lopressor) 25 mg tablet Take 1 tablet (25 mg) by mouth 2 times a day. 6/24/24 6/24/25 Yes Jared Diamond DO   potassium chloride CR 20 mEq ER tablet TAKE 2 TABLETS BY MOUTH ONCE DAILY. Do not crush, chew, or spit 12/3/24  Yes Jared Diamond DO   rosuvastatin (Crestor) 40 mg tablet Take 1 tablet (40 mg) by mouth once daily. 12/13/24 12/13/25 Yes Tracy M Schwab, APRN-CNP   D-MANNOSE ORAL Take 1,300 mg by mouth once daily.  Patient not taking: Reported on 2/21/2025    Historical Provider, MD   atorvastatin (Lipitor) 80 mg tablet Take 1 tablet (80 mg) by mouth once daily at bedtime.  Patient not taking: Reported on 2/21/2025 6/24/24 6/24/25  Jared Diamond DO        PAT ROS:   Constitutional:   neg    Neuro/Psych:   Eyes:   Ears:   Nose:   neg     nasal discharge  Mouth:   Throat:   Neck:   Cardio:   neg    Respiratory:   neg    Endocrine:   neg    GI:    diarrhea   vomiting  :   Musculoskeletal:   Hematologic:   Skin:      Physical Exam  Vitals reviewed.   Constitutional:       Appearance: Normal appearance.   Eyes:      Extraocular Movements: Extraocular movements intact.      Pupils: Pupils are equal, round, and reactive to light.   Cardiovascular:      Pulses: Normal pulses.   Pulmonary:      Effort: Pulmonary effort is normal. No respiratory distress.   Abdominal:      General: Abdomen is flat. Bowel sounds are normal. There is no distension.      Palpations: Abdomen is soft.      Tenderness: There is no abdominal tenderness.   Neurological:      Mental Status: She is alert and oriented to person, place, and time. Mental status is at baseline.   Psychiatric:         Thought Content: Thought content normal.          PAT AIRWAY:   Airway:      "Mallampati::  III    Neck ROM::  Full   partials      Testing/Diagnostic:     Patient Specialist/PCP:     Visit Vitals  /62   Pulse 62   Temp 36.6 °C (97.8 °F) (Oral)   Ht 1.575 m (5' 2\")   Wt 77.2 kg (170 lb 3.2 oz)   SpO2 100%   BMI 31.13 kg/m²   OB Status Postmenopausal   Smoking Status Former   BSA 1.84 m²       DASI Risk Score      Flowsheet Row Pre-Admission Testing from 2/21/2025 in [facility]   Can you take care of yourself (eat, dress, bathe, or use toilet)?  2.75 filed at 02/21/2025 0832   Can you walk indoors, such as around your house? 1.75 filed at 02/21/2025 0832   Can you walk a block or two on level ground?  2.75 filed at 02/21/2025 0832   Can you climb a flight of stairs or walk up a hill? 0 filed at 02/21/2025 0832   Can you run a short distance? 0 filed at 02/21/2025 0832   Can you do light work around the house like dusting or washing dishes? 2.7 filed at 02/21/2025 0832   Can you do moderate work around the house like vacuuming, sweeping floors or carrying groceries? 3.5 filed at 02/21/2025 0832   Can you do heavy work around the house like scrubbing floors or lifting and moving heavy furniture?  0 filed at 02/21/2025 0832   Can you do yard work like raking leaves, weeding or pushing a mower? 4.5 filed at 02/21/2025 0832   Can you have sexual relations? 5.25 filed at 02/21/2025 0832   Can you participate in moderate recreational activities like golf, bowling, dancing, doubles tennis or throwing a baseball or football? 0 filed at 02/21/2025 0832   Can you participate in strenous sports like swimming, singles tennis, football, basketball, or skiing? 0 filed at 02/21/2025 0832   DASI SCORE 23.2 filed at 02/21/2025 0832   METS Score (Will be calculated only when all the questions are answered) 5.6 filed at 02/21/2025 0832          Caprini DVT Assessment      Flowsheet Row Pre-Admission Testing from 2/21/2025 in [facility] Admission (Discharged) from 5/16/2024 " in [facility] 7 with Jovan Zelaya MD   DVT Score (IF A SCORE IS NOT CALCULATING, MUST SELECT A BMI TO COMPLETE) 9 filed at 02/21/2025 0909 6 filed at 05/16/2024 1734   Surgical Factors Minor surgery planned filed at 02/21/2025 0909 --   Women Venous thromboembolism (VTE) in previous pregnancy filed at 02/21/2025 0909 --   BMI (BMI MUST BE CHOSEN) 31-40 (Obesity) filed at 02/21/2025 0909 30 or less filed at 05/16/2024 1734   RETIRED: Current Status -- Central venous access filed at 05/16/2024 1734   RETIRED: History -- Prior major surgery filed at 05/16/2024 1734   RETIRED: Age -- 60-75 years filed at 05/16/2024 1734          Modified Frailty Index      Flowsheet Row Pre-Admission Testing from 2/21/2025 in [facility]   Non-independent functional status (problems with dressing, bathing, personal grooming, or cooking) 0 filed at 02/21/2025 0911   History of diabetes mellitus  0 filed at 02/21/2025 0911   History of COPD 0 filed at 02/21/2025 0911   History of CHF 0.0909 filed at 02/21/2025 0911   History of MI 0 filed at 02/21/2025 0911   History of Percutaneous Coronary Intervention, Cardiac Surgery, or Angina No filed at 02/21/2025 0911   Hypertension requiring the use of medication  0.0909 filed at 02/21/2025 0911   Peripheral vascular disease 0.0909 filed at 02/21/2025 0911   Impaired sensorium (cognitive impairement or loss, clouding, or delirium) 0 filed at 02/21/2025 0911   TIA or CVA withouy residual deficit 0 filed at 02/21/2025 0911   Cerebrovascular accident with deficit 0 filed at 02/21/2025 0911   Modified Frailty Index Calculator .2727 filed at 02/21/2025 0911          CHADS2 Stroke Risk  Current as of 11 minutes ago        8.5% 3 to 100%: High Risk   2 to < 3%: Medium Risk   0 to < 2%: Low Risk     Last Change:           This score determines the patient's risk of having a stroke if the patient has atrial fibrillation.          Points Metrics   1 Has  Congestive Heart Failure:  Yes     Patients with congestive heart failure get 1 point.    Current as of 11 minutes ago   1 Has Hypertension:  Yes     Patients with hypertension get 1 point.    Current as of 11 minutes ago   0 Age:  69     Patients who are 75 years of age or older get 1 point.    Current as of 11 minutes ago   0 Has Diabetes Excluding Gestational Diabetes:  No     Patients with diabetes get 1 point.    Current as of 11 minutes ago   2 Had Stroke:  No  Had TIA:  No  Had Thromboembolism:  Yes     Patients who have had a stroke, TIA, or thromboembolism get 2 points.    Current as of 11 minutes ago             Revised Cardiac Risk Index      Flowsheet Row Pre-Admission Testing from 2/21/2025 in [facility]   High-Risk Surgery (Intraperitoneal, Intrathoracic,Suprainguinal vascular) 0 filed at 02/21/2025 0910   History of ischemic heart disease (History of MI, History of positive exercuse test, Current chest paint considered due to myocardial ischemia, Use of nitrate therapy, ECG with pathological Q Waves) 0 filed at 02/21/2025 0910   History of congestive heart failure (pulmonary edemia, bilateral rales or S3 gallop, Paroxysmal nocturnal dyspnea, CXR showing pulmonary vascular redistribution) 1 filed at 02/21/2025 0910   History of cerebrovascular disease (Prior TIA or stroke) 0 filed at 02/21/2025 0910   Pre-operative insulin treatment 0 filed at 02/21/2025 0910   Pre-operative creatinine>2 mg/dl 0 filed at 02/21/2025 0910   Revised Cardiac Risk Calculator 1 filed at 02/21/2025 0910          Apfel Simplified Score      Flowsheet Row Pre-Admission Testing from 2/21/2025 in [facility]   Smoking status 1 filed at 02/21/2025 0911   History of motion sickness or PONV  0 filed at 02/21/2025 0911   Use of postoperative opioids 0 filed at 02/21/2025 0911   Gender - Female 1=Yes filed at 02/21/2025 0911   Apfel Simplified Score Calculator 2 filed at 02/21/2025 0911          Risk  Analysis Index Results This Encounter    No data found in the last 10 encounters.       Stop Bang Score      Flowsheet Row Pre-Admission Testing from 2/21/2025 in [facility]   Do you snore loudly? 1 filed at 02/21/2025 0831   Do you often feel tired or fatigued after your sleep? 1 filed at 02/21/2025 0831   Has anyone ever observed you stop breathing in your sleep? 0 filed at 02/21/2025 0831   Do you have or are you being treated for high blood pressure? 1 filed at 02/21/2025 0831   Recent BMI (Calculated) 30.7 filed at 02/21/2025 0831   Is BMI greater than 35 kg/m2? 0=No filed at 02/21/2025 0831   Age older than 50 years old? 1=Yes filed at 02/21/2025 0831   Is your neck circumference greater than 17 inches (Male) or 16 inches (Female)? 1 filed at 02/21/2025 0831   Gender - Male 0=No filed at 02/21/2025 0831   STOP-BANG Total Score 5 filed at 02/21/2025 0831          Prodigy: High Risk  Total Score: 8              Prodigy Age Score           ARISCAT Score for Postoperative Pulmonary Complications      Flowsheet Row Pre-Admission Testing from 2/21/2025 in [facility]   Age Calculated Score 3 filed at 02/21/2025 0911   Preoperative SpO2 0 filed at 02/21/2025 0911   Respiratory infection in the last month Either upper or lower (i.e., URI, bronchitis, pneumonia), with fever and antibiotic treatment 0 filed at 02/21/2025 0911   Preoperative anemia (Hgb less than 10 g/dl) 0 filed at 02/21/2025 0911   Surgical incision  0 filed at 02/21/2025 0911   Duration of surgery  0 filed at 02/21/2025 0911   Emergency Procedure  0 filed at 02/21/2025 0911   ARISCAT Total Score  3 filed at 02/21/2025 0911          Saenz Perioperative Risk for Myocardial Infarction or Cardiac Arrest (BHAVKI)      Flowsheet Row Pre-Admission Testing from 2/21/2025 in [facility]   Calculated Age Score 1.36 filed at 02/21/2025 0911   Functional Status  0 filed at 02/21/2025 0911   ASA Class  -1.92 filed at  02/21/2025 0911   Creatinine 0 filed at 02/21/2025 0911   Type of Procedure  0.86 filed at 02/21/2025 0911   BHAVIK Total Score  -4.95 filed at 02/21/2025 0911   BHAVIK % 0.7 filed at 02/21/2025 0911            Assessment and Plan:     No results found for this or any previous visit (from the past 24 hours).     Assessment and Plan:    A 70 y/o F scheduled for  IVC Filter Removal  on 2/27/2025 with Dr. Zelaya     PMHx includes  - HTN  - HLD  - Nonobstructive-CAD   - HFpEF  - PAD s/p perivisceral aortic endarterectomy, aortobifemoral bypass graft, left renal bypass, right femoral endarterectomy on 5/16/2024.  - Hx of VTE s/p IVC     Presents to [facility] today for perioperative risk stratification and optimization.    Neuro:  No neurologic diagnosis, however, the patient is at increased risk for perioperative delirium secondary to  age, polypharmacy, type and duration of surgery, Patient instructed on and provided cognitive exercises  Patient is at increased risk for perioperative CVA secondary to  HTN, increased age, hypercoagulable state    HEENT:  No HEENT diagnosis or significant findings on chart review or clinical presentation and evaluation. No further preoperative testing/intervention indicated at this time.    Cardiovascular:  - HTN  - HLD  - Nonobstructive-CAD   - HFpEF  - PAD s/p perivisceral aortic endarterectomy, aortobifemoral bypass graft, left renal bypass, right femoral endarterectomy on 5/16/2024.  METS: 5.6  RCRI: 1 point, 6.0% risk for postoperative MACE   BHAVIK: 0.7% risk for 30 day postoperative MACE    A-30 day holter monitor 09/2024:   Sinus rhythm, Avg HR 58bpm (HR range of 44 - 115 bpm). 64% of time spent with HR <60bpm, PAC/PVC burden < 1%, no AF or atrial flutter.     Limited Echo 5/28/24:   EF 60-65%, paradoxical septal wall motion, prominent RV trabeculations near the RV apex. ( Seen on prior echo). Slightly elevated RVSP. Compared with the prior exam from 2/21/2024 there are no significant changes.      Echo 2/21/24:   EF 60-65%, elevated mean LAP, prominent echodensity in the apex of the RV, similar in density to surrounding muscle, that is present on multiple views but is not visible with Definity. This may represent a large moderator band or RV trabeculations, vs less likely thrombus or myxoma. Recommend cMRI for further characterization.      [facility] 2/16/24:   Angiographically normal left main  Luminal irregularities throughout LAD with a 30% stenosis after a diagonal branch.  Proximal left circumflex 30% stenosis with luminal irregularities in the rest of the vessel.  Proximal RCA 40-50% stenosis with luminal irregularities in the rest of the vessel.  Right dominant coronary artery system  LVEDP 16 mmHg with no significant stenosis across aortic valve.         Pulmonary:  - Hx of VTE s/p IVC   Stop Bang score is 5 placing patient at high risk for MAE  ARISCAT: <26 points, 1.6% risk of in-hospital postoperative pulmonary complication  PRODIGY: High risk for opioid induced respiratory depression  Pumonary toilet education discussed, patient also provided deep breathing exercises and incentive spirometry educational handout    Renal:   No renal diagnosis, however patient is at increase risk for perioperative renal complications secondary to  Age equal to or greater than 56, BMI equal to or greater than 30, HTN, PAD    Endocrine:  No endocrine diagnosis or significant findings on chart review or clinical presentation and evaluation. No further testing or intervention is indicated at this time.    Hematologic:  Caprini Score 9, patient at High risk for perioperative DVT.  Patient provided with VTE education/handout.    Gastrointestinal:   No GI diagnosis or significant findings on chart review or clinical presentation and evaluation.   Apfel 2    Infectious disease:   No infectious diagnosis or significant findings on chart review or clinical presentation and evaluation.   Prescription provided for CHG body wash and  dental rinse. CHG use instructions reviewed and provided to patient.  Staph screen collected    Musculoskeletal:   No diagnosis or significant findings on chart review or clinical presentation and evaluation.     Anesthesia/Airway:  No anesthesia complications      Medication instructions and NPO guidelines reviewed with the patient.  All questions or concerns discussed and addressed.      Patient seen and staffed with Dr. Caraballo

## 2025-02-21 NOTE — PREPROCEDURE INSTRUCTIONS
Thank you for visiting [facility] today for your pre-procedure evaluation, you were seen by Leobardo Garcia MD [phone].    This summary includes instructions and information to aid you during your perioperative period.  Please read carefully. If you have any questions about your visit today, please call the number listed above.  If you become ill or have any changes to your health before your surgery, please contact your primary care provider and alert your surgeon.    Preparing for your Surgery       Exercises  Preoperative Deep Breathing Exercises  Why it is important to do deep breathing exercises before my surgery?  Deep breathing exercises strengthen your breathing muscles.  This helps you to recover after your surgery and decreases the chance of breathing complications.  How are the deep breathing exercises done?  Sit straight with your back supported.  Breathe in deeply and slowly through your nose. Your lower rib cage should expand and your abdomen may move forward.  Hold that breath for 3 to 5 seconds.  Breathe out through pursed lips, slowly and completely.  Rest and repeat 10 times every hour while awake.  Rest longer if you become dizzy or lightheaded.    Preoperative Brain Exercises    What are brain exercises?  A brain exercise is any activity that engages your thinking (cognitive) skills.    What types of activities are considered brain exercises?  Jigsaw puzzles, crossword puzzles, word jumble, memory games, word search, and many more.  Many can be found free online or on your phone via a mobile [employer].    Why should I do brain exercises before my surgery?  More recent research has shown brain exercise before surgery can lower the risk of postoperative delirium (confusion) which can be especially important for older adults.  Patients who did brain exercises for 5 to 10 hours the days before surgery, cut their risk of postoperative delirium in half up to 1 week  after surgery.    Sit-to-Stand Exercise    What is the sit-to-stand exercise?  The sit-to-stand exercise strengthens the muscles of your lower body and muscles in the center of your body (core muscles for stability) helping to maintain and improve your strength and mobility.  How do I do the sit-to-stand exercise?  The goal is to do this exercise without using your arms or hands.  If this is too difficult, use your arms and hands or a chair with armrests to help slowly push yourself to the standing position and lower yourself back to the sitting position. As the movement becomes easier use your arms and hands less.    Steps to the sit-to-stand exercise  Sit up tall in a sturdy chair, knees bent, feet flat on the floor shoulder-width apart.  Shift your hips/pelvis forward in the chair to correctly position yourself for the next movement.  Lean forward at your hips.  Stand up straight putting equal weight on both feet.  Check to be sure you are properly aligned with the chair, in a slow controlled movement sit back down.  Repeat this exercise 10-15 times.  If needed you can do it fewer times until your strength improves.  Rest for 1 minute.  Do another 10-15 sit-to-stand exercises.  Try to do this in the morning and evening.        Instructions    Preoperative Fasting Guidelines    Why must I stop eating and drinking near surgery time?  With sedation, food or liquid in your stomach can enter your lungs causing serious complications  Food can increase nausea and vomiting  When do I need to stop eating and drinking before my surgery?      Do not eat any food after midnight the night before your surgery/procedure. You may have up to 13.5 ounces of clear liquid until TWO hours before your instructed arrival time to the hospital.  This includes water, black tea/coffee, (no milk or cream) apple juice, and electrolyte drinks (Gatorade). You may chew gum until TWO hours before your surgery/procedure        Simple things you  can do to help prevent blood clots     Blood clots are blockages that can form in the body's veins. When a blood clot forms in your deep veins, it may be called a deep vein thrombosis, or DVT for short. Blood clots can happen in any part of the body where blood flows, but they are most common in the arms and legs. If a piece of a blood clot breaks free and travels to the lungs, it is called a pulmonary embolus (PE). A PE can be a very serious problem.         Being in the hospital or having surgery can raise your chances of getting a blood clot because you may not be well enough to move around as much as you normally do.         Ways you can help prevent blood clots in the hospital       Wearing SCDs  SCDs stands for Sequential Compression Devices.   SCDs are special sleeves that wrap around your legs. They attach to a pump that fills them with air to gently squeeze your legs every few minutes.  This helps return the blood in your legs to your heart.   SCDs should only be taken off when walking or bathing. SCDs may not be comfortable, but they can help save your life.              Pump SCD leg sleeves  Wearing compression stockings - if your doctor orders them. These special snug-fitting stockings gently squeeze your legs to help blood flow.       Walking. Walking helps move the blood in your legs.   If your doctor says it is ok, try walking the halls at least   5 times a day. Ask us to help you get up, so you don't fall.      Taking any blood-thinning medicines your doctor orders.              Ways you can help prevent blood clots at home         Wearing compression stockings - if your doctor orders them.   Walking - to help move the blood in your legs.    Taking any blood-thinning medicines your doctor orders.      Signs of a blood clot or PE    Tell your doctor or nurse right away if you have any of the problems listed below.         If you are at home, seek medical care right away. Call 911 for chest pain or  "problems breathing.            Signs of a blood clot (DVT) - such as pain, swelling, redness, or warmth in your arm or legs.  Signs of a pulmonary embolism (PE) - such as chest pain or feeling short of breath      Tobacco and Alcohol;  Do not drink alcohol or smoke within 24 hours of surgery.  It is best to quit smoking for as long as possible before any surgery or procedure.   Other Instructions  Why did I have my nose, under my arms, and groin swabbed? The purpose of the swab is to identify Staphylococcus aureus inside your nose or on your skin.  The swab was sent to the laboratory for culture.  A positive swab/culture for Staphylococcus aureus is called colonization or carriage.   What is Staphylococcus aureus? Staphylococcus aureus, also known as \"staph\", is a germ found on the skin or in the nose of healthy people.  Sometimes Staphylococcus aureus can get into the body and cause an infection.  This can be minor (such as pimples, boils, or other skin problems).  It might also be serious (such as a blood infection, pneumonia, or a surgical site infection). What is Staphylococcus aureus colonization or carriage? Colonization or carriage means that a person has the germ but is not sick from it.  These bacteria can be spread on the hands or when breathing or sneezing. How is Staphylococcus aureus spread? It is most often spread by close contact with a person or item that carries it. What happens if my culture is positive for Staphylococcus aureus? Your doctor/medical team will use this information to guide any antibiotic treatment which may be necessary.  Regardless of the culture results, we will clean the inside of your nose with a betadine swab just before you have your surgery. Will I get an infection if I have Staphylococcus aureus in my nose or on my skin? Anyone can get an infection with Staphylococcus aureus.  However, the best way to reduce your risk of infection is to follow the instructions provided to " you for the use of your CHG soap and dental rinse.  , Body Wash; What is a home preoperative antibacterial shower? This shower is a way of cleaning the skin with a germ-killing solution before surgery.  The solution contains chlorhexidine, commonly known as CHG.  CHG is a skin cleanser with germ-killing ability.  Let your doctor know if you are allergic to chlorhexidine. Why do I need to take a preoperative antibacterial shower? Skin is not sterile.  It is best to try to make your skin as free of germs as possible before surgery.  Proper cleansing with a germ-killing soap before surgery can lower the number of germs on your skin.  This helps to reduce the risk of infection at the surgical site.  Following the instructions listed below will help you prepare your skin for surgery.   How do I use the solution? Steps:  Begin using your CHG soap 5 days before your scheduled surgery on ___________.   First, wash and rinse your hair using the CHG soap. Keep CHG soap away from ear canals and eyes.  Rinse completely, do not condition.  Hair extensions should be removed. , Oral/Dental Rinse: What is oral/dental rinse?  It is a mouthwash. It is a way of cleaning the mouth with a germ-killing solution before your surgery.  The solution contains chlorhexidine, commonly known as CHG. It is used inside the mouth to kill a bacteria known as Staphylococcus aureus.  Let your doctor know if you are allergic to Chlorhexidine. Why do I need to use CHG oral/dental rinse? The CHG oral/dental rinse helps to kill a bacteria in your mouth known as Staphylococcus aureus.  This reduces the risk of infection at the surgical site.  Using your CHG oral/dental rinse STEPS: Use your CHG oral/dental rinse after you brush your teeth the night before (at bedtime) and the morning of your surgery.  Follow all directions on your prescription label.  Use the cap on the container to measure 15 ml.  Swish (gargle if you can) the mouthwash in your mouth for  at least 30 seconds, (do not swallow) and spit out.  After you use your CHG rinse, do not rinse your mouth with water, drink or eat.  Please refer to the prescription label for the appropriate time to resume oral intake What side effects might I have using the CHG oral/dental rinse? CHG rinse will stick to plaque on the teeth.  Brush and floss just before use.  Teeth brushing will help avoid staining of plaque during use.           The Week before Surgery        Seven days before Surgery  Check your [facility] medication instructions  Do the exercises provided to you by [facility]   Arrange for a responsible, adult licensed  to take you home after surgery and stay with you for 24 hours.  You will not be permitted to drive yourself home if you have received any anesthetic/sedation  Six days before surgery  Check your [facility] medication instructions  Do the exercises provided to you by [facility]   Start using Chlorhexidene (CHG) body wash if prescribed  Five days before surgery  Check your [facility] medication instructions  Do the exercises provided to you by [facility]   Continue to use CHG body wash if prescribed  Three days before surgery  Check your [facility] medication instructions  Do the exercises provided to you by [facility]   Continue to use CHG body wash if prescribed  Two days before surgery  Check your [facility] medication instructions  Do the exercises provided to you by [facility]   Continue to use CHG body wash if prescribed      The Day before Surgery       Check your [facility] medication and all other [facility] instructions including when to stop eating and drinking  You will be called with your arrival time for surgery in the late afternoon.  If you do not receive a call please reach out to your surgeon's office.  Do not smoke or drink 24 hours before surgery  Prepare items to bring with you to the hospital  Shower with your chlorhexidine wash if prescribed  Brush your teeth and use your chlorhexidine dental rinse if prescribed    The Day of Surgery       Check your  [facility] medication instructions  Ensure you follow the instructions for when to stop eating and drinking  Shower, if prescribed use CHG.  Do not apply any lotions, creams, moisturizers, perfume or deodorant  Brush your teeth and use your CHG dental rinse if prescribed  Wear loose comfortable clothing  Avoid make-up  Remove  jewelry and piercings, consider professional piercing removal with a plastic spacer if needed  Bring photo ID and Insurance card  Bring an accurate medication list that includes medication dose, frequency and allergies  Bring a copy of your advanced directives (will, health care power of )  Bring any devices and controllers as well as medical devices you have been provided with for surgery (CPAP, slings, braces, etc.)  Dentures, eyeglasses, and contacts will be removed before surgery, please bring cases for contacts or glasses

## 2025-02-23 LAB — STAPHYLOCOCCUS SPEC CULT: NORMAL

## 2025-02-27 ENCOUNTER — HOSPITAL ENCOUNTER (OUTPATIENT)
Facility: HOSPITAL | Age: 70
Setting detail: OUTPATIENT SURGERY
Discharge: HOME | End: 2025-02-27
Attending: SURGERY | Admitting: SURGERY
Payer: MEDICARE

## 2025-02-27 ENCOUNTER — ANESTHESIA (OUTPATIENT)
Dept: OPERATING ROOM | Facility: HOSPITAL | Age: 70
End: 2025-02-27
Payer: MEDICARE

## 2025-02-27 VITALS
DIASTOLIC BLOOD PRESSURE: 61 MMHG | SYSTOLIC BLOOD PRESSURE: 117 MMHG | RESPIRATION RATE: 18 BRPM | TEMPERATURE: 97.2 F | OXYGEN SATURATION: 99 % | HEART RATE: 58 BPM

## 2025-02-27 PROCEDURE — 36415 COLL VENOUS BLD VENIPUNCTURE: CPT | Performed by: STUDENT IN AN ORGANIZED HEALTH CARE EDUCATION/TRAINING PROGRAM

## 2025-02-27 PROCEDURE — 2720000007 HC OR 272 NO HCPCS: Performed by: SURGERY

## 2025-02-27 RX ORDER — FENTANYL CITRATE 50 UG/ML
INJECTION, SOLUTION INTRAMUSCULAR; INTRAVENOUS CONTINUOUS PRN
Status: DISCONTINUED | OUTPATIENT
Start: 2025-02-27 | End: 2025-03-01 | Stop reason: HOSPADM

## 2025-02-27 RX ORDER — MIDAZOLAM HYDROCHLORIDE 1 MG/ML
INJECTION INTRAMUSCULAR; INTRAVENOUS CONTINUOUS PRN
Status: DISCONTINUED | OUTPATIENT
Start: 2025-02-27 | End: 2025-03-01 | Stop reason: HOSPADM

## 2025-02-27 RX ORDER — PROPOFOL 10 MG/ML
INJECTION, EMULSION INTRAVENOUS CONTINUOUS PRN
Status: DISCONTINUED | OUTPATIENT
Start: 2025-02-27 | End: 2025-03-01 | Stop reason: HOSPADM

## 2025-02-27 ASSESSMENT — PAIN - FUNCTIONAL ASSESSMENT: PAIN_FUNCTIONAL_ASSESSMENT: 0-10

## 2025-02-27 ASSESSMENT — PAIN SCALES - GENERAL: PAINLEVEL_OUTOF10: 0 - NO PAIN

## 2025-02-27 NOTE — ANESTHESIA PREPROCEDURE EVALUATION
Patient: Klaudia Ratliff    Procedure Information       Date/Time: 02/27/25 1444    Procedure: REMOVAL, FILTER, VENA CAVA    Location: [facility] / [facility] [place]    Surgeons: Jovan Zelaya MD            Relevant Problems   Cardiac   (+) Coronary artery disease involving native coronary artery of native heart without angina pectoris   (+) Hyperlipidemia   (+) Hypertension   (+) PAD (peripheral artery disease) (CMS-HCC)      Pulmonary   (+) Chronic obstructive pulmonary disease (Multi)   (+) Multiple subsegmental pulmonary emboli without acute cor pulmonale      Neuro   (+) Bilateral carotid artery stenosis   (+) Lumbosacral plexus lesion      GI   (+) Gastric ulcer   (+) Gastro-esophageal reflux disease without esophagitis      /Renal   (+) Urinary tract infection      Endocrine   (+) Hypothyroidism, unspecified      Hematology   (+) Anemia, unspecified   (+) Venous thromboembolism (VTE)      Musculoskeletal   (+) 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

## 2025-02-27 NOTE — SIGNIFICANT EVENT
Patient with Elevated creatinine (3.8 from baseline 1.2-1.3) on preoprative labs. Per patient she has been sick on and off with diarrhea and nausea/vomiting since January. She has had multiple courses of antibiotics. She most recently had emesis last night. She reports having adequate hydration otherwise and is making urine. She denies fevers or chills.     Spoke with patient extensively about this finding and the risks of venography with unknown renal function, and unknown reason for such a severe elevation in creatinine. I also spoke with Dr. Zelaya who was in agreement that this elective procedure should be postponed until trend in renal function and etiology of ELROY/worsening renal function is elucidated.     I asked preop to obtain another RFP today to help patient avoid going for another lab appointment. Patient to follow up with PCP about elevated creatinine and follow up with Vascular Surgery when this is resolved.     Alvarado Baker MD  General Surgery Resident  [facility] [id number]

## 2025-02-28 DIAGNOSIS — R60.0 BILATERAL LEG EDEMA: ICD-10-CM

## 2025-02-28 DIAGNOSIS — E87.6 HYPOKALEMIA: ICD-10-CM

## 2025-02-28 RX ORDER — POTASSIUM CHLORIDE 20 MEQ/1
TABLET, EXTENDED RELEASE ORAL
Qty: 180 TABLET | Refills: 0 | OUTPATIENT
Start: 2025-02-28

## 2025-02-28 RX ORDER — FUROSEMIDE 80 MG/1
80 TABLET ORAL DAILY
Qty: 90 TABLET | Refills: 1 | Status: SHIPPED | OUTPATIENT
Start: 2025-02-28 | End: 2025-08-27

## 2025-02-28 RX ORDER — POTASSIUM CHLORIDE 20 MEQ/1
40 TABLET, EXTENDED RELEASE ORAL DAILY
Qty: 180 TABLET | Refills: 1 | Status: SHIPPED | OUTPATIENT
Start: 2025-02-28 | End: 2025-08-27

## 2025-02-28 NOTE — TELEPHONE ENCOUNTER
Requested Prescriptions     Pending Prescriptions Disp Refills    furosemide (Lasix) 80 mg tablet 90 tablet 1     Sig: Take 1 tablet (80 mg) by mouth once daily.    potassium chloride CR 20 mEq ER tablet 180 tablet 1     Sig: Take 2 tablets (40 mEq) by mouth once daily. Do not crush or chew.

## 2025-02-28 NOTE — TELEPHONE ENCOUNTER
Rx Refill Request Telephone Encounter    Name:  Klaudia Ratliff  :  [id number]  Medication Name:    furosemide (Lasix) 80 mg tablet   potassium chloride CR 20 mEq ER tablet     Specific Pharmacy location:    [facility] [id number] Phone: [phone]   Fax: [phone]        Date of last appointment:  25  Date of next appointment:    Best number to reach patient:  [phone]

## 2025-03-07 ENCOUNTER — APPOINTMENT (OUTPATIENT)
Dept: CARDIOLOGY | Facility: CLINIC | Age: 70
End: 2025-03-07
Payer: MEDICARE

## 2025-03-07 DIAGNOSIS — I48.91 ATRIAL FIBRILLATION, UNSPECIFIED TYPE (MULTI): ICD-10-CM

## 2025-03-07 RX ORDER — METOPROLOL TARTRATE 25 MG/1
25 TABLET, FILM COATED ORAL 2 TIMES DAILY
Qty: 200 TABLET | Refills: 1 | Status: SHIPPED | OUTPATIENT
Start: 2025-03-07

## 2025-03-12 ENCOUNTER — TELEPHONE (OUTPATIENT)
Dept: VASCULAR SURGERY | Facility: HOSPITAL | Age: 70
End: 2025-03-12

## 2025-03-13 NOTE — PROGRESS NOTES
Chief complaint: Thrombosis - arterial disease     Subjective   Pt is accompanied by her daughter, here to establish care, she has thrombosis related history of:   _ >40 year ago, LE DVT attributed to OCP  _ 2/2024 PE noted on imaging done for preoperative aortic surgery s/p thrombectomy 2/22/24 as requested by VS prior to surgery, RLE DVT, discharged on Lovenox   Readmitted 3/2024 for GIB s/ IVCF placed on 3/15, Lovenox resumed 3/21, dc on Eliquis    _ Aortic occlusion s/p aortic endarterectomy, aortobifemoral bypass graft, left renal bypass, right femoral endarterectomy on 5/16/2024   Course further c/b RPB, Afib, RLE extensive DVT, L renal infarction noted on imaging   _ No thoracic aortic acute pathology or thrombus.   There was a ?RV lesion r/o by cardiac MRI   Cardiac monitor with no significant arrhythmia noted   APLS, PF4 -neg   compression of L CFV by JR noted on imaging     Her kidney function had worsened in February attributed to being acutely ill with subsequent dehydration, repeat ordered by her PCP today    On Eliquis 5 mg BID  On aspirin 81 mg daily  On crestor 40 every day  Does not use compression stockings  Reports being up-to-date on cancer screening except Pap smear  Following with cards for atherosclerosis risk factors control   Following closely with Dr. CALDWELL for aortic/arterial disease     Review of Systems  As noted above   Bilateral LE swelling, right > left since her DVT diagnosis, swelling is better in the a.m., worse at the end of the day  Residual postoperative LE neuropathic pain and minimal discoloration, unchanged since surgery in 2024  She has minimal self-limited nosebleeding with congestion/sinusitis, no melena or other obvious bleeding  Chronic back pain   Arthritis   GERD   No claudication pain, rest pain or wounds  No CP or SOB  No weight loss or loss of appetite  No other complaints today     Past Medical History:   Diagnosis Date    Abnormal ECG 02/20/2024    Normal sinus  rhythm Nonspecific ST abnormality Abnormal ECG    Acute embolism and thrombosis of unspecified deep veins of right lower extremity (Multi) 05/31/2024    Acute sinusitis, unspecified 03/16/2016    Acute sinusitis    Aortic occlusion (CMS-[facility])     Claudication of both lower extremities (CMS-[facility])     COPD (chronic obstructive pulmonary disease) (Multi)     Coronary artery disease     Coronary artery disease without angina pectoris    DVT (deep venous thrombosis) (Multi)     RLE DVT, right PE (s/p mechanical thrombectomy 2/22/24    Essential (primary) hypertension 07/18/2022    Hypertension    Headache, unspecified 07/09/2013    Headache    Hyperlipidemia     Joint pain     Melena     Right shoulder pain 11/01/2023    Ulcer, stomach peptic     Wound of left groin     left groin nonhealing access site     Past Surgical History:   Procedure Laterality Date    CARDIAC CATHETERIZATION N/A 02/16/2024    Procedure: Left Heart Cath;  Surgeon: Aditya Sky MD PhD;  Location: [facility];  Service: Cardiovascular;  Laterality: N/A;  STAT    COLONOSCOPY      CT ABDOMEN PELVIS W AND WO IV CONTRAST  03/15/2024    IMPRESSION: 1. 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 2. No evidence of acute GI bleeding. 3. Diverticulosis without evidence acute diverticulitis. 4. No evidence of acute process in the abdomen or pelvis.    ECHOCARDIOGRAM 2 D M MODE PANEL  02/21/2024    Left ventricular systolic function is normal with a 60-65% estimated ejection fraction.  2. There is an elevated mean left atrial pressure.    ESOPHAGOGASTRODUODENOSCOPY      INVASIVE VASCULAR PROCEDURE N/A 02/22/2024    Procedure: Lower Extremity Angiogram;  Surgeon: Bar Day DO;  Location: [place] [address] [facility];  Service: Cardiovascular;  Laterality: N/A;    INVASIVE VASCULAR PROCEDURE N/A 02/22/2024    Procedure: Pulmonary  Angiogram;  Surgeon: Bar Day DO;  Location: [place] [address] [facility];  Service: Cardiovascular;  Laterality: N/A;    IR INTERVENTION FILTER PLACEMENT  03/15/2024    MR CARDIAC MORPHOLOGY AND FUNCTION W AND WO IV CONTRAST  02/23/2024    THROMBECTOMY  02/2002    mechanical thrombectomy 2/22/24     Social History     Socioeconomic History    Marital status:    Tobacco Use    Smoking status: Former     Current packs/day: 0.50     Average packs/day: 0.5 packs/day for 0.4 years (0.2 ttl pk-yrs)     Types: Cigarettes     Start date: 10/1/2024     Quit date: 2/22/2024     Passive exposure: Past    Smokeless tobacco: Never   Vaping Use    Vaping status: Never Used   Substance and Sexual Activity    Alcohol use: Not Currently     Comment: Occasional / 2 to 4 times per Year.    Drug use: Not Currently     Types: Marijuana     Comment: Hx of used gummies once a couple weeks ago    Sexual activity: Defer     Social Drivers of Health     Financial Resource Strain: Low Risk  (5/17/2024)    Overall Financial Resource Strain (CARDIA)     Difficulty of Paying Living Expenses: Not hard at all   Food Insecurity: No Food Insecurity (5/17/2024)    Hunger Vital Sign     Worried About Running Out of Food in the Last Year: Never true     Ran Out of Food in the Last Year: Never true   Transportation Needs: No Transportation Needs (8/6/2024)    OASIS : Transportation     Lack of Transportation (Medical): No     Lack of Transportation (Non-Medical): No     Patient Unable or Declines to Respond: No   Physical Activity: Sufficiently Active (5/17/2024)    Exercise Vital Sign     Days of Exercise per Week: 6 days     Minutes of Exercise per Session: 30 min   Stress: No Stress Concern Present (5/17/2024)    [employer] - Occupational Stress Questionnaire     Feeling of Stress : Not at all   Social Connections: Feeling Socially Integrated (8/6/2024)    OASIS : Social Isolation      "Frequency of experiencing loneliness or isolation: Never   Recent Concern: Social Connections - Socially Isolated (5/17/2024)    Social Connection and Isolation Panel [NHANES]     Frequency of Communication with Friends and Family: More than three times a week     Frequency of Social Gatherings with Friends and Family: More than three times a week     Attends Gnosticism Services: Never     Active Member of Clubs or Organizations: No     Attends Club or Organization Meetings: Never     Marital Status:    Intimate Partner Violence: Not At Risk (5/17/2024)    Humiliation, Afraid, Rape, and Kick questionnaire     Fear of Current or Ex-Partner: No     Emotionally Abused: No     Physically Abused: No     Sexually Abused: No   Housing Stability: Low Risk  (5/17/2024)    Housing Stability Vital Sign     Unable to Pay for Housing in the Last Year: No     Number of Places Lived in the Last Year: 1     Unstable Housing in the Last Year: No      Family History   Problem Relation Name Age of Onset    No Known Problems Mother      No Known Problems Father      Other (heart issues) Sister        Allergies   Allergen Reactions    Amlodipine Swelling     Bilateral foot swelling    Clarithromycin Other and Dizziness     Slept for days    Doxycycline Diarrhea    Atorvastatin GI Upset and Nausea/vomiting       Objective   Physical Exam  /60 (BP Location: Right arm, Patient Position: Sitting)   Pulse 70   Ht 1.575 m (5' 2\")   Wt 78.5 kg (173 lb)   BMI 31.64 kg/m²      General:  In no acute distress  Neuro: alert and oriented x3  CV:  RRR  Lungs: CTA bilaterally  Abd:  Soft, non-tender   Psych:  Appropriate affect  Upper extremities: No swelling  Lower extremities: Bilateral LE edema, right> left-chronic, palpable DP  Skin: Bilateral spider/reticular veins.  Subtle lymphedema changes    Medications   Current Outpatient Medications   Medication Instructions    apixaban (ELIQUIS) 5 mg, oral, 2 times daily    ascorbic acid " (VITAMIN C) 250 mg, Daily    cholecalciferol (VITAMIN D-3) 50 mcg, Daily    D-MANNOSE ORAL 1,300 mg, Daily    furosemide (LASIX) 80 mg, oral, Daily    lisinopril 10 mg, oral, Daily    metoprolol tartrate (LOPRESSOR) 25 mg, oral, 2 times daily    potassium chloride CR 20 mEq ER tablet 40 mEq, oral, Daily, Do not crush or chew.    rosuvastatin (CRESTOR) 40 mg, oral, Daily       Lab Review   Recent Labs     02/21/25  0924 12/13/24  1521 06/25/24  0855 05/30/24  1941 05/30/24  1134 05/29/24  1933 05/29/24  0828 05/28/24  1654 05/28/24  0710 05/28/24  0656 05/27/24  1547 05/27/24  0728    142 140 135* 133* 135* 133* 133* 133* 133*   < > 132*   K 5.6* 4.2 3.9 3.3* 4.2 3.5 3.4* 4.5 3.8 3.6   < > 4.1    101 97* 96* 96* 98 98 99 98 97*   < > 99   CO2 16* 33* 34* 25 25 26 25 24 19* 23   < > 23   ANIONGAP 23* 12 13 17 16 15 13 15 20 17   < > 14   BUN 85* 31* 22 29* 28* 30* 30* 31* 31* 32*   < > 35*   CREATININE 3.79* 1.23* 1.23* 1.25* 1.27* 1.18* 1.23* 1.12* 1.23* 1.26*   < > 1.06*   EGFR 12* 48* 48* 47* 46* 51* 48* 54* 48* 47*   < > 58*   MG  --   --   --  2.35 2.16 2.19 2.17 2.30 1.58* 1.55*  --  2.24    < > = values in this interval not displayed.     Recent Labs     02/21/25  0924 12/13/24  1521 06/25/24  0855 05/30/24  1941 05/30/24  1134 05/28/24  1654 05/28/24  0710 05/17/24  0005 05/16/24  1745   ALBUMIN 4.1 4.3 3.8 3.2* 3.0*   < > 2.8*   < > 3.1*   ALKPHOS 50 57 70 236*  --   --  247*   < >  --    ALT 12 13 16 49*  --   --  64*   < >  --    AST 13 14 14 41*  --   --  48*   < >  --    BILITOT 0.3 0.5 1.3* 2.6*  --   --  3.7*   < >  --    LIPASE  --   --   --   --   --   --   --   --  <3*    < > = values in this interval not displayed.     Recent Labs     05/30/24  0736 05/29/24  1933 05/29/24  0828 05/28/24  0710 05/28/24  0656 05/27/24  0728 05/26/24  2137 05/26/24  0939   WBC 10.7 10.9 12.1* 14.8* 15.8* 17.7* 18.1* 17.5*   HGB 11.1* 11.0* 10.7* 11.3* 11.7* 11.7* 12.2 12.5   HCT 34.0* 32.7* 32.7* 37.1  35.5* 36.4 36.6 38.6    407 393 305 336 294 265 257   MCV 95 92 93 98 93 97 92 98     Recent Labs     02/21/25  0924 05/30/24  0736 05/29/24  2249 05/29/24  1723 05/29/24  0828 05/28/24  1654 05/28/24  1300 05/28/24  0710 05/25/24  0716 05/24/24  0143 05/23/24  1224 05/23/24  0310 05/22/24  2217 05/22/24  1537 05/22/24  0550 05/19/24  0936 05/19/24  0607 05/18/24  0118 05/17/24  1037   INR 1.6*  --   --   --   --   --   --  1.4* 1.2* 1.3* 1.3* 1.3* 1.5* 1.5*  --    < >  --    < > 1.4*   HAUF  --  0.4 0.3 0.5 0.7 0.5 0.4  --   --   --  0.1  --   --   --  0.5   < >  --   --   --    HAPTOGLOBIN  --   --   --   --   --   --   --   --   --   --   --   --   --   --   --   --  149  --   --    FIBRINOGEN  --   --   --   --   --   --   --   --   --   --   --   --   --   --   --   --   --   --  254    < > = values in this interval not displayed.     PTT - 2/21/2025:  9:24 AM    Recent Labs     06/25/24  0855 01/12/24  1218 02/03/23  0912 07/23/22  0904 11/16/21  0939   CHOL 184 267* 202* 218* 231*   LDLF  --   --  129* 121* 136*   HDL 52.6 47.5 54.7 78.0 81.0   TRIG 141 160* 93 95 71     Lab Results   Component Value Date    HGBA1C 5.3 05/20/2024     Lab Results   Component Value Date    TSH 3.17 05/28/2024       Imaging  CTA A/p 5/22/24  . Postsurgical changes consistent with interval infrarenal  aorto-biiliac bypass and left renal artery grafting which appear  widely patent.  . New wedge-shaped hypodensity within the superior pole of the left  kidney most consistent with infarction given recent surgical history.  . Severe ostial stenosis of the native right renal artery and  mild-to-moderate ostial stenosis of the native superior mesenteric  artery  . Redemonstration of compression of the left common iliac vein from  the native bilateral common iliac arteries.    LE DVT 5/20/24   Critical Result: Acute non-occlusive deep vein thrombosis distal external iliac, common femoral vein, femoral vein proximal thru mid, and  profunda vein.     LE DVT US 2/23/24  1. Occlusive deep venous thrombus throughout the right lower extremity  2. No evidence of DVT in the left lower extremity.      CTPE 2/20/24  1. Interval development of right lower lobe and right middle lobe  segmental pulmonary emboli with focal opacification of the right  lower lobe suggestive of developing pulmonary infarct. No CT evidence  for right heart strain.  2. The thoracic aorta is normal in course, caliber, and contour.  There is complete thrombosis of the visualized portion of the  renal/infrarenal abdominal aorta and partial occlusion of the distal  thoracic and proximal abdominal aorta.. There is otherwise no  evidence for acute aortic pathology.    Imaging reports and labs listed above reviewed     Assessment/Plan   Klaudia Ratliff is a 69 y.o. female with PMH of HTN, HLD, obesity, CAD, HFpEF, CKD, PUD, smoking    Thrombosis related history    _ >40 year ago, LE DVT attributed to OCP  _ 2/2024 PE/RLE DVT noted on imaging done for preoperative aortic surgery s/p PE thrombectomy 2/22/24 as requested by VS prior to surgery, discharged on Lovenox   Readmitted 3/2024 for GIB s/ IVCF placed on 3/15, Lovenox resumed 3/21, dc on Eliquis    _ Aortic occlusion s/p aortic endarterectomy, aortobifemoral bypass graft, left renal bypass, right femoral endarterectomy on 5/16/2024   Course further c/b RPB, Afib, RLE extensive DVT, L renal infarction noted on imaging   _ No thoracic aortic acute pathology or thrombus.   There was a ?RV lesion r/o by cardiac MRI   Cardiac monitor with no significant arrhythmia noted   APLS, PF4 -neg   compression of L CFV by JR noted on imaging     _ Carotid, RRA and SMA disease     _ CVI with subtle lymphedema changes as suggested by exam, worse on the right since her DVT diagnosis    Plan   --- Monitor for signs or symptoms that require immediate medical attention  --- Given arterial and venous thrombosis at the same time, first in 2/2024  when she had a PE/RLE DVT as well as aortic thrombus, and in 5/2024 when she had worsening DVT and renal infarct as suggested by imaging, echo with bubble should be done to rule out intracardiac shunting  --- LPA, homocysteine, SPEP, UPEP  --- Noted plans for IVC filter retrieval when the kidney function improves, advised to get her labs drawn today, she will let me know as soon as her kidney function test result to ensure she she is within an acceptable range for Eliquis use  --- Continue Eliquis 5 mg twice daily, monitor for bleeding, fall precautions  --- Continue aspirin 81 mg daily  --- Continue high intensity statins  --- Continue following with cards for atherosclerosis risk factors control   --- Using compression stockings during the day, leg elevation when sitting/sleeping and exercise as tolerated encouraged  --- Continue following closely with Dr. CALDWELL for aortic/arterial disease     Aydee Molina MD

## 2025-03-14 ENCOUNTER — OFFICE VISIT (OUTPATIENT)
Dept: CARDIOLOGY | Facility: CLINIC | Age: 70
End: 2025-03-14
Payer: MEDICARE

## 2025-03-14 ENCOUNTER — OFFICE VISIT (OUTPATIENT)
Dept: PRIMARY CARE | Facility: CLINIC | Age: 70
End: 2025-03-14
Payer: MEDICARE

## 2025-03-14 ENCOUNTER — APPOINTMENT (OUTPATIENT)
Dept: LAB | Facility: HOSPITAL | Age: 70
End: 2025-03-14
Payer: MEDICARE

## 2025-03-14 VITALS
WEIGHT: 173 LBS | BODY MASS INDEX: 31.64 KG/M2 | DIASTOLIC BLOOD PRESSURE: 72 MMHG | TEMPERATURE: 99.5 F | RESPIRATION RATE: 16 BRPM | HEART RATE: 58 BPM | OXYGEN SATURATION: 95 % | SYSTOLIC BLOOD PRESSURE: 121 MMHG

## 2025-03-14 VITALS
SYSTOLIC BLOOD PRESSURE: 110 MMHG | WEIGHT: 173 LBS | HEIGHT: 62 IN | DIASTOLIC BLOOD PRESSURE: 60 MMHG | OXYGEN SATURATION: 96 % | HEART RATE: 70 BPM | BODY MASS INDEX: 31.83 KG/M2

## 2025-03-14 DIAGNOSIS — E53.8 VITAMIN B12 DEFICIENCY: Primary | ICD-10-CM

## 2025-03-14 DIAGNOSIS — Z79.01 ANTICOAGULATION MANAGEMENT ENCOUNTER: ICD-10-CM

## 2025-03-14 DIAGNOSIS — I74.10 AORTIC THROMBUS (MULTI): Primary | ICD-10-CM

## 2025-03-14 DIAGNOSIS — E55.9 VITAMIN D DEFICIENCY: ICD-10-CM

## 2025-03-14 DIAGNOSIS — I82.409 DEEP VEIN THROMBOSIS (DVT) OF LOWER EXTREMITY, UNSPECIFIED CHRONICITY, UNSPECIFIED LATERALITY, UNSPECIFIED VEIN (MULTI): ICD-10-CM

## 2025-03-14 DIAGNOSIS — J30.9 ALLERGIC RHINITIS, UNSPECIFIED SEASONALITY, UNSPECIFIED TRIGGER: ICD-10-CM

## 2025-03-14 DIAGNOSIS — Z86.711 HISTORY OF PULMONARY EMBOLISM: ICD-10-CM

## 2025-03-14 DIAGNOSIS — I87.2 CHRONIC VENOUS INSUFFICIENCY: ICD-10-CM

## 2025-03-14 DIAGNOSIS — Z51.81 ANTICOAGULATION MANAGEMENT ENCOUNTER: ICD-10-CM

## 2025-03-14 DIAGNOSIS — I10 HYPERTENSION, ESSENTIAL, BENIGN: ICD-10-CM

## 2025-03-14 DIAGNOSIS — J01.90 ACUTE SINUSITIS, RECURRENCE NOT SPECIFIED, UNSPECIFIED LOCATION: ICD-10-CM

## 2025-03-14 PROCEDURE — 99213 OFFICE O/P EST LOW 20 MIN: CPT | Performed by: FAMILY MEDICINE

## 2025-03-14 PROCEDURE — 1123F ACP DISCUSS/DSCN MKR DOCD: CPT | Performed by: FAMILY MEDICINE

## 2025-03-14 PROCEDURE — 3078F DIAST BP <80 MM HG: CPT | Performed by: FAMILY MEDICINE

## 2025-03-14 PROCEDURE — 36415 COLL VENOUS BLD VENIPUNCTURE: CPT

## 2025-03-14 PROCEDURE — 99214 OFFICE O/P EST MOD 30 MIN: CPT | Performed by: INTERNAL MEDICINE

## 2025-03-14 PROCEDURE — 1159F MED LIST DOCD IN RCRD: CPT | Performed by: FAMILY MEDICINE

## 2025-03-14 PROCEDURE — 3074F SYST BP LT 130 MM HG: CPT | Performed by: FAMILY MEDICINE

## 2025-03-14 PROCEDURE — 84155 ASSAY OF PROTEIN SERUM: CPT

## 2025-03-14 RX ORDER — CEFDINIR 300 MG/1
300 CAPSULE ORAL 2 TIMES DAILY
Qty: 20 CAPSULE | Refills: 0 | Status: SHIPPED | OUTPATIENT
Start: 2025-03-14 | End: 2025-03-24

## 2025-03-14 RX ORDER — FLUTICASONE PROPIONATE 50 MCG
2 SPRAY, SUSPENSION (ML) NASAL NIGHTLY
Qty: 16 G | Refills: 5 | Status: SHIPPED | OUTPATIENT
Start: 2025-03-14 | End: 2026-03-14

## 2025-03-14 NOTE — PATIENT INSTRUCTIONS
--- Close monitoring to bleeding and fall precautions while on anticoagulation   --- Advise being up to date on screening, follow up with PCP  --- Advise against hormonal therapy if it is to be required in the future   --- Avoid first and second hand smoking   --- Weight loss encouraged  --- Use compression stockings daily, remove at night for comfort   --- Leg elevation above the level of the heart when sitting/sleeping  --- Exercise as tolerated   --- If traveling, please stop every 2 hours for at least 15 mins, walk around, wear your compression stockings   --- Follow up in 3-4 months, earlier if needed

## 2025-03-14 NOTE — PROGRESS NOTES
Subjective   Patient ID: Klaudia Ratliff is a 69 y.o. female who presents for Follow-up (Fu for sinus infection on 2/3. She finished the ATB but is still having sinus congestion and drainage).    HPI  Patient returns today for follow-up on her sinus infection.  She was here last month and has finished the antibiotics but is still having the congestion and drainage.  It felt like it improved for a little bit and then came back.  She feels like her sinuses are always congested at night and her nose runs constantly throughout the day.    Because she was sick last month they canceled her surgery for removal of her filter.  It is to be rescheduled.    Review of Systems   All other systems reviewed and are negative.      Objective   Vitals:  /72   Pulse 58   Temp 37.5 °C (99.5 °F)   Resp 16   Wt 78.5 kg (173 lb)   SpO2 95%   BMI 31.64 kg/m²     Physical Exam  Constitutional:       Appearance: She is well-developed. She is obese.   HENT:      Head: Normocephalic and atraumatic.      Right Ear: Tympanic membrane, ear canal and external ear normal.      Left Ear: Tympanic membrane, ear canal and external ear normal.      Nose: Congestion and rhinorrhea present.      Mouth/Throat:      Mouth: Mucous membranes are moist.      Pharynx: Oropharynx is clear.   Eyes:      Extraocular Movements: Extraocular movements intact.      Conjunctiva/sclera: Conjunctivae normal.      Pupils: Pupils are equal, round, and reactive to light.   Cardiovascular:      Rate and Rhythm: Normal rate and regular rhythm.      Heart sounds: Normal heart sounds. No murmur heard.  Pulmonary:      Effort: Pulmonary effort is normal.      Breath sounds: No wheezing.   Abdominal:      General: Abdomen is flat. Bowel sounds are normal.      Palpations: Abdomen is soft.   Musculoskeletal:         General: Normal range of motion.   Skin:     General: Skin is warm and dry.   Neurological:      General: No focal deficit present.      Mental Status:  She is alert and oriented to person, place, and time. Mental status is at baseline.   Psychiatric:         Mood and Affect: Mood normal.         Behavior: Behavior normal.         Thought Content: Thought content normal.         Judgment: Judgment normal.       CBC -  Recent Labs     03/14/25  1104 05/30/24  0736 05/29/24  1933   WBC 8.0 10.7 10.9   HGB 8.5* 11.1* 11.0*   HCT 25.9* 34.0* 32.7*    432 407   .0* 95 92       CMP -  Recent Labs     03/14/25  1104 02/21/25  0924 12/13/24  1521 06/25/24  0855 05/30/24  1941 05/30/24  1134 05/29/24  1933    138 142   < > 135* 133* 135*   K 5.1 5.6* 4.2   < > 3.3* 4.2 3.5    105 101   < > 96* 96* 98   CO2 25 16* 33*   < > 25 25 26   ANIONGAP 11 23* 12   < > 17 16 15   BUN 61* 85* 31*   < > 29* 28* 30*   CREATININE 2.85* 3.79* 1.23*   < > 1.25* 1.27* 1.18*   EGFR 17* 12* 48*   < > 47* 46* 51*   MG  --   --   --   --  2.35 2.16 2.19    < > = values in this interval not displayed.     Recent Labs     03/14/25  1104 02/21/25  0924 12/13/24  1521   ALBUMIN 4.2 4.1 4.3   ALKPHOS 65 50 57   ALT 40* 12 13   AST 40* 13 14   BILITOT 0.4 0.3 0.5       LIPID PANEL -   Recent Labs     06/25/24  0855 01/12/24  1218 02/03/23  0912 07/23/22  0904 11/16/21  0939   CHOL 184 267* 202* 218* 231*   LDLCALC 103* 188*  --   --   --    LDLF  --   --  129* 121* 136*   HDL 52.6 47.5 54.7 78.0 81.0   TRIG 141 160* 93 95 71       Recent Labs     05/20/24  0000 02/20/24  1729   BNP  --  15   HGBA1C 5.3  --        Assessment/Plan   Problem List Items Addressed This Visit       Acute sinusitis    Relevant Medications    fluticasone (Flonase) 50 mcg/actuation nasal spray    cefdinir (Omnicef) 300 mg capsule    Allergic rhinitis    Relevant Orders    Food Allergy Profile IgE    Respiratory Allergy Profile IgE     Other Visit Diagnoses       Vitamin B12 deficiency    -  Primary    Relevant Orders    CBC (Completed)    Vitamin B12 (Completed)    Vitamin D deficiency        Relevant  Orders    Vitamin D 25-Hydroxy,Total (for eval of Vitamin D levels) (Completed)    Hypertension, essential, benign        Relevant Orders    Comprehensive Metabolic Panel (Completed)        Will contact patient with lab results when available.  Use meds as directed.  Return to clinic if symptoms do not improve in 10-14 days.    Should the condition worsen contact me and return here or if after hours seek further evaluation at an urgent care or in the emergency room.  Continue all current meds.  RTC in one month for recheck, sooner should problems arise.  Medication list reconciled.  Thank you for visiting today!      Professional services: Some of this note was completed using Dragon voice technology and sometimes the software misinterprets words. This may include unintended errors with respect to translation of words, typographical errors or grammar errors which may not have been identified prior to finalization of the chart note. Please take this into account when reading the note. Thank you.       Jesse Mendes DO 03/15/25 9:16 AM

## 2025-03-15 LAB
25(OH)D3+25(OH)D2 SERPL-MCNC: 33 NG/ML (ref 30–100)
A ALTERNATA IGE QN: NORMAL
A ALTERNATA IGE RAST: NORMAL
A FUMIGATUS IGE QN: NORMAL
A FUMIGATUS IGE RAST: NORMAL
ALBUMIN SERPL-MCNC: 4.2 G/DL (ref 3.6–5.1)
ALP SERPL-CCNC: 65 U/L (ref 37–153)
ALT SERPL-CCNC: 40 U/L (ref 6–29)
ANION GAP SERPL CALCULATED.4IONS-SCNC: 11 MMOL/L (CALC) (ref 7–17)
AST SERPL-CCNC: 40 U/L (ref 10–35)
BERMUDA GRASS IGE QN: NORMAL
BERMUDA GRASS IGE RAST: NORMAL
BILIRUB SERPL-MCNC: 0.4 MG/DL (ref 0.2–1.2)
BOXELDER IGE QN: NORMAL
BOXELDER IGE RAST: NORMAL
BUN SERPL-MCNC: 61 MG/DL (ref 7–25)
C HERBARUM IGE QN: NORMAL
C HERBARUM IGE RAST: NORMAL
CALCIUM SERPL-MCNC: 8.5 MG/DL (ref 8.6–10.4)
CALIF WALNUT POLN IGE QN: NORMAL
CALIF WALNUT POLN IGE RAST: NORMAL
CAT DANDER IGE QN: NORMAL
CAT DANDER IGE RAST: NORMAL
CHLORIDE SERPL-SCNC: 104 MMOL/L (ref 98–110)
CMN PIGWEED IGE QN: NORMAL
CMN PIGWEED IGE RAST: NORMAL
CO2 SERPL-SCNC: 25 MMOL/L (ref 20–32)
COMMON RAGWEED IGE QN: NORMAL
COMMON RAGWEED IGE RAST: NORMAL
COTTONWOOD IGE QN: NORMAL
COTTONWOOD IGE RAST: NORMAL
CREAT SERPL-MCNC: 2.85 MG/DL (ref 0.5–1.05)
D FARINAE IGE QN: NORMAL
D FARINAE IGE RAST: NORMAL
D PTERONYSS IGE QN: NORMAL
D PTERONYSS IGE RAST: NORMAL
DOG DANDER IGE QN: NORMAL
DOG DANDER IGE RAST: NORMAL
EGFRCR SERPLBLD CKD-EPI 2021: 17 ML/MIN/1.73M2
ERYTHROCYTE [DISTWIDTH] IN BLOOD BY AUTOMATED COUNT: 11.8 % (ref 11–15)
GLUCOSE SERPL-MCNC: 72 MG/DL (ref 65–139)
HCT VFR BLD AUTO: 25.9 % (ref 35–45)
HGB BLD-MCNC: 8.5 G/DL (ref 11.7–15.5)
IGE SERPL-ACNC: NORMAL [IU]/L
LONDON PLANE IGE QN: NORMAL
LONDON PLANE IGE RAST: NORMAL
MCH RBC QN AUTO: 33.5 PG (ref 27–33)
MCHC RBC AUTO-ENTMCNC: 32.8 G/DL (ref 32–36)
MCV RBC AUTO: 102 FL (ref 80–100)
MOUSE URINE PROT IGE QN: NORMAL
MOUSE URINE PROT IGE RAST: NORMAL
MT JUNIPER IGE QN: NORMAL
MT JUNIPER IGE RAST: NORMAL
P NOTATUM IGE QN: NORMAL
P NOTATUM IGE RAST: NORMAL
PECAN/HICK TREE IGE QN: NORMAL
PECAN/HICK TREE IGE RAST: NORMAL
PLATELET # BLD AUTO: 237 THOUSAND/UL (ref 140–400)
PMV BLD REES-ECKER: 10.9 FL (ref 7.5–12.5)
POTASSIUM SERPL-SCNC: 5.1 MMOL/L (ref 3.5–5.3)
PROT SERPL-MCNC: 6.4 G/DL (ref 6.4–8.2)
PROT SERPL-MCNC: 6.7 G/DL (ref 6.1–8.1)
RBC # BLD AUTO: 2.54 MILLION/UL (ref 3.8–5.1)
REF LAB TEST REF RANGE: NORMAL
ROACH IGE QN: NORMAL
ROACH IGE RAST: NORMAL
SALTWORT IGE QN: NORMAL
SALTWORT IGE RAST: NORMAL
SHEEP SORREL IGE QN: NORMAL
SHEEP SORREL IGE RAST: NORMAL
SILVER BIRCH IGE QN: NORMAL
SILVER BIRCH IGE RAST: NORMAL
SODIUM SERPL-SCNC: 140 MMOL/L (ref 135–146)
TIMOTHY IGE QN: NORMAL
TIMOTHY IGE RAST: NORMAL
VIT B12 SERPL-MCNC: 339 PG/ML (ref 200–1100)
WBC # BLD AUTO: 8 THOUSAND/UL (ref 3.8–10.8)
WHITE ASH IGE QN: NORMAL
WHITE ASH IGE RAST: NORMAL
WHITE ELM IGE QN: NORMAL
WHITE ELM IGE RAST: NORMAL
WHITE MULBERRY IGE QN: NORMAL
WHITE MULBERRY IGE RAST: NORMAL
WHITE OAK IGE QN: NORMAL
WHITE OAK IGE RAST: NORMAL

## 2025-03-17 ENCOUNTER — TELEPHONE (OUTPATIENT)
Dept: CARDIOLOGY | Facility: CLINIC | Age: 70
End: 2025-03-17
Payer: MEDICARE

## 2025-03-17 DIAGNOSIS — I82.409 DEEP VEIN THROMBOSIS (DVT) OF LOWER EXTREMITY, UNSPECIFIED CHRONICITY, UNSPECIFIED LATERALITY, UNSPECIFIED VEIN: Primary | ICD-10-CM

## 2025-03-17 DIAGNOSIS — Z79.01 ANTICOAGULATION MANAGEMENT ENCOUNTER: ICD-10-CM

## 2025-03-17 DIAGNOSIS — I51.3 RV (RIGHT VENTRICULAR) MURAL THROMBUS: Primary | ICD-10-CM

## 2025-03-17 DIAGNOSIS — R77.9 ABNORMAL PROTEIN ELECTROPHORESIS: ICD-10-CM

## 2025-03-17 DIAGNOSIS — Z51.81 ANTICOAGULATION MANAGEMENT ENCOUNTER: ICD-10-CM

## 2025-03-17 DIAGNOSIS — E78.41 ELEVATED LIPOPROTEIN(A): ICD-10-CM

## 2025-03-17 DIAGNOSIS — I82.90 VENOUS THROMBOEMBOLISM (VTE): ICD-10-CM

## 2025-03-17 LAB — PROT UR-ACNC: 26 MG/DL (ref 5–25)

## 2025-03-17 RX ORDER — WARFARIN SODIUM 5 MG/1
5 TABLET ORAL EVERY EVENING
Qty: 30 TABLET | Refills: 2 | Status: SHIPPED | OUTPATIENT
Start: 2025-03-17 | End: 2026-03-17

## 2025-03-17 RX ORDER — HEPARIN SODIUM 5000 [USP'U]/ML
5000 INJECTION, SOLUTION INTRAVENOUS; SUBCUTANEOUS EVERY 12 HOURS
Qty: 20 ML | Refills: 1 | Status: SHIPPED | OUTPATIENT
Start: 2025-03-17

## 2025-03-17 NOTE — TELEPHONE ENCOUNTER
----- Message from Aydee Molina sent at 3/17/2025  3:06 PM EDT -----  Eric Franks, because of the current kidney function (Estimated Creatinine Clearance: 18.3 mL/min)  She will require heparin prophylaxis to coumadin bridge, I have already talked about it extensively on Fri  Please call and explain the process in details   Thanks

## 2025-03-18 ENCOUNTER — TELEPHONE (OUTPATIENT)
Dept: CARDIOLOGY | Facility: CLINIC | Age: 70
End: 2025-03-18
Payer: MEDICARE

## 2025-03-18 LAB
25(OH)D3+25(OH)D2 SERPL-MCNC: 33 NG/ML (ref 30–100)
A ALTERNATA IGE QN: <0.1 KU/L
A ALTERNATA IGE RAST: 0
A FUMIGATUS IGE QN: <0.1 KU/L
A FUMIGATUS IGE RAST: 0
ALBUMIN MFR UR ELPH: 16.3 %
ALBUMIN SERPL-MCNC: 4.2 G/DL (ref 3.6–5.1)
ALP SERPL-CCNC: 65 U/L (ref 37–153)
ALPHA1 GLOB MFR UR ELPH: 21.6 %
ALPHA2 GLOB MFR UR ELPH: 23.9 %
ALT SERPL-CCNC: 40 U/L (ref 6–29)
ANION GAP SERPL CALCULATED.4IONS-SCNC: 11 MMOL/L (CALC) (ref 7–17)
AST SERPL-CCNC: 40 U/L (ref 10–35)
B-GLOBULIN MFR UR ELPH: 23.6 %
BERMUDA GRASS IGE QN: <0.1 KU/L
BERMUDA GRASS IGE RAST: 0
BILIRUB SERPL-MCNC: 0.4 MG/DL (ref 0.2–1.2)
BOXELDER IGE QN: <0.1 KU/L
BOXELDER IGE RAST: 0
BUN SERPL-MCNC: 61 MG/DL (ref 7–25)
C HERBARUM IGE QN: <0.1 KU/L
C HERBARUM IGE RAST: 0
CALCIUM SERPL-MCNC: 8.5 MG/DL (ref 8.6–10.4)
CALIF WALNUT POLN IGE QN: <0.1 KU/L
CALIF WALNUT POLN IGE RAST: 0
CAT DANDER IGE QN: <0.1 KU/L
CAT DANDER IGE RAST: 0
CHLORIDE SERPL-SCNC: 104 MMOL/L (ref 98–110)
CMN PIGWEED IGE QN: <0.1 KU/L
CMN PIGWEED IGE RAST: 0
CO2 SERPL-SCNC: 25 MMOL/L (ref 20–32)
COMMON RAGWEED IGE QN: <0.1 KU/L
COMMON RAGWEED IGE RAST: 0
COTTONWOOD IGE QN: <0.1 KU/L
COTTONWOOD IGE RAST: 0
CREAT SERPL-MCNC: 2.85 MG/DL (ref 0.5–1.05)
D FARINAE IGE QN: 0.25 KU/L
D FARINAE IGE RAST: ABNORMAL
D PTERONYSS IGE QN: 0.19 KU/L
D PTERONYSS IGE RAST: ABNORMAL
DOG DANDER IGE QN: <0.1 KU/L
DOG DANDER IGE RAST: 0
EGFRCR SERPLBLD CKD-EPI 2021: 17 ML/MIN/1.73M2
ERYTHROCYTE [DISTWIDTH] IN BLOOD BY AUTOMATED COUNT: 11.8 % (ref 11–15)
GAMMA GLOB MFR UR ELPH: 14.6 %
GLUCOSE SERPL-MCNC: 72 MG/DL (ref 65–139)
HCT VFR BLD AUTO: 25.9 % (ref 35–45)
HGB BLD-MCNC: 8.5 G/DL (ref 11.7–15.5)
IGE SERPL-ACNC: 159 KU/L
LONDON PLANE IGE QN: <0.1 KU/L
LONDON PLANE IGE RAST: 0
M-PROTEIN 1 URINE %: 10.1 %
MCH RBC QN AUTO: 33.5 PG (ref 27–33)
MCHC RBC AUTO-ENTMCNC: 32.8 G/DL (ref 32–36)
MCV RBC AUTO: 102 FL (ref 80–100)
MOUSE URINE PROT IGE QN: <0.1 KU/L
MOUSE URINE PROT IGE RAST: 0
MT JUNIPER IGE QN: <0.1 KU/L
MT JUNIPER IGE RAST: 0
P NOTATUM IGE QN: <0.1 KU/L
P NOTATUM IGE RAST: 0
PATH REVIEW-URINE PROTEIN ELECTROPHORESIS: ABNORMAL
PECAN/HICK TREE IGE QN: <0.1 KU/L
PECAN/HICK TREE IGE RAST: 0
PLATELET # BLD AUTO: 237 THOUSAND/UL (ref 140–400)
PMV BLD REES-ECKER: 10.9 FL (ref 7.5–12.5)
POTASSIUM SERPL-SCNC: 5.1 MMOL/L (ref 3.5–5.3)
PROT SERPL-MCNC: 6.7 G/DL (ref 6.1–8.1)
RBC # BLD AUTO: 2.54 MILLION/UL (ref 3.8–5.1)
REF LAB TEST REF RANGE: NORMAL
ROACH IGE QN: 1.13 KU/L
ROACH IGE RAST: 2
SALTWORT IGE QN: <0.1 KU/L
SALTWORT IGE RAST: 0
SHEEP SORREL IGE QN: <0.1 KU/L
SHEEP SORREL IGE RAST: 0
SILVER BIRCH IGE QN: <0.1 KU/L
SILVER BIRCH IGE RAST: 0
SODIUM SERPL-SCNC: 140 MMOL/L (ref 135–146)
TIMOTHY IGE QN: <0.1 KU/L
TIMOTHY IGE RAST: 0
URINE ELECTROPHORESIS COMMENT: ABNORMAL
VIT B12 SERPL-MCNC: 339 PG/ML (ref 200–1100)
WBC # BLD AUTO: 8 THOUSAND/UL (ref 3.8–10.8)
WHITE ASH IGE QN: <0.1 KU/L
WHITE ASH IGE RAST: 0
WHITE ELM IGE QN: <0.1 KU/L
WHITE ELM IGE RAST: 0
WHITE MULBERRY IGE QN: <0.1 KU/L
WHITE MULBERRY IGE RAST: 0
WHITE OAK IGE QN: <0.1 KU/L
WHITE OAK IGE RAST: 0

## 2025-03-18 NOTE — TELEPHONE ENCOUNTER
Spoke with patient to get NPV scheduled for [facility]. Patient stated she has already spoke to Dr. Molina staff about transitioning from Eliquis. Patient verbally acknowledge understanding

## 2025-03-18 NOTE — TELEPHONE ENCOUNTER
Results were successfully communicated with the patient, per Dr. Molina's instructions, and they acknowledged their understanding. Educated patient on stopping Eliquis once they have the heparin shots. Patient will also start taking coumadin at that time. Provided number to [facility] intake clinic to set up first visit at [facility]. Answered all questions. Patient verbalized understanding.

## 2025-03-18 NOTE — TELEPHONE ENCOUNTER
Left message for pt to return call at [phone].  Requested pt confirm location she would like to attend based on facility location and days of week clinic is open.

## 2025-03-19 ENCOUNTER — TELEPHONE (OUTPATIENT)
Dept: VASCULAR SURGERY | Facility: CLINIC | Age: 70
End: 2025-03-19
Payer: MEDICARE

## 2025-03-19 NOTE — TELEPHONE ENCOUNTER
I have attempted to contact   Ms. Ratliff   . There is no answer at the following phone number [phone]    . I have left a voice mail message for the patient to contact Dr. Aydee Molina's  office nurse at [phone]   Per Dr. Molina :       MD Maribel Roberts MA  Urine electrophoresis is suggestive of possible discussed underlying bone marrow abnormalities that could explain thrombosis and it could possibly explain the kidney function as well  The sample was not enough, I put in for repeat to further evaluate  I would like Ms. Ratliff to also see hematology, referral placed  Thanks       I have  called to inform the patient that she needs  additional blood work completed and to assist in scheduling an appointment with hematology.  DORINA Schmidt

## 2025-03-20 LAB
ALBUMIN: 3.6 G/DL (ref 3.4–5)
ALPHA 1 GLOBULIN: 0.4 G/DL (ref 0.2–0.6)
ALPHA 2 GLOBULIN: 0.9 G/DL (ref 0.4–1.1)
BETA GLOBULIN: 0.8 G/DL (ref 0.5–1.2)
GAMMA GLOBULIN: 0.7 G/DL (ref 0.5–1.4)
HCYS SERPL-SCNC: 23.6 UMOL/L
IMMUNOFIXATION COMMENT: NORMAL
LPA SERPL-SCNC: 345 NMOL/L
PATH REVIEW - SERUM IMMUNOFIXATION: NORMAL
PATH REVIEW-SERUM PROTEIN ELECTROPHORESIS: NORMAL
PROTEIN ELECTROPHORESIS COMMENT: NORMAL

## 2025-03-20 PROCEDURE — 84156 ASSAY OF PROTEIN URINE: CPT

## 2025-03-21 ENCOUNTER — LAB (OUTPATIENT)
Dept: LAB | Facility: HOSPITAL | Age: 70
End: 2025-03-21
Payer: MEDICARE

## 2025-03-21 ENCOUNTER — TELEPHONE (OUTPATIENT)
Dept: VASCULAR SURGERY | Facility: HOSPITAL | Age: 70
End: 2025-03-21

## 2025-03-21 LAB — PROT UR-ACNC: 21 MG/DL (ref 5–25)

## 2025-03-21 NOTE — TELEPHONE ENCOUNTER
I have attempted to contact  Ms. Ratliff  . There is no answer at the following phone number [phone]. I have left a voice mail message for the patient to contact Dr. Molina office nurse at [phone]       Per Dr. Molina:  MD Maribel Roberts MA  Hi, Ms. Ratliff lipoprotein a came back significantly elevated, that can increase risk of cardiovascular complications and blood clots as well, I would like her to see preventative cardiology  (I understand there is a lot of things we have been finding since last Friday but all of them needs addressed)    Maribel Olivo RN BSN

## 2025-03-24 ENCOUNTER — ANTICOAGULATION - WARFARIN VISIT (OUTPATIENT)
Dept: CARDIOLOGY | Facility: CLINIC | Age: 70
End: 2025-03-24
Payer: MEDICARE

## 2025-03-24 DIAGNOSIS — I51.3 RV (RIGHT VENTRICULAR) MURAL THROMBUS: Primary | ICD-10-CM

## 2025-03-24 DIAGNOSIS — I82.90 VENOUS THROMBOEMBOLISM (VTE): ICD-10-CM

## 2025-03-24 DIAGNOSIS — N18.4 CKD (CHRONIC KIDNEY DISEASE) STAGE 4, GFR 15-29 ML/MIN (MULTI): Primary | ICD-10-CM

## 2025-03-24 LAB
ALBUMIN MFR UR ELPH: 12.9 %
ALPHA1 GLOB MFR UR ELPH: 22.2 %
ALPHA2 GLOB MFR UR ELPH: 18.4 %
B-GLOBULIN MFR UR ELPH: 30.1 %
GAMMA GLOB MFR UR ELPH: 16.4 %
IMMUNOFIXATION COMMENT: NORMAL
PATH REVIEW - URINE IMMUNOFIXATION: NORMAL
PATH REVIEW-URINE PROTEIN ELECTROPHORESIS: NORMAL
POC INR: 5.6
POC PROTHROMBIN TIME: NORMAL
URINE ELECTROPHORESIS COMMENT: NORMAL

## 2025-03-24 PROCEDURE — 85610 PROTHROMBIN TIME: CPT | Mod: QW

## 2025-03-24 PROCEDURE — 99211 OFF/OP EST MAY X REQ PHY/QHP: CPT

## 2025-03-24 NOTE — PROGRESS NOTES
Patient identification verified with 2 identifiers.    Location: [facility] - [address] [place] [id number] [phone]     Referring Physician: Dr. Aydee Molina  Enrollment/ Re-enrollment date: 3/17/2026   INR Goal: 2.0-3.0  INR monitoring is per [facility] protocol.  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  Bleeding signs/symptoms: No    Bruising: No   Major bleeding event: No  Thrombosis signs/symptoms: No  Thromboembolic event: No  Missed doses: No  Extra doses: No  Medication changes: Yes  Pt reports that she is currently taking Omnicef but only has a couple more days left on it.  Dietary changes: No  Change in health: Yes  Pt is new to warfarin and was started on 5mg/day on 3/17/25 and also bridging with heparin injections BID  Change in activity: No  Alcohol: No  Other concerns: No    Upcoming Procedures:  Does the Patient Have any upcoming procedures that require interruption in anticoagulation therapy? yes  Does the patient require bridging? yes       Anticoagulation Summary  As of 3/24/2025      INR goal:  2.0-3.0   TTR:  --   INR used for dosin.60 (3/24/2025)   Weekly warfarin total:  17.5 mg           Pt was seen at the [facility] for NPV.  She was referred to this clinic by Dr. 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  She was previously on Eliquis and now transitioning to warfarin and doing a heparin inj bridge.  She reports starting warfarin 5mg/day on 3/17/25 along with heparin inj BID.  Educational materials were presented and discussed.  Pt verbalized understanding of affects of vit k foods, other medications, herbal supplements, alcohol (she does not drink) and missed doses on INR.  She was given our voicemail# to call with questions or concerns and was given an alert bracelet to  wear.      Assessment/Plan   Supratherapeutic and INR greater than 5, notified: Dr. Molina who gave order to stop heparin bridge, hold 2 doses of warfarin and recheck INR in 2 days.      1. New dose:  Will have pt hold 2 doses of warfarin, discontinue heparin bridge and recheck INR in 2 days per Dr. Molina.  Will then plan to restart warfarin at 2.5mg/day (half dose)     2. Next INR:  2 days  3. Pt is new to warfarin and was started on 5mg/day on 3/17/25 and also bridging with heparin injections BID      Education provided to patient during the visit:  Patient instructed to call in interim with questions, concerns and changes.   Patient educated on interactions between medications and warfarin.   Patient educated on dietary consistency in vitamin k consumption.   Patient educated on affects of alcohol consumption while taking warfarin.   Patient educated on signs of bleeding/clotting.   Patient educated on compliance with dosing, follow up appointments, and prescribed plan of care.

## 2025-03-26 ENCOUNTER — ANTICOAGULATION - WARFARIN VISIT (OUTPATIENT)
Dept: CARDIOLOGY | Facility: CLINIC | Age: 70
End: 2025-03-26
Payer: MEDICARE

## 2025-03-26 DIAGNOSIS — I51.3 RV (RIGHT VENTRICULAR) MURAL THROMBUS: Primary | ICD-10-CM

## 2025-03-26 DIAGNOSIS — I82.90 VENOUS THROMBOEMBOLISM (VTE): ICD-10-CM

## 2025-03-26 LAB
POC INR: 3.2
POC PROTHROMBIN TIME: NORMAL

## 2025-03-26 PROCEDURE — 99211 OFF/OP EST MAY X REQ PHY/QHP: CPT

## 2025-03-26 PROCEDURE — 85610 PROTHROMBIN TIME: CPT | Mod: QW

## 2025-03-26 NOTE — PROGRESS NOTES
Patient identification verified with 2 identifiers.    Location: [facility] - [address] [place] [id number] [phone]     Referring Physician: Dr. Aydee Molina  Enrollment/ Re-enrollment date: 3/17/2026   INR Goal: 2.0-3.0  INR monitoring is per [facility] protocol.  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  Bleeding signs/symptoms: No    Bruising: No   Major bleeding event: No  Thrombosis signs/symptoms: No  Thromboembolic event: No  Missed doses: No  Extra doses: No  Medication changes: No  Dietary changes: Yes  Pt would like to eat some salads; she will begin with eating 2 salads per week..  Change in health: No  Change in activity: No  Alcohol: No  Other concerns: No    Upcoming Procedures:  Does the Patient Have any upcoming procedures that require interruption in anticoagulation therapy? yes  Does the patient require bridging? yes     Pt dose was held x2 days for an INR of 5.6 on 3/24.  She had been taking 35mg warfarin weekly; plan to reduce dose to 17.5mg weekly.  Anticoagulation Summary  As of 3/26/2025      INR goal:  2.0-3.0   TTR:  --   INR used for dosing:  3.20 (3/26/2025)   Weekly warfarin total:  17.5 mg               Assessment/Plan   Supratherapeutic     1. New dose:  Per Dr. Molina will have pt hold today's dose of warfarin then restart warfarin at 2.5mg/day (half of original dose).  17.5mg warfarin weekly.     2. Next INR:  2 days        Education provided to patient during the visit:  Patient instructed to call in interim with questions, concerns and changes.   Patient educated on interactions between medications and warfarin.   Patient educated on dietary consistency in vitamin k consumption.   Patient educated on affects of alcohol consumption while taking warfarin.   Patient educated on signs of bleeding/clotting.   Patient educated on compliance  with dosing, follow up appointments, and prescribed plan of care.

## 2025-03-27 LAB
ALBUMIN MFR UR ELPH: 16.3 %
ALPHA1 GLOB MFR UR ELPH: 21.6 %
ALPHA2 GLOB MFR UR ELPH: 23.9 %
B-GLOBULIN MFR UR ELPH: 23.6 %
GAMMA GLOB MFR UR ELPH: 14.6 %
M-PROTEIN 1 URINE %: NORMAL
PATH REVIEW-URINE PROTEIN ELECTROPHORESIS: NORMAL
URINE ELECTROPHORESIS COMMENT: NORMAL

## 2025-03-28 ENCOUNTER — ANTICOAGULATION - WARFARIN VISIT (OUTPATIENT)
Dept: CARDIOLOGY | Facility: CLINIC | Age: 70
End: 2025-03-28
Payer: MEDICARE

## 2025-03-28 ENCOUNTER — APPOINTMENT (OUTPATIENT)
Dept: CARDIOLOGY | Facility: CLINIC | Age: 70
End: 2025-03-28
Payer: MEDICARE

## 2025-03-28 DIAGNOSIS — I51.3 RV (RIGHT VENTRICULAR) MURAL THROMBUS: Primary | ICD-10-CM

## 2025-03-28 DIAGNOSIS — I82.90 VENOUS THROMBOEMBOLISM (VTE): ICD-10-CM

## 2025-03-28 LAB
POC INR: 2.1 (ref 2–3)
POC PROTHROMBIN TIME: NORMAL

## 2025-03-28 PROCEDURE — 85610 PROTHROMBIN TIME: CPT | Mod: QW | Performed by: INTERNAL MEDICINE

## 2025-03-28 PROCEDURE — 99211 OFF/OP EST MAY X REQ PHY/QHP: CPT

## 2025-03-28 NOTE — PROGRESS NOTES
Patient identification verified with 2 identifiers.    Location: [facility] - [address] [place] [id number] [phone]     Referring Physician: Dr. Aydee Molina  Enrollment/ Re-enrollment date: 3/17/2026   INR Goal: 2.0-3.0  INR monitoring is per [facility] protocol.  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    Bleeding signs/symptoms: No.  Pt denies.    Bruising: No.  Pt denies.  Major bleeding event: No  Thrombosis signs/symptoms: No  Thromboembolic event: No  Missed doses: Yes.  Pt held One more dose of warfarin as instructed by Dr. Molina on 3/26.  Extra doses: No  Medication changes: No.  Pt denies.  Pt discontinued Lovenox as instructed.  Dietary changes: No.  Pt denies.  Change in health: No.  Pt denies.  Change in activity: No.  Pt denies.  Alcohol: No  Other concerns: No    Upcoming Procedures:  Does the Patient Have any upcoming procedures that require interruption in anticoagulation therapy? no  Does the patient require bridging? no     After last visit, INR was 3.2 on 3/26/25.  Pt held One more dose of warfarin on 3/26 as instructed by Dr. Molina, then resumed warfarin at half dose, 2.5mg daily, starting 3/27/28.  17.5mg weekly.  Anticoagulation Summary  As of 3/28/2025      INR goal:  2.0-3.0   TTR:  100.0% (1 d)   INR used for dosin.10 (3/28/2025)   Weekly warfarin total:  17.5 mg               Assessment/Plan   Therapeutic     1. New dose: no change.  Maintain dose.  17.5mg weekly.    2. Next INR:  5 days .  Can r/s in 1 week after next visit.  3. No Lovenox.        Education provided to patient during the visit:  Patient instructed to call in interim with questions, concerns and changes.   Patient educated on interactions between medications and warfarin.   Patient educated on dietary consistency in vitamin k consumption.   Patient educated on affects  of alcohol consumption while taking warfarin.   Patient educated on signs of bleeding/clotting.   Patient educated on compliance with dosing, follow up appointments, and prescribed plan of care.

## 2025-03-31 ENCOUNTER — APPOINTMENT (OUTPATIENT)
Dept: CARDIOLOGY | Facility: CLINIC | Age: 70
End: 2025-03-31
Payer: MEDICARE

## 2025-04-02 ENCOUNTER — ANTICOAGULATION - WARFARIN VISIT (OUTPATIENT)
Dept: CARDIOLOGY | Facility: CLINIC | Age: 70
End: 2025-04-02
Payer: MEDICARE

## 2025-04-02 DIAGNOSIS — I82.90 VENOUS THROMBOEMBOLISM (VTE): ICD-10-CM

## 2025-04-02 DIAGNOSIS — I51.3 RV (RIGHT VENTRICULAR) MURAL THROMBUS: Primary | ICD-10-CM

## 2025-04-02 LAB
POC INR: 3
POC PROTHROMBIN TIME: NORMAL

## 2025-04-02 PROCEDURE — 99211 OFF/OP EST MAY X REQ PHY/QHP: CPT

## 2025-04-02 PROCEDURE — 85610 PROTHROMBIN TIME: CPT | Mod: QW

## 2025-04-02 NOTE — PROGRESS NOTES
Patient identification verified with 2 identifiers.    Location: [facility] - [address] [place] [id number] [phone]     Referring Physician: Dr. Aydee Molina  Enrollment/ Re-enrollment date: 3/17/2026   INR Goal: 2.0-3.0  INR monitoring is per [facility] protocol.  Anticoagulation Medication: warfarin  Indication: Deep Vein Thrombosis (DVT) and LV Thrombus    Subjective   Bleeding signs/symptoms: No    Bruising: No   Major bleeding event: No  Thrombosis signs/symptoms: No  Thromboembolic event: No  Missed doses: No  Extra doses: No  Medication changes: No  Dietary changes: No  Change in health: No  Change in activity: No  Alcohol: No  Other concerns: No    Upcoming Procedures:  Does the Patient Have any upcoming procedures that require interruption in anticoagulation therapy? no  Does the patient require bridging? no      Anticoagulation Summary  As of 4/2/2025      INR goal:  2.0-3.0   TTR:  100.0% (6 d)   INR used for dosing:  3.00 (4/2/2025)   Weekly warfarin total:  17.5 mg               Assessment/Plan   Therapeutic     1. New dose: Will maintain dose and patient will return in 5 days.    2. Next INR:  5 days      Education provided to patient during the visit:  Patient instructed to call in interim with questions, concerns and changes.

## 2025-04-04 ENCOUNTER — HOSPITAL ENCOUNTER (OUTPATIENT)
Dept: CARDIOLOGY | Facility: HOSPITAL | Age: 70
Discharge: HOME | End: 2025-04-04
Payer: MEDICARE

## 2025-04-04 DIAGNOSIS — I82.409 DEEP VEIN THROMBOSIS (DVT) OF LOWER EXTREMITY, UNSPECIFIED CHRONICITY, UNSPECIFIED LATERALITY, UNSPECIFIED VEIN: ICD-10-CM

## 2025-04-04 DIAGNOSIS — Z86.711 HISTORY OF PULMONARY EMBOLISM: ICD-10-CM

## 2025-04-04 DIAGNOSIS — I74.10 AORTIC THROMBUS (MULTI): ICD-10-CM

## 2025-04-04 DIAGNOSIS — I87.2 CHRONIC VENOUS INSUFFICIENCY: ICD-10-CM

## 2025-04-04 DIAGNOSIS — Z79.01 ANTICOAGULATION MANAGEMENT ENCOUNTER: ICD-10-CM

## 2025-04-04 DIAGNOSIS — Z51.81 ANTICOAGULATION MANAGEMENT ENCOUNTER: ICD-10-CM

## 2025-04-04 PROCEDURE — 93308 TTE F-UP OR LMTD: CPT | Performed by: INTERNAL MEDICINE

## 2025-04-04 PROCEDURE — 93325 DOPPLER ECHO COLOR FLOW MAPG: CPT | Performed by: INTERNAL MEDICINE

## 2025-04-04 PROCEDURE — 2500000004 HC RX 250 GENERAL PHARMACY W/ HCPCS (ALT 636 FOR OP/ED): Performed by: STUDENT IN AN ORGANIZED HEALTH CARE EDUCATION/TRAINING PROGRAM

## 2025-04-04 PROCEDURE — C8924 2D TTE W OR W/O FOL W/CON,FU: HCPCS

## 2025-04-04 PROCEDURE — 93321 DOPPLER ECHO F-UP/LMTD STD: CPT | Performed by: INTERNAL MEDICINE

## 2025-04-04 RX ADMIN — PERFLUTREN 2 ML OF DILUTION: 6.52 INJECTION, SUSPENSION INTRAVENOUS at 12:07

## 2025-04-05 LAB
AORTIC VALVE MEAN GRADIENT: 4 MMHG
AORTIC VALVE PEAK VELOCITY: 1.42 M/S
AV PEAK GRADIENT: 8 MMHG
EJECTION FRACTION APICAL 4 CHAMBER: 62.8
EJECTION FRACTION: 63 %
LEFT VENTRICLE INTERNAL DIMENSION DIASTOLE: 4.66 CM (ref 3.5–6)
LV EJECTION FRACTION BIPLANE: 63 %
MITRAL VALVE E/A RATIO: 0.99
RIGHT VENTRICLE FREE WALL PEAK S': 15 CM/S
RIGHT VENTRICLE PEAK SYSTOLIC PRESSURE: 46.1 MMHG
TRICUSPID ANNULAR PLANE SYSTOLIC EXCURSION: 2.4 CM

## 2025-04-07 ENCOUNTER — ANTICOAGULATION - WARFARIN VISIT (OUTPATIENT)
Dept: CARDIOLOGY | Facility: CLINIC | Age: 70
End: 2025-04-07
Payer: MEDICARE

## 2025-04-07 DIAGNOSIS — I82.409 DEEP VEIN THROMBOSIS (DVT) OF LOWER EXTREMITY, UNSPECIFIED CHRONICITY, UNSPECIFIED LATERALITY, UNSPECIFIED VEIN: ICD-10-CM

## 2025-04-07 DIAGNOSIS — I51.3 RV (RIGHT VENTRICULAR) MURAL THROMBUS: Primary | ICD-10-CM

## 2025-04-07 DIAGNOSIS — I82.90 VENOUS THROMBOEMBOLISM (VTE): ICD-10-CM

## 2025-04-07 LAB
POC INR: 3.5
POC PROTHROMBIN TIME: NORMAL

## 2025-04-07 PROCEDURE — 99211 OFF/OP EST MAY X REQ PHY/QHP: CPT

## 2025-04-07 PROCEDURE — 85610 PROTHROMBIN TIME: CPT | Mod: QW

## 2025-04-07 RX ORDER — WARFARIN 1 MG/1
1 TABLET ORAL SEE ADMIN INSTRUCTIONS
Qty: 30 TABLET | Refills: 1 | Status: SHIPPED | OUTPATIENT
Start: 2025-04-07 | End: 2026-04-07

## 2025-04-07 RX ORDER — WARFARIN 1 MG/1
1 TABLET ORAL SEE ADMIN INSTRUCTIONS
Qty: 30 TABLET | Refills: 1 | Status: SHIPPED | OUTPATIENT
Start: 2025-04-07 | End: 2025-04-07

## 2025-04-07 RX ORDER — WARFARIN 1 MG/1
TABLET ORAL
Qty: 30 TABLET | Refills: 1 | Status: SHIPPED | OUTPATIENT
Start: 2025-04-07 | End: 2025-04-07

## 2025-04-07 NOTE — PROGRESS NOTES
Patient identification verified with 2 identifiers.    Location: [facility] - [address] [place] [id number] [phone]     Referring Physician: Dr. Aydee Molina  Enrollment/ Re-enrollment date: 3/17/2026   INR Goal: 2.0-3.0  INR monitoring is per [facility] protocol.  Anticoagulation Medication: warfarin  Indication: Deep Vein Thrombosis (DVT) and LV Thrombus    Subjective   Bleeding signs/symptoms: No    Bruising: No   Major bleeding event: No  Thrombosis signs/symptoms: No  Thromboembolic event: No  Missed doses: No  Extra doses: No  Medication changes: No  Dietary changes: No  Change in health: No  Change in activity: No  Alcohol: No  Other concerns: No    Upcoming Procedures:  Does the Patient Have any upcoming procedures that require interruption in anticoagulation therapy? no  Does the patient require bridging? no      Anticoagulation Summary  As of 4/7/2025      INR goal:  2.0-3.0   TTR:  54.5% (1.6 wk)   INR used for dosing:  3.50 (4/7/2025)   Weekly warfarin total:  17 mg               Assessment/Plan   Subtherapeutic     1. New dose: Will hold dose x1 and decrease TWD slightly.    2. Next INR: 1 week due to travel      Education provided to patient during the visit:  Patient instructed to call in interim with questions, concerns and changes.   Patient educated on interactions between medications and warfarin.   Patient educated on dietary consistency in vitamin k consumption.   Patient educated on compliance with dosing, follow up appointments, and prescribed plan of care.

## 2025-04-10 ENCOUNTER — APPOINTMENT (OUTPATIENT)
Dept: HEMATOLOGY/ONCOLOGY | Facility: CLINIC | Age: 70
End: 2025-04-10
Payer: MEDICARE

## 2025-04-14 ENCOUNTER — ANTICOAGULATION - WARFARIN VISIT (OUTPATIENT)
Dept: CARDIOLOGY | Facility: CLINIC | Age: 70
End: 2025-04-14
Payer: MEDICARE

## 2025-04-14 ENCOUNTER — OFFICE VISIT (OUTPATIENT)
Dept: PRIMARY CARE | Facility: CLINIC | Age: 70
End: 2025-04-14
Payer: MEDICARE

## 2025-04-14 VITALS
WEIGHT: 177 LBS | SYSTOLIC BLOOD PRESSURE: 102 MMHG | HEART RATE: 74 BPM | OXYGEN SATURATION: 96 % | TEMPERATURE: 98.4 F | RESPIRATION RATE: 20 BRPM | DIASTOLIC BLOOD PRESSURE: 51 MMHG | BODY MASS INDEX: 32.37 KG/M2

## 2025-04-14 DIAGNOSIS — D64.9 ANEMIA, UNSPECIFIED TYPE: ICD-10-CM

## 2025-04-14 DIAGNOSIS — I51.3 RV (RIGHT VENTRICULAR) MURAL THROMBUS: Primary | ICD-10-CM

## 2025-04-14 DIAGNOSIS — J20.9 ACUTE BRONCHITIS, UNSPECIFIED ORGANISM: ICD-10-CM

## 2025-04-14 DIAGNOSIS — N18.4 CKD (CHRONIC KIDNEY DISEASE) STAGE 4, GFR 15-29 ML/MIN (MULTI): ICD-10-CM

## 2025-04-14 DIAGNOSIS — I82.90 VENOUS THROMBOEMBOLISM (VTE): Primary | ICD-10-CM

## 2025-04-14 DIAGNOSIS — N18.31 CHRONIC KIDNEY DISEASE, STAGE 3A (MULTI): Primary | ICD-10-CM

## 2025-04-14 DIAGNOSIS — I10 HYPERTENSION, ESSENTIAL, BENIGN: ICD-10-CM

## 2025-04-14 DIAGNOSIS — I82.90 VENOUS THROMBOEMBOLISM (VTE): ICD-10-CM

## 2025-04-14 PROBLEM — J44.9 CHRONIC OBSTRUCTIVE PULMONARY DISEASE (MULTI): Status: RESOLVED | Noted: 2024-03-14 | Resolved: 2025-04-14

## 2025-04-14 LAB
POC INR: 3
POC PROTHROMBIN TIME: NORMAL

## 2025-04-14 PROCEDURE — 3078F DIAST BP <80 MM HG: CPT | Performed by: FAMILY MEDICINE

## 2025-04-14 PROCEDURE — 99211 OFF/OP EST MAY X REQ PHY/QHP: CPT

## 2025-04-14 PROCEDURE — 1123F ACP DISCUSS/DSCN MKR DOCD: CPT | Performed by: FAMILY MEDICINE

## 2025-04-14 PROCEDURE — 99214 OFFICE O/P EST MOD 30 MIN: CPT | Performed by: FAMILY MEDICINE

## 2025-04-14 PROCEDURE — 1159F MED LIST DOCD IN RCRD: CPT | Performed by: FAMILY MEDICINE

## 2025-04-14 PROCEDURE — 3074F SYST BP LT 130 MM HG: CPT | Performed by: FAMILY MEDICINE

## 2025-04-14 PROCEDURE — 85610 PROTHROMBIN TIME: CPT | Mod: QW

## 2025-04-14 RX ORDER — WARFARIN 2.5 MG/1
2.5 TABLET ORAL SEE ADMIN INSTRUCTIONS
Qty: 30 TABLET | Refills: 11 | Status: SHIPPED | OUTPATIENT
Start: 2025-04-15 | End: 2025-04-24

## 2025-04-14 RX ORDER — LOSARTAN POTASSIUM 25 MG/1
25 TABLET ORAL DAILY
Qty: 30 TABLET | Refills: 5 | Status: SHIPPED | OUTPATIENT
Start: 2025-04-14 | End: 2025-10-11

## 2025-04-14 RX ORDER — AMOXICILLIN 875 MG/1
875 TABLET, FILM COATED ORAL 2 TIMES DAILY
Qty: 20 TABLET | Refills: 0 | Status: SHIPPED | OUTPATIENT
Start: 2025-04-14 | End: 2025-04-24

## 2025-04-14 NOTE — PROGRESS NOTES
Patient identification verified with 2 identifiers.    Location: [facility] - [address] [place] [id number] [phone]     Referring Physician: Dr. Aydee Molina  Enrollment/ Re-enrollment date: 3/17/2026   INR Goal: 2.0-3.0  INR monitoring is per [facility] protocol.  Anticoagulation Medication: warfarin  Indication: Deep Vein Thrombosis (DVT) and LV Thrombus    Subjective   Bleeding signs/symptoms: No    Bruising: No   Major bleeding event: No  Thrombosis signs/symptoms: No  Thromboembolic event: No  Missed doses: No  Extra doses: No  Medication changes: No  Dietary changes: No  Change in health: No  Change in activity: No  Alcohol: No  Other concerns: No    Upcoming Procedures:  Does the Patient Have any upcoming procedures that require interruption in anticoagulation therapy? no  Does the patient require bridging? no      Anticoagulation Summary  As of 4/14/2025      INR goal:  2.0-3.0   TTR:  33.3% (2.6 wk)   INR used for dosing:  3.00 (4/14/2025)   Weekly warfarin total:  17 mg               Assessment/Plan   Therapeutic     1. New dose: Will maintain dose and patient will return in 1 week    2. Next INR: 1 week      Education provided to patient during the visit:  Patient instructed to call in interim with questions, concerns and changes.

## 2025-04-14 NOTE — PROGRESS NOTES
Subjective   Patient ID: Klaudia Ratliff is a 69 y.o. female who presents for Cough (Coughing started Thursday. Congested and lots a of phlegm. Taking cough drops and tylenol. ).    HPI  Patient comes in today with upper respiratory complaints.  She states she has had cough and congestion now for 4 days but has been struggling to cough up phlegm.  She has not done any testing.  She does have a 2-year-old great granddaughter who has a constant runny nose.  The patient herself has quit using the Flonase because she was having a lot of nosebleeds out of the left nostril.    Patient states that she always has a lot of dryness and soreness in the back of the throat which makes her want to cough and is what she used the Flonase for.    She is concerned today because she feels like she is constantly getting sick and wonders if her immune system is down.    Review of Systems   All other systems reviewed and are negative.      Objective   Vitals:  /51   Pulse 74   Temp 36.9 °C (98.4 °F)   Resp 20   Wt 80.3 kg (177 lb)   SpO2 96%   BMI 32.37 kg/m²     Physical Exam  Constitutional:       Appearance: She is well-developed. She is obese.   HENT:      Head: Normocephalic and atraumatic.      Right Ear: Tympanic membrane, ear canal and external ear normal.      Left Ear: Tympanic membrane, ear canal and external ear normal.      Nose: Congestion and rhinorrhea present.      Mouth/Throat:      Mouth: Mucous membranes are moist.      Pharynx: Oropharynx is clear.   Eyes:      Extraocular Movements: Extraocular movements intact.      Conjunctiva/sclera: Conjunctivae normal.      Pupils: Pupils are equal, round, and reactive to light.   Cardiovascular:      Rate and Rhythm: Normal rate and regular rhythm.      Heart sounds: Normal heart sounds. No murmur heard.  Pulmonary:      Effort: Pulmonary effort is normal.      Breath sounds: Rhonchi (Dry hacking cough) present. No wheezing.   Abdominal:      General: Abdomen is  flat. Bowel sounds are normal.      Palpations: Abdomen is soft.   Musculoskeletal:         General: Normal range of motion.   Skin:     General: Skin is warm and dry.   Neurological:      General: No focal deficit present.      Mental Status: She is alert and oriented to person, place, and time. Mental status is at baseline.   Psychiatric:         Mood and Affect: Mood normal.         Behavior: Behavior normal.         Thought Content: Thought content normal.         Judgment: Judgment normal.       CBC -  Recent Labs     03/14/25  1104 05/30/24  0736 05/29/24  1933   WBC 8.0 10.7 10.9   HGB 8.5* 11.1* 11.0*   HCT 25.9* 34.0* 32.7*    432 407   .0* 95 92       CMP -  Recent Labs     03/14/25  1104 02/21/25  0924 12/13/24  1521 06/25/24  0855 05/30/24  1941 05/30/24  1134 05/29/24  1933    138 142   < > 135* 133* 135*   K 5.1 5.6* 4.2   < > 3.3* 4.2 3.5    105 101   < > 96* 96* 98   CO2 25 16* 33*   < > 25 25 26   ANIONGAP 11 23* 12   < > 17 16 15   BUN 61* 85* 31*   < > 29* 28* 30*   CREATININE 2.85* 3.79* 1.23*   < > 1.25* 1.27* 1.18*   EGFR 17* 12* 48*   < > 47* 46* 51*   MG  --   --   --   --  2.35 2.16 2.19    < > = values in this interval not displayed.     Recent Labs     03/14/25  1104 02/21/25  0924 12/13/24  1521   ALBUMIN 4.2 4.1 4.3   ALKPHOS 65 50 57   ALT 40* 12 13   AST 40* 13 14   BILITOT 0.4 0.3 0.5       LIPID PANEL -   Recent Labs     06/25/24  0855 01/12/24  1218 02/03/23  0912 07/23/22  0904 11/16/21  0939   CHOL 184 267* 202* 218* 231*   LDLCALC 103* 188*  --   --   --    LDLF  --   --  129* 121* 136*   HDL 52.6 47.5 54.7 78.0 81.0   TRIG 141 160* 93 95 71       Recent Labs     05/20/24  0000 02/20/24  1729   BNP  --  15   HGBA1C 5.3  --        Lab Results   Component Value Date    KZFWENEY45 339 03/14/2025       Lab Results   Component Value Date    VITD25 33 03/14/2025        Assessment/Plan   Problem List Items Addressed This Visit       Hypertension, essential,  benign    Overview     DC lisinopril secondary to cough  Changed to losartan 25 mg daily         Relevant Medications    losartan (Cozaar) 25 mg tablet    Anemia, unspecified    Overview     Recheck CBC today         Relevant Orders    CBC    Chronic kidney disease, stage 3a (Multi) - Primary    Overview     CMP done 12/2024.  Continue to monitor.         CKD (chronic kidney disease) stage 4, GFR 15-29 ml/min (Multi)    Overview     Recheck BMP today         Relevant Orders    Basic Metabolic Panel    Acute bronchitis    Relevant Medications    amoxicillin (Amoxil) 875 mg tablet   Use Maximum strength Mucinex DM 1 every 12 hrs as needed for cough  Take new medication as directed.  Continue other medications as directed.  RTC in one month for recheck, sooner should problems arise.  Medication list reconciled.  Thank you for visiting today!      Professional services: Some of this note was completed using Dragon voice technology and sometimes the software misinterprets words. This may include unintended errors with respect to translation of words, typographical errors or grammar errors which may not have been identified prior to finalization of the chart note. Please take this into account when reading the note. Thank you.       Jesse Mendes DO 04/14/25 4:48 PM

## 2025-04-15 DIAGNOSIS — I82.90 VENOUS THROMBOEMBOLISM (VTE): ICD-10-CM

## 2025-04-15 LAB
ANION GAP SERPL CALCULATED.4IONS-SCNC: 11 MMOL/L (CALC) (ref 7–17)
BUN SERPL-MCNC: 56 MG/DL (ref 7–25)
BUN/CREAT SERPL: 21 (CALC) (ref 6–22)
CALCIUM SERPL-MCNC: 8.5 MG/DL (ref 8.6–10.4)
CHLORIDE SERPL-SCNC: 107 MMOL/L (ref 98–110)
CO2 SERPL-SCNC: 21 MMOL/L (ref 20–32)
CREAT SERPL-MCNC: 2.61 MG/DL (ref 0.5–1.05)
EGFRCR SERPLBLD CKD-EPI 2021: 19 ML/MIN/1.73M2
ERYTHROCYTE [DISTWIDTH] IN BLOOD BY AUTOMATED COUNT: 11.9 % (ref 11–15)
GLUCOSE SERPL-MCNC: 109 MG/DL (ref 65–139)
HCT VFR BLD AUTO: 24.3 % (ref 35–45)
HGB BLD-MCNC: 8 G/DL (ref 11.7–15.5)
MCH RBC QN AUTO: 33.2 PG (ref 27–33)
MCHC RBC AUTO-ENTMCNC: 32.9 G/DL (ref 32–36)
MCV RBC AUTO: 100.8 FL (ref 80–100)
PLATELET # BLD AUTO: 238 THOUSAND/UL (ref 140–400)
PMV BLD REES-ECKER: 10.1 FL (ref 7.5–12.5)
POTASSIUM SERPL-SCNC: 5.4 MMOL/L (ref 3.5–5.3)
RBC # BLD AUTO: 2.41 MILLION/UL (ref 3.8–5.1)
SODIUM SERPL-SCNC: 139 MMOL/L (ref 135–146)
WBC # BLD AUTO: 11.3 THOUSAND/UL (ref 3.8–10.8)

## 2025-04-15 NOTE — RESULT ENCOUNTER NOTE
Eric Rudolph,    Your hemoglobin has actually gone down slightly from a month ago.  Continue to watch for blood loss in your stool or urine.  In the meantime I would like you to take an iron pill, ferrous sulfate 325 mg available over-the-counter once a day.  When you are anemic like you are, it can make you feel very tired and fatigued and short of breath.  We need to see if we can rebuild your blood supply.  Would recommend taking the iron pill daily.  You should also continue to take the over-the-counter vitamin B-12 1000 units daily to get your level above 400.  Will recheck these numbers again in July 2025.    Your kidney function has improved slightly but is still low.  If you have not seen a kidney doctor (nephrologist) before then I would like you to see 1 to see if there is any thing else that we should be doing to improve your kidney function.  If you have not seen 1 let me know and I can refer you to 1.    Have a Great Day and Happy Easter!!!    Dr. Mendes

## 2025-04-21 ENCOUNTER — ANTICOAGULATION - WARFARIN VISIT (OUTPATIENT)
Dept: CARDIOLOGY | Facility: CLINIC | Age: 70
End: 2025-04-21
Payer: MEDICARE

## 2025-04-21 DIAGNOSIS — I51.3 RV (RIGHT VENTRICULAR) MURAL THROMBUS: Primary | ICD-10-CM

## 2025-04-21 DIAGNOSIS — I82.90 VENOUS THROMBOEMBOLISM (VTE): ICD-10-CM

## 2025-04-21 LAB
POC INR: 2.8 (ref 2–3)
POC PROTHROMBIN TIME: NORMAL (ref 9.3–12.5)

## 2025-04-21 PROCEDURE — 99211 OFF/OP EST MAY X REQ PHY/QHP: CPT

## 2025-04-21 PROCEDURE — 85610 PROTHROMBIN TIME: CPT | Mod: QW | Performed by: INTERNAL MEDICINE

## 2025-04-21 NOTE — PROGRESS NOTES
Patient identification verified with 2 identifiers.    Location: [facility] - [address] [place] [id number] [phone]     Referring Physician: Dr. Aydee Molina MD  Enrollment/ Re-enrollment date: 3/17/2026   INR Goal: 2.0-3.0  INR monitoring is per [facility] protocol.  Anticoagulation Medication: warfarin  Indication: Deep Vein Thrombosis (DVT) and LV Thrombus    Subjective   Pt present by herself for today's visit.    Bleeding signs/symptoms: No.  Pt denies.    Bruising: No.  Pt denies.    Major bleeding event: No  Thrombosis signs/symptoms: No  Thromboembolic event: No  Missed doses: No.  Pt denies.  Extra doses: No  Medication changes: Yes.  Pt started on Amoxicillin 875mg BID x 10 days for cough and URI.  Pt started on .  Pt was also taken off Lisinopril 10mg daily due to possible cough side effect and replaced with Losartan 25mg Daily  Dietary changes: No.  Pt denies.  Change in health: No.  Pt denies.  Change in activity: No.  Pt denies.  Alcohol: No  Other concerns: No    Upcoming Procedures:  Does the Patient Have any upcoming procedures that require interruption in anticoagulation therapy? no  Does the patient require bridging? no    Dose maintained after last visit.  Pt is taking 17mg of warfarin weekly.  Anticoagulation Summary  As of 2025      INR goal:  2.0-3.0   TTR:  52.9% (3.6 wk)   INR used for dosin.80 (2025)   Weekly warfarin total:  17 mg               Assessment/Plan   Therapeutic     1. New dose:  no change.  Maintain dose.  17mg weekly.  2. Next INR: 1 week.  Can r/s in 2 weeks if therapeutic next visit.  3. Will see Pt in a week due to med changes and recent elevated INR and borderline reading.      Education provided to patient during the visit:  Patient instructed to call in interim with questions, concerns and changes.   Patient educated on interactions between medications and warfarin.   Patient educated on dietary consistency in vitamin  k consumption.   Patient educated on affects of alcohol consumption while taking warfarin.   Patient educated on signs of bleeding/clotting.   Patient educated on compliance with dosing, follow up appointments, and prescribed plan of care.

## 2025-04-24 ENCOUNTER — APPOINTMENT (OUTPATIENT)
Dept: RADIOLOGY | Facility: HOSPITAL | Age: 70
End: 2025-04-24
Payer: MEDICARE

## 2025-04-24 DIAGNOSIS — I82.90 VENOUS THROMBOEMBOLISM (VTE): ICD-10-CM

## 2025-04-24 RX ORDER — WARFARIN 2.5 MG/1
2.5 TABLET ORAL SEE ADMIN INSTRUCTIONS
Qty: 30 TABLET | Refills: 11 | Status: SHIPPED | OUTPATIENT
Start: 2025-04-24 | End: 2026-04-24

## 2025-04-25 ENCOUNTER — TELEPHONE (OUTPATIENT)
Dept: PRIMARY CARE | Facility: CLINIC | Age: 70
End: 2025-04-25
Payer: MEDICARE

## 2025-04-25 NOTE — TELEPHONE ENCOUNTER
I spoke with patient this afternoon and recommended that she continue to use the maximum strength Mucinex DM twice a day as needed.  She will do so.

## 2025-04-25 NOTE — TELEPHONE ENCOUNTER
Patient called in this morning to give an update after finishing her antibiotic.  Patient states that she feels much better.  Patient did state that she still has a little cough, dryness in her throat and some sneezing, but nothing compared to what she was like before.  Patient can be reached at [phone].        Thank You

## 2025-04-28 ENCOUNTER — ANTICOAGULATION - WARFARIN VISIT (OUTPATIENT)
Dept: CARDIOLOGY | Facility: CLINIC | Age: 70
End: 2025-04-28
Payer: MEDICARE

## 2025-04-28 DIAGNOSIS — I51.3 RV (RIGHT VENTRICULAR) MURAL THROMBUS: ICD-10-CM

## 2025-04-28 DIAGNOSIS — I82.90 VENOUS THROMBOEMBOLISM (VTE): ICD-10-CM

## 2025-04-28 LAB
POC INR: 2.7 (ref 0.9–1.1)
POC PROTHROMBIN TIME: ABNORMAL (ref 9.3–12.5)

## 2025-04-28 PROCEDURE — 85610 PROTHROMBIN TIME: CPT | Mod: QW

## 2025-04-28 PROCEDURE — 99211 OFF/OP EST MAY X REQ PHY/QHP: CPT | Performed by: INTERNAL MEDICINE

## 2025-04-28 NOTE — PROGRESS NOTES
Patient identification verified with 2 identifiers.    Location: [facility] - [address] [place] [id number] [phone]     Referring Physician: Dr. Aydee Molina MD  Enrollment/ Re-enrollment date: 3/17/2026   INR Goal: 2.0-3.0  INR monitoring is per [facility] protocol.  Anticoagulation Medication: warfarin  Indication: Deep Vein Thrombosis (DVT) and LV Thrombus    Subjective   Bleeding signs/symptoms: Yes    Bruising: No   Major bleeding event: No  Thrombosis signs/symptoms: No  Thromboembolic event: No  Missed doses: No  Extra doses: No  Medication changes: No  Dietary changes: No  Change in health: No  Change in activity: No  Alcohol: No  Other concerns: No    Upcoming Procedures:  Does the Patient Have any upcoming procedures that require interruption in anticoagulation therapy? no  Does the patient require bridging? no      Anticoagulation Summary  As of 2025      INR goal:  2.0-3.0   TTR:  63.1% (1.1 mo)   INR used for dosin.70 (2025)   Weekly warfarin total:  17 mg               Assessment/Plan   Therapeutic     1. New dose: no change    2. Next INR: 2 weeks      Education provided to patient during the visit:  Patient instructed to call in interim with questions, concerns and changes.   Patient educated on dietary consistency in vitamin k consumption.   Patient educated on affects of alcohol consumption while taking warfarin.   Patient educated on signs of bleeding/clotting.   Patient educated on compliance with dosing, follow up appointments, and prescribed plan of care.

## 2025-04-30 DIAGNOSIS — I82.90 VENOUS THROMBOEMBOLISM (VTE): ICD-10-CM

## 2025-04-30 DIAGNOSIS — I82.409 DEEP VEIN THROMBOSIS (DVT) OF LOWER EXTREMITY, UNSPECIFIED CHRONICITY, UNSPECIFIED LATERALITY, UNSPECIFIED VEIN: ICD-10-CM

## 2025-04-30 RX ORDER — WARFARIN 2.5 MG/1
TABLET ORAL
Qty: 72 TABLET | Refills: 3 | Status: SHIPPED | OUTPATIENT
Start: 2025-04-30

## 2025-04-30 RX ORDER — WARFARIN 1 MG/1
TABLET ORAL
Qty: 24 TABLET | Refills: 3 | Status: SHIPPED | OUTPATIENT
Start: 2025-04-30

## 2025-05-07 ENCOUNTER — OFFICE VISIT (OUTPATIENT)
Dept: CARDIOLOGY | Facility: CLINIC | Age: 70
End: 2025-05-07
Payer: MEDICARE

## 2025-05-07 ENCOUNTER — APPOINTMENT (OUTPATIENT)
Dept: CARDIOLOGY | Facility: CLINIC | Age: 70
End: 2025-05-07
Payer: MEDICARE

## 2025-05-07 VITALS
DIASTOLIC BLOOD PRESSURE: 68 MMHG | HEART RATE: 61 BPM | SYSTOLIC BLOOD PRESSURE: 120 MMHG | BODY MASS INDEX: 32.87 KG/M2 | RESPIRATION RATE: 18 BRPM | HEIGHT: 62 IN | WEIGHT: 178.6 LBS | OXYGEN SATURATION: 98 %

## 2025-05-07 DIAGNOSIS — I51.3 RV (RIGHT VENTRICULAR) MURAL THROMBUS: Primary | ICD-10-CM

## 2025-05-07 DIAGNOSIS — E78.5 HYPERLIPIDEMIA, UNSPECIFIED HYPERLIPIDEMIA TYPE: ICD-10-CM

## 2025-05-07 DIAGNOSIS — R94.31 ABNORMAL EKG: ICD-10-CM

## 2025-05-07 DIAGNOSIS — I25.10 CORONARY ARTERY DISEASE INVOLVING NATIVE CORONARY ARTERY OF NATIVE HEART WITHOUT ANGINA PECTORIS: ICD-10-CM

## 2025-05-07 PROCEDURE — 1160F RVW MEDS BY RX/DR IN RCRD: CPT | Performed by: NURSE PRACTITIONER

## 2025-05-07 PROCEDURE — 1159F MED LIST DOCD IN RCRD: CPT | Performed by: NURSE PRACTITIONER

## 2025-05-07 PROCEDURE — 3074F SYST BP LT 130 MM HG: CPT | Performed by: NURSE PRACTITIONER

## 2025-05-07 PROCEDURE — 3008F BODY MASS INDEX DOCD: CPT | Performed by: NURSE PRACTITIONER

## 2025-05-07 PROCEDURE — 3078F DIAST BP <80 MM HG: CPT | Performed by: NURSE PRACTITIONER

## 2025-05-07 PROCEDURE — 99214 OFFICE O/P EST MOD 30 MIN: CPT | Performed by: NURSE PRACTITIONER

## 2025-05-07 PROCEDURE — 1036F TOBACCO NON-USER: CPT | Performed by: NURSE PRACTITIONER

## 2025-05-07 RX ORDER — FERROUS SULFATE 325(65) MG
325 TABLET, DELAYED RELEASE (ENTERIC COATED) ORAL
COMMUNITY

## 2025-05-07 NOTE — PROGRESS NOTES
Name : Klaudia Ratliff   : 1955   MRN : [id number]   ENC Date : 2025    Referred by: Dr. Ahumada    CC:   Follow up testing, possible RV thrombus     HPI:    Klaudia Ratliff is a 69 y.o. female with PMHx sig for nonobstructive CAD, HTN, RLE DVT/PE s/p thrombectomy plus IVC filter (2024), HFpEF, s/p Transaortic perivisceral aortic endarterectomy, Aortobifemoral bypass grafting, right common femoral artery thromboendarterectomy, left renal artery endarterectomy (May 2024) c/b RP bleed who presents today as above.    Interval history:   She saw Dr. Ahumada in July & October to establish care after experiencing postoperative atrial flutter with RVR in May following her aortofemoral bypass surgery. She was treated with Amiodarone & Eliquis. Per Dr. Ahumada's note & based on holter monitor, she does not need anticoagulation from an EP standpoint. Amiodarone course completed.    Reports that her leg swelling & pain is much improved. Will still get numbness in her toes if she moves them a certain way. No chest pain, pressure, SOB/GALEANO, PND, orthopnea, LE edema, palpitations, lightheadedness, dizziness, or syncope.     Smoking again    OV 25: Since I saw Klaudia last she saw Dr. Aydee Molina who ordered a limited echo to assess for PFO as well as to look at the RV trabeculation that I had requested patient have cMRI for.  By echo the RV trabeculation is interpreted as worse. Looks like a bubble study was done per the documentation but there is not comment on bubble study from the reading cardiologist. Klaudia was also started on coumadin given her history of VTE at that time.    Here today on follow up    [facility]:  30 day holter monitor 2024: Sinus rhythm, Avg HR 58bpm (HR range of 44 - 115 bpm). 64% of time spent with HR <60bpm, PAC/PVC burden < 1%, no AF or atrial flutter.    Limited Echo 24: EF 60-65%, paradoxical septal wall motion, prominent RV trabeculations near the RV apex. (  Seen on prior echo). Slightly elevated RVSP. Compared with the prior exam from 2/21/2024 there are no significant changes.    TANMAY 5/16/24: I cannot see results    Echo 2/21/24: EF 60-65%, elevated mean LAP, prominent echodensity in the apex of the RV, similar in density to surrounding muscle, that is present on multiple views but is not visible with Definity. This may represent a large moderator band or RV trabeculations, vs less likely thrombus or myxoma. Recommend cMRI for further characterization.     LHC 2/16/24:   Angiographically normal left main  Luminal irregularities throughout LAD with a 30% stenosis after a diagonal branch.  Proximal left circumflex 30% stenosis with luminal irregularities in the rest of the vessel.  Proximal RCA 40-50% stenosis with luminal irregularities in the rest of the vessel.  Right dominant coronary artery system  LVEDP 16 mmHg with no significant stenosis across aortic valve.    ROS: unless otherwise noted in the history of present illness, all other systems were reviewed and they are negative for complaints     PMH:  She has a past medical history of Abnormal ECG (02/20/2024), Acute embolism and thrombosis of unspecified deep veins of right lower extremity (05/31/2024), Acute sinusitis, unspecified (03/16/2016), Aortic occlusion, Claudication of both lower extremities, COPD (chronic obstructive pulmonary disease) (Multi), Coronary artery disease, DVT (deep venous thrombosis) (Multi), Essential (primary) hypertension (07/18/2022), Headache, unspecified (07/09/2013), Hyperlipidemia, Joint pain, Melena, Right shoulder pain (11/01/2023), Ulcer, stomach peptic, and Wound of left groin.    PSH:  She has a past surgical history that includes Cardiac catheterization (N/A, 02/16/2024); Invasive Vascular Procedure (N/A, 02/22/2024); Invasive Vascular Procedure (N/A, 02/22/2024); Esophagogastroduodenoscopy; Colonoscopy; MR cardiac morphology and function w and wo IV contrast (02/23/2024);  "IR intervention filter placement (03/15/2024); Thrombectomy (02/2002); echocardiogram 2 d m mode panel (02/21/2024); and CT abdomen pelvis w and wo IV contrast (03/15/2024).    SHx:  She reports that she quit smoking about 14 months ago. Her smoking use included cigarettes. She started smoking about 7 months ago. She has a 0.3 pack-year smoking history. She has been exposed to tobacco smoke. She has never used smokeless tobacco. She reports that she does not currently use alcohol. She reports that she does not currently use drugs after having used the following drugs: Marijuana.  Tobacco- smoking again  ETOH- social  Drugs- Marijuana    FHx:  Noncontributory    Allergies:  Amlodipine, Clarithromycin, Doxycycline, and Atorvastatin    Outpatient Medications:  Current Outpatient Medications   Medication Instructions    ascorbic acid (VITAMIN C) 250 mg, Daily    cholecalciferol (VITAMIN D-3) 50 mcg, Daily    ferrous sulfate 325 mg, 3 times daily (morning, midday, late afternoon)    fluticasone (Flonase) 50 mcg/actuation nasal spray 2 sprays, Each Nostril, Nightly, Shake gently. Before first use, prime pump. After use, clean tip and replace cap.    furosemide (LASIX) 80 mg, oral, Daily    heparin (porcine) 5,000 Units, subcutaneous, Every 12 hours    losartan (COZAAR) 25 mg, oral, Daily    metoprolol tartrate (LOPRESSOR) 25 mg, oral, 2 times daily    potassium chloride CR 20 mEq ER tablet 40 mEq, oral, Daily, Do not crush or chew.    rosuvastatin (CRESTOR) 40 mg, oral, Daily    syringe with needle 1 mL 25 gauge x 5/8\" syringe 1 each, miscellaneous, 2 times daily    vit B complex no.12/niacin,B3, (VITAMIN B COMPLEX NO.12-NIACIN ORAL) 1 tablet, Daily    warfarin (Coumadin) 1 mg tablet Take 2mg by mouth every Wednesday, as directed by coumadin clinic    warfarin (Coumadin) 2.5 mg tablet Take 2.5mg by mouth every Sunday, Monday, Tuesday, Thursday, Friday, and Saturday, as directed by coumadin clinic     Last Recorded " "Vitals:  Vitals:    05/07/25 1145   BP: 120/68   BP Location: Left arm   Patient Position: Sitting   Pulse: 61   Resp: 18   SpO2: 98%   Weight: 81 kg (178 lb 9.6 oz)   Height: 1.575 m (5' 2\")     Physical Exam:  On exam Ms. Klaudia Ratliff appears her stated age, is alert and oriented x3, and in no acute distress. Her sclera are anicteric and her oropharynx has moist mucous membranes. Her neck is supple and without thyromegaly. The JVP is ~5 cm of water above the right atrium. Her cardiac exam has regular rhythm, normal S1, S2. No S3/4. There are no murmurs. Her lungs are clear to auscultation bilaterally and there is no dullness to percussion. Her abdomen is soft, nontender with normoactive bowel sounds. There is no HJR. The extremities are warm and without edema. The skin is dry. There is no rash present. The distal pulses are 2-3+ in all four extremities. Her mood and affect are appropriate for todays encounter.      Last Labs:  CBC -  Lab Results   Component Value Date    WBC 11.3 (H) 04/14/2025    HGB 8.0 (L) 04/14/2025    HCT 24.3 (L) 04/14/2025    .8 (H) 04/14/2025     04/14/2025       CMP -  Lab Results   Component Value Date    CALCIUM 8.5 (L) 04/14/2025    PHOS 4.2 05/30/2024    PROT 6.4 03/14/2025    PROT 6.7 03/14/2025    ALBUMIN 4.2 03/14/2025    AST 40 (H) 03/14/2025    ALT 40 (H) 03/14/2025    ALKPHOS 65 03/14/2025    BILITOT 0.4 03/14/2025       LIPID PANEL -   Lab Results   Component Value Date    CHOL 184 06/25/2024    TRIG 141 06/25/2024    HDL 52.6 06/25/2024    CHHDL 3.5 06/25/2024    LDLF 129 (H) 02/03/2023    VLDL 28 06/25/2024    NHDL 131 06/25/2024       RENAL FUNCTION PANEL -   Lab Results   Component Value Date    GLUCOSE 109 04/14/2025     04/14/2025    K 5.4 (H) 04/14/2025     04/14/2025    CO2 21 04/14/2025    ANIONGAP 11 04/14/2025    BUN 56 (H) 04/14/2025    CREATININE 2.61 (H) 04/14/2025    CALCIUM 8.5 (L) 04/14/2025    PHOS 4.2 05/30/2024    ALBUMIN 4.2 " 03/14/2025        Lab Results   Component Value Date    BNP 15 02/20/2024    HGBA1C 5.3 05/20/2024     I have reviewed the above labs & diagnostics    Assessment/Plan:  Diastolic Heart Failure. Asymptomatic. Euvolemic. HR nicely controlled. C/w lasix 80mg every day.    Hypertension. /68 mmHg today. Now nicely controlled    Hyperlipidemia. Had nausea with atorvastatin so Dr. Ahumada switched her to pravastatin. Given her CAD, PAD & HLD, I switched her to Crestor. She is tolerating. Has an outstanding order for lipid panel. Remind to complete    Nonobstructive CAD. C/w aggressive risk factor modification. Statin therapy as above. HR is nicely controlled. BP control as above.     RV Mural Thrombus. Worsening trabeculation noted on most recent echocardiogram. I previously ordered cMRI for better characterization, but patient did not complete. Recently started on coumadin. Would repeat echo in 3 months to assess for resolution.    PAD s/p Transaortic perivisceral aortic endarterectomy, Aortobifemoral bypass grafting, right common femoral artery thromboendarterectomy, left renal artery endarterectomy (May 2024). Following with Dr. Garcia KAMINSKI DVT/PE s/p thrombectomy plus IVC filter placed (February 2024). following with Dr. Aydee Molina. On coumadin    Tobacco Abuse. Educated on the risks of smoking & reviewed benefits of cessation  - Discussed need for complete cessation    Follow up with Dr. Yo in 3 months, at which time he can repeat echo    Tracy M Schwab, APRN-CNP

## 2025-05-08 RX ORDER — WARFARIN 2.5 MG/1
2.5 TABLET ORAL SEE ADMIN INSTRUCTIONS
Qty: 30 TABLET | Refills: 11 | OUTPATIENT
Start: 2025-05-08 | End: 2026-05-08

## 2025-05-09 DIAGNOSIS — I48.91 ATRIAL FIBRILLATION, UNSPECIFIED TYPE (MULTI): ICD-10-CM

## 2025-05-09 RX ORDER — METOPROLOL TARTRATE 25 MG/1
25 TABLET, FILM COATED ORAL 2 TIMES DAILY
Qty: 180 TABLET | Refills: 1 | Status: SHIPPED | OUTPATIENT
Start: 2025-05-09 | End: 2025-11-05

## 2025-05-09 NOTE — TELEPHONE ENCOUNTER
Patient is requesting medication be sent to [facility], for a 90 day supply.     Patient requests prescription below    Last Office Visit: 04/14/2025  Next Office Visit: Visit date not found     Requested Prescriptions     Pending Prescriptions Disp Refills    metoprolol tartrate (Lopressor) 25 mg tablet 200 tablet 1     Sig: Take 1 tablet (25 mg) by mouth 2 times a day.

## 2025-05-12 ENCOUNTER — ANTICOAGULATION - WARFARIN VISIT (OUTPATIENT)
Dept: CARDIOLOGY | Facility: CLINIC | Age: 70
End: 2025-05-12
Payer: MEDICARE

## 2025-05-12 DIAGNOSIS — I51.3 RV (RIGHT VENTRICULAR) MURAL THROMBUS: Primary | ICD-10-CM

## 2025-05-12 DIAGNOSIS — I82.90 VENOUS THROMBOEMBOLISM (VTE): ICD-10-CM

## 2025-05-12 LAB
POC INR: 2.2 (ref 0.9–1.1)
POC PROTHROMBIN TIME: ABNORMAL (ref 9.3–12.5)

## 2025-05-12 PROCEDURE — 85610 PROTHROMBIN TIME: CPT | Mod: QW

## 2025-05-12 PROCEDURE — 99211 OFF/OP EST MAY X REQ PHY/QHP: CPT

## 2025-05-12 NOTE — PROGRESS NOTES
Patient identification verified with 2 identifiers.    Location: [facility] - [address] [place] [id number] [phone]     Referring Physician: Dr. Aydee Molina MD  Enrollment/ Re-enrollment date: 3/17/2026   INR Goal: 2.0-3.0  INR monitoring is per [facility] protocol.  Anticoagulation Medication: warfarin  Indication: Deep Vein Thrombosis (DVT) and LV Thrombus    Subjective   Bleeding signs/symptoms: No    Bruising: No   Major bleeding event: No  Thrombosis signs/symptoms: No  Thromboembolic event: No  Missed doses: No  Extra doses: No  Medication changes: No  Dietary changes: No.  Pt eats 2 salads per week.  Change in health: No  Change in activity: No  Alcohol: No  Other concerns: No    Upcoming Procedures:  Does the Patient Have any upcoming procedures that require interruption in anticoagulation therapy? no  Does the patient require bridging? no    Dose maintained after last visit.  Pt is currently taking 17mg warfarin weekly.  Anticoagulation Summary  As of 2025      INR goal:  2.0-3.0   TTR:  74.2% (1.5 mo)   INR used for dosin.20 (2025)   Weekly warfarin total:  17 mg               Assessment/Plan   Therapeutic     1. New dose: no change.  Maintain dose of 17mg warfarin weekly.   2. Next INR: 2 weeks.  If therapeutic, ok to recheck INR in 4 weeks.      Education provided to patient during the visit:  Patient instructed to call in interim with questions, concerns and changes.   Patient educated on dietary consistency in vitamin k consumption.   Patient educated on affects of alcohol consumption while taking warfarin.   Patient educated on signs of bleeding/clotting.   Patient educated on compliance with dosing, follow up appointments, and prescribed plan of care.

## 2025-05-14 ENCOUNTER — OFFICE VISIT (OUTPATIENT)
Dept: VASCULAR SURGERY | Facility: HOSPITAL | Age: 70
End: 2025-05-14
Payer: MEDICARE

## 2025-05-14 VITALS
BODY MASS INDEX: 32.97 KG/M2 | HEART RATE: 60 BPM | HEIGHT: 62 IN | SYSTOLIC BLOOD PRESSURE: 127 MMHG | DIASTOLIC BLOOD PRESSURE: 83 MMHG | WEIGHT: 179.2 LBS | OXYGEN SATURATION: 95 %

## 2025-05-14 DIAGNOSIS — Z95.828 PRESENCE OF IVC FILTER: ICD-10-CM

## 2025-05-14 DIAGNOSIS — I74.09 AORTOILIAC OCCLUSIVE DISEASE (MULTI): Primary | ICD-10-CM

## 2025-05-14 PROCEDURE — 1126F AMNT PAIN NOTED NONE PRSNT: CPT | Performed by: SURGERY

## 2025-05-14 PROCEDURE — 99214 OFFICE O/P EST MOD 30 MIN: CPT | Performed by: SURGERY

## 2025-05-14 PROCEDURE — 1159F MED LIST DOCD IN RCRD: CPT | Performed by: SURGERY

## 2025-05-14 PROCEDURE — 3079F DIAST BP 80-89 MM HG: CPT | Performed by: SURGERY

## 2025-05-14 PROCEDURE — 3008F BODY MASS INDEX DOCD: CPT | Performed by: SURGERY

## 2025-05-14 PROCEDURE — 1036F TOBACCO NON-USER: CPT | Performed by: SURGERY

## 2025-05-14 PROCEDURE — 3074F SYST BP LT 130 MM HG: CPT | Performed by: SURGERY

## 2025-05-14 ASSESSMENT — ENCOUNTER SYMPTOMS
LOSS OF SENSATION IN FEET: 0
OCCASIONAL FEELINGS OF UNSTEADINESS: 0
DEPRESSION: 0

## 2025-05-14 ASSESSMENT — PAIN SCALES - GENERAL: PAINLEVEL_OUTOF10: 0-NO PAIN

## 2025-05-14 NOTE — PROGRESS NOTES
"Primary Care Physician: Jesse Mendes -[phone]     History Of Present Illness  Klaudia Ratliff is a 69 y.o. female who presents in follow-up of   Transaortic perivisceral aortic endarterectomy  Left renal artery bypass grafting   Aortobifemoral bypass grafting  Right common femoral artery thromboendarterectomy  Left renal artery endarterectomy     From 5/16/2024.  She has done well since the operation. No claudication symptoms. No rest pain. Mild degree of right lower extremity swelling.  She is still on Coumadin and has an IVC filter.    Past Medical History    Surgical History  Surgical History[1]     Social History  She reports that she quit smoking about 14 months ago. Her smoking use included cigarettes. She started smoking about 7 months ago. She has a 0.3 pack-year smoking history. She has been exposed to tobacco smoke. She has never used smokeless tobacco. She reports that she does not currently use alcohol. She reports that she does not currently use drugs after having used the following drugs: Marijuana.    Family History  Family History[2]     Allergies  Amlodipine, Clarithromycin, Doxycycline, and Atorvastatin    Review of Systems   Non cont    Medications  Current Medications[3]     Physical Exam  Gen: alert and awake. Oriented to time, place and person. In no acute distress.  HEENT: normocephalic, sclera non icteric. EOMI. Supple without lymphadenopathy  Cor: RRR,   Lung: unlabored breathing  Abd: soft, non tender and non distended.  No evidence of hepatosplenomegaly  Ext:   Pulse Exam: Palpable bilateral DP pulses. Mild right foot discoloration  Psych: Normal    Last Recorded Vitals  Vitals:    05/14/25 1307 05/14/25 1314   BP: 126/81 127/83   BP Location: Left arm Right arm   Patient Position: Sitting Sitting   BP Cuff Size: Large adult Large adult   Pulse: 60    SpO2: 95%    Weight: 81.3 kg (179 lb 3.2 oz)    Height: 1.575 m (5' 2\")       Assessment/Plan   S/P aortic reconstruction: " doing well    RTC in 3 months after repeat Echo to determine whether to remove the IVC filter or not.  Weight loss    Jovan Zelaya MD  Professor & Chief, Division of Vascular Surgery & Endovascular Therapy  Co-Director, [facility]  [facility] [place]  [facility] [place]       [1]   Past Surgical History:  Procedure Laterality Date    CARDIAC CATHETERIZATION N/A 02/16/2024    Procedure: Left Heart Cath;  Surgeon: Aditya Sky MD PhD;  Location: [facility] Cardiac Cath Lab;  Service: Cardiovascular;  Laterality: N/A;  STAT    COLONOSCOPY      CT ABDOMEN PELVIS W AND WO IV CONTRAST  03/15/2024    IMPRESSION: 1. 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 2. No evidence of acute GI bleeding. 3. Diverticulosis without evidence acute diverticulitis. 4. No evidence of acute process in the abdomen or pelvis.    ECHOCARDIOGRAM 2 D M MODE PANEL  02/21/2024    Left ventricular systolic function is normal with a 60-65% estimated ejection fraction.  2. There is an elevated mean left atrial pressure.    ESOPHAGOGASTRODUODENOSCOPY      INVASIVE VASCULAR PROCEDURE N/A 02/22/2024    Procedure: Lower Extremity Angiogram;  Surgeon: Bar Day DO;  Location: [place] [address] Cardiac Cath Lab;  Service: Cardiovascular;  Laterality: N/A;    INVASIVE VASCULAR PROCEDURE N/A 02/22/2024    Procedure: Pulmonary Angiogram;  Surgeon: Bar Day DO;  Location: [place] [address] Cardiac Cath Lab;  Service: Cardiovascular;  Laterality: N/A;    IR INTERVENTION FILTER PLACEMENT  03/15/2024    MR CARDIAC MORPHOLOGY AND FUNCTION W AND WO IV CONTRAST  02/23/2024    THROMBECTOMY  02/2002    mechanical thrombectomy 2/22/24   [2]   Family History  Problem Relation Name Age of Onset    No Known Problems Mother    "   No Known Problems Father      Other (heart issues) Sister     [3]   Current Outpatient Medications:     ascorbic acid (Vitamin C) 250 mg tablet, Take 1 tablet (250 mg) by mouth once daily., Disp: , Rfl:     cholecalciferol (Vitamin D-3) 50 MCG (2000 UT) tablet, Take 1 tablet (50 mcg) by mouth once daily., Disp: , Rfl:     ferrous sulfate 325 (65 Fe) mg EC tablet, Take 1 tablet by mouth 3 times daily (morning, midday, late afternoon). Do not crush, chew, or split., Disp: , Rfl:     furosemide (Lasix) 80 mg tablet, Take 1 tablet (80 mg) by mouth once daily., Disp: 90 tablet, Rfl: 1    losartan (Cozaar) 25 mg tablet, Take 1 tablet (25 mg) by mouth once daily., Disp: 30 tablet, Rfl: 5    metoprolol tartrate (Lopressor) 25 mg tablet, Take 1 tablet (25 mg) by mouth 2 times a day., Disp: 180 tablet, Rfl: 1    potassium chloride CR 20 mEq ER tablet, Take 2 tablets (40 mEq) by mouth once daily. Do not crush or chew., Disp: 180 tablet, Rfl: 1    rosuvastatin (Crestor) 40 mg tablet, Take 1 tablet (40 mg) by mouth once daily., Disp: 90 tablet, Rfl: 3    syringe with needle 1 mL 25 gauge x 5/8\" syringe, 1 each 2 times a day., Disp: 20 each, Rfl: 1    vit B complex no.12/niacin,B3, (VITAMIN B COMPLEX NO.12-NIACIN ORAL), Take 1 tablet by mouth once daily., Disp: , Rfl:     warfarin (Coumadin) 1 mg tablet, Take 2mg by mouth every Wednesday, as directed by coumadin clinic, Disp: 24 tablet, Rfl: 3    warfarin (Coumadin) 2.5 mg tablet, Take 2.5mg by mouth every Sunday, Monday, Tuesday, Thursday, Friday, and Saturday, as directed by coumadin clinic, Disp: 72 tablet, Rfl: 3    fluticasone (Flonase) 50 mcg/actuation nasal spray, Administer 2 sprays into each nostril once daily at bedtime. Shake gently. Before first use, prime pump. After use, clean tip and replace cap. (Patient not taking: Reported on 5/14/2025), Disp: 16 g, Rfl: 5    heparin sodium,porcine (heparin, porcine,) 5,000 unit/mL injection, Inject 1 mL (5,000 Units) under " the skin every 12 hours. (Patient not taking: Reported on 5/14/2025), Disp: 20 mL, Rfl: 1    [DISCONTINUED] atorvastatin (Lipitor) 80 mg tablet, Take 1 tablet (80 mg) by mouth once daily at bedtime. (Patient not taking: Reported on 5/14/2025), Disp: 90 tablet, Rfl: 3

## 2025-05-23 DIAGNOSIS — R93.1 ABNORMAL ECHOCARDIOGRAM: ICD-10-CM

## 2025-05-23 DIAGNOSIS — I51.3 RV (RIGHT VENTRICULAR) MURAL THROMBUS: Primary | ICD-10-CM

## 2025-05-28 ENCOUNTER — ANTICOAGULATION - WARFARIN VISIT (OUTPATIENT)
Dept: CARDIOLOGY | Facility: CLINIC | Age: 70
End: 2025-05-28
Payer: MEDICARE

## 2025-05-28 DIAGNOSIS — I82.90 VENOUS THROMBOEMBOLISM (VTE): ICD-10-CM

## 2025-05-28 DIAGNOSIS — I51.3 RV (RIGHT VENTRICULAR) MURAL THROMBUS: Primary | ICD-10-CM

## 2025-05-28 LAB
POC INR: 2.7 (ref 0.9–1.1)
POC PROTHROMBIN TIME: ABNORMAL (ref 9.3–12.5)

## 2025-05-28 PROCEDURE — 85610 PROTHROMBIN TIME: CPT | Mod: QW

## 2025-05-28 PROCEDURE — 99211 OFF/OP EST MAY X REQ PHY/QHP: CPT

## 2025-05-28 NOTE — PROGRESS NOTES
Patient identification verified with 2 identifiers.    Location: [facility] - [address] [place] [id number] [phone]     Referring Physician: Dr. Aydee Molina MD  Enrollment/ Re-enrollment date: 3/17/2026   INR Goal: 2.0-3.0  INR monitoring is per [facility] protocol.  Anticoagulation Medication: warfarin  Indication: Deep Vein Thrombosis (DVT) and LV Thrombus    Subjective   Bleeding signs/symptoms: No  Bruising: No   Major bleeding event: No  Thrombosis signs/symptoms: No  Thromboembolic event: No  Missed doses: No  Extra doses: No  Medication changes: No  Dietary changes: No.  Pt eats 2 salads per week.  Change in health: No  Change in activity: No  Alcohol: No  Other concerns: No    Upcoming Procedures:  Does the Patient Have any upcoming procedures that require interruption in anticoagulation therapy? no  Does the patient require bridging? no      Anticoagulation Summary  As of 2025      INR goal:  2.0-3.0   TTR:  80.8% (2.1 mo)   INR used for dosin.70 (2025)   Weekly warfarin total:  17 mg               Assessment/Plan   Therapeutic     1. New dose: no change.  Maintain dose of 17mg warfarin weekly.   2. Next INR: 1 month.       Education provided to patient during the visit:  Patient instructed to call in interim with questions, concerns and changes.   Patient educated on interactions between medications and warfarin.   Patient educated on dietary consistency in vitamin k consumption.   Patient educated on affects of alcohol consumption while taking warfarin.   Patient educated on signs of bleeding/clotting.   Patient educated on compliance with dosing, follow up appointments, and prescribed plan of care.

## 2025-06-25 ENCOUNTER — ANTICOAGULATION - WARFARIN VISIT (OUTPATIENT)
Dept: CARDIOLOGY | Facility: CLINIC | Age: 70
End: 2025-06-25
Payer: MEDICARE

## 2025-06-25 ENCOUNTER — APPOINTMENT (OUTPATIENT)
Dept: CARDIOLOGY | Facility: CLINIC | Age: 70
End: 2025-06-25
Payer: MEDICARE

## 2025-06-25 DIAGNOSIS — I51.3 RV (RIGHT VENTRICULAR) MURAL THROMBUS: Primary | ICD-10-CM

## 2025-06-25 DIAGNOSIS — I82.90 VENOUS THROMBOEMBOLISM (VTE): ICD-10-CM

## 2025-06-25 LAB
POC INR: 4.2 (ref 0.9–1.1)
POC PROTHROMBIN TIME: ABNORMAL (ref 9.3–12.5)

## 2025-06-25 PROCEDURE — 99211 OFF/OP EST MAY X REQ PHY/QHP: CPT

## 2025-06-25 PROCEDURE — 85610 PROTHROMBIN TIME: CPT | Mod: QW

## 2025-06-25 NOTE — PROGRESS NOTES
Patient's appointment was moved from today to 6/27 due to the building elevators not working today.

## 2025-06-25 NOTE — PROGRESS NOTES
Patient identification verified with 2 identifiers.    Location: [facility] - [address] [place] [id number] [phone]     Referring Physician: Dr. Aydee Molina MD  Enrollment/ Re-enrollment date: 3/17/2026   INR Goal: 2.0-3.0  INR monitoring is per [facility] protocol.  Anticoagulation Medication: warfarin  Indication: Deep Vein Thrombosis (DVT) and LV Thrombus    Subjective   Bleeding signs/symptoms: No  Bruising: No   Major bleeding event: No  Thrombosis signs/symptoms: No  Thromboembolic event: No  Missed doses: No  Extra doses: No  Medication changes: No  Dietary changes: Yes  decreased consumption of salads.   Change in health: No  Change in activity: No  Alcohol: Yes  increased  Other concerns: No    Upcoming Procedures:  Does the Patient Have any upcoming procedures that require interruption in anticoagulation therapy? no  Does the patient require bridging? no      Anticoagulation Summary  As of 2025      INR goal:  2.0-3.0   TTR:  61.9% (3 mo)   INR used for dosin.20 (2025)   Weekly warfarin total:  17 mg               Assessment/Plan   Supra therapeutic in setting of alcohol and increased consumption of lemonade. Patient will not continue with this. Decreased consumption of salads. Patient will return to prior consumption.  Will hold warfarin today and tomorrow then decrease TWD.  RTC in 1 week.      Education provided to patient during the visit:  Patient instructed to call in interim with questions, concerns and changes.   Patient educated on interactions between medications and warfarin.   Patient educated on dietary consistency in vitamin k consumption.   Patient educated on affects of alcohol consumption while taking warfarin.   Patient educated on signs of bleeding/clotting.   Patient educated on compliance with dosing, follow up appointments, and prescribed plan of care.

## 2025-06-27 ENCOUNTER — APPOINTMENT (OUTPATIENT)
Dept: CARDIOLOGY | Facility: CLINIC | Age: 70
End: 2025-06-27
Payer: MEDICARE

## 2025-07-02 ENCOUNTER — ANTICOAGULATION - WARFARIN VISIT (OUTPATIENT)
Dept: CARDIOLOGY | Facility: CLINIC | Age: 70
End: 2025-07-02
Payer: MEDICARE

## 2025-07-02 DIAGNOSIS — I51.3 RV (RIGHT VENTRICULAR) MURAL THROMBUS: Primary | ICD-10-CM

## 2025-07-02 DIAGNOSIS — I82.90 VENOUS THROMBOEMBOLISM (VTE): ICD-10-CM

## 2025-07-02 LAB
POC INR: 2.8 (ref 2–3)
POC PROTHROMBIN TIME: NORMAL (ref 9.3–12.5)

## 2025-07-02 PROCEDURE — 99211 OFF/OP EST MAY X REQ PHY/QHP: CPT

## 2025-07-02 PROCEDURE — 85610 PROTHROMBIN TIME: CPT | Mod: QW | Performed by: INTERNAL MEDICINE

## 2025-07-02 NOTE — PROGRESS NOTES
Patient identification verified with 2 identifiers.    Location: [facility] - [id number] [place] [id number] [phone]     Referring Physician: Dr. Aydee Molina MD  Enrollment/ Re-enrollment date: 3/17/2026   INR Goal: 2.0-3.0  INR monitoring is per [facility] protocol.  Anticoagulation Medication: warfarin  Indication: Deep Vein Thrombosis (DVT) and LV Thrombus    Subjective   Pt present by herself for today's visit.      Bleeding signs/symptoms: No.  Pt denies.    Bruising: No.  Pt denies.    Major bleeding event: No  Thrombosis signs/symptoms: No  Thromboembolic event: No  Missed doses: Yes.  Pt held Two doses of warfarin as instructed on  and .  Extra doses: No  Medication changes: No.  Pt denies.  Dietary changes: Yes.  Pt stopped drinking Lemonade as instructed.  She was consuming large amts. Prior to 4.2 INR last visit.  Change in health: No.  Pt denies.  Change in activity: No.  Pt denies.  Alcohol: No.  Pt denies.  Other concerns: No    Upcoming Procedures:  Does the Patient Have any upcoming procedures that require interruption in anticoagulation therapy? no  Does the patient require bridging? no    Dose maintained after last visit but a Two day dose hold was done on  and  due to high intake of Lemonade and questionable JOHANNA intake..  Pt is taking 17mg of warfarin weekly.  Anticoagulation Summary  As of 2025      INR goal:  2.0-3.0   TTR:  58.6% (3.2 mo)   INR used for dosin.80 (2025)   Weekly warfarin total:  17 mg               Assessment/Plan   Therapeutic    New dose:  no change.  Maintain dose.  17mg weekly.  Next INR:  2 weeks.  Can r/s in 4 weeks if therapeutic next visit.        Education provided to patient during the visit:  Patient instructed to call in interim with questions, concerns and changes.   Patient educated on interactions between medications and warfarin.   Patient educated on dietary consistency in vitamin k consumption.    Patient educated on affects of alcohol consumption while taking warfarin.   Patient educated on signs of bleeding/clotting.   Patient educated on compliance with dosing, follow up appointments, and prescribed plan of care.

## 2025-07-16 ENCOUNTER — ANTICOAGULATION - WARFARIN VISIT (OUTPATIENT)
Dept: CARDIOLOGY | Facility: CLINIC | Age: 70
End: 2025-07-16
Payer: MEDICARE

## 2025-07-16 DIAGNOSIS — I51.3 RV (RIGHT VENTRICULAR) MURAL THROMBUS: ICD-10-CM

## 2025-07-16 DIAGNOSIS — I82.90 VENOUS THROMBOEMBOLISM (VTE): ICD-10-CM

## 2025-07-16 LAB
POC INR: 5.1 (ref 0.9–1.1)
POC PROTHROMBIN TIME: ABNORMAL (ref 9.3–12.5)

## 2025-07-16 PROCEDURE — 85610 PROTHROMBIN TIME: CPT | Mod: QW | Performed by: INTERNAL MEDICINE

## 2025-07-16 PROCEDURE — 99211 OFF/OP EST MAY X REQ PHY/QHP: CPT

## 2025-07-16 NOTE — PROGRESS NOTES
Patient identification verified with 2 identifiers.    Location: [facility] - [place] - [address] [place] [id number] [phone]     Referring Physician: Dr. Aydee Molina MD  Enrollment/ Re-enrollment date: 3/17/2026   INR Goal: 2.0-3.0  INR monitoring is per [facility] protocol.  Anticoagulation Medication: warfarin  Indication: Deep Vein Thrombosis (DVT) and LV Thrombus    Subjective   Pt present by herself for today's visit.      Bleeding signs/symptoms: No.  Pt denies.    Bruising: No.  Pt denies.    Major bleeding event: No  Thrombosis signs/symptoms: No  Thromboembolic event: No  Missed doses: No.    Extra doses: No  Medication changes: No.  Pt denies.  Dietary changes: Yes  Pt may have had less greens this past week.    Change in health: No.  Pt denies.  Change in activity: No.  Pt denies.  Alcohol: No.  Pt denies.  Other concerns: No    Upcoming Procedures:  Does the Patient Have any upcoming procedures that require interruption in anticoagulation therapy? no  Does the patient require bridging? no    Dose maintained after last visit.  Pt is taking 17mg of warfarin weekly.  Anticoagulation Summary  As of 2025      INR goal:  2.0-3.0   TTR:  52.3% (3.7 mo)   INR used for dosin.10 (2025)   Weekly warfarin total:  17 mg               Assessment/Plan   INR greater than 5, notified: Dr. Aydee Molina     1. New dose: Hold for 2 days and RTC Friday    2. Next INR: 2025      Education provided to patient during the visit:  Patient instructed to call in interim with questions, concerns and changes.   Patient educated on interactions between medications and warfarin.   Patient educated on dietary consistency in vitamin k consumption.   Patient educated on affects of alcohol consumption while taking warfarin.   Patient educated on signs of bleeding/clotting.   Patient educated on compliance with dosing, follow up appointments, and prescribed plan of care.

## 2025-07-18 ENCOUNTER — ANTICOAGULATION - WARFARIN VISIT (OUTPATIENT)
Dept: CARDIOLOGY | Facility: CLINIC | Age: 70
End: 2025-07-18
Payer: MEDICARE

## 2025-07-18 DIAGNOSIS — I82.409 DEEP VEIN THROMBOSIS (DVT) OF LOWER EXTREMITY, UNSPECIFIED CHRONICITY, UNSPECIFIED LATERALITY, UNSPECIFIED VEIN: ICD-10-CM

## 2025-07-18 DIAGNOSIS — I51.3 RV (RIGHT VENTRICULAR) MURAL THROMBUS: ICD-10-CM

## 2025-07-18 DIAGNOSIS — I82.90 VENOUS THROMBOEMBOLISM (VTE): ICD-10-CM

## 2025-07-18 LAB
POC INR: 3 (ref 0.9–1.1)
POC PROTHROMBIN TIME: ABNORMAL (ref 9.3–12.5)

## 2025-07-18 PROCEDURE — 99211 OFF/OP EST MAY X REQ PHY/QHP: CPT

## 2025-07-18 PROCEDURE — 85610 PROTHROMBIN TIME: CPT | Mod: QW | Performed by: INTERNAL MEDICINE

## 2025-07-18 RX ORDER — WARFARIN 1 MG/1
TABLET ORAL
Qty: 60 TABLET | Refills: 3 | Status: SHIPPED | OUTPATIENT
Start: 2025-07-18

## 2025-07-18 NOTE — PROGRESS NOTES
Patient identification verified with 2 identifiers.    Location: [facility] - [address] [place] [id number] [phone]     Referring Physician: Dr. Aydee Molina MD  Enrollment/ Re-enrollment date: 3/17/2026   INR Goal: 2.0-3.0  INR monitoring is per [facility] protocol.  Anticoagulation Medication: warfarin  Indication: Deep Vein Thrombosis (DVT) and LV Thrombus    Subjective   Pt present by herself for today's visit.      Bleeding signs/symptoms: No.  Pt denies.  Bruising: No.  Pt denies.  Major bleeding event: No  Thrombosis signs/symptoms: No  Thromboembolic event: No  Missed doses: No.    Extra doses: No  Medication changes: No.  Pt denies.  Dietary changes: No.    Change in health: No.  Pt denies.  Change in activity: No.  Pt denies.  Alcohol: No.  Pt denies.  Other concerns: No    Upcoming Procedures:  Does the Patient Have any upcoming procedures that require interruption in anticoagulation therapy? no  Does the patient require bridging? no    Dose held x2 for INR of 5.1 at last visit.  Pt is taking 17mg of warfarin weekly.  Anticoagulation Summary  As of 7/18/2025      INR goal:  2.0-3.0   TTR:  51.4% (3.8 mo)   INR used for dosing:  3.00 (7/18/2025)   Weekly warfarin total:  14.5 mg               Assessment/Plan   Therapeutic     1. New dose: decrease dose by approximately 14% from 17mg to 14.5mg weekly. Plan discussed with Dr. Molina.    2. Next INR: 5 days      Education provided to patient during the visit:  Patient instructed to call in interim with questions, concerns and changes.   Patient educated on interactions between medications and warfarin.   Patient educated on dietary consistency in vitamin k consumption.   Patient educated on affects of alcohol consumption while taking warfarin.   Patient educated on signs of bleeding/clotting.   Patient educated on compliance with dosing, follow up appointments, and prescribed plan of care.

## 2025-07-23 ENCOUNTER — ANTICOAGULATION - WARFARIN VISIT (OUTPATIENT)
Dept: CARDIOLOGY | Facility: CLINIC | Age: 70
End: 2025-07-23
Payer: MEDICARE

## 2025-07-23 DIAGNOSIS — I82.90 VENOUS THROMBOEMBOLISM (VTE): ICD-10-CM

## 2025-07-23 DIAGNOSIS — I51.3 RV (RIGHT VENTRICULAR) MURAL THROMBUS: Primary | ICD-10-CM

## 2025-07-23 LAB
POC INR: 2.7 (ref 0.9–1.1)
POC PROTHROMBIN TIME: ABNORMAL (ref 9.3–12.5)

## 2025-07-23 PROCEDURE — 99211 OFF/OP EST MAY X REQ PHY/QHP: CPT

## 2025-07-23 PROCEDURE — 85610 PROTHROMBIN TIME: CPT | Mod: QW | Performed by: INTERNAL MEDICINE

## 2025-07-23 NOTE — PROGRESS NOTES
Patient identification verified with 2 identifiers.    Location: [facility] [place] - [address] [place] [address] [phone]     Referring Physician: Dr. Aydee Molina MD  Enrollment/ Re-enrollment date: 3/17/2026   INR Goal: 2.0-3.0  INR monitoring is per [facility] protocol.  Anticoagulation Medication: warfarin  Indication: Deep Vein Thrombosis (DVT) and LV Thrombus    Subjective   Pt present by herself for today's visit.      Bleeding signs/symptoms: No.  Pt denies.  Bruising: No.  Pt denies.  Major bleeding event: No  Thrombosis signs/symptoms: No  Thromboembolic event: No  Missed doses: No.    Extra doses: No  Medication changes: No.  Pt denies.  Dietary changes: Yes  Pt cut back gingerale to only once every couple days.  Change in health: No.  Pt denies.  Change in activity: No.  Pt denies.  Alcohol: No.  Pt denies.  Other concerns: No    Upcoming Procedures:  Does the Patient Have any upcoming procedures that require interruption in anticoagulation therapy? no  Does the patient require bridging? no    Dose maintained after last visit.  Pt is taking 14.5mg of warfarin weekly.  Anticoagulation Summary  As of 2025      INR goal:  2.0-3.0   TTR:  53.4% (3.9 mo)   INR used for dosin.70 (2025)   Weekly warfarin total:  14.5 mg               Assessment/Plan   Therapeutic     1. New dose: no change.  Maintain dose of 14.5mg warfarin weekly.    2. Next INR: 1 week.  If therapeutic, ok to recheck INR in 2 weeks.      Education provided to patient during the visit:  Patient instructed to call in interim with questions, concerns and changes.   Patient educated on interactions between medications and warfarin.   Patient educated on dietary consistency in vitamin k consumption.   Patient educated on affects of alcohol consumption while taking warfarin.   Patient educated on signs of bleeding/clotting.   Patient educated on compliance with dosing, follow up appointments, and  prescribed plan of care.

## 2025-07-30 ENCOUNTER — ANTICOAGULATION - WARFARIN VISIT (OUTPATIENT)
Dept: CARDIOLOGY | Facility: CLINIC | Age: 70
End: 2025-07-30
Payer: MEDICARE

## 2025-07-30 DIAGNOSIS — I82.90 VENOUS THROMBOEMBOLISM (VTE): ICD-10-CM

## 2025-07-30 DIAGNOSIS — I51.3 RV (RIGHT VENTRICULAR) MURAL THROMBUS: Primary | ICD-10-CM

## 2025-07-30 LAB
POC INR: 2.4 (ref 0.9–1.1)
POC PROTHROMBIN TIME: NORMAL (ref 9.3–12.5)

## 2025-07-30 PROCEDURE — 99211 OFF/OP EST MAY X REQ PHY/QHP: CPT | Performed by: INTERNAL MEDICINE

## 2025-07-30 PROCEDURE — 85610 PROTHROMBIN TIME: CPT | Mod: QW | Performed by: INTERNAL MEDICINE

## 2025-07-30 NOTE — PROGRESS NOTES
Patient identification verified with 2 identifiers.    Location: [facility] - [id number] [place] [id number] [phone]     Referring Physician: Dr. Jesse Bajwa  Enrollment/ Re-enrollment date: 2026   INR Goal: 2.0-3.0  INR monitoring is per [facility] protocol.  Anticoagulation Medication: warfarin  Indication: Right Ventricular Mural Thrombus    Subjective   Bleeding signs/symptoms: No    Bruising: No   Major bleeding event: No  Thrombosis signs/symptoms: No  Thromboembolic event: No  Missed doses: No  Extra doses: No  Medication changes: No  Dietary changes: No  Change in health: No  Change in activity: No  Alcohol: No  Other concerns: No    Upcoming Procedures:  Does the Patient Have any upcoming procedures that require interruption in anticoagulation therapy? no  Does the patient require bridging? no      Anticoagulation Summary  As of 2025      INR goal:  2.0-3.0   TTR:  56.0% (4.2 mo)   INR used for dosin.40 (2025)   Weekly warfarin total:  14.5 mg               Assessment/Plan   Therapeutic     1. New dose: no change    2. Next INR: 1 week  Next appt in 1 week per patient request.       Education provided to patient during the visit:  Patient instructed to call in interim with questions, concerns and changes.   Patient educated on interactions between medications and warfarin.   Patient educated on dietary consistency in vitamin k consumption.   Patient educated on signs of bleeding/clotting.   Patient educated on compliance with dosing, follow up appointments, and prescribed plan of care.

## 2025-08-06 ENCOUNTER — ANTICOAGULATION - WARFARIN VISIT (OUTPATIENT)
Dept: CARDIOLOGY | Facility: CLINIC | Age: 70
End: 2025-08-06
Payer: MEDICARE

## 2025-08-06 ENCOUNTER — APPOINTMENT (OUTPATIENT)
Dept: CARDIOLOGY | Facility: CLINIC | Age: 70
End: 2025-08-06
Payer: MEDICARE

## 2025-08-06 VITALS
HEIGHT: 62 IN | BODY MASS INDEX: 33.13 KG/M2 | SYSTOLIC BLOOD PRESSURE: 140 MMHG | OXYGEN SATURATION: 98 % | HEART RATE: 62 BPM | WEIGHT: 180 LBS | DIASTOLIC BLOOD PRESSURE: 82 MMHG

## 2025-08-06 DIAGNOSIS — I25.10 CORONARY ARTERY DISEASE INVOLVING NATIVE CORONARY ARTERY OF NATIVE HEART WITHOUT ANGINA PECTORIS: ICD-10-CM

## 2025-08-06 DIAGNOSIS — I82.90 VENOUS THROMBOEMBOLISM (VTE): Primary | ICD-10-CM

## 2025-08-06 DIAGNOSIS — I82.90 VENOUS THROMBOEMBOLISM (VTE): ICD-10-CM

## 2025-08-06 DIAGNOSIS — I51.3 RV (RIGHT VENTRICULAR) MURAL THROMBUS: Primary | ICD-10-CM

## 2025-08-06 LAB
POC INR: 2.4 (ref 0.9–1.1)
POC PROTHROMBIN TIME: ABNORMAL (ref 9.3–12.5)

## 2025-08-06 PROCEDURE — 3079F DIAST BP 80-89 MM HG: CPT | Performed by: INTERNAL MEDICINE

## 2025-08-06 PROCEDURE — 3008F BODY MASS INDEX DOCD: CPT | Performed by: INTERNAL MEDICINE

## 2025-08-06 PROCEDURE — 1159F MED LIST DOCD IN RCRD: CPT | Performed by: INTERNAL MEDICINE

## 2025-08-06 PROCEDURE — 85610 PROTHROMBIN TIME: CPT | Mod: QW | Performed by: INTERNAL MEDICINE

## 2025-08-06 PROCEDURE — 3077F SYST BP >= 140 MM HG: CPT | Performed by: INTERNAL MEDICINE

## 2025-08-06 PROCEDURE — 99211 OFF/OP EST MAY X REQ PHY/QHP: CPT

## 2025-08-06 PROCEDURE — 99213 OFFICE O/P EST LOW 20 MIN: CPT | Performed by: INTERNAL MEDICINE

## 2025-08-06 NOTE — PROGRESS NOTES
Patient identification verified with 2 identifiers.    Location: [facility] [place] - [address] [place] [id number] [phone]     Referring Physician: Dr. Jesse Bajwa  Enrollment/ Re-enrollment date: 2026   INR Goal: 2.0-3.0  INR monitoring is per [facility] protocol.  Anticoagulation Medication: warfarin  Indication: Right Ventricular Mural Thrombus    Subjective   Bleeding signs/symptoms: No    Bruising: No   Major bleeding event: No  Thrombosis signs/symptoms: No  Thromboembolic event: No  Missed doses: No  Extra doses: No  Medication changes: No  Dietary changes: No  Change in health: No  Change in activity: No  Alcohol: No  Other concerns: No    Upcoming Procedures:  Does the Patient Have any upcoming procedures that require interruption in anticoagulation therapy? no  Does the patient require bridging? no      Anticoagulation Summary  As of 2025      INR goal:  2.0-3.0   TTR:  58.4% (4.4 mo)   INR used for dosin.40 (2025)   Weekly warfarin total:  14.5 mg               Assessment/Plan   Therapeutic     1. New dose: no change    2. Next INR: 2 weeks      Education provided to patient during the visit:  Patient instructed to call in interim with questions, concerns and changes.   Patient educated on interactions between medications and warfarin.   Patient educated on dietary consistency in vitamin k consumption.   Patient educated on signs of bleeding/clotting.   Patient educated on compliance with dosing, follow up appointments, and prescribed plan of care.

## 2025-08-07 ENCOUNTER — DOCUMENTATION (OUTPATIENT)
Dept: CARDIOLOGY | Facility: CLINIC | Age: 70
End: 2025-08-07
Payer: MEDICARE

## 2025-08-07 DIAGNOSIS — I51.3 RV (RIGHT VENTRICULAR) MURAL THROMBUS: Primary | ICD-10-CM

## 2025-08-08 ENCOUNTER — APPOINTMENT (OUTPATIENT)
Dept: CARDIOLOGY | Facility: CLINIC | Age: 70
End: 2025-08-08
Payer: MEDICARE

## 2025-08-15 ENCOUNTER — TELEPHONE (OUTPATIENT)
Dept: CARDIOLOGY | Facility: CLINIC | Age: 70
End: 2025-08-15
Payer: MEDICARE

## 2025-08-15 ENCOUNTER — TELEPHONE (OUTPATIENT)
Dept: PRIMARY CARE | Facility: CLINIC | Age: 70
End: 2025-08-15
Payer: MEDICARE

## 2025-08-20 ENCOUNTER — ANTICOAGULATION - WARFARIN VISIT (OUTPATIENT)
Dept: CARDIOLOGY | Facility: CLINIC | Age: 70
End: 2025-08-20
Payer: MEDICARE

## 2025-08-20 DIAGNOSIS — I82.90 VENOUS THROMBOEMBOLISM (VTE): ICD-10-CM

## 2025-08-20 DIAGNOSIS — I51.3 RV (RIGHT VENTRICULAR) MURAL THROMBUS: Primary | ICD-10-CM

## 2025-08-20 LAB
POC INR: 1.8 (ref 0.9–1.1)
POC PROTHROMBIN TIME: ABNORMAL (ref 9.3–12.5)

## 2025-08-20 PROCEDURE — 85610 PROTHROMBIN TIME: CPT | Mod: QW | Performed by: INTERNAL MEDICINE

## 2025-08-20 PROCEDURE — 99211 OFF/OP EST MAY X REQ PHY/QHP: CPT

## 2025-08-27 ENCOUNTER — ANTICOAGULATION - WARFARIN VISIT (OUTPATIENT)
Dept: CARDIOLOGY | Facility: CLINIC | Age: 70
End: 2025-08-27
Payer: MEDICARE

## 2025-08-27 DIAGNOSIS — I51.3 RV (RIGHT VENTRICULAR) MURAL THROMBUS: Primary | ICD-10-CM

## 2025-08-27 DIAGNOSIS — I82.90 VENOUS THROMBOEMBOLISM (VTE): ICD-10-CM

## 2025-08-27 LAB
POC INR: 1.5 (ref 0.9–1.1)
POC PROTHROMBIN TIME: ABNORMAL (ref 9.3–12.5)

## 2025-08-27 PROCEDURE — 99211 OFF/OP EST MAY X REQ PHY/QHP: CPT

## 2025-08-27 PROCEDURE — 85610 PROTHROMBIN TIME: CPT | Mod: QW | Performed by: INTERNAL MEDICINE

## 2025-09-03 ENCOUNTER — ANTICOAGULATION - WARFARIN VISIT (OUTPATIENT)
Dept: CARDIOLOGY | Facility: CLINIC | Age: 70
End: 2025-09-03
Payer: MEDICARE

## 2025-09-03 DIAGNOSIS — I82.90 VENOUS THROMBOEMBOLISM (VTE): ICD-10-CM

## 2025-09-03 DIAGNOSIS — I51.3 RV (RIGHT VENTRICULAR) MURAL THROMBUS: Primary | ICD-10-CM

## 2025-09-03 LAB
POC INR: 1.9 (ref 2–3)
POC PROTHROMBIN TIME: ABNORMAL (ref 9.3–12.5)

## 2025-09-03 PROCEDURE — 99211 OFF/OP EST MAY X REQ PHY/QHP: CPT

## 2025-09-03 PROCEDURE — 85610 PROTHROMBIN TIME: CPT | Mod: QW | Performed by: INTERNAL MEDICINE

## 2025-09-04 DIAGNOSIS — R60.0 BILATERAL LEG EDEMA: ICD-10-CM

## 2025-09-04 DIAGNOSIS — E87.6 HYPOKALEMIA: ICD-10-CM

## 2025-09-04 RX ORDER — POTASSIUM CHLORIDE 20 MEQ/1
TABLET, EXTENDED RELEASE ORAL
Qty: 180 TABLET | Refills: 1 | Status: SHIPPED | OUTPATIENT
Start: 2025-09-04

## 2025-09-04 RX ORDER — FUROSEMIDE 80 MG/1
80 TABLET ORAL DAILY
Qty: 90 TABLET | Refills: 1 | Status: SHIPPED | OUTPATIENT
Start: 2025-09-04

## 2025-10-10 ENCOUNTER — APPOINTMENT (OUTPATIENT)
Dept: CARDIOLOGY | Facility: CLINIC | Age: 70
End: 2025-10-10
Payer: MEDICARE

## 2025-11-05 ENCOUNTER — APPOINTMENT (OUTPATIENT)
Dept: CARDIOLOGY | Facility: CLINIC | Age: 70
End: 2025-11-05
Payer: MEDICARE

## (undated) DEVICE — PLEDGET, PTFE, SOFT, LARGE, 3/8 X 3/16 X 1/16 IN

## (undated) DEVICE — LOOP, VESSEL, MAXI, BLUE

## (undated) DEVICE — DILATOR, VESSEL, COONS, 20FR X 20CM

## (undated) DEVICE — CATHETER, ANGIO, IMPULSE, FR4, 5 FR X 125 CM

## (undated) DEVICE — ACCESS KIT, S-MAK MINI, 4FR 10CM 0.018IN 40CM, NT/PT, ECHO ENHANCE NEEDLE

## (undated) DEVICE — SYRINGE, 20 CC, LUER LOCK, MONOJECT, W/O CAP, LF

## (undated) DEVICE — LIGASURE, MARYLAND JAW, OPEN DELIVERY

## (undated) DEVICE — SENSOR, OXYGEN, ADULT, INVOS KIT

## (undated) DEVICE — SUTURE, PROLENE, 6-0, 30 IN, C-1, CV-11, BLUE

## (undated) DEVICE — PAD, GROUNDING, ELECTROSURGICAL, W/9 FT CABLE, POLYHESIVE II, ADULT, LF

## (undated) DEVICE — SUTURE, PROLENE, 4-0, TAPER POINT, SH/SH BLUE 36IN

## (undated) DEVICE — INSERT, CLAMP, SURGICAL, SOFT/TRACTION, STEALTH, 5 MM

## (undated) DEVICE — CONTAINER, SPECIMEN, 120 ML, STERILE

## (undated) DEVICE — TR BAND, RADIAL COMPRESSION, STANDARD, 24CM

## (undated) DEVICE — DRAPE, SHEET, MINOR PROCEDURE, T, PEDIATRIC, 100X122X77

## (undated) DEVICE — DRAPE, PAD, INSTRUMENT, MAGNETIC, MEDIUM, 10 X 16 IN, DISPOSABLE

## (undated) DEVICE — NEEDLE, SPINAL, LUMBAR PUNCTURE, NEONATAL, 22 G X 2.5 IN, BLACK HUB

## (undated) DEVICE — CATHETER, OPTITORQUE, 5FR, TIG, 1H/100CM

## (undated) DEVICE — DRAPE, INSTRUMENT, W/POUCH, STERI DRAPE, 7 X 11 IN, DISPOSABLE, STERILE

## (undated) DEVICE — GUIDEWIRE, .035 X 180 BENTSON STR

## (undated) DEVICE — EXTENSION SET W/MALE LUER LOCK ADAPTER, VOLUME 2.4ML

## (undated) DEVICE — TUBE, SALEM SUMP, 16 FR X 48IN, ENFIT

## (undated) DEVICE — Device

## (undated) DEVICE — CLIP, LIGATING, HORIZON, LARGE, TITANIUM

## (undated) DEVICE — SUTURE, SILK, 2-0, 30 IN, SH, BLACK

## (undated) DEVICE — COVER, CART, 45 X 27 X 48 IN, CLEAR

## (undated) DEVICE — COVER, MAYO STAND, W/PAD, 23 IN, DISPOSABLE, PLASTIC, LF, STERILE

## (undated) DEVICE — CATHETER, VTE-PPP

## (undated) DEVICE — COVER, TABLE, UHC

## (undated) DEVICE — COLLECTION UNIT, DRAINAGE, THORACIC, SINGLE TUBE, DRY SUCTION, ATS COMPATIBLE, OASIS 3600, LF

## (undated) DEVICE — SYSTEM, FLOWSAVER, BLOOD RETURN

## (undated) DEVICE — KIT, BEACH CHAIR TRIMANO

## (undated) DEVICE — SPONGE, LAP, XRAY DECT, 18IN X 18IN, W/MASTER DMT, STERILE

## (undated) DEVICE — SUTURE, PROLENE, 3-0, 36 IN, MHMH

## (undated) DEVICE — TRAY, SURESTEP, URINE METER, 14FR, SILICONE

## (undated) DEVICE — SHEATH, GLIDESHEATH, SLENDER, 6FR 10CM

## (undated) DEVICE — SUTURE, SILK, 2-0, TIES, 12-30 IN, BLACK

## (undated) DEVICE — SUTURE, PROLENE, 3-0, 48 IN, SH, DA, BLUE

## (undated) DEVICE — INSERT, EVERGRIP 61

## (undated) DEVICE — TAPE, UMBILICAL, 1/8 X 30 IN, MULTIPACK, COTTON, WHITE

## (undated) DEVICE — GUIDEWIRE, HI-TORQUE, VERSACORE, 260CM, FLOPPY

## (undated) DEVICE — SHEATH, PINNACLE, 10 CM,  5FR INTRODUCER, 5FR DIA, 2.5 CM DIALATOR

## (undated) DEVICE — STAPLER, SKIN PROXIMATE, 35 WIDE

## (undated) DEVICE — TIP,  ELECTRODE COATED INSULATED, EXTENDED, LF

## (undated) DEVICE — SYRINGE, 60 CC, LUER LOCK, MONOJECT, W/O CAP, LF

## (undated) DEVICE — DRESSING, MEPILEX, BORDER, HEEL, 8.7 X 9.1 IN

## (undated) DEVICE — CATHETER, THORACIC, STRAIGHT, ADULT, 28 FR, PVC

## (undated) DEVICE — APPLICATOR, CHLORAPREP, W/ORANGE TINT, 26ML

## (undated) DEVICE — SPONGE, HEMOSTATIC, CELLULOSE, SURGICEL, NU-KNIT, 6 X 9 IN

## (undated) DEVICE — SUTURE, SILK, 3-0, 30 IN, MULTIPACK, BLACK

## (undated) DEVICE — STOPCOCK, 4 WAY, SMALL BODY, W/SWIVEL, ULTRA, LIPD RESISTANT, LUER LOCK, MALE, LF

## (undated) DEVICE — DRESSING, MEPILEX, BORDER, SACRUM, 8.7 X 9.8 IN

## (undated) DEVICE — TOWEL, OR, XRAY DETECT 5 PK, WHITE, 17X26, W/DMT TAG, ST

## (undated) DEVICE — SUTURE, SILK, 0, 24 IN, SUTUPAK, BLACK

## (undated) DEVICE — PITCHER, GRADUATE, 32 OZ (1200CC), STERILE

## (undated) DEVICE — COVER, TABLE, 44 X 75 IN, DISPOSABLE, LF, STERILE

## (undated) DEVICE — INSERT, CLAMP, SURGICAL, SOFT/TRACTION, STEALTH, 1 MM

## (undated) DEVICE — LOOP, VESSEL, MAXI, YELLOW

## (undated) DEVICE — DRESSING, ADHESIVE, ISLAND, TELFA, 4 X 14 IN

## (undated) DEVICE — CONNECTOR, STRAIGHT, 0.375 X 0.375 IN

## (undated) DEVICE — STRAP, CIRCUMFERENTIAL, 2 X 76""

## (undated) DEVICE — SUTURE, VICRYL, 2-0, 27 IN, CT-1, VIOLET

## (undated) DEVICE — GOWN, SURGICAL, SMARTGOWN, XLARGE, STERILE

## (undated) DEVICE — SEALANT, HEMOSTATIC, FLOSEAL, 10 ML

## (undated) DEVICE — MANIFOLD, 4 PORT NEPTUNE STANDARD

## (undated) DEVICE — RETRACTOR, GLASSMAN, VISCERA, FISH, SMALL, STERILE

## (undated) DEVICE — GLOVE, SURGICAL, PI ORTHO PRO, BIOGEL, SZ 7.5

## (undated) DEVICE — DRAPE, SHEET, FAN FOLDED, HALF, 44 X 58 IN, DISPOSABLE, LF, STERILE

## (undated) DEVICE — DEVICE, SUTURE RETENTION, FLOWSTATSIS

## (undated) DEVICE — PROTECTOR, NERVE, ULNAR, PINK

## (undated) DEVICE — COVER, EQUIPMENT, SOLUTION, SLUSH, 112 X 168 CM, LF, STERILE

## (undated) DEVICE — SPONGE, HEMOSTATIC, GELATIN, SURGIFOAM, 8 X 12.5 CM X 10 MM

## (undated) DEVICE — GUIDEWIRE, INQWIRE, 3MM J, .035 X 210CM, FIXED

## (undated) DEVICE — CATHETER, TRIEVER, THROMBECTOMY, 24FR   (PPP)

## (undated) DEVICE — BAG, DECANTER

## (undated) DEVICE — SUTURE, VICRYL, 3-0, 27 IN, SH

## (undated) DEVICE — CAUTERY, PENCIL, PUSH BUTTON, SMOKE EVAC, 70MM

## (undated) DEVICE — SUTURE, GUT, 0, 36 IN, CT-1, BROWN

## (undated) DEVICE — SUTURE, PROLENE, 5-0, 36 IN, C-1, CV-11, BLUE

## (undated) DEVICE — GEL, ULTRASOUND, AQUASONIC 100, 20 GM, STERILE

## (undated) DEVICE — DRAPE, TOWEL, STERI DRAPE, 17 X 11 IN, PLASTIC, STERILE

## (undated) DEVICE — COVER PROBE, SOFT FLEX W/ GEL, 5 X 48 IN (13X122CM)

## (undated) DEVICE — SUTURE, SILK, 3-0, 30 IN, BR SH, BLACK

## (undated) DEVICE — DRAPE, SHEET, CARDIOVASCULAR, ANTIMICROBIAL, W/ANESTHESIA SCREEN, IOBAN 2, STERI DRAPE, 107 X 133 IN, DISPOSABLE, FABRIC, BLUE, STERILE

## (undated) DEVICE — SHEATH, INTRI24,  24FR

## (undated) DEVICE — GUIDEWIRE, STRAIGHT, AMPLATZ SUPER STIFF ST-1, 0.035 IN X 260 CM